# Patient Record
Sex: FEMALE | Race: WHITE | URBAN - METROPOLITAN AREA
[De-identification: names, ages, dates, MRNs, and addresses within clinical notes are randomized per-mention and may not be internally consistent; named-entity substitution may affect disease eponyms.]

---

## 2017-03-27 ENCOUNTER — RECORDS - HEALTHEAST (OUTPATIENT)
Dept: LAB | Facility: CLINIC | Age: 82
End: 2017-03-27

## 2017-03-27 LAB — HBA1C MFR BLD: 7.4 % (ref 4.2–6.1)

## 2017-03-28 ENCOUNTER — TRANSFERRED RECORDS (OUTPATIENT)
Dept: HEALTH INFORMATION MANAGEMENT | Facility: CLINIC | Age: 82
End: 2017-03-28

## 2017-03-28 ENCOUNTER — RECORDS - HEALTHEAST (OUTPATIENT)
Dept: LAB | Facility: CLINIC | Age: 82
End: 2017-03-28

## 2017-03-28 LAB — HBA1C MFR BLD: 7.4 % (ref 4.2–6.1)

## 2017-06-14 ENCOUNTER — HOSPITAL ENCOUNTER (INPATIENT)
Facility: CLINIC | Age: 82
LOS: 5 days | Discharge: HOME | DRG: 853 | End: 2017-06-19
Attending: EMERGENCY MEDICINE | Admitting: INTERNAL MEDICINE
Payer: COMMERCIAL

## 2017-06-14 ENCOUNTER — APPOINTMENT (OUTPATIENT)
Dept: GENERAL RADIOLOGY | Facility: CLINIC | Age: 82
DRG: 853 | End: 2017-06-14
Attending: EMERGENCY MEDICINE
Payer: COMMERCIAL

## 2017-06-14 DIAGNOSIS — J18.9 PNEUMONIA OF LEFT LUNG DUE TO INFECTIOUS ORGANISM, UNSPECIFIED PART OF LUNG: ICD-10-CM

## 2017-06-14 DIAGNOSIS — T83.511A URINARY TRACT INFECTION ASSOCIATED WITH CATHETERIZATION OF URINARY TRACT, UNSPECIFIED INDWELLING URINARY CATHETER TYPE, INITIAL ENCOUNTER (H): ICD-10-CM

## 2017-06-14 DIAGNOSIS — A41.9 SEPSIS, DUE TO UNSPECIFIED ORGANISM: ICD-10-CM

## 2017-06-14 DIAGNOSIS — M19.019 PRIMARY LOCALIZED OSTEOARTHROSIS OF SHOULDER REGION, UNSPECIFIED LATERALITY: Primary | ICD-10-CM

## 2017-06-14 DIAGNOSIS — L03.317 CELLULITIS OF BUTTOCK: ICD-10-CM

## 2017-06-14 DIAGNOSIS — N39.0 URINARY TRACT INFECTION ASSOCIATED WITH CATHETERIZATION OF URINARY TRACT, UNSPECIFIED INDWELLING URINARY CATHETER TYPE, INITIAL ENCOUNTER (H): ICD-10-CM

## 2017-06-14 LAB
ALBUMIN SERPL-MCNC: 2.5 G/DL (ref 3.4–5)
ALBUMIN UR-MCNC: 100 MG/DL
ALP SERPL-CCNC: 96 U/L (ref 40–150)
ALT SERPL W P-5'-P-CCNC: 22 U/L (ref 0–50)
ANION GAP SERPL CALCULATED.3IONS-SCNC: 7 MMOL/L (ref 3–14)
APPEARANCE UR: ABNORMAL
AST SERPL W P-5'-P-CCNC: 20 U/L (ref 0–45)
BACTERIA #/AREA URNS HPF: ABNORMAL /HPF
BASOPHILS # BLD AUTO: 0.1 10E9/L (ref 0–0.2)
BASOPHILS NFR BLD AUTO: 0.3 %
BILIRUB SERPL-MCNC: 0.5 MG/DL (ref 0.2–1.3)
BILIRUB UR QL STRIP: NEGATIVE
BUN SERPL-MCNC: 60 MG/DL (ref 7–30)
CALCIUM SERPL-MCNC: 9 MG/DL (ref 8.5–10.1)
CHLORIDE SERPL-SCNC: 110 MMOL/L (ref 94–109)
CO2 SERPL-SCNC: 27 MMOL/L (ref 20–32)
COLOR UR AUTO: YELLOW
CREAT SERPL-MCNC: 1.28 MG/DL (ref 0.52–1.04)
DIFFERENTIAL METHOD BLD: ABNORMAL
EOSINOPHIL # BLD AUTO: 0.3 10E9/L (ref 0–0.7)
EOSINOPHIL NFR BLD AUTO: 1.7 %
ERYTHROCYTE [DISTWIDTH] IN BLOOD BY AUTOMATED COUNT: 14.9 % (ref 10–15)
GFR SERPL CREATININE-BSD FRML MDRD: 39 ML/MIN/1.7M2
GLUCOSE BLDC GLUCOMTR-MCNC: 162 MG/DL (ref 70–99)
GLUCOSE SERPL-MCNC: 275 MG/DL (ref 70–99)
GLUCOSE UR STRIP-MCNC: 70 MG/DL
HCT VFR BLD AUTO: 39.6 % (ref 35–47)
HGB BLD-MCNC: 12.4 G/DL (ref 11.7–15.7)
HGB UR QL STRIP: ABNORMAL
IMM GRANULOCYTES # BLD: 0.1 10E9/L (ref 0–0.4)
IMM GRANULOCYTES NFR BLD: 0.5 %
INR PPP: 1.1 (ref 0.86–1.14)
KETONES UR STRIP-MCNC: NEGATIVE MG/DL
LACTATE BLD-SCNC: 2.6 MMOL/L (ref 0.7–2.1)
LACTATE BLD-SCNC: 3.5 MMOL/L (ref 0.7–2.1)
LEUKOCYTE ESTERASE UR QL STRIP: ABNORMAL
LIPASE SERPL-CCNC: 130 U/L (ref 73–393)
LYMPHOCYTES # BLD AUTO: 2.8 10E9/L (ref 0.8–5.3)
LYMPHOCYTES NFR BLD AUTO: 15.1 %
MCH RBC QN AUTO: 30.7 PG (ref 26.5–33)
MCHC RBC AUTO-ENTMCNC: 31.3 G/DL (ref 31.5–36.5)
MCV RBC AUTO: 98 FL (ref 78–100)
MONOCYTES # BLD AUTO: 1.2 10E9/L (ref 0–1.3)
MONOCYTES NFR BLD AUTO: 6.2 %
NEUTROPHILS # BLD AUTO: 14.3 10E9/L (ref 1.6–8.3)
NEUTROPHILS NFR BLD AUTO: 76.2 %
NITRATE UR QL: POSITIVE
NRBC # BLD AUTO: 0 10*3/UL
NRBC BLD AUTO-RTO: 0 /100
PH UR STRIP: 6.5 PH (ref 5–7)
PLATELET # BLD AUTO: 386 10E9/L (ref 150–450)
POTASSIUM SERPL-SCNC: 4.1 MMOL/L (ref 3.4–5.3)
PROT SERPL-MCNC: 8.2 G/DL (ref 6.8–8.8)
RBC # BLD AUTO: 4.04 10E12/L (ref 3.8–5.2)
RBC #/AREA URNS AUTO: 0 /HPF (ref 0–2)
SODIUM SERPL-SCNC: 144 MMOL/L (ref 133–144)
SP GR UR STRIP: 1.01 (ref 1–1.03)
URN SPEC COLLECT METH UR: ABNORMAL
UROBILINOGEN UR STRIP-MCNC: NORMAL MG/DL (ref 0–2)
WBC # BLD AUTO: 18.7 10E9/L (ref 4–11)
WBC #/AREA URNS AUTO: >182 /HPF (ref 0–2)
WBC CLUMPS #/AREA URNS HPF: PRESENT /HPF

## 2017-06-14 PROCEDURE — 87088 URINE BACTERIA CULTURE: CPT | Performed by: EMERGENCY MEDICINE

## 2017-06-14 PROCEDURE — 87186 SC STD MICRODIL/AGAR DIL: CPT | Performed by: EMERGENCY MEDICINE

## 2017-06-14 PROCEDURE — 87106 FUNGI IDENTIFICATION YEAST: CPT | Performed by: EMERGENCY MEDICINE

## 2017-06-14 PROCEDURE — 83690 ASSAY OF LIPASE: CPT | Performed by: EMERGENCY MEDICINE

## 2017-06-14 PROCEDURE — 81001 URINALYSIS AUTO W/SCOPE: CPT | Performed by: EMERGENCY MEDICINE

## 2017-06-14 PROCEDURE — 85025 COMPLETE CBC W/AUTO DIFF WBC: CPT | Performed by: EMERGENCY MEDICINE

## 2017-06-14 PROCEDURE — 80053 COMPREHEN METABOLIC PANEL: CPT | Performed by: EMERGENCY MEDICINE

## 2017-06-14 PROCEDURE — 40000275 ZZH STATISTIC RCP TIME EA 10 MIN

## 2017-06-14 PROCEDURE — 83605 ASSAY OF LACTIC ACID: CPT | Performed by: INTERNAL MEDICINE

## 2017-06-14 PROCEDURE — 83605 ASSAY OF LACTIC ACID: CPT | Performed by: EMERGENCY MEDICINE

## 2017-06-14 PROCEDURE — 36415 COLL VENOUS BLD VENIPUNCTURE: CPT | Performed by: EMERGENCY MEDICINE

## 2017-06-14 PROCEDURE — 96365 THER/PROPH/DIAG IV INF INIT: CPT

## 2017-06-14 PROCEDURE — 87040 BLOOD CULTURE FOR BACTERIA: CPT | Performed by: EMERGENCY MEDICINE

## 2017-06-14 PROCEDURE — 12000000 ZZH R&B MED SURG/OB

## 2017-06-14 PROCEDURE — 87077 CULTURE AEROBIC IDENTIFY: CPT | Performed by: EMERGENCY MEDICINE

## 2017-06-14 PROCEDURE — 99223 1ST HOSP IP/OBS HIGH 75: CPT | Mod: AI | Performed by: INTERNAL MEDICINE

## 2017-06-14 PROCEDURE — 96368 THER/DIAG CONCURRENT INF: CPT

## 2017-06-14 PROCEDURE — 96361 HYDRATE IV INFUSION ADD-ON: CPT

## 2017-06-14 PROCEDURE — 25000128 H RX IP 250 OP 636

## 2017-06-14 PROCEDURE — 25000131 ZZH RX MED GY IP 250 OP 636 PS 637: Performed by: INTERNAL MEDICINE

## 2017-06-14 PROCEDURE — 25000132 ZZH RX MED GY IP 250 OP 250 PS 637: Performed by: INTERNAL MEDICINE

## 2017-06-14 PROCEDURE — 25000132 ZZH RX MED GY IP 250 OP 250 PS 637: Performed by: EMERGENCY MEDICINE

## 2017-06-14 PROCEDURE — 25000128 H RX IP 250 OP 636: Performed by: INTERNAL MEDICINE

## 2017-06-14 PROCEDURE — 25000128 H RX IP 250 OP 636: Performed by: EMERGENCY MEDICINE

## 2017-06-14 PROCEDURE — 71020 XR CHEST 2 VW: CPT

## 2017-06-14 PROCEDURE — 87086 URINE CULTURE/COLONY COUNT: CPT | Performed by: EMERGENCY MEDICINE

## 2017-06-14 PROCEDURE — 85610 PROTHROMBIN TIME: CPT | Performed by: EMERGENCY MEDICINE

## 2017-06-14 PROCEDURE — 96367 TX/PROPH/DG ADDL SEQ IV INF: CPT

## 2017-06-14 PROCEDURE — 36415 COLL VENOUS BLD VENIPUNCTURE: CPT

## 2017-06-14 PROCEDURE — 99285 EMERGENCY DEPT VISIT HI MDM: CPT | Mod: 25

## 2017-06-14 PROCEDURE — 36415 COLL VENOUS BLD VENIPUNCTURE: CPT | Performed by: INTERNAL MEDICINE

## 2017-06-14 PROCEDURE — 00000146 ZZHCL STATISTIC GLUCOSE BY METER IP

## 2017-06-14 PROCEDURE — 87070 CULTURE OTHR SPECIMN AEROBIC: CPT | Performed by: EMERGENCY MEDICINE

## 2017-06-14 RX ORDER — ACETAMINOPHEN 650 MG/1
650 SUPPOSITORY RECTAL ONCE
Status: COMPLETED | OUTPATIENT
Start: 2017-06-14 | End: 2017-06-14

## 2017-06-14 RX ORDER — OXYCODONE HCL 5 MG/5 ML
2 SOLUTION, ORAL ORAL EVERY 4 HOURS PRN
Status: DISCONTINUED | OUTPATIENT
Start: 2017-06-14 | End: 2017-06-19 | Stop reason: HOSPADM

## 2017-06-14 RX ORDER — BISACODYL 10 MG
10 SUPPOSITORY, RECTAL RECTAL DAILY PRN
Status: DISCONTINUED | OUTPATIENT
Start: 2017-06-14 | End: 2017-06-19 | Stop reason: HOSPADM

## 2017-06-14 RX ORDER — ACETAMINOPHEN 325 MG/1
650 TABLET ORAL 3 TIMES DAILY
Status: DISCONTINUED | OUTPATIENT
Start: 2017-06-14 | End: 2017-06-19 | Stop reason: HOSPADM

## 2017-06-14 RX ORDER — POTASSIUM CHLORIDE 29.8 MG/ML
20 INJECTION INTRAVENOUS
Status: DISCONTINUED | OUTPATIENT
Start: 2017-06-14 | End: 2017-06-19 | Stop reason: HOSPADM

## 2017-06-14 RX ORDER — NALOXONE HYDROCHLORIDE 0.4 MG/ML
.1-.4 INJECTION, SOLUTION INTRAMUSCULAR; INTRAVENOUS; SUBCUTANEOUS
Status: DISCONTINUED | OUTPATIENT
Start: 2017-06-14 | End: 2017-06-19 | Stop reason: HOSPADM

## 2017-06-14 RX ORDER — SODIUM CHLORIDE 9 MG/ML
INJECTION, SOLUTION INTRAVENOUS CONTINUOUS
Status: DISCONTINUED | OUTPATIENT
Start: 2017-06-14 | End: 2017-06-15

## 2017-06-14 RX ORDER — LACTOBACILLUS RHAMNOSUS GG 10B CELL
1 CAPSULE ORAL DAILY
Status: DISCONTINUED | OUTPATIENT
Start: 2017-06-14 | End: 2017-06-19 | Stop reason: HOSPADM

## 2017-06-14 RX ORDER — OXYCODONE HCL 5 MG/5 ML
2 SOLUTION, ORAL ORAL EVERY 4 HOURS PRN
Status: ON HOLD | COMMUNITY
End: 2017-06-19

## 2017-06-14 RX ORDER — VANCOMYCIN HYDROCHLORIDE 1 G/200ML
1000 INJECTION, SOLUTION INTRAVENOUS ONCE
Status: COMPLETED | OUTPATIENT
Start: 2017-06-14 | End: 2017-06-14

## 2017-06-14 RX ORDER — NICOTINE POLACRILEX 4 MG
15-30 LOZENGE BUCCAL
Status: DISCONTINUED | OUTPATIENT
Start: 2017-06-14 | End: 2017-06-19 | Stop reason: HOSPADM

## 2017-06-14 RX ORDER — INSULIN GLARGINE 100 [IU]/ML
36 INJECTION, SOLUTION SUBCUTANEOUS EVERY MORNING
Status: ON HOLD | COMMUNITY
End: 2017-07-08

## 2017-06-14 RX ORDER — INSULIN GLARGINE 100 [IU]/ML
34 INJECTION, SOLUTION SUBCUTANEOUS AT BEDTIME
Status: ON HOLD | COMMUNITY
End: 2017-07-08

## 2017-06-14 RX ORDER — MULTIVITAMIN,THERAPEUTIC
1 TABLET ORAL DAILY
COMMUNITY

## 2017-06-14 RX ORDER — LEVOFLOXACIN 5 MG/ML
250 INJECTION, SOLUTION INTRAVENOUS EVERY 24 HOURS
Status: DISCONTINUED | OUTPATIENT
Start: 2017-06-15 | End: 2017-06-17

## 2017-06-14 RX ORDER — SODIUM CHLORIDE 9 MG/ML
1000 INJECTION, SOLUTION INTRAVENOUS CONTINUOUS
Status: DISCONTINUED | OUTPATIENT
Start: 2017-06-14 | End: 2017-06-15

## 2017-06-14 RX ORDER — OXYCODONE HCL 5 MG/5 ML
2 SOLUTION, ORAL ORAL 2 TIMES DAILY
Status: ON HOLD | COMMUNITY
End: 2017-06-19

## 2017-06-14 RX ORDER — ONDANSETRON 4 MG/1
4 TABLET, ORALLY DISINTEGRATING ORAL EVERY 6 HOURS PRN
Status: DISCONTINUED | OUTPATIENT
Start: 2017-06-14 | End: 2017-06-19 | Stop reason: HOSPADM

## 2017-06-14 RX ORDER — ACETAMINOPHEN 325 MG/1
650 TABLET ORAL EVERY 4 HOURS PRN
Status: DISCONTINUED | OUTPATIENT
Start: 2017-06-14 | End: 2017-06-14

## 2017-06-14 RX ORDER — DEXTROSE MONOHYDRATE 25 G/50ML
25-50 INJECTION, SOLUTION INTRAVENOUS
Status: DISCONTINUED | OUTPATIENT
Start: 2017-06-14 | End: 2017-06-19 | Stop reason: HOSPADM

## 2017-06-14 RX ORDER — ONDANSETRON 2 MG/ML
4 INJECTION INTRAMUSCULAR; INTRAVENOUS EVERY 6 HOURS PRN
Status: DISCONTINUED | OUTPATIENT
Start: 2017-06-14 | End: 2017-06-19 | Stop reason: HOSPADM

## 2017-06-14 RX ORDER — LIDOCAINE 40 MG/G
CREAM TOPICAL
Status: DISCONTINUED | OUTPATIENT
Start: 2017-06-14 | End: 2017-06-19 | Stop reason: HOSPADM

## 2017-06-14 RX ORDER — POTASSIUM CHLORIDE 1500 MG/1
20-40 TABLET, EXTENDED RELEASE ORAL
Status: DISCONTINUED | OUTPATIENT
Start: 2017-06-14 | End: 2017-06-19 | Stop reason: HOSPADM

## 2017-06-14 RX ORDER — PIPERACILLIN SODIUM, TAZOBACTAM SODIUM 3; .375 G/15ML; G/15ML
3.38 INJECTION, POWDER, LYOPHILIZED, FOR SOLUTION INTRAVENOUS ONCE
Status: COMPLETED | OUTPATIENT
Start: 2017-06-14 | End: 2017-06-14

## 2017-06-14 RX ORDER — LEVOFLOXACIN 5 MG/ML
500 INJECTION, SOLUTION INTRAVENOUS ONCE
Status: COMPLETED | OUTPATIENT
Start: 2017-06-14 | End: 2017-06-14

## 2017-06-14 RX ORDER — ACETAMINOPHEN 650 MG/1
650 SUPPOSITORY RECTAL EVERY 4 HOURS PRN
Status: DISCONTINUED | OUTPATIENT
Start: 2017-06-14 | End: 2017-06-19 | Stop reason: HOSPADM

## 2017-06-14 RX ORDER — POTASSIUM CL/LIDO/0.9 % NACL 10MEQ/0.1L
10 INTRAVENOUS SOLUTION, PIGGYBACK (ML) INTRAVENOUS
Status: DISCONTINUED | OUTPATIENT
Start: 2017-06-14 | End: 2017-06-19 | Stop reason: HOSPADM

## 2017-06-14 RX ORDER — PIPERACILLIN SODIUM, TAZOBACTAM SODIUM 3; .375 G/15ML; G/15ML
3.38 INJECTION, POWDER, LYOPHILIZED, FOR SOLUTION INTRAVENOUS EVERY 6 HOURS
Status: DISCONTINUED | OUTPATIENT
Start: 2017-06-14 | End: 2017-06-17

## 2017-06-14 RX ORDER — OXYCODONE HCL 5 MG/5 ML
2 SOLUTION, ORAL ORAL 2 TIMES DAILY
Status: DISCONTINUED | OUTPATIENT
Start: 2017-06-14 | End: 2017-06-19 | Stop reason: HOSPADM

## 2017-06-14 RX ORDER — POTASSIUM CHLORIDE 7.45 MG/ML
10 INJECTION INTRAVENOUS
Status: DISCONTINUED | OUTPATIENT
Start: 2017-06-14 | End: 2017-06-19 | Stop reason: HOSPADM

## 2017-06-14 RX ORDER — POTASSIUM CHLORIDE 1.5 G/1.58G
20-40 POWDER, FOR SOLUTION ORAL
Status: DISCONTINUED | OUTPATIENT
Start: 2017-06-14 | End: 2017-06-19 | Stop reason: HOSPADM

## 2017-06-14 RX ADMIN — INSULIN ASPART 1 UNITS: 100 INJECTION, SOLUTION INTRAVENOUS; SUBCUTANEOUS at 20:40

## 2017-06-14 RX ADMIN — PIPERACILLIN SODIUM,TAZOBACTAM SODIUM 3.38 G: 3; .375 INJECTION, POWDER, FOR SOLUTION INTRAVENOUS at 20:10

## 2017-06-14 RX ADMIN — SODIUM CHLORIDE 1000 ML: 9 INJECTION, SOLUTION INTRAVENOUS at 12:20

## 2017-06-14 RX ADMIN — SODIUM CHLORIDE: 9 INJECTION, SOLUTION INTRAVENOUS at 18:08

## 2017-06-14 RX ADMIN — PIPERACILLIN SODIUM,TAZOBACTAM SODIUM 3.38 G: 3; .375 INJECTION, POWDER, FOR SOLUTION INTRAVENOUS at 14:38

## 2017-06-14 RX ADMIN — VANCOMYCIN HYDROCHLORIDE 500 MG: 5 INJECTION, POWDER, LYOPHILIZED, FOR SOLUTION INTRAVENOUS at 21:04

## 2017-06-14 RX ADMIN — MICONAZOLE NITRATE: 2 POWDER TOPICAL at 20:53

## 2017-06-14 RX ADMIN — RANITIDINE 75 MG: 75 TABLET, FILM COATED ORAL at 20:45

## 2017-06-14 RX ADMIN — LEVOFLOXACIN 500 MG: 5 INJECTION, SOLUTION INTRAVENOUS at 14:10

## 2017-06-14 RX ADMIN — Medication 1 CAPSULE: at 20:45

## 2017-06-14 RX ADMIN — SODIUM CHLORIDE 1000 ML: 9 INJECTION, SOLUTION INTRAVENOUS at 13:31

## 2017-06-14 RX ADMIN — VANCOMYCIN HYDROCHLORIDE 1000 MG: 1 INJECTION, SOLUTION INTRAVENOUS at 15:19

## 2017-06-14 RX ADMIN — ACETAMINOPHEN 650 MG: 650 SUPPOSITORY RECTAL at 13:30

## 2017-06-14 RX ADMIN — OXYCODONE HYDROCHLORIDE 2 MG: 5 SOLUTION ORAL at 20:45

## 2017-06-14 RX ADMIN — ACETAMINOPHEN 650 MG: 325 TABLET, FILM COATED ORAL at 20:46

## 2017-06-14 NOTE — H&P
DATE OF ADMISSION:  06/14/2017      CHIEF COMPLAINT:  Fever, low oxygen saturations.      HISTORY OF PRESENT ILLNESS:  Ms. Celia Lewis is a 89-year-old female with history of diabetes mellitus type 2, CHF, chronic kidney disease, coronary artery disease, hypertension, recurrent UTIs, Alzheimer dementia, with baseline nonverbal status, who was sent from her long-term care facility for low O2 sats and fever.  Reportedly, the staff at the nursing home noted that the patient's oxygen saturations were low and she also had a fever up to 100.3.  She was also tachypneic and had some labored breathing and so was sent to the emergency room.  No further information is available at this time as the patient is nonverbal at baseline because of her advanced dementia.  There is no reported recent diarrhea or vomiting.  The patient does have a feeding tube in place for nutrition.      In the emergency room, the patient was evaluated by Dr. Cortez.  Basic lab work showed a white count of 18.7 and UA consistent with UTI.  She also has a chest x-ray which showed an infiltrate in the left base.  She does have a chronic indwelling Fonseca which was changed in the emergency room.  Started on empiric antibiotics for both UTI and presumed healthcare-associated pneumonia.  She was also found to have a sacral decubitus with surrounding cellulitis.      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     Advanced directives, counseling/discussion, POLST completed 5/29/15  Full Code 5/29/2015     Alzheimer's disease 5/22/2015     Arthritis     shoulder     Atrial fibrillation (H) 5/22/2015     Calculus of kidney      Candidiasis of skin and nails 5/22/2015     Congestive heart failure, unspecified      Coronary artery disease      Heart disease, unspecified     diastolic dysfunction     Hypertension      Morbid obesity (H) 5/22/2015     Other and unspecified hyperlipidemia 5/22/2015      Personal history of urinary (tract) infection 5/22/2015     Primary localized osteoarthrosis, shoulder region 5/22/2015     Renal failure, unspecified     secondary to diuresis     Thyroid disease      Type II or unspecified type diabetes mellitus with renal manifestations, not stated as uncontrolled(250.40) 5/22/2015     Type II or unspecified type diabetes mellitus without mention of complication, not stated as uncontrolled      Unspecified hypertensive heart and kidney disease with heart failure and with chronic kidney disease stage I through stage IV, or unspecified(404.91) 5/22/2015     Uric acid stone in urine         ALLERGIES:    Allergies   Allergen Reactions     Sulfa Drugs Other (See Comments)     Per nursing home documents, patient has allergy to sulfa        SOCIAL AND PERSONAL HISTORY:   Social History     Social History     Marital status:      Spouse name: Leonel     Number of children: N/A     Years of education: N/A     Occupational History      Retired     Social History Main Topics     Smoking status: Former Smoker     Smokeless tobacco: Never Used      Comment: She was a social smoker many years ago     Alcohol use No     Drug use: No     Sexual activity: Not on file     Other Topics Concern     Not on file     Social History Narrative        HOME MEDICATIONS:  Prescriptions Prior to Admission   Medication Sig Dispense Refill Last Dose     insulin glargine (LANTUS) 100 UNIT/ML injection Inject 36 Units Subcutaneous every morning   6/14/2017 at Unknown time     insulin glargine (LANTUS) 100 UNIT/ML injection Inject 34 Units Subcutaneous At Bedtime   6/13/2017 at Unknown time     insulin NPH (HUMULIN N/NOVOLIN N) 100 UNIT/ML injection Inject 16 Units Subcutaneous every morning   6/14/2017 at Unknown time     insulin NPH (HUMULIN N/NOVOLIN N) 100 UNIT/ML injection Inject 10 Units Subcutaneous every evening Given at 1400 daily   6/13/2017 at Unknown time     Lactobacillus Acidophilus POWD 1  each by Gastric Tube route daily 10 billion units   6/14/2017 at Unknown time     multivitamin, therapeutic (THERA-VIT) TABS tablet 1 tablet by Gastric Tube route daily   6/14/2017 at Unknown time     oxyCODONE (ROXICODONE) 5 MG/5ML solution 2 mg by Gastric Tube route 2 times daily   6/14/2017 at 1000     Loperamide HCl (IMODIUM A-D) 1 MG/7.5ML LIQD 4 mg by Gastric Tube route every other day   6/14/2017 at am     acetaminophen (TYLENOL) 32 mg/mL solution 325 mg by Gastric Tube route daily as needed for fever or mild pain        oxyCODONE (ROXICODONE) 5 MG/5ML solution 2 mg by Gastric Tube route every 4 hours as needed for moderate to severe pain        insulin aspart (NOVOLOG PEN) 100 UNIT/ML soln Inject 1-6 Units Subcutaneous every 4 hours   6/14/2017 at Unknown time     ACETAMINOPHEN  mg by Gastric Tube route 3 times daily    6/14/2017 at x1     nystatin (MYCOSTATIN) 666360 UNIT/GM POWD Apply 1 g topically 3 times daily as needed Apply to stomach folds as needed        RANITIDINE HCL PO 75 mg by Gastric Tube route 2 times daily    6/14/2017 at am     bisacodyl (DULCOLAX) 10 MG suppository Place 10 mg rectally daily as needed for constipation         FAMILY HISTORY:  Family History     Problem (# of Occurrences) Relation (Name,Age of Onset)    CANCER (1) Unspecified    CEREBROVASCULAR DISEASE (2) Mother, Father    DIABETES (1) Brother           REVIEW OF SYSTEMS:  A complete review of system is unobtainable because of patient's baseline is nonverbal state.      PHYSICAL EXAMINATION:   GENERAL:  Ms. Lewis is a 89-year-old female.  She does not appear to be in overt distress, but she is nonverbal at baseline.   VITAL SIGNS:  Temperature is 99.4, blood pressure is 138/74, heart rate is 120, respiration rate is 36.  O2 sats is 92% on 3 liters nasal cannula.   HEENT:  Atraumatic, normocephalic, no pallor or icterus.   NECK:  Supple.   RESPIRATORY:  Coarse breath sounds at the left base with some crackles, normal  effort of breathing.   CARDIOVASCULAR:  Tachycardic, no murmur.   ABDOMEN:  Slightly distended but soft.  Feeding tube in place.   SKIN:  Warm and dry.   NEUROLOGIC:  She does open eyes to verbal commands but does not follow any commands and she is nonverbal.  Difficult to assess her muscle strength.   MUSCULOSKELETAL:  She does appear to have contractures of her extremities.   BACK:  She does have a sacral decubitus which is stable.      LABORATORY DATA:  Sodium is 144, potassium is 4.1, creatinine is 1.28, which is about her baseline.  Lactic acid is elevated at 3.5.  White cell count is 18.7.  UA consistent with UTI with positive nitrite, large leukocyte esterase and 182 WBC.  Chest x-ray with infiltrate on the left lung base.      ASSESSMENT AND PLAN:  Ms. Lewis is a 89-year-old female with advanced dementia, nonverbal at baseline and other medical problems as described above who presents from a nursing home with low oxygen sats, fever and labored breathing.   1.  Severe sepsis due to healthcare associated pneumonia and urinary tract infection.  The patient presents with tachypnea, low oxygen sats and fever.  Chest x-ray does show an infiltrate in the left lung base.  It does appear like she has UTI also.  At this time, she will be covered on empiric antibiotics for healthcare associated pneumonia with vancomycin, Zosyn and Levaquin.  Await results of her blood culture.  She is saturating adequately on 3 liters oxygen at the moment.   2.  Urinary tract infection.  The patient does have a chronic indwelling Fonseca which was changed in the emergency room.  UA consistent with UTI.  Antibiotics as above.   3.  Advanced dementia.  The patient is nonverbal at baseline and apparently does not move around.  Reportedly, there has been no new change in mental state, but this is her baseline.  She does have tube feedings for feeding, nutritional services will be consulted for helping with her tube feeding.   4.  Diabetes  mellitus type 2.  She is on Lantus 35 units in the morning and 40 units at bedtime.  I will continue that at a slightly reduced dose of 30 units in the morning and 30 units at bedtime.  Also, cover with moderate resistance sliding scale.   5.  Sacral decubitus, stable.  Patient does have about 4-5 cm size sacral decubitus with surrounding cellulitis.  Wound care will be consulted.  Antibiotics as above.   6.  Chronic kidney disease, stage 3.  This appears to be at baseline.  Avoid nephrotoxins.        CODE STATUS:  I did not personally speak with the family; however, per Dr. Cortez in the emergency room who spoke with the family, the patient has been and will be a full code.      Anticipated length of stay more than 2 midnights.         VICTOR MANUEL HERRING MD             D: 2017 14:44   T: 2017 15:23   MT: CD      Name:     RAÚL MERCEDES   MRN:      [id number]        Account:      [id number]   :      1926           Admitted:     [id number]      Document: [id number]

## 2017-06-14 NOTE — IP AVS SNAPSHOT
MRN:[id number]                      After Visit Summary   6/14/2017    Celia Lewis    MRN: [id number]           Thank you!     Thank you for choosing [facility] for your care. Our goal is always to provide you with excellent care. Hearing back from our patients is one way we can continue to improve our services. Please take a few minutes to complete the written survey that you may receive in the mail after you visit with us. Thank you!        Patient Information     Date Of Birth          11/30/1926        Designated Caregiver       Most Recent Value    Caregiver    Will someone help with your care after discharge? yes    Name of designated caregiver Daniel argueta Kunkle     Phone number of caregiver [phone]    Caregiver address [address] [place] [address]      About your hospital stay     You were admitted on:  June 14, 2017 You last received care in the:  [facility] [address] [facility]    You were discharged on:  June 19, 2017        Reason for your hospital stay       UTI, pneumonia, decubitus ulcer                  Who to Call     For medical emergencies, please call 911.  For non-urgent questions about your medical care, please call your primary care provider or clinic, None          Attending Provider     Provider Specialty    Chencho Cortez MD Emergency Medicine    Antwan Green MD Internal Medicine       Primary Care Provider    None Specified      After Care Instructions     Activity - Up with nursing assistance       Bed bound status            Advance Diet as Tolerated       Follow this diet upon discharge: Orders Placed This Encounter      Adult Formula Drip Feeding: Continuous Isosource 1.5; Other - Specify in Comment; Goal Rate: 45; mL/hr; Medication - Tube Feeding Flush Frequency: At least 15-30 mL water before and after medication administration and with tube clogging; Amout to ...      NPO for Medical/Clinical Reasons Except for: NPO but  receiving Tube Feeding              Fall precautions           General info for SNF       Length of Stay Estimate: Long Term Care: Estimated # of Days >30  Condition at Discharge: Stable  Level of care:skilled   Rehabilitation Potential: Poor  Admission H&P remains valid and up-to-date: Yes  Recent Chemotherapy: N/A  Use Nursing Home Standing Orders: Yes            Mantoux instructions       Give two-step Mantoux (PPD) Per Facility Policy Yes            Wound care       Per wound care nurse instructions:    Plan for wound care to coccyx/sacrum: BID and prn :  1.  Cleanse wound with MicroKlenz wound cleanser.  Cleanse periwound with mild skin cleanser, ie Nakul perineal lotion.  2.  Moisten a kerlix fluff with Dakin's solution (aka sodium hypochlorite; comes from Pharmacy), wring out excess.  Pack into wound.  3.  Apply antifungal powder to periwound, rub in well.  Then apply Critic-Aid barrier paste over the powder to periwound (and perineal area if needed).  4.  Cover with ABD pad and minimal Medipore tape.  Label with time/date/initials.  5.  Follow rigorous PIP measures.                  Follow-up Appointments     Follow Up       Follow-up with LTC provider in 2-4 days                  Further instructions from your care team         Type of Feeding Tube: G-tube  Enteral Frequency:  Continuous  Enteral Regimen: Isosource 1.5 at 45 mL/hr  Total Enteral Provisions: 1620 kcal (332 kcal per kg), 73 g protein (1.4 g per kg), 16 g fiber, 190 g CHO, 820 mL H2O, 04196 International Units of Vit A, 346 mg of Vit C, and 35 mg of Zinc  Free Water Flush: 135 mL every 4 hours  Certavite via FT daily  Tri-Vi-Sol, 7 mL daily x 10 days (6/16-6/26) (5250 International Units Vit A, 245 mg Vit C, and 2800 International Units Vit D)  Total (TF + Tri-Vi-Sol)= 90441 International Units Vit A and 591 mg Vit C    [facility] back to [facility], phone [phone]    Pending Results     Date and Time Order Name Status Description     "2017 1257 Blood culture Preliminary     2017 1158 Blood culture Preliminary             Statement of Approval     Ordered          17 1011  I have reviewed and agree with all the recommendations and orders detailed in this document.  EFFECTIVE NOW     Approved and electronically signed by:  Kraig Mobley MD             Admission Information     Date & Time Provider Department Dept. Phone    2017 Antwan Green MD [facility] [address] [facility] [phone]      Your Vitals Were     Blood Pressure Pulse Temperature Respirations Height Weight    154/90 (BP Location: Left arm) 95 98.6  F (37  C) (Axillary) 20 1.524 m (5') 69.3 kg (152 lb 12.5 oz)    Pulse Oximetry BMI (Body Mass Index)                96% 29.84 kg/m2          MyChart Information     [facility] lets you send messages to your doctor, view your test results, renew your prescriptions, schedule appointments and more. To sign up, go to [email or web address] . Click on \"Log in\" on the left side of the screen, which will take you to the Welcome page. Then click on \"Sign up Now\" on the right side of the page.     You will be asked to enter the access code listed below, as well as some personal information. Please follow the directions to create your username and password.     Your access code is: [email or web address]  Expires: 2017 12:54 PM     Your access code will  in 90 days. If you need help or a new code, please call your [facility] clinic or [phone].        Care EveryWhere ID     This is your Care EveryWhere ID. This could be used by other organizations to access your [facility] medical records  [id number]           Review of your medicines      START taking        Dose / Directions    levofloxacin 250 MG tablet   Commonly known as:  LEVAQUIN   Used for:  Pneumonia of left lung due to infectious organism, unspecified part of lung        Dose:  250 mg   1 tablet (250 mg) by Per G Tube route daily for 5 days "   Quantity:  5 tablet   Refills:  0       linezolid 600 MG tablet   Commonly known as:  ZYVOX   Used for:  Urinary tract infection associated with catheterization of urinary tract, unspecified indwelling urinary catheter type, initial encounter        Dose:  600 mg   1 tablet (600 mg) by Per G Tube route 2 times daily for 7 days   Refills:  0         CONTINUE these medicines which may have CHANGED, or have new prescriptions. If we are uncertain of the size of tablets/capsules you have at home, strength may be listed as something that might have changed.        Dose / Directions    * oxyCODONE 5 MG/5ML solution   Commonly known as:  ROXICODONE   This may have changed:  how to take this   Used for:  Primary localized osteoarthrosis of shoulder region, unspecified laterality        Dose:  2 mg   2 mLs (2 mg) by Per G Tube route 2 times daily   Quantity:  15 mL   Refills:  0       * oxyCODONE 5 MG/5ML solution   Commonly known as:  ROXICODONE   This may have changed:  how to take this   Used for:  Primary localized osteoarthrosis of shoulder region, unspecified laterality        Dose:  2 mg   2 mLs (2 mg) by Per G Tube route every 4 hours as needed for moderate to severe pain   Refills:  0       * Notice:  This list has 2 medication(s) that are the same as other medications prescribed for you. Read the directions carefully, and ask your doctor or other care provider to review them with you.      CONTINUE these medicines which have NOT CHANGED        Dose / Directions    * ACETAMINOPHEN PO        Dose:  650 mg   650 mg by Gastric Tube route 3 times daily   Refills:  0       * acetaminophen 32 mg/mL solution   Commonly known as:  TYLENOL        Dose:  325 mg   325 mg by Gastric Tube route daily as needed for fever or mild pain   Refills:  0       bisacodyl 10 MG Suppository   Commonly known as:  DULCOLAX        Dose:  10 mg   Place 10 mg rectally daily as needed for constipation   Refills:  0       IMODIUM A-D 1 MG/7.5ML  Liqd   Generic drug:  Loperamide HCl        Dose:  4 mg   4 mg by Gastric Tube route every other day   Refills:  0       insulin aspart 100 UNIT/ML injection   Commonly known as:  NovoLOG PEN   Used for:  Type 2 diabetes mellitus with diabetic nephropathy (H)        Dose:  1-6 Units   Inject 1-6 Units Subcutaneous every 4 hours   Refills:  0       * insulin glargine 100 UNIT/ML injection   Commonly known as:  LANTUS        Dose:  36 Units   Inject 36 Units Subcutaneous every morning   Refills:  0       * insulin glargine 100 UNIT/ML injection   Commonly known as:  LANTUS        Dose:  34 Units   Inject 34 Units Subcutaneous At Bedtime   Refills:  0       * insulin  UNIT/ML injection   Commonly known as:  HumuLIN N/NovoLIN N        Dose:  16 Units   Inject 16 Units Subcutaneous every morning   Refills:  0       * insulin  UNIT/ML injection   Commonly known as:  HumuLIN N/NovoLIN N        Dose:  10 Units   Inject 10 Units Subcutaneous every evening Given at 1400 daily   Refills:  0       Lactobacillus Acidophilus Powd        Dose:  1 each   1 each by Gastric Tube route daily 10 billion units   Refills:  0       multivitamin, therapeutic Tabs tablet        Dose:  1 tablet   1 tablet by Gastric Tube route daily   Refills:  0       nystatin 458607 UNIT/GM Powd   Commonly known as:  MYCOSTATIN        Dose:  1 g   Apply 1 g topically 3 times daily as needed Apply to stomach folds as needed   Refills:  0       RANITIDINE HCL PO        Dose:  75 mg   75 mg by Gastric Tube route 2 times daily   Refills:  0       * Notice:  This list has 6 medication(s) that are the same as other medications prescribed for you. Read the directions carefully, and ask your doctor or other care provider to review them with you.         Where to get your medicines      Some of these will need a paper prescription and others can be bought over the counter. Ask your nurse if you have questions.     You don't need a prescription for  these medications     levofloxacin 250 MG tablet    linezolid 600 MG tablet         Information about where to get these medications is not yet available     ! Ask your nurse or doctor about these medications     oxyCODONE 5 MG/5ML solution    oxyCODONE 5 MG/5ML solution                Protect others around you: Learn how to safely use, store and throw away your medicines at www.disposemymeds.org.             Medication List: This is a list of all your medications and when to take them. Check marks below indicate your daily home schedule. Keep this list as a reference.      Medications           Morning Afternoon Evening Bedtime As Needed    * ACETAMINOPHEN PO   650 mg by Gastric Tube route 3 times daily   Last time this was given:  650 mg on 6/19/2017  8:23 AM                                * acetaminophen 32 mg/mL solution   Commonly known as:  TYLENOL   325 mg by Gastric Tube route daily as needed for fever or mild pain   Last time this was given:  650 mg on 6/19/2017  8:23 AM                                bisacodyl 10 MG Suppository   Commonly known as:  DULCOLAX   Place 10 mg rectally daily as needed for constipation                                IMODIUM A-D 1 MG/7.5ML Liqd   4 mg by Gastric Tube route every other day   Generic drug:  Loperamide HCl                                insulin aspart 100 UNIT/ML injection   Commonly known as:  NovoLOG PEN   Inject 1-6 Units Subcutaneous every 4 hours   Last time this was given:  1 Units on 6/19/2017 12:34 PM                                * insulin glargine 100 UNIT/ML injection   Commonly known as:  LANTUS   Inject 36 Units Subcutaneous every morning   Last time this was given:  36 Units on 6/19/2017  8:25 AM                                * insulin glargine 100 UNIT/ML injection   Commonly known as:  LANTUS   Inject 34 Units Subcutaneous At Bedtime   Last time this was given:  36 Units on 6/19/2017  8:25 AM                                * insulin  UNIT/ML  injection   Commonly known as:  HumuLIN N/NovoLIN N   Inject 16 Units Subcutaneous every morning                                * insulin  UNIT/ML injection   Commonly known as:  HumuLIN N/NovoLIN N   Inject 10 Units Subcutaneous every evening Given at 1400 daily                                Lactobacillus Acidophilus Powd   1 each by Gastric Tube route daily 10 billion units                                levofloxacin 250 MG tablet   Commonly known as:  LEVAQUIN   1 tablet (250 mg) by Per G Tube route daily for 5 days                                linezolid 600 MG tablet   Commonly known as:  ZYVOX   1 tablet (600 mg) by Per G Tube route 2 times daily for 7 days                                multivitamin, therapeutic Tabs tablet   1 tablet by Gastric Tube route daily                                nystatin [id number] UNIT/GM Powd   Commonly known as:  MYCOSTATIN   Apply 1 g topically 3 times daily as needed Apply to stomach folds as needed                                * oxyCODONE 5 MG/5ML solution   Commonly known as:  ROXICODONE   2 mLs (2 mg) by Per G Tube route 2 times daily   Last time this was given:  2 mg on 6/19/2017  8:24 AM                                * oxyCODONE 5 MG/5ML solution   Commonly known as:  ROXICODONE   2 mLs (2 mg) by Per G Tube route every 4 hours as needed for moderate to severe pain   Last time this was given:  2 mg on 6/19/2017  8:24 AM                                RANITIDINE HCL PO   75 mg by Gastric Tube route 2 times daily   Last time this was given:  75 mg on 6/19/2017  8:23 AM                                * Notice:  This list has 8 medication(s) that are the same as other medications prescribed for you. Read the directions carefully, and ask your doctor or other care provider to review them with you.

## 2017-06-14 NOTE — ED NOTES
Pt has nasal trumpet in R nare, RN who assumed care asked the second nurse whom cared for this patient if she knew why it was in place or if it is usually in place. That RN did not know and Dr. Cortez states the patient had it in prior to arrival. Plan is to keep nasal trumpet in place at this time as it does not appear to be causing the patient any trouble

## 2017-06-14 NOTE — PHARMACY-ADMISSION MEDICATION HISTORY
Admission medication history interview status for the 6/14/2017 admission is complete. See Epic admission navigator for allergy information, pharmacy, prior to admission medications and immunization status.     Medication history interview sources:  MAR from [facility] - copied 6/14/17        Prior to Admission medications    Medication Sig Last Dose Taking? Auth Provider   insulin glargine (LANTUS) 100 UNIT/ML injection Inject 36 Units Subcutaneous every morning 6/14/2017 at Unknown time Yes Unknown, Entered By History   insulin glargine (LANTUS) 100 UNIT/ML injection Inject 34 Units Subcutaneous At Bedtime 6/13/2017 at Unknown time Yes Unknown, Entered By History   insulin NPH (HUMULIN N/NOVOLIN N) 100 UNIT/ML injection Inject 16 Units Subcutaneous every morning 6/14/2017 at Unknown time Yes Unknown, Entered By History   insulin NPH (HUMULIN N/NOVOLIN N) 100 UNIT/ML injection Inject 10 Units Subcutaneous every evening Given at 1400 daily 6/13/2017 at Unknown time Yes Unknown, Entered By History   Lactobacillus Acidophilus POWD 1 each by Gastric Tube route daily 10 billion units 6/14/2017 at Unknown time Yes Unknown, Entered By History   multivitamin, therapeutic (THERA-VIT) TABS tablet 1 tablet by Gastric Tube route daily 6/14/2017 at Unknown time Yes Unknown, Entered By History   oxyCODONE (ROXICODONE) 5 MG/5ML solution 2 mg by Gastric Tube route 2 times daily 6/14/2017 at 1000 Yes Unknown, Entered By History   Loperamide HCl (IMODIUM A-D) 1 MG/7.5ML LIQD 4 mg by Gastric Tube route every other day 6/14/2017 at am Yes Unknown, Entered By History   acetaminophen (TYLENOL) 32 mg/mL solution 325 mg by Gastric Tube route daily as needed for fever or mild pain  Yes Unknown, Entered By History   oxyCODONE (ROXICODONE) 5 MG/5ML solution 2 mg by Gastric Tube route every 4 hours as needed for moderate to severe pain  Yes Unknown, Entered By History   insulin aspart (NOVOLOG PEN) 100 UNIT/ML soln Inject 1-6 Units  Subcutaneous every 4 hours 6/14/2017 at Unknown time Yes Hillary Brown MD   ACETAMINOPHEN  mg by Gastric Tube route 3 times daily  6/14/2017 at x1 Yes Unknown, Entered By History   nystatin (MYCOSTATIN) 471209 UNIT/GM POWD Apply 1 g topically 3 times daily as needed Apply to stomach folds as needed  Yes Unknown, Entered By History   RANITIDINE HCL PO 75 mg by Gastric Tube route 2 times daily  6/14/2017 at am Yes Unknown, Entered By History   bisacodyl (DULCOLAX) 10 MG suppository Place 10 mg rectally daily as needed for constipation  Yes Unknown, Entered By History         Medication history completed by: Mimi Marroquin, PharmD

## 2017-06-14 NOTE — IP AVS SNAPSHOT
Joshua Celia S #[id number] (CSN: [id number])  (89 year old F)  (Adm: 17)     [id number]               [place] [address] MEDICAL SPECIALTY UNIT: [phone]            Patient Demographics     Patient Name Sex          Age SSN Address Phone    Celia Lewis Female 1926 (89 year old) [id number] [facility]  [address] [place] [address] [phone] (Home)      Emergency Contact(s)     Name Relation Home Work Mobile    JoshuaLeonel Spouse       Salvatore Lewis Dr. (POA) Son [phone]    Castro & Thi Lewis (POA) Son   [phone]    JoshuaCat Daughter   [phone]      Admission Information     Attending Provider Admitting Provider Admission Type Admission Date/Time    Antwan Green MD Bhattarai, Nimesh, MD Emergency 17  1141    Discharge Date Hospital Service Auth/Cert Status Service Area     Hospitalist [facility]    Unit Room/Bed Admission Status       [facility] MED SPEC UNIT 6629/6629-01 Admission (Confirmed)       Admission     Complaint    Sepsis (H)      Hospital Account     Name Acct ID Class Status Primary Coverage    JoshuaCelia mariano [id number] Inpatient Open UCARE - UCARE SENIORS NON FPA            Guarantor Account (for Hospital Account #[id number])     Name Relation to Pt Service Area Active? Acct Type    Celia Lewis  FCS Yes Personal/Family    Address Phone          [facility]  [address] [place], [place] [address] [phone](H)              Coverage Information (for Hospital Account #[id number])     F/O Payor/Plan Precert #    UCARE/UCARE SENIORS NON FPA     Subscriber Subscriber #    Celia Lewis [id number]    Address Phone    [address]  [place] [id number] [phone]                                                INTERAGENCY TRANSFER FORM - PHYSICIAN ORDERS   2017                       [place] [address]  MEDICAL SPECIALTY UNIT: [phone]            Attending Provider: Antwan Green MD     Allergies:  Sulfa Drugs    Infection:  MRSA-Contact Isolation   Service:  HOSPITALIST    Ht:  1.524 m (5')   Wt:  69.3 kg (152 lb 12.5 oz)   Admission Wt:  68.9 kg (151 lb 14.4 oz)    BMI:  29.84 kg/m 2   BSA:  1.71 m 2            ED Clinical Impression     Diagnosis Description Comment Added By Time Added    Urinary tract infection associated with catheterization of urinary tract, unspecified indwelling urinary catheter type, initial encounter [T83.511A, N39.0] Urinary tract infection associated with catheterization of urinary tract, unspecified indwelling urinary catheter type, initial encounter [T83.511A, N39.0]  Chencho Cortez MD 6/14/2017  2:21 PM    Cellulitis of buttock [L03.317] Cellulitis of buttock [L03.317]  Chencho Cortez MD 6/14/2017  2:22 PM    Pneumonia of left lung due to infectious organism, unspecified part of lung [J18.9] Pneumonia of left lung due to infectious organism, unspecified part of lung [J18.9]  Chencho Cortez MD 6/14/2017  2:22 PM    Sepsis, due to unspecified organism (H) [A41.9] Sepsis, due to unspecified organism (H) [A41.9]  Chencho Cortez MD 6/14/2017  2:23 PM      Hospital Problems as of 6/19/2017              Priority Class Noted POA    Sepsis (H) Medium  6/14/2017 Yes      Non-Hospital Problems as of 6/19/2017              Priority Class Noted    Nephrolithiasis   8/6/2012    Bacteremia   8/11/2012    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    Uric acid stone in urine   Unknown    UTI (urinary tract infection) Medium  6/24/2014    Scalp laceration Medium  6/24/2014    Dementia Medium  6/24/2014    Fall   6/24/2014    Atrial fibrillation (H) Medium  5/22/2015    History of urinary tract infection Medium  5/22/2015    Alzheimer's disease Medium  5/22/2015    Primary localized osteoarthrosis of shoulder region Medium  5/22/2015    Morbid obesity (H) Medium   5/22/2015    DM (diabetes mellitus), type 2 with renal complications (H) Medium  5/22/2015    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) Medium  5/22/2015    Hyperlipidemia Medium  5/22/2015    Candidiasis of skin and nails Medium  5/22/2015    Chronic kidney disease, stage III (moderate) Medium  5/22/2015    Advance care planning Medium  5/29/2015    HCAP (healthcare-associated pneumonia) Medium  6/21/2016      Code Status History     Date Active Date Inactive Code Status Order ID Comments User Context    6/19/2017 10:10 AM  Full Code [id number]  Kraig Mobley MD Outpatient    6/14/2017  5:14 PM 6/19/2017 10:10 AM Full Code [id number]  Antwan Green MD Inpatient    6/21/2016 11:48 AM 6/24/2016  5:44 PM Full Code [id number]  Clarence Wilkerson MD Inpatient    6/24/2014  3:01 PM 6/21/2016 11:48 AM Full Code [id number]  Emily Ca DO Outpatient    6/24/2014  3:34 AM 6/24/2014  3:01 PM Full Code [id number]  Ni Mccurdy MD Inpatient    8/14/2012 12:42 PM 6/24/2014  3:34 AM Full Code [id number]  Francesca Knott MD Outpatient    8/6/2012  3:47 AM 8/14/2012 12:42 PM Full Code [id number]  Peewee De La Cruz MD Inpatient      Current Code Status     Date Active Code Status Order ID Comments User Context       Prior      Summary of Visit     Reason for your hospital stay       UTI, pneumonia, decubitus ulcer                Medication Review      START taking        Dose / Directions Comments    levofloxacin 250 MG tablet   Commonly known as:  LEVAQUIN   Used for:  Pneumonia of left lung due to infectious organism, unspecified part of lung        Dose:  250 mg   1 tablet (250 mg) by Per G Tube route daily for 5 days   Quantity:  5 tablet   Refills:  0        linezolid 600 MG tablet   Commonly known as:  ZYVOX   Used for:  Urinary tract infection associated with catheterization of urinary tract, unspecified indwelling urinary catheter type, initial encounter        Dose:  600 mg   1 tablet (600  mg) by Per G Tube route 2 times daily for 7 days   Refills:  0          CONTINUE these medications which may have CHANGED, or have new prescriptions. If we are uncertain of the size of tablets/capsules you have at home, strength may be listed as something that might have changed.        Dose / Directions Comments    * oxyCODONE 5 MG/5ML solution   Commonly known as:  ROXICODONE   This may have changed:  how to take this   Used for:  Primary localized osteoarthrosis of shoulder region, unspecified laterality        Dose:  2 mg   2 mLs (2 mg) by Per G Tube route 2 times daily   Quantity:  15 mL   Refills:  0        * oxyCODONE 5 MG/5ML solution   Commonly known as:  ROXICODONE   This may have changed:  how to take this   Used for:  Primary localized osteoarthrosis of shoulder region, unspecified laterality        Dose:  2 mg   2 mLs (2 mg) by Per G Tube route every 4 hours as needed for moderate to severe pain   Refills:  0        * Notice:  This list has 2 medication(s) that are the same as other medications prescribed for you. Read the directions carefully, and ask your doctor or other care provider to review them with you.      CONTINUE these medications which have NOT CHANGED        Dose / Directions Comments    * ACETAMINOPHEN PO        Dose:  650 mg   650 mg by Gastric Tube route 3 times daily   Refills:  0        * acetaminophen 32 mg/mL solution   Commonly known as:  TYLENOL        Dose:  325 mg   325 mg by Gastric Tube route daily as needed for fever or mild pain   Refills:  0        bisacodyl 10 MG Suppository   Commonly known as:  DULCOLAX        Dose:  10 mg   Place 10 mg rectally daily as needed for constipation   Refills:  0        IMODIUM A-D 1 MG/7.5ML Liqd   Generic drug:  Loperamide HCl        Dose:  4 mg   4 mg by Gastric Tube route every other day   Refills:  0        insulin aspart 100 UNIT/ML injection   Commonly known as:  NovoLOG PEN   Used for:  Type 2 diabetes mellitus with diabetic  nephropathy (H)        Dose:  1-6 Units   Inject 1-6 Units Subcutaneous every 4 hours   Refills:  0        * insulin glargine 100 UNIT/ML injection   Commonly known as:  LANTUS        Dose:  36 Units   Inject 36 Units Subcutaneous every morning   Refills:  0        * insulin glargine 100 UNIT/ML injection   Commonly known as:  LANTUS        Dose:  34 Units   Inject 34 Units Subcutaneous At Bedtime   Refills:  0        * insulin  UNIT/ML injection   Commonly known as:  HumuLIN N/NovoLIN N        Dose:  16 Units   Inject 16 Units Subcutaneous every morning   Refills:  0        * insulin  UNIT/ML injection   Commonly known as:  HumuLIN N/NovoLIN N        Dose:  10 Units   Inject 10 Units Subcutaneous every evening Given at 1400 daily   Refills:  0        Lactobacillus Acidophilus Powd        Dose:  1 each   1 each by Gastric Tube route daily 10 billion units   Refills:  0        multivitamin, therapeutic Tabs tablet        Dose:  1 tablet   1 tablet by Gastric Tube route daily   Refills:  0        nystatin 611042 UNIT/GM Powd   Commonly known as:  MYCOSTATIN        Dose:  1 g   Apply 1 g topically 3 times daily as needed Apply to stomach folds as needed   Refills:  0        RANITIDINE HCL PO        Dose:  75 mg   75 mg by Gastric Tube route 2 times daily   Refills:  0        * Notice:  This list has 6 medication(s) that are the same as other medications prescribed for you. Read the directions carefully, and ask your doctor or other care provider to review them with you.            After Care     Activity - Up with nursing assistance       Bed bound status       Advance Diet as Tolerated       Follow this diet upon discharge: Orders Placed This Encounter      Adult Formula Drip Feeding: Continuous Isosource 1.5; Other - Specify in Comment; Goal Rate: 45; mL/hr; Medication - Tube Feeding Flush Frequency: At least 15-30 mL water before and after medication administration and with tube clogging; Amout to  ...      NPO for Medical/Clinical Reasons Except for: NPO but receiving Tube Feeding         Fall precautions           General info for SNF       Length of Stay Estimate: Long Term Care: Estimated # of Days >30  Condition at Discharge: Stable  Level of care:skilled   Rehabilitation Potential: Poor  Admission H&P remains valid and up-to-date: Yes  Recent Chemotherapy: N/A  Use Nursing Home Standing Orders: Yes       Mantoux instructions       Give two-step Mantoux (PPD) Per Facility Policy Yes       Wound care       Per wound care nurse instructions:    Plan for wound care to coccyx/sacrum: BID and prn :  1.  Cleanse wound with MicroKlenz wound cleanser.  Cleanse periwound with mild skin cleanser, ie Nakul perineal lotion.  2.  Moisten a kerlix fluff with Dakin's solution (aka sodium hypochlorite; comes from Pharmacy), wring out excess.  Pack into wound.  3.  Apply antifungal powder to periwound, rub in well.  Then apply Critic-Aid barrier paste over the powder to periwound (and perineal area if needed).  4.  Cover with ABD pad and minimal Medipore tape.  Label with time/date/initials.  5.  Follow rigorous PIP measures.               Further instructions from your care team         Type of Feeding Tube: G-tube  Enteral Frequency:  Continuous  Enteral Regimen: Isosource 1.5 at 45 mL/hr  Total Enteral Provisions: 1620 kcal (332 kcal per kg), 73 g protein (1.4 g per kg), 16 g fiber, 190 g CHO, 820 mL H2O, 66650 International Units of Vit A, 346 mg of Vit C, and 35 mg of Zinc  Free Water Flush: 135 mL every 4 hours  Certavite via FT daily  Tri-Vi-Sol, 7 mL daily x 10 days (6/16-6/26) (5250 International Units Vit A, 245 mg Vit C, and 2800 International Units Vit D)  Total (TF + Tri-Vi-Sol)= 34613 International Units Vit A and 591 mg Vit C    Discharge back to [facility], phone [phone]    Follow-Up Appointment Instructions     Follow Up       Follow-up with LTC provider in 2-4 days             Statement of Approval      Ordered          06/19/17 1011  I have reviewed and agree with all the recommendations and orders detailed in this document.  EFFECTIVE NOW     Approved and electronically signed by:  Kraig Mobley MD                                                 INTERAGENCY TRANSFER FORM - NURSING   6/14/2017                       [place] [address] MEDICAL SPECIALTY UNIT: [phone]            Attending Provider: Antwan Green MD     Allergies:  Sulfa Drugs    Infection:  MRSA-Contact Isolation   Service:  HOSPITALIST    Ht:  1.524 m (5')   Wt:  69.3 kg (152 lb 12.5 oz)   Admission Wt:  68.9 kg (151 lb 14.4 oz)    BMI:  29.84 kg/m 2   BSA:  1.71 m 2            Advance Directives        Does patient have a scanned Advance Directive/ACP document in EPIC?           Yes        Immunizations     Name Date      Influenza (IIV3) 10/13/14     Pneumococcal 23 valent 12/01/98       ASSESSMENT     Discharge Profile Flowsheet     EXPECTED DISCHARGE     Passing flatus  yes 06/19/17 0508    Expected Discharge Date  06/19/17 06/15/17 1619   COMMUNICATION ASSESSMENT      DISCHARGE NEEDS ASSESSMENT     Patient's communication style  spoken language (English or Bilingual) (nonverbal at baseline) 06/21/16 1154    Transportation Available  family or friend will provide 06/24/14 1319   SKIN      # of Referrals Placed by CTS  Post Acute Facilities 06/15/17 1122   Inspection  Full 06/19/17 1251    Equipment Used at Home  standard walker;wheelchair/stroller 08/09/12 1131   Skin WDL  ex 06/19/17 1251    GASTROINTESTINAL (ADULT,PEDIATRIC,OB)     Skin Color/Characteristics  redness nonblanchable;pale 06/19/17 0508    GI WDL  ex 06/19/17 1251   Skin Temperature  warm 06/19/17 1251    Abdominal Appearance  distended;obese 06/19/17 1251   Skin Integrity  pressure ulcer(s);scab(s);wound(s) 06/19/17 1251    Last Bowel Movement  06/19/17 06/19/17 1251   SAFETY      GI Signs/Symptoms  fecal incontinence 06/19/17 1251   Safety WDL  WDL 06/19/17 1251  "                Assessment WDL (Within Defined Limits) Definitions           Safety WDL     Effective: 09/28/15    Row Information: <b>WDL Definition:</b> Bed in low position, wheels locked; call light in reach; upper side rails up x 2; ID band on<br> <font color=\"gray\"><i>Item=AS safety wdl>>List=AS safety wdl>>Version=F14</i></font>      Skin WDL     Effective: 09/28/15    Row Information: <b>WDL Definition:</b> Warm; dry; intact; elastic; without discoloration; pressure points without redness<br> <font color=\"gray\"><i>Item=AS skin wdl>>List=AS skin wdl>>Version=F14</i></font>      Vitals     Vital Signs Flowsheet     VITAL SIGNS     Pain Rating: FLACC (Activity) - Face  0 06/19/17 0501    Temp  98.6  F (37  C) 06/19/17 0809   Pain Rating: FLACC (Activity) - Legs  0 06/19/17 0501    Temp src  Axillary 06/19/17 0809   Pain Rating: FLACC (Activity) - Activity  0 06/19/17 0501    Resp  20 06/19/17 0809   Pain Rating: FLACC (Activity) - Cry  0 06/19/17 0501    Pulse  95 06/18/17 1615   Pain Rating: FLACC (Activity) - Consolability  0 06/19/17 0501    Heart Rate  102 06/19/17 0809   Score: FLACC (activity)  0 06/19/17 0501    Pulse/Heart Rate Source  Monitor 06/19/17 0809   ANALGESIA SIDE EFFECTS MONITORING      BP  154/90 06/19/17 0809   Side Effects Monitoring: Respiratory Quality  R 06/19/17 0501    BP Location  Left arm 06/19/17 0809   Side Effects Monitoring: Respiratory Depth  N 06/19/17 0501    OXYGEN THERAPY     Side Effects Monitoring: Sedation Level  S 06/19/17 0501    SpO2  96 % 06/19/17 0809   HEIGHT AND WEIGHT      O2 Device  None (Room air) 06/19/17 0809   Height  1.524 m (5') 06/14/17 1921    Oxygen Delivery  2 LPM 06/17/17 0817   Weight  69.3 kg (152 lb 12.5 oz) 06/19/17 0655    PAIN/COMFORT     EKG MONITORING      Patient Currently in Pain  sleeping: patient not able to self report 06/19/17 1238   Cardiac Regularity  Regular 06/15/17 1038    Preferred Pain Scale  FACES (Chavez-Lira FACES Pain Rating " Scale) 06/18/17 0039   Cardiac Rhythm  ST 06/15/17 1038    0-10 Pain Scale  0 06/19/17 0501   BRANNON COMA SCALE      FACES Pain Rating  0-->no hurt 06/19/17 1238   Best Eye Response  4-->(E4) spontaneous 06/19/17 0501    Nonverbal Indicators Of Pain  other (see comments) 06/19/17 0501   Best Motor Response  6-->(M6) obeys commands 06/19/17 0501    Pain Management Interventions  analgesia administered 06/18/17 1525   Best Verbal Response  5-->(V5) oriented 06/19/17 0501    Pain Intervention(s)  Medication (See eMAR) 06/18/17 1525   [place] Coma Scale Score  15 06/19/17 0501    Response to Interventions  Increase in pain 06/19/17 0501   POSITIONING      PAIN ASSESSMENT/FLACC     Body Position  left, side-lying 06/19/17 1136    Pain Rating: FLACC (rest) - Face  0 06/19/17 1238   Head of Bed (HOB)  HOB at 30 degrees 06/19/17 1136    Pain Rating: FLACC (rest) - Legs  0 06/19/17 1238   Positioning/Transfer Devices  mechanical lift utilized;pillows;in use 06/19/17 1136    Pain Rating: FLACC (rest) - Activity  0 06/19/17 1238   DAILY CARE      Pain Rating: FLACC (rest) - Cry  0 06/19/17 1238   Activity Type  bedrest 06/19/17 0901    Pain Rating: FLACC (rest) - Consolability  0 06/19/17 1238   Activity Level of Assistance  assistance, 2 people 06/19/17 0901    Score: FLACC (rest)  0 06/19/17 1238                 Patient Lines/Drains/Airways Status    Active LINES/DRAINS/AIRWAYS     Name: Placement date: Placement time: Site: Days: Last dressing change:    Gastrostomy/Enterostomy Gastrostomy LUQ       LUQ        Urethral Catheter               Pressure Ulcer 06/21/16 Right Sacrum 06/21/16   1215    363     Pressure Ulcer 06/21/16 Right Other (Comment) 06/21/16   1215    363     Pressure Injury 06/14/17 Coccyx 3x4cm irregular shaped wound to coccyx 06/14/17   1715    4     Pressure Injury 06/14/17 Bilateral Ear Bilateral ear redness/excoriation with scabbing on outer rims  06/14/17   1715    4     Wound Posterior Head  Laceration       Head                Patient Lines/Drains/Airways Status    Active PICC/CVC     None            Intake/Output Detail Report     Date Intake         Output   Net    Shift P.O. I.V. NG/GT IV Piggyback Enteral Total Urine Emesis/NG output Total       Noc 06/17/17 2300 - 06/18/17 0659 -- -- -- -- -- -- 700 -- 700 -700    Day 06/18/17 0700 - 06/18/17 1459 -- 1206 570 -- -- 1776 -- -- -- 1776    Sandrine 06/18/17 1500 - 06/18/17 2259 0 -- 725 -- 688 1413 925 -- 925 488    Noc 06/18/17 2300 - 06/19/17 0659 -- -- -- -- -- -- 625 -- 625 -625    Day 06/19/17 0700 - 06/19/17 1459 -- -- 120 -- -- 120 475 -- 475 -355      Last Void/BM       Most Recent Value    Urine Occurrence     Stool Occurrence 1 at 06/18/2017 2126      Case Management/Discharge Planning     Case Management/Discharge Planning Flowsheet     REFERRAL INFORMATION     Major Change/Loss/Stressor  none 06/16/17 2233    Admission Type  inpatient 06/15/17 1122   EXPECTED DISCHARGE      Arrived From  nursing facility 06/21/16 1154   Expected Discharge Date  06/19/17 06/15/17 1619    Referral Source  case finding 06/15/17 1122   DISCHARGE PLANNING      # of Referrals Placed by CTS  Post Acute Facilities 06/15/17 1122   Transportation Available  family or friend will provide 06/24/14 1319    Post Acute Facilities  [facility] 06/15/17 1122   [facility] 08/10/12 1751    Reason For Consult  discharge planning 06/15/17 1122   Skilled Nursing Facility Phone Number  [phone] 08/10/12 1751    Record Reviewed  clinical discipline documentation;history and physical;medical record 06/15/17 1122   Outpatient/Agency/Support Group Needs  assisted living facility 08/06/12 0445     Assigned to Case  Chantelle Elder 06/15/17 1122   Equipment Used at Home  standard walker;wheelchair/stroller 08/09/12 1131    LIVING ENVIRONMENT     FINAL NOTE      Lives With  facility resident 06/15/17 1122   Final Note  -- (D/C back to [facility])  06/19/17 1226    Living Arrangements  extended care facility 06/15/17 1122   ABUSE RISK SCREEN      Provides Primary Care For  no one, unable/limited ability to care for self 06/15/17 1122   QUESTION TO PATIENT:  Has a member of your family or a partner(now or in the past) intimidated, hurt, manipulated, or controlled you in any way?  patient declined to answer or is unable to answer 06/14/17 1956    ASSESSMENT OF FAMILY/SOCIAL SUPPORT     QUESTION TO PATIENT: Do you feel safe going back to the place where you are living?  patient declined to answer or is unable to answer 06/14/17 1956    Marital Status   06/15/17 1122   OBSERVATION: Is there reason to believe there has been maltreatment of a vulnerable adult (ie. Physical/Sexual/Emotional abuse, self neglect, lack of adequate food, shelter, medical care, or financial exploitation)?  no 06/14/17 1956    Who is your support system?  ;Children 06/15/17 1122   (R) MENTAL HEALTH SUICIDE RISK      COPING/STRESS     Are you depressed or being treated for depression?  No 06/16/17 2233                  [place] [address] MEDICAL SPECIALTY UNIT: [phone]            Medication Administration Report for Celia Lewis as of 06/19/17 1257   Legend:    Given Hold Not Given Due Canceled Entry Other Actions    Time Time (Time) Time  Time-Action       Inactive    Active    Linked        Medications 06/13/17 06/14/17 06/15/17 06/16/17 06/17/17 06/18/17 06/19/17    acetaminophen (TYLENOL) solution 325-650 mg  Dose: 325-650 mg Freq: EVERY 6 HOURS PRN Route: PO  PRN Reasons: mild pain,fever  Start: 06/14/17 1854   Admin Instructions: Maximum acetaminophen dose from all sources= 75 mg/kg/day not to exceed 4 grams/day.       1629 (650 mg)-Given               acetaminophen (TYLENOL) Suppository 650 mg  Dose: 650 mg Freq: EVERY 4 HOURS PRN Route: RE  PRN Reason: mild pain  Start: 06/14/17 1713   Admin Instructions: Alternate ibuprofen (if ordered) with  acetaminophen.  Maximum acetaminophen dose from all sources = 75 mg/kg/day not to exceed 4 grams/day.               acetaminophen (TYLENOL) tablet 650 mg  Dose: 650 mg Freq: 3 TIMES DAILY Route: ORAL OR FEED  Start: 06/14/17 2200   Admin Instructions: Maximum acetaminophen dose from all sources = 75 mg/kg/day not to exceed 4 grams/day.      2046 (650 mg)-Given               0810 (650 mg)-Given              2144 (650 mg)-Given        0902 (650 mg)-Given       1640 (650 mg)-Given       2101 (650 mg)-Given        0938 (650 mg)-Given       1632 (650 mg)-Given       2126 (650 mg)-Given        1000 (650 mg)-Given       1624 (650 mg)-Given       2126 (650 mg)-Given        0823 (650 mg)-Given       [ ] 1600       [ ] 2200           bisacodyl (DULCOLAX) Suppository 10 mg  Dose: 10 mg Freq: DAILY PRN Route: RE  PRN Reason: constipation  Start: 06/14/17 1854              cefTRIAXone (ROCEPHIN) 1 g vial to attach to  mL bag for ADULTS or NS 50 mL bag for PEDS  Dose: 1 g Freq: EVERY 24 HOURS Route: IV  Indications of Use: SKIN AND SOFT TISSUE INFECTION  Last Dose: 1 g (06/18/17 1444)  Start: 06/17/17 1400        1509 (1 g)-New Bag        1444 (1 g)-New Bag        [ ] 1400           dextrose 10 % 1,000 mL infusion  Freq: CONTINUOUS PRN Route: IV  PRN Comment: Hypoglycemia prevention  Start: 06/15/17 0854   Admin Instructions: For Hypoglycemia Prevention for patients on long-acting subcutaneous basal insulin (Glargine, Detemir, NPH) or continuous insulin infusion. Whenever nutrition support is held or interrupted:   1) Infuse IV D10W at nutrition support rate  2) Notify provider for further instructions               glucose 40 % gel 15-30 g  Dose: 15-30 g Freq: EVERY 15 MIN PRN Route: PO  PRN Reason: low blood sugar  Start: 06/14/17 1856   Admin Instructions: Give 15 g for BG 51 to 69 mg/dL IF patient is conscious and able to swallow. Give 30 g for BG less than or equal to 50 mg/dL IF patient is conscious and able to  swallow. Do NOT give glucose gel via enteral tube.  IF patient has enteral tube: give apple juice 120 mL (4 oz or 15 g of CHO) via enteral tube for BG 51 to 69 mg/dL.  Give apple juice 240 mL (8 oz or 30 g of CHO) via enteral tube for BG less than or equal to 50 mg/dL.              Or  dextrose 50 % injection 25-50 mL  Dose: 25-50 mL Freq: EVERY 15 MIN PRN Route: IV  PRN Reason: low blood sugar  Start: 06/14/17 1856   Admin Instructions: Use if have IV access, BG less than 70 mg/dL and meet dose criteria below:  Dose if conscious and alert (or disorientated) and NPO = 25 mL  Dose if unconscious / not alert = 50 mL  Vesicant.              Or  glucagon injection 1 mg  Dose: 1 mg Freq: EVERY 15 MIN PRN Route: SC  PRN Reason: low blood sugar  PRN Comment: May repeat x 1 only  Start: 06/14/17 1856   Admin Instructions: May give SQ or IM. IF BG less than or equal to 50 mg/dL and no IV access.  ONLY use glucagon IF patient has NO IV access AND is UNABLE to swallow.               insulin aspart (NovoLOG) inj (RAPID ACTING)  Dose: 1-6 Units Freq: EVERY 4 HOURS Route: SC  Start: 06/14/17 1900   Admin Instructions: Correction Scale - MEDIUM INSULIN RESISTANCE DOSING     Do Not give Correction Insulin if BG less than 140.  For  - 189 give 1 unit.  For  - 239 give 2 units.  For  - 289 give 3 units.  For  - 339 give 4 units.  For  - 399 give 5 units.  For BG greater than or equal to 400 give 6 units.  Check blood glucose Q4H and administer based on blood glucose.  Notify provider if glucose greater than or equal to 350 mg/dL after administration of correction dose.  If given at mealtime, must be administered 5 min before meal or immediately after.      2040 (1 Units)-Given [C]        (0124)-Not Given [C]       (0420)-Not Given [C]       (0808)-Not Given       (1231)-Not Given       1655 (3 Units)-Given [C]       2024 (1 Units)-Given [C]       2355 (4 Units)-Given [C]        0356 (5 Units)-Given  [C]       0903 (5 Units)-Given       1207 (5 Units)-Given       1640 (5 Units)-Given       2056 (5 Units)-Given        0118 (4 Units)-Given       0440 (4 Units)-Given       0938 (4 Units)-Given       1212 (4 Units)-Given       1632 (3 Units)-Given [C]       2053 (3 Units)-Given        0005 (2 Units)-Given       0501 (2 Units)-Given       1035 (3 Units)-Given [C]       1300 (3 Units)-Given [C]       1618 (2 Units)-Given       2056 (1 Units)-Given        0051 (1 Units)-Given       0453 (2 Units)-Given       0824 (1 Units)-Given       1234 (1 Units)-Given       [ ] 1600       [ ] 2000           insulin glargine (LANTUS) injection 36 Units  Dose: 36 Units Freq: 2 TIMES DAILY Route: SC  Start: 06/17/17 0900        0941 (36 Units)-Given       2052 (36 Units)-Given        1035 (36 Units)-Given       2056 (36 Units)-Given        0825 (36 Units)-Given       [ ] 2100           insulin isophane human (HumuLIN N PEN) injection 10 Units  Dose: 10 Units Freq: EVERY 24 HOURS Route: SC  Start: 06/17/17 1400        1818 (10 Units)-Given        1300 (10 Units)-Given        [ ] 1400           insulin isophane human (HumuLIN N PEN) injection 16 Units  Dose: 16 Units Freq: EVERY MORNING BEFORE BREAKFAST Route: SC  Start: 06/17/17 0815        1213 (16 Units)-Given        1034 (16 Units)-Given        0825 (16 Units)-Given           lactobacillus rhamnosus (GG) (CULTURELL) capsule 1 capsule  Dose: 1 capsule Freq: DAILY Route: PER G TUBE  Start: 06/14/17 1900   Admin Instructions: OK to open capsule  Administer at least 2 hours before or after oral antibiotics. Capsules may be opened.      2045 (1 capsule)-Given        0811 (1 capsule)-Given        0902 (1 capsule)-Given        0938 (1 capsule)-Given        1000 (1 capsule)-Given        0823 (1 capsule)-Given           lidocaine (LMX4) cream  Freq: EVERY 1 HOUR PRN Route: Top  PRN Reason: pain  PRN Comment: with VAD insertion or accessing implanted port.  Start: 06/14/17 1713   Admin  "Instructions: Do NOT give if patient has a history of allergy to any local anesthetic or any \"carlotta\" product.   Apply 30 minutes prior to VAD insertion or port access.  MAX Dose:  2.5 g (  of 5 g tube)               lidocaine 1 % 1 mL  Dose: 1 mL Freq: EVERY 1 HOUR PRN Route: OTHER  PRN Comment: mild pain with VAD insertion or accessing implanted port  Start: 06/14/17 1713   Admin Instructions: Do NOT give if patient has a history of allergy to any local anesthetic or any \"carlotta\" product. MAX dose 1 mL subcutaneous OR intradermal in divided doses.               melatonin tablet 1 mg  Dose: 1 mg Freq: AT BEDTIME PRN Route: PO  PRN Reason: sleep  Start: 06/14/17 1713   Admin Instructions: Do not give unless at least 6 hours of uninterrupted sleep is expected.               miconazole (MICATIN; MICRO GUARD) 2 % powder  Freq: EVERY 1 HOUR PRN Route: Top  PRN Reason: other  PRN Comment: topical candidiasis  Start: 06/14/17 1857   Admin Instructions: Apply to affected area.        2053 ( )-Given           1119 ( )-Given [C]            multivitamins with minerals (CERTAVITE/CEROVITE) liquid 15 mL  Dose: 15 mL Freq: DAILY Route: PO  Start: 06/15/17 0915      1124 (15 mL)-Given        0907 (15 mL)-Given        0939 (15 mL)-Given        1001 (15 mL)-Given        0825 (15 mL)-Given           naloxone (NARCAN) injection 0.1-0.4 mg  Dose: 0.1-0.4 mg Freq: EVERY 2 MIN PRN Route: IV  PRN Reason: opioid reversal  Start: 06/14/17 1713   Admin Instructions: For respiratory rate LESS than or EQUAL to 8.  Partial reversal dose:  0.1 mg titrated q 2 minutes for Analgesia Side Effects Monitoring Sedation Level of 3 (frequently drowsy, arousable, drifts to sleep during conversation).Full reversal dose:  0.4 mg bolus for Analgesia Side Effects Monitoring Sedation Level of 4 (somnolent, minimal or no response to stimulation).               ondansetron (ZOFRAN-ODT) ODT tab 4 mg  Dose: 4 mg Freq: EVERY 6 HOURS PRN Route: PO  PRN Reason: " nausea  Start: 06/14/17 1713   Admin Instructions: This is Step 1 of nausea and vomiting management.  If nausea not resolved in 15 minutes, go to Step 2 prochlorperazine (COMPAZINE). Do not push through foil backing. Peel back foil and gently remove. Place on tongue immediately. Administration with liquid unnecessary              Or  ondansetron (ZOFRAN) injection 4 mg  Dose: 4 mg Freq: EVERY 6 HOURS PRN Route: IV  PRN Reasons: nausea,vomiting  Start: 06/14/17 1713   Admin Instructions: This is Step 1 of nausea and vomiting management.  If nausea not resolved in 15 minutes, go to Step 2 prochlorperazine (COMPAZINE).  Irritant.               oxyCODONE (ROXICODONE) solution 2 mg  Dose: 2 mg Freq: EVERY 4 HOURS PRN Route: PO  PRN Reason: moderate to severe pain  Start: 06/14/17 1856              0910 (2 mg)-Read [C]              oxyCODONE (ROXICODONE) solution 2 mg  Dose: 2 mg Freq: 2 TIMES DAILY Route: PO  Start: 06/14/17 2100     2045 (2 mg)-Given        0813 (2 mg)-Given       2145 (2 mg)-Given        0906 (2 mg)-Given       2059 (2 mg)-Given        0937 (2 mg)-Given       2053 (2 mg)-Given        1000 (2 mg)-Given       2122 (2 mg)-Given        0824 (2 mg)-Given       [ ] 2100           potassium chloride (KLOR-CON) Packet 20-40 mEq  Dose: 20-40 mEq Freq: EVERY 2 HOURS PRN Route: ORAL OR FEED  PRN Reason: potassium supplementation  Start: 06/14/17 1713   Admin Instructions: Use if unable to tolerate tablets.  If Serum K+ 3.0-3.3, dose = 60 mEq po total dose (40 mEq x1 followed in 2 hours by 20 mEq x1). Recheck K+ level 4 hours after dose and the next AM.  If Serum K+ 2.5-2.9, dose = 80 mEq po total dose (40 mEq Q2H x2). Recheck K+ level 4 hours after dose and the next AM.  If Serum K+ less than 2.5, See IV order.  Dissolve packet contents in 4-8 ounces of cold water or juice.               potassium chloride 10 mEq in 100 mL intermittent infusion  Dose: 10 mEq Freq: EVERY 1 HOUR PRN Route: IV  PRN Reason: potassium  supplementation  Start: 06/14/17 1713   Admin Instructions: Infuse via PERIPHERAL LINE or CENTRAL LINE. Use for central line replacement if patient weight less than 65 kg, if patient is on TPN with high potassium content or if unit does not stock 20 mEq bags.   If Serum K+ 3.0-3.3, dose = 10 mEq/hr x4 doses (40 mEq IV total dose). Recheck K+ level 2 hours after dose and the next AM.   If Serum K+ less than 3.0, dose = 10 mEq/hr x6 doses (60 mEq IV total dose). Recheck K+ level 2 hours after dose and the next AM.               potassium chloride 10 mEq in 100 mL intermittent infusion with 10 mg lidocaine  Dose: 10 mEq Freq: EVERY 1 HOUR PRN Route: IV  PRN Reason: potassium supplementation  Start: 06/14/17 1713   Admin Instructions: Infuse via PERIPHERAL LINE. Use potassium with lidocaine for pain with peripheral administration.  If Serum K+ 3.0-3.3, dose = 10 mEq/hr x4 doses (40 mEq IV total dose). Recheck K+ level 2 hours after dose and the next AM.  If Serum K+ less than 3.0, dose = 10 mEq/hr x6 doses (60 mEq IV total dose). Recheck K+ level 2 hours after dose and the next AM.               potassium chloride 20 mEq in 50 mL intermittent infusion  Dose: 20 mEq Freq: EVERY 1 HOUR PRN Route: IV  PRN Reason: potassium supplementation  Start: 06/14/17 1713   Admin Instructions: Infuse via CENTRAL LINE Only. May need EKG if less than 65 kg or on TPN - Max rate is 0.3 mEq/kg/hr for patients not on EKG monitoring.   If Serum K+ 3.0-3.3, dose = 20 mEq/hr x2 doses (40 mEq IV total dose). Recheck K+ level 2 hours after dose and the next AM.  If Serum K+ less than 3.0, dose = 20 mEq/hr x3 doses (60 mEq IV total dose). Recheck K+ level 2 hours after dose and the next AM.               potassium chloride SA (K-DUR/KLOR-CON M) CR tablet 20-40 mEq  Dose: 20-40 mEq Freq: EVERY 2 HOURS PRN Route: PO  PRN Reason: potassium supplementation  Start: 06/14/17 1713   Admin Instructions: Use if able to take PO.   If Serum K+ 3.0-3.3, dose  = 60 mEq po total dose (40 mEq x1 followed in 2 hours by 20 mEq x1). Recheck K+ level 4 hours after dose and the next AM.  If Serum K+ 2.5-2.9, dose = 80 mEq po total dose (40 mEq Q2H x2). Recheck K+ level 4 hours after dose and the next AM.  If Serum K+ less than 2.5, See IV order.  DO NOT CRUSH               ranitidine (ZANTAC) tablet 75 mg  Dose: 75 mg Freq: 2 TIMES DAILY Route: PER G TUBE  Start: 06/14/17 2100     2045 (75 mg)-Given        0811 (75 mg)-Given       2145 (75 mg)-Given        0902 (75 mg)-Given       2039 (75 mg)-Given        0938 (75 mg)-Given       2052 (75 mg)-Given        1000 (75 mg)-Given       2122 (75 mg)-Given        0823 (75 mg)-Given       [ ] 2100           sodium chloride (PF) 0.9% PF flush 3 mL  Dose: 3 mL Freq: EVERY 8 HOURS Route: IK  Start: 06/14/17 1715   Admin Instructions: And Q1H PRN, to lock peripheral IV dormant line.      (1954)-Not Given        0125 (3 mL)-Given              1631 (3 mL)-Given       2357 (3 mL)-Given               0904 (100 mL)-Given               (0120)-Not Given              0955 (3 mL)-Given [C]       (1632)-Not Given        (0047)-Not Given       1010 (3 mL)-Given       1620 (3 mL)-Given        (0053)-Not Given       1056 (3 mL)-Given       [ ] 1715           sodium chloride (PF) 0.9% PF flush 3 mL  Dose: 3 mL Freq: EVERY 1 HOUR PRN Route: IK  PRN Reason: line flush  PRN Comment: for peripheral IV flush post IV meds  Start: 06/14/17 1713              sodium hypochlorite (half-strength DAKINS) external solution  Freq: 2 TIMES DAILY Route: Top  Start: 06/15/17 1145   Admin Instructions: Dakin's-moist gauze to coccyx wound BID and prn       1242 ( )-Given       2151 ( )-Given        0950 ( )-Given       2059 ( )-Given        1202 ( )-Given       2059 ( )-Given        1020 ( )-Given       2121 ( )-Given        1057 ( )-Given       [ ] 2100           vancomycin 1250 mg in 0.9% NaCl 250 mL PREMIX  Dose: 1,250 mg Freq: EVERY 24 HOURS Route: IV  Indications of  Use: HEALTHCARE-ASSOCIATED PNEUMONIA  Start: 06/18/17 2200   Admin Instructions: For an adult with peripheral catheter and dose of 2-2.5 g, infuse over 2 hours.  IF central catheter, doses below 2 g may be given over 1 hour.          2247 (1,250 mg)-New Bag        [ ] 2200           vitamin A-D & C drops (TRI-VI-SOL) drops SOLN 7 mL  Dose: 7 mL Freq: DAILY Route: PER G TUBE  Start: 06/16/17 1445   End: 06/26/17 1559       1640 (7 mL)-Given        1633 (7 mL)-Given        1620 (7 mL)-Given        [ ] 1600          Discontinued Medications  Medications 06/13/17 06/14/17 06/15/17 06/16/17 06/17/17 06/18/17 06/19/17         Rate: 100 mL/hr Freq: CONTINUOUS Route: IV  Start: 06/15/17 1330   End: 06/18/17 0845      1330 ( )-New Bag        0635 ( )-New Bag       1703 ( )-New Bag       2052 ( )-New Bag        0954 ( )-New Bag       1420 ( )-Rate/Dose Verify       1605 ( )-Rate/Dose Verify       2125 ( )-New Bag        0845-Med Discontinued          Dose: 30 Units Freq: 2 TIMES DAILY Route: SC  Start: 06/16/17 1045   End: 06/17/17 0750       1207 (30 Units)-Given       2058 (30 Units)-Given        0750-Med Discontinued           Dose: 250 mg Freq: EVERY 24 HOURS Route: IV  Indications of Use: HEALTHCARE-ASSOCIATED PNEUMONIA  Last Dose: 250 mg (06/16/17 1519)  Start: 06/15/17 1400   End: 06/17/17 0810   Admin Instructions: Dose adjusted per renal dosing policy.  Estimated CrCl = 25 mL/min  Irritant. Administer at a rate of no greater than 100 mL/hr       1333 (250 mg)-New Bag        1519 (250 mg)-New Bag        0810-Med Discontinued           Dose: 3.375 g Freq: EVERY 6 HOURS Route: IV  Indications of Use: HEALTHCARE-ASSOCIATED PNEUMONIA  Last Dose: 3.375 g (06/16/17 2037)  Start: 06/14/17 2000   End: 06/17/17 1321     2010 (3.375 g)-New Bag        0213 (3.375 g)-New Bag       0810 (3.375 g)-New Bag       1327 (3.375 g)-New Bag       2024 (3.375 g)-New Bag        0140 (3.375 g)-New Bag       0905 (3.375 g)-New Bag       1409  (3.375 g)-New Bag       2037 (3.375 g)-New Bag        0141 (3.375 g)-New Bag       0935 (3.375 g)-New Bag       1321-Med Discontinued           Dose: 1,500 mg Freq: EVERY 48 HOURS Route: IV  Indications of Use: HEALTHCARE-ASSOCIATED PNEUMONIA  Last Dose: 1,500 mg (06/17/17 2125)  Start: 06/15/17 2200   End: 06/17/17 2231   Admin Instructions: For an adult with peripheral catheter and dose of 2-2.5 g, infuse over 2 hours.  IF central catheter, doses below 2 g may be given over 1 hour.       2226 (1,500 mg)-New Bag         2125 (1,500 mg)-New Bag       2231-Med Discontinued      Medications 06/13/17 06/14/17 06/15/17 06/16/17 06/17/17 06/18/17 06/19/17               INTERAGENCY TRANSFER FORM - NOTES (H&P, Discharge Summary, Consults, Procedures, Therapies)   6/14/2017                       [place] [address] [facility]: [phone]               History & Physicals      H&P signed by Antwan Green MD at 6/14/2017  6:13 PM      Author:  Antwan Green MD Service:  Hospitalist Author Type:  Physician    Filed:  6/14/2017  6:13 PM Date of Service:  6/14/2017  2:44 PM Creation Time:  6/14/2017  3:24 PM    Status:  Signed :  Antwan Green MD (Physician)         DATE OF ADMISSION:  06/14/2017      CHIEF COMPLAINT:  Fever, low oxygen saturations.      HISTORY OF PRESENT ILLNESS:  Ms. Celia Lewis is a 89-year-old female with history of diabetes mellitus type 2, CHF, chronic kidney disease, coronary artery disease, hypertension, recurrent UTIs, Alzheimer dementia, with baseline nonverbal status, who was sent from her long-term care facility for low O2 sats and fever.  Reportedly, the staff at the nursing home noted that the patient's oxygen saturations were low and she also had a fever up to 100.3.  She was also tachypneic and had some labored breathing and so was sent to the emergency room.  No further information is available at this time as the patient is nonverbal at baseline because  of her advanced dementia.  There is no reported recent diarrhea or vomiting.  The patient does have a feeding tube in place for nutrition.      In the emergency room, the patient was evaluated by Dr. Cortez.  Basic lab work showed a white count of 18.7 and UA consistent with UTI.  She also has a chest x-ray which showed an infiltrate in the left base.  She does have a chronic indwelling Fonseca which was changed in the emergency room.  Started on empiric antibiotics for both UTI and presumed healthcare-associated pneumonia.  She was also found to have a sacral decubitus with surrounding cellulitis.      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     Advanced directives, counseling/discussion, POLST completed 5/29/15  Full Code 5/29/2015     Alzheimer's disease 5/22/2015     Arthritis     shoulder     Atrial fibrillation (H) 5/22/2015     Calculus of kidney      Candidiasis of skin and nails 5/22/2015     Congestive heart failure, unspecified      Coronary artery disease      Heart disease, unspecified     diastolic dysfunction     Hypertension      Morbid obesity (H) 5/22/2015     Other and unspecified hyperlipidemia 5/22/2015     Personal history of urinary (tract) infection 5/22/2015     Primary localized osteoarthrosis, shoulder region 5/22/2015     Renal failure, unspecified     secondary to diuresis     Thyroid disease      Type II or unspecified type diabetes mellitus with renal manifestations, not stated as uncontrolled(250.40) 5/22/2015     Type II or unspecified type diabetes mellitus without mention of complication, not stated as uncontrolled      Unspecified hypertensive heart and kidney disease with heart failure and with chronic kidney disease stage I through stage IV, or unspecified(404.91) 5/22/2015     Uric acid stone in urine[NB1.2]         ALLERGIES:[NB1.1]    Allergies   Allergen Reactions     Sulfa Drugs Other (See Comments)     Per  nursing home documents, patient has allergy to sulfa[NB1.2]        SOCIAL AND PERSONAL HISTORY:[NB1.1]   Social History     Social History     Marital status:      Spouse name: Leonel     Number of children: N/A     Years of education: N/A     Occupational History      Retired     Social History Main Topics     Smoking status: Former Smoker     Smokeless tobacco: Never Used      Comment: She was a social smoker many years ago     Alcohol use No     Drug use: No     Sexual activity: Not on file     Other Topics Concern     Not on file     Social History Narrative[NB1.2]        HOME MEDICATIONS:[NB1.1]  Prescriptions Prior to Admission   Medication Sig Dispense Refill Last Dose     insulin glargine (LANTUS) 100 UNIT/ML injection Inject 36 Units Subcutaneous every morning   6/14/2017 at Unknown time     insulin glargine (LANTUS) 100 UNIT/ML injection Inject 34 Units Subcutaneous At Bedtime   6/13/2017 at Unknown time     insulin NPH (HUMULIN N/NOVOLIN N) 100 UNIT/ML injection Inject 16 Units Subcutaneous every morning   6/14/2017 at Unknown time     insulin NPH (HUMULIN N/NOVOLIN N) 100 UNIT/ML injection Inject 10 Units Subcutaneous every evening Given at 1400 daily   6/13/2017 at Unknown time     Lactobacillus Acidophilus POWD 1 each by Gastric Tube route daily 10 billion units   6/14/2017 at Unknown time     multivitamin, therapeutic (THERA-VIT) TABS tablet 1 tablet by Gastric Tube route daily   6/14/2017 at Unknown time     oxyCODONE (ROXICODONE) 5 MG/5ML solution 2 mg by Gastric Tube route 2 times daily   6/14/2017 at 1000     Loperamide HCl (IMODIUM A-D) 1 MG/7.5ML LIQD 4 mg by Gastric Tube route every other day   6/14/2017 at am     acetaminophen (TYLENOL) 32 mg/mL solution 325 mg by Gastric Tube route daily as needed for fever or mild pain        oxyCODONE (ROXICODONE) 5 MG/5ML solution 2 mg by Gastric Tube route every 4 hours as needed for moderate to severe pain        insulin aspart (NOVOLOG PEN) 100  UNIT/ML soln Inject 1-6 Units Subcutaneous every 4 hours   6/14/2017 at Unknown time     ACETAMINOPHEN  mg by Gastric Tube route 3 times daily    6/14/2017 at x1     nystatin (MYCOSTATIN) 360703 UNIT/GM POWD Apply 1 g topically 3 times daily as needed Apply to stomach folds as needed        RANITIDINE HCL PO 75 mg by Gastric Tube route 2 times daily    6/14/2017 at am     bisacodyl (DULCOLAX) 10 MG suppository Place 10 mg rectally daily as needed for constipation[NB1.2]         FAMILY HISTORY:  Family History     Problem (# of Occurrences) Relation (Name,Age of Onset)    CANCER (1) Unspecified    CEREBROVASCULAR DISEASE (2) Mother, Father    DIABETES (1) Brother           REVIEW OF SYSTEMS:  A complete review of system is unobtainable because of patient's baseline is nonverbal state.      PHYSICAL EXAMINATION:   GENERAL:  Ms. Lewis is a 89-year-old female.  She does not appear to be in overt distress, but she is nonverbal at baseline.   VITAL SIGNS:  Temperature is 99.4, blood pressure is 138/74, heart rate is 120, respiration rate is 36.  O2 sats is 92% on 3 liters nasal cannula.   HEENT:  Atraumatic, normocephalic, no pallor or icterus.   NECK:  Supple.   RESPIRATORY:  Coarse breath sounds at the left base with some crackles, normal effort of breathing.   CARDIOVASCULAR:  Tachycardic, no murmur.   ABDOMEN:  Slightly distended but soft.  Feeding tube in place.   SKIN:  Warm and dry.   NEUROLOGIC:  She does open eyes to verbal commands but does not follow any commands and she is nonverbal.  Difficult to assess her muscle strength.   MUSCULOSKELETAL:  She does appear to have contractures of her extremities.   BACK:  She does have a sacral decubitus which is stable.      LABORATORY DATA:  Sodium is 144, potassium is 4.1, creatinine is 1.28, which is about her baseline.  Lactic acid is elevated at 3.5.  White cell count is 18.7.  UA consistent with UTI with positive nitrite, large leukocyte esterase and 182 WBC.   Chest x-ray with infiltrate on the left lung base.      ASSESSMENT AND PLAN:  Ms. Lewis is a 89-year-old female with advanced dementia, nonverbal at baseline and other medical problems as described above who presents from a nursing home with low oxygen sats, fever and labored breathing.   1.  Severe sepsis due to healthcare associated pneumonia and urinary tract infection.  The patient presents with tachypnea, low oxygen sats and fever.  Chest x-ray does show an infiltrate in the left lung base.  It does appear like she has UTI also.  At this time, she will be covered on empiric antibiotics for healthcare associated pneumonia with vancomycin, Zosyn and Levaquin.  Await results of her blood culture.  She is saturating adequately on 3 liters oxygen at the moment.   2.  Urinary tract infection.  The patient does have a chronic indwelling Fonseca which was changed in the emergency room.  UA consistent with UTI.  Antibiotics as above.   3.  Advanced dementia.  The patient is nonverbal at baseline and apparently does not move around.  Reportedly, there has been no new change in mental state, but this is her baseline.  She does have tube feedings for feeding, nutritional services will be consulted for helping with her tube feeding.   4.  Diabetes mellitus type 2.  She is on Lantus 35 units in the morning and 40 units at bedtime.  I will continue that at a slightly reduced dose of 30 units in the morning and 30 units at bedtime.  Also, cover with moderate resistance sliding scale.   5.  Sacral decubitus, stable.  Patient does have about 4-5 cm size sacral decubitus with surrounding cellulitis.  Wound care will be consulted.  Antibiotics as above.   6.  Chronic kidney disease, stage 3.  This appears to be at baseline.  Avoid nephrotoxins.        CODE STATUS:  I did not personally speak with the family; however, per Dr. Cortez in the emergency room who spoke with the family, the patient has been and will be a full code.       Anticipated length of stay more than 2 midnights.         VICTOR MANUEL HERRING MD             D: 2017 14:44   T: 2017 15:23   MT: ANTIONE      Name:     CELIA LEWIS   MRN:      [id number]        Account:      [id number]   :      1926           Admitted:     [id number]      Document: [id number][NB1.1]         Revision History        User Key Date/Time User Provider Type Action    > NB1.1 2017  6:13 PM Victor Manuel Herring MD Physician Sign     NB1.2 2017  6:12 PM Victor Manuel Herring MD Physician      [N/A] 2017  3:24 PM Victor Manuel Herring MD Physician Edit                     Discharge Summaries      Discharge Summaries by Kraig Mobley MD at 2017 10:31 AM     Author:  Kraig Mobley MD Service:  Hospitalist Author Type:  Physician    Filed:  2017 10:58 AM Date of Service:  2017 10:31 AM Creation Time:  2017 10:12 AM    Status:  Addendum :  Kraig Mobley MD (Physician)         [facility]    Discharge Summary  Hospitalist    Date of Admission:  2017  Date of Discharge:  2017  Discharging Provider:[VP1.1] Kraig VASQUEZ. Leandra[VP1.2], MD  Date of Service: 17[VP1.1]    Discharge Diagnoses[VP1.2]   HCAP vs aspiration pneumonia  Urinary tract infection associated with catheterization of urinary tract, unspecified indwelling urinary catheter type, initial encounter  Stage 3-4 Coccyx pressure ulcer s/p bedside debridement[VP1.1]    History of Present Illness[VP1.2]   Ms. Celia Lewis is a 89-year-old female with history of diabetes mellitus type 2, CHF, chronic kidney disease, coronary artery disease, hypertension, recurrent UTIs, Alzheimer dementia, with baseline nonverbal and bed bound status who presents from a LTC with low oxygen sats, fever and labored breathing.     Please refer to HPI for further details.[VP1.1]    Hospital Course[VP1.2]   The following problems were addressed during his hospitalization.     Severe sepsis due to  HCAP and catheter associated UTI  The patient presents with tachypnea, low oxygen sats and fever.    Chest x-ray shows infiltrate in the left lung base.   Chronic indwelling Fonseca which was changed in the emergency room.    UA consistent with UTI  Sepsis improved with decreasing WBC (18.7->11.8), lactic acid (2.6->1.9), and temp (Tmax 100.4)  Cultures:   - Urine growing >100K MRSA (2 strains)   - Wound from buttock growing E coli, enterococcus avium and faecalis, and candida   - Blood no growth after 5 days  - The patient was initially initiated on broad spectrum antibiotics with Vanco, levofloxacin, and Zosyn.  As she showed improvement in her sepsis and culture data returned, Zosyn and levofloxacin was stopped in favor of ceftriaxone.  Infectious disease was consulted as patient has a sulfa allergy and urine did grow MRSA sensitive to vanco and Linezolid.  ID recommends Linezolid for 7 days.   - Her superficial wound culture shows multiple organisms which is not helpful clinically and her decubitus ulcer was also debrided by general surgery without further evidence of a localized infection.  - From a pneumonia perspective, she received treatment for HCAP.  Repeat CXR prior to discharge was not overly convincing for an ongoing pneumonia, but did see a possible retrocardiac infiltrate.  She is chronically NPO with ongoing G-tube feeds, but may be at risk for aspiration despite this.  - Son, Dr. Salvatore Lewis, allergist/immunologist, is POA.  Discussed his code status, which he confirms is full.  Discussed if Celia has required recurrent hospitalizations, which he confirms she has not.  She was last hospitalized within [place] a year ago.  Unfortunately given her very limited functional status, including being non-verbal, feeding tube dependent, and bed bound, she remains high risk for readmissions for recurrent infections which I discussed with Dr. Lewis.  At this stage he is not interested in palliative care  consultation.    Stage 3-4 Coccyx pressure ulcer  Wound is necrotic measuring 4x7cm, 3 cm deep.  - Wound care and gen surg consult appreciated  - S/p bedside debridement on 6/15 by general surgery without evidence of infection    Advanced dementia  Appears to be at her nonverbal and immobile baseline.    - Continue tube feedings, nutritionist consulted  - Discussed goals of care with son, Dr. Lewis who is POA, who requests full code and current level of cares    Diabetes mellitus type 2   PTA regimen: Lantus 36 units in AM and 34 units in PM, NPH 16 units in AM and 10 units at 1400  - Continue her regimen    CKD3  This appears to be at baseline.  Avoid nephrotoxins.       Hypernatremia  Improved with 1/2NS IVF on admission.  Now maintained on her outpatient G-tube feed regimen.    Kraig Mobley MD  [facility]ist[VP1.1]    Unresulted Labs Ordered in the Past 30 Days of this Admission     Date and Time Order Name Status Description    6/14/2017 1257 Blood culture Preliminary     6/14/2017 1158 Blood culture Preliminary           Code Status[VP1.2]   Full Code[VP1.1]       Primary Care Physician   No primary care provider on file.    Physical Exam   /90 (BP Location: Left arm)  Pulse 95  Temp 98.6  F (37  C) (Axillary)  Resp 20  Ht 1.524 m (5')  Wt 69.3 kg (152 lb 12.5 oz)  SpO2 96%  BMI 29.84 kg/m2[VP1.2]  Constitutional: NAD, eyes open without significant response to questions, non-verbal  HEENT: EOMI   Cardiovascular: S1, S2, regular rhythm  Respiratory: CTAB, no wheezing, resonating upper airway congestion  Gastrointestinal: Soft, NT, G-tube in place  Neurologic: Bed bound, non-verbal  Skin: Sacral decub ulcer[VP1.1]    Discharge Disposition[VP1.2]   Discharged to LTC  Condition at discharge: Stable[VP1.1]    Consultations This Hospital Stay   PHYSICAL THERAPY ADULT IP CONSULT  WOUND OSTOMY CONTINENCE NURSE  IP CONSULT  PHARMACY TO DOSE VANCO  NUTRITION SERVICES ADULT IP CONSULT  PHARMACY IP  CONSULT  SURGERY GENERAL IP CONSULT  INFECTIOUS DISEASES IP CONSULT    Time Spent on this Encounter[VP1.2]   Kraig HERNADEZ, personally saw the patient today and spent greater than 30 minutes discharging this patient.[VP1.1]    Discharge Orders     Mantoux instructions   Give two-step Mantoux (PPD) Per Facility Policy Yes     Reason for your hospital stay   UTI, pneumonia, decubitus ulcer     Activity - Up with nursing assistance   Bed bound status     Wound care   Per wound care nurse instructions:    Plan for wound care to coccyx/sacrum: BID and prn :  1.  Cleanse wound with MicroKlenz wound cleanser.  Cleanse periwound with mild skin cleanser, ie Nakul perineal lotion.  2.  Moisten a kerlix fluff with Dakin's solution (aka sodium hypochlorite; comes from Pharmacy), wring out excess.  Pack into wound.  3.  Apply antifungal powder to periwound, rub in well.  Then apply Critic-Aid barrier paste over the powder to periwound (and perineal area if needed).  4.  Cover with ABD pad and minimal Medipore tape.  Label with time/date/initials.  5.  Follow rigorous PIP measures.     Follow Up   Follow-up with LTC provider in 2-4 days     General info for SNF   Length of Stay Estimate: Long Term Care: Estimated # of Days >30  Condition at Discharge: Stable  Level of care:skilled   Rehabilitation Potential: Poor  Admission H&P remains valid and up-to-date: Yes  Recent Chemotherapy: N/A  Use Nursing Home Standing Orders: Yes     Full Code     Fall precautions     Advance Diet as Tolerated   Follow this diet upon discharge: Orders Placed This Encounter     Adult Formula Drip Feeding: Continuous Isosource 1.5; Other - Specify in Comment; Goal Rate: 45; mL/hr; Medication - Tube Feeding Flush Frequency: At least 15-30 mL water before and after medication administration and with tube clogging; Amout to ...     NPO for Medical/Clinical Reasons Except for: NPO but receiving Tube Feeding         Discharge Medications   Current Discharge  Medication List      START taking these medications    Details   linezolid (ZYVOX) 600 MG tablet 1 tablet (600 mg) by Per G Tube route 2 times daily for 7 days    Associated Diagnoses: Urinary tract infection associated with catheterization of urinary tract, unspecified indwelling urinary catheter type, initial encounter      levofloxacin (LEVAQUIN) 250 MG tablet 1 tablet (250 mg) by Per G Tube route daily for 5 days  Qty: 5 tablet, Refills: 0    Associated Diagnoses: Pneumonia of left lung due to infectious organism, unspecified part of lung         CONTINUE these medications which have CHANGED    Details   !! oxyCODONE (ROXICODONE) 5 MG/5ML solution 2 mLs (2 mg) by Per G Tube route 2 times daily  Qty: 15 mL, Refills: 0    Associated Diagnoses: Primary localized osteoarthrosis of shoulder region, unspecified laterality      !! oxyCODONE (ROXICODONE) 5 MG/5ML solution 2 mLs (2 mg) by Per G Tube route every 4 hours as needed for moderate to severe pain  Refills: 0    Associated Diagnoses: Primary localized osteoarthrosis of shoulder region, unspecified laterality       !! - Potential duplicate medications found. Please discuss with provider.      CONTINUE these medications which have NOT CHANGED    Details   !! insulin glargine (LANTUS) 100 UNIT/ML injection Inject 36 Units Subcutaneous every morning      !! insulin glargine (LANTUS) 100 UNIT/ML injection Inject 34 Units Subcutaneous At Bedtime      !! insulin NPH (HUMULIN N/NOVOLIN N) 100 UNIT/ML injection Inject 16 Units Subcutaneous every morning      !! insulin NPH (HUMULIN N/NOVOLIN N) 100 UNIT/ML injection Inject 10 Units Subcutaneous every evening Given at 1400 daily      Lactobacillus Acidophilus POWD 1 each by Gastric Tube route daily 10 billion units      multivitamin, therapeutic (THERA-VIT) TABS tablet 1 tablet by Gastric Tube route daily      Loperamide HCl (IMODIUM A-D) 1 MG/7.5ML LIQD 4 mg by Gastric Tube route every other day      !! acetaminophen  (TYLENOL) 32 mg/mL solution 325 mg by Gastric Tube route daily as needed for fever or mild pain      insulin aspart (NOVOLOG PEN) 100 UNIT/ML soln Inject 1-6 Units Subcutaneous every 4 hours    Associated Diagnoses: Type 2 diabetes mellitus with diabetic nephropathy (H)      !! ACETAMINOPHEN  mg by Gastric Tube route 3 times daily       nystatin (MYCOSTATIN) 102689 UNIT/GM POWD Apply 1 g topically 3 times daily as needed Apply to stomach folds as needed      RANITIDINE HCL PO 75 mg by Gastric Tube route 2 times daily       bisacodyl (DULCOLAX) 10 MG suppository Place 10 mg rectally daily as needed for constipation       !! - Potential duplicate medications found. Please discuss with provider.        Allergies   Allergies   Allergen Reactions     Sulfa Drugs Other (See Comments)     Per nursing home documents, patient has allergy to sulfa     Data[VP1.2]   Most Recent 3 CBC's:[VP1.1]  Recent Labs   Lab Test  06/19/17   0856  06/18/17   0855  06/17/17   1255   WBC  11.8*  11.4*  11.4*   HGB  10.3*  10.1*  9.5*   MCV  93  94  96   PLT  293  289  270[VP1.2]      Most Recent 3 BMP's:[VP1.1]  Recent Labs   Lab Test  06/19/17   0856  06/18/17   0855  06/17/17   1110   NA  141  141  144   POTASSIUM  3.7  3.6  4.7   CHLORIDE  111*  110*  114*   CO2  22  22  18*   BUN  24  23  30   CR  0.70  0.76  0.90   ANIONGAP  8  9  12   NELLIE  8.3*  7.9*  7.6*   GLC  153*  241*  320*[VP1.2]     Most Recent 2 LFT's:[VP1.1]  Recent Labs   Lab Test  06/14/17   1200  06/24/16   0806   AST  20  24   ALT  22  48   ALKPHOS  96  155*   BILITOTAL  0.5  0.7[VP1.2]     Most Recent INR's and Anticoagulation Dosing History:  Anticoagulation Dose History     Recent Dosing and Labs Latest Ref Rng & Units 11/1/2010 11/2/2010 11/3/2010 8/5/2012 6/23/2014 6/21/2016 6/14/2017    INR 0.86 - 1.14 1.02 1.02 0.97 1.09 1.01 1.37(H) 1.10        Most Recent 3 Troponin's:[VP1.1]  Recent Labs   Lab Test  08/08/12   1751  08/08/12   1323  08/05/12   1837   05/19/10   0646  01/28/10   1505   TROPI  0.013  0.022   --   <0.012   --    TROPONIN   --    --   0.00   --   0.00[VP1.2]     Most Recent Cholesterol Panel:[VP1.1]  Recent Labs   Lab Test 05/26/15   CHOL  159   LDL  77   HDL  40   TRIG  211*[VP1.2]     Most Recent 6 Bacteria Isolates From Any Culture (See EPIC Reports for Culture Details):[VP1.1]  Recent Labs   Lab Test  06/14/17   1319  06/14/17   1230  06/14/17   1200  06/21/16   0653  06/21/16   0652  06/21/16   0636   CULT  No growth after 5 days  Moderate growth Escherichia coli  Moderate growth Enterococcus avium  Moderate growth Enterococcus faecalis  Light growth Candida albicans / dubliniensis Candida albicans and Candida   dubliniensis are not routinely speciated Susceptibility testing not routinely   done  Plus Heavy growth Normal skin maria esther  *  >100,000 colonies/mL Methicillin resistant Staphylococcus aureus (MRSA)  50,000 to 100,000 colonies/mL Strain 2 Methicillin resistant Staphylococcus   aureus (MRSA)  Critical Value/Significant Value called to and read back by Elmer Hurd Rn at   2044 [id number]  *  No growth after 5 days  No growth  >100,000 colonies/mL Enterococcus species*  No growth[VP1.2]     Most Recent TSH, T4 and A1c Labs:[VP1.1]  Recent Labs   Lab Test  06/15/17   0833   11/03/10   0710   TSH   --    --   2.69   A1C  8.4*   < >   --     < > = values in this interval not displayed.[VP1.2]     Results for orders placed or performed during the hospital encounter of 06/14/17   XR Chest 2 Views    Narrative    XR CHEST 2 VW 6/14/2017 1:13 PM    HISTORY: Fever.    COMPARISON: June 22, 2016.      Impression    IMPRESSION: Lung volumes are low, with opacification of the left lung  base suggestive of atelectasis and/or infection. No definite pleural  effusions appreciated. No pneumothorax. Stable cardiomegaly.    PUNEET CARABALLO MD   XR Chest Port 1 View    Narrative    CHEST PORTABLE ONE VIEW June 18, 2017 9:12 AM     HISTORY:  Tachypnea.    COMPARISON: 6/14/2017.    FINDINGS: The heart is enlarged. There is retrocardiac airspace  opacity. No pneumothorax or significant pleural effusion.      Impression    IMPRESSION: Retrocardiac airspace opacity may represent atelectasis or  pneumonia.    LIBBY POST MD[VP1.1]           Revision History        User Key Date/Time User Provider Type Action    > VP1.2 6/19/2017 10:58 AM Kraig Mobley MD Physician Addend     VP1.1 6/19/2017 10:31 AM Kraig Mobley MD Physician Sign                     Consult Notes      Consults signed by Matteo Macias MD at 6/18/2017  2:54 PM      Author:  Matteo Macias MD Service:  Infectious Disease Author Type:  Physician    Filed:  6/18/2017  2:54 PM Date of Service:  6/18/2017  1:11 PM Creation Time:  6/18/2017  1:30 PM    Status:  Signed :  Matteo Macias MD (Physician)         INFECTIOUS DISEASE CONSULTATION      IMPRESSION:   1.  A 89-year-old female, nursing home resident, prior known aspiration, chronic Fonseca catheter and Alzheimer's dementia admitted with low-grade sepsis, etiology not entirely clear, would suspect the MRSA UTI as the cause, no positive blood cultures.  Doubt wound is an issue and not clearcut pneumonia.   2.  Chronic sacral decubitus wound, somewhat necrotic, but no signs of infection and it does not tract deep had an I&D done, culture of the wound growing multiple organisms of no significance.   3.  MRSA positive urine culture with catheter in place.   4.  Possible slight infiltrate, mild initial hypoxia, which has resolved, possible aspiration pneumonia.  If so has improved.   5.  Severe dementia.   6.  Recurrent urinary tract infections and urinary colonization with Fonseca catheter in place.   7.  Feeding tube in place, prior probable aspiration, possible etiology to current illness as well.   8.  Diabetes mellitus.   9.  Chronic renal insufficiency.   10.  Sulfa allergy.      RECOMMENDATIONS:   1.  Ignore the sacral decubitus  culture of no clinical significance.  The wound is not infected, nothing to suggest that deep.  Doubt this is the cause of current fever and has already been debrided.   2.  Urine may just be colonization, but also possible cause of current illness.  I would treat.  Continue vancomycin while here but as early as tomorrow, possibly convert to oral linezolid and treat for about 7 days.   3.  Not clearcut pneumonia.  Has been getting gram-negative coverage currently is on ceftriaxone previously Zosyn and Levaquin.  Does not appear to have major clinical pneumonia.  Assuming is recurrent aspiration could argue for just a short course of antibiotics has already been given.     4.  Obviously long-term prognosis poor, especially if recurrently aspirating.      HISTORY:  Celia Lewis is a 89-year-old female seen in consultation due to apparent sepsis.  The patient is noncommunicative comes from the rehab center has a history of some aspiration pneumonia.  Has feeding and a G-tube, chronic Fonseca catheter and recurrent urinary tract infections.  She has also had a sacral decubitus wound has now been debrided.  As described at admission is showing signs of infection, but nothing to suggest that currently and not seen when debrided by Dr. Jones.  The wound culture is growing multiple organisms as expected, no clinical significance.  The patient is improved at present, not requiring oxygen and had a slight infiltrate present and urine is growing MRSA.  She has been getting vancomycin for that and does not have signs of obvious ongoing sepsis.      PAST MEDICAL HISTORY:  Probable recurrent aspiration, history of sacral decubitus wound, history of chronic Fonseca catheter, advanced dementia, diabetes mellitus, mild chronic renal insufficiency.      ALLERGIES:  Sulfa, unclear history.      REVIEW OF SYSTEMS:  Largely unobtainable, as the patient has severe dementia.      PHYSICAL EXAMINATION:   GENERAL:  The patient is awake, looks  at me and not communicative, does not look toxic or ill.   VITAL SIGNS:  Pulse of 70, respiratory rate 16.  She is afebrile.   HEENT:  No visible lesions.   NECK:  Supple and nontender without lymphadenopathy.   HEART:  Not really tachycardic.   LUNGS:  Few crackles at both bases.  Difficult exam is not cooperative.   EXTREMITIES:  Sacral decubitus wound not impressive from an infection perspective, no significant lower extremity wounds.      LABORATORY:  Creatinine currently normal at 0.76.  Procalcitonin normal at 0.20.  Sacral decubitus wound growing numerous organisms.  Urine culture growing MRSA.  Blood cultures have been negative.  Chest x-ray with possible slight infiltrate.      Thank you very much for consultation.  Will follow the patient with you.         JOSE BERNARDO MD             D: 2017 13:11   T: 2017 13:30   MT: #[id number]      Name:     RAÚL MERCEDES   MRN:      [id number]        Account:       [id number]   :      1926           Consult Date:  2017      Document: [id number]       cc: Kraig Mobley MD   [SD1.1]   Revision History        User Key Date/Time User Provider Type Action    > SD1.1 2017  2:54 PM Jose Bernardo MD Physician Sign            Consults by Jose Bernardo MD at 2017  9:43 AM     Author:  Jose Bernardo MD Service:  Infectious Disease Author Type:  Physician    Filed:  2017  9:43 AM Date of Service:  2017  9:43 AM Creation Time:  2017  9:40 AM    Status:  Signed :  Jose Bernardo MD (Physician)         ID consult dictated IMP 1 89 yo female NH, dementia, TF, caro, chronic sacral wd, ? Sepsis, Bc neg, sacral wd debrided but not deep infection, UC MRSa , ? Infiltrate    REc decubtitus not infected, cx of no sig, no antibiotics, Uc ? Sig, vanco while here, zyvox po short course, had zosyn now ceftriaxone ? Pneumonia but seems minor ? zyvox alone at disposition[SD1.1]     Revision History        User Key Date/Time User  Provider Type Action    > SD1.1 6/18/2017  9:43 AM Matteo Macias MD Physician Sign            Consults signed by Raffy Lopez MD at 6/16/2017 12:21 PM      Author:  Raffy Lopez MD Service:  Surgery Author Type:  Physician    Filed:  6/16/2017 12:21 PM Date of Service:  6/15/2017  2:59 PM Creation Time:  6/16/2017  2:44 AM    Status:  Signed :  Raffy Lopez MD (Physician)         SURGICAL CONSULTATION AND PROCEDURE      DATE OF ENCOUNTER:  06/15/2017      TIME OF ENCOUNTER:  15:00      REASON FOR CONSULTATION:  Sacral decubitus ulcer.      REFERRING PROVIDER:  Dr. Yoder.      BRIEF HISTORY:  Ms. Celia Lewis is a 89-year-old female who was admitted to [facility] due to a presumed urinary tract infection and sepsis, also possible pneumonia.  She was noted on her admission to have a chronic sacral decubitus ulcer.  This was evaluated by the wound care nurse, and found to have some necrotic fibrinous slough within the wound, and surgical consultation was therefore requested to evaluate for debridement.      The patient was examined at the bedside with the assistance of her nurse.  The wound itself was examined.  The sacral decubitus ulcer itself measured 4 cm in length and approximately 7 cm in transverse dimension, and the depth was approximately 3 cm with some deep tunneling at one aspect of the wound.  On the left lateral aspect, there was some necrotic fibrinous slough, whereas on the right side there was healthy-appearing granulation tissue.  There was mild reactive erythema around the open edges of the wound.      I discussed this with the patient's son, Dr. Salvatore Lewis, and obtained a telephone consent to proceed with excisional debridement.      PROCEDURE:  With the assistance of a nurse, with the patient in a right side down decubitus position, scissors and forceps were used to sharply debride the fibrinous slough from the wound.  The eschar present was  relatively superficial.  This was debrided effectively at the bedside to what appeared to be more healthy tissue with some visible mild tissue bleeding.  Overall, I would characterize this as a likely stage III/borderline stage IV sacral decubitus ulcer without real significant sequelae to suggest infection.  The wound was repacked by the nurse.  She tolerated this without any difficulty.      PLAN:  At this point, we will defer ongoing management of the wound to the wound care nurse who has already made recommendations for the ongoing management.         WILNER LOPEZ MD             D: 06/15/2017 14:59   T: 2017 02:44   MT: [id number]      Name:     RAÚL MERCEDES   MRN:      [id number]        Account:       [id number]   :      1926           Consult Date:  06/15/2017      Document: [id number]       cc: Myron Yoder MD   [BP1.1]   Revision History        User Key Date/Time User Provider Type Action    > BP1.1 2017 12:21 PM Wilner Lopez MD Physician Sign            Consults by Wilner Lopez MD at 6/15/2017  2:59 PM     Author:  Wilner Lopez MD Service:  Surgery Author Type:  Physician    Filed:  6/15/2017  2:59 PM Date of Service:  6/15/2017  2:59 PM Creation Time:  6/15/2017  2:59 PM    Status:  Signed :  Wilner Lopez MD (Physician)     Consult Orders:    1. Surgery General IP Consult: Patient to be seen: Routine - within 24 hours; Coccyx pressure ulcer with deep wound and drainage; Consultant may enter orders: Yes [[id number]] ordered by Kraig Mobley MD at 06/15/17 1319                See dictated report[BP1.1]     Revision History        User Key Date/Time User Provider Type Action    > BP1.1 6/15/2017  2:59 PM Wilner Lopez MD Physician Sign            Consults by Bisi Montgomery RD, LD at 6/15/2017  9:28 AM     Author:  Bisi Montgomery RD, LD Service:  Nutrition Author Type:  Registered Dietitian    Filed:  6/15/2017  9:28 AM Date  "of Service:  6/15/2017  9:28 AM Creation Time:  6/15/2017  9:09 AM    Status:  Signed :  Bisi Montgomery RD, LD (Registered Dietitian)     Consult Orders:    1. Nutrition Services Adult IP Consult [[id number]] ordered by Antwan Green MD at 06/14/17 1543                CLINICAL NUTRITION SERVICES  -  ASSESSMENT NOTE      Recommendations Ordered by Registered Dietitian (GAVI):   Isosource 1.5 @ 45 mL/hr, continuous (goal rate)  Daily Certavit  Will check WOCN eval of pt's sacral wound and order additional vit/min once the wound is staged.       Malnutrition:   Patient does not meet two of the criteria necessary for diagnosing malnutrition          REASON FOR ASSESSMENT  Celia Lewis is a 89 year old female seen by Registered Dietitian for Provider Order - Registered Dietitian to Assess and Order TF per Medical Nutrition protocol      NUTRITION HISTORY  - Information obtained from EMR, pt with Alzheimer's and is nonverbal.  Pt lives in a NH. She had a G-tube place 6/16/2016 and has been on Osmolite 1.5 @ 55 mL/hr x 20 hrs/day plus ProStat daily. H2O flushes of 200 mL q 4 hours in addition to flushes given with meds.  Total provisions = 1750 jarrod, 84 gm protein.    Pt was also on a multivt, Senokot and Imodium.      CURRENT NUTRITION ORDERS  Diet Order:     NPO       PHYSICAL FINDINGS  Observed  No nutrition-related physical findings observed  Obtained from Chart/Interdisciplinary Team  Pressure Ulcers - sacral decubitus, large (WOCN consult ordered)    ANTHROPOMETRICS  Height:[CM1.1] 5' 0\"[CM1.2]  Weight:[CM1.1] 151 lbs 14.35 oz[CM1.2] (68.9 kg)[CM1.1]  Body mass index is 29.67 kg/(m^2).[CM1.2]  Weight Status:  Overweight BMI 25-29.9  IBW: 45.4 kg +/- 10^%  % IBW: 152%  Weight History:[CM1.1]   Wt Readings from Last 10 Encounters:   06/14/17 68.9 kg (151 lb 14.4 oz)   06/24/16 72.2 kg (159 lb 3.2 oz)   06/18/15 83.9 kg (185 lb)   05/29/15 81.6 kg (180 lb)   05/28/15 81.6 kg (180 lb)   05/22/15 81.6 kg (179 " lb 12.8 oz)   06/24/14 77.5 kg (170 lb 13.7 oz)   08/26/12 82.1 kg (181 lb)   08/25/12 82.1 kg (181 lb)   08/21/12 82.3 kg (181 lb 7 oz)[CM1.3]       LABS  Labs reviewed    MEDICATIONS  Na IVF @ 100 mL/hr  Med SSI, Lantus BID  Culturell      Dosing Weight 51 kg - adjusted for overweight    ASSESSED NUTRITION NEEDS PER APPROVED PRACTICE GUIDELINES:  Estimated Energy Needs: 2423-9732 kcals (30-35 Kcal/Kg)  Justification: Pressure Injury Protocol  Estimated Protein Needs: 60-75 grams protein (1.2-1.5 g pro/Kg)  Justification: wound healing - monitor renal status  Estimated Fluid Needs: 0734-2486  mL (1 mL/Kcal)  Justification: maintenance    MALNUTRITION:  % Weight Loss:  None noted  % Intake:  No decreased intake noted  Subcutaneous Fat Loss:  None observed  Muscle Loss:  None observed  Fluid Retention:  None noted    Malnutrition Diagnosis: Patient does not meet two of the above criteria necessary for diagnosing malnutrition    NUTRITION DIAGNOSIS:  Increased nutrient needs (protein) related to healing as evidenced by sacral decubitus      NUTRITION INTERVENTIONS  Recommendations / Nutrition Prescription  Isosource 1.5 @ 45 mL/hr continuous to provide 1620 jarrod (32 jarrod/kg), 73 gm pro (1.4 gm/kg), 16 gm fiber, 190 gm CHO, 820 mL free water.  Std H2O flushes of 60 mL q 4 hours while on IVF (increase to 135 mL q 4 hr once IVF d/c'd).  Multivit for wound healing    Will check WOCN eval of pt's sacral wound and order additional vit/min once the wound is staged.      Implementation  Nutrition education: No education needs   EN Schedule, Feeding Tube Flush - ordered TF to start at goal rate of 45 mL/hr  Multivitamin/Mineral - ordered daily Certavit per PI protocol  Collaboration and Referral of Nutrition care -Discussed TF and pt's wounds with RASHMI Roman.      Nutrition Goals  Isosource 1.5 @ goal of 45 mL/hr will meet assessed needs  .      MONITORING AND EVALUATION:  Progress towards goals will be monitored and evaluated  per protocol and Practice Guidelines    Bisi Montgomery RD  Pager [phone] (M-F)            [phone] (W/E & Hol)[CM1.1]                     Revision History        User Key Date/Time User Provider Type Action    > CM1.3 6/15/2017  9:28 AM Bisi Montgomery, GAVI, LD Registered Dietitian Sign     CM1.2 6/15/2017  9:10 AM Bisi Montgomery, GAVI, [place] Registered Dietitian      CM1.1 6/15/2017  9:09 AM Bisi Montgomery, GAVI, [place] Registered Dietitian                      Progress Notes - Physician (Notes for yesterday and today)      Progress Notes by Matteo Macias MD at 6/19/2017  7:41 AM     Author:  Matteo Macias MD Service:  Infectious Disease Author Type:  Physician    Filed:  6/19/2017  7:47 AM Date of Service:  6/19/2017  7:41 AM Creation Time:  6/19/2017  7:41 AM    Status:  Signed :  Matteo Macias MD (Physician)         [facility]  Infectious Disease Progress Note          Assessment and Plan:   IMPRESSION:   1.  A 89-year-old female, nursing home resident, prior known aspiration, chronic Fonseca catheter and Alzheimer's dementia admitted with low-grade sepsis, etiology not entirely clear, would suspect the MRSA UTI as the cause, no positive blood cultures.  Doubt wound is an issue and not clearcut pneumonia.   2.  Chronic sacral decubitus wound, somewhat necrotic, but no signs of infection and it does not tract deep had an I&D done, culture of the wound growing multiple organisms of no significance.   3.  MRSA positive urine culture with catheter in place.   4.  Possible slight infiltrate, mild initial hypoxia, which has resolved, possible aspiration pneumonia.  If so has improved.   5.  Severe dementia.   6.  Recurrent urinary tract infections and urinary colonization with Fonseca catheter in place.   7.  Feeding tube in place, prior probable aspiration, possible etiology to current illness as well.   8.  Diabetes mellitus.   9.  Chronic renal insufficiency.   10.  Sulfa allergy.        RECOMMENDATIONS:   1.  Ignore the sacral decubitus, culture of no clinical significance.  The wound is not infected, nothing to suggest  deep.  Doubt this is the cause of current fever and has already been debrided.   2.  Urine may just be colonization, but also possible cause of current illness.  I would treat.  Continue vancomycin while here but as early as any time convert to oral linezolid 600 bid and treat for about 7 days.   3.  Not clearcut pneumonia.  Has been getting gram-negative coverage currently is on ceftriaxone previously Zosyn and Levaquin.  Does not appear to have major clinical pneumonia.  Assuming is recurrent aspiration could argue for just a short course of antibiotics has already been given, ie 5 days.  Alternatively enteral lev for another 5 days   4.  Obviously long-term prognosis poor, especially if recurrently aspirating.         Interval History:   no complaints not interactive, no fever procal0.2, no new cxs              Medications:       insulin glargine  36 Units Subcutaneous BID     insulin isophane human  16 Units Subcutaneous QAM AC     insulin isophane human  10 Units Subcutaneous Q24H     cefTRIAXone  1 g Intravenous Q24H     vancomycin (VANCOCIN) IV  1,250 mg Intravenous Q24H     vitamin A-D & C drops  7 mL Per G Tube Daily     multivitamins with minerals  15 mL Oral Daily     sodium hypochlorite   Topical BID     sodium chloride (PF)  3 mL Intracatheter Q8H     acetaminophen (TYLENOL) tablet 650 mg  650 mg Oral or Feeding Tube TID     lactobacillus rhamnosus (GG)  1 capsule Per G Tube Daily     oxyCODONE  2 mg Oral BID     ranitidine  75 mg Per G Tube BID     insulin aspart  1-6 Units Subcutaneous Q4H                  Physical Exam:   Blood pressure 167/84, pulse 95, temperature 98.4  F (36.9  C), temperature source Axillary, resp. rate 20, height 1.524 m (5'), weight 69.3 kg (152 lb 12.5 oz), SpO2 97 %.  Wt Readings from Last 2 Encounters:   06/19/17 69.3 kg (152 lb 12.5  oz)   06/24/16 72.2 kg (159 lb 3.2 oz)     Vital Signs with Ranges  Temp:  [97.6  F (36.4  C)-98.8  F (37.1  C)] 98.4  F (36.9  C)  Pulse:  [95] 95  Heart Rate:  [] 104  Resp:  [20-32] 20  BP: (137-167)/(66-90) 167/84  SpO2:  [95 %-100 %] 97 %    Constitutional: Awake, alert, not interactive   Lungs: Clear to auscultation bilaterally, no crackles or wheezing   Cardiovascular: Regular rate and rhythm, normal S1 and S2, and no murmur noted   Abdomen: Normal bowel sounds, soft, non-distended, non-tender   Skin: No rashes, no cyanosis, no edema wd not seen   Other:           Data:   All microbiology laboratory data reviewed.  Recent Labs   Lab Test  06/18/17   0855  06/17/17   1255  06/17/17   1110   WBC  11.4*  11.4*  Canceled, Test credited   Unsatisfactory specimen - clotted  CALLED TO DI ON 66     HGB  10.1*  9.5*  Canceled, Test credited   Unsatisfactory specimen - clotted  CALLED TO DI ON 66     HCT  31.9*  30.7*  Canceled, Test credited   Unsatisfactory specimen - clotted  CALLED TO DI ON 66     MCV  94  96  Canceled, Test credited   Unsatisfactory specimen - clotted  CALLED TO DI ON 66     PLT  289  270  Canceled, Test credited   Unsatisfactory specimen - clotted  CALLED TO DI ON 66       Recent Labs   Lab Test  06/18/17   0855  06/17/17   1110  06/16/17   0850   CR  0.76  0.90  0.96     Recent Labs   Lab Test  05/08/13   1300   SED  61*     Recent Labs   Lab Test  06/14/17   1319  06/14/17   1230  06/14/17   1200  06/21/16   0653  06/21/16   0652  06/21/16   0636  06/24/14   1200  09/10/12   1623  08/31/12   0800   CULT  No growth after 5 days  Moderate growth Escherichia coli  Moderate growth Enterococcus avium  Moderate growth Enterococcus faecalis  Light growth Candida albicans / dubliniensis Candida albicans and Candida   dubliniensis are not routinely speciated Susceptibility testing not routinely   done  Plus Heavy growth Normal skin maria esther  *  >100,000 colonies/mL Methicillin resistant  Staphylococcus aureus (MRSA)  50,000 to 100,000 colonies/mL Strain 2 Methicillin resistant Staphylococcus   aureus (MRSA)  Critical Value/Significant Value called to and read back by Elmer Hurd Rn at   [id number]  *  No growth after 5 days  No growth  >100,000 colonies/mL Enterococcus species*  No growth  10,000 to 50,000 colonies/mL Candida kefyr Susceptibility testing not routinely   done  10,000 to 50,000 colonies/mL Lactobacillus species No further identification  Susceptibility testing not routinely done  *  Heavy growth Klebsiella pneumoniae Heavy growth Enterobacter aerogenes Heavy growth Escherichia coli Plus Normal Urogenital Merline  No growth  No growth[SD1.1]          Revision History        User Key Date/Time User Provider Type Action    > SD1.1 6/19/2017  7:47 AM Matteo Macias MD Physician Sign            Progress Notes by Kraig Mobley MD at 6/18/2017  9:01 AM     Author:  Kraig Mobley MD Service:  Hospitalist Author Type:  Physician    Filed:  6/18/2017  9:15 AM Date of Service:  6/18/2017  9:01 AM Creation Time:  6/18/2017  9:01 AM    Status:  Signed :  Kraig Mobley MD (Physician)         [facility]  Hospitalist Progress Note     06/18/2017    Assessment & Plan   Ms. Celia Lewis is a 89-year-old female with history of diabetes mellitus type 2, CHF, chronic kidney disease, coronary artery disease, hypertension, recurrent UTIs, Alzheimer dementia, with baseline nonverbal status who presents from a LTC  with low oxygen sats, fever and labored breathing.     Severe sepsis due to HCAP and UTI  The patient presents with tachypnea, low oxygen sats and fever.    Chest x-ray shows infiltrate in the left lung base.   Chronic indwelling Fonseca which was changed in the emergency room.    UA consistent with UTI  Sepsis improving with decreasing WBC, lactic acid, and temp  Cultures:   - Urine growing >100K MRSA (2 strains)   - Wound from buttock growing E coli, enterococcus avium  and faecalis, and candida   - Blood NGTD  - Currently on vancomycin and ceftriaxone daily  - Stopped levofloxacin and Zosyn on 6/17  - Given sulfa allergy, will request ID assistance regarding antibiotics if able to transition to orals  - With increased tachypnea, will check CXR and stop IVF, give Lasix 20mg IV x 1  - Restart telemetry    Stage 3-4 Coccyx pressure ulcer  Wound is necrotic measuring 4x7cm, 3 cm deep.  - Wound care and gen surg consult appreciated  - S/p bedside debridement on 6/15 by general surgery    Advanced dementia  Appears to be at her nonverbal and immobile baseline.    - Continue tube feedings, nutritionist consulted  - Discussed goals of care with son, Dr. Lewis who is POA, who requests full code and current level of cares    Diabetes mellitus type 2   PTA regimen: Lantus 36 units in AM and 34 units in PM, NPH 16 units in AM and 10 units at 1400  - Control starting to improve with increased insulin  - Lantus 36 units BID, NPH 16 units in AM, 10 units at 1400  - moderate resistance sliding scale.     CKD3  This appears to be at baseline.  Avoid nephrotoxins.       Hypernatremia  Improved with 1/2NS IVF.    Prophylaxis: Pneumatic Compression Devices  Code Status: Full Code  Disposition:  possible discharge in 2 days back to LTC if able to transition to orals    Last updated son, Dr. Salvatore Lewis (immunologist/allergist), on 6/18    Kraig Mobley MD  [phone] (P)  Text Page (7 am - 6 pm)    Interval History   Patient is nonverbal.  Spoke with son Corbin on the unit floor.  Discussed with nurse that patient is more tachypneic this morning.    -Data reviewed today: I reviewed all new labs and imaging results over the last 24 hours.    Physical Exam   /66 (BP Location: Left arm)  Pulse 99  Temp 98.8  F (37.1  C) (Axillary)  Resp (!) 32  Ht 1.524 m (5')  Wt 70.3 kg (154 lb 15.7 oz)  SpO2 100%  BMI 30.27 kg/m2  Constitutional: NAD, awake  HEENT: EOMI   Cardiovascular: S1, S2,  regular rhythm,   Respiratory: CTAB, diffusely diminished, crackles over bases, tachypneic  Gastrointestinal: Soft, NT  Neurologic: At baseline dementia, nonverbal, does not respond to questions  Skin: Coccyx pressure wound    Medications     IV fluid REPLACEMENT ONLY         insulin glargine  36 Units Subcutaneous BID     insulin isophane human  16 Units Subcutaneous QAM AC     insulin isophane human  10 Units Subcutaneous Q24H     cefTRIAXone  1 g Intravenous Q24H     vancomycin (VANCOCIN) IV  1,250 mg Intravenous Q24H     vitamin A-D & C drops  7 mL Per G Tube Daily     multivitamins with minerals  15 mL Oral Daily     sodium hypochlorite   Topical BID     sodium chloride (PF)  3 mL Intracatheter Q8H     acetaminophen (TYLENOL) tablet 650 mg  650 mg Oral or Feeding Tube TID     lactobacillus rhamnosus (GG)  1 capsule Per G Tube Daily     oxyCODONE  2 mg Oral BID     ranitidine  75 mg Per G Tube BID     insulin aspart  1-6 Units Subcutaneous Q4H       Data     Recent Labs  Lab 06/17/17  1255 06/17/17  1110 06/16/17  0850 06/15/17  0833 06/14/17  2205 06/14/17  1200   WBC 11.4* Canceled, Test credited Unsatisfactory specimen - clottedCALLED TO DI ON 66 10.5 13.9*  --  18.7*   HGB 9.5* Canceled, Test credited Unsatisfactory specimen - clottedCALLED TO DI ON 66 10.0* 11.0*  --  12.4   MCV 96 Canceled, Test credited Unsatisfactory specimen - clottedCALLED TO DI ON 66 98 98  --  98    Canceled, Test credited Unsatisfactory specimen - clottedCALLED TO DI ON 66 286 302  --  386   INR  --   --   --   --   --  1.10   NA  --  144 146* 149*  --  144   POTASSIUM  --  4.7 4.0 4.3  --  4.1   CHLORIDE  --  114* 115* 117*  --  110*   CO2  --  18* 20 23  --  27   BUN  --  30 37* 45*  --  60*   CR  --  0.90 0.96 1.12*  --  1.28*   ANIONGAP  --  12 11 9  --  7   NELLIE  --  7.6* 7.7* 8.3*  --  9.0   GLC  --  320* 366* 109*  --  275*   LACT  --   --   --  1.9 2.6* 3.5*   ALBUMIN  --   --   --   --   --  2.5*   PROTTOTAL  --   --   " --   --   --  8.2   BILITOTAL  --   --   --   --   --  0.5   ALKPHOS  --   --   --   --   --  96   ALT  --   --   --   --   --  22   AST  --   --   --   --   --  20   LIPASE  --   --   --   --   --  130     No results found for this or any previous visit (from the past 24 hour(s)).[VP1.1]      Revision History        User Key Date/Time User Provider Type Action    > VP1.1 6/18/2017  9:15 AM Kraig Mobley MD Physician Sign                  Procedure Notes     No notes of this type exist for this encounter.      Progress Notes - Therapies (Notes from 06/16/17 through 06/19/17)     No notes of this type exist for this encounter.                                          INTERAGENCY TRANSFER FORM - LAB / IMAGING / EKG / EMG RESULTS   6/14/2017                       [place] [address] [facility]: [phone]            Unresulted Labs (24h ago through future)    Start       Ordered    06/19/17 0600  Basic metabolic panel  EVERY MONDAY,   Routine     Comments:  Every Monday while on enteral tube feeding.    06/15/17 0858    06/19/17 0600  Magnesium  EVERY MONDAY,   Routine     Comments:  Every Monday while on enteral tube feeding.    06/15/17 0858    06/19/17 0600  Phosphorus  EVERY MONDAY,   Routine     Comments:  Every Monday while on enteral tube feeding.    06/15/17 0858    06/16/17 0600  Creatinine  AM DRAW,   Routine     Comments:  While receiving Vancomycin therapy    06/14/17 1946    Unscheduled  Potassium  (Potassium Replacement - \"Standard\" - For K levels less than 3.4 mmol/L - UU,UR,UA,RH,SH,PH,WY )  CONDITIONAL (SPECIFY),   Routine     Comments:  Obtain Potassium Level for these conditions:  *IF no potassium result within 24 hours before initiation of order set, draw potassium level with next lab collect.    *2 HOURS AFTER last IV potassium replacement dose and 4 hours after an oral replacement dose.  *Next morning after potassium dose.     Repeat Potassium Replacement if necessary.    06/14/17 " 1714         Lab Results - 3 Days      Glucose by meter [[id number]] (Abnormal)  Resulted: 06/19/17 1231, Result status: Final result    Ordering provider: Antwan Green MD  06/19/17 1225 Resulting lab: POINT OF CARE TEST, GLUCOSE    Specimen Information    Type Source Collected On     06/19/17 1225          Components       Value Reference Range Flag Lab   Glucose 152 70 - 99 mg/dL H 170            Magnesium [[id number]]  Resulted: 06/19/17 0929, Result status: Final result    Ordering provider: Antwan Green MD  06/19/17 0000 Resulting lab: [facility]    Specimen Information    Type Source Collected On   Blood  06/19/17 0856          Components       Value Reference Range Flag Lab   Magnesium 2.1 1.6 - 2.3 mg/dL  FrStHsLb            Phosphorus [[id number]]  Resulted: 06/19/17 0929, Result status: Final result    Ordering provider: Antwan Green MD  06/19/17 0000 Resulting lab: [facility]    Specimen Information    Type Source Collected On   Blood  06/19/17 0856          Components       Value Reference Range Flag Lab   Phosphorus 2.5 2.5 - 4.5 mg/dL  FrStHsLb            Basic metabolic panel [[id number]] (Abnormal)  Resulted: 06/19/17 0929, Result status: Final result    Ordering provider: Antwan Green MD  06/19/17 0000 Resulting lab: [facility]    Specimen Information    Type Source Collected On   Blood  06/19/17 0856          Components       Value Reference Range Flag Lab   Sodium 141 133 - 144 mmol/L  FrStHsLb   Potassium 3.7 3.4 - 5.3 mmol/L  FrStHsLb   Chloride 111 94 - 109 mmol/L H FrStHsLb   Carbon Dioxide 22 20 - 32 mmol/L  FrStHsLb   Anion Gap 8 3 - 14 mmol/L  FrStHsLb   Glucose 153 70 - 99 mg/dL H FrStHsLb   Urea Nitrogen 24 7 - 30 mg/dL  FrStHsLb   Creatinine 0.70 0.52 - 1.04 mg/dL  FrStHsLb   GFR Estimate 79 >60 mL/min/1.7m2  FrStHsLb   Comment:  Non  GFR Calc   GFR Estimate If Black -- >60 mL/min/1.7m2  FrStHsLb    Result:         >90   GFR Calc     Calcium 8.3 8.5 - 10.1 mg/dL L FrStHsLb   Result:              CBC with platelets [[id number]] (Abnormal)  Resulted: 06/19/17 0914, Result status: Final result    Ordering provider: Kraig Mobley MD  06/19/17 0000 Resulting lab: [facility]    Specimen Information    Type Source Collected On   Blood  06/19/17 0856          Components       Value Reference Range Flag Lab   WBC 11.8 4.0 - 11.0 10e9/L H FrStHsLb   RBC Count 3.44 3.8 - 5.2 10e12/L L FrStHsLb   Hemoglobin 10.3 11.7 - 15.7 g/dL L FrStHsLb   Hematocrit 32.1 35.0 - 47.0 % L FrStHsLb   MCV 93 78 - 100 fl  FrStHsLb   MCH 29.9 26.5 - 33.0 pg  FrStHsLb   MCHC 32.1 31.5 - 36.5 g/dL  FrStHsLb   RDW 15.1 10.0 - 15.0 % H FrStHsLb   Platelet Count 293 150 - 450 10e9/L  FrStHsLb            Glucose by meter [[id number]] (Abnormal)  Resulted: 06/19/17 0831, Result status: Final result    Ordering provider: Antwan Green MD  06/19/17 0823 Resulting lab: POINT OF CARE TEST, GLUCOSE    Specimen Information    Type Source Collected On     06/19/17 0823          Components       Value Reference Range Flag Lab   Glucose 149 70 - 99 mg/dL H 170            Glucose by meter [[id number]] (Abnormal)  Resulted: 06/19/17 0455, Result status: Final result    Ordering provider: Antwan Green MD  06/19/17 0442 Resulting lab: POINT OF CARE TEST, GLUCOSE    Specimen Information    Type Source Collected On     06/19/17 0442          Components       Value Reference Range Flag Lab   Glucose 198 70 - 99 mg/dL H 170            Blood culture [[id number]]  Resulted: 06/19/17 0425, Result status: Preliminary result    Ordering provider: Chencho Cortez MD  06/14/17 1158 Resulting lab: [facility]    Specimen Information    Type Source Collected On   Blood Arm, Left 06/14/17 1200          Components       Value Reference Range Flag Lab   Specimen Description Blood Unspecified Site    75   Special Requests Aerobic and anaerobic bottles received   FrStHsLb   Culture Micro No growth after 5 days   75   Micro Report Status Pending   75            Blood culture [[id number]]  Resulted: 06/19/17 0425, Result status: Preliminary result    Ordering provider: Chencho Cortez MD  06/14/17 1257 Resulting lab: [facility]    Specimen Information    Type Source Collected On   Blood  06/14/17 1319          Components       Value Reference Range Flag Lab   Specimen Description Blood Right Arm   FrStHsLb   Special Requests Aerobic and anaerobic bottles received   FrStHsLb   Culture Micro No growth after 5 days   75   Micro Report Status Pending   75            Glucose by meter [[id number]] (Abnormal)  Resulted: 06/19/17 0100, Result status: Final result    Ordering provider: Antwan Green MD  06/19/17 0049 Resulting lab: POINT OF CARE TEST, GLUCOSE    Specimen Information    Type Source Collected On     06/19/17 0049          Components       Value Reference Range Flag Lab   Glucose 156 70 - 99 mg/dL H 170            Vancomycin level [[id number]]  Resulted: 06/18/17 2142, Result status: Final result    Ordering provider: Bella Pinto RPH  06/18/17 1500 Resulting lab: [facility]    Specimen Information    Type Source Collected On   Blood  06/18/17 2053          Components       Value Reference Range Flag Lab   [facility]9 mg[facility]L  [facility]   Comment:         Traditional Dosing therapeutic Range:          Trough 8-20 mg/L          Peak 20-50 mg/L              Glucose by meter [[id number]] (Abnormal)  Resulted: 06/18/17 2021, Result status: Final result    Ordering provider: Antwan Green MD  06/18/17 2016 Resulting lab: POINT OF CARE TEST, GLUCOSE    Specimen Information    Type Source Collected On     06/18/17 2016          Components       Value Reference Range Flag Lab   Glucose 170 70 - 99 mg/dL H 170            Glucose by meter [[id number]]  (Abnormal)  Resulted: 06/18/17 1616, Result status: Final result    Ordering provider: Antwan Green MD  06/18/17 1610 Resulting lab: POINT OF CARE TEST, GLUCOSE    Specimen Information    Type Source Collected On     06/18/17 1610          Components       Value Reference Range Flag Lab   Glucose 208 70 - 99 mg/dL H 170            Glucose by meter [[id number]] (Abnormal)  Resulted: 06/18/17 1221, Result status: Final result    Ordering provider: Antwan Green MD  06/18/17 1217 Resulting lab: POINT OF CARE TEST, GLUCOSE    Specimen Information    Type Source Collected On     06/18/17 1217          Components       Value Reference Range Flag Lab   Glucose 240 70 - 99 mg/dL H 170            Procalcitonin [[id number]]  Resulted: 06/18/17 0958, Result status: Final result    Ordering provider: Kraig Mobley MD  06/18/17 0000 Resulting lab: [facility]    Specimen Information    Type Source Collected On   Blood  06/18/17 0855          Components       Value Reference Range Flag Lab   Procalcitonin 0.20 ng/ml  FrStHsLb   Comment:         0.05-0.24 ng/ml Low risk of systemic bacterial infection. Local bacterial   infection possible.  Recommendation: Assess other clinical features of   infection. Discourage antibiotics unless strong clinical suspicion for   serious   infection.              Basic metabolic panel [[id number]] (Abnormal)  Resulted: 06/18/17 0927, Result status: Final result    Ordering provider: Kraig Mobley MD  06/18/17 0000 Resulting lab: [facility]    Specimen Information    Type Source Collected On   Blood  06/18/17 0855          Components       Value Reference Range Flag Lab   Sodium 141 133 - 144 mmol/L  FrStHsLb   Potassium 3.6 3.4 - 5.3 mmol/L  FrStHsLb   Chloride 110 94 - 109 mmol/L H FrStHsLb   Carbon Dioxide 22 20 - 32 mmol/L  FrStHsLb   Anion Gap 9 3 - 14 mmol/L  FrStHsLb   Glucose 241 70 - 99 mg/dL H FrStHsLb   Urea Nitrogen 23 7 - 30 mg/dL  FrStHsLb    Creatinine 0.76 0.52 - 1.04 mg/dL  FrStHsLb   GFR Estimate 71 >60 mL/min/1.7m2  FrStHsLb   Comment:  Non  GFR Calc   GFR Estimate If Black 86 >60 mL/min/1.7m2  FrStHsLb   Comment:  African American GFR Calc   Calcium 7.9 8.5 - 10.1 mg/dL L FrStHsLb            CBC with platelets [[id number]] (Abnormal)  Resulted: 06/18/17 0908, Result status: Final result    Ordering provider: Kraig Mobley MD  06/18/17 0000 Resulting lab: [facility]    Specimen Information    Type Source Collected On   Blood  06/18/17 0855          Components       Value Reference Range Flag Lab   WBC 11.4 4.0 - 11.0 10e9/L H FrStHsLb   RBC Count 3.39 3.8 - 5.2 10e12/L L FrStHsLb   Hemoglobin 10.1 11.7 - 15.7 g/dL L FrStHsLb   Hematocrit 31.9 35.0 - 47.0 % L FrStHsLb   MCV 94 78 - 100 fl  FrStHsLb   MCH 29.8 26.5 - 33.0 pg  FrStHsLb   MCHC 31.7 31.5 - 36.5 g/dL  FrStHsLb   RDW 14.8 10.0 - 15.0 %  FrStHsLb   Platelet Count 289 150 - 450 10e9/L  FrStHsLb            Glucose by meter [[id number]] (Abnormal)  Resulted: 06/18/17 0500, Result status: Final result    Ordering provider: Antwan Green MD  06/18/17 0457 Resulting lab: POINT OF CARE TEST, GLUCOSE    Specimen Information    Type Source Collected On     06/18/17 0457          Components       Value Reference Range Flag Lab   Glucose 234 70 - 99 mg/dL H 170            Glucose by meter [[id number]] (Abnormal)  Resulted: 06/18/17 0041, Result status: Final result    Ordering provider: Antwan Green MD  06/18/17 0004 Resulting lab: POINT OF CARE TEST, GLUCOSE    Specimen Information    Type Source Collected On     06/18/17 0004          Components       Value Reference Range Flag Lab   Glucose 200 70 - 99 mg/dL H 170            Vancomycin level [[id number]]  Resulted: 06/17/17 2217, Result status: Final result    Ordering provider: Bella Pinto RPH  06/17/17 1500 Resulting lab: [facility]    Specimen Information    Type Source Collected On    Blood  06/17/17 2156          Components       Value Reference Range Flag Lab   Vancomycin Level 11.7 mg/L  FrStHsLb   Comment:         Traditional Dosing therapeutic Range:          Trough 8-20 mg/L          Peak 20-50 mg/L              Glucose by meter [[id number]] (Abnormal)  Resulted: 06/17/17 2021, Result status: Final result    Ordering provider: Antwan Green MD  06/17/17 2006 Resulting lab: POINT OF CARE TEST, GLUCOSE    Specimen Information    Type Source Collected On     06/17/17 2006          Components       Value Reference Range Flag Lab   Glucose 265 70 - 99 mg/dL H 170            Glucose by meter [[id number]] (Abnormal)  Resulted: 06/17/17 1640, Result status: Final result    Ordering provider: Antwan Green MD  06/17/17 1632 Resulting lab: POINT OF CARE TEST, GLUCOSE    Specimen Information    Type Source Collected On     06/17/17 1632          Components       Value Reference Range Flag Lab   Glucose 287 70 - 99 mg/dL H 170            CBC with platelets [[id number]] (Abnormal)  Resulted: 06/17/17 1302, Result status: Final result    Ordering provider: Antwan Green MD  06/17/17 1124 Resulting lab: [facility]    Specimen Information    Type Source Collected On     06/17/17 1255          Components       Value Reference Range Flag Lab   WBC 11.4 4.0 - 11.0 10e9/L H FrStHsLb   RBC Count 3.19 3.8 - 5.2 10e12/L L FrStHsLb   Hemoglobin 9.5 11.7 - 15.7 g/dL L FrStHsLb   Hematocrit 30.7 35.0 - 47.0 % L FrStHsLb   MCV 96 78 - 100 fl  FrStHsLb   MCH 29.8 26.5 - 33.0 pg  FrStHsLb   MCHC 30.9 31.5 - 36.5 g/dL L FrStHsLb   RDW 15.0 10.0 - 15.0 %  FrStHsLb   Platelet Count 270 150 - 450 10e9/L  FrStHsLb            Glucose by meter [[id number]] (Abnormal)  Resulted: 06/17/17 1217, Result status: Final result    Ordering provider: Antwan Green MD  06/17/17 1209 Resulting lab: POINT OF CARE TEST, GLUCOSE    Specimen Information    Type Source Collected On     06/17/17 3017           Components       Value Reference Range Flag Lab   Glucose 297 70 - 99 mg/dL H 170            Wound Culture Aerobic Bacterial [[id number]] (Abnormal)  Resulted: 06/17/17 1140, Result status: Final result    Ordering provider: Chencho Cortez MD  06/14/17 1231 Resulting lab: INFECTIOUS DISEASE DIAGNOSTIC LABORATORY    Specimen Information    Type Source Collected On   Wound Buttock 06/14/17 1230          Components       Value Reference Range Flag Lab   Specimen Description Sacrum Wound   75   Culture Micro --  A 225   Result:         Moderate growth Escherichia coli  Moderate growth Enterococcus avium  Moderate growth Enterococcus faecalis  Light growth Candida albicans / dubliniensis Candida albicans and Candida   dubliniensis are not routinely speciated Susceptibility testing not routinely   done  Plus Heavy growth Normal skin maria esther     Micro Report Status FINAL 06/17/2017   225   Result:     Organism: Moderate growth Escherichia coli   75   Organism: Moderate growth Enterococcus faecalis   225   Organism: Moderate growth Enterococcus avium   225            Basic metabolic panel [[id number]] (Abnormal)  Resulted: 06/17/17 1131, Result status: Final result    Ordering provider: Kraig Mobley MD  06/17/17 0000 Resulting lab: [facility]    Specimen Information    Type Source Collected On   Blood  06/17/17 1110          Components       Value Reference Range Flag Lab   Sodium 144 133 - 144 mmol/L  FrStHsLb   Potassium 4.7 3.4 - 5.3 mmol/L  FrStHsLb   Chloride 114 94 - 109 mmol/L H FrStHsLb   Carbon Dioxide 18 20 - 32 mmol/L L FrStHsLb   Anion Gap 12 3 - 14 mmol/L  FrStHsLb   Glucose 320 70 - 99 mg/dL H FrStHsLb   Urea Nitrogen 30 7 - 30 mg/dL  FrStHsLb   Creatinine 0.90 0.52 - 1.04 mg/dL  FrStHsLb   GFR Estimate 59 >60 mL/min/1.7m2 L FrStHsLb   Comment:  Non  GFR Calc   GFR Estimate If Black 71 >60 mL/min/1.7m2  FrStHsLb   Comment:  African American GFR Calc   Calcium 7.6 8.5 -  10.1 mg/dL L FrStHsLb            CBC with platelets [[id number]]  Resulted: 06/17/17 1123, Result status: Final result    Ordering provider: Kraig Mobley MD  06/17/17 0000 Resulting lab: [facility]    Specimen Information    Type Source Collected On   Blood  06/17/17 1110          Components       Value Reference Range Flag Lab   WBC -- 4.0 - 11.0 10e9/L  FrStHsLb   Result:         Canceled, Test credited   Unsatisfactory specimen - clotted  CALLED TO DI ON 66     RBC Count -- 3.8 - 5.2 10e12/L  FrStHsLb   Result:         Canceled, Test credited   Unsatisfactory specimen - clotted  CALLED TO DI ON 66     Hemoglobin -- 11.7 - 15.7 g/dL  FrStHsLb   Result:         Canceled, Test credited   Unsatisfactory specimen - clotted  CALLED TO DI ON 66     Hematocrit -- 35.0 - 47.0 %  FrStHsLb   Result:         Canceled, Test credited   Unsatisfactory specimen - clotted  CALLED TO DI ON 66     MCV -- 78 - 100 fl  FrStHsLb   Result:         Canceled, Test credited   Unsatisfactory specimen - clotted  CALLED TO DI ON 66     MCH -- 26.5 - 33.0 pg  FrStHsLb   Result:         Canceled, Test credited   Unsatisfactory specimen - clotted  CALLED TO DI ON 66     MCHC -- 31.5 - 36.5 g/dL  FrStHsLb   Result:         Canceled, Test credited   Unsatisfactory specimen - clotted  CALLED TO DI ON 66     RDW -- 10.0 - 15.0 %  FrStHsLb   Result:         Canceled, Test credited   Unsatisfactory specimen - clotted  CALLED TO DI ON 66     Platelet Count -- 150 - 450 10e9/L  FrStHsLb   Result:         Canceled, Test credited   Unsatisfactory specimen - clotted  CALLED TO DI ON 66              Glucose by meter [[id number]] (Abnormal)  Resulted: 06/17/17 0915, Result status: Final result    Ordering provider: Antwan Green MD  06/17/17 0901 Resulting lab: POINT OF CARE TEST, GLUCOSE    Specimen Information    Type Source Collected On     06/17/17 0901          Components       Value Reference Range Flag Lab   Glucose 306 70 - 99  mg/dL H 170            Glucose by meter [[id number]] (Abnormal)  Resulted: 06/17/17 0431, Result status: Final result    Ordering provider: Antwan Green MD  06/17/17 0427 Resulting lab: POINT OF CARE TEST, GLUCOSE    Specimen Information    Type Source Collected On     06/17/17 0427          Components       Value Reference Range Flag Lab   Glucose 307 70 - 99 mg/dL H 170            Glucose by meter [[id number]] (Abnormal)  Resulted: 06/17/17 0121, Result status: Final result    Ordering provider: Antwan Green MD  06/17/17 0047 Resulting lab: POINT OF CARE TEST, GLUCOSE    Specimen Information    Type Source Collected On     06/17/17 0047          Components       Value Reference Range Flag Lab   Glucose 322 70 - 99 mg/dL H 170            Urine Culture Aerobic Bacterial [[id number]] (Abnormal)  Resulted: 06/16/17 2046, Result status: Final result    Ordering provider: Chencho Cortez MD  06/14/17 1158 Resulting lab: [facility]    Specimen Information    Type Source Collected On   Urine Urine catheter 06/14/17 1230          Components       Value Reference Range Flag Lab   Specimen Description Catheterized Urine   FrStHsLb   Culture Micro --  A 75   Result:         >100,000 colonies/mL Methicillin resistant Staphylococcus aureus (MRSA)  50,000 to 100,000 colonies/mL Strain 2 Methicillin resistant Staphylococcus   aureus (MRSA)  Critical Value/Significant Value called to and read back by Elmer Hurd Rn at   [phone]     Micro Report Status FINAL 06/16/2017   75   Result:     Organism: --   75   Result:         50,000 to 100,000 colonies/mL Strain 2 Methicillin resistant Staphylococcus   aureus (MRSA)     Organism: >100,000 colonies/mL Methicillin resistant Staphylococcus aureus (MRSA)   75   Result:              Glucose by meter [[id number]] (Abnormal)  Resulted: 06/16/17 2030, Result status: Final result    Ordering provider: Antwan Green MD  06/16/17 2023  Resulting lab: POINT OF CARE TEST, GLUCOSE    Specimen Information    Type Source Collected On     06/16/17 2023          Components       Value Reference Range Flag Lab   Glucose 365 70 - 99 mg/dL H 170            Glucose by meter [[id number]] (Abnormal)  Resulted: 06/16/17 1626, Result status: Final result    Ordering provider: Antwan Green MD  06/16/17 1621 Resulting lab: POINT OF CARE TEST, GLUCOSE    Specimen Information    Type Source Collected On     06/16/17 1621          Components       Value Reference Range Flag Lab   Glucose 356 70 - 99 mg/dL H 170            Glucose by meter [[id number]] (Abnormal)  Resulted: 06/16/17 1206, Result status: Final result    Ordering provider: Antwan Green MD  06/16/17 1159 Resulting lab: POINT OF CARE TEST, GLUCOSE    Specimen Information    Type Source Collected On     06/16/17 1159          Components       Value Reference Range Flag Lab   Glucose 367 70 - 99 mg/dL H 170            Basic metabolic panel [[id number]] (Abnormal)  Resulted: 06/16/17 0925, Result status: Final result    Ordering provider: Antwan Green MD  06/16/17 0000 Resulting lab: [facility]    Specimen Information    Type Source Collected On   Blood  06/16/17 0850          Components       Value Reference Range Flag Lab   Sodium 146 133 - 144 mmol/L H FrStHsLb   Potassium 4.0 3.4 - 5.3 mmol/L  FrStHsLb   Chloride 115 94 - 109 mmol/L H FrStHsLb   Carbon Dioxide 20 20 - 32 mmol/L  FrStHsLb   Anion Gap 11 3 - 14 mmol/L  FrStHsLb   Glucose 366 70 - 99 mg/dL H FrStHsLb   Urea Nitrogen 37 7 - 30 mg/dL H FrStHsLb   Creatinine 0.96 0.52 - 1.04 mg/dL  FrStHsLb   GFR Estimate 54 >60 mL/min/1.7m2 L FrStHsLb   Comment:  Non  GFR Calc   GFR Estimate If Black 66 >60 mL/min/1.7m2  FrStHsLb   Comment:  African American GFR Calc   Calcium 7.7 8.5 - 10.1 mg/dL L FrStHsLb            Magnesium [[id number]]  Resulted: 06/16/17 0925, Result status: Final result    Ordering  provider: Antwan Green MD  06/16/17 0000 Resulting lab: [facility]    Specimen Information    Type Source Collected On   Blood  06/16/17 0850          Components       Value Reference Range Flag Lab   Magnesium 2.3 1.6 - 2.3 mg/dL  FrStHsLb            Phosphorus [[id number]] (Abnormal)  Resulted: 06/16/17 0925, Result status: Final result    Ordering provider: Antwan Green MD  06/16/17 0000 Resulting lab: [facility]    Specimen Information    Type Source Collected On   Blood  06/16/17 0850          Components       Value Reference Range Flag Lab   Phosphorus 1.8 2.5 - 4.5 mg/dL L FrStHsLb            CBC with platelets [[id number]] (Abnormal)  Resulted: 06/16/17 0914, Result status: Final result    Ordering provider: Kraig Mobley MD  06/16/17 0000 Resulting lab: [facility]    Specimen Information    Type Source Collected On   Blood  06/16/17 0850          Components       Value Reference Range Flag Lab   WBC 10.5 4.0 - 11.0 10e9/L  FrStHsLb   RBC Count 3.32 3.8 - 5.2 10e12/L L FrStHsLb   Hemoglobin 10.0 11.7 - 15.7 g/dL L FrStHsLb   Hematocrit 32.6 35.0 - 47.0 % L FrStHsLb   MCV 98 78 - 100 fl  FrStHsLb   MCH 30.1 26.5 - 33.0 pg  FrStHsLb   MCHC 30.7 31.5 - 36.5 g/dL L FrStHsLb   RDW 15.1 10.0 - 15.0 % H FrStHsLb   Platelet Count 286 150 - 450 10e9/L  FrStHsLb            Glucose by meter [[id number]] (Abnormal)  Resulted: 06/16/17 0856, Result status: Final result    Ordering provider: Antwan Green MD  06/16/17 0854 Resulting lab: POINT OF CARE TEST, GLUCOSE    Specimen Information    Type Source Collected On     06/16/17 0854          Components       Value Reference Range Flag Lab   Glucose 376 70 - 99 mg/dL H 170            Glucose by meter [[id number]] (Abnormal)  Resulted: 06/16/17 0501, Result status: Final result    Ordering provider: Antwan Green MD  06/16/17 0457 Resulting lab: POINT OF CARE TEST, GLUCOSE    Specimen Information    Type  Source Collected On     06/16/17 0457          Components       Value Reference Range Flag Lab   Glucose 267 70 - 99 mg/dL H 170            Glucose by meter [[id number]] (Abnormal)  Resulted: 06/16/17 0406, Result status: Final result    Ordering provider: Antwan Green MD  06/16/17 0355 Resulting lab: POINT OF CARE TEST, GLUCOSE    Specimen Information    Type Source Collected On     06/16/17 0355          Components       Value Reference Range Flag Lab   Glucose 355 70 - 99 mg/dL H 170            Testing Performed By     Lab - Abbreviation Name Director Address Valid Date Range    14 - FrStHsLb [facility] Unknown [address] [place] [address] 05/08/15 1057 - Present    75 - Unknown [facility] Unknown [address] [facility] [address] 01/15/15 1019 - Present    170 - Unknown POINT OF CARE TEST, GLUCOSE Unknown Unknown 10/31/11 1114 - Present    225 - Unknown INF[facility] LABORATORY Unknown [address] [facility] [address] 12/19/14 0954 - Present               Imaging Results - 3 Days      XR Chest Port 1 View [[id number]]  Resulted: 06/18/17 0926, Result status: Final result    Ordering provider: Kraig Mobley MD  06/18/17 0846 Resulted by: Libby Post MD    Performed: 06/18/17 0849 - 06/18/17 0912 Resulting lab: RADIOLOGY RESULTS    Narrative:       CHEST PORTABLE ONE VIEW June 18, 2017 9:12 AM     HISTORY: Tachypnea.    COMPARISON: 6/14/2017.    FINDINGS: The heart is enlarged. There is retrocardiac airspace  opacity. No pneumothorax or significant pleural effusion.      Impression:       IMPRESSION: Retrocardiac airspace opacity may represent atelectasis or  pneumonia.    LIBBY POST MD      Testing Performed By     Lab - Abbreviation Name Director Address Valid Date Range    104 - Rad Rslts RADIOLOGY RESULTS Unknown Unknown 02/16/05 1553 - Present            Encounter-Level Documents:     There are no encounter-level  documents.      Order-Level Documents:     There are no order-level documents.

## 2017-06-14 NOTE — IP AVS SNAPSHOT
[place] [address] [facility]    [address] [place] [address]    Phone:  [phone]                                       After Visit Summary   6/14/2017    Celia Lewis    MRN: [id number]           After Visit Summary Signature Page     I have received my discharge instructions, and my questions have been answered. I have discussed any challenges I see with this plan with the nurse or doctor.    ..........................................................................................................................................  Patient/Patient Representative Signature      ..........................................................................................................................................  Patient Representative Print Name and Relationship to Patient    ..................................................               ................................................  Date                                            Time    ..........................................................................................................................................  Reviewed by Signature/Title    ...................................................              ..............................................  Date                                                            Time

## 2017-06-14 NOTE — ED PROVIDER NOTES
CHIEF COMPLAINT:  Rapid breathing.      HISTORY OF PRESENT ILLNESS:  Celia Lewis is an elderly 89-year-old woman who was sent in from the [facility] because they noticed that her breathing and pulse seemed to be rapid today.  She unfortunately has severe dementia and it does not talk or give any history.  She has a history though of frequent urinary tract infections due to her indwelling catheter and also frequent episodes of pneumonia; according to her nursing home information she has full resuscitation status.  The patient herself can give no other information and we unfortunately do not have much more information of this acute problem from the nursing home.      PAST MEDICAL HISTORY:  Significant for kidney stones, heart disease, kidney failure, coronary artery disease, hypertension, arthritis, atrial fib, obesity, diabetes.      MEDICATIONS:  Include insulin, pain pills and Zantac.      ALLERGIES:  Sulfa.      FAMILY AND SOCIAL HISTORY:  As noted above, she has a feeding tube as well as a Fonseca.      REVIEW OF SYSTEMS:  As noted above, no further information from patient due to her dementia.      PHYSICAL EXAMINATION:     GENERAL:  Revealed an elderly heavyset female supine, her legs are drawn up, she is wearing heel protective boots, she has a nasal airway in place.   VITAL SIGNS:  Her initial temperature is 99.4, blood pressure 142/90, pulse 120, respirations 36, pulse ox 99% on 3 liters.  The patient does feel warm to touch but her rectal temperature does not show fever at this time.   HEAD & NECK EXAM:  Nasal cannula is as noted.  Oropharynx is moist; I cannot get her to open her mouth fully.  Neck reveals no adenopathy, trachea is midline.  Head reveals no sign of trauma.   LUNGS:  Bilateral breath sounds are present.   HEART:  Heart tones regular and rapid, no murmurs appreciated.   ABDOMEN:  Soft, no masses appreciated, feeding tube is present mid abdomen.  1+ femoral pulses are noted.    MUSCULOSKELETAL:  There are no sores under the heel.  There is a large sacral ulcer approximately 4 to 5 cm in diameter with surrounding erythema, there is fibrotic tissue, there is no active drainage.  As noted initially both legs are contracted.   GENITOURINARY:  Fonseca catheter is in place.     NEUROLOGIC:  The patient's eyes are open, she appears to be awake; however she is non-verbal; she does not cooperate with commands or answer any questions.        ED COURSE:  The patient was placed in a stabilization room initially, IV fluids were begun.  Chest x-ray reveals possible atelectasis versus infiltrate at the left base with cardiomegaly.  Fonseca catheter was changed felt and fresh urine was obtained and this reveals many white cells.  Urine was sent for culture.  Blood culture also was obtained x2.  White count 18. 7, hemoglobin 12.4, platelet 386, left shift present.  Chemistries; glucose 275, BUN 60, creatinine 1.28, lactate 3.5, lipase 130.  Although patient is not febrile with an elevated lactate, tachycardia and being warm to touch, sepsis protocol was initiated.  She has 3 possible sources of infection and urinary, pulmonary and also skin, she received triple antibiotics; vancomycin, Zosyn and Levaquin.  She received IV fluids; her pulse had come down to around 100.  She never developed any hypotension.  I did contact her son,  Salvatore Mercedes and we discussed her code status; it remains unchanged but it is understood that if she should have an arrest and required intubation and CPR that results would most likely be very poor.      IMPRESSION:   1.  Sepsis.   2.  Pneumonia.   3.  Urinary tract infection.   4.  Sacral ulcer with cellulitis.      PLAN:  As noted above.         ANDREI BEASLEY MD             D: 2017 16:47   T: 2017 17:44   MT: [id number]      Name:     RAÚL MERCEDES   MRN:      [id number]        Account:      [id number]   :      [id number]           Visit Date:    06/14/2017      Document: [id number]

## 2017-06-14 NOTE — ED NOTES
[facility]  ED Nurse Handoff Report    ED Chief complaint: Respiratory Distress (pt from [facility] - EMS called for labor breathing )      ED Diagnosis:   Final diagnoses:   None       Code Status: Full Code    Allergies:   Allergies   Allergen Reactions     Sulfa Drugs Other (See Comments)     Per nursing home documents, patient has allergy to sulfa       Activity level - Baseline/Home:  Total Care    Activity Level - Current:   Total Care     Needed?: No    Isolation: No  Infection: Not Applicable    Bariatric?: No    Vital Signs:   Vitals:    06/14/17 1155 06/14/17 1252   BP: 142/90    Pulse: 120    Resp: (!) 36    Temp:  99.4  F (37.4  C)   TempSrc:  Rectal   SpO2: 99%        Cardiac Rhythm: ,   Cardiac  Cardiac Rhythm: Sinus tachycardia    Pain level:      Is this patient confused?: Yes    Patient Report: Initial Complaint: resp. Distress;   Focused Assessment: Pt is nonverbal that lives in a total care facility after a stoke. She is contracted in the legs and arms. She arrived tachy with rapid breathing. She was warm to touch. She does have an indwelling catheter that was changed and sample collected. She has a very large decubitus ulcer which possibly needs debridement. A new dressing was placed in the ED.  Pt is full code. Imaging and UA revealed a L lobe PNA and UTI. Lactic acid 3.5. In ED, pt received IV fluids resuscitation and IV fluids. Pt got IV levaquin, zosyn and vanco in ED. Blood cultures x2 sent. Pt current VS-  bpm, blood pressure 116/84 and 96% on 3 L NC.  Pt has nasal trumpet in R nare. This was in place prior to pt arrival so the RNs who assumed care don't know if it was placed by EMS or from the care facility. It is dated as if it was from the care facility. RN spoke with MD and plan is to keep it in place at this time as it does not appear to be harming the patient and they had used it for suctioning in stab room.  Abnormal Results: Lactic 3.5, creat  1.3  Treatments provided: IV fluids, IV antibiotics, new urinary catheter, wound care    Family Comments: no family @ bedside    OBS brochure/video discussed/provided to patient: No    ED Medications:   Medications   0.9% sodium chloride BOLUS (not administered)     Followed by   0.9% sodium chloride infusion (not administered)   acetaminophen (TYLENOL) Suppository 650 mg (not administered)       Drips infusing?:  Yes      ED NURSE PHONE NUMBER: [phone]

## 2017-06-15 LAB
ANION GAP SERPL CALCULATED.3IONS-SCNC: 9 MMOL/L (ref 3–14)
BUN SERPL-MCNC: 45 MG/DL (ref 7–30)
CALCIUM SERPL-MCNC: 8.3 MG/DL (ref 8.5–10.1)
CHLORIDE SERPL-SCNC: 117 MMOL/L (ref 94–109)
CO2 SERPL-SCNC: 23 MMOL/L (ref 20–32)
CREAT SERPL-MCNC: 1.12 MG/DL (ref 0.52–1.04)
ERYTHROCYTE [DISTWIDTH] IN BLOOD BY AUTOMATED COUNT: 15 % (ref 10–15)
GFR SERPL CREATININE-BSD FRML MDRD: 46 ML/MIN/1.7M2
GLUCOSE BLDC GLUCOMTR-MCNC: 108 MG/DL (ref 70–99)
GLUCOSE BLDC GLUCOMTR-MCNC: 111 MG/DL (ref 70–99)
GLUCOSE BLDC GLUCOMTR-MCNC: 187 MG/DL (ref 70–99)
GLUCOSE BLDC GLUCOMTR-MCNC: 251 MG/DL (ref 70–99)
GLUCOSE BLDC GLUCOMTR-MCNC: 95 MG/DL (ref 70–99)
GLUCOSE BLDC GLUCOMTR-MCNC: 99 MG/DL (ref 70–99)
GLUCOSE SERPL-MCNC: 109 MG/DL (ref 70–99)
HBA1C MFR BLD: 8.4 % (ref 4.3–6)
HCT VFR BLD AUTO: 35.8 % (ref 35–47)
HGB BLD-MCNC: 11 G/DL (ref 11.7–15.7)
LACTATE BLD-SCNC: 1.9 MMOL/L (ref 0.7–2.1)
MCH RBC QN AUTO: 30.1 PG (ref 26.5–33)
MCHC RBC AUTO-ENTMCNC: 30.7 G/DL (ref 31.5–36.5)
MCV RBC AUTO: 98 FL (ref 78–100)
PLATELET # BLD AUTO: 302 10E9/L (ref 150–450)
POTASSIUM SERPL-SCNC: 4.3 MMOL/L (ref 3.4–5.3)
RBC # BLD AUTO: 3.65 10E12/L (ref 3.8–5.2)
SODIUM SERPL-SCNC: 149 MMOL/L (ref 133–144)
VANCOMYCIN SERPL-MCNC: 12.7 MG/L
WBC # BLD AUTO: 13.9 10E9/L (ref 4–11)

## 2017-06-15 PROCEDURE — 83036 HEMOGLOBIN GLYCOSYLATED A1C: CPT | Performed by: INTERNAL MEDICINE

## 2017-06-15 PROCEDURE — 99221 1ST HOSP IP/OBS SF/LOW 40: CPT | Performed by: SURGERY

## 2017-06-15 PROCEDURE — 80202 ASSAY OF VANCOMYCIN: CPT | Performed by: INTERNAL MEDICINE

## 2017-06-15 PROCEDURE — 25000131 ZZH RX MED GY IP 250 OP 636 PS 637: Performed by: INTERNAL MEDICINE

## 2017-06-15 PROCEDURE — 80048 BASIC METABOLIC PNL TOTAL CA: CPT | Performed by: INTERNAL MEDICINE

## 2017-06-15 PROCEDURE — 27210429 ZZH NUTRITION PRODUCT INTERMEDIATE LITER

## 2017-06-15 PROCEDURE — 99212 OFFICE O/P EST SF 10 MIN: CPT

## 2017-06-15 PROCEDURE — 85027 COMPLETE CBC AUTOMATED: CPT | Performed by: INTERNAL MEDICINE

## 2017-06-15 PROCEDURE — 12000000 ZZH R&B MED SURG/OB

## 2017-06-15 PROCEDURE — 27210995 ZZH RX 272: Performed by: INTERNAL MEDICINE

## 2017-06-15 PROCEDURE — 83605 ASSAY OF LACTIC ACID: CPT | Performed by: INTERNAL MEDICINE

## 2017-06-15 PROCEDURE — 00000146 ZZHCL STATISTIC GLUCOSE BY METER IP

## 2017-06-15 PROCEDURE — 0JD70ZZ EXTRACTION OF BACK SUBCUTANEOUS TISSUE AND FASCIA, OPEN APPROACH: ICD-10-PCS | Performed by: SURGERY

## 2017-06-15 PROCEDURE — 25000132 ZZH RX MED GY IP 250 OP 250 PS 637: Performed by: INTERNAL MEDICINE

## 2017-06-15 PROCEDURE — 36415 COLL VENOUS BLD VENIPUNCTURE: CPT | Performed by: INTERNAL MEDICINE

## 2017-06-15 PROCEDURE — 99233 SBSQ HOSP IP/OBS HIGH 50: CPT | Performed by: INTERNAL MEDICINE

## 2017-06-15 PROCEDURE — 25000128 H RX IP 250 OP 636: Performed by: INTERNAL MEDICINE

## 2017-06-15 RX ORDER — SODIUM HYPOCHLORITE 2.5 MG/ML
SOLUTION TOPICAL 2 TIMES DAILY
Status: DISCONTINUED | OUTPATIENT
Start: 2017-06-15 | End: 2017-06-19 | Stop reason: HOSPADM

## 2017-06-15 RX ORDER — SODIUM CHLORIDE 450 MG/100ML
INJECTION, SOLUTION INTRAVENOUS CONTINUOUS
Status: DISCONTINUED | OUTPATIENT
Start: 2017-06-15 | End: 2017-06-18

## 2017-06-15 RX ADMIN — LEVOFLOXACIN 250 MG: 5 INJECTION, SOLUTION INTRAVENOUS at 13:33

## 2017-06-15 RX ADMIN — SODIUM CHLORIDE: 9 INJECTION, SOLUTION INTRAVENOUS at 06:27

## 2017-06-15 RX ADMIN — HYOSCYAMINE SULFATE: 16 SOLUTION at 12:42

## 2017-06-15 RX ADMIN — INSULIN ASPART 4 UNITS: 100 INJECTION, SOLUTION INTRAVENOUS; SUBCUTANEOUS at 23:55

## 2017-06-15 RX ADMIN — INSULIN ASPART 1 UNITS: 100 INJECTION, SOLUTION INTRAVENOUS; SUBCUTANEOUS at 20:24

## 2017-06-15 RX ADMIN — ACETAMINOPHEN 650 MG: 325 TABLET, FILM COATED ORAL at 08:10

## 2017-06-15 RX ADMIN — ACETAMINOPHEN 650 MG: 325 TABLET, FILM COATED ORAL at 21:44

## 2017-06-15 RX ADMIN — PIPERACILLIN SODIUM,TAZOBACTAM SODIUM 3.38 G: 3; .375 INJECTION, POWDER, FOR SOLUTION INTRAVENOUS at 20:24

## 2017-06-15 RX ADMIN — VANCOMYCIN HYDROCHLORIDE 1500 MG: 5 INJECTION, POWDER, LYOPHILIZED, FOR SOLUTION INTRAVENOUS at 22:26

## 2017-06-15 RX ADMIN — RANITIDINE 75 MG: 75 TABLET, FILM COATED ORAL at 08:11

## 2017-06-15 RX ADMIN — RANITIDINE 75 MG: 75 TABLET, FILM COATED ORAL at 21:45

## 2017-06-15 RX ADMIN — ACETAMINOPHEN 650 MG: 160 SUSPENSION ORAL at 16:29

## 2017-06-15 RX ADMIN — PIPERACILLIN SODIUM,TAZOBACTAM SODIUM 3.38 G: 3; .375 INJECTION, POWDER, FOR SOLUTION INTRAVENOUS at 08:10

## 2017-06-15 RX ADMIN — SODIUM CHLORIDE: 4.5 INJECTION, SOLUTION INTRAVENOUS at 13:30

## 2017-06-15 RX ADMIN — INSULIN ASPART 3 UNITS: 100 INJECTION, SOLUTION INTRAVENOUS; SUBCUTANEOUS at 16:55

## 2017-06-15 RX ADMIN — HYOSCYAMINE SULFATE: 16 SOLUTION at 21:51

## 2017-06-15 RX ADMIN — PIPERACILLIN SODIUM,TAZOBACTAM SODIUM 3.38 G: 3; .375 INJECTION, POWDER, FOR SOLUTION INTRAVENOUS at 02:13

## 2017-06-15 RX ADMIN — INSULIN GLARGINE 30 UNITS: 100 INJECTION, SOLUTION SUBCUTANEOUS at 08:09

## 2017-06-15 RX ADMIN — PIPERACILLIN SODIUM,TAZOBACTAM SODIUM 3.38 G: 3; .375 INJECTION, POWDER, FOR SOLUTION INTRAVENOUS at 13:27

## 2017-06-15 RX ADMIN — INSULIN GLARGINE 20 UNITS: 100 INJECTION, SOLUTION SUBCUTANEOUS at 21:49

## 2017-06-15 RX ADMIN — OXYCODONE HYDROCHLORIDE 2 MG: 5 SOLUTION ORAL at 08:13

## 2017-06-15 RX ADMIN — MULTIVITAMIN 15 ML: LIQUID ORAL at 11:24

## 2017-06-15 RX ADMIN — OXYCODONE HYDROCHLORIDE 2 MG: 5 SOLUTION ORAL at 21:45

## 2017-06-15 RX ADMIN — Medication 1 CAPSULE: at 08:11

## 2017-06-15 NOTE — PLAN OF CARE
Problem: Goal Outcome Summary  Goal: Goal Outcome Summary  Outcome: Therapy, unable to show any progress toward functional goals  PT: Chart reviewed. Pt. Is dependent with amb., transfers, bed mobility, dressing, toileting (has indwelling catheter), eating (has TF),and  is nonverbal due to advance Alzheimer's Disease. Pt. Is not appropriate for rehab.  and is at baseline from a mobility standpoint. Will DC from PT.

## 2017-06-15 NOTE — PROGRESS NOTES
Care Transition Initial Assessment -   Reason For Consult: discharge planning  Met with: Spoke to Monie in admissions at [facility].    Active Problems:    Sepsis (H)         DATA  Lives With: facility resident  Living Arrangements: extended care facility-[facility]. Bed hold in place.  Who is your support system?: , Children  Identified issues/concerns regarding health management: Return to LTC when ready.      ASSESSMENT  Cognitive Status:  Non-verbal per nursing notes.  Concerns to be addressed: On-going DC planning.       PLAN  Patient anticipates discharging to:  Back to LTC.    SEAN Arroyo  [phone]

## 2017-06-15 NOTE — PHARMACY-VANCOMYCIN DOSING SERVICE
Pharmacy Vancomycin Initial Note  Date of Service 2017  Patient's  [id number]  89 year old, female    Indication: Healthcare-Associated Pneumonia    Current estimated CrCl = Estimated Creatinine Clearance: 25.3 mL/min (based on Cr of 1.28).    Creatinine for last 3 days  2017: 12:00 PM Creatinine 1.28 mg/dL    Recent Vancomycin Level(s) for last 3 days  No results found for requested labs within last 72 hours.      Vancomycin IV Administrations (past 72 hours)                   vancomycin (VANCOCIN) 1000 mg in dextrose 5% 200 mL PREMIX (mg) 1,000 mg New Bag 17 151                Nephrotoxins and other renal medications (Future)    Start     Dose/Rate Route Frequency Ordered Stop    17  piperacillin-tazobactam (ZOSYN) 3.375 g vial to attach to  mL bag     Comments:  Pharmacy can adjust dose based on renal function.    3.375 g  over 1 Hours Intravenous EVERY 6 HOURS 17 1714      17 193  vancomycin (VANCOCIN) 500 mg in NaCl 0.9 % 100 mL intermittent infusion      500 mg Intravenous ONCE 17 19217 192  vancomycin place ramos - receiving intermittent dosing      1 each Does not apply SEE ADMIN INSTRUCTIONS 17            Contrast Orders - past 72 hours     None                Plan:  1.  Vancomycin 1000 mg x 1 given in ED is underdosed for this indication.  Adding Vancomycin  500 mg IV x 1 to bring total initial dose to 1500 mg.  Will dose intermittently based upon measured drug levels for first few days to achieve therapeutic level as quickly as possible.  2.  Goal Trough Level: 15-20 mg/L   3.  Pharmacy will check trough levels as appropriate 6/15 PM  4. Serum creatinine levels will be ordered daily for the first week of therapy and at least twice weekly for subsequent weeks.    5. [place] method utilized to dose vancomycin therapy: clinical experience    Aden SingletonD

## 2017-06-15 NOTE — CONSULTS
"CLINICAL NUTRITION SERVICES  -  ASSESSMENT NOTE      Recommendations Ordered by Registered Dietitian (RD):   Isosource 1.5 @ 45 mL/hr, continuous (goal rate)  Daily Certavit  Will check WOCN eval of pt's sacral wound and order additional vit/min once the wound is staged.       Malnutrition:   Patient does not meet two of the criteria necessary for diagnosing malnutrition          REASON FOR ASSESSMENT  Celia Lewis is a 89 year old female seen by Registered Dietitian for Provider Order - Registered Dietitian to Assess and Order TF per Medical Nutrition protocol      NUTRITION HISTORY  - Information obtained from EMR, pt with Alzheimer's and is nonverbal.  Pt lives in a NH. She had a G-tube place 6/16/2016 and has been on Osmolite 1.5 @ 55 mL/hr x 20 hrs/day plus ProStat daily. H2O flushes of 200 mL q 4 hours in addition to flushes given with meds.  Total provisions = 1750 jarrod, 84 gm protein.    Pt was also on a multivt, Senokot and Imodium.      CURRENT NUTRITION ORDERS  Diet Order:     NPO       PHYSICAL FINDINGS  Observed  No nutrition-related physical findings observed  Obtained from Chart/Interdisciplinary Team  Pressure Ulcers - sacral decubitus, large (WOCN consult ordered)    ANTHROPOMETRICS  Height: 5' 0\"  Weight: 151 lbs 14.35 oz (68.9 kg)  Body mass index is 29.67 kg/(m^2).  Weight Status:  Overweight BMI 25-29.9  IBW: 45.4 kg +/- 10^%  % IBW: 152%  Weight History:   Wt Readings from Last 10 Encounters:   06/14/17 68.9 kg (151 lb 14.4 oz)   06/24/16 72.2 kg (159 lb 3.2 oz)   06/18/15 83.9 kg (185 lb)   05/29/15 81.6 kg (180 lb)   05/28/15 81.6 kg (180 lb)   05/22/15 81.6 kg (179 lb 12.8 oz)   06/24/14 77.5 kg (170 lb 13.7 oz)   08/26/12 82.1 kg (181 lb)   08/25/12 82.1 kg (181 lb)   08/21/12 82.3 kg (181 lb 7 oz)       LABS  Labs reviewed    MEDICATIONS  Na IVF @ 100 mL/hr  Med SSI, Lantus BID  Culturell      Dosing Weight 51 kg - adjusted for overweight    ASSESSED NUTRITION NEEDS PER APPROVED " PRACTICE GUIDELINES:  Estimated Energy Needs: 8968-0350 kcals (30-35 Kcal/Kg)  Justification: Pressure Injury Protocol  Estimated Protein Needs: 60-75 grams protein (1.2-1.5 g pro/Kg)  Justification: wound healing - monitor renal status  Estimated Fluid Needs: 0396-9865  mL (1 mL/Kcal)  Justification: maintenance    MALNUTRITION:  % Weight Loss:  None noted  % Intake:  No decreased intake noted  Subcutaneous Fat Loss:  None observed  Muscle Loss:  None observed  Fluid Retention:  None noted    Malnutrition Diagnosis: Patient does not meet two of the above criteria necessary for diagnosing malnutrition    NUTRITION DIAGNOSIS:  Increased nutrient needs (protein) related to healing as evidenced by sacral decubitus      NUTRITION INTERVENTIONS  Recommendations / Nutrition Prescription  Isosource 1.5 @ 45 mL/hr continuous to provide 1620 jarrod (32 jarrod/kg), 73 gm pro (1.4 gm/kg), 16 gm fiber, 190 gm CHO, 820 mL free water.  Std H2O flushes of 60 mL q 4 hours while on IVF (increase to 135 mL q 4 hr once IVF d/c'd).  Multivit for wound healing    Will check WOCN eval of pt's sacral wound and order additional vit/min once the wound is staged.      Implementation  Nutrition education: No education needs   EN Schedule, Feeding Tube Flush - ordered TF to start at goal rate of 45 mL/hr  Multivitamin/Mineral - ordered daily Certavit per PI protocol  Collaboration and Referral of Nutrition care -Discussed TF and pt's wounds with RASHMI Roman.      Nutrition Goals  Isosource 1.5 @ goal of 45 mL/hr will meet assessed needs  .      MONITORING AND EVALUATION:  Progress towards goals will be monitored and evaluated per protocol and Practice Guidelines    Bisi Montgomery RD  Pager [phone] (M-F)            [phone] (W/E & Hol)

## 2017-06-15 NOTE — PLAN OF CARE
Problem: Goal Outcome Summary  Goal: Goal Outcome Summary  Outcome: No Change  Care Plan Summary Note:  Unresponsive, opens eyes with turn and repositioning. Both legs contracted. AROM performed. Seen by wound care RN. Pt to have air matress and new wound care order (see chart), pt also to have surgery consult d/t sacral wound. Skin intact except for sacral wounds and bilateral ears (stage 2 pressure sore on left ear). IVF infusing, started on TF at goal, will increase free water flush to 135 Q 4 hours once IVF d/c. Contiune with turning schedule Q 2 hours, and scheduled pain meds. T max of 100.4 ax this am. Sepsis fired, lactic acid drawn, --1.9 (normal) which had improved from previous results. Scheduled tylenol given. Rechecked Temp was 99.2 (ax). Continue with IV abx, WBC imporoving. UA  and blood cx pending. Lungs course on bases. Pt was able to be tapered down to 1 LPM Via NC. Bowel sounds present in all quadrants. G tube, caro, and PIVs patent. Continue to monitor.   Activity Level:  Bed rest   Fall Risk: high fall risk, arm band and alarms on  CAM (if applicable): ZOEY  Mobility Tier (A or B): b

## 2017-06-15 NOTE — PLAN OF CARE
Problem: Goal Outcome Summary  Goal: Goal Outcome Summary  Outcome: No Change  Patient is non verbal, total care, turn and reposition every two hours.  Vitals stable.  No s/s pain.  IV fluids and antibiotics per orders.  Dressings to coccyx, CDI.  Blood glucose 99 and 95.  Fonseca patent.  Barrier cream and foam dressings applied to bilateral ear excoriation areas.

## 2017-06-15 NOTE — PROGRESS NOTES
[facility] Nurse Inpatient Adult Pressure Injury (PI) Assessment     Initial Assessment of PI(s) on pt's:   Coccyx/sacrum; bilateral outer ears (helix)    Data:   Patient History:      per MD note(s): Ms. Celia Lewis is a 89-year-old female with history of diabetes mellitus type 2, CHF, chronic kidney disease, coronary artery disease, hypertension, recurrent UTIs, Alzheimer dementia, with baseline nonverbal status, who was sent from her long-term care facility for low O2 sats and fever.  Reportedly, the staff at the nursing home noted that the patient's oxygen saturations were low and she also had a fever up to 100.3.  She was also tachypneic and had some labored breathing and so was sent to the emergency room.  No further information is available at this time as the patient is nonverbal at baseline because of her advanced dementia.  There is no reported recent diarrhea or vomiting.  The patient does have a feeding tube in place for nutrition.       In the emergency room, the patient was evaluated by Dr. Cortez.  Basic lab work showed a white count of 18.7 and UA consistent with UTI.  She also has a chest x-ray which showed an infiltrate in the left base.  She does have a chronic indwelling Fonseca which was changed in the emergency room.  Started on empiric antibiotics for both UTI and presumed healthcare-associated pneumonia.  She was also found to have a sacral decubitus with surrounding cellulitis.      Current mattress:  AtmosAir  Current pressure relieving devices:  Pillows    Moisture Management:  Incontinence Protocol and Urinary Catheter    Catheter secured? Yes      Current Diet / Nutrition:       Active Diet Order      NPO for Medical/Clinical Reasons Except for: NPO but receiving Tube Feeding      Deshawn Assessment and sub scores:   Deshawn Score  Av  Min: 9  Max: 9     Labs:   Recent Labs   Lab Test  06/15/17   0833  17   1200   13   1300   ALBUMIN   --   2.5*   <  >   --    HGB  11.0*  12.4   < >  13.2   INR   --   1.10   < >   --    WBC  13.9*  18.7*   < >  9.3   A1C  8.4*   --    < >   --    CRP   --    --    --   11.0*    < > = values in this interval not displayed.                                                                                                                          Pressure Injury Assessment  (location):   Coccyx/sacrum    Wound History:   Present on admission; according to flowsheets some kind of wound was present about a year ago at previous admission.  Unknown who has been following pt for this if at all and what cares have been done at her nursing home.  Pt remains full code; per nursing, family wants everything possible done for pt, as this is part of their nella.      Wound Base: deep wound, majority of wound base is thick tough tan adherent sloughy necrotic tissue.  Edges with moist pink tissue.    Specific Dimensions (length x width x depth, in cm) :   4 x 7 x 3+cm, unable to tell true depth due to slough    Tunneling:  unknown    Undermining: unknown    Palpation of the wound bed:  Rubbery slough; no direct palpation of bone    Slough appearance:  adherent, firm and tan    Eschar appearance:  none    Periwound Skin: denuded, erythema, maceration and rash    Color: pink, blanchable    Temperature  normal     Drainage:  Moderate yellowish and light tan         Odor: moderate    Pain:  unable to fully assess but no wincing during palpation/cleansing    Pressure Injury Assessment:   Bilateral outer ears (mid-helix)    Wound History:   Present on admission; pt lies directly on ears when on sides.  Wears O2 tubing.     Specific Dimensions (length x width x depth, in cm) :     1.  Left ear:  Approx. 3 x 0.5 cm area of scattered lightly scabby tissue that is sloughing off and some red moist areas, scant serosang drainage.  Posterior ear has slight denudement under O2 tubing.    2.  Right ear: Approx. 1 x 1 cm area of scattered reddened areas and  slightly moist red tissue. Scant serous drainage.     Periwound Skin: scattered mild erythema     Odor: none    Pain:  wincing slightly with palpation           Intervention:     Patient's chart evaluated.      Deshawn Interventions:  Current Deshawn Interventions and Care Plan reviewed and updated, appropriate at this time.    Wounds assessed, coccyx wound cleansed, packed with MicroKlenz-moist gauze, ears cleansed and covered with PolyMem    Orders  Written    Supplies  Reviewed and Ordered: Dakins solution, z-sb pillow, air mattress    Discussed plan of care with Nurse and Physician Dr. Mobley, who will order surgical consult and gave verbal ok for Dakins and air mattress           Assessment:     Pressure Injury (PI) located on coccyx/sacrum: Unstageable, present on admission, deep necrotic wound, base is thick adherent tan slough.  Mild erythema and fungal rash to periwound.  Moderate odor to wound/drainage. Unclear if any infection, but wound likely down to bone or close to it, under the slough - high risk for osteo.  Would recommend surgical consult to evaluate wound, and see if possible to debride some of the necrotic tissue; this would also help make topical dressings more effective.  Meanwhile will start Dakin's solution moist gauze dressings - is basically a dilute bleach solution that is a very strong antimicrobial and will also help debride and help with odor.  Will also order air mattress.   Pt contracted especially lower extremities and is total care - very high risk for further breakdown.  Per nursing, pt's family wants to pursue all cares, in accordance with their nella.      Pressure injuries to bilateral ears:  Scattered small Stage 2s, present on admission, no s/s infection.  Pt lies directly on helix of ear when on side.  O2 tubing not related to these injuries, though may be causing a little denudement to posterior and upper ear creases. Will order z-sb pillow to use under head as will be able to  carve out space for ears to help prevent further injury.             Plan:     Nursing to notify the Provider(s) and re-consult the [facility] Nurse if wound(s) deteriorate(s)or if the wound care plan needs reevaluation.      Plan for wound care to coccyx/sacrum: BID and prn (unless any new orders from Surgery):  1.  Cleanse wound with MicroKlenz wound cleanser.  Cleanse periwound with mild skin cleanser, ie Nakul perineal lotion.  2.  Moisten a kerlix fluff with Dakin's solution (aka sodium hypochlorite; comes from Pharmacy), wring out excess.  Pack into wound.  3.  Apply antifungal powder to periwound, rub in well.  Then apply Critic-Aid barrier paste over the powder to periwound (and perineal area if needed).  4.  Cover with ABD pad and minimal Medipore tape.  Label with time/date/initials.  5.  Follow rigorous PIP measures.       Plan for wound care to bilateral outer ears:  Daily and prn:  1.  Swab wounds and periwounds with no-sting skin prep, let dry.   2.  Cover with small strips of PolyMem, cutting to fit, trimming tape as needed.  Can discontinue as areas heal.    3.  Pad around O2 tubing with Mepilex Lite.  4.  Use z-sb pillow prn under head and 'carve out' space for ear when pt on side, to minimize any pressure to ears.        PIP (pressure injury prevention):  - reposition every 1-2 hours and prn, side to side  - Pulsate air mattress.  NO fitted sheets and NO cloth chux on air mattress.  Only green lift sheet and Covidien pad.    - Z-sb pad prn to keep pressure off of ears  - float heels at all times, consider heel boots   - keep pillow between knees to prevent pressure between contracted legs   - keep HOB as low as tolerated to minimize shear forces  - thorough skin assessments BID and prn    WO Nurse will return: weekly and prn  Face to face time: 30 Minutes

## 2017-06-15 NOTE — PROGRESS NOTES
[facility]  Hospitalist Progress Note     06/15/2017    Assessment & Plan   Ms. Lewis is a 89-year-old female with advanced dementia, nonverbal at baseline and other medical problems as described above who presents from a nursing home with low oxygen sats, fever and labored breathing.     Severe sepsis due to HCAP, UTI, and coccyx cellulitis  The patient presents with tachypnea, low oxygen sats and fever.    Chest x-ray does show an infiltrate in the left lung base.   Chronic indwelling Fonseca which was changed in the emergency room.    UA consistent with UTI  Sepsis starting to improve with decreasing WBC, lactic acid.  Low grade fever present.  Cultures:   - Urine growing >100K staph aureus   - Wound growing GNR   - Blood NGTD  - Currently on vancomycin, Zosyn and Levaquin.     Coccyx pressure ulcer and cellulitis  Wound is deep and necrotic with drainage and surrounding cellulitis.    - Wound care consult recommends general surgery consult    Advanced dementia  Appears to be at her nonverbal and immobile baseline.    - Continue tube feedings, nutritionist consulted    Diabetes mellitus type 2   PTA regimen: Lantus 35 units in the morning and 40 units at bedtime  - Currently on Lantus 20 units BID for now, and monitor further while on tube feeds  - moderate resistance sliding scale.     CKD3  This appears to be at baseline.  Avoid nephrotoxins.       Hypernatremia  Na 149.  Change IVF from NS to 1/2 NS    Prophylaxis: Pneumatic Compression Devices  Code Status: Full Code  Disposition: Expected discharge TBD, > 3 days    Kraig Mobley MD  [phone] (P)  Text Page (7 am - 6 pm)    Interval History   No events overnight.  Patient is nonverbal.  Discussed with nursing and wound nurse.  -Data reviewed today: I reviewed all new labs and imaging results over the last 24 hours.    Physical Exam   /68 (BP Location: Right arm)  Pulse 99  Temp 99.2  F (37.3  C) (Axillary)  Resp 20  Ht 1.524 m  (5')  Wt 68.9 kg (151 lb 14.4 oz)  SpO2 94%  BMI 29.67 kg/m2  Constitutional: NAD, awake  HEENT: EOMI   Cardiovascular: S1, S2, regular rhythm  Respiratory: CTAB, diffusely diminished, no crackles  Gastrointestinal: Soft, NT  Neurologic: At baseline dementia, nonverbal, does not respond to questions    Medications     IV fluid REPLACEMENT ONLY       NaCl Stopped (06/14/17 1514)     NaCl 100 mL/hr at 06/15/17 0837       multivitamins with minerals  15 mL Oral Daily     sodium hypochlorite   Topical BID     sodium chloride (PF)  3 mL Intracatheter Q8H     piperacillin-tazobactam  3.375 g Intravenous Q6H     levofloxacin  250 mg Intravenous Q24H     acetaminophen (TYLENOL) tablet 650 mg  650 mg Oral or Feeding Tube TID     insulin glargine  30 Units Subcutaneous At Bedtime     insulin glargine  30 Units Subcutaneous QAM AC     lactobacillus rhamnosus (GG)  1 capsule Per G Tube Daily     oxyCODONE  2 mg Oral BID     ranitidine  75 mg Per G Tube BID     insulin aspart  1-6 Units Subcutaneous Q4H     vancomycin place ramos - receiving intermittent dosing  1 each Does not apply See Admin Instructions       Data     Recent Labs  Lab 06/15/17  0833 06/14/17  2205 06/14/17  1200   WBC 13.9*  --  18.7*   HGB 11.0*  --  12.4   MCV 98  --  98     --  386   INR  --   --  1.10   *  --  144   POTASSIUM 4.3  --  4.1   CHLORIDE 117*  --  110*   CO2 23  --  27   BUN 45*  --  60*   CR 1.12*  --  1.28*   ANIONGAP 9  --  7   NELLIE 8.3*  --  9.0   *  --  275*   LACT 1.9 2.6* 3.5*   ALBUMIN  --   --  2.5*   PROTTOTAL  --   --  8.2   BILITOTAL  --   --  0.5   ALKPHOS  --   --  96   ALT  --   --  22   AST  --   --  20   LIPASE  --   --  130     Recent Results (from the past 24 hour(s))   XR Chest 2 Views    Narrative    XR CHEST 2 VW 6/14/2017 1:13 PM    HISTORY: Fever.    COMPARISON: June 22, 2016.      Impression    IMPRESSION: Lung volumes are low, with opacification of the left lung  base suggestive of atelectasis  and/or infection. No definite pleural  effusions appreciated. No pneumothorax. Stable cardiomegaly.    PUNEET CARABALLO MD

## 2017-06-16 LAB
ANION GAP SERPL CALCULATED.3IONS-SCNC: 11 MMOL/L (ref 3–14)
BACTERIA SPEC CULT: ABNORMAL
BUN SERPL-MCNC: 37 MG/DL (ref 7–30)
CALCIUM SERPL-MCNC: 7.7 MG/DL (ref 8.5–10.1)
CHLORIDE SERPL-SCNC: 115 MMOL/L (ref 94–109)
CO2 SERPL-SCNC: 20 MMOL/L (ref 20–32)
CREAT SERPL-MCNC: 0.96 MG/DL (ref 0.52–1.04)
ERYTHROCYTE [DISTWIDTH] IN BLOOD BY AUTOMATED COUNT: 15.1 % (ref 10–15)
GFR SERPL CREATININE-BSD FRML MDRD: 54 ML/MIN/1.7M2
GLUCOSE BLDC GLUCOMTR-MCNC: 267 MG/DL (ref 70–99)
GLUCOSE BLDC GLUCOMTR-MCNC: 309 MG/DL (ref 70–99)
GLUCOSE BLDC GLUCOMTR-MCNC: 355 MG/DL (ref 70–99)
GLUCOSE BLDC GLUCOMTR-MCNC: 356 MG/DL (ref 70–99)
GLUCOSE BLDC GLUCOMTR-MCNC: 365 MG/DL (ref 70–99)
GLUCOSE BLDC GLUCOMTR-MCNC: 367 MG/DL (ref 70–99)
GLUCOSE BLDC GLUCOMTR-MCNC: 376 MG/DL (ref 70–99)
GLUCOSE SERPL-MCNC: 366 MG/DL (ref 70–99)
HCT VFR BLD AUTO: 32.6 % (ref 35–47)
HGB BLD-MCNC: 10 G/DL (ref 11.7–15.7)
MAGNESIUM SERPL-MCNC: 2.3 MG/DL (ref 1.6–2.3)
MCH RBC QN AUTO: 30.1 PG (ref 26.5–33)
MCHC RBC AUTO-ENTMCNC: 30.7 G/DL (ref 31.5–36.5)
MCV RBC AUTO: 98 FL (ref 78–100)
MICRO REPORT STATUS: ABNORMAL
MICROORGANISM SPEC CULT: ABNORMAL
MICROORGANISM SPEC CULT: ABNORMAL
PHOSPHATE SERPL-MCNC: 1.8 MG/DL (ref 2.5–4.5)
PLATELET # BLD AUTO: 286 10E9/L (ref 150–450)
POTASSIUM SERPL-SCNC: 4 MMOL/L (ref 3.4–5.3)
RBC # BLD AUTO: 3.32 10E12/L (ref 3.8–5.2)
SODIUM SERPL-SCNC: 146 MMOL/L (ref 133–144)
SPECIMEN SOURCE: ABNORMAL
WBC # BLD AUTO: 10.5 10E9/L (ref 4–11)

## 2017-06-16 PROCEDURE — 27210429 ZZH NUTRITION PRODUCT INTERMEDIATE LITER

## 2017-06-16 PROCEDURE — 25000132 ZZH RX MED GY IP 250 OP 250 PS 637: Performed by: INTERNAL MEDICINE

## 2017-06-16 PROCEDURE — 84100 ASSAY OF PHOSPHORUS: CPT | Performed by: INTERNAL MEDICINE

## 2017-06-16 PROCEDURE — 27210995 ZZH RX 272: Performed by: INTERNAL MEDICINE

## 2017-06-16 PROCEDURE — 83735 ASSAY OF MAGNESIUM: CPT | Performed by: INTERNAL MEDICINE

## 2017-06-16 PROCEDURE — 36415 COLL VENOUS BLD VENIPUNCTURE: CPT | Performed by: INTERNAL MEDICINE

## 2017-06-16 PROCEDURE — 80048 BASIC METABOLIC PNL TOTAL CA: CPT | Performed by: INTERNAL MEDICINE

## 2017-06-16 PROCEDURE — 25000131 ZZH RX MED GY IP 250 OP 636 PS 637: Performed by: INTERNAL MEDICINE

## 2017-06-16 PROCEDURE — 99233 SBSQ HOSP IP/OBS HIGH 50: CPT | Performed by: INTERNAL MEDICINE

## 2017-06-16 PROCEDURE — 25000128 H RX IP 250 OP 636: Performed by: INTERNAL MEDICINE

## 2017-06-16 PROCEDURE — 85027 COMPLETE CBC AUTOMATED: CPT | Performed by: INTERNAL MEDICINE

## 2017-06-16 PROCEDURE — 12000000 ZZH R&B MED SURG/OB

## 2017-06-16 PROCEDURE — 00000146 ZZHCL STATISTIC GLUCOSE BY METER IP

## 2017-06-16 RX ADMIN — PIPERACILLIN SODIUM,TAZOBACTAM SODIUM 3.38 G: 3; .375 INJECTION, POWDER, FOR SOLUTION INTRAVENOUS at 20:37

## 2017-06-16 RX ADMIN — SODIUM CHLORIDE: 4.5 INJECTION, SOLUTION INTRAVENOUS at 06:35

## 2017-06-16 RX ADMIN — LEVOFLOXACIN 250 MG: 5 INJECTION, SOLUTION INTRAVENOUS at 15:19

## 2017-06-16 RX ADMIN — INSULIN ASPART 5 UNITS: 100 INJECTION, SOLUTION INTRAVENOUS; SUBCUTANEOUS at 03:56

## 2017-06-16 RX ADMIN — MULTIVITAMIN 15 ML: LIQUID ORAL at 09:07

## 2017-06-16 RX ADMIN — HYOSCYAMINE SULFATE: 16 SOLUTION at 20:59

## 2017-06-16 RX ADMIN — PIPERACILLIN SODIUM,TAZOBACTAM SODIUM 3.38 G: 3; .375 INJECTION, POWDER, FOR SOLUTION INTRAVENOUS at 14:09

## 2017-06-16 RX ADMIN — ACETAMINOPHEN 650 MG: 325 TABLET, FILM COATED ORAL at 09:02

## 2017-06-16 RX ADMIN — PIPERACILLIN SODIUM,TAZOBACTAM SODIUM 3.38 G: 3; .375 INJECTION, POWDER, FOR SOLUTION INTRAVENOUS at 09:05

## 2017-06-16 RX ADMIN — SODIUM CHLORIDE: 4.5 INJECTION, SOLUTION INTRAVENOUS at 17:03

## 2017-06-16 RX ADMIN — OXYCODONE HYDROCHLORIDE 2 MG: 5 SOLUTION ORAL at 09:10

## 2017-06-16 RX ADMIN — INSULIN ASPART 5 UNITS: 100 INJECTION, SOLUTION INTRAVENOUS; SUBCUTANEOUS at 16:40

## 2017-06-16 RX ADMIN — SODIUM CHLORIDE: 4.5 INJECTION, SOLUTION INTRAVENOUS at 20:52

## 2017-06-16 RX ADMIN — OXYCODONE HYDROCHLORIDE 2 MG: 5 SOLUTION ORAL at 09:06

## 2017-06-16 RX ADMIN — Medication 7 ML: at 16:40

## 2017-06-16 RX ADMIN — INSULIN GLARGINE 30 UNITS: 100 INJECTION, SOLUTION SUBCUTANEOUS at 12:07

## 2017-06-16 RX ADMIN — OXYCODONE HYDROCHLORIDE 2 MG: 5 SOLUTION ORAL at 20:59

## 2017-06-16 RX ADMIN — ACETAMINOPHEN 650 MG: 325 TABLET, FILM COATED ORAL at 21:01

## 2017-06-16 RX ADMIN — RANITIDINE 75 MG: 75 TABLET, FILM COATED ORAL at 20:39

## 2017-06-16 RX ADMIN — Medication 1 CAPSULE: at 09:02

## 2017-06-16 RX ADMIN — INSULIN ASPART 5 UNITS: 100 INJECTION, SOLUTION INTRAVENOUS; SUBCUTANEOUS at 09:03

## 2017-06-16 RX ADMIN — PIPERACILLIN SODIUM,TAZOBACTAM SODIUM 3.38 G: 3; .375 INJECTION, POWDER, FOR SOLUTION INTRAVENOUS at 01:40

## 2017-06-16 RX ADMIN — RANITIDINE 75 MG: 75 TABLET, FILM COATED ORAL at 09:02

## 2017-06-16 RX ADMIN — HYOSCYAMINE SULFATE: 16 SOLUTION at 09:50

## 2017-06-16 RX ADMIN — ACETAMINOPHEN 650 MG: 325 TABLET, FILM COATED ORAL at 16:40

## 2017-06-16 RX ADMIN — INSULIN GLARGINE 30 UNITS: 100 INJECTION, SOLUTION SUBCUTANEOUS at 20:58

## 2017-06-16 RX ADMIN — INSULIN ASPART 5 UNITS: 100 INJECTION, SOLUTION INTRAVENOUS; SUBCUTANEOUS at 20:56

## 2017-06-16 RX ADMIN — INSULIN ASPART 5 UNITS: 100 INJECTION, SOLUTION INTRAVENOUS; SUBCUTANEOUS at 12:07

## 2017-06-16 NOTE — PROGRESS NOTES
[facility]  Hospitalist Progress Note     06/16/2017    Assessment & Plan   Ms. Lewis is a 89-year-old female with advanced dementia, nonverbal at baseline and other medical problems as described above who presents from a nursing home with low oxygen sats, fever and labored breathing.     Severe sepsis due to HCAP and UTI  The patient presents with tachypnea, low oxygen sats and fever.    Chest x-ray does show an infiltrate in the left lung base.   Chronic indwelling Fonseca which was changed in the emergency room.    UA consistent with UTI  Sepsis improving with decreasing WBC, lactic acid, and temp  Cultures:   - Urine growing >100K staph aureus   - Wound from buttock growing GNR   - Blood NGTD  - Currently on vancomycin, Zosyn and Levaquin; based on additional cultures, will start narrowing ABx tomorrow  - Son, Dr. Salvatore Lewis, is requesting PICC line if additional IV antibiotics needed.  Attempted to call to discuss further, but unable to reach.  Will try again later this afternoon.    Addendum: Spoke with Dr. Lewis who was only interested in a PICC if Celia required outpatient IV antibiotics.  Continue PIV.  Son also confirmed code status as Full code    Stage 3-4 Coccyx pressure ulcer  Wound is necrotic measuring 4x7cm, 3 cm deep.  - Wound care and gen surg consult appreciated  - S/p bedside debridement on 6/15 by general surgery    Advanced dementia  Appears to be at her nonverbal and immobile baseline.    - Continue tube feedings, nutritionist consulted    Diabetes mellitus type 2   PTA regimen: Lantus 35 units in the morning and 40 units at bedtime  - Hyperglycemic overnight, increase Lantus to 30units BID  - moderate resistance sliding scale.     CKD3  This appears to be at baseline.  Avoid nephrotoxins.       Hypernatremia  Improving with 1/2 NS    Prophylaxis: Pneumatic Compression Devices  Code Status: Full Code  Disposition: Expected discharge TBD, > 3 days    Kraig Mobley  MD  [phone] (P)  Text Page (7 am - 6 pm)    Interval History   No events overnight.  Patient is nonverbal.  Discussed with nursing and wound nurse.  -Data reviewed today: I reviewed all new labs and imaging results over the last 24 hours.    Physical Exam   /61 (BP Location: Left arm)  Pulse 99  Temp 98.6  F (37  C) (Axillary)  Resp 20  Ht 1.524 m (5')  Wt 64 kg (141 lb 1.5 oz)  SpO2 95%  BMI 27.56 kg/m2  Constitutional: NAD, awake  HEENT: EOMI   Cardiovascular: S1, S2, regular rhythm  Respiratory: CTAB, diffusely diminished, no crackles  Gastrointestinal: Soft, NT  Neurologic: At baseline dementia, nonverbal, does not respond to questions  Skin: Coccyx pressure wound    Medications     IV fluid REPLACEMENT ONLY       NaCl 100 mL/hr at 06/16/17 0635       insulin glargine  30 Units Subcutaneous BID     multivitamins with minerals  15 mL Oral Daily     sodium hypochlorite   Topical BID     vancomycin (VANCOCIN) IV  1,500 mg Intravenous Q48H     sodium chloride (PF)  3 mL Intracatheter Q8H     piperacillin-tazobactam  3.375 g Intravenous Q6H     levofloxacin  250 mg Intravenous Q24H     acetaminophen (TYLENOL) tablet 650 mg  650 mg Oral or Feeding Tube TID     lactobacillus rhamnosus (GG)  1 capsule Per G Tube Daily     oxyCODONE  2 mg Oral BID     ranitidine  75 mg Per G Tube BID     insulin aspart  1-6 Units Subcutaneous Q4H       Data     Recent Labs  Lab 06/16/17  0850 06/15/17  0833 06/14/17  2205 06/14/17  1200   WBC 10.5 13.9*  --  18.7*   HGB 10.0* 11.0*  --  12.4   MCV 98 98  --  98    302  --  386   INR  --   --   --  1.10   * 149*  --  144   POTASSIUM 4.0 4.3  --  4.1   CHLORIDE 115* 117*  --  110*   CO2 20 23  --  27   BUN 37* 45*  --  60*   CR 0.96 1.12*  --  1.28*   ANIONGAP 11 9  --  7   NELLIE 7.7* 8.3*  --  9.0   * 109*  --  275*   LACT  --  1.9 2.6* 3.5*   ALBUMIN  --   --   --  2.5*   PROTTOTAL  --   --   --  8.2   BILITOTAL  --   --   --  0.5   ALKPHOS  --   --    --  96   ALT  --   --   --  22   AST  --   --   --  20   LIPASE  --   --   --  130     No results found for this or any previous visit (from the past 24 hour(s)).

## 2017-06-16 NOTE — PLAN OF CARE
Problem: Goal Outcome Summary  Goal: Goal Outcome Summary  Outcome: No Change  Nonverbal, unresponsive.  HR tachy, low 100's, on 2L/NC sats in the mid 90's. Afebrile.  Appears comfortable.  Bedrest, T/R q2 hrs.  TF running at goal 45/hr, NPO.  , 309, 355, and 267.  Fonseca patent.  IVF infusing per orders. Dressing to coccyx and bilateral ears CDI.  LS course.  IV zosyn, vanco, levaquin.  D/C pending, nursing will continue to monitor.

## 2017-06-16 NOTE — PLAN OF CARE
Problem: Goal Outcome Summary  Goal: Goal Outcome Summary  Outcome: No Change  Pt alert but non-verbal, baseline. NPO. Tolerating tube-feed. , 367, MD started Lantus BID.  Fonseca clear/trung urine. Dressing change (coccyx). Digital stimulation for fecal removal (large/soft). Ear wounds (both) red/dry/crusted, covered w/foam dressing. Turned Q2.  O2 mid-90's on 3L. HR regular.

## 2017-06-16 NOTE — PHARMACY-VANCOMYCIN DOSING SERVICE
Pharmacy Vancomycin Note  Date of Service Barby 15, 2017  Patient's  [id number]   89 year old, female    Indication: Healthcare-Associated Pneumonia  Goal Trough Level: 15-20 mg/L  Day of Therapy: 2  Current Vancomycin regimen:  1500 mg IV once  Current estimated CrCl = Estimated Creatinine Clearance: 28.9 mL/min (based on Cr of 1.12).    Creatinine for last 3 days  2017: 12:00 PM Creatinine 1.28 mg/dL  6/15/2017:  8:33 AM Creatinine 1.12 mg/dL    Recent Vancomycin Levels (past 3 days)  6/15/2017:  8:05 PM Vancomycin Level 12.7 mg/L    Vancomycin IV Administrations (past 72 hours)                   vancomycin (VANCOCIN) 500 mg in NaCl 0.9 % 100 mL intermittent infusion (mg) 500 mg New Bag 17 210    vancomycin (VANCOCIN) 1000 mg in dextrose 5% 200 mL PREMIX (mg) 1,000 mg New Bag 17 1519                Nephrotoxins and other renal medications (Future)    Start     Dose/Rate Route Frequency Ordered Stop    06/15/17 2200  vancomycin (VANCOCIN) 1500 mg in 0.9% NaCl 250 mL PREMIX      1,500 mg Intravenous EVERY 48 HOURS 06/15/17 2103      06/14/17 2000  piperacillin-tazobactam (ZOSYN) 3.375 g vial to attach to  mL bag     Comments:  Pharmacy can adjust dose based on renal function.    3.375 g  over 1 Hours Intravenous EVERY 6 HOURS 17 1714               Contrast Orders - past 72 hours     None          Interpretation of levels and current regimen:  Trough level is  Subtherapeutic    Has serum creatinine changed > 50% in last 72 hours: No    Urine output:  diminished urine output    Renal Function: Stable    Plan:  1.  change to 1500 mg q48h  2.  Pharmacy will check trough levels as appropriate in 1-3 Days.    3. Serum creatinine levels will be ordered daily for the first week of therapy and at least twice weekly for subsequent weeks.      Danica Oseguera        .

## 2017-06-16 NOTE — CONSULTS
SURGICAL CONSULTATION AND PROCEDURE      DATE OF ENCOUNTER:  06/15/2017      TIME OF ENCOUNTER:  15:00      REASON FOR CONSULTATION:  Sacral decubitus ulcer.      REFERRING PROVIDER:  Dr. Yoder.      BRIEF HISTORY:  Ms. Celia Lewis is a 89-year-old female who was admitted to [facility] due to a presumed urinary tract infection and sepsis, also possible pneumonia.  She was noted on her admission to have a chronic sacral decubitus ulcer.  This was evaluated by the wound care nurse, and found to have some necrotic fibrinous slough within the wound, and surgical consultation was therefore requested to evaluate for debridement.      The patient was examined at the bedside with the assistance of her nurse.  The wound itself was examined.  The sacral decubitus ulcer itself measured 4 cm in length and approximately 7 cm in transverse dimension, and the depth was approximately 3 cm with some deep tunneling at one aspect of the wound.  On the left lateral aspect, there was some necrotic fibrinous slough, whereas on the right side there was healthy-appearing granulation tissue.  There was mild reactive erythema around the open edges of the wound.      I discussed this with the patient's son, Dr. Salvatore Lewis, and obtained a telephone consent to proceed with excisional debridement.      PROCEDURE:  With the assistance of a nurse, with the patient in a right side down decubitus position, scissors and forceps were used to sharply debride the fibrinous slough from the wound.  The eschar present was relatively superficial.  This was debrided effectively at the bedside to what appeared to be more healthy tissue with some visible mild tissue bleeding.  Overall, I would characterize this as a likely stage III/borderline stage IV sacral decubitus ulcer without real significant sequelae to suggest infection.  The wound was repacked by the nurse.  She tolerated this without any difficulty.      PLAN:  At this point, we will  defer ongoing management of the wound to the wound care nurse who has already made recommendations for the ongoing management.         WILNER HASSAN MD             D: 06/15/2017 14:59   T: 2017 02:44   MT: MAIN#[id number]      Name:     RAÚL MERCEDES   MRN:      [id number]        Account:       [id number]   :      1926           Consult Date:  06/15/2017      Document: [id number]       cc: Myron Yoder MD

## 2017-06-16 NOTE — PLAN OF CARE
Problem: Goal Outcome Summary  Goal: Goal Outcome Summary  Outcome: No Change  Care Plan Summary Note:  Vss, nonverbal at baseline. Turn and reposition Q 2 hours. Dressing done to coccyx wound per order. 2lpm nc continous, o2 sat >90%. IVF infusing. PIV x2 on left arm, peg tube, and caro  patent and intact. Tachypnea. Lungs coarse. Continue with IV abx and scheduled pain meds. Pt is to have PICC line only if outpatient IV abx if needed as it was dicussed with pt's son by Dr. Mobley.    Critical result of MRSA in urine was given by lab. Call placed to on call MD Curiel . No new order. Pt is to be put in contact isolation. Currently on vanco, zoysn, and lavaquin IV abx.  Activity Level:   Bed rest   Fall Risk: high, arm band and alarm on.   CAM (if applicable): ZOEY  Mobility Tier (A or B): B.      Anticipated DC date: pending.

## 2017-06-16 NOTE — PROGRESS NOTES
Patient resumed TF yesterday (also receives feeds at NH) and continues as follows ~    Nutrition Support Enteral:  Type of Feeding Tube: G-tube  Enteral Frequency:  Continuous  Enteral Regimen: Isosource 1.5 at 45 mL/hr  Total Enteral Provisions: 1620 kcal (332 kcal per kg), 73 g protein (1.4 g per kg), 16 g fiber, 190 g CHO, 820 mL H2O, 01052 International Units of Vit A, 346 mg of Vit C, and 35 mg of Zinc  Free Water Flush: 60 mL every 4 hours, plan to increase to 135 mL every 4 hours once IVF d/c'd     Noted WOCN eval yesterday:  PI on coccyx/sacrum - Unstageable  PI to bilateral ears - Scattered small Stage 2 PIs    Currently receiving Certavite via FT    Will add Tri-Vi-Sol, 7 mL daily x 10 days (5250 International Units Vit A, 245 mg Vit C, and 2800 International Units Vit D)  Total (TF + Tri-Vi-Sol)= 63107 International Units Vit A and 591 mg Vit C    Kasey Trimble RD, LD, CNSC   Clinical Dietitian - [facility]

## 2017-06-17 LAB
ANION GAP SERPL CALCULATED.3IONS-SCNC: 12 MMOL/L (ref 3–14)
BACTERIA SPEC CULT: ABNORMAL
BUN SERPL-MCNC: 30 MG/DL (ref 7–30)
CALCIUM SERPL-MCNC: 7.6 MG/DL (ref 8.5–10.1)
CHLORIDE SERPL-SCNC: 114 MMOL/L (ref 94–109)
CO2 SERPL-SCNC: 18 MMOL/L (ref 20–32)
CREAT SERPL-MCNC: 0.9 MG/DL (ref 0.52–1.04)
ERYTHROCYTE [DISTWIDTH] IN BLOOD BY AUTOMATED COUNT: 15 % (ref 10–15)
ERYTHROCYTE [DISTWIDTH] IN BLOOD BY AUTOMATED COUNT: NORMAL % (ref 10–15)
GFR SERPL CREATININE-BSD FRML MDRD: 59 ML/MIN/1.7M2
GLUCOSE BLDC GLUCOMTR-MCNC: 265 MG/DL (ref 70–99)
GLUCOSE BLDC GLUCOMTR-MCNC: 287 MG/DL (ref 70–99)
GLUCOSE BLDC GLUCOMTR-MCNC: 297 MG/DL (ref 70–99)
GLUCOSE BLDC GLUCOMTR-MCNC: 306 MG/DL (ref 70–99)
GLUCOSE BLDC GLUCOMTR-MCNC: 307 MG/DL (ref 70–99)
GLUCOSE BLDC GLUCOMTR-MCNC: 322 MG/DL (ref 70–99)
GLUCOSE SERPL-MCNC: 320 MG/DL (ref 70–99)
HCT VFR BLD AUTO: 30.7 % (ref 35–47)
HCT VFR BLD AUTO: NORMAL % (ref 35–47)
HGB BLD-MCNC: 9.5 G/DL (ref 11.7–15.7)
HGB BLD-MCNC: NORMAL G/DL (ref 11.7–15.7)
MCH RBC QN AUTO: 29.8 PG (ref 26.5–33)
MCH RBC QN AUTO: NORMAL PG (ref 26.5–33)
MCHC RBC AUTO-ENTMCNC: 30.9 G/DL (ref 31.5–36.5)
MCHC RBC AUTO-ENTMCNC: NORMAL G/DL (ref 31.5–36.5)
MCV RBC AUTO: 96 FL (ref 78–100)
MCV RBC AUTO: NORMAL FL (ref 78–100)
MICRO REPORT STATUS: ABNORMAL
MICROORGANISM SPEC CULT: ABNORMAL
PLATELET # BLD AUTO: 270 10E9/L (ref 150–450)
PLATELET # BLD AUTO: NORMAL 10E9/L (ref 150–450)
POTASSIUM SERPL-SCNC: 4.7 MMOL/L (ref 3.4–5.3)
RBC # BLD AUTO: 3.19 10E12/L (ref 3.8–5.2)
RBC # BLD AUTO: NORMAL 10E12/L (ref 3.8–5.2)
SODIUM SERPL-SCNC: 144 MMOL/L (ref 133–144)
SPECIMEN SOURCE: ABNORMAL
VANCOMYCIN SERPL-MCNC: 11.7 MG/L
WBC # BLD AUTO: 11.4 10E9/L (ref 4–11)
WBC # BLD AUTO: NORMAL 10E9/L (ref 4–11)

## 2017-06-17 PROCEDURE — 25000132 ZZH RX MED GY IP 250 OP 250 PS 637: Performed by: INTERNAL MEDICINE

## 2017-06-17 PROCEDURE — 36416 COLLJ CAPILLARY BLOOD SPEC: CPT | Performed by: INTERNAL MEDICINE

## 2017-06-17 PROCEDURE — 25000131 ZZH RX MED GY IP 250 OP 636 PS 637: Performed by: INTERNAL MEDICINE

## 2017-06-17 PROCEDURE — 80202 ASSAY OF VANCOMYCIN: CPT | Performed by: INTERNAL MEDICINE

## 2017-06-17 PROCEDURE — 80048 BASIC METABOLIC PNL TOTAL CA: CPT | Performed by: INTERNAL MEDICINE

## 2017-06-17 PROCEDURE — 25000128 H RX IP 250 OP 636: Performed by: INTERNAL MEDICINE

## 2017-06-17 PROCEDURE — 36415 COLL VENOUS BLD VENIPUNCTURE: CPT | Performed by: INTERNAL MEDICINE

## 2017-06-17 PROCEDURE — 27210995 ZZH RX 272: Performed by: INTERNAL MEDICINE

## 2017-06-17 PROCEDURE — 85027 COMPLETE CBC AUTOMATED: CPT | Performed by: INTERNAL MEDICINE

## 2017-06-17 PROCEDURE — 12000000 ZZH R&B MED SURG/OB

## 2017-06-17 PROCEDURE — 99233 SBSQ HOSP IP/OBS HIGH 50: CPT | Performed by: INTERNAL MEDICINE

## 2017-06-17 PROCEDURE — 00000146 ZZHCL STATISTIC GLUCOSE BY METER IP

## 2017-06-17 RX ORDER — CEFTRIAXONE 1 G/1
1 INJECTION, POWDER, FOR SOLUTION INTRAMUSCULAR; INTRAVENOUS EVERY 24 HOURS
Status: DISCONTINUED | OUTPATIENT
Start: 2017-06-17 | End: 2017-06-19 | Stop reason: HOSPADM

## 2017-06-17 RX ORDER — CEFAZOLIN SODIUM 1 G/50ML
1250 SOLUTION INTRAVENOUS EVERY 24 HOURS
Status: DISCONTINUED | OUTPATIENT
Start: 2017-06-18 | End: 2017-06-19 | Stop reason: HOSPADM

## 2017-06-17 RX ADMIN — OXYCODONE HYDROCHLORIDE 2 MG: 5 SOLUTION ORAL at 20:53

## 2017-06-17 RX ADMIN — RANITIDINE 75 MG: 75 TABLET, FILM COATED ORAL at 09:38

## 2017-06-17 RX ADMIN — ACETAMINOPHEN 650 MG: 325 TABLET, FILM COATED ORAL at 09:38

## 2017-06-17 RX ADMIN — SODIUM CHLORIDE: 4.5 INJECTION, SOLUTION INTRAVENOUS at 09:54

## 2017-06-17 RX ADMIN — INSULIN ASPART 3 UNITS: 100 INJECTION, SOLUTION INTRAVENOUS; SUBCUTANEOUS at 16:32

## 2017-06-17 RX ADMIN — OXYCODONE HYDROCHLORIDE 2 MG: 5 SOLUTION ORAL at 09:37

## 2017-06-17 RX ADMIN — INSULIN HUMAN 16 UNITS: 100 INJECTION, SUSPENSION SUBCUTANEOUS at 12:13

## 2017-06-17 RX ADMIN — RANITIDINE 75 MG: 75 TABLET, FILM COATED ORAL at 20:52

## 2017-06-17 RX ADMIN — HYOSCYAMINE SULFATE: 16 SOLUTION at 20:59

## 2017-06-17 RX ADMIN — PIPERACILLIN SODIUM,TAZOBACTAM SODIUM 3.38 G: 3; .375 INJECTION, POWDER, FOR SOLUTION INTRAVENOUS at 01:41

## 2017-06-17 RX ADMIN — VANCOMYCIN HYDROCHLORIDE 1500 MG: 5 INJECTION, POWDER, LYOPHILIZED, FOR SOLUTION INTRAVENOUS at 21:25

## 2017-06-17 RX ADMIN — INSULIN ASPART 3 UNITS: 100 INJECTION, SOLUTION INTRAVENOUS; SUBCUTANEOUS at 20:53

## 2017-06-17 RX ADMIN — INSULIN ASPART 4 UNITS: 100 INJECTION, SOLUTION INTRAVENOUS; SUBCUTANEOUS at 04:40

## 2017-06-17 RX ADMIN — ACETAMINOPHEN 650 MG: 325 TABLET, FILM COATED ORAL at 16:32

## 2017-06-17 RX ADMIN — HYOSCYAMINE SULFATE: 16 SOLUTION at 12:02

## 2017-06-17 RX ADMIN — INSULIN GLARGINE 36 UNITS: 100 INJECTION, SOLUTION SUBCUTANEOUS at 09:41

## 2017-06-17 RX ADMIN — INSULIN HUMAN 10 UNITS: 100 INJECTION, SUSPENSION SUBCUTANEOUS at 18:18

## 2017-06-17 RX ADMIN — INSULIN ASPART 4 UNITS: 100 INJECTION, SOLUTION INTRAVENOUS; SUBCUTANEOUS at 12:12

## 2017-06-17 RX ADMIN — Medication 1 CAPSULE: at 09:38

## 2017-06-17 RX ADMIN — PIPERACILLIN SODIUM,TAZOBACTAM SODIUM 3.38 G: 3; .375 INJECTION, POWDER, FOR SOLUTION INTRAVENOUS at 09:35

## 2017-06-17 RX ADMIN — INSULIN ASPART 4 UNITS: 100 INJECTION, SOLUTION INTRAVENOUS; SUBCUTANEOUS at 01:18

## 2017-06-17 RX ADMIN — INSULIN ASPART 4 UNITS: 100 INJECTION, SOLUTION INTRAVENOUS; SUBCUTANEOUS at 09:38

## 2017-06-17 RX ADMIN — CEFTRIAXONE 1 G: 1 INJECTION, POWDER, FOR SOLUTION INTRAMUSCULAR; INTRAVENOUS at 15:09

## 2017-06-17 RX ADMIN — INSULIN GLARGINE 36 UNITS: 100 INJECTION, SOLUTION SUBCUTANEOUS at 20:52

## 2017-06-17 RX ADMIN — ACETAMINOPHEN 650 MG: 325 TABLET, FILM COATED ORAL at 21:26

## 2017-06-17 RX ADMIN — SODIUM CHLORIDE: 4.5 INJECTION, SOLUTION INTRAVENOUS at 21:25

## 2017-06-17 RX ADMIN — Medication 7 ML: at 16:33

## 2017-06-17 RX ADMIN — MULTIVITAMIN 15 ML: LIQUID ORAL at 09:39

## 2017-06-17 NOTE — PROVIDER NOTIFICATION
Call placed to on call MD Curiel regarding MRSA in urine. No new order. Pt is to be put in contact isolation. Currently on vanco, zoysn, and lavaquin IV abx.

## 2017-06-17 NOTE — PROGRESS NOTES
[facility]  Hospitalist Progress Note     06/17/2017    Assessment & Plan   Ms. Lewis is a 89-year-old female with advanced dementia, nonverbal at baseline and other medical problems as described above who presents from a nursing home with low oxygen sats, fever and labored breathing.     Severe sepsis due to HCAP and UTI  The patient presents with tachypnea, low oxygen sats and fever.    Chest x-ray shows infiltrate in the left lung base.   Chronic indwelling Fonseca which was changed in the emergency room.    UA consistent with UTI  Sepsis improving with decreasing WBC, lactic acid, and temp  Cultures:   - Urine growing >100K MRSA (2 strains)   - Wound from buttock growing E coli, enterococcus avium and faecalis, and candida   - Blood NGTD  - Continue vancomycin, start ceftriaxone daily  - Stop levofloxacin and Zosyn  - Given sulfa allergy, will request ID assistance tomorrow regarding antibiotics if able to transition to orals    Stage 3-4 Coccyx pressure ulcer  Wound is necrotic measuring 4x7cm, 3 cm deep.  - Wound care and gen surg consult appreciated  - S/p bedside debridement on 6/15 by general surgery    Advanced dementia  Appears to be at her nonverbal and immobile baseline.    - Continue tube feedings, nutritionist consulted  - Discussed goals of care with son, Dr. Lewis who is POA, who requests full code and current level of cares    Diabetes mellitus type 2   PTA regimen: Lantus 36 units in AM and 34 units in PM, NPH 16 units in AM and 10 units at 1400  - Still hyperglycemic, will also resume PTA NPH  - Lantus 36 units BID, NPH 16 units in AM, 10 units at 1400  - moderate resistance sliding scale.     CKD3  This appears to be at baseline.  Avoid nephrotoxins.       Hypernatremia  Improving with 1/2 NS    Prophylaxis: Pneumatic Compression Devices  Code Status: Full Code  Disposition: Sepsis improving, possible discharge in 2 days back to LTC if able to transition to orals    Last updated  son, Dr. Salvatore Lewis (immunologist/allergist), on 6/16    Kraig Mobley MD  [phone] (P)  Text Page (7 am - 6 pm)    Interval History   No events overnight.  Patient is nonverbal.   -Data reviewed today: I reviewed all new labs and imaging results over the last 24 hours.    Physical Exam   /70 (BP Location: Left arm)  Pulse 99  Temp 98.5  F (36.9  C) (Axillary)  Resp 18  Ht 1.524 m (5')  Wt 68.4 kg (150 lb 12.7 oz)  SpO2 96%  BMI 29.45 kg/m2  Constitutional: NAD, awake  HEENT: EOMI   Cardiovascular: S1, S2, regular rhythm  Respiratory: CTAB, diffusely diminished, no crackles  Gastrointestinal: Soft, NT  Neurologic: At baseline dementia, nonverbal, does not respond to questions  Skin: Coccyx pressure wound    Medications     IV fluid REPLACEMENT ONLY       NaCl 100 mL/hr at 06/17/17 0954       insulin glargine  36 Units Subcutaneous BID     insulin isophane human  16 Units Subcutaneous QAM AC     insulin isophane human  10 Units Subcutaneous Q24H     vitamin A-D & C drops  7 mL Per G Tube Daily     multivitamins with minerals  15 mL Oral Daily     sodium hypochlorite   Topical BID     vancomycin (VANCOCIN) IV  1,500 mg Intravenous Q48H     sodium chloride (PF)  3 mL Intracatheter Q8H     piperacillin-tazobactam  3.375 g Intravenous Q6H     acetaminophen (TYLENOL) tablet 650 mg  650 mg Oral or Feeding Tube TID     lactobacillus rhamnosus (GG)  1 capsule Per G Tube Daily     oxyCODONE  2 mg Oral BID     ranitidine  75 mg Per G Tube BID     insulin aspart  1-6 Units Subcutaneous Q4H       Data     Recent Labs  Lab 06/17/17  1255 06/17/17  1110 06/16/17  0850 06/15/17  0833 06/14/17  2205 06/14/17  1200   WBC 11.4* Canceled, Test credited Unsatisfactory specimen - clottedCALLED TO DI ON 66 10.5 13.9*  --  18.7*   HGB 9.5* Canceled, Test credited Unsatisfactory specimen - clottedCALLED TO DI ON 66 10.0* 11.0*  --  12.4   MCV 96 Canceled, Test credited Unsatisfactory specimen - clottedCALLED TO DI ON 66  98 98  --  98    Canceled, Test credited Unsatisfactory specimen - clottedCALLED TO DI ON 66 286 302  --  386   INR  --   --   --   --   --  1.10   NA  --  144 146* 149*  --  144   POTASSIUM  --  4.7 4.0 4.3  --  4.1   CHLORIDE  --  114* 115* 117*  --  110*   CO2  --  18* 20 23  --  27   BUN  --  30 37* 45*  --  60*   CR  --  0.90 0.96 1.12*  --  1.28*   ANIONGAP  --  12 11 9  --  7   NELLIE  --  7.6* 7.7* 8.3*  --  9.0   GLC  --  320* 366* 109*  --  275*   LACT  --   --   --  1.9 2.6* 3.5*   ALBUMIN  --   --   --   --   --  2.5*   PROTTOTAL  --   --   --   --   --  8.2   BILITOTAL  --   --   --   --   --  0.5   ALKPHOS  --   --   --   --   --  96   ALT  --   --   --   --   --  22   AST  --   --   --   --   --  20   LIPASE  --   --   --   --   --  130     No results found for this or any previous visit (from the past 24 hour(s)).

## 2017-06-17 NOTE — PLAN OF CARE
Problem: Goal Outcome Summary  Goal: Goal Outcome Summary  Outcome: No Change  Pt alert at times. Non-verbal baseline. Total cares. NPO, TF at 45/hr. 1/2 NS at 100/hr. , 297. Insulin orders increased. Drg chg BID coccyx wound. Bilat ear woc, covered with foam drsg. Reposition Q2. BMx2 this shift, loose. Fonseca in place. RA sats 94-95. Levoquin and Zosyn stopped today. Started on Rocephin

## 2017-06-17 NOTE — PLAN OF CARE
Problem: Goal Outcome Summary  Goal: Goal Outcome Summary  Outcome: No Change  Patient nonverbal and baseline dementia, 2 assists with mechanical lift, total care, VSS, wean to room air, saturation of 99%, incontinence of bowel, BM x 2, caro patent, turned and repositioned every 2 hours, LS diminished, stage 3-4 coccyx wound, dressing changed, bilateral ear wound crusted, , 307, NPO, tube feeding at 45 ml/hr, 60 ml free water flush, 1/2 NS at 100 ml/hr.

## 2017-06-18 ENCOUNTER — APPOINTMENT (OUTPATIENT)
Dept: GENERAL RADIOLOGY | Facility: CLINIC | Age: 82
DRG: 853 | End: 2017-06-18
Attending: INTERNAL MEDICINE
Payer: COMMERCIAL

## 2017-06-18 LAB
ANION GAP SERPL CALCULATED.3IONS-SCNC: 9 MMOL/L (ref 3–14)
BUN SERPL-MCNC: 23 MG/DL (ref 7–30)
CALCIUM SERPL-MCNC: 7.9 MG/DL (ref 8.5–10.1)
CHLORIDE SERPL-SCNC: 110 MMOL/L (ref 94–109)
CO2 SERPL-SCNC: 22 MMOL/L (ref 20–32)
CREAT SERPL-MCNC: 0.76 MG/DL (ref 0.52–1.04)
ERYTHROCYTE [DISTWIDTH] IN BLOOD BY AUTOMATED COUNT: 14.8 % (ref 10–15)
GFR SERPL CREATININE-BSD FRML MDRD: 71 ML/MIN/1.7M2
GLUCOSE BLDC GLUCOMTR-MCNC: 170 MG/DL (ref 70–99)
GLUCOSE BLDC GLUCOMTR-MCNC: 200 MG/DL (ref 70–99)
GLUCOSE BLDC GLUCOMTR-MCNC: 208 MG/DL (ref 70–99)
GLUCOSE BLDC GLUCOMTR-MCNC: 234 MG/DL (ref 70–99)
GLUCOSE BLDC GLUCOMTR-MCNC: 240 MG/DL (ref 70–99)
GLUCOSE SERPL-MCNC: 241 MG/DL (ref 70–99)
HCT VFR BLD AUTO: 31.9 % (ref 35–47)
HGB BLD-MCNC: 10.1 G/DL (ref 11.7–15.7)
MCH RBC QN AUTO: 29.8 PG (ref 26.5–33)
MCHC RBC AUTO-ENTMCNC: 31.7 G/DL (ref 31.5–36.5)
MCV RBC AUTO: 94 FL (ref 78–100)
PLATELET # BLD AUTO: 289 10E9/L (ref 150–450)
POTASSIUM SERPL-SCNC: 3.6 MMOL/L (ref 3.4–5.3)
PROCALCITONIN SERPL-MCNC: 0.2 NG/ML
RBC # BLD AUTO: 3.39 10E12/L (ref 3.8–5.2)
SODIUM SERPL-SCNC: 141 MMOL/L (ref 133–144)
VANCOMYCIN SERPL-MCNC: 15.9 MG/L
WBC # BLD AUTO: 11.4 10E9/L (ref 4–11)

## 2017-06-18 PROCEDURE — 00000146 ZZHCL STATISTIC GLUCOSE BY METER IP

## 2017-06-18 PROCEDURE — 36415 COLL VENOUS BLD VENIPUNCTURE: CPT | Performed by: INTERNAL MEDICINE

## 2017-06-18 PROCEDURE — 80048 BASIC METABOLIC PNL TOTAL CA: CPT | Performed by: INTERNAL MEDICINE

## 2017-06-18 PROCEDURE — 71010 XR CHEST PORT 1 VW: CPT

## 2017-06-18 PROCEDURE — 85027 COMPLETE CBC AUTOMATED: CPT | Performed by: INTERNAL MEDICINE

## 2017-06-18 PROCEDURE — 25000128 H RX IP 250 OP 636: Performed by: INTERNAL MEDICINE

## 2017-06-18 PROCEDURE — 12000000 ZZH R&B MED SURG/OB

## 2017-06-18 PROCEDURE — 40000141 ZZH STATISTIC PERIPHERAL IV START W/O US GUIDANCE

## 2017-06-18 PROCEDURE — 25000132 ZZH RX MED GY IP 250 OP 250 PS 637: Performed by: INTERNAL MEDICINE

## 2017-06-18 PROCEDURE — 80202 ASSAY OF VANCOMYCIN: CPT | Performed by: INTERNAL MEDICINE

## 2017-06-18 PROCEDURE — 25000131 ZZH RX MED GY IP 250 OP 636 PS 637: Performed by: INTERNAL MEDICINE

## 2017-06-18 PROCEDURE — 84145 PROCALCITONIN (PCT): CPT | Performed by: INTERNAL MEDICINE

## 2017-06-18 PROCEDURE — 99233 SBSQ HOSP IP/OBS HIGH 50: CPT | Performed by: INTERNAL MEDICINE

## 2017-06-18 RX ADMIN — MULTIVITAMIN 15 ML: LIQUID ORAL at 10:01

## 2017-06-18 RX ADMIN — Medication 7 ML: at 16:20

## 2017-06-18 RX ADMIN — RANITIDINE 75 MG: 75 TABLET, FILM COATED ORAL at 21:22

## 2017-06-18 RX ADMIN — INSULIN ASPART 2 UNITS: 100 INJECTION, SOLUTION INTRAVENOUS; SUBCUTANEOUS at 05:01

## 2017-06-18 RX ADMIN — INSULIN GLARGINE 36 UNITS: 100 INJECTION, SOLUTION SUBCUTANEOUS at 20:56

## 2017-06-18 RX ADMIN — INSULIN ASPART 3 UNITS: 100 INJECTION, SOLUTION INTRAVENOUS; SUBCUTANEOUS at 10:35

## 2017-06-18 RX ADMIN — INSULIN ASPART 3 UNITS: 100 INJECTION, SOLUTION INTRAVENOUS; SUBCUTANEOUS at 13:00

## 2017-06-18 RX ADMIN — ACETAMINOPHEN 650 MG: 325 TABLET, FILM COATED ORAL at 21:26

## 2017-06-18 RX ADMIN — RANITIDINE 75 MG: 75 TABLET, FILM COATED ORAL at 10:00

## 2017-06-18 RX ADMIN — MICONAZOLE NITRATE: 2 POWDER TOPICAL at 11:19

## 2017-06-18 RX ADMIN — OXYCODONE HYDROCHLORIDE 2 MG: 5 SOLUTION ORAL at 21:22

## 2017-06-18 RX ADMIN — INSULIN ASPART 2 UNITS: 100 INJECTION, SOLUTION INTRAVENOUS; SUBCUTANEOUS at 16:18

## 2017-06-18 RX ADMIN — INSULIN ASPART 1 UNITS: 100 INJECTION, SOLUTION INTRAVENOUS; SUBCUTANEOUS at 20:56

## 2017-06-18 RX ADMIN — HYOSCYAMINE SULFATE: 16 SOLUTION at 10:20

## 2017-06-18 RX ADMIN — INSULIN GLARGINE 36 UNITS: 100 INJECTION, SOLUTION SUBCUTANEOUS at 10:35

## 2017-06-18 RX ADMIN — ACETAMINOPHEN 650 MG: 325 TABLET, FILM COATED ORAL at 10:00

## 2017-06-18 RX ADMIN — INSULIN HUMAN 10 UNITS: 100 INJECTION, SUSPENSION SUBCUTANEOUS at 13:00

## 2017-06-18 RX ADMIN — CEFTRIAXONE 1 G: 1 INJECTION, POWDER, FOR SOLUTION INTRAMUSCULAR; INTRAVENOUS at 14:44

## 2017-06-18 RX ADMIN — VANCOMYCIN HYDROCHLORIDE 1250 MG: 5 INJECTION, POWDER, LYOPHILIZED, FOR SOLUTION INTRAVENOUS at 22:47

## 2017-06-18 RX ADMIN — Medication 1 CAPSULE: at 10:00

## 2017-06-18 RX ADMIN — ACETAMINOPHEN 650 MG: 325 TABLET, FILM COATED ORAL at 16:24

## 2017-06-18 RX ADMIN — HYOSCYAMINE SULFATE: 16 SOLUTION at 21:21

## 2017-06-18 RX ADMIN — OXYCODONE HYDROCHLORIDE 2 MG: 5 SOLUTION ORAL at 10:00

## 2017-06-18 RX ADMIN — INSULIN ASPART 2 UNITS: 100 INJECTION, SOLUTION INTRAVENOUS; SUBCUTANEOUS at 00:05

## 2017-06-18 RX ADMIN — INSULIN HUMAN 16 UNITS: 100 INJECTION, SUSPENSION SUBCUTANEOUS at 10:34

## 2017-06-18 NOTE — PROGRESS NOTES
CLINICAL NUTRITION SERVICES - REASSESSMENT NOTE      EVALUATION OF PROGRESS TOWARD GOALS   Diet:  NPO    Nutrition Support Enteral:  Type of Feeding Tube: G-tube  Enteral Frequency:  Continuous  Enteral Regimen: Isosource 1.5 at 45 mL/hr  Total Enteral Provisions: 1620 kcal (332 kcal per kg), 73 g protein (1.4 g per kg), 16 g fiber, 190 g CHO, 820 mL H2O, 17025 International Units of Vit A, 346 mg of Vit C, and 35 mg of Zinc  Free Water Flush: 135 mL every 4 hours     Per PI protocol, pt receiving daily Certavite and Tri-Vi-Sol, 7 mL daily x 10 days (5250 International Units Vit A, 245 mg Vit C, and 2800 International Units Vit D)  Total (TF + Tri-Vi-Sol)= 19212 International Units Vit A and 591 mg Vit C    Pt is tolerating TF without issues, having 2 BMs/day.       Dosing Weight 51 kg - adjusted for overweight   ASSESSED NUTRITION NEEDS PER APPROVED PRACTICE GUIDELINES:  Estimated Energy Needs: 0390-6073 kcals (30-35 Kcal/Kg)  Justification: Pressure Injury Protocol  Estimated Protein Needs: 60-75 grams protein (1.2-1.5 g pro/Kg)  Justification: wound healing - monitor renal status      NEW FINDINGS:   BS 200s, on Lantus BID, med SSI and NPH BID    Vitals:    06/14/17 1907 06/16/17 0621 06/17/17 0500 06/18/17 0500   Weight: 68.9 kg (151 lb 14.4 oz) 64 kg (141 lb 1.5 oz) 68.4 kg (150 lb 12.7 oz) 70.3 kg (154 lb 15.7 oz)       Previous Goals:   Isosource 1.5 @ goal of 45 mL/hr will meet assessed needs  Evaluation: Met    Previous Nutrition Diagnosis:   Increased nutrient needs (protein) related to healing as evidenced by sacral decubitus  Evaluation: No change      CURRENT NUTRITION DIAGNOSIS  Same: Increased nutrient needs (protein) related to healing as evidenced by sacral decubitus    INTERVENTIONS  Recommendations / Nutrition Prescription  Continue current TF    Implementation  No further interventions    Goals  Isosource 1.5 @ goal of 45 mL/hr will continue to meet assessed needs      MONITORING AND  EVALUATION:  Progress towards goals will be monitored and evaluated per protocol and Practice Guidelines    Bisi Montgomery, GAVI  Pager [phone] (M-F)            [phone] (W/E & Hol)

## 2017-06-18 NOTE — CONSULTS
INFECTIOUS DISEASE CONSULTATION      IMPRESSION:   1.  A 89-year-old female, nursing home resident, prior known aspiration, chronic Fonseca catheter and Alzheimer's dementia admitted with low-grade sepsis, etiology not entirely clear, would suspect the MRSA UTI as the cause, no positive blood cultures.  Doubt wound is an issue and not clearcut pneumonia.   2.  Chronic sacral decubitus wound, somewhat necrotic, but no signs of infection and it does not tract deep had an I&D done, culture of the wound growing multiple organisms of no significance.   3.  MRSA positive urine culture with catheter in place.   4.  Possible slight infiltrate, mild initial hypoxia, which has resolved, possible aspiration pneumonia.  If so has improved.   5.  Severe dementia.   6.  Recurrent urinary tract infections and urinary colonization with Fonseca catheter in place.   7.  Feeding tube in place, prior probable aspiration, possible etiology to current illness as well.   8.  Diabetes mellitus.   9.  Chronic renal insufficiency.   10.  Sulfa allergy.      RECOMMENDATIONS:   1.  Ignore the sacral decubitus culture of no clinical significance.  The wound is not infected, nothing to suggest that deep.  Doubt this is the cause of current fever and has already been debrided.   2.  Urine may just be colonization, but also possible cause of current illness.  I would treat.  Continue vancomycin while here but as early as tomorrow, possibly convert to oral linezolid and treat for about 7 days.   3.  Not clearcut pneumonia.  Has been getting gram-negative coverage currently is on ceftriaxone previously Zosyn and Levaquin.  Does not appear to have major clinical pneumonia.  Assuming is recurrent aspiration could argue for just a short course of antibiotics has already been given.     4.  Obviously long-term prognosis poor, especially if recurrently aspirating.      HISTORY:  Celia Lewis is a 89-year-old female seen in consultation due to apparent  sepsis.  The patient is noncommunicative comes from the rehab center has a history of some aspiration pneumonia.  Has feeding and a G-tube, chronic Fonseca catheter and recurrent urinary tract infections.  She has also had a sacral decubitus wound has now been debrided.  As described at admission is showing signs of infection, but nothing to suggest that currently and not seen when debrided by Dr. Jones.  The wound culture is growing multiple organisms as expected, no clinical significance.  The patient is improved at present, not requiring oxygen and had a slight infiltrate present and urine is growing MRSA.  She has been getting vancomycin for that and does not have signs of obvious ongoing sepsis.      PAST MEDICAL HISTORY:  Probable recurrent aspiration, history of sacral decubitus wound, history of chronic Fonseca catheter, advanced dementia, diabetes mellitus, mild chronic renal insufficiency.      ALLERGIES:  Sulfa, unclear history.      REVIEW OF SYSTEMS:  Largely unobtainable, as the patient has severe dementia.      PHYSICAL EXAMINATION:   GENERAL:  The patient is awake, looks at me and not communicative, does not look toxic or ill.   VITAL SIGNS:  Pulse of 70, respiratory rate 16.  She is afebrile.   HEENT:  No visible lesions.   NECK:  Supple and nontender without lymphadenopathy.   HEART:  Not really tachycardic.   LUNGS:  Few crackles at both bases.  Difficult exam is not cooperative.   EXTREMITIES:  Sacral decubitus wound not impressive from an infection perspective, no significant lower extremity wounds.      LABORATORY:  Creatinine currently normal at 0.76.  Procalcitonin normal at 0.20.  Sacral decubitus wound growing numerous organisms.  Urine culture growing MRSA.  Blood cultures have been negative.  Chest x-ray with possible slight infiltrate.      Thank you very much for consultation.  Will follow the patient with you.         JOSE BERNARDO MD             D: 06/18/2017 13:11   T: 06/18/2017  13:30   MT: [id number]      Name:     RAÚL MERCEDES   MRN:      [id number]        Account:       [id number]   :      1926           Consult Date:  2017      Document: [id number]       cc: Kraig Mobley MD

## 2017-06-18 NOTE — CONSULTS
ID consult dictated IMP 1 89 yo female NH, dementia, TF, caro, chronic sacral wd, ? Sepsis, Bc neg, sacral wd debrided but not deep infection, UC MRSa , ? Infiltrate    REc decubtitus not infected, cx of no sig, no antibiotics, Uc ? Sig, vanco while here, zyvox po short course, had zosyn now ceftriaxone ? Pneumonia but seems minor ? zyvox alone at disposition

## 2017-06-18 NOTE — PLAN OF CARE
Problem: Goal Outcome Summary  Goal: Goal Outcome Summary  Pt nonverbal, VSS, desats during repositioning, o2 quickly returns. AO2, lift. Turned and reposiitoned q 2h. Fonseca patent, good output. 2 small, loose BMs. Wound care done on coccyx and dressing c/d/i. Plan for ID consult tomorrow.

## 2017-06-18 NOTE — PLAN OF CARE
Problem: Goal Outcome Summary  Goal: Goal Outcome Summary  Outcome: No Change  Pt appears comfortable in bed. Repositioned q 2 hours. Small stool incontinence.

## 2017-06-18 NOTE — PLAN OF CARE
Son Dr. Salvatore Lewis would appreciate calls regarding change in care or for any questions (cell-preferred) [phone]  Or (Z) [phone]

## 2017-06-18 NOTE — PLAN OF CARE
Problem: Goal Outcome Summary  Goal: Goal Outcome Summary  Outcome: No Change  Patient nonverbal and baseline dementia, 2 assists with mechanical lift, total care, VSS on room air, saturation in mid 90's, incontinence of bowel, small BM x 2, caro patent, turned and repositioned every 2 hours, LS diminished with some expiratory wheezes, stage 3-4 coccyx wound, dressing changed, bilateral ear wound with foam dressing, , 233, NPO, tube feeding at 45 ml/hr,  1/2 NS at 100 ml/hr, infectious disease consult.

## 2017-06-18 NOTE — PLAN OF CARE
Problem: Goal Outcome Summary  Goal: Goal Outcome Summary  Outcome: No Change  Patient is nonverbal, dementia, unable to assess orientation, VSS on room air. Bedbound, turn and reposition x2, specialty mattress. Fonseca intact, incontinent of stool. NPO, TF. Scheduled meds given for pain, no nonverbal indicators of pain. Large decubitus ulcer and bilateral ear scabs redressed. Continue antibiotics. Possible return to assisted in 2-3 days. Will continue to monitor.

## 2017-06-18 NOTE — PHARMACY-VANCOMYCIN DOSING SERVICE
Pharmacy Vancomycin Note  Date of Service 2017  Patient's  1926   89 year old, female    Indication: Healthcare-Associated Pneumonia  Goal Trough Level: 15-20 mg/L  Day of Therapy: 4  Current Vancomycin regimen:  1500 mg IV q48h    Current estimated CrCl = Estimated Creatinine Clearance: 35.9 mL/min (based on Cr of 0.9).    Creatinine for last 3 days  6/15/2017:  8:33 AM Creatinine 1.12 mg/dL  2017:  8:50 AM Creatinine 0.96 mg/dL  2017: 11:10 AM Creatinine 0.90 mg/dL    Recent Vancomycin Levels (past 3 days)  6/15/2017:  8:05 PM Vancomycin Level 12.7 mg/L  2017:  9:56 PM Vancomycin Level 11.7 mg/L    Vancomycin IV Administrations (past 72 hours)                   vancomycin (VANCOCIN) 1500 mg in 0.9% NaCl 250 mL PREMIX (mg) 1,500 mg New Bag 17 2125     1,500 mg New Bag 06/15/17 2226                Nephrotoxins and other renal medications (Future)    Start     Dose/Rate Route Frequency Ordered Stop    17 2200  vancomycin 1250 mg in 0.9% NaCl 250 mL PREMIX      1,250 mg Intravenous EVERY 24 HOURS 17 2231               Contrast Orders - past 72 hours     None          Interpretation of levels and current regimen:  Trough level is  Subtherapeutic    Has serum creatinine changed > 50% in last 72 hours: No      Renal Function: Stable    Plan:  1.  Increase Dose to 1250mg IV Q24hr  2.  Pharmacy will check trough levels as appropriate in 1-3 Days.    3. Serum creatinine levels will be ordered daily for the first week of therapy and at least twice weekly for subsequent weeks.      Cece Loza        .

## 2017-06-18 NOTE — PROGRESS NOTES
[facility]  Hospitalist Progress Note     06/18/2017    Assessment & Plan   Ms. Celia Lewis is a 89-year-old female with history of diabetes mellitus type 2, CHF, chronic kidney disease, coronary artery disease, hypertension, recurrent UTIs, Alzheimer dementia, with baseline nonverbal status who presents from a LTC  with low oxygen sats, fever and labored breathing.     Severe sepsis due to HCAP and UTI  The patient presents with tachypnea, low oxygen sats and fever.    Chest x-ray shows infiltrate in the left lung base.   Chronic indwelling Fonseca which was changed in the emergency room.    UA consistent with UTI  Sepsis improving with decreasing WBC, lactic acid, and temp  Cultures:   - Urine growing >100K MRSA (2 strains)   - Wound from buttock growing E coli, enterococcus avium and faecalis, and candida   - Blood NGTD  - Currently on vancomycin and ceftriaxone daily  - Stopped levofloxacin and Zosyn on 6/17  - Given sulfa allergy, will request ID assistance regarding antibiotics if able to transition to orals  - With increased tachypnea, will check CXR and stop IVF, give Lasix 20mg IV x 1  - Restart telemetry    Stage 3-4 Coccyx pressure ulcer  Wound is necrotic measuring 4x7cm, 3 cm deep.  - Wound care and gen surg consult appreciated  - S/p bedside debridement on 6/15 by general surgery    Advanced dementia  Appears to be at her nonverbal and immobile baseline.    - Continue tube feedings, nutritionist consulted  - Discussed goals of care with son, Dr. Lewis who is POA, who requests full code and current level of cares    Diabetes mellitus type 2   PTA regimen: Lantus 36 units in AM and 34 units in PM, NPH 16 units in AM and 10 units at 1400  - Control starting to improve with increased insulin  - Lantus 36 units BID, NPH 16 units in AM, 10 units at 1400  - moderate resistance sliding scale.     CKD3  This appears to be at baseline.  Avoid nephrotoxins.       Hypernatremia  Improved with  1/2NS IVF.    Prophylaxis: Pneumatic Compression Devices  Code Status: Full Code  Disposition:  possible discharge in 2 days back to LTC if able to transition to orals    Last updated son, Bibi Sotoxler (immunologist/allergist), on 6/18    Kraig Mobley MD  [phone] (P)  Text Page (7 am - 6 pm)    Interval History   Patient is nonverbal.  Spoke with son Corbin on the unit floor.  Discussed with nurse that patient is more tachypneic this morning.    -Data reviewed today: I reviewed all new labs and imaging results over the last 24 hours.    Physical Exam   /66 (BP Location: Left arm)  Pulse 99  Temp 98.8  F (37.1  C) (Axillary)  Resp (!) 32  Ht 1.524 m (5')  Wt 70.3 kg (154 lb 15.7 oz)  SpO2 100%  BMI 30.27 kg/m2  Constitutional: NAD, awake  HEENT: EOMI   Cardiovascular: S1, S2, regular rhythm,   Respiratory: CTAB, diffusely diminished, crackles over bases, tachypneic  Gastrointestinal: Soft, NT  Neurologic: At baseline dementia, nonverbal, does not respond to questions  Skin: Coccyx pressure wound    Medications     IV fluid REPLACEMENT ONLY         insulin glargine  36 Units Subcutaneous BID     insulin isophane human  16 Units Subcutaneous QAM AC     insulin isophane human  10 Units Subcutaneous Q24H     cefTRIAXone  1 g Intravenous Q24H     vancomycin (VANCOCIN) IV  1,250 mg Intravenous Q24H     vitamin A-D & C drops  7 mL Per G Tube Daily     multivitamins with minerals  15 mL Oral Daily     sodium hypochlorite   Topical BID     sodium chloride (PF)  3 mL Intracatheter Q8H     acetaminophen (TYLENOL) tablet 650 mg  650 mg Oral or Feeding Tube TID     lactobacillus rhamnosus (GG)  1 capsule Per G Tube Daily     oxyCODONE  2 mg Oral BID     ranitidine  75 mg Per G Tube BID     insulin aspart  1-6 Units Subcutaneous Q4H       Data     Recent Labs  Lab 06/17/17  1255 06/17/17  1110 06/16/17  0850 06/15/17  0833 06/14/17  2205 06/14/17  1200   WBC 11.4* Canceled, Test credited Unsatisfactory  specimen - clottedCALLED TO DI ON 66 10.5 13.9*  --  18.7*   HGB 9.5* Canceled, Test credited Unsatisfactory specimen - clottedCALLED TO DI ON 66 10.0* 11.0*  --  12.4   MCV 96 Canceled, Test credited Unsatisfactory specimen - clottedCALLED TO DI ON 66 98 98  --  98    Canceled, Test credited Unsatisfactory specimen - clottedCALLED TO DI ON 66 286 302  --  386   INR  --   --   --   --   --  1.10   NA  --  144 146* 149*  --  144   POTASSIUM  --  4.7 4.0 4.3  --  4.1   CHLORIDE  --  114* 115* 117*  --  110*   CO2  --  18* 20 23  --  27   BUN  --  30 37* 45*  --  60*   CR  --  0.90 0.96 1.12*  --  1.28*   ANIONGAP  --  12 11 9  --  7   NELLIE  --  7.6* 7.7* 8.3*  --  9.0   GLC  --  320* 366* 109*  --  275*   LACT  --   --   --  1.9 2.6* 3.5*   ALBUMIN  --   --   --   --   --  2.5*   PROTTOTAL  --   --   --   --   --  8.2   BILITOTAL  --   --   --   --   --  0.5   ALKPHOS  --   --   --   --   --  96   ALT  --   --   --   --   --  22   AST  --   --   --   --   --  20   LIPASE  --   --   --   --   --  130     No results found for this or any previous visit (from the past 24 hour(s)).

## 2017-06-19 VITALS
DIASTOLIC BLOOD PRESSURE: 90 MMHG | OXYGEN SATURATION: 96 % | TEMPERATURE: 98.6 F | HEART RATE: 95 BPM | BODY MASS INDEX: 29.99 KG/M2 | SYSTOLIC BLOOD PRESSURE: 154 MMHG | RESPIRATION RATE: 20 BRPM | WEIGHT: 152.78 LBS | HEIGHT: 60 IN

## 2017-06-19 LAB
ANION GAP SERPL CALCULATED.3IONS-SCNC: 8 MMOL/L (ref 3–14)
BUN SERPL-MCNC: 24 MG/DL (ref 7–30)
CALCIUM SERPL-MCNC: 8.3 MG/DL (ref 8.5–10.1)
CHLORIDE SERPL-SCNC: 111 MMOL/L (ref 94–109)
CO2 SERPL-SCNC: 22 MMOL/L (ref 20–32)
CREAT SERPL-MCNC: 0.7 MG/DL (ref 0.52–1.04)
ERYTHROCYTE [DISTWIDTH] IN BLOOD BY AUTOMATED COUNT: 15.1 % (ref 10–15)
GFR SERPL CREATININE-BSD FRML MDRD: 79 ML/MIN/1.7M2
GLUCOSE BLDC GLUCOMTR-MCNC: 149 MG/DL (ref 70–99)
GLUCOSE BLDC GLUCOMTR-MCNC: 152 MG/DL (ref 70–99)
GLUCOSE BLDC GLUCOMTR-MCNC: 156 MG/DL (ref 70–99)
GLUCOSE BLDC GLUCOMTR-MCNC: 198 MG/DL (ref 70–99)
GLUCOSE SERPL-MCNC: 153 MG/DL (ref 70–99)
HCT VFR BLD AUTO: 32.1 % (ref 35–47)
HGB BLD-MCNC: 10.3 G/DL (ref 11.7–15.7)
MAGNESIUM SERPL-MCNC: 2.1 MG/DL (ref 1.6–2.3)
MCH RBC QN AUTO: 29.9 PG (ref 26.5–33)
MCHC RBC AUTO-ENTMCNC: 32.1 G/DL (ref 31.5–36.5)
MCV RBC AUTO: 93 FL (ref 78–100)
PHOSPHATE SERPL-MCNC: 2.5 MG/DL (ref 2.5–4.5)
PLATELET # BLD AUTO: 293 10E9/L (ref 150–450)
POTASSIUM SERPL-SCNC: 3.7 MMOL/L (ref 3.4–5.3)
RBC # BLD AUTO: 3.44 10E12/L (ref 3.8–5.2)
SODIUM SERPL-SCNC: 141 MMOL/L (ref 133–144)
WBC # BLD AUTO: 11.8 10E9/L (ref 4–11)

## 2017-06-19 PROCEDURE — 25000131 ZZH RX MED GY IP 250 OP 636 PS 637: Performed by: INTERNAL MEDICINE

## 2017-06-19 PROCEDURE — 83735 ASSAY OF MAGNESIUM: CPT | Performed by: INTERNAL MEDICINE

## 2017-06-19 PROCEDURE — 84100 ASSAY OF PHOSPHORUS: CPT | Performed by: INTERNAL MEDICINE

## 2017-06-19 PROCEDURE — 85027 COMPLETE CBC AUTOMATED: CPT | Performed by: INTERNAL MEDICINE

## 2017-06-19 PROCEDURE — 99239 HOSP IP/OBS DSCHRG MGMT >30: CPT | Performed by: INTERNAL MEDICINE

## 2017-06-19 PROCEDURE — 80048 BASIC METABOLIC PNL TOTAL CA: CPT | Performed by: INTERNAL MEDICINE

## 2017-06-19 PROCEDURE — 36415 COLL VENOUS BLD VENIPUNCTURE: CPT | Performed by: INTERNAL MEDICINE

## 2017-06-19 PROCEDURE — 00000146 ZZHCL STATISTIC GLUCOSE BY METER IP

## 2017-06-19 PROCEDURE — 25000132 ZZH RX MED GY IP 250 OP 250 PS 637: Performed by: INTERNAL MEDICINE

## 2017-06-19 RX ORDER — LEVOFLOXACIN 250 MG/1
250 TABLET, FILM COATED ORAL DAILY
Qty: 3 TABLET | Refills: 0 | DISCHARGE
Start: 2017-06-19 | End: 2017-06-19

## 2017-06-19 RX ORDER — OXYCODONE HCL 5 MG/5 ML
2 SOLUTION, ORAL ORAL EVERY 4 HOURS PRN
Refills: 0 | Status: ON HOLD | DISCHARGE
Start: 2017-06-19 | End: 2017-07-08

## 2017-06-19 RX ORDER — LINEZOLID 600 MG/1
600 TABLET, FILM COATED ORAL 2 TIMES DAILY
DISCHARGE
Start: 2017-06-19 | End: 2017-06-26

## 2017-06-19 RX ORDER — OXYCODONE HCL 5 MG/5 ML
2 SOLUTION, ORAL ORAL 2 TIMES DAILY
Qty: 15 ML | Refills: 0 | Status: ON HOLD | DISCHARGE
Start: 2017-06-19 | End: 2017-07-08

## 2017-06-19 RX ORDER — LEVOFLOXACIN 250 MG/1
250 TABLET, FILM COATED ORAL DAILY
Qty: 5 TABLET | Refills: 0 | DISCHARGE
Start: 2017-06-19 | End: 2017-06-24

## 2017-06-19 RX ADMIN — INSULIN HUMAN 16 UNITS: 100 INJECTION, SUSPENSION SUBCUTANEOUS at 08:25

## 2017-06-19 RX ADMIN — INSULIN ASPART 1 UNITS: 100 INJECTION, SOLUTION INTRAVENOUS; SUBCUTANEOUS at 08:24

## 2017-06-19 RX ADMIN — Medication 1 CAPSULE: at 08:23

## 2017-06-19 RX ADMIN — INSULIN ASPART 2 UNITS: 100 INJECTION, SOLUTION INTRAVENOUS; SUBCUTANEOUS at 04:53

## 2017-06-19 RX ADMIN — ACETAMINOPHEN 650 MG: 325 TABLET, FILM COATED ORAL at 08:23

## 2017-06-19 RX ADMIN — MULTIVITAMIN 15 ML: LIQUID ORAL at 08:25

## 2017-06-19 RX ADMIN — OXYCODONE HYDROCHLORIDE 2 MG: 5 SOLUTION ORAL at 08:24

## 2017-06-19 RX ADMIN — INSULIN ASPART 1 UNITS: 100 INJECTION, SOLUTION INTRAVENOUS; SUBCUTANEOUS at 12:34

## 2017-06-19 RX ADMIN — RANITIDINE 75 MG: 75 TABLET, FILM COATED ORAL at 08:23

## 2017-06-19 RX ADMIN — INSULIN ASPART 1 UNITS: 100 INJECTION, SOLUTION INTRAVENOUS; SUBCUTANEOUS at 00:51

## 2017-06-19 RX ADMIN — INSULIN GLARGINE 36 UNITS: 100 INJECTION, SOLUTION SUBCUTANEOUS at 08:25

## 2017-06-19 RX ADMIN — HYOSCYAMINE SULFATE: 16 SOLUTION at 10:57

## 2017-06-19 NOTE — PLAN OF CARE
Problem: Goal Outcome Summary  Goal: Goal Outcome Summary  Outcome: No Change  [id number] Patient baseline nonverbal, dementia. Unable to assess orientation. BP slightly elevated other VSS on RA. Fonseca intact, adequate output. Patient is NPO, TF 45ml. Coccyx decubitus ulcer wound care done. Bilateral ear scabs ZOEY. Patient had no nonverbal indicators of pain on shift. Patient is bedbound , turned and reposition q2h. Possible return to [place] 2-3 days . Will continue to monitor

## 2017-06-19 NOTE — DISCHARGE INSTRUCTIONS
Type of Feeding Tube: G-tube  Enteral Frequency:  Continuous  Enteral Regimen: Isosource 1.5 at 45 mL/hr  Total Enteral Provisions: 1620 kcal (332 kcal per kg), 73 g protein (1.4 g per kg), 16 g fiber, 190 g CHO, 820 mL H2O, 22937 International Units of Vit A, 346 mg of Vit C, and 35 mg of Zinc  Free Water Flush: 135 mL every 4 hours  Certavite via FT daily  Tri-Vi-Sol, 7 mL daily x 10 days (6/16-6/26) (5250 International Units Vit A, 245 mg Vit C, and 2800 International Units Vit D)  Total (TF + Tri-Vi-Sol)= 62612 International Units Vit A and 591 mg Vit C    Discharge back to [facility], phone [phone]

## 2017-06-19 NOTE — PROGRESS NOTES
[facility]  Infectious Disease Progress Note          Assessment and Plan:   IMPRESSION:   1.  A 89-year-old female, nursing home resident, prior known aspiration, chronic Fonseca catheter and Alzheimer's dementia admitted with low-grade sepsis, etiology not entirely clear, would suspect the MRSA UTI as the cause, no positive blood cultures.  Doubt wound is an issue and not clearcut pneumonia.   2.  Chronic sacral decubitus wound, somewhat necrotic, but no signs of infection and it does not tract deep had an I&D done, culture of the wound growing multiple organisms of no significance.   3.  MRSA positive urine culture with catheter in place.   4.  Possible slight infiltrate, mild initial hypoxia, which has resolved, possible aspiration pneumonia.  If so has improved.   5.  Severe dementia.   6.  Recurrent urinary tract infections and urinary colonization with Fonseca catheter in place.   7.  Feeding tube in place, prior probable aspiration, possible etiology to current illness as well.   8.  Diabetes mellitus.   9.  Chronic renal insufficiency.   10.  Sulfa allergy.       RECOMMENDATIONS:   1.  Ignore the sacral decubitus, culture of no clinical significance.  The wound is not infected, nothing to suggest  deep.  Doubt this is the cause of current fever and has already been debrided.   2.  Urine may just be colonization, but also possible cause of current illness.  I would treat.  Continue vancomycin while here but as early as any time convert to oral linezolid 600 bid and treat for about 7 days.   3.  Not clearcut pneumonia.  Has been getting gram-negative coverage currently is on ceftriaxone previously Zosyn and Levaquin.  Does not appear to have major clinical pneumonia.  Assuming is recurrent aspiration could argue for just a short course of antibiotics has already been given, ie 5 days.  Alternatively enteral lev for another 5 days   4.  Obviously long-term prognosis poor, especially if  recurrently aspirating.         Interval History:   no complaints not interactive, no fever procal0.2, no new cxs              Medications:       insulin glargine  36 Units Subcutaneous BID     insulin isophane human  16 Units Subcutaneous QAM AC     insulin isophane human  10 Units Subcutaneous Q24H     cefTRIAXone  1 g Intravenous Q24H     vancomycin (VANCOCIN) IV  1,250 mg Intravenous Q24H     vitamin A-D & C drops  7 mL Per G Tube Daily     multivitamins with minerals  15 mL Oral Daily     sodium hypochlorite   Topical BID     sodium chloride (PF)  3 mL Intracatheter Q8H     acetaminophen (TYLENOL) tablet 650 mg  650 mg Oral or Feeding Tube TID     lactobacillus rhamnosus (GG)  1 capsule Per G Tube Daily     oxyCODONE  2 mg Oral BID     ranitidine  75 mg Per G Tube BID     insulin aspart  1-6 Units Subcutaneous Q4H                  Physical Exam:   Blood pressure 167/84, pulse 95, temperature 98.4  F (36.9  C), temperature source Axillary, resp. rate 20, height 1.524 m (5'), weight 69.3 kg (152 lb 12.5 oz), SpO2 97 %.  Wt Readings from Last 2 Encounters:   06/19/17 69.3 kg (152 lb 12.5 oz)   06/24/16 72.2 kg (159 lb 3.2 oz)     Vital Signs with Ranges  Temp:  [97.6  F (36.4  C)-98.8  F (37.1  C)] 98.4  F (36.9  C)  Pulse:  [95] 95  Heart Rate:  [] 104  Resp:  [20-32] 20  BP: (137-167)/(66-90) 167/84  SpO2:  [95 %-100 %] 97 %    Constitutional: Awake, alert, not interactive   Lungs: Clear to auscultation bilaterally, no crackles or wheezing   Cardiovascular: Regular rate and rhythm, normal S1 and S2, and no murmur noted   Abdomen: Normal bowel sounds, soft, non-distended, non-tender   Skin: No rashes, no cyanosis, no edema wd not seen   Other:           Data:   All microbiology laboratory data reviewed.  Recent Labs   Lab Test  06/18/17   0855  06/17/17   1255  06/17/17   1110   WBC  11.4*  11.4*  Canceled, Test credited   Unsatisfactory specimen - clotted  CALLED TO MARISOL ON 66     HGB  10.1*  9.5*  Canceled,  Test credited   Unsatisfactory specimen - clotted  CALLED TO DI ON 66     HCT  31.9*  30.7*  Canceled, Test credited   Unsatisfactory specimen - clotted  CALLED TO DI ON 66     MCV  94  96  Canceled, Test credited   Unsatisfactory specimen - clotted  CALLED TO DI ON 66     PLT  289  270  Canceled, Test credited   Unsatisfactory specimen - clotted  CALLED TO DI ON 66       Recent Labs   Lab Test  06/18/17   0855  06/17/17   1110  06/16/17   0850   CR  0.76  0.90  0.96     Recent Labs   Lab Test  05/08/13   1300   SED  61*     Recent Labs   Lab Test  06/14/17   1319  06/14/17   1230  06/14/17   1200  06/21/16   0653  06/21/16   0652  06/21/16   0636  06/24/14   1200  09/10/12   1623  08/31/12   0800   CULT  No growth after 5 days  Moderate growth Escherichia coli  Moderate growth Enterococcus avium  Moderate growth Enterococcus faecalis  Light growth Candida albicans / dubliniensis Candida albicans and Candida   dubliniensis are not routinely speciated Susceptibility testing not routinely   done  Plus Heavy growth Normal skin maria esther  *  >100,000 colonies/mL Methicillin resistant Staphylococcus aureus (MRSA)  50,000 to 100,000 colonies/mL Strain 2 Methicillin resistant Staphylococcus   aureus (MRSA)  Critical Value/Significant Value called to and read back by Elmer Hurd Rn at   2044 [id number]  *  No growth after 5 days  No growth  >100,000 colonies/mL Enterococcus species*  No growth  10,000 to 50,000 colonies/mL Candida kefyr Susceptibility testing not routinely   done  10,000 to 50,000 colonies/mL Lactobacillus species No further identification  Susceptibility testing not routinely done  *  Heavy growth Klebsiella pneumoniae Heavy growth Enterobacter aerogenes Heavy growth Escherichia coli Plus Normal Urogenital Maria Esther  No growth  No growth

## 2017-06-19 NOTE — PROGRESS NOTES
MRALON WALKER MD has completed discharge orders for return to skilled nursing facility. Patient has a bed hold in place at [facility]. She is from their long term care unit.  I) [facility] stretcher ride at 1330 and completed the PCS form. Patient requires stretcher due to a stage 3-4 coccyx pressure ulcer.  Faxed orders and the scripts to [facility]. Left a message for Monie in Admissions with the D/C time.  Updated son, Salvatore.  A) Salvatore agrees to this plan.  P) D/C back to [facility] at 1330 today.

## 2017-06-19 NOTE — PLAN OF CARE
Problem: Goal Outcome Summary  Goal: Goal Outcome Summary  Outcome: No Change  Patient nonverbal, total care, 2 assist with mechanical lift, BP elevated 167/87, , other VSS on RA, , 198, NPO, tube feed at 45 ml/hr, coccyx wound dressing CDI, turned and repositioned every 2 hours, caro patent, incontinence of bowel, no BM this shift, tele sinus tach, will continue to monitor.

## 2017-06-19 NOTE — PHARMACY-VANCOMYCIN DOSING SERVICE
Pharmacy Vancomycin Note  Date of Service 2017  Patient's  [id number]   89 year old, female    Indication: Healthcare-Associated Pneumonia and Urinary Tract Infection  Goal Trough Level: 15-20 mg/L  Day of Therapy: 5  Current Vancomycin regimen:  1250 mg IV q24h    Current estimated CrCl = Estimated Creatinine Clearance: 43 mL/min (based on Cr of 0.76).    Creatinine for last 3 days  2017:  8:50 AM Creatinine 0.96 mg/dL  2017: 11:10 AM Creatinine 0.90 mg/dL  2017:  8:55 AM Creatinine 0.76 mg/dL    Recent Vancomycin Levels (past 3 days)  2017:  9:56 PM Vancomycin Level 11.7 mg/L  2017:  8:53 PM Vancomycin Level 15.9 mg/L    Vancomycin IV Administrations (past 72 hours)                   vancomycin (VANCOCIN) 1500 mg in 0.9% NaCl 250 mL PREMIX (mg) 1,500 mg New Bag 17 2125                Nephrotoxins and other renal medications (Future)    Start     Dose/Rate Route Frequency Ordered Stop    17 2200  vancomycin 1250 mg in 0.9% NaCl 250 mL PREMIX      1,250 mg Intravenous EVERY 24 HOURS 17 2231               Contrast Orders - past 72 hours     None          Interpretation of levels and current regimen:  Trough level is  Therapeutic    Has serum creatinine changed > 50% in last 72 hours: No    Renal Function: Stable    Plan:  1.  Continue Current Dose  2.  Pharmacy will check trough levels as appropriate in 1-3 Days.    3. Serum creatinine levels will be ordered daily for the first week of therapy and at least twice weekly for subsequent weeks.      Cece Loza        .

## 2017-06-19 NOTE — DISCHARGE SUMMARY
[facility]    Discharge Summary  Hospitalist    Date of Admission:  6/14/2017  Date of Discharge:  6/19/2017  Discharging Provider: Kraig Mobley MD  Date of Service: 06/19/17    Discharge Diagnoses   HCAP vs aspiration pneumonia  Urinary tract infection associated with catheterization of urinary tract, unspecified indwelling urinary catheter type, initial encounter  Stage 3-4 Coccyx pressure ulcer s/p bedside debridement    History of Present Illness   Ms. Celia Lewis is a 89-year-old female with history of diabetes mellitus type 2, CHF, chronic kidney disease, coronary artery disease, hypertension, recurrent UTIs, Alzheimer dementia, with baseline nonverbal and bed bound status who presents from a LTC with low oxygen sats, fever and labored breathing.     Please refer to HPI for further details.    Hospital Course   The following problems were addressed during his hospitalization.     Severe sepsis due to HCAP and catheter associated UTI  The patient presents with tachypnea, low oxygen sats and fever.    Chest x-ray shows infiltrate in the left lung base.   Chronic indwelling Fonseca which was changed in the emergency room.    UA consistent with UTI  Sepsis improved with decreasing WBC (18.7->11.8), lactic acid (2.6->1.9), and temp (Tmax 100.4)  Cultures:   - Urine growing >100K MRSA (2 strains)   - Wound from buttock growing E coli, enterococcus avium and faecalis, and candida   - Blood no growth after 5 days  - The patient was initially initiated on broad spectrum antibiotics with Vanco, levofloxacin, and Zosyn.  As she showed improvement in her sepsis and culture data returned, Zosyn and levofloxacin was stopped in favor of ceftriaxone.  Infectious disease was consulted as patient has a sulfa allergy and urine did grow MRSA sensitive to vanco and Linezolid.  ID recommends Linezolid for 7 days.   - Her superficial wound culture shows multiple organisms which is not helpful clinically and her  decubitus ulcer was also debrided by general surgery without further evidence of a localized infection.  - From a pneumonia perspective, she received treatment for HCAP.  Repeat CXR prior to discharge was not overly convincing for an ongoing pneumonia, but did see a possible retrocardiac infiltrate.  She is chronically NPO with ongoing G-tube feeds, but may be at risk for aspiration despite this.  - Son, Dr. Salvatore Lewis, allergist/immunologist, is POA.  Discussed his code status, which he confirms is full.  Discussed if Celia has required recurrent hospitalizations, which he confirms she has not.  She was last hospitalized within [facility] a year ago.  Unfortunately given her very limited functional status, including being non-verbal, feeding tube dependent, and bed bound, she remains high risk for readmissions for recurrent infections which I discussed with Dr. Lewis.  At this stage he is not interested in palliative care consultation.    Stage 3-4 Coccyx pressure ulcer  Wound is necrotic measuring 4x7cm, 3 cm deep.  - Wound care and gen surg consult appreciated  - S/p bedside debridement on 6/15 by general surgery without evidence of infection    Advanced dementia  Appears to be at her nonverbal and immobile baseline.    - Continue tube feedings, nutritionist consulted  - Discussed goals of care with son, Dr. Lewis who is POA, who requests full code and current level of cares    Diabetes mellitus type 2   PTA regimen: Lantus 36 units in AM and 34 units in PM, NPH 16 units in AM and 10 units at 1400  - Continue her regimen    CKD3  This appears to be at baseline.  Avoid nephrotoxins.       Hypernatremia  Improved with 1/2NS IVF on admission.  Now maintained on her outpatient G-tube feed regimen.    Kraig Mobley MD  [facility] Hospitalist    Unresulted Labs Ordered in the Past 30 Days of this Admission     Date and Time Order Name Status Description    6/14/2017 1257 Blood culture Preliminary     6/14/2017 0429  Blood culture Preliminary           Code Status   Full Code       Primary Care Physician   No primary care provider on file.    Physical Exam   /90 (BP Location: Left arm)  Pulse 95  Temp 98.6  F (37  C) (Axillary)  Resp 20  Ht 1.524 m (5')  Wt 69.3 kg (152 lb 12.5 oz)  SpO2 96%  BMI 29.84 kg/m2  Constitutional: NAD, eyes open without significant response to questions, non-verbal  HEENT: EOMI   Cardiovascular: S1, S2, regular rhythm  Respiratory: CTAB, no wheezing, resonating upper airway congestion  Gastrointestinal: Soft, NT, G-tube in place  Neurologic: Bed bound, non-verbal  Skin: Sacral decub ulcer    Discharge Disposition   Discharged to LTC  Condition at discharge: Stable    Consultations This Hospital Stay   PHYSICAL THERAPY ADULT IP CONSULT  WOUND OSTOMY CONTINENCE NURSE  IP CONSULT  PHARMACY TO DOSE VANCO  NUTRITION SERVICES ADULT IP CONSULT  PHARMACY IP CONSULT  SURGERY GENERAL IP CONSULT  INFECTIOUS DISEASES IP CONSULT    Time Spent on this Encounter   Kraig HERNADEZ, personally saw the patient today and spent greater than 30 minutes discharging this patient.    Discharge Orders     Mantoux instructions   Give two-step Mantoux (PPD) Per Facility Policy Yes     Reason for your hospital stay   UTI, pneumonia, decubitus ulcer     Activity - Up with nursing assistance   Bed bound status     Wound care   Per wound care nurse instructions:    Plan for wound care to coccyx/sacrum: BID and prn :  1.  Cleanse wound with MicroKlenz wound cleanser.  Cleanse periwound with mild skin cleanser, ie Nakul perineal lotion.  2.  Moisten a kerlix fluff with Dakin's solution (aka sodium hypochlorite; comes from Pharmacy), wring out excess.  Pack into wound.  3.  Apply antifungal powder to periwound, rub in well.  Then apply Critic-Aid barrier paste over the powder to periwound (and perineal area if needed).  4.  Cover with ABD pad and minimal Medipore tape.  Label with time/date/initials.  5.  Follow rigorous  PIP measures.     Follow Up   Follow-up with LTC provider in 2-4 days     General info for SNF   Length of Stay Estimate: Long Term Care: Estimated # of Days >30  Condition at Discharge: Stable  Level of care:skilled   Rehabilitation Potential: Poor  Admission H&P remains valid and up-to-date: Yes  Recent Chemotherapy: N/A  Use Nursing Home Standing Orders: Yes     Full Code     Fall precautions     Advance Diet as Tolerated   Follow this diet upon discharge: Orders Placed This Encounter     Adult Formula Drip Feeding: Continuous Isosource 1.5; Other - Specify in Comment; Goal Rate: 45; mL/hr; Medication - Tube Feeding Flush Frequency: At least 15-30 mL water before and after medication administration and with tube clogging; Amout to ...     NPO for Medical/Clinical Reasons Except for: NPO but receiving Tube Feeding         Discharge Medications   Current Discharge Medication List      START taking these medications    Details   linezolid (ZYVOX) 600 MG tablet 1 tablet (600 mg) by Per G Tube route 2 times daily for 7 days    Associated Diagnoses: Urinary tract infection associated with catheterization of urinary tract, unspecified indwelling urinary catheter type, initial encounter      levofloxacin (LEVAQUIN) 250 MG tablet 1 tablet (250 mg) by Per G Tube route daily for 5 days  Qty: 5 tablet, Refills: 0    Associated Diagnoses: Pneumonia of left lung due to infectious organism, unspecified part of lung         CONTINUE these medications which have CHANGED    Details   !! oxyCODONE (ROXICODONE) 5 MG/5ML solution 2 mLs (2 mg) by Per G Tube route 2 times daily  Qty: 15 mL, Refills: 0    Associated Diagnoses: Primary localized osteoarthrosis of shoulder region, unspecified laterality      !! oxyCODONE (ROXICODONE) 5 MG/5ML solution 2 mLs (2 mg) by Per G Tube route every 4 hours as needed for moderate to severe pain  Refills: 0    Associated Diagnoses: Primary localized osteoarthrosis of shoulder region, unspecified  laterality       !! - Potential duplicate medications found. Please discuss with provider.      CONTINUE these medications which have NOT CHANGED    Details   !! insulin glargine (LANTUS) 100 UNIT/ML injection Inject 36 Units Subcutaneous every morning      !! insulin glargine (LANTUS) 100 UNIT/ML injection Inject 34 Units Subcutaneous At Bedtime      !! insulin NPH (HUMULIN N/NOVOLIN N) 100 UNIT/ML injection Inject 16 Units Subcutaneous every morning      !! insulin NPH (HUMULIN N/NOVOLIN N) 100 UNIT/ML injection Inject 10 Units Subcutaneous every evening Given at 1400 daily      Lactobacillus Acidophilus POWD 1 each by Gastric Tube route daily 10 billion units      multivitamin, therapeutic (THERA-VIT) TABS tablet 1 tablet by Gastric Tube route daily      Loperamide HCl (IMODIUM A-D) 1 MG/7.5ML LIQD 4 mg by Gastric Tube route every other day      !! acetaminophen (TYLENOL) 32 mg/mL solution 325 mg by Gastric Tube route daily as needed for fever or mild pain      insulin aspart (NOVOLOG PEN) 100 UNIT/ML soln Inject 1-6 Units Subcutaneous every 4 hours    Associated Diagnoses: Type 2 diabetes mellitus with diabetic nephropathy (H)      !! ACETAMINOPHEN  mg by Gastric Tube route 3 times daily       nystatin (MYCOSTATIN) 190846 UNIT/GM POWD Apply 1 g topically 3 times daily as needed Apply to stomach folds as needed      RANITIDINE HCL PO 75 mg by Gastric Tube route 2 times daily       bisacodyl (DULCOLAX) 10 MG suppository Place 10 mg rectally daily as needed for constipation       !! - Potential duplicate medications found. Please discuss with provider.        Allergies   Allergies   Allergen Reactions     Sulfa Drugs Other (See Comments)     Per nursing home documents, patient has allergy to sulfa     Data   Most Recent 3 CBC's:  Recent Labs   Lab Test  06/19/17   0856  06/18/17   0855  06/17/17   1255   WBC  11.8*  11.4*  11.4*   HGB  10.3*  10.1*  9.5*   MCV  93  94  96   PLT  293  289  270      Most Recent  3 BMP's:  Recent Labs   Lab Test  06/19/17   0856  06/18/17   0855  06/17/17   1110   NA  141  141  144   POTASSIUM  3.7  3.6  4.7   CHLORIDE  111*  110*  114*   CO2  22  22  18*   BUN  24  23  30   CR  0.70  0.76  0.90   ANIONGAP  8  9  12   NELLIE  8.3*  7.9*  7.6*   GLC  153*  241*  320*     Most Recent 2 LFT's:  Recent Labs   Lab Test  06/14/17   1200  06/24/16   0806   AST  20  24   ALT  22  48   ALKPHOS  96  155*   BILITOTAL  0.5  0.7     Most Recent INR's and Anticoagulation Dosing History:  Anticoagulation Dose History     Recent Dosing and Labs Latest Ref Rng & Units 11/1/2010 11/2/2010 11/3/2010 8/5/2012 6/23/2014 6/21/2016 6/14/2017    INR 0.86 - 1.14 1.02 1.02 0.97 1.09 1.01 1.37(H) 1.10        Most Recent 3 Troponin's:  Recent Labs   Lab Test  08/08/12   1751  08/08/12   1323  08/05/12   1837  05/19/10   0646  01/28/10   1505   TROPI  0.013  0.022   --   <0.012   --    TROPONIN   --    --   0.00   --   0.00     Most Recent Cholesterol Panel:  Recent Labs   Lab Test 05/26/15   CHOL  159   LDL  77   HDL  40   TRIG  211*     Most Recent 6 Bacteria Isolates From Any Culture (See EPIC Reports for Culture Details):  Recent Labs   Lab Test  06/14/17   1319  06/14/17   1230  06/14/17   1200  06/21/16   0653  06/21/16   0652  06/21/16   0636   CULT  No growth after 5 days  Moderate growth Escherichia coli  Moderate growth Enterococcus avium  Moderate growth Enterococcus faecalis  Light growth Candida albicans / dubliniensis Candida albicans and Candida   dubliniensis are not routinely speciated Susceptibility testing not routinely   done  Plus Heavy growth Normal skin maria esther  *  >100,000 colonies/mL Methicillin resistant Staphylococcus aureus (MRSA)  50,000 to 100,000 colonies/mL Strain 2 Methicillin resistant Staphylococcus   aureus (MRSA)  Critical Value/Significant Value called to and read back by Elmer Hurd Rn at   [id number]  *  No growth after 5 days  No growth  >100,000 colonies/mL Enterococcus  species*  No growth     Most Recent TSH, T4 and A1c Labs:  Recent Labs   Lab Test  06/15/17   0833   11/03/10   0710   TSH   --    --   2.69   A1C  8.4*   < >   --     < > = values in this interval not displayed.     Results for orders placed or performed during the hospital encounter of 06/14/17   XR Chest 2 Views    Narrative    XR CHEST 2 VW 6/14/2017 1:13 PM    HISTORY: Fever.    COMPARISON: June 22, 2016.      Impression    IMPRESSION: Lung volumes are low, with opacification of the left lung  base suggestive of atelectasis and/or infection. No definite pleural  effusions appreciated. No pneumothorax. Stable cardiomegaly.    PUNEET CARABALLO MD   XR Chest Port 1 View    Narrative    CHEST PORTABLE ONE VIEW June 18, 2017 9:12 AM     HISTORY: Tachypnea.    COMPARISON: 6/14/2017.    FINDINGS: The heart is enlarged. There is retrocardiac airspace  opacity. No pneumothorax or significant pleural effusion.      Impression    IMPRESSION: Retrocardiac airspace opacity may represent atelectasis or  pneumonia.    LIBBY POST MD

## 2017-06-19 NOTE — PLAN OF CARE
Problem: Goal Outcome Summary  Goal: Goal Outcome Summary  Outcome: Adequate for Discharge Date Met:  06/19/17  Patient sleeping and lethargic throughout shift.  Opens eyes occasionally with cares and turning and repositioning. Total care, assist with 2 and lift repositioned q2hrs. Vitals were stable with no sign of fever or infection. Incontinent with continuous indwelling catheter. Receives continuous tube feeding via G-tube. Coccyx pressure wound and bilateral ear wounds assessed, cleaned and redressed. Had a small BM. Blood sugar reading was 152 and 1 unit of insulin was given. Is planning to be discharged today at 1:30 to her nursing home

## 2017-06-20 LAB
BACTERIA SPEC CULT: NO GROWTH
BACTERIA SPEC CULT: NO GROWTH
Lab: NORMAL
Lab: NORMAL
MICRO REPORT STATUS: NORMAL
MICRO REPORT STATUS: NORMAL
SPECIMEN SOURCE: NORMAL
SPECIMEN SOURCE: NORMAL

## 2017-06-30 ENCOUNTER — HOSPITAL ENCOUNTER (INPATIENT)
Facility: CLINIC | Age: 82
LOS: 8 days | Discharge: NURSING, REHAB OR LONG-TERM CARE | DRG: 871 | End: 2017-07-08
Attending: EMERGENCY MEDICINE | Admitting: INTERNAL MEDICINE
Payer: COMMERCIAL

## 2017-06-30 ENCOUNTER — APPOINTMENT (OUTPATIENT)
Dept: GENERAL RADIOLOGY | Facility: CLINIC | Age: 82
DRG: 871 | End: 2017-06-30
Attending: EMERGENCY MEDICINE
Payer: COMMERCIAL

## 2017-06-30 DIAGNOSIS — L89.309 DECUBITUS ULCER OF BUTTOCK, UNSPECIFIED LATERALITY, UNSPECIFIED ULCER STAGE: ICD-10-CM

## 2017-06-30 DIAGNOSIS — E11.21 TYPE 2 DIABETES MELLITUS WITH DIABETIC NEPHROPATHY, UNSPECIFIED LONG TERM INSULIN USE STATUS: Primary | ICD-10-CM

## 2017-06-30 DIAGNOSIS — I10 BENIGN ESSENTIAL HYPERTENSION: ICD-10-CM

## 2017-06-30 DIAGNOSIS — M19.019 PRIMARY LOCALIZED OSTEOARTHROSIS OF SHOULDER REGION, UNSPECIFIED LATERALITY: ICD-10-CM

## 2017-06-30 DIAGNOSIS — A41.9 SEPSIS (H): ICD-10-CM

## 2017-06-30 DIAGNOSIS — J18.9 HCAP (HEALTHCARE-ASSOCIATED PNEUMONIA): ICD-10-CM

## 2017-06-30 DIAGNOSIS — K21.9 GASTROESOPHAGEAL REFLUX DISEASE WITHOUT ESOPHAGITIS: ICD-10-CM

## 2017-06-30 PROBLEM — N17.9 ARF (ACUTE RENAL FAILURE) (H): Status: ACTIVE | Noted: 2017-06-30

## 2017-06-30 LAB
ALBUMIN SERPL-MCNC: 2.9 G/DL (ref 3.4–5)
ALBUMIN UR-MCNC: 30 MG/DL
ALP SERPL-CCNC: 81 U/L (ref 40–150)
ALT SERPL W P-5'-P-CCNC: 26 U/L (ref 0–50)
ANION GAP SERPL CALCULATED.3IONS-SCNC: 11 MMOL/L (ref 3–14)
APPEARANCE UR: ABNORMAL
AST SERPL W P-5'-P-CCNC: 24 U/L (ref 0–45)
BACTERIA #/AREA URNS HPF: ABNORMAL /HPF
BASOPHILS # BLD AUTO: 0.1 10E9/L (ref 0–0.2)
BASOPHILS NFR BLD AUTO: 0.3 %
BILIRUB SERPL-MCNC: 0.7 MG/DL (ref 0.2–1.3)
BILIRUB UR QL STRIP: NEGATIVE
BUN SERPL-MCNC: 104 MG/DL (ref 7–30)
C DIFF TOX B STL QL: NORMAL
CALCIUM SERPL-MCNC: 8.8 MG/DL (ref 8.5–10.1)
CHLORIDE SERPL-SCNC: 111 MMOL/L (ref 94–109)
CO2 SERPL-SCNC: 26 MMOL/L (ref 20–32)
COLOR UR AUTO: YELLOW
CREAT SERPL-MCNC: 2.84 MG/DL (ref 0.52–1.04)
DIFFERENTIAL METHOD BLD: ABNORMAL
EOSINOPHIL # BLD AUTO: 0.2 10E9/L (ref 0–0.7)
EOSINOPHIL NFR BLD AUTO: 1.1 %
ERYTHROCYTE [DISTWIDTH] IN BLOOD BY AUTOMATED COUNT: 17.1 % (ref 10–15)
GFR SERPL CREATININE-BSD FRML MDRD: 16 ML/MIN/1.7M2
GLUCOSE BLDC GLUCOMTR-MCNC: 159 MG/DL (ref 70–99)
GLUCOSE SERPL-MCNC: 240 MG/DL (ref 70–99)
GLUCOSE UR STRIP-MCNC: NEGATIVE MG/DL
HCT VFR BLD AUTO: 38.4 % (ref 35–47)
HGB BLD-MCNC: 11.7 G/DL (ref 11.7–15.7)
HGB UR QL STRIP: ABNORMAL
IMM GRANULOCYTES # BLD: 0.1 10E9/L (ref 0–0.4)
IMM GRANULOCYTES NFR BLD: 0.5 %
KETONES UR STRIP-MCNC: NEGATIVE MG/DL
LACTATE BLD-SCNC: 3.8 MMOL/L (ref 0.7–2.1)
LEUKOCYTE ESTERASE UR QL STRIP: ABNORMAL
LYMPHOCYTES # BLD AUTO: 2.6 10E9/L (ref 0.8–5.3)
LYMPHOCYTES NFR BLD AUTO: 16.6 %
MCH RBC QN AUTO: 30.9 PG (ref 26.5–33)
MCHC RBC AUTO-ENTMCNC: 30.5 G/DL (ref 31.5–36.5)
MCV RBC AUTO: 101 FL (ref 78–100)
MONOCYTES # BLD AUTO: 1.3 10E9/L (ref 0–1.3)
MONOCYTES NFR BLD AUTO: 8.2 %
NEUTROPHILS # BLD AUTO: 11.4 10E9/L (ref 1.6–8.3)
NEUTROPHILS NFR BLD AUTO: 73.3 %
NITRATE UR QL: NEGATIVE
NRBC # BLD AUTO: 0 10*3/UL
NRBC BLD AUTO-RTO: 0 /100
PH UR STRIP: 5.5 PH (ref 5–7)
PLATELET # BLD AUTO: 251 10E9/L (ref 150–450)
PLATELET # BLD EST: ABNORMAL 10*3/UL
POTASSIUM SERPL-SCNC: 4.6 MMOL/L (ref 3.4–5.3)
PROT SERPL-MCNC: 8.1 G/DL (ref 6.8–8.8)
RBC # BLD AUTO: 3.79 10E12/L (ref 3.8–5.2)
RBC #/AREA URNS AUTO: ABNORMAL /HPF (ref 0–2)
SODIUM SERPL-SCNC: 148 MMOL/L (ref 133–144)
SP GR UR STRIP: 1.01 (ref 1–1.03)
SPECIMEN SOURCE: NORMAL
TROPONIN I SERPL-MCNC: 0.04 UG/L (ref 0–0.04)
URN SPEC COLLECT METH UR: ABNORMAL
UROBILINOGEN UR STRIP-ACNC: 0.2 EU/DL (ref 0.2–1)
WBC # BLD AUTO: 15.6 10E9/L (ref 4–11)
WBC #/AREA URNS AUTO: ABNORMAL /HPF (ref 0–2)
YEAST #/AREA URNS HPF: ABNORMAL /HPF

## 2017-06-30 PROCEDURE — 96366 THER/PROPH/DIAG IV INF ADDON: CPT

## 2017-06-30 PROCEDURE — 81001 URINALYSIS AUTO W/SCOPE: CPT | Performed by: EMERGENCY MEDICINE

## 2017-06-30 PROCEDURE — 25000132 ZZH RX MED GY IP 250 OP 250 PS 637: Performed by: INTERNAL MEDICINE

## 2017-06-30 PROCEDURE — 85025 COMPLETE CBC W/AUTO DIFF WBC: CPT | Performed by: EMERGENCY MEDICINE

## 2017-06-30 PROCEDURE — 96375 TX/PRO/DX INJ NEW DRUG ADDON: CPT

## 2017-06-30 PROCEDURE — 25000128 H RX IP 250 OP 636: Performed by: EMERGENCY MEDICINE

## 2017-06-30 PROCEDURE — 00000146 ZZHCL STATISTIC GLUCOSE BY METER IP

## 2017-06-30 PROCEDURE — 40000884 ZZH STATISTIC STEP DOWN HRS NIGHT

## 2017-06-30 PROCEDURE — 83605 ASSAY OF LACTIC ACID: CPT | Performed by: EMERGENCY MEDICINE

## 2017-06-30 PROCEDURE — 21000000 ZZH R&B IMCU HEART CARE

## 2017-06-30 PROCEDURE — 99291 CRITICAL CARE FIRST HOUR: CPT | Mod: 25

## 2017-06-30 PROCEDURE — 71010 XR CHEST PORT 1 VW: CPT

## 2017-06-30 PROCEDURE — 25000128 H RX IP 250 OP 636: Performed by: INTERNAL MEDICINE

## 2017-06-30 PROCEDURE — 84484 ASSAY OF TROPONIN QUANT: CPT | Performed by: EMERGENCY MEDICINE

## 2017-06-30 PROCEDURE — 87040 BLOOD CULTURE FOR BACTERIA: CPT | Performed by: EMERGENCY MEDICINE

## 2017-06-30 PROCEDURE — 40000885 ZZH STATISTIC STEP DOWN HRS EVENING

## 2017-06-30 PROCEDURE — 96365 THER/PROPH/DIAG IV INF INIT: CPT

## 2017-06-30 PROCEDURE — 87493 C DIFF AMPLIFIED PROBE: CPT | Performed by: EMERGENCY MEDICINE

## 2017-06-30 PROCEDURE — 87086 URINE CULTURE/COLONY COUNT: CPT | Performed by: EMERGENCY MEDICINE

## 2017-06-30 PROCEDURE — 80053 COMPREHEN METABOLIC PANEL: CPT | Performed by: EMERGENCY MEDICINE

## 2017-06-30 PROCEDURE — 96368 THER/DIAG CONCURRENT INF: CPT

## 2017-06-30 PROCEDURE — 99223 1ST HOSP IP/OBS HIGH 75: CPT | Mod: AI | Performed by: INTERNAL MEDICINE

## 2017-06-30 RX ORDER — OXYCODONE HCL 5 MG/5 ML
2 SOLUTION, ORAL ORAL EVERY 4 HOURS PRN
Status: DISCONTINUED | OUTPATIENT
Start: 2017-06-30 | End: 2017-07-08 | Stop reason: HOSPADM

## 2017-06-30 RX ORDER — LEVOFLOXACIN 5 MG/ML
500 INJECTION, SOLUTION INTRAVENOUS
Status: DISCONTINUED | OUTPATIENT
Start: 2017-07-02 | End: 2017-07-07

## 2017-06-30 RX ORDER — LACTOBACILLUS RHAMNOSUS GG 10B CELL
1 CAPSULE ORAL DAILY
Status: DISCONTINUED | OUTPATIENT
Start: 2017-07-01 | End: 2017-07-08 | Stop reason: HOSPADM

## 2017-06-30 RX ORDER — SODIUM CHLORIDE 9 MG/ML
INJECTION, SOLUTION INTRAVENOUS CONTINUOUS
Status: DISCONTINUED | OUTPATIENT
Start: 2017-06-30 | End: 2017-07-02

## 2017-06-30 RX ORDER — PIPERACILLIN SODIUM, TAZOBACTAM SODIUM 4; .5 G/20ML; G/20ML
4.5 INJECTION, POWDER, LYOPHILIZED, FOR SOLUTION INTRAVENOUS ONCE
Status: COMPLETED | OUTPATIENT
Start: 2017-06-30 | End: 2017-06-30

## 2017-06-30 RX ORDER — NALOXONE HYDROCHLORIDE 0.4 MG/ML
.1-.4 INJECTION, SOLUTION INTRAMUSCULAR; INTRAVENOUS; SUBCUTANEOUS
Status: DISCONTINUED | OUTPATIENT
Start: 2017-06-30 | End: 2017-07-08 | Stop reason: HOSPADM

## 2017-06-30 RX ORDER — ACETAMINOPHEN 650 MG/1
650 SUPPOSITORY RECTAL ONCE
Status: DISCONTINUED | OUTPATIENT
Start: 2017-06-30 | End: 2017-07-08 | Stop reason: HOSPADM

## 2017-06-30 RX ORDER — NITROGLYCERIN 0.4 MG/1
0.4 TABLET SUBLINGUAL EVERY 5 MIN PRN
Status: DISCONTINUED | OUTPATIENT
Start: 2017-06-30 | End: 2017-07-08 | Stop reason: HOSPADM

## 2017-06-30 RX ORDER — LEVOFLOXACIN 5 MG/ML
750 INJECTION, SOLUTION INTRAVENOUS ONCE
Status: COMPLETED | OUTPATIENT
Start: 2017-06-30 | End: 2017-06-30

## 2017-06-30 RX ORDER — OXYCODONE HCL 5 MG/5 ML
2 SOLUTION, ORAL ORAL 2 TIMES DAILY
Status: DISCONTINUED | OUTPATIENT
Start: 2017-06-30 | End: 2017-07-08 | Stop reason: HOSPADM

## 2017-06-30 RX ORDER — BISACODYL 10 MG
10 SUPPOSITORY, RECTAL RECTAL DAILY PRN
Status: DISCONTINUED | OUTPATIENT
Start: 2017-06-30 | End: 2017-07-08 | Stop reason: HOSPADM

## 2017-06-30 RX ORDER — PIPERACILLIN SODIUM, TAZOBACTAM SODIUM 2; .25 G/10ML; G/10ML
2.25 INJECTION, POWDER, LYOPHILIZED, FOR SOLUTION INTRAVENOUS EVERY 6 HOURS
Status: DISCONTINUED | OUTPATIENT
Start: 2017-06-30 | End: 2017-07-02

## 2017-06-30 RX ORDER — LIDOCAINE 40 MG/G
CREAM TOPICAL
Status: DISCONTINUED | OUTPATIENT
Start: 2017-06-30 | End: 2017-07-08 | Stop reason: HOSPADM

## 2017-06-30 RX ORDER — VANCOMYCIN HYDROCHLORIDE 1 G/200ML
1000 INJECTION, SOLUTION INTRAVENOUS ONCE
Status: COMPLETED | OUTPATIENT
Start: 2017-06-30 | End: 2017-06-30

## 2017-06-30 RX ORDER — SODIUM CHLORIDE, SODIUM LACTATE, POTASSIUM CHLORIDE, CALCIUM CHLORIDE 600; 310; 30; 20 MG/100ML; MG/100ML; MG/100ML; MG/100ML
INJECTION, SOLUTION INTRAVENOUS CONTINUOUS
Status: DISCONTINUED | OUTPATIENT
Start: 2017-06-30 | End: 2017-07-01

## 2017-06-30 RX ORDER — PIPERACILLIN SODIUM, TAZOBACTAM SODIUM 2; .25 G/10ML; G/10ML
2.25 INJECTION, POWDER, LYOPHILIZED, FOR SOLUTION INTRAVENOUS EVERY 6 HOURS
Status: DISCONTINUED | OUTPATIENT
Start: 2017-06-30 | End: 2017-06-30

## 2017-06-30 RX ADMIN — LEVOFLOXACIN 750 MG: 5 INJECTION, SOLUTION INTRAVENOUS at 17:43

## 2017-06-30 RX ADMIN — SODIUM CHLORIDE: 9 INJECTION, SOLUTION INTRAVENOUS at 22:44

## 2017-06-30 RX ADMIN — SODIUM CHLORIDE, POTASSIUM CHLORIDE, SODIUM LACTATE AND CALCIUM CHLORIDE 2211 ML: 600; 310; 30; 20 INJECTION, SOLUTION INTRAVENOUS at 16:39

## 2017-06-30 RX ADMIN — VANCOMYCIN HYDROCHLORIDE 1000 MG: 1 INJECTION, SOLUTION INTRAVENOUS at 17:02

## 2017-06-30 RX ADMIN — OXYCODONE HYDROCHLORIDE 2 MG: 5 SOLUTION ORAL at 23:00

## 2017-06-30 RX ADMIN — PIPERACILLIN SODIUM,TAZOBACTAM SODIUM 2.25 G: 2; .25 INJECTION, POWDER, FOR SOLUTION INTRAVENOUS at 23:20

## 2017-06-30 RX ADMIN — ACETAMINOPHEN 650 MG: 160 SUSPENSION ORAL at 23:01

## 2017-06-30 RX ADMIN — PIPERACILLIN AND TAZOBACTAM 4.5 G: 4; .5 INJECTION, POWDER, FOR SOLUTION INTRAVENOUS at 17:01

## 2017-06-30 NOTE — IP AVS SNAPSHOT
[place] [address] ONCOLOGY: [phone]                                              INTERAGENCY TRANSFER FORM - LAB / IMAGING / EKG / EMG RESULTS   2017                    Hospital Admission Date: 2017  RAÚL MERCEDES   : 1926  Sex: Female        Attending Provider: Paul Jolly MD     Allergies:  Sulfa Drugs    Infection:  MRSA-Contact Isolation   Service:  HOSPITALIST    Ht:  1.524 m (5')   Wt:  71.4 kg (157 lb 6.4 oz)   Admission Wt:  73.7 kg (162 lb 8 oz)    BMI:  30.74 kg/m 2   BSA:  1.74 m 2            Patient PCP Information     None on File         Lab Results - 3 Days      Glucose by meter [[id number]] (Abnormal)  Resulted: 17 1330, Result status: Final result    Ordering provider: Paul Jolly MD  17 1232 Resulting lab: POINT OF CARE TEST, GLUCOSE    Specimen Information    Type Source Collected On     17 1232          Components       Value Reference Range Flag Lab   Glucose 236 70 - 99 mg/dL H 170            Glucose by meter [[id number]] (Abnormal)  Resulted: 17 0921, Result status: Final result    Ordering provider: Paul Jolly MD  17 0831 Resulting lab: POINT OF CARE TEST, GLUCOSE    Specimen Information    Type Source Collected On     17 0831          Components       Value Reference Range Flag Lab   Glucose 279 70 - 99 mg/dL H 170   Comment:  /RN Notified            Basic metabolic panel [[id number]] (Abnormal)  Resulted: 17 0754, Result status: Final result    Ordering provider: Piotr Saba MD  17 0001 Resulting lab: [facility]    Specimen Information    Type Source Collected On   Blood  17 0727          Components       Value Reference Range Flag Lab   Sodium 145 133 - 144 mmol/L H FrStHsLb   Potassium 4.4 3.4 - 5.3 mmol/L  FrStHsLb   Chloride 114 94 - 109 mmol/L H FrStHsLb   Carbon Dioxide 20 20 - 32 mmol/L  FrStHsLb   Anion Gap 11 3 - 14 mmol/L  FrStHsLb   Glucose 271 70 - 99  mg/dL H FrStHsLb   Urea Nitrogen 32 7 - 30 mg/dL H FrStHsLb   Creatinine 0.96 0.52 - 1.04 mg/dL  FrStHsLb   GFR Estimate 54 >60 mL/min/1.7m2 L FrStHsLb   Comment:  Non  GFR Calc   GFR Estimate If Black 66 >60 mL/min/1.7m2  FrStHsLb   Comment:  African American GFR Calc   Calcium 8.8 8.5 - 10.1 mg/dL  FrStHsLb            CBC with platelets [[id number]] (Abnormal)  Resulted: 07/08/17 0736, Result status: Final result    Ordering provider: Piotr Saba MD  07/08/17 0001 Resulting lab: [facility]    Specimen Information    Type Source Collected On   Blood  07/08/17 0727          Components       Value Reference Range Flag Lab   WBC 10.0 4.0 - 11.0 10e9/L  FrStHsLb   RBC Count 3.48 3.8 - 5.2 10e12/L L FrStHsLb   Hemoglobin 10.6 11.7 - 15.7 g/dL L FrStHsLb   Hematocrit 33.5 35.0 - 47.0 % L FrStHsLb   MCV 96 78 - 100 fl  FrStHsLb   MCH 30.5 26.5 - 33.0 pg  FrStHsLb   MCHC 31.6 31.5 - 36.5 g/dL  FrStHsLb   RDW 16.9 10.0 - 15.0 % H FrStHsLb   Platelet Count 333 150 - 450 10e9/L  FrStHsLb            Glucose by meter [[id number]] (Abnormal)  Resulted: 07/08/17 0455, Result status: Final result    Ordering provider: Paul Jolly MD  07/08/17 0440 Resulting lab: POINT OF CARE TEST, GLUCOSE    Specimen Information    Type Source Collected On     07/08/17 0440          Components       Value Reference Range Flag Lab   Glucose 270 70 - 99 mg/dL H 170            Glucose by meter [[id number]] (Abnormal)  Resulted: 07/08/17 0026, Result status: Final result    Ordering provider: Paul Jolly MD  07/08/17 0009 Resulting lab: POINT OF CARE TEST, GLUCOSE    Specimen Information    Type Source Collected On     07/08/17 0009          Components       Value Reference Range Flag Lab   Glucose 238 70 - 99 mg/dL H 170            Glucose by meter [[id number]] (Abnormal)  Resulted: 07/07/17 2101, Result status: Final result    Ordering provider: Paul Jolly MD  07/07/17 2047 Resulting lab: POINT  OF CARE TEST, GLUCOSE    Specimen Information    Type Source Collected On     07/07/17 2047          Components       Value Reference Range Flag Lab   Glucose 292 70 - 99 mg/dL H 170            Lactic acid level STAT [[id number]] (Abnormal)  Resulted: 07/07/17 1815, Result status: Final result    Ordering provider: Lele Sanderson MD  07/07/17 1713 Resulting lab: [facility]    Specimen Information    Type Source Collected On   Blood  07/07/17 1802          Components       Value Reference Range Flag Lab   Lactic Acid 3.0 0.7 - 2.1 mmol/L H FrStHsLb            Glucose by meter [[id number]] (Abnormal)  Resulted: 07/07/17 1621, Result status: Final result    Ordering provider: Paul Jolly MD  07/07/17 1604 Resulting lab: POINT OF CARE TEST, GLUCOSE    Specimen Information    Type Source Collected On     07/07/17 1604          Components       Value Reference Range Flag Lab   Glucose 306 70 - 99 mg/dL H 170            Vancomycin level [[id number]]  Resulted: 07/07/17 1242, Result status: Final result    Ordering provider: Piotr Saba MD  07/07/17 1051 Resulting lab: [facility]    Specimen Information    Type Source Collected On   Blood  07/07/17 1155          Components       Value Reference Range Flag Lab   Vancomycin Level 17.6 mg/L  FrStHsLb   Comment:         Traditional Dosing therapeutic Range:          Trough 8-20 mg/L          Peak 20-50 mg/L              Glucose by meter [[id number]] (Abnormal)  Resulted: 07/07/17 1201, Result status: Final result    Ordering provider: Paul Jolly MD  07/07/17 1145 Resulting lab: POINT OF CARE TEST, GLUCOSE    Specimen Information    Type Source Collected On     07/07/17 1145          Components       Value Reference Range Flag Lab   Glucose 320 70 - 99 mg/dL H 170            Glucose by meter [[id number]] (Abnormal)  Resulted: 07/07/17 0856, Result status: Final result    Ordering provider: Paul Jolly MD  07/07/17 0848  Resulting lab: POINT OF CARE TEST, GLUCOSE    Specimen Information    Type Source Collected On     07/07/17 0848          Components       Value Reference Range Flag Lab   Glucose 317 70 - 99 mg/dL H 170            Basic metabolic panel [[id number]] (Abnormal)  Resulted: 07/07/17 0701, Result status: Final result    Ordering provider: Piotr Saba MD  07/07/17 0001 Resulting lab: [facility]    Specimen Information    Type Source Collected On   Blood  07/07/17 0637          Components       Value Reference Range Flag Lab   Sodium 145 133 - 144 mmol/L H FrStHsLb   Potassium 4.1 3.4 - 5.3 mmol/L  FrStHsLb   Chloride 113 94 - 109 mmol/L H FrStHsLb   Carbon Dioxide 21 20 - 32 mmol/L  FrStHsLb   Anion Gap 11 3 - 14 mmol/L  FrStHsLb   Glucose 319 70 - 99 mg/dL H FrStHsLb   Urea Nitrogen 33 7 - 30 mg/dL H FrStHsLb   Creatinine 1.17 0.52 - 1.04 mg/dL H FrStHsLb   GFR Estimate 43 >60 mL/min/1.7m2 L FrStHsLb   Comment:  Non  GFR Calc   GFR Estimate If Black 53 >60 mL/min/1.7m2 L FrStHsLb   Comment:  African American GFR Calc   Calcium 8.6 8.5 - 10.1 mg/dL  FrStHsLb            CBC with platelets [[id number]] (Abnormal)  Resulted: 07/07/17 0647, Result status: Final result    Ordering provider: Piotr Saba MD  07/07/17 0001 Resulting lab: [facility]    Specimen Information    Type Source Collected On   Blood  07/07/17 0637          Components       Value Reference Range Flag Lab   WBC 8.7 4.0 - 11.0 10e9/L  FrStHsLb   RBC Count 3.27 3.8 - 5.2 10e12/L L FrStHsLb   Hemoglobin 10.0 11.7 - 15.7 g/dL L FrStHsLb   Hematocrit 31.1 35.0 - 47.0 % L FrStHsLb   MCV 95 78 - 100 fl  FrStHsLb   MCH 30.6 26.5 - 33.0 pg  FrStHsLb   MCHC 32.2 31.5 - 36.5 g/dL  FrStHsLb   RDW 16.5 10.0 - 15.0 % H FrStHsLb   Platelet Count 291 150 - 450 10e9/L  FrStHsLb            Glucose by meter [[id number]] (Abnormal)  Resulted: 07/07/17 0426, Result status: Final result    Ordering provider: Rik  Paul ORTIZ MD  07/07/17 0417 Resulting lab: POINT OF CARE TEST, GLUCOSE    Specimen Information    Type Source Collected On     07/07/17 0417          Components       Value Reference Range Flag Lab   Glucose 319 70 - 99 mg/dL H 170            Lactic acid level STAT [[id number]] (Abnormal)  Resulted: 07/07/17 0101, Result status: Final result    Ordering provider: Piotr Saba MD  07/06/17 2303 Resulting lab: [facility]    Specimen Information    Type Source Collected On   Blood  07/07/17 0055          Components       Value Reference Range Flag Lab   Lactic Acid 2.8 0.7 - 2.1 mmol/L H FrStHsLb            Glucose by meter [[id number]] (Abnormal)  Resulted: 07/07/17 0050, Result status: Final result    Ordering provider: Paul Jolly MD  07/07/17 0017 Resulting lab: POINT OF CARE TEST, GLUCOSE    Specimen Information    Type Source Collected On     07/07/17 0017          Components       Value Reference Range Flag Lab   Glucose 310 70 - 99 mg/dL H 170            Glucose by meter [[id number]] (Abnormal)  Resulted: 07/06/17 2051, Result status: Final result    Ordering provider: Paul Jolly MD  07/06/17 2037 Resulting lab: POINT OF CARE TEST, GLUCOSE    Specimen Information    Type Source Collected On     07/06/17 2037          Components       Value Reference Range Flag Lab   Glucose 368 70 - 99 mg/dL H 170   Comment:  /RN Notified            Glucose by meter [[id number]] (Abnormal)  Resulted: 07/06/17 1611, Result status: Final result    Ordering provider: Paul Jolly MD  07/06/17 1603 Resulting lab: POINT OF CARE TEST, GLUCOSE    Specimen Information    Type Source Collected On     07/06/17 1603          Components       Value Reference Range Flag Lab   Glucose 344 70 - 99 mg/dL H 170            Glucose by meter [[id number]] (Abnormal)  Resulted: 07/06/17 1231, Result status: Final result    Ordering provider: Paul Jolly MD  07/06/17 1225 Resulting lab: POINT OF CARE TEST,  GLUCOSE    Specimen Information    Type Source Collected On     07/06/17 1225          Components       Value Reference Range Flag Lab   Glucose 358 70 - 99 mg/dL H 170            Glucose by meter [[id number]] (Abnormal)  Resulted: 07/06/17 0826, Result status: Final result    Ordering provider: Paul Jolly MD  07/06/17 0815 Resulting lab: POINT OF CARE TEST, GLUCOSE    Specimen Information    Type Source Collected On     07/06/17 0815          Components       Value Reference Range Flag Lab   Glucose 348 70 - 99 mg/dL H 170            Basic metabolic panel [[id number]] (Abnormal)  Resulted: 07/06/17 0716, Result status: Final result    Ordering provider: Kinjal Gorman MD  07/06/17 0000 Resulting lab: [facility]    Specimen Information    Type Source Collected On   Blood  07/06/17 0650          Components       Value Reference Range Flag Lab   Sodium 142 133 - 144 mmol/L  FrStHsLb   Potassium 4.2 3.4 - 5.3 mmol/L  FrStHsLb   Chloride 109 94 - 109 mmol/L  FrStHsLb   Carbon Dioxide 18 20 - 32 mmol/L L FrStHsLb   Anion Gap 15 3 - 14 mmol/L H FrStHsLb   Glucose 367 70 - 99 mg/dL H FrStHsLb   Urea Nitrogen 34 7 - 30 mg/dL H FrStHsLb   Creatinine 1.25 0.52 - 1.04 mg/dL H FrStHsLb   GFR Estimate 40 >60 mL/min/1.7m2 L FrStHsLb   Comment:  Non  GFR Calc   GFR Estimate If Black 49 >60 mL/min/1.7m2 L FrStHsLb   Comment:  African American GFR Calc   Calcium 8.5 8.5 - 10.1 mg/dL  FrStHsLb            Lactic acid whole blood [[id number]] (Abnormal)  Resulted: 07/06/17 0706, Result status: Final result    Ordering provider: Kinjal Gorman MD  07/06/17 0000 Resulting lab: [facility]    Specimen Information    Type Source Collected On   Blood  07/06/17 0650          Components       Value Reference Range Flag Lab   Lactic Acid 3.3 0.7 - 2.1 mmol/L H FrStHsLb            Glucose by meter [[id number]] (Abnormal)  Resulted: 07/06/17 0436, Result status: Final  result    Ordering provider: Paul Jloly MD  07/06/17 0409 Resulting lab: POINT OF CARE TEST, GLUCOSE    Specimen Information    Type Source Collected On     07/06/17 0409          Components       Value Reference Range Flag Lab   Glucose 323 70 - 99 mg/dL H 170            Blood culture [[id number]]  Resulted: 07/06/17 0114, Result status: Final result    Ordering provider: Mariaelena Ca MD  06/30/17 1632 Resulting lab: [facility]CRO RAPID TESTING [facility]    Specimen Information    Type Source Collected On   Blood Arm, Left 06/30/17 1640          Components       Value Reference Range Flag Lab   Specimen Description Blood Left Arm   FrStHsLb   Special Requests Aerobic and anaerobic bottles received   FrStHsLb   Culture Micro No growth   226   Micro Report Status FINAL 07/06/2017   226            Blood culture [[id number]]  Resulted: 07/06/17 0114, Result status: Final result    Ordering provider: Mariaelean Ca MD  06/30/17 1632 Resulting lab: [facility]AP[facility]    Specimen Information    Type Source Collected On   Blood Arm, Right 06/30/17 1654          Components       Value Reference Range Flag Lab   Specimen Description Right Arm   FrStHsLb   Special Requests Aerobic and anaerobic bottles received   FrStHsLb   Culture Micro No growth   226   Micro Report Status FINAL 07/06/2017   226            Potassium [[id number]]  Resulted: 07/06/17 0113, Result status: Final result    Ordering provider: Paul Jolly MD  07/05/17 2212 Resulting lab: [facility]    Specimen Information    Type Source Collected On   Blood  07/06/17 0100          Components       Value Reference Range Flag Lab   [facility]2 3.4 [facility] 5.3 [facility]            Glucose by meter [[id number]] (Abnormal)  Resulted: 07/06/17 0035, Result status: Final result    Ordering provider: Paul Jolly MD  07/06/17 0003 Resulting lab: POINT OF CARE TEST, GLUCOSE    Specimen Information    Type Source Collected On     07/06/17  0003          Components       Value Reference Range Flag Lab   Glucose 283 70 - 99 mg/dL H 170            Glucose by meter [[id number]] (Abnormal)  Resulted: 07/05/17 2116, Result status: Final result    Ordering provider: Paul Jolly MD  07/05/17 2108 Resulting lab: POINT OF CARE TEST, GLUCOSE    Specimen Information    Type Source Collected On     07/05/17 2108          Components       Value Reference Range Flag Lab   Glucose 315 70 - 99 mg/dL H 170            Glucose by meter [[id number]] (Abnormal)  Resulted: 07/05/17 1746, Result status: Final result    Ordering provider: Paul Jolly MD  07/05/17 1739 Resulting lab: POINT OF CARE TEST, GLUCOSE    Specimen Information    Type Source Collected On     07/05/17 1739          Components       Value Reference Range Flag Lab   Glucose 334 70 - 99 mg/dL H 170            Glucose by meter [[id number]] (Abnormal)  Resulted: 07/05/17 1156, Result status: Final result    Ordering provider: Paul Jolly MD  07/05/17 1129 Resulting lab: POINT OF CARE TEST, GLUCOSE    Specimen Information    Type Source Collected On     07/05/17 1129          Components       Value Reference Range Flag Lab   Glucose 394 70 - 99 mg/dL H 170   Comment:  /RN Notified            Glucose by meter [[id number]] (Abnormal)  Resulted: 07/05/17 0821, Result status: Final result    Ordering provider: Paul Jolly MD  07/05/17 0815 Resulting lab: POINT OF CARE TEST, GLUCOSE    Specimen Information    Type Source Collected On     07/05/17 0815          Components       Value Reference Range Flag Lab   Glucose 335 70 - 99 mg/dL H 170   Comment:  /RN Notified            Vancomycin level [[id number]]  Resulted: 07/05/17 0755, Result status: Final result    Ordering provider: Paul Jolly MD  07/05/17 0000 Resulting lab: [facility]    Specimen Information    Type Source Collected On   Blood  07/05/17 0705          Components       Value Reference Range Flag Lab    Vancomycin Level 17.9 mg/L  FrStHsLb   Comment:         Traditional Dosing therapeutic Range:          Trough 8-20 mg/L          Peak 20-50 mg/L              Basic metabolic panel [[id number]] (Abnormal)  Resulted: 07/05/17 0754, Result status: Final result    Ordering provider: Shu Berg MD  07/05/17 0000 Resulting lab: [facility]    Specimen Information    Type Source Collected On   Blood  07/05/17 0705          Components       Value Reference Range Flag Lab   Sodium 140 133 - 144 mmol/L  FrStHsLb   Potassium 3.3 3.4 - 5.3 mmol/L L FrStHsLb   Chloride 106 94 - 109 mmol/L  FrStHsLb   Carbon Dioxide 22 20 - 32 mmol/L  FrStHsLb   Anion Gap 12 3 - 14 mmol/L  FrStHsLb   Glucose 305 70 - 99 mg/dL H FrStHsLb   Urea Nitrogen 31 7 - 30 mg/dL H FrStHsLb   Creatinine 1.05 0.52 - 1.04 mg/dL H FrStHsLb   GFR Estimate 49 >60 mL/min/1.7m2 L FrStHsLb   Comment:  Non  GFR Calc   GFR Estimate If Black 60 >60 mL/min/1.7m2 L FrStHsLb   Comment:  African American GFR Calc   Calcium 7.9 8.5 - 10.1 mg/dL L FrStHsLb            CBC with platelets [[id number]] (Abnormal)  Resulted: 07/05/17 0748, Result status: Final result    Ordering provider: Shu Berg MD  07/05/17 0000 Resulting lab: [facility]    Specimen Information    Type Source Collected On   Blood  07/05/17 0705          Components       Value Reference Range Flag Lab   WBC 8.7 4.0 - 11.0 10e9/L  FrStHsLb   RBC Count 3.53 3.8 - 5.2 10e12/L L FrStHsLb   Hemoglobin 10.7 11.7 - 15.7 g/dL L FrStHsLb   Hematocrit 33.0 35.0 - 47.0 % L FrStHsLb   MCV 94 78 - 100 fl  FrStHsLb   MCH 30.3 26.5 - 33.0 pg  FrStHsLb   MCHC 32.4 31.5 - 36.5 g/dL  FrStHsLb   RDW 15.7 10.0 - 15.0 % H FrStHsLb   Platelet Count 240 150 - 450 10e9/L  FrStHsLb            Glucose by meter [[id number]] (Abnormal)  Resulted: 07/05/17 0426, Result status: Final result    Ordering provider: Paul Jolly MD  07/05/17 0406  "Resulting lab: POINT OF CARE TEST, GLUCOSE    Specimen Information    Type Source Collected On     07/05/17 0406          Components       Value Reference Range Flag Lab   Glucose 337 70 - 99 mg/dL H 170            Testing Performed By     Lab - Abbreviation Name Director Address Valid Date Range    14 - FrStHsLb [facility] Unknown [address] [place] [address] 05/08/15 1057 - Present    170 - Unknown POINT OF CARE TEST, GLUCOSE Unknown Unknown 10/31/11 1114 - Present    226 - Unknown [facility]CRO RAPID TESTING LAB Unknown [address] [facility] [address] 12/19/14 0955 - Present            Unresulted Labs (24h ago through future)    Start       Ordered    07/10/17 0600  Basic metabolic panel  EVERY MONDAY,   Routine     Comments:  Every Monday while on enteral tube feeding.    07/06/17 0744    07/10/17 0600  Magnesium  EVERY MONDAY,   Routine     Comments:  Every Monday while on enteral tube feeding.    07/06/17 0744    07/10/17 0600  Phosphorus  EVERY MONDAY,   Routine     Comments:  Every Monday while on enteral tube feeding.    07/06/17 0744    Unscheduled  Blood gas blood with oxy  CONDITIONAL X 3,   Routine     Comments:  Release order if patient in respiratory distress and Notify Provider.    07/06/17 0744    Unscheduled  Glucose  CONDITIONAL X 3,   Routine     Comments:  Release order if patient experiencing hyperglycemia or hypoglycemia symptoms and Notify Provider.    07/06/17 0744    Unscheduled  Hemoglobin  CONDITIONAL X 3,   Routine     Comments:  Last Lab Result: Hemoglobin (g/dL)       Date                     Value                 07/05/2017               10.7 (L)         ----------    07/06/17 0744    Unscheduled  Magnesium  (Magnesium Replacement -  Adult - \"Standard\" - Replacement for all levels less than 1.6 mg/dL )  CONDITIONAL (SPECIFY),   Routine     Comments:  Obtain Magnesium Level for these conditions:  *IF no magnesium result within 24 hrs before initiation of order " "set, draw magnesium level with next lab collect.    *2 HOURS AFTER last magnesium replacement dose when magnesium replacement given for level less than 1.6   *Next morning after magnesium dose.     Repeat Magnesium Replacement if necessary.    07/06/17 0744    Unscheduled  Potassium  (Potassium Replacement - \"Standard\" - For K levels less than 3.4 mmol/L - UU,UR,UA,RH,SH,PH,WY )  CONDITIONAL (SPECIFY),   Routine     Comments:  Obtain Potassium Level for these conditions:  *IF no potassium result within 24 hours before initiation of order set, draw potassium level with next lab collect.    *2 HOURS AFTER last IV potassium replacement dose and 4 hours after an oral replacement dose.  *Next morning after potassium dose.     Repeat Potassium Replacement if necessary.    07/06/17 0744         Imaging Results - 3 Days      XR Chest Port 1 View [[id number]]  Resulted: 07/06/17 1711, Result status: Final result    Ordering provider: Piotr Saba MD  07/06/17 1421 Resulted by: Will Caraballo MD    Performed: 07/06/17 1455 - 07/06/17 1505 Resulting lab: RADIOLOGY RESULTS    Narrative:       XR CHEST PORT 1 VW 7/6/2017 3:05 PM    HISTORY: Increased respiratory rate.    COMPARISON: July 4, 2017.      Impression:       IMPRESSION: No significant change from prior examination. Low lung  volumes. Bibasilar opacities likely reflect atelectasis although  infection cannot be excluded. No pneumothorax. Stable cardiomegaly.    WILL CARABALLO MD      Testing Performed By     Lab - Abbreviation Name Director Address Valid Date Range    104 - Rad Rslts RADIOLOGY RESULTS Unknown Unknown 02/16/05 1553 - Present            Encounter-Level Documents:     There are no encounter-level documents.      Order-Level Documents:     There are no order-level documents.      "

## 2017-06-30 NOTE — ED PROVIDER NOTES
History     Chief Complaint:  Altered Mental Status    HPI   Celia Lewis is a 89 year old female with history of diabetes mellitus type 2, CHF, chronic kidney disease, coronary artery disease, hypertension, recurrent UTI's, Alzheimer dementia, nonverbal baseline status post pneumonia who presents to the Emergency Department for evaluation of altered mental status. Patient stopped antibiotics on 6/26/17. Per EMS, the patient was found to be unresponsive by nursing home staff. Staff was unaware of her last time known well. Patient was found with SpO2 of 86% on room air, glucose of 237, systolic BP of 154, fever 102 F, elevated respirations and tachycardic with HR in the 130's-140's. Saturations improved to 91% after 4 liters O2 on nasal canula. Of note the patient was hospitalized here on 6/14/17 due to fever and low oxygen saturations. Talking with RN at nursing home later, they state that she is usually unresponsive (other than son says she opens eyes to voice). The RN noted that there was foam and what appeared to be tube feeding material around her mouth.      Discharge summary (6/19/17):     Severe sepsis due to HCAP and catheter associated UTI  The patient presents with tachypnea, low oxygen SATs and fever.    Chest x-ray shows infiltrate in the left lung base.   Chronic indwelling Fonseca which was changed in the emergency room.    UA consistent with UTI  Sepsis improved with decreasing WBC (18.7->11.8), lactic acid (2.6->1.9), and temp (Tmax 100.4)  Cultures:                        - Urine growing >100K MRSA (2 strains)                        - Wound from buttock growing E coli, enterococcus avium and faecalis, and candida                        - Blood no growth after 5 days  - The patient was initially initiated on broad spectrum antibiotics with Vanco, levofloxacin, and Zosyn.  As she showed improvement in her sepsis and culture data returned, Zosyn and levofloxacin was stopped in favor of ceftriaxone.   Infectious disease was consulted as patient has a sulfa allergy and urine did grow MRSA sensitive to vanco and Linezolid.  ID recommends Linezolid for 7 days.   - Her superficial wound culture shows multiple organisms which is not helpful clinically and her decubitus ulcer was also debrided by general surgery without further evidence of a localized infection.  - From a pneumonia perspective, she received treatment for HCAP.  Repeat CXR prior to discharge was not overly convincing for an ongoing pneumonia, but did see a possible retrocardiac infiltrate.  She is chronically NPO with ongoing G-tube feeds, but may be at risk for aspiration despite this.  - Son, Dr. Salvatore Lewis, allergist/immunologist, is POA.  Discussed his code status, which he confirms is full.  Discussed if Celia has required recurrent hospitalizations, which he confirms she has not.  She was last hospitalized within [facility] a year ago.  Unfortunately given her very limited functional status, including being non-verbal, feeding tube dependent, and bed bound, she remains high risk for readmissions for recurrent infections which I discussed with Dr. Lewis.  At this stage he is not interested in palliative care consultation.     Stage 3-4 Coccyx pressure ulcer:  Wound is necrotic measuring 4x7cm, 3 cm deep.  - Wound care and gen surg consult appreciated  - S/p bedside debridement on 6/15 by general surgery without evidence of infection     Advanced dementia:  Appears to be at her nonverbal and immobile baseline.    - Continue tube feedings, nutritionist consulted  - Discussed goals of care with son, Dr. Lewis who is POA, who requests full code and current level of cares     Diabetes mellitus type 2 :  PTA regimen: Lantus 36 units in AM and 34 units in PM, NPH 16 units in AM and 10 units at 1400  - Continue her regimen     CKD3:  This appears to be at baseline.  Avoid nephrotoxins.        Hypernatremia:  Improved with 1/2NS IVF on admission.   Now maintained on her outpatient G-tube feed regimen.     Kraig Mobley MD  [facility]    Allergies:  Sulfa drugs     Medications:    oxyCODONE (ROXICODONE) 5 MG/5ML solution  insulin glargine (LANTUS) 100 UNIT/ML injection  insulin NPH (HUMULIN N/NOVOLIN N) 100 UNIT/ML injection  Lactobacillus Acidophilus POWD  multivitamin, therapeutic (THERA-VIT) TABS tablet  Loperamide HCl (IMODIUM A-D) 1 MG/7.5ML LIQD  acetaminophen (TYLENOL) 32 mg/mL solution  insulin aspart (NOVOLOG PEN) 100 UNIT/ML soln  ACETAMINOPHEN PO  nystatin (MYCOSTATIN) 798716 UNIT/GM POWD  RANITIDINE HCL PO  bisacodyl (DULCOLAX) 10 MG suppository    Past Medical History:    Actinomyces infection   Advanced directives, counseling/discussion, POLST completed 5/29/15  Full Code   Alzheimer's disease   Arthritis   Atrial fibrillation (H)   Calculus of kidney  CKD   Candidiasis of skin and nails   Congestive heart failure, unspecified   Coronary artery disease   Heart disease, unspecified   Hyperlipidemia  Hypertension   Morbid obesity (H)   Other and unspecified hyperlipidemia   Personal history of urinary (tract) infection   Primary localized osteoarthrosis, shoulder region   Renal failure, unspecified   Thyroid disease   Type II diabetes mellitus   Sepsis  Nephrolithiasis  Bacteremia  Actinomyces infection  Dementia  UTI    Past Surgical History:    Cystoscopy, insert stent urethra  Cystoscopy ureteroscopy  Lithotripsy ureters    Family History:    Cerebrovascular disease: mother, father  Diabetes: brother    Social History:  The patient was accompanied to the ED by EMS.  Smoking Status: former smoker  Smokeless Tobacco: no  Alcohol Use: no  Marital Status:   [2]     Review of Systems   Unable to perform ROS: Mental status change     Physical Exam   First Vitals:  Patient Vitals for the past 24 hrs:   BP Temp Temp src Heart Rate Resp SpO2 Weight   06/30/17 1726 135/73 - - 113 29 93 % -   06/30/17 1715 122/85 99.3  F (37.4  C) - 112 11 96  % -   06/30/17 1700 143/71 100.6  F (38.1  C) Bladder 115 26 98 % -   06/30/17 1655 - 98.6  F (37  C) - 118 (!) 31 - -   06/30/17 1645 149/83 - - 122 (!) 34 95 % -   06/30/17 1635 133/75 - - 125 (!) 33 97 % -   06/30/17 1620 136/82 - - 125 (!) 31 95 % -   06/30/17 1618 - - - 125 22 94 % 73.7 kg (162 lb 8 oz)        Physical Exam  Constitutional:  Patient unresponsive. No response to calling name. Feeding tube in place.      Lying with eyes closed  HENT:   Head:    Normocephalic and atraumatic.   Right Ear:   Tympanic membrane and external ear normal.   Left Ear:   Tympanic membrane and external ear normal.   Mouth/Throat:   Oropharynx is clear and moist and mucous membranes are normal.   Eyes:    Conjunctivae normal and EOM are normal.      Pupils are equal   Neck:    Normal range of motion. Neck supple.   Cardiovascular:  Tachycardic, S1 normal, S2 normal and normal heart sounds.      No gallop. No murmur heard.  Pulmonary/Chest:  Crackles in the lung bases     No wheezes.   Abdominal:   Soft. Normal appearance. No hepatosplenomegaly.      No obvious abdominal tenderness. Small umbilical hernia. No rigidity, no rebound, no guarding.      No CVA tenderness.   Musculoskeletal:  Normal range of motion. Pin point rash across upper abdomen and chest.  Skin:    Coccyx ulcer (see below)  Neurological:   Patient unresponsive. Pupils equal. Does not respond to IV poke or sternal rub. Does squeeze eyes together when trying to open lids.         Emergency Department Course   ECG:  Indication: Altered mental status  Time: 1625  Vent. Rate 124 bpm. MO interval 126. QRS duration 76. QT/QTc 330/474. P-R-T axis 49 78 36.   Sinus tachycardia. Otherwise normal ECG. Read time: 1630.    Imaging:  Radiographic findings were communicated with the patient who voiced understanding of the findings.    Chest XR:  No convincing evidence of active cardiopulmonary disease. As per radiology.     Laboratory:  CBC: WBC: 15.6(H), HGB: 11.7, PLT:  251  CMP: Glucose 240(H), (H), Chloride 111(H), (H), Creatinine 2.84(H), GFR 16(L), Albumin 2.9(L), o/w WNL.   Lactic acid: 3.8(H)  Troponin: 0.035  UA reflex to microscopic: small urine blood, protein albumin 30, large leukocyte esterase, o/w WNL.  Urine microscopic: WBC 25-50, RBC 2-5, few bacteria, many yeast.    Urine Culture aerobic bacterial: In process  Clostridium difficile toxin B: In process      Blood culture: In process  Blood culture: In process    Interventions:  1639 Lactated ringers 2,211 mL IV  1701 Zosyn 4.5 g IV  1702 Vancomycin 1 G IV    Emergency Department Course:  Nursing notes and vitals reviewed. I performed an exam of the patient as documented above.     1614 Met patient at bedside.    1619   and SpO2 95%.    1625 Nurse at nursing home states he lies in bed and doesn't respond,foaming at mouth, distended abdomen and temperature of 102.     1630 I spoke with the patients power of  regarding this patient.    1637 I reassessed the patient.     EKG obtained in the ED, see results above.     The patient provided a stool sample here in the emergency department. This was sent for laboratory testing, findings above.    1740 I reassessed the patient and spoke with Dr. Lewis.     1805 I spoke with Dr. Juarez regarding this patient.    Findings and plan explained to the son who consents to admission. Discussed the patient with the hospialist, who will admit the patient to a bed for further monitoring, evaluation, and treatment.    Impression & Plan      CMS Diagnoses:    The patient has signs of Severe Sepsis as evidenced by:    1. 2 SIRS criteria, AND  2. Suspected infection, AND   3. Organ dysfunction: Lactic Acid >2    Time severe sepsis diagnosis confirmed = When lactate was resulted.       3 Hour Severe Sepsis Bundle Completion:  1. Initial Lactic Acid Result:   Recent Labs   Lab Test  06/30/17   1620  06/15/17   0833  06/14/17   2205   LACT  3.8*  1.9  2.6*     2.  Blood Cultures before Antibiotics: Yes  3. Broad Spectrum Antibiotics Administered: Yes     Anti-infectives (Future)    Start     Dose/Rate Route Frequency Ordered Stop    07/02/17 1800  levofloxacin (LEVAQUIN) infusion 500 mg     Comments:  Pharmacy can adjust dose based on renal function.    500 mg  100 mL/hr over 60 Minutes Intravenous EVERY 48 HOURS 06/30/17 1929      06/30/17 2300  piperacillin-tazobactam (ZOSYN) 2.25 g vial to attach to  ml bag     Comments:  Pharmacy can adjust dose based on renal function.    2.25 g  over 30 Minutes Intravenous EVERY 6 HOURS 06/30/17 1950      06/30/17 1959  vancomycin [facility] - receiving intermittent dosing      1 each Does not apply SEE ADMIN INSTRUCTIONS 06/30/17 2000          4. 30/kg ml of IV fluids.    the patient was transferred out of the ED prior to the 6 hour hesham.     Medical Decision Making:  Patient who comes in with a fever and change in level of responsiveness. Patient was recently here from 6/14-6/19 and diagnosed with pneumonia, UTI and pressure ulcer. She was discharged home on antibiotics which were stopped on the 26 th. The nurse that sent her in today does not know her but said that she found her with foam around her mouth, possibly looking like feeding tube secretions and felt she was unresponsive, however she apparently is always unresponsive other than she might open her eyes to voice. She was noted to have a fever of 102. On examination here the only response I get is she squeezed her eyelids tightly when I try to open them, otherwise she does not withdraw to pain and IV placement or catheter placement. On examination she has the feeding tube in place and the caro catheter was not drained but when replaced drained about 1200 cc. She has the large coccyx ulcer which does not appear to be acutely infected. Her laboratory work is remarkable for elevated white count, lactic acid, renal failure which looks due to dehydration, positive urine,  although obviously from a catheter. Hyperglycemia at 240, hypernatremia at 148, chest xray was unremarkable.     I did talk to the nurse at the nursing home and then talked to Dr. Lewis who explained to me the patient is an [employer]  and that he has talked to the rabbi and unless there is organ failure, they will consider full measures for her. I did discuss whether her dementia would qualify as having some organ failure which he said would not be included. We discuss her renal insufficiency today although this appears as more pre renal and could be due to the catheter not draining as well as dehydration.     Assessment: number one sepsis, possibly due to UTI, she also has a coccyx ulcer and was  hypoxic so need to consider pneumonia, although not seen on xray.She may have had aspiration by history Number two, dementia with patient being largely unresponsive. Number 3, hyperglycemia. Number four, renal failure.     Diagnosis:    ICD-10-CM    1. Sepsis (H) A41.9      Disposition:  Admit    I, Juliet Guzman, am serving as a scribe on 6/30/2017 at 5:26 PM to personally document services performed by Mariaelena Ca MD based on my observations and the provider's statements to me.     Juliet Guzman  6/30/2017    EMERGENCY DEPARTMENT       Mariaelena Ca MD  06/30/17 [id number]

## 2017-06-30 NOTE — IP AVS SNAPSHOT
` `     [place] [address] ONCOLOGY: [phone]                 INTERAGENCY TRANSFER FORM - NOTES (H&P, Discharge Summary, Consults, Procedures, Therapies)   2017                    Hospital Admission Date: 2017  RAÚL MERCEDES   : 1926  Sex: Female        Patient PCP Information     None on File         History & Physicals      H&P by Paul Jolly MD at 2017  7:01 PM     Author:  Paul Jolly MD Service:  Hospitalist Author Type:  Physician    Filed:  2017  8:19 PM Date of Service:  2017  7:01 PM Creation Time:  2017  7:01 PM    Status:  Signed :  Paul Jolly MD (Physician)         [facility]    History and Physical  Hospitalist       Date of Admission:  2017  Date of Service (when I saw the patient): 17    Assessment & Plan   Raúl Mercedes is a 89 year old female who presents with acute renal failure      1. Acute renal failure on CKD  This is likely due to an obstructive uropathy. Notably she had 1200 cc out what her Fonseca was changed in the emergency department.  Her urinalysis does appear to be somewhat suspicious for infection thus we will continue antibiotics. We will hydrate her aggressively with intravenous fluids and follow her creatinine. I discussed with her son the possibility of having more frequent Fonseca catheter changes in her care facility as this may prevent obstructive uropathy episodes from occurring as frequently.    2. fevers uncertain source: Her urine does appear to be possibly infected[PG1.1];[PG1.2] her chest x-ray appears unremarkable. She has a history of a coccygeal ulcer which was recently evaluated by infectious disease and felt to not be a likely source of infection. Nevertheless we will again consult the infectious disease service and ask them to render an expert opinion on the source for fevers. She has been initiated on Zosyn and vancomycin and levofloxacin and we will continue those  agents with plans to streamline as more is known.     3. Advanced dementia with nonverbal state she has tube feeds on board which will be temporarily held due to the possibility of aspiration. We will ask nutrition to evaluate her in the a.m.      4. Type 2 diabetes she normally requires Lantus in the a.m. and p.m. as well as NPH in the a.m. and p.m. and a sliding scale. For now we will hold her Lantus due to her acute renal failure and continue her NPH and place her on an insulin sliding scale.    5 sacral decubitus ulcer will ask our wound nurse to evaluate this and infectious disease has also been consulted due to the fevers.      DVT Prophylaxis: Pneumatic Compression Devices  Code Status: Full Code    Disposition: Inpatient    Paul Jolly MD    Primary Care Physician     Chief Complaint   Fevers    History is obtained from the patient's son    History of Present Illness   Celia Lewis is a 89 year old female who presents with fevers. She was brought to the emergency department due to fevers that occurred in the [facility] where she lives. She was recently hospitalized at [facility] between 6/14/2017 and 6/19/2017. During that time she was felt to have a healthcare associated pneumonia possibly due to aspiration and urinary tract infection associated with catheterization with urinary tract as well as a stage 3-4 coccygeal pressure ulcer.[PG1.1]    The patient returns today with her son due to fevers that occurred in the [facility] home. She was seen by Dr. Ca and a chest x-ray was performed as well as routine labs. She was noted to have a significant worsening of her kidney function. She was also noted to have 1200 cc of urine in her bladder which were expelled what her Fonseca was changed. I discussed the case with Dr. Marie and due to the acute renal failure as well as the fevers we both agree that it would be appropriate to admit her to our intermediate care unit.      The  patient is nonverbal and has been for some time. Her son notes that she had some frothy things on her mouth when seen in the [facility] home today. The question of possible aspiration of tube feeds exists. She does have advanced Alzheimer's disease. For Ho[employer] reasons the family prefers not to have palliative care involved in her care. There is no history of any bleeding or clotting disorders but she has a history of having had atrial fibrillation as well as congestive heart failure in the past.[PG1.2]    Past Medical History[PG1.1]    I have reviewed this patient's medical history and updated it with pertinent information if needed.   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     Advanced directives, counseling/discussion, POLST completed 5/29/15  Full Code 5/29/2015     Alzheimer's disease 5/22/2015     Arthritis     shoulder     Atrial fibrillation (H) 5/22/2015     Calculus of kidney      Candidiasis of skin and nails 5/22/2015     Congestive heart failure, unspecified      Coronary artery disease      Heart disease, unspecified     diastolic dysfunction     Hyp ht/kd NOS I-IV w hf 5/22/2015     Hypertension      Morbid obesity (H) 5/22/2015     Other and unspecified hyperlipidemia 5/22/2015     Personal history of urinary (tract) infection 5/22/2015     Primary localized osteoarthrosis, shoulder region 5/22/2015     Renal failure, unspecified     secondary to diuresis     Thyroid disease      Type II or unspecified type diabetes mellitus with renal manifestations, not stated as uncontrolled(250.40) 5/22/2015     Type II or unspecified type diabetes mellitus without mention of complication, not stated as uncontrolled      Uric acid stone in urine[PG1.2]        Past Surgical History[PG1.1]   I have reviewed this patient's surgical history and updated it with pertinent information if needed.  Past Surgical History:   Procedure Laterality Date     CYSTOSCOPY, INSERT STENT URETHRA,  COMBINED  2012    Procedure: COMBINED CYSTOSCOPY, INSERT STENT URETHRA;  cystoscopy, left  ureteral stent placement, left pyelogram;  Surgeon: Eulalio Bernardo MD;  Location: [facility] OR     CYSTOSCOPY,+URETEROSCOPY  05    with placement of (L) ureteral JJ stent     HC X-RAY ABDOMEN AP VIEW (KUB)  05     LASER HOLMIUM LITHOTRIPSY URETER(S), INSERT STENT, COMBINED  2012    Procedure: COMBINED CYSTOSCOPY, URETEROSCOPY, LASER HOLMIUM LITHOTRIPSY URETER(S), INSERT STENT;  CYSTOSCOPY, LEFT URETEROSCOPY, LASER HOLMIUM LITHOTRIPSY ,left ureteral stent exchange;  Surgeon: Eulalio Bernardo MD;  Location: [facility] OR[PG1.2]       Prior to Admission Medications   Prior to Admission Medications   Prescriptions Last Dose Informant Patient Reported? Taking?   ACETAMINOPHEN PO  Nursing Home Yes No   Si mg by Gastric Tube route 3 times daily    Lactobacillus Acidophilus POWD  Nursing Home Yes No   Si each by Gastric Tube route daily 10 billion units   Loperamide HCl (IMODIUM A-D) 1 MG/7.5ML LIQD  Nursing Home Yes No   Si mg by Gastric Tube route every other day   RANITIDINE HCL PO  Nursing Home Yes No   Si mg by Gastric Tube route 2 times daily    acetaminophen (TYLENOL) 32 mg/mL solution  Nursing Home Yes No   Si mg by Gastric Tube route daily as needed for fever or mild pain   bisacodyl (DULCOLAX) 10 MG suppository  Nursing Home Yes No   Sig: Place 10 mg rectally daily as needed for constipation   insulin NPH (HUMULIN N/NOVOLIN N) 100 UNIT/ML injection  Nursing Home Yes No   Sig: Inject 16 Units Subcutaneous every morning   insulin NPH (HUMULIN N/NOVOLIN N) 100 UNIT/ML injection  Nursing Home Yes No   Sig: Inject 10 Units Subcutaneous every evening Given at 1400 daily   insulin aspart (NOVOLOG PEN) 100 UNIT/ML soln  Nursing Home No No   Sig: Inject 1-6 Units Subcutaneous every 4 hours   insulin glargine (LANTUS) 100 UNIT/ML injection  Nursing Home Yes No   Sig: Inject 36 Units  Subcutaneous every morning   insulin glargine (LANTUS) 100 UNIT/ML injection  Nursing Home Yes No   Sig: Inject 34 Units Subcutaneous At Bedtime   multivitamin, therapeutic (THERA-VIT) TABS tablet  Nursing Home Yes No   Si tablet by Gastric Tube route daily   nystatin (MYCOSTATIN) 008433 UNIT/GM POWD  Nursing Home Yes No   Sig: Apply 1 g topically 3 times daily as needed Apply to stomach folds as needed   oxyCODONE (ROXICODONE) 5 MG/5ML solution   No No   Si mLs (2 mg) by Per G Tube route 2 times daily   oxyCODONE (ROXICODONE) 5 MG/5ML solution   No No   Si mLs (2 mg) by Per G Tube route every 4 hours as needed for moderate to severe pain      Facility-Administered Medications: None     Allergies   Allergies   Allergen Reactions     Sulfa Drugs Other (See Comments)     Per nursing home documents, patient has allergy to sulfa       Social History[PG1.1]   I have reviewed this patient's social history and updated it with pertinent information if needed. Celia Lewis  reports that she has quit smoking. She has never used smokeless tobacco. She reports that she does not drink alcohol or use illicit drugs.[PG1.2]    Family History[PG1.1]   I have reviewed this patient's family history and updated it with pertinent information if needed.   Family History   Problem Relation Age of Onset     CEREBROVASCULAR DISEASE Mother      CEREBROVASCULAR DISEASE Father      CANCER       Lung     DIABETES Brother[PG1.2]        Review of Systems[PG1.1]   Review of systems not obtained due to patient factors - confusion[PG1.2]    Physical Exam   Temp: 99.3  F (37.4  C) Temp src: Bladder BP: 135/73   Heart Rate: 113 Resp: 29 SpO2: 93 % O2 Device: Oxymizer cannula Oxygen Delivery: 4 LPM  Vital Signs with Ranges  Temp:  [98.6  F (37  C)-100.6  F (38.1  C)] 99.3  F (37.4  C)  Heart Rate:  [112-125] 113  Resp:  [11-34] 29  BP: (122-149)/(71-85) 135/73  SpO2:  [93 %-98 %] 93 %  162 lbs 8 oz    Constitutional:[PG1.1]  Nonverbal[PG1.2]   Eyes:[PG1.1] Attempt to close eyes to light pupils are myotic but symmetric minimally reactive[PG1.2]   HEENT:[PG1.1] No facial asymmetry[PG1.2]  Respiratory:[PG1.1] Decreased breath sounds at bases no crackles or wheezes[PG1.2]  Cardiovascular:[PG1.1] Regular rate and rhythm S1 and S2 heard no murmurs[PG1.2]   GI:[PG1.1] Abdomen soft obese nontender nondistended there is a feeding tube in place[PG1.2]   Lymph/Hematologic:[PG1.1] Trace edema in extremities[PG1.2]   Genitourinary:[PG1.1] A Fonseca catheter is in place[PG1.2]   Skin:[PG1.1] No jaundice[PG1.2]  Neurologic:[PG1.1] She is nonfocal nonverbal and responds only to pain and light[PG1.2]       Data   Data reviewed today:  I personally reviewed[PG1.1] the EKG tracing showing Sinus tachycardia[PG1.2].[PG1.1]    I also reviewed the chest x-ray and I agree with radiologist that there are no acute findings.[PG1.2]    Recent Labs  Lab 06/30/17  1620   WBC 15.6*   HGB 11.7   *      *   POTASSIUM 4.6   CHLORIDE 111*   CO2 26   *   CR 2.84*   ANIONGAP 11   NELLIE 8.8   *   ALBUMIN 2.9*   PROTTOTAL 8.1   BILITOTAL 0.7   ALKPHOS 81   ALT 26   AST 24   TROPI 0.035       Recent Results (from the past 24 hour(s))   XR Chest Port 1 View    Narrative    CHEST SINGLE VIEW PORTABLE   6/30/2017 4:47 PM     HISTORY: Fever.    COMPARISON: 6/18/2017.    FINDINGS: Note that the evaluation is mildly limited by the rotation  of the patient to the left. A few linear opacities in the left lung  base most likely represent atelectasis or scarring. The lungs are  otherwise clear. Mild cardiomegaly. Atherosclerotic calcification in  the thoracic aorta.      Impression    IMPRESSION: No convincing evidence of active cardiopulmonary disease.[PG1.1]        Revision History        User Key Date/Time User Provider Type Action    > PG1.2 6/30/2017  8:19 PM Paul Jolly MD Physician Sign     PG1.1 6/30/2017  7:01 PM Paul Jolly MD Physician              H&P signed by Antwan Green MD at 6/14/2017  6:13 PM      Author:  Antwan Green MD Service:  Hospitalist Author Type:  Physician    Filed:  6/14/2017  6:13 PM Date of Service:  6/14/2017  2:44 PM Creation Time:  6/14/2017  3:24 PM    Status:  Signed :  Antwan Green MD (Physician)         DATE OF ADMISSION:  06/14/2017      CHIEF COMPLAINT:  Fever, low oxygen saturations.      HISTORY OF PRESENT ILLNESS:  Ms. Celia Lewis is a 89-year-old female with history of diabetes mellitus type 2, CHF, chronic kidney disease, coronary artery disease, hypertension, recurrent UTIs, Alzheimer dementia, with baseline nonverbal status, who was sent from her long-term care facility for low O2 sats and fever.  Reportedly, the staff at the nursing home noted that the patient's oxygen saturations were low and she also had a fever up to 100.3.  She was also tachypneic and had some labored breathing and so was sent to the emergency room.  No further information is available at this time as the patient is nonverbal at baseline because of her advanced dementia.  There is no reported recent diarrhea or vomiting.  The patient does have a feeding tube in place for nutrition.      In the emergency room, the patient was evaluated by Dr. Cortez.  Basic lab work showed a white count of 18.7 and UA consistent with UTI.  She also has a chest x-ray which showed an infiltrate in the left base.  She does have a chronic indwelling Fonseca which was changed in the emergency room.  Started on empiric antibiotics for both UTI and presumed healthcare-associated pneumonia.  She was also found to have a sacral decubitus with surrounding cellulitis.      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     Advanced directives, counseling/discussion, POLST completed 5/29/15  Full Code 5/29/2015     Alzheimer's disease 5/22/2015     Arthritis     shoulder     Atrial  fibrillation (H) 5/22/2015     Calculus of kidney      Candidiasis of skin and nails 5/22/2015     Congestive heart failure, unspecified      Coronary artery disease      Heart disease, unspecified     diastolic dysfunction     Hypertension      Morbid obesity (H) 5/22/2015     Other and unspecified hyperlipidemia 5/22/2015     Personal history of urinary (tract) infection 5/22/2015     Primary localized osteoarthrosis, shoulder region 5/22/2015     Renal failure, unspecified     secondary to diuresis     Thyroid disease      Type II or unspecified type diabetes mellitus with renal manifestations, not stated as uncontrolled(250.40) 5/22/2015     Type II or unspecified type diabetes mellitus without mention of complication, not stated as uncontrolled      Unspecified hypertensive heart and kidney disease with heart failure and with chronic kidney disease stage I through stage IV, or unspecified(404.91) 5/22/2015     Uric acid stone in urine[NB1.2]         ALLERGIES:[NB1.1]    Allergies   Allergen Reactions     Sulfa Drugs Other (See Comments)     Per nursing home documents, patient has allergy to sulfa[NB1.2]        SOCIAL AND PERSONAL HISTORY:[NB1.1]   Social History     Social History     Marital status:      Spouse name: Leonel     Number of children: N/A     Years of education: N/A     Occupational History      Retired     Social History Main Topics     Smoking status: Former Smoker     Smokeless tobacco: Never Used      Comment: She was a social smoker many years ago     Alcohol use No     Drug use: No     Sexual activity: Not on file     Other Topics Concern     Not on file     Social History Narrative[NB1.2]        HOME MEDICATIONS:[NB1.1]  Prescriptions Prior to Admission   Medication Sig Dispense Refill Last Dose     insulin glargine (LANTUS) 100 UNIT/ML injection Inject 36 Units Subcutaneous every morning   6/14/2017 at Unknown time     insulin glargine (LANTUS) 100 UNIT/ML injection Inject 34 Units  Subcutaneous At Bedtime   6/13/2017 at Unknown time     insulin NPH (HUMULIN N/NOVOLIN N) 100 UNIT/ML injection Inject 16 Units Subcutaneous every morning   6/14/2017 at Unknown time     insulin NPH (HUMULIN N/NOVOLIN N) 100 UNIT/ML injection Inject 10 Units Subcutaneous every evening Given at 1400 daily   6/13/2017 at Unknown time     Lactobacillus Acidophilus POWD 1 each by Gastric Tube route daily 10 billion units   6/14/2017 at Unknown time     multivitamin, therapeutic (THERA-VIT) TABS tablet 1 tablet by Gastric Tube route daily   6/14/2017 at Unknown time     oxyCODONE (ROXICODONE) 5 MG/5ML solution 2 mg by Gastric Tube route 2 times daily   6/14/2017 at 1000     Loperamide HCl (IMODIUM A-D) 1 MG/7.5ML LIQD 4 mg by Gastric Tube route every other day   6/14/2017 at am     acetaminophen (TYLENOL) 32 mg/mL solution 325 mg by Gastric Tube route daily as needed for fever or mild pain        oxyCODONE (ROXICODONE) 5 MG/5ML solution 2 mg by Gastric Tube route every 4 hours as needed for moderate to severe pain        insulin aspart (NOVOLOG PEN) 100 UNIT/ML soln Inject 1-6 Units Subcutaneous every 4 hours   6/14/2017 at Unknown time     ACETAMINOPHEN  mg by Gastric Tube route 3 times daily    6/14/2017 at x1     nystatin (MYCOSTATIN) [id number] UNIT/GM POWD Apply 1 g topically 3 times daily as needed Apply to stomach folds as needed        RANITIDINE HCL PO 75 mg by Gastric Tube route 2 times daily    6/14/2017 at am     bisacodyl (DULCOLAX) 10 MG suppository Place 10 mg rectally daily as needed for constipation[NB1.2]         FAMILY HISTORY:  Family History     Problem (# of Occurrences) Relation (Name,Age of Onset)    CANCER (1) Unspecified    CEREBROVASCULAR DISEASE (2) Mother, Father    DIABETES (1) Brother           REVIEW OF SYSTEMS:  A complete review of system is unobtainable because of patient's baseline is nonverbal state.      PHYSICAL EXAMINATION:   GENERAL:  Ms. Lewis is a 89-year-old female.  She  does not appear to be in overt distress, but she is nonverbal at baseline.   VITAL SIGNS:  Temperature is 99.4, blood pressure is 138/74, heart rate is 120, respiration rate is 36.  O2 sats is 92% on 3 liters nasal cannula.   HEENT:  Atraumatic, normocephalic, no pallor or icterus.   NECK:  Supple.   RESPIRATORY:  Coarse breath sounds at the left base with some crackles, normal effort of breathing.   CARDIOVASCULAR:  Tachycardic, no murmur.   ABDOMEN:  Slightly distended but soft.  Feeding tube in place.   SKIN:  Warm and dry.   NEUROLOGIC:  She does open eyes to verbal commands but does not follow any commands and she is nonverbal.  Difficult to assess her muscle strength.   MUSCULOSKELETAL:  She does appear to have contractures of her extremities.   BACK:  She does have a sacral decubitus which is stable.      LABORATORY DATA:  Sodium is 144, potassium is 4.1, creatinine is 1.28, which is about her baseline.  Lactic acid is elevated at 3.5.  White cell count is 18.7.  UA consistent with UTI with positive nitrite, large leukocyte esterase and 182 WBC.  Chest x-ray with infiltrate on the left lung base.      ASSESSMENT AND PLAN:  Ms. Lewis is a 89-year-old female with advanced dementia, nonverbal at baseline and other medical problems as described above who presents from a nursing home with low oxygen sats, fever and labored breathing.   1.  Severe sepsis due to healthcare associated pneumonia and urinary tract infection.  The patient presents with tachypnea, low oxygen sats and fever.  Chest x-ray does show an infiltrate in the left lung base.  It does appear like she has UTI also.  At this time, she will be covered on empiric antibiotics for healthcare associated pneumonia with vancomycin, Zosyn and Levaquin.  Await results of her blood culture.  She is saturating adequately on 3 liters oxygen at the moment.   2.  Urinary tract infection.  The patient does have a chronic indwelling Fonseca which was changed in the  emergency room.  UA consistent with UTI.  Antibiotics as above.   3.  Advanced dementia.  The patient is nonverbal at baseline and apparently does not move around.  Reportedly, there has been no new change in mental state, but this is her baseline.  She does have tube feedings for feeding, nutritional services will be consulted for helping with her tube feeding.   4.  Diabetes mellitus type 2.  She is on Lantus 35 units in the morning and 40 units at bedtime.  I will continue that at a slightly reduced dose of 30 units in the morning and 30 units at bedtime.  Also, cover with moderate resistance sliding scale.   5.  Sacral decubitus, stable.  Patient does have about 4-5 cm size sacral decubitus with surrounding cellulitis.  Wound care will be consulted.  Antibiotics as above.   6.  Chronic kidney disease, stage 3.  This appears to be at baseline.  Avoid nephrotoxins.        CODE STATUS:  I did not personally speak with the family; however, per Dr. Cortez in the emergency room who spoke with the family, the patient has been and will be a full code.      Anticipated length of stay more than 2 midnights.         ANTWAN HERRING MD             D: 2017 14:44   T: 2017 15:23   MT: CD      Name:     RAÚL MERCEDES   MRN:      [id number]        Account:      [id number]   :      1926           Admitted:     [id number]      Document: [id number][NB1.1]         Revision History        User Key Date/Time User Provider Type Action    > NB1.1 2017  6:13 PM Antwan Herring MD Physician Sign     NB1.2 2017  6:12 PM Antwan Herring MD Physician      [N/A] 2017  3:24 PM Antwan Herring MD Physician Edit                     Discharge Summaries      Discharge Summaries by Piotr Saba MD at 2017 10:27 AM     Author:  Piotr Saba MD Service:  Hospitalist Author Type:  Physician    Filed:  2017 10:50 AM Date of Service:  2017 10:27 AM Creation Time:  2017  10:27 AM    Status:  Addendum :  Piotr Saba MD (Physician)         [facility]  Discharge Summary        Celia Lewis MRN# [id number]   YOB: 1926 Age: 89 year old     Date of Admission:  6/30/2017  Date of Discharge:  7/8/2017  Admitting Physician:  Paul Jolly MD  Discharge Physician: Piotr Saba MD  Discharging Service: Hospitalist     Primary Provider:  PCP at [facility]  Primary Care Physician Phone Number: None     Discharge Diagnoses:     1. Fevers possible sepsis secondary to HCAP vs aspiration   2. Severe dementia patient bed bound with tube feedings, patient non-verbal and non-interactive  3. Chronic indwelling caro catheter with hx of MRSA infection, UCx negative, urinary   4. Hypernatremia, nutritional  5. Chronic stage III sacral decubitus ulcer  6. DM II  7. CKD/ARF  8. Elevated BP, start on metoprolol      Problem Oriented Hospital Course   Celia Lewis was admitted on 6/30/2017 by Paul Jolly MD and I would refer you to their history and physical.  The following problems were addressed during her hospitalization:     89 y/o NH resident is admitted with ARF and sepsis, not clear sourse of infection, possible sec MRSA UTI ( but neg UC and BC). She developed hypernatremia and the free water through PEG was increased. She was stable, afebrile, stable VS and she was transferred out of CCU on July 3. On early morning July 4 she developed tachycardia and tachypnea and the CXR shows new bilat infiltrates. Most likely aspiration. Tube feeding will be continued as per family cristinoes       Her complex PMHx: advanced dementia, aspirations pneumonia, indwelling urinary cath, UTIs, DM, CKD, feeding tube, chronic sacral wound,           Sepsis possible sec MRSA UTI w neg BC  -- fever, tachypnea, hypoxemia, increased lactic acid - on admission. She was getting better until July 4 when she became tachycardic and tachypneic and new CXR infiltrates.  Most likely she aspirated.    -- ID consult notes reviewed, cont Vanco and zosyn changed to orals with Linezolid + Levaquin for 1 week at discharge.  -- CXR 6/30 - negative for acute changes. CXR 7/4: bilat infiltrates, likely aspiration vs HCAP  -- BC - neg to the date  -- UA with WBC, yeast. UC is neg at this admission but Pos for MRSA on June 14  -- C Diff - neg      Nutrition and long term prognosis  --  tube feeding resumed as per son wishes.    -- her Na improved, with D5W and currently on free water flushes   -- SL IVF      Chr indwelling CFoley cath, freq UTIs  -- ok to change Caro q 2 weeks  -- caro was blocked with sediment, flushed and with good urine output.  -- family asking for urology consult, but currently no urologic problems   -- patient had 1200 ml residual on admission which were expelled when Caro was changed in ER ( see H&P)       Hypernatremia  -- NA  142<--140<--145<--151<--155<--151 < -- 148  -- continue free water flushes       ARF on CKD  -- Cr  0.96 <--1.25<--1.04<--1.91<--2.33 <-- 2.84   -- suspect from her infection/sepsis         Elevated BP  -- she was hypertensive and tachycardic  -- will treat with metoprolol          Advanced dementia with nonverbal state, nonabulatory with fixed chronic muscle contraction          DM 2. Poorly controlled.  -- she was on 70/30 bid and Lantus bid at the NH     -- being discharged with Lantus 30 units bid   -- she will need accuchecks q 4 with ssi          Chr sacral decubitus ulcer   -- considered chronic, noninfected   -- continue local care      Chr Anemia  -- Hgb in the same range as before.       DVT Prophylaxis:   -- she has Pneumatic Compression Devices      Goal of care and CODE status  -- Dr. Berg had a long discussion with pt's son,  Joshua, about the patient medical condition. He feels pressured and judged by the medical staff but he is trying to respect his mother strong Hindu believes. And all the major changes in her  "plan of care need to have \" a clearance\" from their rabbi.   -- He understands her medical situation and the prognosis and he appreciates the care his mother is receiving. The patient is Religion Yarsani and she was very strong in her Christianity beliefs. Her son wants to respect her wishes. According to their Orthodox he can't change her to DNR. If a time comes and she will need to be intubated the situation will be reassessed. For now she will have full treatment.   -- Medical team discussed about nutrition and fluids. Son wants picc line and long term IV fluids. He understands this increased he risk of infection.            Code Status:      Full Code        Brief Hospital Stay Summary Sent Home With Patient in AVS:       Patient admitted with fever, ARF and some urinary retention.        Important Results:      See below        Pending Results:        Unresulted Labs Ordered in the Past 30 Days of this Admission     No orders found from 5/1/2017 to 7/1/2017.            Discharge Instructions and Follow-Up:            Discharge Disposition:      Discharged to LTC        Discharge Medications:        # addendem :  Adding metoprolol[PD1.1] 2[PD1.2]5 mg liquid via feeding tube BID.    Current Discharge Medication List      START taking these medications    Details   vitamin A-D & C drops (TRI-VI-SOL) 750-400-35 UNIT-MG/ML solution NEW FORMULATION 7 mLs by Per G Tube route daily  Qty: 50 mL, Refills: 0    Associated Diagnoses: Decubitus ulcer of buttock, unspecified laterality, unspecified ulcer stage      linezolid (ZYVOX) 100 MG/5ML suspension 30 mLs (600 mg) by Per Feeding Tube route every 12 hours for 6 days  Qty: 360 mL, Refills: 0    Associated Diagnoses: HCAP (healthcare-associated pneumonia)      levofloxacin (LEVAQUIN) 25 MG/ML solution 10 mLs (250 mg) by Per Feeding Tube route daily for 6 days  Qty: 60 mL, Refills: 0    Associated Diagnoses: HCAP (healthcare-associated pneumonia)         CONTINUE these " medications which have CHANGED    Details   insulin glargine (LANTUS) 100 UNIT/ML injection Inject 30 Units Subcutaneous 2 times daily    Associated Diagnoses: Type 2 diabetes mellitus with diabetic nephropathy, unspecified long term insulin use status (H)      ranitidine (ZANTAC) 150 MG/10ML syrup 10 mLs (150 mg) by Per Feeding Tube route daily  Qty: 600 mL    Associated Diagnoses: Gastroesophageal reflux disease without esophagitis      oxyCODONE (ROXICODONE) 5 MG/5ML solution 2 mLs (2 mg) by Per G Tube route every 4 hours as needed for moderate to severe pain  Qty: 473 mL, Refills: 0    Associated Diagnoses: Primary localized osteoarthrosis of shoulder region, unspecified laterality         CONTINUE these medications which have NOT CHANGED    Details   !! INSULIN ASPART SC Inject 1-6 Units Subcutaneous every 6 hours 0800, 1400, 2000, 0200  -250 1 unit  -300 2 units  -350 3 units  -400 4 units  -450 5 units  BG >450 6 units and update MD      Dakins 0.125 % SOLN Externally apply topically 2 times daily BID and PRN.   1. Cleanse wound with microklenze, cleanse periwound area with naomi perineal  2. Moisten kerlix fluff with dakins solution 0.125%, wring out excess, pack wound with kerlix  3. Apply antifungal powder to periwound area, rub in. Apply criticaid over powder.  4. Cover with ABD using minimal medipore tape to secure.  5. Label dressing with date, time, initials. Follow Rigorous PIP measures.      Lactobacillus Acidophilus POWD 1 capsule by Gastric Tube route daily 10 billion units       multivitamin, therapeutic (THERA-VIT) TABS tablet 1 tablet by Gastric Tube route daily      Loperamide HCl (IMODIUM A-D) 1 MG/7.5ML LIQD 4 mg by Gastric Tube route every other day      !! acetaminophen (TYLENOL) 32 mg/mL solution 325 mg by Gastric Tube route daily as needed for fever or mild pain      !! ACETAMINOPHEN  mg by Gastric Tube route 3 times daily       bisacodyl (DULCOLAX) 10  MG suppository Place 10 mg rectally daily as needed for constipation      !! insulin aspart (NOVOLOG PEN) 100 UNIT/ML soln Inject 1-6 Units Subcutaneous every 4 hours    Associated Diagnoses: Type 2 diabetes mellitus with diabetic nephropathy (H)       !! - Potential duplicate medications found. Please discuss with provider.      STOP taking these medications       insulin NPH-insulin regular (HUMULIN MIX 70/30/NOVOLIN MIX 70/30) injection Comments:   Reason for Stopping:         insulin NPH-insulin regular (HUMULIN MIX 70/30/NOVOLIN MIX 70/30) injection Comments:   Reason for Stopping:                 Allergies:         Allergies   Allergen Reactions     Sulfa Drugs Other (See Comments)     Per nursing home documents, patient has allergy to sulfa           Consultations This Hospital Stay:      Consultation during this admission received from infectious disease        Condition and Physical on Discharge:      Discharge condition: Stable   Vitals: Blood pressure 151/67, pulse 107, temperature 96.7  F (35.9  C), temperature source Axillary, resp. rate 28, height 1.524 m (5'), weight 71.4 kg (157 lb 6.4 oz), SpO2 95 %.     Constitutional: Non verbal - resting comfortable   Lungs: Decreased at the bases, clear upper lungs   Cardiovascular: RRR   Abdomen: Distended, hypoactive bs   Skin: Warm, dry, sacral decubitus ulcer   Other: contracted         Discharge Time:      Greater than 30 minutes.        Image Results From This Hospital Stay (For Non-EPIC Providers):        Results for orders placed or performed during the hospital encounter of 06/30/17   XR Chest Port 1 View    Narrative    CHEST SINGLE VIEW PORTABLE   6/30/2017 4:47 PM     HISTORY: Fever.    COMPARISON: 6/18/2017.    FINDINGS: Note that the evaluation is mildly limited by the rotation  of the patient to the left. A few linear opacities in the left lung  base most likely represent atelectasis or scarring. The lungs are  otherwise clear. Mild cardiomegaly.  Atherosclerotic calcification in  the thoracic aorta.      Impression    IMPRESSION: No convincing evidence of active cardiopulmonary disease.    RENU UMANA MD   XR Chest 2 Views    Narrative    CHEST TWO VIEWS July 4, 2017 4:30 AM     HISTORY: Tachypnea (evaluate for aspiration event).    COMPARISON: 6/30/2017.      Impression    IMPRESSION: Hypoinflated lungs. Patchy bilateral mid to low chest  airspace opacities appear increased and could represent developing  airspace disease and/or atelectasis. Cardiomegaly is stable. There is  vascular congestion and potential pulmonary edema.    PARMINDER HOWARD MD   XR Chest Port 1 View    Narrative    XR CHEST PORT 1 VW 7/6/2017 3:05 PM    HISTORY: Increased respiratory rate.    COMPARISON: July 4, 2017.      Impression    IMPRESSION: No significant change from prior examination. Low lung  volumes. Bibasilar opacities likely reflect atelectasis although  infection cannot be excluded. No pneumothorax. Stable cardiomegaly.    PUNEET CARABALLO MD           Most Recent Lab Results In EPIC (For Non-EPIC Providers):    Most Recent 3 CBC's:  Recent Labs   Lab Test  07/08/17   0727  07/07/17   0637  07/05/17   0705   WBC  10.0  8.7  8.7   HGB  10.6*  10.0*  10.7*   MCV  96  95  94   PLT  333  291  240      Most Recent 3 BMP's:  Recent Labs   Lab Test  07/08/17   0727  07/07/17   0637  07/06/17   0650   NA  145*  145*  142   POTASSIUM  4.4  4.1  4.2   CHLORIDE  114*  113*  109   CO2  20  21  18*   BUN  32*  33*  34*   CR  0.96  1.17*  1.25*   ANIONGAP  11  11  15*   NELLIE  8.8  8.6  8.5   GLC  271*  319*  367*     Most Recent 3 Troponin's:  Recent Labs   Lab Test  06/30/17   1620  08/08/12   1751  08/08/12   1323  08/05/12   1837   01/28/10   1505   TROPI  0.035  0.013  0.022   --    < >   --    TROPONIN   --    --    --   0.00   --   0.00    < > = values in this interval not displayed.     Most Recent 3 INR's:  Recent Labs   Lab Test  06/14/17   1200  06/21/16   0643  06/23/14   2140    INR  1.10  1.37*  1.01     Most Recent 2 LFT's:  Recent Labs   Lab Test  06/30/17   1620  06/14/17   1200   AST  24  20   ALT  26  22   ALKPHOS  81  96   BILITOTAL  0.7  0.5     Most Recent Cholesterol Panel:  Recent Labs   Lab Test 05/26/15   CHOL  159   LDL  77   HDL  40   TRIG  211*     Most Recent 6 Bacteria Isolates From Any Culture (See EPIC Reports for Culture Details):  Recent Labs   Lab Test  06/30/17   1654  06/30/17   1640  06/14/17   1319  06/14/17   1230  06/14/17   1200  06/21/16   0653   CULT  No growth  <1000 colonies/mL urogenital maria esther Susceptibility testing not routinely done  No growth  No growth  Moderate growth Escherichia coli  Moderate growth Enterococcus avium  Moderate growth Enterococcus faecalis  Light growth Candida albicans / dubliniensis Candida albicans and Candida   dubliniensis are not routinely speciated Susceptibility testing not routinely   done  Plus Heavy growth Normal skin maria esther  *  >100,000 colonies/mL Methicillin resistant Staphylococcus aureus (MRSA)  50,000 to 100,000 colonies/mL Strain 2 Methicillin resistant Staphylococcus   aureus (MRSA)  Critical Value/Significant Value called to and read back by Elmer Hurd Rn at   2044 [id number]  *  No growth  No growth     Most Recent TSH, T4 and HgbA1c:   Recent Labs   Lab Test  06/15/17   0833   11/03/10   0710   TSH   --    --   2.69   A1C  8.4*   < >   --     < > = values in this interval not displayed.[PD1.1]                   Revision History        User Key Date/Time User Provider Type Action    > PD1.2 7/8/2017 10:50 AM Piotr Saba MD Physician Addend     PD1.1 7/8/2017 10:47 AM Piotr Saba MD Physician Sign            Discharge Summaries by Kraig Mobley MD at 6/19/2017 10:31 AM     Author:  Kraig Mobley MD Service:  Hospitalist Author Type:  Physician    Filed:  6/19/2017 10:58 AM Date of Service:  6/19/2017 10:31 AM Creation Time:  6/19/2017 10:12 AM    Status:  Addendum :  Kraig Mobley MD  (Physician)         [facility]    Discharge Summary  Hospitalist    Date of Admission:  6/14/2017  Date of Discharge:  6/19/2017  Discharging Provider:[VP1.1] Kraig Mobley[VP1.2], MD  Date of Service: 06/19/17[VP1.1]    Discharge Diagnoses[VP1.2]   HCAP vs aspiration pneumonia  Urinary tract infection associated with catheterization of urinary tract, unspecified indwelling urinary catheter type, initial encounter  Stage 3-4 Coccyx pressure ulcer s/p bedside debridement[VP1.1]    History of Present Illness[VP1.2]   Ms. Celia Lewis is a 89-year-old female with history of diabetes mellitus type 2, CHF, chronic kidney disease, coronary artery disease, hypertension, recurrent UTIs, Alzheimer dementia, with baseline nonverbal and bed bound status who presents from a LTC with low oxygen sats, fever and labored breathing.     Please refer to HPI for further details.[VP1.1]    Hospital Course[VP1.2]   The following problems were addressed during his hospitalization.     Severe sepsis due to HCAP and catheter associated UTI  The patient presents with tachypnea, low oxygen sats and fever.    Chest x-ray shows infiltrate in the left lung base.   Chronic indwelling Fonseca which was changed in the emergency room.    UA consistent with UTI  Sepsis improved with decreasing WBC (18.7->11.8), lactic acid (2.6->1.9), and temp (Tmax 100.4)  Cultures:   - Urine growing >100K MRSA (2 strains)   - Wound from buttock growing E coli, enterococcus avium and faecalis, and candida   - Blood no growth after 5 days  - The patient was initially initiated on broad spectrum antibiotics with Vanco, levofloxacin, and Zosyn.  As she showed improvement in her sepsis and culture data returned, Zosyn and levofloxacin was stopped in favor of ceftriaxone.  Infectious disease was consulted as patient has a sulfa allergy and urine did grow MRSA sensitive to vanco and Linezolid.  ID recommends Linezolid for 7 days.   - Her superficial wound  culture shows multiple organisms which is not helpful clinically and her decubitus ulcer was also debrided by general surgery without further evidence of a localized infection.  - From a pneumonia perspective, she received treatment for HCAP.  Repeat CXR prior to discharge was not overly convincing for an ongoing pneumonia, but did see a possible retrocardiac infiltrate.  She is chronically NPO with ongoing G-tube feeds, but may be at risk for aspiration despite this.  - Son, Dr. Salvatore Lewis, allergist/immunologist, is POA.  Discussed his code status, which he confirms is full.  Discussed if Celia has required recurrent hospitalizations, which he confirms she has not.  She was last hospitalized within [place] a year ago.  Unfortunately given her very limited functional status, including being non-verbal, feeding tube dependent, and bed bound, she remains high risk for readmissions for recurrent infections which I discussed with Dr. Lewis.  At this stage he is not interested in palliative care consultation.    Stage 3-4 Coccyx pressure ulcer  Wound is necrotic measuring 4x7cm, 3 cm deep.  - Wound care and gen surg consult appreciated  - S/p bedside debridement on 6/15 by general surgery without evidence of infection    Advanced dementia  Appears to be at her nonverbal and immobile baseline.    - Continue tube feedings, nutritionist consulted  - Discussed goals of care with son, Dr. Lewis who is POA, who requests full code and current level of cares    Diabetes mellitus type 2   PTA regimen: Lantus 36 units in AM and 34 units in PM, NPH 16 units in AM and 10 units at 1400  - Continue her regimen    CKD3  This appears to be at baseline.  Avoid nephrotoxins.       Hypernatremia  Improved with 1/2NS IVF on admission.  Now maintained on her outpatient G-tube feed regimen.    Kraig Mobley MD  [facility][VP1.1]    Unresulted Labs Ordered in the Past 30 Days of this Admission     Date and Time Order Name  Status Description    6/14/2017 1257 Blood culture Preliminary     6/14/2017 1158 Blood culture Preliminary           Code Status[VP1.2]   Full Code[VP1.1]       Primary Care Physician   No primary care provider on file.    Physical Exam   /90 (BP Location: Left arm)  Pulse 95  Temp 98.6  F (37  C) (Axillary)  Resp 20  Ht 1.524 m (5')  Wt 69.3 kg (152 lb 12.5 oz)  SpO2 96%  BMI 29.84 kg/m2[VP1.2]  Constitutional: NAD, eyes open without significant response to questions, non-verbal  HEENT: EOMI   Cardiovascular: S1, S2, regular rhythm  Respiratory: CTAB, no wheezing, resonating upper airway congestion  Gastrointestinal: Soft, NT, G-tube in place  Neurologic: Bed bound, non-verbal  Skin: Sacral decub ulcer[VP1.1]    Discharge Disposition[VP1.2]   Discharged to LTC  Condition at discharge: Stable[VP1.1]    Consultations This Hospital Stay   PHYSICAL THERAPY ADULT IP CONSULT  WOUND OSTOMY CONTINENCE NURSE  IP CONSULT  PHARMACY TO DOSE VANCO  NUTRITION SERVICES ADULT IP CONSULT  PHARMACY IP CONSULT  SURGERY GENERAL IP CONSULT  INFECTIOUS DISEASES IP CONSULT    Time Spent on this Encounter[VP1.2]   Kraig HERNADEZ, personally saw the patient today and spent greater than 30 minutes discharging this patient.[VP1.1]    Discharge Orders     Mantoux instructions   Give two-step Mantoux (PPD) Per Facility Policy Yes     Reason for your hospital stay   UTI, pneumonia, decubitus ulcer     Activity - Up with nursing assistance   Bed bound status     Wound care   Per wound care nurse instructions:    Plan for wound care to coccyx/sacrum: BID and prn :  1.  Cleanse wound with MicroKlenz wound cleanser.  Cleanse periwound with mild skin cleanser, ie Nakul perineal lotion.  2.  Moisten a kerlix fluff with Dakin's solution (aka sodium hypochlorite; comes from Pharmacy), wring out excess.  Pack into wound.  3.  Apply antifungal powder to periwound, rub in well.  Then apply Critic-Aid barrier paste over the powder to  periwound (and perineal area if needed).  4.  Cover with ABD pad and minimal Medipore tape.  Label with time/date/initials.  5.  Follow rigorous PIP measures.     Follow Up   Follow-up with LTC provider in 2-4 days     General info for SNF   Length of Stay Estimate: Long Term Care: Estimated # of Days >30  Condition at Discharge: Stable  Level of care:skilled   Rehabilitation Potential: Poor  Admission H&P remains valid and up-to-date: Yes  Recent Chemotherapy: N/A  Use Nursing Home Standing Orders: Yes     Full Code     Fall precautions     Advance Diet as Tolerated   Follow this diet upon discharge: Orders Placed This Encounter     Adult Formula Drip Feeding: Continuous Isosource 1.5; Other - Specify in Comment; Goal Rate: 45; mL/hr; Medication - Tube Feeding Flush Frequency: At least 15-30 mL water before and after medication administration and with tube clogging; Amout to ...     NPO for Medical/Clinical Reasons Except for: NPO but receiving Tube Feeding         Discharge Medications   Current Discharge Medication List      START taking these medications    Details   linezolid (ZYVOX) 600 MG tablet 1 tablet (600 mg) by Per G Tube route 2 times daily for 7 days    Associated Diagnoses: Urinary tract infection associated with catheterization of urinary tract, unspecified indwelling urinary catheter type, initial encounter      levofloxacin (LEVAQUIN) 250 MG tablet 1 tablet (250 mg) by Per G Tube route daily for 5 days  Qty: 5 tablet, Refills: 0    Associated Diagnoses: Pneumonia of left lung due to infectious organism, unspecified part of lung         CONTINUE these medications which have CHANGED    Details   !! oxyCODONE (ROXICODONE) 5 MG/5ML solution 2 mLs (2 mg) by Per G Tube route 2 times daily  Qty: 15 mL, Refills: 0    Associated Diagnoses: Primary localized osteoarthrosis of shoulder region, unspecified laterality      !! oxyCODONE (ROXICODONE) 5 MG/5ML solution 2 mLs (2 mg) by Per G Tube route every 4  hours as needed for moderate to severe pain  Refills: 0    Associated Diagnoses: Primary localized osteoarthrosis of shoulder region, unspecified laterality       !! - Potential duplicate medications found. Please discuss with provider.      CONTINUE these medications which have NOT CHANGED    Details   !! insulin glargine (LANTUS) 100 UNIT/ML injection Inject 36 Units Subcutaneous every morning      !! insulin glargine (LANTUS) 100 UNIT/ML injection Inject 34 Units Subcutaneous At Bedtime      !! insulin NPH (HUMULIN N/NOVOLIN N) 100 UNIT/ML injection Inject 16 Units Subcutaneous every morning      !! insulin NPH (HUMULIN N/NOVOLIN N) 100 UNIT/ML injection Inject 10 Units Subcutaneous every evening Given at 1400 daily      Lactobacillus Acidophilus POWD 1 each by Gastric Tube route daily 10 billion units      multivitamin, therapeutic (THERA-VIT) TABS tablet 1 tablet by Gastric Tube route daily      Loperamide HCl (IMODIUM A-D) 1 MG/7.5ML LIQD 4 mg by Gastric Tube route every other day      !! acetaminophen (TYLENOL) 32 mg/mL solution 325 mg by Gastric Tube route daily as needed for fever or mild pain      insulin aspart (NOVOLOG PEN) 100 UNIT/ML soln Inject 1-6 Units Subcutaneous every 4 hours    Associated Diagnoses: Type 2 diabetes mellitus with diabetic nephropathy (H)      !! ACETAMINOPHEN  mg by Gastric Tube route 3 times daily       nystatin (MYCOSTATIN) 974445 UNIT/GM POWD Apply 1 g topically 3 times daily as needed Apply to stomach folds as needed      RANITIDINE HCL PO 75 mg by Gastric Tube route 2 times daily       bisacodyl (DULCOLAX) 10 MG suppository Place 10 mg rectally daily as needed for constipation       !! - Potential duplicate medications found. Please discuss with provider.        Allergies   Allergies   Allergen Reactions     Sulfa Drugs Other (See Comments)     Per nursing home documents, patient has allergy to sulfa     Data[VP1.2]   Most Recent 3 CBC's:[VP1.1]  Recent Labs   Lab Test   06/19/17   0856  06/18/17   0855  06/17/17   1255   WBC  11.8*  11.4*  11.4*   HGB  10.3*  10.1*  9.5*   MCV  93  94  96   PLT  293  289  270[VP1.2]      Most Recent 3 BMP's:[VP1.1]  Recent Labs   Lab Test  06/19/17   0856  06/18/17   0855  06/17/17   1110   NA  141  141  144   POTASSIUM  3.7  3.6  4.7   CHLORIDE  111*  110*  114*   CO2  22  22  18*   BUN  24  23  30   CR  0.70  0.76  0.90   ANIONGAP  8  9  12   NELLIE  8.3*  7.9*  7.6*   GLC  153*  241*  320*[VP1.2]     Most Recent 2 LFT's:[VP1.1]  Recent Labs   Lab Test  06/14/17   1200  06/24/16   0806   AST  20  24   ALT  22  48   ALKPHOS  96  155*   BILITOTAL  0.5  0.7[VP1.2]     Most Recent INR's and Anticoagulation Dosing History:  Anticoagulation Dose History     Recent Dosing and Labs Latest Ref Rng & Units 11/1/2010 11/2/2010 11/3/2010 8/5/2012 6/23/2014 6/21/2016 6/14/2017    INR 0.86 - 1.14 1.02 1.02 0.97 1.09 1.01 1.37(H) 1.10        Most Recent 3 Troponin's:[VP1.1]  Recent Labs   Lab Test  08/08/12   1751  08/08/12   1323  08/05/12   1837  05/19/10   0646  01/28/10   1505   TROPI  0.013  0.022   --   <0.012   --    TROPONIN   --    --   0.00   --   0.00[VP1.2]     Most Recent Cholesterol Panel:[VP1.1]  Recent Labs   Lab Test 05/26/15   CHOL  159   LDL  77   HDL  40   TRIG  211*[VP1.2]     Most Recent 6 Bacteria Isolates From Any Culture (See EPIC Reports for Culture Details):[VP1.1]  Recent Labs   Lab Test  06/14/17   1319  06/14/17   1230  06/14/17   1200  06/21/16   0653  06/21/16   0652  06/21/16   0636   CULT  No growth after 5 days  Moderate growth Escherichia coli  Moderate growth Enterococcus avium  Moderate growth Enterococcus faecalis  Light growth Candida albicans / dubliniensis Candida albicans and Candida   dubliniensis are not routinely speciated Susceptibility testing not routinely   done  Plus Heavy growth Normal skin maria esther  *  >100,000 colonies/mL Methicillin resistant Staphylococcus aureus (MRSA)  50,000 to 100,000 colonies/mL Strain 2  Methicillin resistant Staphylococcus   aureus (MRSA)  Critical Value/Significant Value called to and read back by Elmer Hurd Rn at   2044 [id number]  *  No growth after 5 days  No growth  >100,000 colonies/mL Enterococcus species*  No growth[VP1.2]     Most Recent TSH, T4 and A1c Labs:[VP1.1]  Recent Labs   Lab Test  06/15/17   0833   11/03/10   0710   TSH   --    --   2.69   A1C  8.4*   < >   --     < > = values in this interval not displayed.[VP1.2]     Results for orders placed or performed during the hospital encounter of 06/14/17   XR Chest 2 Views    Narrative    XR CHEST 2 VW 6/14/2017 1:13 PM    HISTORY: Fever.    COMPARISON: June 22, 2016.      Impression    IMPRESSION: Lung volumes are low, with opacification of the left lung  base suggestive of atelectasis and/or infection. No definite pleural  effusions appreciated. No pneumothorax. Stable cardiomegaly.    PUNEET CARABALLO MD   XR Chest Port 1 View    Narrative    CHEST PORTABLE ONE VIEW June 18, 2017 9:12 AM     HISTORY: Tachypnea.    COMPARISON: 6/14/2017.    FINDINGS: The heart is enlarged. There is retrocardiac airspace  opacity. No pneumothorax or significant pleural effusion.      Impression    IMPRESSION: Retrocardiac airspace opacity may represent atelectasis or  pneumonia.    LIBBY POST MD[VP1.1]           Revision History        User Key Date/Time User Provider Type Action    > VP1.2 6/19/2017 10:58 AM Kraig Mobley MD Physician Addend     VP1.1 6/19/2017 10:31 AM Kraig Mobley MD Physician Sign                     Consult Notes      Consults by Ni Cuba at 7/1/2017 10:58 AM     Author:  Ni Cuba Service:  (none) Author Type:  (none)    Filed:  7/1/2017 12:15 PM Date of Service:  7/1/2017 10:58 AM Creation Time:  7/1/2017 10:57 AM    Status:  Attested :  Ni Cuba    Cosigner:  Madeline Kiran, RD, LD at 7/3/2017  7:26 AM         Consult Orders:    1. Nutrition Services Adult IP Consult [[id number]]  ordered by Paul Jolly MD at 06/30/17 7807           Attestation signed by Madeline Kiran RD, LD at 7/3/2017  7:26 AM        Clinical co-sign  Madeline Kiran RD, LD  Clinical Dietitian - [facility]  Pager - [phone]                                 CLINICAL NUTRITION SERVICES  -  ASSESSMENT NOTE      Recommendations Ordered by Registered Dietitian (RD):   1. Isosource 1.5 via G-tube at goal rate of 45mL/hr. Providing 1620 kcal (32 kcal/kg), 73 g PRO (1.4 g/kg), 190 g CHO, 16.2g fiber, 35% fat and 820 mL free water.[AP1.1] Initiate at 15mL/hr and advance by 10mL Q4hrs  2. Free w[AP1.2]ater flushes: 60mL Q4hr  3. Multivitamin/Mineral- Tri-Vi-Sol, 7 mL daily x 10 days (5250 International Units Vit A, 245 mg Vit C, and 2800 International Units Vit D)   Future/Additional Recommendations:[AP1.1]   Continue to monitor PI and need for further supplementation[AP1.2]    Malnutrition:[AP1.1] Unable to assess[AP1.2]         REASON FOR ASSESSMENT  Celia Lewis is a 89 year old female seen by Registered Dietitian for[AP1.1] Provider Order - Registered Dietitian to Assess and Order TF per Medical Nutrition protocol[AP1.2]      NUTRITION HISTORY  - Unable to obtain nutrition history from pt due to nonverbal   6/16/16: G-tube placed  Pt lives at [facility][AP1.1]  Per previous notes pt was receiving TF, however, no notes in paper chart and no pt family present verify regimen. Per RD note on 6/15:[AP1.2] Osmolite 1.5 @ 55 mL/hr x 20 hrs/day plus ProStat daily. H2O flushes of 200 mL q 4 hours in addition to flushes given with meds.[AP1.1] Total provisions = 1750 jarrod, 84 gm protein.[AP1.2]      CURRENT NUTRITION ORDERS  Diet Order:     NPO     Current Intake/Tolerance:  BGM range: 106-240 - NPH and sliding scale ordered (Lantus held due to renal failure[AP1.1])  BM x2 reported by ED[AP1.2]    PHYSICAL FINDINGS  Observed[AP1.1]  None observed[AP1.2]   Obtained from  "Chart/Interdisciplinary Team  Pressure Ulcers, per MD ellison PI (stage 3-4) on admission     ANTHROPOMETRICS  Height:[AP1.1] 5' 0\"[AP1.3]  Weight:[AP1.1] 150 lbs 12.71 oz[AP1.3] (68.4kg)[AP1.1]  Body mass index is 29.45 kg/(m^2).[AP1.3]  Weight Status:  Overweight BMI 25-29.9  IBW: 45.5 kg  % IBW: 151%  Weight History: Wt loss of 3.8kg (5.5%) over the last year[AP1.1]  Wt Readings from Last 10 Encounters:   07/01/17 68.4 kg (150 lb 12.7 oz)   06/19/17 69.3 kg (152 lb 12.5 oz)   06/24/16 72.2 kg (159 lb 3.2 oz)   06/18/15 83.9 kg (185 lb)   05/29/15 81.6 kg (180 lb)   05/28/15 81.6 kg (180 lb)   05/22/15 81.6 kg (179 lb 12.8 oz)   06/24/14 77.5 kg (170 lb 13.7 oz)   08/26/12 82.1 kg (181 lb)   08/25/12 82.1 kg (181 lb)[AP1.4]       LABS  Na 151 (H), BUN 86 (H), Cr 2.33 (H)    MEDICATIONS  Certavite     Dosing Weight 51.2 kg (adjusted wt, based on 68.4kg from 7/1)    ASSESSED NUTRITION NEEDS PER APPROVED PRACTICE GUIDELINES:  Estimated Energy Needs: 3157-6064 kcals (30-35 Kcal/Kg)  Justification: Pressure injury   Estimated Protein Needs: 61-77 grams protein (1.2-1.5 g pro/Kg)  Justification: wound healing, monitor renal labs  Estimated Fluid Needs: 1288-1536mL (25-30 mL/kg)  Justification: maintenance    MALNUTRITION:  % Weight Loss:  Weight loss does not meet criteria for malnutrition- Wt loss of 3.8kg (5.5%) over the last year  % Intake:[AP1.1]  Unable to assess due to limited information and pt nonverbal[AP1.2]   Subcutaneous Fat Loss:[AP1.1]  None observed[AP1.2]  Muscle Loss:[AP1.1]  None observed[AP1.2]  Fluid Retention:[AP1.1]  None noted[AP1.2]     Malnutrition Diagnosis:[AP1.1] Unable to determine due to unable to assess intake- suspect EN was running at previous regimen[AP1.2]       NUTRITION DIAGNOSIS:[AP1.1]  Increased nutrient needs energy-protein[AP1.2] related to[AP1.1] PI and overall medical status[AP1.2] as evidenced by[AP1.1] per MD[AP1.2] coccyx PI (stage 3-4) on " admission[AP1.1].[AP1.2]      NUTRITION INTERVENTIONS  Recommendations / Nutrition Prescription  Enteral nutrition goal rate 45mL/hr  Tri-Vi-Sol   Continue Certavite     Implementation  1. Nutrition education:[AP1.1] Not appropriate at this time due to patient condition[AP1.2]  2. EN Composition- Isosource 1.5 via G-tube at goal rate of 45mL/hr. Providing 1620 kcal (32 kcal/kg), 73 g PRO (1.4 g/kg), 190 g CHO, 16.2g fiber, 35% fat and 820 mL free water.[AP1.1] (39294 International Units of Vit A, 346 mg of Vit C, and 35 mg of Zinc)[AP1.2]  3. Free water flushes: 60mL Q4hr  4. Multivitamin/Mineral- Tri-Vi-Sol, 7 mL daily x 10 days (5250 International Units Vit A, 245 mg Vit C, and 2800 International Units Vit D)[AP1.1]    Certavite + Tri-Vi-Sol (7mL) + TF(45mL/hr)=  Vit A: 16,827 International Units, Vit C: 651mg, Vit D: 2816 International Units[AP1.2]    Nutrition Goals  1. Initiate enteral nutrition within 24-48hrs  2. Once enteral nutrition initiated, TF to meet 90%-110% of assessed needs    MONITORING AND EVALUATION:  Progress towards goals will be monitored and evaluated per protocol and Practice Guidelines    Ni Cuba RD[AP1.1]       Revision History        User Key Date/Time User Provider Type Action    > [N/A] 7/1/2017 12:15 PM Ni Cuba (none) Sign     AP1.2 7/1/2017 12:06 PM Ni Cuba (none) Sign     AP1.4 7/1/2017 11:13 AM Ni Cuba (none)      AP1.3 7/1/2017 10:58 AM Ni Cuba (none)      AP1.1 7/1/2017 10:57 AM Ni Cuba (none)             Consults by Lele Sanderson MD at 7/1/2017  7:24 PM     Author:  Lele Sanderson MD Service:  Infectious Disease Author Type:  Physician    Filed:  7/1/2017  9:07 PM Date of Service:  7/1/2017  7:24 PM Creation Time:  7/1/2017  7:16 PM    Status:  Signed :  Lele Sanderson MD (Physician)     Consult Orders:    1. Infectious Diseases IP Consult: Patient to be seen: Routine - within 24 hours;  "Fevers, uncertain etiology.; Consultant may enter orders: Yes [[id number]] ordered by Paul Jolly MD at 17 1901                Note INFECTIOUS DISEASES CONSULTATION NOTE  Covering for Grecia Gandara and Gilberto     Date 2017     Name /  Celia Lewis   1926     MRN [id number]       Thank you for asking us to see Celia Lewis for infectious diseases evaluation    REASON FOR CONSULT[DA1.1] Fevers, uncertain etiology[DA1.2]  REQUESTING PROVIDER[DA1.1] Dr Jolly[DA1.2]    CHIEF COMPLAINT[DA1.1]    fevers[DA1.2]  HPI  Nonagenarian w hx dementia, chronic Fonseca catheter, hx MRSA[DA1.1]. Nonverbal / non ambulatory / chronic sacral wound[DA1.2]  Recently in hospital  to  (earlier this month) w \"low grade sepsis\"  Discharged after peripheral leukocytosis and elevated blood lactate both improved  Thought to have possible (recurrent) aspiration and / or catheter associated UTI  Chronic sacral wound thought NOT to be actively infected at that time  Discharged w linezolid & levofloxacin to [facility]       Seen by Dr Macias. From his note  Assessment and Plan:   IMPRESSION:   1.  A 89-year-old female, nursing home resident, prior known aspiration, chronic Fonseca catheter and Alzheimer's dementia admitted with low-grade sepsis, etiology not entirely clear, would suspect the MRSA UTI as the cause, no positive blood cultures.  Doubt wound is an issue and not clearcut pneumonia.   2.  Chronic sacral decubitus wound, somewhat necrotic, but no signs of infection and it does not tract deep had an I&D done, culture of the wound growing multiple organisms of no significance.   3.  MRSA positive urine culture with catheter in place.   4.  Possible slight infiltrate, mild initial hypoxia, which has resolved, possible aspiration pneumonia.  If so has improved.   5.  Severe dementia.   6.  Recurrent urinary tract infections and urinary colonization with Fonseca catheter in place.   7.  Feeding tube in place, prior " "probable aspiration, possible etiology to current illness as well.   8.  Diabetes mellitus.   9.  Chronic renal insufficiency.   10.  Sulfa allergy.       RECOMMENDATIONS:   1.  Ignore the sacral decubitus, culture of no clinical significance.  The wound is not infected, nothing to suggest  deep.  Doubt this is the cause of current fever and has already been debrided.   2.  Urine may just be colonization, but also possible cause of current illness.  I would treat.  Continue vancomycin while here but as early as any time convert to oral linezolid 600 bid and treat for about 7 days.   3.  Not clearcut pneumonia.  Has been getting gram-negative coverage currently is on ceftriaxone previously Zosyn and Levaquin.  Does not appear to have major clinical pneumonia.  Assuming is recurrent aspiration could argue for just a short course of antibiotics has already been given, ie 5 days.  Alternatively enteral lev for another 5 days   4.  Obviously long-term prognosis poor, especially if recurrently aspirating.     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     Advanced directives, counseling/discussion, POLST completed 5/29/15  Full Code 5/29/2015     Alzheimer's disease 5/22/2015     Arthritis     shoulder     Atrial fibrillation (H) 5/22/2015     Calculus of kidney      Candidiasis of skin and nails 5/22/2015     Congestive heart failure, unspecified      Coronary artery disease      Heart disease, unspecified     diastolic dysfunction     Hyp ht/kd NOS I-IV w hf 5/22/2015     Hypertension      Morbid obesity (H) 5/22/2015     Other and unspecified hyperlipidemia 5/22/2015     Personal history of " urinary (tract) infection 5/22/2015     Primary localized osteoarthrosis, shoulder region 5/22/2015     Renal failure, unspecified     secondary to diuresis     Thyroid disease      Type II or unspecified type diabetes mellitus with renal manifestations, not stated as uncontrolled(250.40) 5/22/2015     Type II or unspecified type diabetes mellitus without mention of complication, not stated as uncontrolled      Uric acid stone in urine      Past Surgical History:   Procedure Laterality Date     CYSTOSCOPY, INSERT STENT URETHRA, COMBINED  8/6/2012    Procedure: COMBINED CYSTOSCOPY, INSERT STENT URETHRA;  cystoscopy, left  ureteral stent placement, left pyelogram;  Surgeon: Eulalio Bernardo MD;  Location: [facility] OR     CYSTOSCOPY,+URETEROSCOPY  8/19/05    with placement of (L) ureteral JJ stent     HC X-RAY ABDOMEN AP VIEW (KUB)  8/19/05     LASER HOLMIUM LITHOTRIPSY URETER(S), INSERT STENT, COMBINED  8/21/2012    Procedure: COMBINED CYSTOSCOPY, URETEROSCOPY, LASER HOLMIUM LITHOTRIPSY URETER(S), INSERT STENT;  CYSTOSCOPY, LEFT URETEROSCOPY, LASER HOLMIUM LITHOTRIPSY ,left ureteral stent exchange;  Surgeon: Eulalio Bernardo MD;  Location: [facility] OR      Problem (# of Occurrences) Relation (Name,Age of Onset)    CANCER (1) Unspecified    CEREBROVASCULAR DISEASE (2) Mother, Father    DIABETES (1) Brother        Social History     Social History     Marital status:      Spouse name: Leonel     Number of children: N/A     Years of education: N/A     Occupational History      Retired     Social History Main Topics     Smoking status: Former Smoker     Smokeless tobacco: Never Used      Comment: She was a social smoker many years ago     Alcohol use No     Drug use: No     Sexual activity: Not on file     Other Topics Concern     Not on file     Social History Narrative     Allergies   Allergen Reactions     Sulfa Drugs Other (See Comments)     Per nursing home documents, patient has allergy to sulfa      Immunization History   Administered Date(s) Administered     Influenza (IIV3) 10/13/2014     Pneumococcal 23 valent 12/01/1998     Prescription Medications as of 7/1/2017             insulin NPH-insulin regular (HUMULIN MIX 70/30/NOVOLIN MIX 70/30) injection Inject 16 Units Subcutaneous every morning    insulin NPH-insulin regular (HUMULIN MIX 70/30/NOVOLIN MIX 70/30) injection Inject 10 Units Subcutaneous every 24 hours @@ 1400    INSULIN ASPART SC Inject 1-6 Units Subcutaneous every 6 hours 0800, 1400, 2000, 0200  -250 1 unit  -300 2 units  -350 3 units  -400 4 units  -450 5 units  BG >450 6 units and update MD    Dakins 0.125 % SOLN Externally apply topically 2 times daily BID and PRN.   1. Cleanse wound with microklenze, cleanse periwound area with naomi perineal  2. Moisten kerlix fluff with dakins solution 0.125%, wring out excess, pack wound with kerlix  3. Apply antifungal powder to periwound area, rub in. Apply criticaid over powder.  4. Cover with ABD using minimal medipore tape to secure.  5. Label dressing with date, time, initials. Follow Rigorous PIP measures.    oxyCODONE (ROXICODONE) 5 MG/5ML solution 2 mLs (2 mg) by Per G Tube route 2 times daily    oxyCODONE (ROXICODONE) 5 MG/5ML solution 2 mLs (2 mg) by Per G Tube route every 4 hours as needed for moderate to severe pain    insulin glargine (LANTUS) 100 UNIT/ML injection Inject 36 Units Subcutaneous every morning    insulin glargine (LANTUS) 100 UNIT/ML injection Inject 34 Units Subcutaneous At Bedtime    Lactobacillus Acidophilus POWD 1 capsule by Gastric Tube route daily 10 billion units     multivitamin, therapeutic (THERA-VIT) TABS tablet 1 tablet by Gastric Tube route daily    Loperamide HCl (IMODIUM A-D) 1 MG/7.5ML LIQD 4 mg by Gastric Tube route every other day    acetaminophen (TYLENOL) 32 mg/mL solution 325 mg by Gastric Tube route daily as needed for fever or mild pain    insulin aspart (NOVOLOG PEN) 100  "UNIT/ML soln Inject 1-6 Units Subcutaneous every 4 hours    ACETAMINOPHEN  mg by Gastric Tube route 3 times daily     bisacodyl (DULCOLAX) 10 MG suppository Place 10 mg rectally daily as needed for constipation      Facility Administered Medications as of 7/1/2017             miconazole (MICATIN; MICRO GUARD) 2 % powder Apply topically every hour as needed for other (topical candidiasis)    dextrose 10 % 1,000 mL infusion Inject into the vein continuous prn (Hypoglycemia prevention)    vitamin A-D & C drops (TRI-VI-SOL) drops SOLN 7 mL 7 mLs by Per G Tube route daily    glucose 40 % gel 15-30 g Take 15-30 g by mouth every 15 minutes as needed for low blood sugar    Linked Group 1:  \"Or\" Linked Group Details     dextrose 50 % injection 25-50 mL Inject 25-50 mLs into the vein every 15 minutes as needed for low blood sugar    Linked Group 1:  \"Or\" Linked Group Details     glucagon injection 1 mg Inject 1 mg Subcutaneous every 15 minutes as needed for low blood sugar (May repeat x 1 only)    Linked Group 1:  \"Or\" Linked Group Details     insulin aspart (NovoLOG) inj (RAPID ACTING) Inject 1-4 Units Subcutaneous every 4 hours    vancomycin (VANCOCIN) 1500 mg in 0.9% NaCl 250 mL PREMIX Inject 250 mLs (1,500 mg) into the vein once    acetaminophen (TYLENOL) Suppository 650 mg Place 1 suppository (650 mg) rectally once    acetaminophen (TYLENOL) solution 325 mg Take 10.15 mLs (325 mg) by mouth every 8 hours as needed for mild pain or fever    acetaminophen (TYLENOL) solution 650 mg 20.3 mLs (650 mg) by Per Feeding Tube route 3 times daily    bisacodyl (DULCOLAX) Suppository 10 mg Place 1 suppository (10 mg) rectally daily as needed for constipation    lactobacillus rhamnosus (GG) (CULTURELL) capsule 1 capsule 1 capsule by Per Feeding Tube route daily    multivitamins with minerals (CERTAVITE/CEROVITE) liquid 15 mL 15 mLs by Per Feeding Tube route daily    oxyCODONE (ROXICODONE) solution 2 mg 2 mLs (2 mg) by Per G Tube " route 2 times daily    oxyCODONE (ROXICODONE) solution 2 mg 2 mLs (2 mg) by Per G Tube route every 4 hours as needed for moderate to severe pain    ranitidine (Zantac) syrup 150 mg 10 mLs (150 mg) by Per Feeding Tube route daily    naloxone (NARCAN) injection 0.1-0.4 mg Inject 0.25-1 mLs (0.1-0.4 mg) into the vein every 2 minutes as needed for opioid reversal    0.9% sodium chloride infusion Inject into the vein continuous    lidocaine 1 % 1 mL 1 mL by Other route every hour as needed (mild pain with VAD insertion or accessing implanted port)    lidocaine (LMX4) cream Apply topically every hour as needed for pain (with VAD insertion or accessing implanted port.)    sodium chloride (PF) 0.9% PF flush 3 mL 3 mLs by Intracatheter route every hour as needed for line flush (for peripheral IV flush post IV meds)    sodium chloride (PF) 0.9% PF flush 3 mL 3 mLs by Intracatheter route every 8 hours    nitroGLYcerin (NITROSTAT) sublingual tablet 0.4 mg Place 1 tablet (0.4 mg) under the tongue every 5 minutes as needed for chest pain    levofloxacin (LEVAQUIN) infusion 500 mg Starting on 7/2/2017. Inject 100 mLs (500 mg) into the vein every 48 hours    piperacillin-tazobactam (ZOSYN) 2.25 g vial to attach to  ml bag Inject 2.25 g into the vein every 6 hours    vancomycin place ramos - receiving intermittent dosing 1 each See Admin Instructions    lactated ringers infusion (Discontinued) Inject into the vein continuous    miconazole (MICATIN; MICRO GUARD) 2 % powder 1 g (Discontinued) Apply 1 g topically 3 times daily as needed for dry skin    piperacillin-tazobactam (ZOSYN) 2.25 g vial to attach to  ml bag (Discontinued) Inject 2.25 g into the vein every 6 hours    Lidocaine 1% injection 0.5-5 mL 0.5-5 mLs by Other route once as needed (mild pain For local anesthetic during PICC insertion.)    lidocaine 4 % (LMX4) cream Apply topically once as needed for moderate pain (for local anesthetic during PICC insertion)  "   sodium chloride (PF) 0.9% PF flush 5-50 mL Inject 5-50 mLs into the vein once as needed for line flush (to flush each lumen with line placement)        EXAM  /60  Temp 97.6  F (36.4  C) (Axillary)  Resp 27  Ht 1.524 m (5')  Wt 68.4 kg (150 lb 12.7 oz)  SpO2 96%  BMI 29.45 kg/m2[DA1.1]    general   In bed     neuro   Eyes open  No response for me  Mouth remains closed  No visible signs of pain or anguish     skin   pale     buttock   ~ 4-5 cm circular wound w ~ 2-3 cm depth  No visible bone / no odor / no gross purulence  Quite near anus -- some stool present at this time     lungs   Shallow respirations  No cough observed     CV   No murmer   abd   soft[DA1.2]     DATA[DA1.1]  Imaging    6.30 CXR No clear acute lung disease[DA1.2]    Cultures[DA1.1]    6.30 Stool (--) C diff PCR  6.30 Blood (--) culture  6.30 Urine (--) culture[DA1.2]    LABS  Recent Labs   Lab Test  06/30/17   1620  06/19/17   0856   06/14/17   1200   WBC  15.6*  11.8*   < >  18.7*   AST  24   --    --   20   ALT  26   --    --   22   BILITOTAL  0.7   --    --   0.5   ALKPHOS  81   --    --   96    < > = values in this interval not displayed.[DA1.1]      Lactate 6.30 3.8   7.1 2.8    Troponin 6.30 0.035[DA1.2]          ASSESSMENT   SUGGESTIONS    (A41.9) Sepsis (H)[DA1.1]    Many potential reasons for fever  Infection (UTI, aspiration resp tract, C diff, iv related blood)  Non-infection (aspiratin w/o infection, DVT, MI, ...)    Pt is of advanced age, nonverbal, non ambulatory  I think chance of sustained period without fever / cause to be in hospital is exceedingly small  I think continued aggressive intervention has significant chance of increasing discomfort without significant chance or sustained \"cure\" of anything   Pt remains \"full code\" which I think only stands to increase patient's dis-ease / pain and suffering if she does have an arrest  I am aware that family wants no involvement by palliative care team at this " time    I do not think chronic sacral wound will ever heal. I do not think it is now contributing to her fever  She is at risk for chronic asymptomatic bacteriuria as well as clinical UTI  She is at high risk of C diff  She likely recurrently aspirates despite having percutaneous feeding tube  There are many potential non infection causes of fever in her as well  Her non communicative state makes investigation a bit more challenging    At this time continue current maximal anti infection intervention  Follow cultures (all neg so far)  ? DVT study of legs  Anti aspiration measures  Wound care sacrum  Maximize pt comfort as best we can[DA1.2]         Lele Sanderson MD  Covering for Grecia Gandara & Gilberto & Liam  [facility] Infectious Diseases  [phone][DA1.1]      Revision History        User Key Date/Time User Provider Type Action    > DA1.2 7/1/2017  9:07 PM Lele Sanderson MD Physician Sign     DA1.1 7/1/2017  7:16 PM Lele Sanderson MD Physician             Consults signed by Matteo Macias MD at 6/18/2017  2:54 PM      Author:  Matteo Macias MD Service:  Infectious Disease Author Type:  Physician    Filed:  6/18/2017  2:54 PM Date of Service:  6/18/2017  1:11 PM Creation Time:  6/18/2017  1:30 PM    Status:  Signed :  Matteo Macias MD (Physician)         INFECTIOUS DISEASE CONSULTATION      IMPRESSION:   1.  A 89-year-old female, nursing home resident, prior known aspiration, chronic Fonseca catheter and Alzheimer's dementia admitted with low-grade sepsis, etiology not entirely clear, would suspect the MRSA UTI as the cause, no positive blood cultures.  Doubt wound is an issue and not clearcut pneumonia.   2.  Chronic sacral decubitus wound, somewhat necrotic, but no signs of infection and it does not tract deep had an I&D done, culture of the wound growing multiple organisms of no significance.   3.  MRSA positive urine culture with catheter in place.   4.  Possible  slight infiltrate, mild initial hypoxia, which has resolved, possible aspiration pneumonia.  If so has improved.   5.  Severe dementia.   6.  Recurrent urinary tract infections and urinary colonization with Fonseca catheter in place.   7.  Feeding tube in place, prior probable aspiration, possible etiology to current illness as well.   8.  Diabetes mellitus.   9.  Chronic renal insufficiency.   10.  Sulfa allergy.      RECOMMENDATIONS:   1.  Ignore the sacral decubitus culture of no clinical significance.  The wound is not infected, nothing to suggest that deep.  Doubt this is the cause of current fever and has already been debrided.   2.  Urine may just be colonization, but also possible cause of current illness.  I would treat.  Continue vancomycin while here but as early as tomorrow, possibly convert to oral linezolid and treat for about 7 days.   3.  Not clearcut pneumonia.  Has been getting gram-negative coverage currently is on ceftriaxone previously Zosyn and Levaquin.  Does not appear to have major clinical pneumonia.  Assuming is recurrent aspiration could argue for just a short course of antibiotics has already been given.     4.  Obviously long-term prognosis poor, especially if recurrently aspirating.      HISTORY:  Celia Lewis is a 89-year-old female seen in consultation due to apparent sepsis.  The patient is noncommunicative comes from the rehab center has a history of some aspiration pneumonia.  Has feeding and a G-tube, chronic Fonseca catheter and recurrent urinary tract infections.  She has also had a sacral decubitus wound has now been debrided.  As described at admission is showing signs of infection, but nothing to suggest that currently and not seen when debrided by Dr. Jones.  The wound culture is growing multiple organisms as expected, no clinical significance.  The patient is improved at present, not requiring oxygen and had a slight infiltrate present and urine is growing MRSA.  She has  been getting vancomycin for that and does not have signs of obvious ongoing sepsis.      PAST MEDICAL HISTORY:  Probable recurrent aspiration, history of sacral decubitus wound, history of chronic Fonseca catheter, advanced dementia, diabetes mellitus, mild chronic renal insufficiency.      ALLERGIES:  Sulfa, unclear history.      REVIEW OF SYSTEMS:  Largely unobtainable, as the patient has severe dementia.      PHYSICAL EXAMINATION:   GENERAL:  The patient is awake, looks at me and not communicative, does not look toxic or ill.   VITAL SIGNS:  Pulse of 70, respiratory rate 16.  She is afebrile.   HEENT:  No visible lesions.   NECK:  Supple and nontender without lymphadenopathy.   HEART:  Not really tachycardic.   LUNGS:  Few crackles at both bases.  Difficult exam is not cooperative.   EXTREMITIES:  Sacral decubitus wound not impressive from an infection perspective, no significant lower extremity wounds.      LABORATORY:  Creatinine currently normal at 0.76.  Procalcitonin normal at 0.20.  Sacral decubitus wound growing numerous organisms.  Urine culture growing MRSA.  Blood cultures have been negative.  Chest x-ray with possible slight infiltrate.      Thank you very much for consultation.  Will follow the patient with you.         JOSE BERNARDO MD             D: 2017 13:11   T: 2017 13:30   MT: [id number]      Name:     RAÚL MERCEDES   MRN:      [id number]        Account:       [id number]   :      1926           Consult Date:  2017      Document: [id number]       cc: Kraig Mobley MD   [SD1.1]   Revision History        User Key Date/Time User Provider Type Action    > SD1.1 2017  2:54 PM Jose Bernardo MD Physician Sign            Consults by Jose Bernardo MD at 2017  9:43 AM     Author:  Jose Bernardo MD Service:  Infectious Disease Author Type:  Physician    Filed:  2017  9:43 AM Date of Service:  2017  9:43 AM Creation Time:  2017  9:40 AM    Status:   Signed :  Matteo Macias MD (Physician)         ID consult dictated IMP 1 89 yo female NH, dementia, TF, caro, chronic sacral wd, ? Sepsis, Bc neg, sacral wd debrided but not deep infection, UC MRSa , ? Infiltrate    REc decubtitus not infected, cx of no sig, no antibiotics, Uc ? Sig, vanco while here, zyvox po short course, had zosyn now ceftriaxone ? Pneumonia but seems minor ? zyvox alone at disposition[SD1.1]     Revision History        User Key Date/Time User Provider Type Action    > SD1.1 6/18/2017  9:43 AM Matteo Macias MD Physician Sign            Consults signed by Raffy Lopez MD at 6/16/2017 12:21 PM      Author:  Raffy Lopez MD Service:  Surgery Author Type:  Physician    Filed:  6/16/2017 12:21 PM Date of Service:  6/15/2017  2:59 PM Creation Time:  6/16/2017  2:44 AM    Status:  Signed :  Raffy Lopez MD (Physician)         SURGICAL CONSULTATION AND PROCEDURE      DATE OF ENCOUNTER:  06/15/2017      TIME OF ENCOUNTER:  15:00      REASON FOR CONSULTATION:  Sacral decubitus ulcer.      REFERRING PROVIDER:  Dr. Yoder.      BRIEF HISTORY:  Ms. Celia Lewis is a 89-year-old female who was admitted to [facility] due to a presumed urinary tract infection and sepsis, also possible pneumonia.  She was noted on her admission to have a chronic sacral decubitus ulcer.  This was evaluated by the wound care nurse, and found to have some necrotic fibrinous slough within the wound, and surgical consultation was therefore requested to evaluate for debridement.      The patient was examined at the bedside with the assistance of her nurse.  The wound itself was examined.  The sacral decubitus ulcer itself measured 4 cm in length and approximately 7 cm in transverse dimension, and the depth was approximately 3 cm with some deep tunneling at one aspect of the wound.  On the left lateral aspect, there was some necrotic fibrinous slough, whereas on the right side  there was healthy-appearing granulation tissue.  There was mild reactive erythema around the open edges of the wound.      I discussed this with the patient's son, Dr. Salvatore Lewis, and obtained a telephone consent to proceed with excisional debridement.      PROCEDURE:  With the assistance of a nurse, with the patient in a right side down decubitus position, scissors and forceps were used to sharply debride the fibrinous slough from the wound.  The eschar present was relatively superficial.  This was debrided effectively at the bedside to what appeared to be more healthy tissue with some visible mild tissue bleeding.  Overall, I would characterize this as a likely stage III/borderline stage IV sacral decubitus ulcer without real significant sequelae to suggest infection.  The wound was repacked by the nurse.  She tolerated this without any difficulty.      PLAN:  At this point, we will defer ongoing management of the wound to the wound care nurse who has already made recommendations for the ongoing management.         WILNER LOPEZ MD             D: 06/15/2017 14:59   T: 2017 02:44   MT: [id number]      Name:     RAÚL LEWIS   MRN:      [id number]        Account:       [id number]   :      1926           Consult Date:  06/15/2017      Document: [id number]       cc: Myron Yoder MD   [BP1.1]   Revision History        User Key Date/Time User Provider Type Action    > BP1.1 2017 12:21 PM Wilner Lopez MD Physician Sign            Consults by Wilner Lopez MD at 6/15/2017  2:59 PM     Author:  Wilner Lopez MD Service:  Surgery Author Type:  Physician    Filed:  6/15/2017  2:59 PM Date of Service:  6/15/2017  2:59 PM Creation Time:  6/15/2017  2:59 PM    Status:  Signed :  Wilner Lopez MD (Physician)     Consult Orders:    1. Surgery General IP Consult: Patient to be seen: Routine - within 24 hours; Coccyx pressure ulcer with deep wound and  drainage; Consultant may enter orders: Yes [[id number]] ordered by Kraig Mobley MD at 06/15/17 1319                See dictated report[BP1.1]     Revision History        User Key Date/Time User Provider Type Action    > BP1.1 6/15/2017  2:59 PM Raffy Lopez MD Physician Sign            Consults by Bisi Montgomery RD, LD at 6/15/2017  9:28 AM     Author:  Bisi Montgomery RD, LD Service:  Nutrition Author Type:  Registered Dietitian    Filed:  6/15/2017  9:28 AM Date of Service:  6/15/2017  9:28 AM Creation Time:  6/15/2017  9:09 AM    Status:  Signed :  Bisi Montgomery RD, LD (Registered Dietitian)     Consult Orders:    1. Nutrition Services Adult IP Consult [[id number]] ordered by Antwan Green MD at 06/14/17 1543                CLINICAL NUTRITION SERVICES  -  ASSESSMENT NOTE      Recommendations Ordered by Registered Dietitian (GAVI):   Isosource 1.5 @ 45 mL/hr, continuous (goal rate)  Daily Certavit  Will check WOCN eval of pt's sacral wound and order additional vit/min once the wound is staged.       Malnutrition:   Patient does not meet two of the criteria necessary for diagnosing malnutrition          REASON FOR ASSESSMENT  Celia Lewis is a 89 year old female seen by Registered Dietitian for Provider Order - Registered Dietitian to Assess and Order TF per Medical Nutrition protocol      NUTRITION HISTORY  - Information obtained from EMR, pt with Alzheimer's and is nonverbal.  Pt lives in a NH. She had a G-tube place 6/16/2016 and has been on Osmolite 1.5 @ 55 mL/hr x 20 hrs/day plus ProStat daily. H2O flushes of 200 mL q 4 hours in addition to flushes given with meds.  Total provisions = 1750 jarrod, 84 gm protein.    Pt was also on a multivt, Senokot and Imodium.      CURRENT NUTRITION ORDERS  Diet Order:     NPO       PHYSICAL FINDINGS  Observed  No nutrition-related physical findings observed  Obtained from Chart/Interdisciplinary Team  Pressure Ulcers - sacral decubitus, large (WOCN  "consult ordered)    ANTHROPOMETRICS  Height:[CM1.1] 5' 0\"[CM1.2]  Weight:[CM1.1] 151 lbs 14.35 oz[CM1.2] (68.9 kg)[CM1.1]  Body mass index is 29.67 kg/(m^2).[CM1.2]  Weight Status:  Overweight BMI 25-29.9  IBW: 45.4 kg +/- 10^%  % IBW: 152%  Weight History:[CM1.1]   Wt Readings from Last 10 Encounters:   06/14/17 68.9 kg (151 lb 14.4 oz)   06/24/16 72.2 kg (159 lb 3.2 oz)   06/18/15 83.9 kg (185 lb)   05/29/15 81.6 kg (180 lb)   05/28/15 81.6 kg (180 lb)   05/22/15 81.6 kg (179 lb 12.8 oz)   06/24/14 77.5 kg (170 lb 13.7 oz)   08/26/12 82.1 kg (181 lb)   08/25/12 82.1 kg (181 lb)   08/21/12 82.3 kg (181 lb 7 oz)[CM1.3]       LABS  Labs reviewed    MEDICATIONS  Na IVF @ 100 mL/hr  Med SSI, Lantus BID  Culturell      Dosing Weight 51 kg - adjusted for overweight    ASSESSED NUTRITION NEEDS PER APPROVED PRACTICE GUIDELINES:  Estimated Energy Needs: 8385-5293 kcals (30-35 Kcal/Kg)  Justification: Pressure Injury Protocol  Estimated Protein Needs: 60-75 grams protein (1.2-1.5 g pro/Kg)  Justification: wound healing - monitor renal status  Estimated Fluid Needs: 8706-1304  mL (1 mL/Kcal)  Justification: maintenance    MALNUTRITION:  % Weight Loss:  None noted  % Intake:  No decreased intake noted  Subcutaneous Fat Loss:  None observed  Muscle Loss:  None observed  Fluid Retention:  None noted    Malnutrition Diagnosis: Patient does not meet two of the above criteria necessary for diagnosing malnutrition    NUTRITION DIAGNOSIS:  Increased nutrient needs (protein) related to healing as evidenced by sacral decubitus      NUTRITION INTERVENTIONS  Recommendations / Nutrition Prescription  Isosource 1.5 @ 45 mL/hr continuous to provide 1620 jarrod (32 jarrod/kg), 73 gm pro (1.4 gm/kg), 16 gm fiber, 190 gm CHO, 820 mL free water.  Std H2O flushes of 60 mL q 4 hours while on IVF (increase to 135 mL q 4 hr once IVF d/c'd).  Multivit for wound healing    Will check WOCN eval of pt's sacral wound and order additional vit/min once the " "wound is staged.      Implementation  Nutrition education: No education needs   EN Schedule, Feeding Tube Flush - ordered TF to start at goal rate of 45 mL/hr  Multivitamin/Mineral - ordered daily Certavit per PI protocol  Collaboration and Referral of Nutrition care -Discussed TF and pt's wounds with RASHMI Roman.      Nutrition Goals  Isosource 1.5 @ goal of 45 mL/hr will meet assessed needs  .      MONITORING AND EVALUATION:  Progress towards goals will be monitored and evaluated per protocol and Practice Guidelines    Bisi Montgomery RD  Pager [phone] (M-F)            [phone] (W/E & Hol)[CM1.1]                     Revision History        User Key Date/Time User Provider Type Action    > CM1.3 6/15/2017  9:28 AM Bisi Montgomery, GAVI, LD Registered Dietitian Sign     CM1.2 6/15/2017  9:10 AM Bisi Montgomery, GAVI, [place] Registered Dietitian      CM1.1 6/15/2017  9:09 AM Bisi Montgomery, GAVI, LD Registered Dietitian                      Progress Notes - Physician (Notes from 07/05/17 through 07/08/17)      Progress Notes by Tamar Montes De Oca RN at 7/8/2017 12:33 PM     Author:  Tamar Montes De Oca RN Service:  (none) Author Type:      Filed:  7/8/2017 12:40 PM Date of Service:  7/8/2017 12:33 PM Creation Time:  7/8/2017 12:33 PM    Status:  Addendum :  Tamar Montes De Oca RN ()         Care Coordination:    Routinely following.  Appreciate d/c orders.  Understand MD has reached out to pt's family re: discharge plans.  SW has set 2pm ride time.    Noted in d/c summary, \"\"son wants picc line & long term IV fluids.\" Pt does not have a PICC.  Paged hospitalist to clarify.[LH1.1]     Noted nutrition note from 7/5: \"Pt originally at goal rate of 55mL/h but then held last night d/t suspected aspiration. Family wishes to continue with nutrition support; MD reinitiated TF at lower rate (45mL/h) and added D5. Provisions still adequate to meet needs.\"     Discussed w/MD:[LH1.2] no plan for PICC, pt has a " functioning G-tube for nutrition.[LH1.3]      Tamar Montes De Oca RN, BSN  Critical access hospital Care Coordinator   Mobile Phone: [phone][LH1.1]       Revision History        User Key Date/Time User Provider Type Action    > LH1.3 7/8/2017 12:40 PM Tamar Montes De Oca, RN Case Manager Addend     LH1.2 7/8/2017 12:37 PM Tamar Montes De Oca RN Case Manager Addend     LH1.1 7/8/2017 12:34 PM Tamar Montes De Oca RN Case Manager Sign            Progress Notes by Suzi Gonzalez LSW at 7/8/2017 11:17 AM     Author:  Suzi Gonzalez LSW Service:  Social Work Author Type:      Filed:  7/8/2017 11:19 AM Date of Service:  7/8/2017 11:17 AM Creation Time:  7/8/2017 11:17 AM    Status:  Signed :  Suzi Gonzalez LSW ()         Sw:    I/P:  Per MD pt is ready for d/c.  Per MD pt needs stretcher transport.  Per chart pt is nonverbal (cannot express needs), contracted, requires a mechanical lift, cannot sit.  Tee arranged stretcher transport with [employer] from  for today at 1400.  Tee confirmed with [facility] that pt can arrive today with a transport time of 1400.  Per MD son was left vm regarding d/c plan.  Sw faxed d/c orders.[KE1.1]     Revision History        User Key Date/Time User Provider Type Action    > KE1.1 7/8/2017 11:19 AM Suzi Gonzalez LSW  Sign            Progress Notes by Matteo Macias MD at 7/8/2017  8:06 AM     Author:  Matteo Macias MD Service:  Infectious Disease Author Type:  Physician    Filed:  7/8/2017  8:10 AM Date of Service:  7/8/2017  8:06 AM Creation Time:  7/8/2017  8:06 AM    Status:  Signed :  Matteo Macias MD (Physician)         [facility]  Infectious Disease Progress Note          Assessment and Plan:   IMPRESSION:   1.  A 89-year-old female, nursing home resident, prior known aspiration, chronic Fonseca catheter and Alzheimer's dementia readmitted with low-grade sepsis, etiology still not entirely clear, prior treated MRSA UTI ,  now UC neg, no positive blood cultures.  Still doubt wound is  the issue , not clearcut pneumonia but obvious aspiration risk.   2.  Chronic sacral decubitus wound, somewhat necrotic, but no signs of infection and it does not tract deep had prior I&D done, culture of the wound growing multiple organisms of no significance by itself.   3.  MRSA positive urine culture prior admit, treated, UA abnormal still with with catheter in place, but current UC no sig +.   4.  Minimal infiltrate, mild  hypoxia, mostly resolved, possible aspiration pneumonia.  If so has improved.   5.  Severe dementia.   6.  Recurrent urinary tract infections and urinary colonization with Fonseca catheter in place.   7.  Feeding tube in place, prior probable aspiration, possible etiology to current illness as well.   8.  Diabetes mellitus.   9.  Chronic renal insufficiency, acute worsening now stable.   10.  Sulfa allergy.       RECOMMENDATIONS:   1.  No indication to treat sacral decubitus with antibiotics,  wound culture of no clinical significance unless deep osteo or cellulitis and neither evident. .  Doubt this is the cause of current fever and has already been debrided recently, if it is cause of fever due to deep infection, no long term antibiotic strategy of likely benefit, would require major surgical intervention also.   2.  Urine does not appear to be current issue (recent MRSA) with current cx neg  3.  Not clearcut initial pneumonia.  Day 8+  gram-negative  and MRSA coverage .  Does not appear to have major clinical pneumonia.  If it is aspiration antibiotics likely only of short term benefit been given,apparently another episode aspiration, although not hypoxic now,   4.  Obviously long-term prognosis poor, especially if recurrently aspirating, Note ethics consult . Plan is still  full aggressive care, almost certain to have recurrent episodes and no preventive, longer or other antibiotic plan likely to change that  5 Even with  aggressive care plan no indication for extended IV,if here IV OK but I would favor to enteral empiric levaquin and zyvox for 1 week any time, disposition OK  6 Continues to trigger sepsis protocol, lactic acid persistent elevation likely for other reasons, not clinically septic        Interval History:   no complaints not interactive, no fever , no new cxs +, Uc mixed low ct maria esther, initial CXR no clear infiltrate but now bilat infiltrates, despite this no hypoxia, creat now 1.17              Medications:[SD1.1]       linezolid  600 mg Per Feeding Tube Q12H VIRA     levofloxacin  250 mg Per Feeding Tube Daily     insulin glargine  24 Units Subcutaneous BID     insulin aspart  1-6 Units Subcutaneous Q4H     metoprolol  2.5 mg Intravenous Q6H     amLODIPine  2.5 mg Oral Daily     vitamin A-D & C drops  7 mL Per G Tube Daily     acetaminophen  650 mg Rectal Once     acetaminophen  650 mg Per Feeding Tube TID     lactobacillus rhamnosus (GG)  1 capsule Per Feeding Tube Daily     multivitamins with minerals  15 mL Per Feeding Tube Daily     oxyCODONE  2 mg Per G Tube BID     ranitidine  150 mg Per Feeding Tube Daily     sodium chloride (PF)  3 mL Intracatheter Q8H[SD1.2]                  Physical Exam:[SD1.1]   Blood pressure 159/68, pulse 107, temperature 97.9  F (36.6  C), temperature source Axillary, resp. rate 30, height 1.524 m (5'), weight 71.4 kg (157 lb 6.4 oz), SpO2 95 %.  Wt Readings from Last 2 Encounters:   07/06/17 71.4 kg (157 lb 6.4 oz)   06/19/17 69.3 kg (152 lb 12.5 oz)[SD1.2]     Vital Signs with Ranges[SD1.1]  Temp:  [96.7  F (35.9  C)-98.1  F (36.7  C)] 97.9  F (36.6  C)  Pulse:  [107] 107  Heart Rate:  [] 95  Resp:  [22-35] 30  BP: (137-187)/(60-86) 159/68  SpO2:  [94 %-97 %] 95 %[SD1.2]    Constitutional: Awake, alert, not interactive as usual   Lungs: Congestion to auscultation bilaterally, no crackles or wheezing no O2   Cardiovascular: Regular rate and rhythm, normal S1 and S2, and no murmur  noted   Abdomen: Normal bowel sounds, soft, non-distended, non-tender   Skin: No rashes, no cyanosis, no edema wd not seen today   Other:           Data:   All microbiology laboratory data reviewed.[SD1.1]  Recent Labs   Lab Test  07/08/17   0727  07/07/17   0637  07/05/17   0705   WBC  10.0  8.7  8.7   HGB  10.6*  10.0*  10.7*   HCT  33.5*  31.1*  33.0*   MCV  96  95  94   PLT  333  291  240     Recent Labs   Lab Test  07/08/17   0727  07/07/17   0637  07/06/17   0650   CR  0.96  1.17*  1.25*     Recent Labs   Lab Test  05/08/13   1300   SED  61*     Recent Labs   Lab Test  06/30/17   1654  06/30/17   1640  06/14/17   1319  06/14/17   1230  06/14/17   1200  06/21/16   0653  06/21/16   0652  06/21/16   0636  06/24/14   1200   CULT  No growth  <1000 colonies/mL urogenital maria esther Susceptibility testing not routinely done  No growth  No growth  Moderate growth Escherichia coli  Moderate growth Enterococcus avium  Moderate growth Enterococcus faecalis  Light growth Candida albicans / dubliniensis Candida albicans and Candida   dubliniensis are not routinely speciated Susceptibility testing not routinely   done  Plus Heavy growth Normal skin maria esther  *  >100,000 colonies/mL Methicillin resistant Staphylococcus aureus (MRSA)  50,000 to 100,000 colonies/mL Strain 2 Methicillin resistant Staphylococcus   aureus (MRSA)  Critical Value/Significant Value called to and read back by Elmer Hurd Rn at   2044 [id number]  *  No growth  No growth  >100,000 colonies/mL Enterococcus species*  No growth  10,000 to 50,000 colonies/mL Candida kefyr Susceptibility testing not routinely   done  10,000 to 50,000 colonies/mL Lactobacillus species No further identification  Susceptibility testing not routinely done  *[SD1.2]          Revision History        User Key Date/Time User Provider Type Action    > SD1.2 7/8/2017  8:10 AM Matteo Macias MD Physician Sign     SD1.1 7/8/2017  8:06 AM Matteo Macias MD Physician             Progress  Notes by Piotr Saba MD at 7/7/2017  5:02 PM     Author:  Piotr Saba MD Service:  Hospitalist Author Type:  Physician    Filed:  7/7/2017  5:04 PM Date of Service:  7/7/2017  5:02 PM Creation Time:  7/7/2017  5:02 PM    Status:  Signed :  Piotr Saba MD (Physician)         [facility]  Hospitalist Progress Note  Piotr Saba MD  07/07/2017    Assessment & Plan   89 y/o NH resident is admitted with ARF and sepsis, not clear sourse of infection, possible sec MRSA UTI ( but neg UC and BC). She developed hypernatremia and the free water through PEG was increased. She was stable, afebrile, stable VS and she was transferred out of CCU on July 3. On early morning July 4 she developed tachycardia and tachypnea and the CXR shows new bilat infiltrates. Most likely aspiration. Tube feeding will be continued as per family goldishes       Her complex PMHx: advanced dementia, aspirations pneumonia, indwelling urinary cath, UTIs, DM, CKD, feeding tube, chronic sacral wound,           Sepsis possible sec MRSA UTI w neg BC  -- fever, tachypnea, hypoxemia, increased lactic acid ( no fever in the last 24) - on admission. She was getting better until July 4 when she became tachycardic and tachypneic and new CXR infiltrates. Most likely she aspirated.    -- ID consult notes reviewed, cont Vanco and zosyn today and to orals today with Linezolid + Levaquin  -- CXR 6/30 - negative for acute changes. CXR 7/4: bilat infiltrates, will repeat CXR with tachypnea and mild tachycardia on 7/6  -- BC - neg to the date  -- UA with WBC, yeast. UC is neg at this admission but Pos for MRSA on June 14  -- C Diff - neg      Nutrition and long term prognosis  --  tube feeding resumed as per son wishes. We will continue the D5 lower rate as well.  -- her Na improved, stop D5W and give free water flushes   -- SL IVF     Chr indwelling CFoley cath, freq UTIs  -- ok to change Caro q 2 weeks  -- caro was  "blocked with sediment, flushed and had good urine output.  -- family asking for urology consult, but currently no urologic problems noted  -- patient had 1200 ml residual on admission which were expelled when Fonseca was changed in ER ( see H&P)       Hypernatremia  -- NA  142<--140<--145<--151<--155<--151 < -- 148  -- SL IVF  -- continue free water flushes           ARF on CKD  -- Cr  1.25<--1.04<--1.91<--2.33 <-- 2.84   -- monitor   -- SL iv fluids      Elevated BP  -- started on Amlodipine 2.5 mg daily  -- metoprolol prn HTN, tachycardia         Advanced dementia with nonverbal state, nonabulatory with fixed chronic muscle contraction          DM 2. Poorly controlled.  -- she was on 70/30 bid and Lantus bid at the NH - she will not need both  -- SSI to medium   -- increase Lantus 20 to 24 units bid           Chr sacral decubitus ulcer   -- considered chronic, noninfected   -- continue local care      Chr Anemia  -- Hgb in the same range as before.          DVT Prophylaxis: Pneumatic Compression Devices     Goal of care and COde status  -- Dr. Berg had a long discussion with pt's son,  Joshua, about the patient medical condition. He feels pressured and judged by the medical staff but he is trying to respect his mother strong Confucianism believes. And all the major changes in her plan of care need to have \" a clearance\" from their rabbi.   -- He understands her medical situation and the prognosis and he appreciates the care his mother is receiving. The patient is Orthodoxy Confucianism and she was very strong in her Confucianism beliefs. Her son wants to respect her wishes. According to their [employer] he can't change her to DNR. If a time comes and she will need to be intubated the situation will be reassessed. For now she will have full treatment.   -- Medical team discussed about nutrition and fluids. Son wants picc line and long term IV fluids. He understands this increased he risk of infection.     Code " Status: Full Code      Disposition:   - > 2 days    Interval History    - breathing comfortably  - she is awake, non-verbal      -Data reviewed today: I reviewed all new labs and imaging over the last 24 hours. I personally reviewed no images or EKG's today.    Physical Exam   Heart Rate: 110, Blood pressure 161/60, pulse 79, temperature 98.1  F (36.7  C), temperature source Oral, resp. rate (!) 34, height 1.524 m (5'), weight 71.4 kg (157 lb 6.4 oz), SpO2 95 %.  Vitals:    07/02/17 0500 07/03/17 0600 07/06/17 0535   Weight: 68.4 kg (150 lb 12.7 oz) 68 kg (149 lb 14.6 oz) 71.4 kg (157 lb 6.4 oz)     Vital Signs with Ranges  Temp:  [96  F (35.6  C)-98.3  F (36.8  C)] 98.1  F (36.7  C)  Heart Rate:  [] 110  Resp:  [26-40] 34  BP: (108-161)/(55-82) 161/60  SpO2:  [94 %-97 %] 95 %  I/O's Last 24 hours  I/O last 3 completed shifts:  In: 3080 [I.V.:1648; NG/GT:460]  Out: 2400 [Urine:2400]    Constitutional: Non-verbal, awake  Respiratory: Clear to auscultation bilaterally, no crackles or wheezing  Cardiovascular: borderline tachycardia, normal S1 and S2, and no murmur noted  GI: Normal bowel sounds, soft,  distended, non-tender  Skin/Integumen: No rashes, no cyanosis,    Other:      Medications   All medications were reviewed.    IV fluid REPLACEMENT ONLY         linezolid  600 mg Per Feeding Tube Q12H VIRA     levofloxacin  250 mg Per Feeding Tube Daily     insulin glargine  24 Units Subcutaneous BID     insulin aspart  1-6 Units Subcutaneous Q4H     metoprolol  2.5 mg Intravenous Q6H     amLODIPine  2.5 mg Oral Daily     vitamin A-D & C drops  7 mL Per G Tube Daily     acetaminophen  650 mg Rectal Once     acetaminophen  650 mg Per Feeding Tube TID     lactobacillus rhamnosus (GG)  1 capsule Per Feeding Tube Daily     multivitamins with minerals  15 mL Per Feeding Tube Daily     oxyCODONE  2 mg Per G Tube BID     ranitidine  150 mg Per Feeding Tube Daily     sodium chloride (PF)  3 mL Intracatheter Q8H        Data      Recent Labs  Lab 07/07/17  0637 07/06/17  0650 07/06/17  0100 07/05/17  0705  07/03/17  1005   WBC 8.7  --   --  8.7  --  8.6   HGB 10.0*  --   --  10.7*  --  9.8*   MCV 95  --   --  94  --  99     --   --  240  --  189   * 142  --  140  < >  --    POTASSIUM 4.1 4.2 4.2 3.3*  < >  --    CHLORIDE 113* 109  --  106  < >  --    CO2 21 18*  --  22  < >  --    BUN 33* 34*  --  31*  < >  --    CR 1.17* 1.25*  --  1.05*  < >  --    ANIONGAP 11 15*  --  12  < >  --    NELLIE 8.6 8.5  --  7.9*  < >  --    * 367*  --  305*  < >  --    < > = values in this interval not displayed.    No results found for this or any previous visit (from the past 24 hour(s)).    Piotr Saba MD  Pager [phone][PD1.1]        Revision History        User Key Date/Time User Provider Type Action    > PD1.1 7/7/2017  5:04 PM Piotr Saba MD Physician Sign            Progress Notes by Matteo Macias MD at 7/7/2017  9:15 AM     Author:  Matteo Macias MD Service:  Infectious Disease Author Type:  Physician    Filed:  7/7/2017  9:23 AM Date of Service:  7/7/2017  9:15 AM Creation Time:  7/7/2017  9:15 AM    Status:  Signed :  Matteo Macias MD (Physician)         [facility]  Infectious Disease Progress Note          Assessment and Plan:   IMPRESSION:   1.  A 89-year-old female, nursing home resident, prior known aspiration, chronic Fonseca catheter and Alzheimer's dementia readmitted with low-grade sepsis, etiology still not entirely clear, prior treated MRSA UTI , now UC neg, no positive blood cultures.  Still doubt wound is  the issue , not clearcut pneumonia but obvious aspiration risk.   2.  Chronic sacral decubitus wound, somewhat necrotic, but no signs of infection and it does not tract deep had prior I&D done, culture of the wound growing multiple organisms of no significance by itself.   3.  MRSA positive urine culture prior admit, treated, UA abnormal still with with catheter in  place, but current UC no sig +.   4.  Minimal infiltrate, mild  hypoxia, mostly resolved, possible aspiration pneumonia.  If so has improved.   5.  Severe dementia.   6.  Recurrent urinary tract infections and urinary colonization with Fonseca catheter in place.   7.  Feeding tube in place, prior probable aspiration, possible etiology to current illness as well.   8.  Diabetes mellitus.   9.  Chronic renal insufficiency, acute worsening now stable.   10.  Sulfa allergy.       RECOMMENDATIONS:   1.  No indication to treat sacral decubitus with antibiotics,  wound culture of no clinical significance unless deep osteo or cellulitis and neither evident. .  Doubt this is the cause of current fever and has already been debrided recently, if it is cause of fever due to deep infection, no long term antibiotic strategy of likely benefit, would require major surgical intervention also.   2.  Urine does not appear to be current issue (recent MRSA) with current cx neg  3.  Not clearcut initial pneumonia.  Day 7+  gram-negative  and MRSA coverage .  Does not appear to have major clinical pneumonia.  If it is aspiration antibiotics likely only of short term benefit been given,apparently another episode aspiration, although not hypoxic now,   4.  Obviously long-term prognosis poor, especially if recurrently aspirating, Note ethics consult . Plan is full aggressive care  5 Even with aggressive care plan no indication for extended IV, I would favor 1 week enteral empiric levaquin and zyvox, dispositionOK        Interval History:   no complaints not interactive, no fever , no new cxs +, Uc mixed low ct maria esther, initial CXR no clear infiltrate but now bilat infiltrates, despite this no hypoxia, creat now 1.17              Medications:       vancomycin (VANCOCIN) IV  1,250 mg Intravenous Q48H     insulin glargine  20 Units Subcutaneous BID     insulin aspart  1-6 Units Subcutaneous Q4H     piperacillin-tazobactam  2.25 g Intravenous Q6H      metoprolol  2.5 mg Intravenous Q6H     amLODIPine  2.5 mg Oral Daily     vitamin A-D & C drops  7 mL Per G Tube Daily     acetaminophen  650 mg Rectal Once     acetaminophen  650 mg Per Feeding Tube TID     lactobacillus rhamnosus (GG)  1 capsule Per Feeding Tube Daily     multivitamins with minerals  15 mL Per Feeding Tube Daily     oxyCODONE  2 mg Per G Tube BID     ranitidine  150 mg Per Feeding Tube Daily     sodium chloride (PF)  3 mL Intracatheter Q8H     levofloxacin  500 mg Intravenous Q48H     vancomycin place ramos - receiving intermittent dosing  1 each Does not apply See Admin Instructions                  Physical Exam:   Blood pressure 161/62, pulse 79, temperature 97  F (36.1  C), temperature source Axillary, resp. rate 26, height 1.524 m (5'), weight 71.4 kg (157 lb 6.4 oz), SpO2 96 %.  Wt Readings from Last 2 Encounters:   07/06/17 71.4 kg (157 lb 6.4 oz)   06/19/17 69.3 kg (152 lb 12.5 oz)     Vital Signs with Ranges  Temp:  [96  F (35.6  C)-98.3  F (36.8  C)] 97  F (36.1  C)  Heart Rate:  [] 90  Resp:  [18-40] 26  BP: (108-161)/(55-75) 161/62  SpO2:  [93 %-97 %] 96 %    Constitutional: Awake, alert, not interactive as usual   Lungs: Congestion to auscultation bilaterally, no crackles or wheezing no O2   Cardiovascular: Regular rate and rhythm, normal S1 and S2, and no murmur noted   Abdomen: Normal bowel sounds, soft, non-distended, non-tender   Skin: No rashes, no cyanosis, no edema wd not seen today   Other:           Data:   All microbiology laboratory data reviewed.  Recent Labs   Lab Test  07/07/17   0637  07/05/17   0705  07/03/17   1005   WBC  8.7  8.7  8.6   HGB  10.0*  10.7*  9.8*   HCT  31.1*  33.0*  31.9*   MCV  95  94  99   PLT  291  240  189     Recent Labs   Lab Test  07/07/17   0637  07/06/17   0650  07/05/17   0705   CR  1.17*  1.25*  1.05*     Recent Labs   Lab Test  05/08/13   1300   SED  61*     Recent Labs   Lab Test  06/30/17   1654  06/30/17   1640  06/14/17   1319   06/14/17   1230  06/14/17   1200  06/21/16   0653  06/21/16   0652  06/21/16   0636  06/24/14   1200   CULT  No growth  <1000 colonies/mL urogenital maria esther Susceptibility testing not routinely done  No growth  No growth  Moderate growth Escherichia coli  Moderate growth Enterococcus avium  Moderate growth Enterococcus faecalis  Light growth Candida albicans / dubliniensis Candida albicans and Candida   dubliniensis are not routinely speciated Susceptibility testing not routinely   done  Plus Heavy growth Normal skin maria esther  *  >100,000 colonies/mL Methicillin resistant Staphylococcus aureus (MRSA)  50,000 to 100,000 colonies/mL Strain 2 Methicillin resistant Staphylococcus   aureus (MRSA)  Critical Value/Significant Value called to and read back by Elmer Hurd Rn at   2044 [id number]  *  No growth  No growth  >100,000 colonies/mL Enterococcus species*  No growth  10,000 to 50,000 colonies/mL Candida kefyr Susceptibility testing not routinely   done  10,000 to 50,000 colonies/mL Lactobacillus species No further identification  Susceptibility testing not routinely done  *[SD1.1]          Revision History        User Key Date/Time User Provider Type Action    > SD1.1 7/7/2017  9:23 AM Matteo Macias MD Physician Sign            Progress Notes by Piotr Saba MD at 7/6/2017  1:59 PM     Author:  Piotr Saba MD Service:  Hospitalist Author Type:  Physician    Filed:  7/6/2017  2:25 PM Date of Service:  7/6/2017  1:59 PM Creation Time:  7/6/2017  1:59 PM    Status:  Signed :  Piotr Saba MD (Physician)         [facility]  Hospitalist Progress Note  Piotr Saba MD  07/06/2017    Assessment & Plan   89 y/o NH resident is admitted with ARF and sepsis, not clear sourse of infection, possible sec MRSA UTI ( but neg UC and BC). She developed hypernatremia and the free water through PEG was increased. She was stable, afebrile, stable VS and she was transferred out of CCU on  July 3. On early morning July 4 she developed tachycardia and tachypnea and the CXR shows new bilat infiltrates. Most likely aspiration. Tube feeding will be continued as per family brian       Her complex PMHx: advanced dementia, aspirations pneumonia, indwelling urinary cath, UTIs, DM, CKD, feeding tube, chronic sacral wound,           Sepsis possible sec MRSA UTI w neg BC  -- fever, tachypnea, hypoxemia, increased lactic acid ( no fever in the last 24) - on admission. She was getting better until July 4 when she became tachycardic and tachypneic and new CXR infiltrates. Most likely she aspirated.    -- ID consult notes reviewed, cont Vanco and zosyn today and to orals tomorrow with Linezolid + Levaquin  -- CXR 6/30 - negative for acute changes. CXR 7/4: bilat infiltrates, will repeat CXR with tachypnea and mild tachycardia on 7/6  -- BC - neg to the date  -- UA with WBC, yeast. UC is neg at this admission but Pos for MRSA on June 14  -- C Diff - neg      Nutrition and long term prognosis  --  tube feeding resumed as per son wishes. We will continue the D5 lower rate as well.  -- her Na improved, stop D5W and give free water flushes      Chr indwelling CFoley cath, freq UTIs  -- ok to change Caro q 2 weeks  -- caro was blocked with sediment, flushed and had good urine output.  -- family asking for urology consult, but currently no urologic problems noted  -- patient had 1200 ml residual on admission which were expelled when Caro was changed in ER ( see H&P)       Hypernatremia  -- NA  142<--140<--145<--151<--155<--151 < -- 148  -- SL IVF  -- start free water flushes           ARF on CKD  -- Cr  1.25<--1.04<--1.91<--2.33 <-- 2.84   -- monitor   -- SL iv fluids      Elevated BP  -- started on Amlodipine 2.5 mg daily  -- metoprolol prn HTN, tachycardia         Advanced dementia with nonverbal state, nonabulatory with fixed chronic muscle contraction          DM 2. Poorly controlled.  -- she was on 70/30 bid and  "Lantus bid at the NH - she will not need both  -- high BS in 300 range, discontinued D5W infusion  -- increase SSI to medium   -- contninue Lantus 20 units bid for now          Chr sacral decubitus ulcer   -- considered chronic, noninfected   -- continue local care      Chr Anemia  -- Hgb in the same range as before.          DVT Prophylaxis: Pneumatic Compression Devices     Goal of care and COde status  -- Dr. Berg had a long discussion with pt's son, Dr Lewis, about the patient medical condition. He feels pressured and judged by the medical staff but he is trying to respect his mother strong Episcopal believes. And all the major changes in her plan of care need to have \" a clearance\" from their rabbi.   -- He understands her medical situation and the prognosis and he appreciates the care his mother is receiving. The patient is Rastafari Christian and she was very strong in her Episcopal beliefs. Her son wants to respect her wishes. According to their Protestant he can't change her to DNR. If a time comes and she will need to be intubated the situation will be reassessed. For now she will have full treatment.   -- Medical team discussed about nutrition and fluids. Son wants picc line and long term IV fluids. He understands this increased he risk of infection.     Code Status: Full Code      Disposition:   - > 2 days    Interval History    - chart reviewed  - patient non-verbal  - more tachypnea today    -Data reviewed today: I reviewed all new labs and imaging over the last 24 hours. I personally reviewed no images or EKG's today.    Physical Exam   Heart Rate: 108, Blood pressure 145/75, pulse 79, temperature 96.8  F (36  C), temperature source Axillary, resp. rate 18, height 1.524 m (5'), weight 71.4 kg (157 lb 6.4 oz), SpO2 93 %.  Vitals:    07/02/17 0500 07/03/17 0600 07/06/17 0535   Weight: 68.4 kg (150 lb 12.7 oz) 68 kg (149 lb 14.6 oz) 71.4 kg (157 lb 6.4 oz)     Vital Signs with Ranges  Temp:  [96.5 "  F (35.8  C)-97.4  F (36.3  C)] 96.8  F (36  C)  Heart Rate:  [] 108  Resp:  [16-18] 18  BP: (134-163)/(67-83) 145/75  SpO2:  [93 %-98 %] 93 %  I/O's Last 24 hours  I/O last 3 completed shifts:  In: 1637 [I.V.:1577; NG/GT:60]  Out: 50 [Urine:50]    Constitutional: Non-verbal, mildly increased RR  Respiratory: Clear to auscultation bilaterally, no crackles or wheezing  Cardiovascular: borderline tachycardia, normal S1 and S2, and no murmur noted  GI: Normal bowel sounds, soft,  distended, non-tender  Skin/Integumen: No rashes, no cyanosis, no edema  Other:      Medications   All medications were reviewed.    D5W 50 mL/hr at 07/05/17 1247     IV fluid REPLACEMENT ONLY         vancomycin (VANCOCIN) IV  1,250 mg Intravenous Q48H     insulin glargine  20 Units Subcutaneous BID     insulin aspart  1-6 Units Subcutaneous Q4H     piperacillin-tazobactam  2.25 g Intravenous Q6H     metoprolol  2.5 mg Intravenous Q6H     amLODIPine  2.5 mg Oral Daily     vitamin A-D & C drops  7 mL Per G Tube Daily     acetaminophen  650 mg Rectal Once     acetaminophen  650 mg Per Feeding Tube TID     lactobacillus rhamnosus (GG)  1 capsule Per Feeding Tube Daily     multivitamins with minerals  15 mL Per Feeding Tube Daily     oxyCODONE  2 mg Per G Tube BID     ranitidine  150 mg Per Feeding Tube Daily     sodium chloride (PF)  3 mL Intracatheter Q8H     levofloxacin  500 mg Intravenous Q48H     vancomycin place ramos - receiving intermittent dosing  1 each Does not apply See Admin Instructions        Data     Recent Labs  Lab 07/06/17  0650 07/06/17  0100 07/05/17  0705 07/04/17  0705 07/03/17  1005 07/03/17  0556  06/30/17  1620   WBC  --   --  8.7  --  8.6 Canceled, Test credited Unsatisfactory specimen - clottedLAB TO REORDER AND DRAW.  < > 15.6*   HGB  --   --  10.7*  --  9.8* Canceled, Test credited Unsatisfactory specimen - clottedLAB TO REORDER AND DRAW.  < > 11.7   MCV  --   --  94  --  99 Canceled, Test credited  Unsatisfactory specimen - clottedLAB TO REORDER AND DRAW.  < > 101*   PLT  --   --  240  --  189 Canceled, Test credited Unsatisfactory specimen - clottedLAB TO REORDER AND DRAW.  < > 251     --  140 145*  --  150*  < > 148*   POTASSIUM 4.2 4.2 3.3* 3.6  --  3.7  < > 4.6   CHLORIDE 109  --  106 114*  --  118*  < > 111*   CO2 18*  --  22 20  --  21  < > 26   BUN 34*  --  31* 36*  --  49*  < > 104*   CR 1.25*  --  1.05* 1.04  --  1.36*  < > 2.84*   ANIONGAP 15*  --  12 11  --  11  < > 11   NELLIE 8.5  --  7.9* 8.1*  --  7.9*  < > 8.8   *  --  305* 327*  --  336*  < > 240*   ALBUMIN  --   --   --   --   --   --   --  2.9*   PROTTOTAL  --   --   --   --   --   --   --  8.1   BILITOTAL  --   --   --   --   --   --   --  0.7   ALKPHOS  --   --   --   --   --   --   --  81   ALT  --   --   --   --   --   --   --  26   AST  --   --   --   --   --   --   --  24   TROPI  --   --   --   --   --   --   --  0.035   < > = values in this interval not displayed.    No results found for this or any previous visit (from the past 24 hour(s)).    Piotr Saba MD  Pager [phone][PD1.1]        Revision History        User Key Date/Time User Provider Type Action    > PD1.1 7/6/2017  2:25 PM Piotr Saba MD Physician Sign            Progress Notes by Malnii Underwood at 7/6/2017 10:12 AM     Author:  Malini Underwood Service:  Spiritual Health Author Type:      Filed:  7/6/2017 10:13 AM Date of Service:  7/6/2017 10:12 AM Creation Time:  7/6/2017 10:12 AM    Status:  Signed :  Malini Underwood ()         SPIRITUAL HEALTH SERVICES Progress Note  [facility]    I have attempted to connect with family, but have been unsuccessful to date.  Understand from chart notes that pt is Oriental orthodox Orthodox, and that her own Rabbi is involved.  SH team is available for additional support, per family/staff request.                                                                                                                                                  Malini Underwood M.A.  Staff   Pager [phone]  Phone [phone][PL1.1]         Revision History        User Key Date/Time User Provider Type Action    > PL1.1 7/6/2017 10:13 AM Malini Underwood Sign            Progress Notes by Matteo Macias MD at 7/6/2017  8:17 AM     Author:  Matteo Macias MD Service:  Infectious Disease Author Type:  Physician    Filed:  7/6/2017  8:20 AM Date of Service:  7/6/2017  8:17 AM Creation Time:  7/6/2017  8:17 AM    Status:  Signed :  Matteo Macias MD (Physician)         [facility]  Infectious Disease Progress Note          Assessment and Plan:   IMPRESSION:   1.  A 89-year-old female, nursing home resident, prior known aspiration, chronic Fonseca catheter and Alzheimer's dementia readmitted with low-grade sepsis, etiology still not entirely clear, prior treated MRSA UTI , now UC neg, no positive blood cultures.  Still doubt wound is  the issue , not clearcut pneumonia but obvious aspiration risk.   2.  Chronic sacral decubitus wound, somewhat necrotic, but no signs of infection and it does not tract deep had prior I&D done, culture of the wound growing multiple organisms of no significance by itself.   3.  MRSA positive urine culture prior admit, treated, UA abnormal still with with catheter in place, but current UC no sig +.   4.  Minimal infiltrate, mild  hypoxia, mostly resolved, possible aspiration pneumonia.  If so has improved.   5.  Severe dementia.   6.  Recurrent urinary tract infections and urinary colonization with Fonseca catheter in place.   7.  Feeding tube in place, prior probable aspiration, possible etiology to current illness as well.   8.  Diabetes mellitus.   9.  Chronic renal insufficiency, acute worsening now stable.   10.  Sulfa allergy.       RECOMMENDATIONS:   1.  No indication to treat sacral decubitus with antibiotics,  wound culture of no clinical significance unless deep  osteo or cellulitis and neither evident. .  Doubt this is the cause of current fever and has already been debrided recently, if it is cause of fever due to deep infection, no long term antibiotic strategy of likely benefit, would require major surgical intervention also.   2.  Urine does not appear to be current issue (recent MRSA) with current cx neg  3.  Not clearcut initial pneumonia.  Day 7  gram-negative  and MRSA coverage .  Does not appear to have major clinical pneumonia.  If it is aspiration antibiotics likely only of short term benefit been given,apparently another episode aspiration, although not hypoxic now,   4.  Obviously long-term prognosis poor, especially if recurrently aspirating, Note ethics consult . Plan is full aggressive care  5 Even with aggressive care plan no indication for extended IV, continue 1 day more then enteral conversion        Interval History:   no complaints not interactive, no fever , no new cxs +, Uc mixed low ct maria esther, initial CXR no clear infiltrate but now bilat infiltrates, despite this no hypoxia, creat now 1.25              Medications:       vancomycin (VANCOCIN) IV  1,250 mg Intravenous Q48H     insulin glargine  20 Units Subcutaneous BID     insulin aspart  1-6 Units Subcutaneous Q4H     piperacillin-tazobactam  2.25 g Intravenous Q6H     metoprolol  2.5 mg Intravenous Q6H     amLODIPine  2.5 mg Oral Daily     vitamin A-D & C drops  7 mL Per G Tube Daily     acetaminophen  650 mg Rectal Once     acetaminophen  650 mg Per Feeding Tube TID     lactobacillus rhamnosus (GG)  1 capsule Per Feeding Tube Daily     multivitamins with minerals  15 mL Per Feeding Tube Daily     oxyCODONE  2 mg Per G Tube BID     ranitidine  150 mg Per Feeding Tube Daily     sodium chloride (PF)  3 mL Intracatheter Q8H     levofloxacin  500 mg Intravenous Q48H     vancomycin place ramos - receiving intermittent dosing  1 each Does not apply See Admin Instructions                  Physical Exam:    Blood pressure 158/76, pulse 79, temperature 96.5  F (35.8  C), temperature source Axillary, resp. rate 18, height 1.524 m (5'), weight 71.4 kg (157 lb 6.4 oz), SpO2 97 %.  Wt Readings from Last 2 Encounters:   07/06/17 71.4 kg (157 lb 6.4 oz)   06/19/17 69.3 kg (152 lb 12.5 oz)     Vital Signs with Ranges  Temp:  [96  F (35.6  C)-97.3  F (36.3  C)] 96.5  F (35.8  C)  Heart Rate:  [] 92  Resp:  [16-18] 18  BP: (134-158)/(67-81) 158/76  SpO2:  [96 %-98 %] 97 %    Constitutional: Awake, alert, not interactive as usual   Lungs: Congestion to auscultation bilaterally, no crackles or wheezing no O2   Cardiovascular: Regular rate and rhythm, normal S1 and S2, and no murmur noted   Abdomen: Normal bowel sounds, soft, non-distended, non-tender   Skin: No rashes, no cyanosis, no edema wd not seen today   Other:           Data:   All microbiology laboratory data reviewed.  Recent Labs   Lab Test  07/05/17   0705  07/03/17   1005  07/03/17   0556   WBC  8.7  8.6  Canceled, Test credited   Unsatisfactory specimen - clotted  LAB TO REORDER AND DRAW.     HGB  10.7*  9.8*  Canceled, Test credited   Unsatisfactory specimen - clotted  LAB TO REORDER AND DRAW.     HCT  33.0*  31.9*  Canceled, Test credited   Unsatisfactory specimen - clotted  LAB TO REORDER AND DRAW.     MCV  94  99  Canceled, Test credited   Unsatisfactory specimen - clotted  LAB TO REORDER AND DRAW.     PLT  240  189  Canceled, Test credited   Unsatisfactory specimen - clotted  LAB TO REORDER AND DRAW.       Recent Labs   Lab Test  07/06/17   0650  07/05/17   0705  07/04/17   0705   CR  1.25*  1.05*  1.04     Recent Labs   Lab Test  05/08/13   1300   SED  61*     Recent Labs   Lab Test  06/30/17   1654  06/30/17   1640  06/14/17   1319  06/14/17   1230  06/14/17   1200  06/21/16   0653  06/21/16   0652  06/21/16   0636  06/24/14   1200   CULT  No growth  <1000 colonies/mL urogenital maria esther Susceptibility testing not routinely done  No growth  No growth   Moderate growth Escherichia coli  Moderate growth Enterococcus avium  Moderate growth Enterococcus faecalis  Light growth Candida albicans / dubliniensis Candida albicans and Candida   dubliniensis are not routinely speciated Susceptibility testing not routinely   done  Plus Heavy growth Normal skin maria esther  *  >100,000 colonies/mL Methicillin resistant Staphylococcus aureus (MRSA)  50,000 to 100,000 colonies/mL Strain 2 Methicillin resistant Staphylococcus   aureus (MRSA)  Critical Value/Significant Value called to and read back by Elmer Hurd Rn at   2044 [id number]  *  No growth  No growth  >100,000 colonies/mL Enterococcus species*  No growth  10,000 to 50,000 colonies/mL Candida kefyr Susceptibility testing not routinely   done  10,000 to 50,000 colonies/mL Lactobacillus species No further identification  Susceptibility testing not routinely done  *[SD1.1]          Revision History        User Key Date/Time User Provider Type Action    > SD1.1 7/6/2017  8:20 AM Matteo Macias MD Physician Sign            Progress Notes by Miguelina Malik at 7/5/2017  3:51 PM     Author:  Miguelina Malik Service:  Nutrition Author Type:  Registered Dietitian    Filed:  7/5/2017  4:30 PM Date of Service:  7/5/2017  3:51 PM Creation Time:  7/5/2017  3:51 PM    Status:  Signed :  Miguelina Malik ([facility]abel[facility] Dietitian)         CLINICAL NUTRITION SERVICES - REASSESSMENT NOTE      RECOMMENDATIONS FOR MD/PROVIDER TO ORDER:   Consider high SSI for improved BG control    Recommendations Ordered by Registered Dietitian (RD):   Continue current enteral regimen   Future/Additional Recommendations:   D/cD5 as able pending aspiration risk and ability to increase TF rate.        EVALUATION OF PROGRESS TOWARD GOALS   Diet:  NPO    Nutrition Support Enteral:  Type of Feeding Tube: G-tube  Enteral Frequency:  Continuous  Enteral Regimen: 45mL/h  Total Enteral Provisions: 1620 kcals (32 kcal/kg), 73g PRO (1.4 g/kg), 190g CHO, 16g fiber and 821  free water.  Also receiving D5 IV @ 50mL/h, providing additional 204 kcals for total energy provision of 1824 kcals (36 kcal/kg)  Free Water Flush: 30mL q4h    Intake:    Pt originally at goal rate of 55mL/h but then held last night d/t suspected aspiration. Family wishes to continue with nutrition support; MD reinitiated TF at lower rate (45mL/h) and added D5. Provisions still adequate to meet needs.     Tolerance:   BM 1-3x/day; fecal incontinence  I/O 1671/2250  -394 (medium SSI + Lantus BID)  +1 edema in L+R foot and ankle  Na improved (140) K low (3.3)    Dosing Weight::  68.4kg    ASSESSED NUTRITION NEEDS:  ASSESSED NUTRITION NEEDS :  Energy Needs: 2208-4340 kcals (30-35) - Pressure injury   Protein Needs: 61-77 grams protein (1.2-1.5 - wound healing, monitor renal labs    NEW FINDINGS:   Ethics conferance today: aggressive interventions to continue pending alternative POC from pt's [facility]. Goal is to return pt to [facility] whether critically ill or not.    WOC 7/3: Unstageable PI on coccyx/sacrum. Stage II on ears. Will continue PI nutritional provisions per protocol.     Previous Goals:   1. Initiate enteral nutrition within 24-48hrs  Evaluation: Met  2. Once enteral nutrition initiated, TF to meet 90%-110% of assessed needs  Evaluation: Met     Previous Nutrition Diagnosis:   Increased nutrient needs energy-protein related to PI and overall medical status as evidenced by per MD coccyx PI (stage 3-4) on admission.  Evaluation: No change (ongoing)      CURRENT NUTRITION DIAGNOSIS  Predicted suboptimal nutrient intake related to aspiration risk as evidenced by TF previously held and now re-initiated however at reduced rate    INTERVENTIONS  Recommendations / Nutrition Prescription  Continue FT rate as MD discretion. May need additional protein provisions if decreased any further.     Recommend consider high SSI for better BG control      Implementation  Medical Food Supplement    Goals  TF + D5 will  meet % of needs.       MONITORING AND EVALUATION:  Progress towards goals will be monitored and evaluated per protocol and Practice Guidelines    Miguelina Malik RD, [place]  Clinical Dietitian[VH1.1]         Revision History        User Key Date/Time User Provider Type Action    > VH1.1 7/5/2017  4:30 PM Miguelina Malik Registered Dietitian Sign            Progress Notes by Kinjal Gorman MD at 7/5/2017 12:24 PM     Author:  Kinjal Gorman MD Service:  Hospitalist Author Type:  Physician    Filed:  7/5/2017 12:34 PM Date of Service:  7/5/2017 12:24 PM Creation Time:  7/5/2017 12:24 PM    Status:  Signed :  Kinjal Gorman MD (Physician)           [facility]  Hospitalist Progress Note  Shu Rausch MD  Pager: [phone]     Admission day    6/30/2017  Date of Service (when I saw the patient): 4 July 2017        Interval History:     Pt nonverbal this morning. No overnight events.         Assessment and Plan:     89 y/o NH resident is admitted with ARF and sepsis, not clear sourse of infection, possible sec MRSA UTI ( but neg UC and BC). She developed hypernatremia and the free water through PEG was increased. She was stable, afebrile, stable VS and she was transferred out of CCU on July 3. On early morning July 4 she developed tachycardia and tachypnea and the CXR shows new bilat infiltrates. Most likely aspiration. Tube feeding will be continued as per family cristinoes       Her complex PMHx: advanced dementia, aspirations pneumonia, indwelling urinary cath, UTIs, DM, CKD, feeding tube, chronic sacral wound,           Sepsis possible sec MRSA UTI w neg BC  -- fever, tachypnea, hypoxemia, increased lactic acid ( no fever in the last 24) - on admission. She was getting better until July 4 when she became tachycardic and tachypneic and new CXR infiltrates. Most likely she aspirated.    -- ID consult appreciated. Recommended cont Vanco while inpatient and  "discharged on Linzolid + Levaquin  -- cont vanco, Zosyn  -- CXR 6/30 - negative for acute changes. CXR 7/4: bilat infiltrates  -- BC - neg to the date  -- UA with WBC, yeast. UC is neg at this admission but Pos for MRSA on June 14  -- C Diff - neg     Nutrition and long term prognosis  --  tube feeding resumed as per son wishes. We will continue the D5 lower rate as well.  -- her Na improved when she was receiving 50 ml/h free water water along with tube feeding 45 ml /h. We stopped and reintroduce a at a slower rate.     Chr indwelling CFoley cath, freq UTIs  -- ok to change Fonseca q 2 weeks  -- family asking for urology consult, but currently no urologic problems noted  -- patient had 1200 ml residual on admission which were expelled when Fonseca was changed in ER ( see H&P)       Hypernatremia  -- <--145<--151<--155<--151 < -- 148  -- decrease D5 75>50 cc/hr  -- monitor           ARF on CKD  -- Cr 1.04<--1.91<--2.33 <-- 2.84   -- monitor   -- cont iv fluids     Elevated BP  -- started on Amlodipine 2.5 mg daily  -- metoprolol prn HTN, tachyc        Advanced dementia with nonverbal state, nonabulatory with fixed chronic muscle contraction          DM 2. Poorly controlled.  -- she was on 70/30 bid and Lantus bid at the NH - she will not need both  -- high BS.  -- increase SSI to medium   -- increase Lantus 20 units bid           Chr sacral decubitus ulcer   -- considered chronic, noninfected   -- continue local care      Chr Anemia  -- Hgb in the same range as before.         DVT Prophylaxis: Pneumatic Compression Devices    Goal of care and COde status  -- Dr. Berg had a long discussion with pt's son,  Joshua, about the patient medical condition. He feels pressured and judged by the medical staff but he is trying to respect his mother strong Worship believes. And all the major changes in her plan of care need to have \" a clearance\" from their rabbi.   -- He understands her medical situation and " the prognosis and he appreciates the care his mother is receiving. The patient is Christianity Denominational and she was very strong in her [employer] beliefs. Her son wants to respect her wishes. According to their Congregation he can't change her to DNR. If a time comes and she will need to be intubated the situation will be reassessed. For now she will have full treatment.   -- Medical team discussed about nutrition and fluids. Son wants picc line and long term IV fluids. He understands this increased he risk of infection.    Code Status: Full Code      Disposition: Inpatient      ROS: not obtainable             Physical Exam:   Blood pressure 168/82, pulse 79, temperature 96.1  F (35.6  C), temperature source Axillary, resp. rate 16, height 1.524 m (5'), weight 68 kg (149 lb 14.6 oz), SpO2 96 %.     NAD, appears comfortable   Skin: no rashes   Chest: clear to auscultation bilaterally, tachypneic  Heart: S1 S2, RRR, no mgr appreciated  Abdomen: soft, not tender,   Extremities: no edema.   Neurologic: A, nonverbal,     Vital Sign Ranges  Temperature Temp  Av.8  F (36  C)  Min: 95.7  F (35.4  C)  Max: 98.1  F (36.7  C)   Blood pressure Systolic (24hrs), Av , Min:118 , Max:173        Diastolic (24hrs), Av, Min:10, Max:86      Pulse No Data Recorded   Respirations Resp  Av.2  Min: 19  Max: 40   Pulse oximetry SpO2  Av.6 %  Min: 94 %  Max: 98 %              Medications:         vancomycin (VANCOCIN) IV  1,250 mg Intravenous Q48H     insulin glargine  15 Units Subcutaneous BID     insulin aspart  1-6 Units Subcutaneous Q4H     piperacillin-tazobactam  2.25 g Intravenous Q6H     metoprolol  2.5 mg Intravenous Q6H     amLODIPine  2.5 mg Oral Daily     vitamin A-D & C drops  7 mL Per G Tube Daily     acetaminophen  650 mg Rectal Once     acetaminophen  650 mg Per Feeding Tube TID     lactobacillus rhamnosus (GG)  1 capsule Per Feeding Tube Daily     multivitamins with minerals  15 mL Per Feeding Tube Daily      oxyCODONE  2 mg Per G Tube BID     ranitidine  150 mg Per Feeding Tube Daily     sodium chloride (PF)  3 mL Intracatheter Q8H     levofloxacin  500 mg Intravenous Q48H     vancomycin place ramos - receiving intermittent dosing  1 each Does not apply See Admin Instructions        Prescriptions Prior to Admission   Medication Sig Dispense Refill Last Dose     insulin NPH-insulin regular (HUMULIN MIX 70/30/NOVOLIN MIX 70/30) injection Inject 16 Units Subcutaneous every morning   6/30/2017 at am     insulin NPH-insulin regular (HUMULIN MIX 70/30/NOVOLIN MIX 70/30) injection Inject 10 Units Subcutaneous every 24 hours @@ 1400        INSULIN ASPART SC Inject 1-6 Units Subcutaneous every 6 hours 0800, 1400, 2000, 0200  -250 1 unit  -300 2 units  -350 3 units  -400 4 units  -450 5 units  BG >450 6 units and update MD        Dakins 0.125 % SOLN Externally apply topically 2 times daily BID and PRN.   1. Cleanse wound with microklenze, cleanse periwound area with naomi perineal  2. Moisten kerlix fluff with dakins solution 0.125%, wring out excess, pack wound with kerlix  3. Apply antifungal powder to periwound area, rub in. Apply criticaid over powder.  4. Cover with ABD using minimal medipore tape to secure.  5. Label dressing with date, time, initials. Follow Rigorous PIP measures.        oxyCODONE (ROXICODONE) 5 MG/5ML solution 2 mLs (2 mg) by Per G Tube route 2 times daily 15 mL 0 6/30/2017 at 10 am     oxyCODONE (ROXICODONE) 5 MG/5ML solution 2 mLs (2 mg) by Per G Tube route every 4 hours as needed for moderate to severe pain  0 6/29/2017 at Unknown time     insulin glargine (LANTUS) 100 UNIT/ML injection Inject 36 Units Subcutaneous every morning   6/30/2017 at am     insulin glargine (LANTUS) 100 UNIT/ML injection Inject 34 Units Subcutaneous At Bedtime   6/29/2017 at pm     Lactobacillus Acidophilus POWD 1 capsule by Gastric Tube route daily 10 billion units    6/30/2017 at am      multivitamin, therapeutic (THERA-VIT) TABS tablet 1 tablet by Gastric Tube route daily   6/30/2017 at am     Loperamide HCl (IMODIUM A-D) 1 MG/7.5ML LIQD 4 mg by Gastric Tube route every other day   6/30/2017 at 0800     acetaminophen (TYLENOL) 32 mg/mL solution 325 mg by Gastric Tube route daily as needed for fever or mild pain   prn     ACETAMINOPHEN  mg by Gastric Tube route 3 times daily    6/30/2017 at 1400     bisacodyl (DULCOLAX) 10 MG suppository Place 10 mg rectally daily as needed for constipation   prn     insulin aspart (NOVOLOG PEN) 100 UNIT/ML soln Inject 1-6 Units Subcutaneous every 4 hours   6/14/2017 at Unknown time       Current Facility-Administered Medications   Medication     potassium chloride SA (K-DUR/KLOR-CON M) CR tablet 20-40 mEq     potassium chloride (KLOR-CON) Packet 20-40 mEq     potassium chloride 10 mEq in 100 mL intermittent infusion     potassium chloride 10 mEq in 100 mL intermittent infusion with 10 mg lidocaine     potassium chloride 20 mEq in 50 mL intermittent infusion     magnesium sulfate 4 g in 100 mL sterile water (premade)     vancomycin 1250 mg in 0.9% NaCl 250 mL PREMIX     metoprolol (LOPRESSOR) injection 2.5 mg     dextrose 5% infusion     insulin glargine (LANTUS) injection 15 Units     insulin aspart (NovoLOG) inj (RAPID ACTING)     piperacillin-tazobactam (ZOSYN) 2.25 g vial to attach to  ml bag     metoprolol (LOPRESSOR) injection 2.5 mg     albuterol neb solution 2.5 mg     amLODIPine (NORVASC) tablet 2.5 mg     miconazole (MICATIN; MICRO GUARD) 2 % powder     dextrose 10 % 1,000 mL infusion     vitamin A-D & C drops (TRI-VI-SOL) drops SOLN 7 mL     glucose 40 % gel 15-30 g    Or     dextrose 50 % injection 25-50 mL    Or     glucagon injection 1 mg     acetaminophen (TYLENOL) Suppository 650 mg     acetaminophen (TYLENOL) solution 325 mg     acetaminophen (TYLENOL) solution 650 mg     bisacodyl (DULCOLAX) Suppository 10 mg     lactobacillus rhamnosus  (GG) (CULTURELL) capsule 1 capsule     multivitamins with minerals (CERTAVITE/CEROVITE) liquid 15 mL     oxyCODONE (ROXICODONE) solution 2 mg     oxyCODONE (ROXICODONE) solution 2 mg     ranitidine (Zantac) syrup 150 mg     naloxone (NARCAN) injection 0.1-0.4 mg     lidocaine 1 % 1 mL     lidocaine (LMX4) cream     sodium chloride (PF) 0.9% PF flush 3 mL     sodium chloride (PF) 0.9% PF flush 3 mL     nitroGLYcerin (NITROSTAT) sublingual tablet 0.4 mg     levofloxacin (LEVAQUIN) infusion 500 mg     vancomycin place ramos - receiving intermittent dosing     Facility-Administered Medications Ordered in Other Encounters   Medication     Lidocaine 1% injection 0.5-5 mL     lidocaine 4 % (LMX4) cream     sodium chloride (PF) 0.9% PF flush 5-50 mL        Intake/Output Summary (Last 24 hours) at 07/04/17 0754  Last data filed at 07/04/17 0717   Gross per 24 hour   Intake             2966 ml   Output             1900 ml   Net             1066 ml             Vitals:    06/30/17 1618 07/01/17 0500 07/02/17 0500 07/03/17 0600   Weight: 73.7 kg (162 lb 8 oz) 68.4 kg (150 lb 12.7 oz) 68.4 kg (150 lb 12.7 oz) 68 kg (149 lb 14.6 oz)               Data:   Recent labs, imaging, and other studies were reviewed.      Recent Labs  Lab 07/05/17  0705 07/03/17  1005 07/03/17  0556   WBC 8.7 8.6 Canceled, Test credited Unsatisfactory specimen - clottedLAB TO REORDER AND DRAW.   HGB 10.7* 9.8* Canceled, Test credited Unsatisfactory specimen - clottedLAB TO REORDER AND DRAW.   HCT 33.0* 31.9* Canceled, Test credited Unsatisfactory specimen - clottedLAB TO REORDER AND DRAW.   MCV 94 99 Canceled, Test credited Unsatisfactory specimen - clottedLAB TO REORDER AND DRAW.    189 Canceled, Test credited Unsatisfactory specimen - clottedLAB TO REORDER AND DRAW.       Recent Labs  Lab 07/05/17  0705 07/04/17  0705 07/03/17  0556  07/01/17  1650  06/30/17  1620    145* 150*  < >  --   < > 148*   POTASSIUM 3.3* 3.6 3.7  < >  --   < > 4.6    CHLORIDE 106 114* 118*  < >  --   < > 111*   CO2 22 20 21  < >  --   < > 26   ANIONGAP 12 11 11  < >  --   < > 11   * 327* 336*  < >  --   < > 240*   BUN 31* 36* 49*  < >  --   < > 104*   CR 1.05* 1.04 1.36*  < >  --   < > 2.84*   GFRESTIMATED 49* 50* 36*  < >  --   < > 16*   GFRESTBLACK 60* 60* 44*  < >  --   < > 19*   NELLIE 7.9* 8.1* 7.9*  < >  --   < > 8.8   MAG  --   --  2.1  --  2.6*  --   --    PHOS  --   --  2.5  --  3.6  --   --    PROTTOTAL  --   --   --   --   --   --  8.1   ALBUMIN  --   --   --   --   --   --  2.9*   BILITOTAL  --   --   --   --   --   --  0.7   ALKPHOS  --   --   --   --   --   --  81   AST  --   --   --   --   --   --  24   ALT  --   --   --   --   --   --  26   < > = values in this interval not displayed.    Recent Labs  Lab 07/05/17  1129 07/05/17  0815 07/05/17  0705 07/05/17  0406 07/05/17  0001 07/04/17  2106  07/04/17  0705  07/03/17  0556  07/02/17  0532  07/01/17  0615   GLC  --   --  305*  --   --   --   --  327*  --  336*  --  181*  --  115*   * 335*  --  337* 287* 244*  < >  --   < >  --   < >  --   < >  --    < > = values in this interval not displayed.    Recent Labs  Lab 07/04/17  0245 07/01/17  0215 06/30/17  1620   LACT 2.9* 2.8* 3.8*[OE1.1]                    Revision History        User Key Date/Time User Provider Type Action    > OE1.1 7/5/2017 12:34 PM Kinjal Gorman MD Physician Sign            Progress Notes by Christen Carroll, RN at 7/5/2017 11:48 AM     Author:  Christen Carroll RN Service:  (none) Author Type:  Registered Nurse    Filed:  7/5/2017 11:48 AM Date of Service:  7/5/2017 11:48 AM Creation Time:  7/5/2017 11:48 AM    Status:  Signed :  Christen Carroll RN (Registered Nurse)         Notified hospitalist of 394 BG with sliding scale given.[JH1.1]      Revision History        User Key Date/Time User Provider Type Action    > JH1.1 7/5/2017 11:48 AM Christen Carroll, RN Registered Nurse Sign            Progress Notes by  "Alice Fitzgerald RN at 7/5/2017 11:10 AM     Author:  Alice Fitzgerald RN Service:  Care Coordinator Author Type:      Filed:  7/5/2017 11:29 AM Date of Service:  7/5/2017 11:10 AM Creation Time:  7/5/2017 11:10 AM    Status:  Addendum :  Alice Fitzgerald RN ()         Left  for the patients son Bibi Lewis phone: [phone] per notes yesterday with Shu Gerardo patients son discussed that he would be involving the Rabbi for any changes in her plan of care for \"clearance\". Asked for Dr. Lewis to call back if he is aware of when the Rabbi might be coming in so that we can have the assigned MD and possibly ID discuss further and answer questions.  Will await a call back from Dr. Lewis.[BB1.1]     Addendum: Received a call back from Dr. Lewis today discussing the above situation.  Per Dr. Lewis he is not getting the Rabbi involved at this point and the Rabbi would support a full code.  He says his Rabbi will be involved in the \"future\" for any changes to be discussed. Tried to explain that PICC/IVF is a change however Dr. Lewis says that the Hospitalist from yesterday informed him already that long term IVF is not covered.  Told him that we would further clarify for him.  Will update SW and Ethics SW.[BB1.2]     Revision History        User Key Date/Time User Provider Type Action    > BB1.2 7/5/2017 11:29 AM Alice Fitzgerald RN Case Manager Addend     BB1.1 7/5/2017 11:19 AM Alice Fitzgerald RN Case Manager Sign            Progress Notes by Gauri Stack LSW at 7/5/2017 11:15 AM     Author:  Gauri Stack LSW Service:  (none) Author Type:      Filed:  7/5/2017 11:18 AM Date of Service:  7/5/2017 11:15 AM Creation Time:  7/5/2017 11:15 AM    Status:  Addendum :  Gauri Stack LSW ()         MARLON  I: MARLON attended ethics consult to discuss patient's care plan. Patient may d/c with a picc line for IV fluids and a question arose as to " whether [facility] can accept patient with IV fluids. Sonam, RN manager at [facility][SM1.1],[SM1.2] states they can accept patient back with picc line for IV fluids. SW will need to update admissions when patient is ready for d/c.     P: SW will continue to follow and assist as needed.    SEAN Villarreal   *[id number][SM1.1]       Revision History        User Key Date/Time User Provider Type Action    > [N/A] 7/5/2017 11:18 AM Gauri Stack LSW  Addend     SM1.2 7/5/2017 11:18 AM Gauri Stack LSW  Sign     SM1.1 7/5/2017 11:15 AM Gauri Stack LSW              Progress Notes by Matteo Macias MD at 7/5/2017  7:52 AM     Author:  Matteo Macias MD Service:  Infectious Disease Author Type:  Physician    Filed:  7/5/2017  7:56 AM Date of Service:  7/5/2017  7:52 AM Creation Time:  7/5/2017  7:52 AM    Status:  Signed :  Matteo Macias MD (Physician)         [facility]  Infectious Disease Progress Note          Assessment and Plan:   IMPRESSION:   1.  A 89-year-old female, nursing home resident, prior known aspiration, chronic Fonseca catheter and Alzheimer's dementia readmitted with low-grade sepsis, etiology still not entirely clear, prior treated MRSA UTI , now UC neg, no positive blood cultures.  Still doubt wound is  the issue , not clearcut pneumonia but obvious aspiration risk.   2.  Chronic sacral decubitus wound, somewhat necrotic, but no signs of infection and it does not tract deep had prior I&D done, culture of the wound growing multiple organisms of no significance by itself.   3.  MRSA positive urine culture prior admit, treated, UA abnormal still with with catheter in place, but current UC no sig +.   4.  Minimal infiltrate, mild  hypoxia, mostly resolved, possible aspiration pneumonia.  If so has improved.   5.  Severe dementia.   6.  Recurrent urinary tract infections and urinary colonization with  Fonseca catheter in place.   7.  Feeding tube in place, prior probable aspiration, possible etiology to current illness as well.   8.  Diabetes mellitus.   9.  Chronic renal insufficiency, acute worsening now improved.   10.  Sulfa allergy.       RECOMMENDATIONS:   1.  No indication to treat sacral decubitus with antibiotics, such a wound culture of no clinical significance unless deep osteo or cellulitis and neither evident. .  Doubt this is the cause of current fever and has already been debrided recently, if it is cause of fever due to deep infection, no long term antibiotic strategy of likely benefit, would require major surgical intervention also.   2.  Urine does not appear to be current issue   3.  Not clearcut pneumonia.  Has been getting gram-negative coverage currently is on  levaquin and MRSA coverage vanco.  Does not appear to have major clinical pneumonia.  If it is aspiration antibiotics likely only of short term benefit been given,apparently another episode aspiration, although not hypoxic now, add back zosyn. Day 6 broad empiric coverage, usual approach 1 week or so  4.  Obviously long-term prognosis poor, especially if recurrently aspirating, ? Further family LOC discussion. Will try to contact Dr Lewis today        Interval History:   no complaints not interactive, no fever , no new cxs +, Uc mixed low ct maria esther, initial CXR no clear infiltrate but now bilat infiltrates, despite this no hypoxia, creat now 1.04 improved              Medications:[SD1.1]       insulin glargine  15 Units Subcutaneous BID     insulin aspart  1-6 Units Subcutaneous Q4H     piperacillin-tazobactam  2.25 g Intravenous Q6H     metoprolol  2.5 mg Intravenous Q6H     amLODIPine  2.5 mg Oral Daily     vitamin A-D & C drops  7 mL Per G Tube Daily     acetaminophen  650 mg Rectal Once     acetaminophen  650 mg Per Feeding Tube TID     lactobacillus rhamnosus (GG)  1 capsule Per Feeding Tube Daily     multivitamins with minerals   15 mL Per Feeding Tube Daily     oxyCODONE  2 mg Per G Tube BID     ranitidine  150 mg Per Feeding Tube Daily     sodium chloride (PF)  3 mL Intracatheter Q8H     levofloxacin  500 mg Intravenous Q48H     vancomycin place ramos - receiving intermittent dosing  1 each Does not apply See Admin Instructions[SD1.2]                  Physical Exam:[SD1.1]   Blood pressure 168/82, pulse 79, temperature 96.1  F (35.6  C), temperature source Axillary, resp. rate 16, height 1.524 m (5'), weight 68 kg (149 lb 14.6 oz), SpO2 96 %.  Wt Readings from Last 2 Encounters:   07/03/17 68 kg (149 lb 14.6 oz)   06/19/17 69.3 kg (152 lb 12.5 oz)[SD1.2]     Vital Signs with Ranges[SD1.1]  Temp:  [95.5  F (35.3  C)-97.6  F (36.4  C)] 96.1  F (35.6  C)  Pulse:  [79-89] 79  Heart Rate:  [80-92] 92  Resp:  [16-32] 16  BP: (127-168)/(66-82) 168/82  SpO2:  [94 %-96 %] 96 %[SD1.2]    Constitutional: Awake, alert, not interactive as usual   Lungs: Congestion to auscultation bilaterally, no crackles or wheezing no O2   Cardiovascular: Regular rate and rhythm, normal S1 and S2, and no murmur noted   Abdomen: Normal bowel sounds, soft, non-distended, non-tender   Skin: No rashes, no cyanosis, no edema wd not seen today   Other:           Data:   All microbiology laboratory data reviewed.[SD1.1]  Recent Labs   Lab Test  07/05/17   0705  07/03/17   1005  07/03/17   0556   WBC  8.7  8.6  Canceled, Test credited   Unsatisfactory specimen - clotted  LAB TO REORDER AND DRAW.     HGB  10.7*  9.8*  Canceled, Test credited   Unsatisfactory specimen - clotted  LAB TO REORDER AND DRAW.     HCT  33.0*  31.9*  Canceled, Test credited   Unsatisfactory specimen - clotted  LAB TO REORDER AND DRAW.     MCV  94  99  Canceled, Test credited   Unsatisfactory specimen - clotted  LAB TO REORDER AND DRAW.     PLT  240  189  Canceled, Test credited   Unsatisfactory specimen - clotted  LAB TO REORDER AND DRAW.       Recent Labs   Lab Test  07/04/17   0705  07/03/17   0556   07/02/17   0532   CR  1.04  1.36*  1.91*     Recent Labs   Lab Test  05/08/13   1300   SED  61*     Recent Labs   Lab Test  06/30/17   1654  06/30/17   1640  06/14/17   1319  06/14/17   1230  06/14/17   1200  06/21/16   0653  06/21/16   0652  06/21/16   0636  06/24/14   1200   CULT  No growth after 5 days  <1000 colonies/mL urogenital maria esther Susceptibility testing not routinely done  No growth after 5 days  No growth  Moderate growth Escherichia coli  Moderate growth Enterococcus avium  Moderate growth Enterococcus faecalis  Light growth Candida albicans / dubliniensis Candida albicans and Candida   dubliniensis are not routinely speciated Susceptibility testing not routinely   done  Plus Heavy growth Normal skin maria esther  *  >100,000 colonies/mL Methicillin resistant Staphylococcus aureus (MRSA)  50,000 to 100,000 colonies/mL Strain 2 Methicillin resistant Staphylococcus   aureus (MRSA)  Critical Value/Significant Value called to and read back by Elmer Hurd Rn at   [id number]  *  No growth  No growth  >100,000 colonies/mL Enterococcus species*  No growth  10,000 to 50,000 colonies/mL Candida kefyr Susceptibility testing not routinely   done  10,000 to 50,000 colonies/mL Lactobacillus species No further identification  Susceptibility testing not routinely done  *[SD1.2]          Revision History        User Key Date/Time User Provider Type Action    > SD1.2 7/5/2017  7:56 AM Matteo Macias MD Physician Sign     SD1.1 7/5/2017  7:52 AM Matteo Macias MD Physician                   Procedure Notes     No notes of this type exist for this encounter.      Progress Notes - Therapies (Notes from 07/05/17 through 07/08/17)     No notes of this type exist for this encounter.

## 2017-06-30 NOTE — IP AVS SNAPSHOT
` [place] [address] ONCOLOGY: [phone]                                              INTERAGENCY TRANSFER FORM - NURSING   2017                    Hospital Admission Date: 2017  RAÚL MERCEDES   : 1926  Sex: Female        Attending Provider: Paul Jolly MD     Allergies:  Sulfa Drugs    Infection:  MRSA-Contact Isolation   Service:  HOSPITALIST    Ht:  1.524 m (5')   Wt:  71.4 kg (157 lb 6.4 oz)   Admission Wt:  73.7 kg (162 lb 8 oz)    BMI:  30.74 kg/m 2   BSA:  1.74 m 2            Patient PCP Information     None on File      Current Code Status     Date Active Code Status Order ID Comments User Context       Prior      Code Status History     Date Active Date Inactive Code Status Order ID Comments User Context    2017 10:22 AM  Full Code [id number]  Piotr Saba MD Outpatient    2017  7:29 PM 2017 10:22 AM Full Code [id number]  Paul Jolly MD Inpatient    2017 10:10 AM 2017  7:29 PM Full Code [id number]  Kraig Mobley MD Outpatient    2017  5:14 PM 2017 10:10 AM Full Code [id number]  Antwan Green MD Inpatient    2016 11:48 AM 2016  5:44 PM Full Code [id number]  Clarence Wilkerson MD Inpatient    2014  3:01 PM 2016 11:48 AM Full Code [id number]  mEily Ca DO Outpatient    2014  3:34 AM 2014  3:01 PM Full Code [id number]  Ni Mccurdy MD Inpatient    2012 12:42 PM 2014  3:34 AM Full Code [id number]  Francesca Knott MD Outpatient    2012  3:47 AM 2012 12:42 PM Full Code [id number]  Peewee De La Cruz MD Inpatient      Advance Directives        Does patient have a scanned Advance Directive/ACP document in EPIC?           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    Uric acid stone in urine   Unknown    UTI (urinary tract infection) Medium  6/24/2014    Scalp laceration Medium  6/24/2014    Dementia Medium  6/24/2014    Fall   6/24/2014    Atrial fibrillation (H) Medium  5/22/2015    History of urinary tract infection Medium  5/22/2015    Alzheimer's disease Medium  5/22/2015    Primary localized osteoarthrosis of shoulder region Medium  5/22/2015    Morbid obesity (H) Medium  5/22/2015    DM (diabetes mellitus), type 2 with renal complications (H) Medium  5/22/2015    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) Medium  5/22/2015    Hyperlipidemia Medium  5/22/2015    Candidiasis of skin and nails Medium  5/22/2015    Chronic kidney disease, stage III (moderate) Medium  5/22/2015    Advance care planning Medium  5/29/2015    HCAP (healthcare-associated pneumonia) Medium  6/21/2016    Sepsis (H) Medium  6/14/2017      Immunizations     Name Date      Influenza (IIV3) 10/13/14     Pneumococcal 23 valent 12/01/98          END      ASSESSMENT     Discharge Profile Flowsheet     EXPECTED DISCHARGE     COMMUNICATION ASSESSMENT      Expected Discharge Date  07/08/17 07/08/17 1120   Patient's communication style  spoken language (English or Bilingual) 06/30/17 1618    DISCHARGE NEEDS ASSESSMENT     FINAL RESOURCES      Medical Team notified of plan?  yes 07/08/17 1120   Referrals Placed  SNF;Transportation 07/08/17 1120    Transportation Available  family or friend will provide 06/24/14 1319   SKIN      # of Referrals Placed by CTS  Post Acute Facilities;Transportation 07/08/17 1120   Inspection  Full 07/08/17 0050    Equipment Used at Home  standard walker;wheelchair/stroller 08/09/12 1131   Skin WDL  ex 07/08/17 0050    GASTROINTESTINAL (ADULT,PEDIATRIC,OB)     Skin Color/Characteristics  pale 07/08/17 0050    GI WDL  ex 07/08/17 0050   Skin Temperature  warm 07/06/17 2119    Abdominal Appearance  rounded  "07/08/17 0050   Skin Moisture  diaphoretic 07/08/17 0050    Last Bowel Movement  07/07/17 07/07/17 1648   Skin Integrity  pressure ulcer(s);scab(s) 07/08/17 0050    GI Signs/Symptoms  fecal incontinence 07/08/17 0050   SAFETY      Passing flatus  yes 07/07/17 1050   Safety WDL  WDL 07/08/17 0050                 Assessment WDL (Within Defined Limits) Definitions           Safety WDL     Effective: 09/28/15    Row Information: <b>WDL Definition:</b> Bed in low position, wheels locked; call light in reach; upper side rails up x 2; ID band on<br> <font color=\"gray\"><i>Item=AS safety wdl>>List=AS safety wdl>>Version=F14</i></font>      Skin WDL     Effective: 09/28/15    Row Information: <b>WDL Definition:</b> Warm; dry; intact; elastic; without discoloration; pressure points without redness<br> <font color=\"gray\"><i>Item=AS skin wdl>>List=AS skin wdl>>Version=F14</i></font>      Vitals     Vital Signs Flowsheet     VITAL SIGNS     Pain Rating: FLACC (Activity) - Legs  0 07/07/17 1041    Temp  96.4  F (35.8  C) 07/08/17 1238   Pain Rating: FLACC (Activity) - Activity  1 (unable to move) 07/07/17 1041    Temp src  Axillary 07/08/17 1238   Pain Rating: FLACC (Activity) - Cry  1 07/07/17 1041    Resp  28 07/08/17 1238   Pain Rating: FLACC (Activity) - Consolability  1 07/07/17 1041    Pulse  107 07/08/17 0015   Score: FLACC (activity)  4 07/07/17 1041    Heart Rate  94 07/08/17 1238   ANALGESIA SIDE EFFECTS MONITORING      Pulse/Heart Rate Source  Monitor 07/08/17 1238   Side Effects Monitoring: Respiratory Quality  R 07/07/17 2115    BP  141/59 07/08/17 1238   Side Effects Monitoring: Respiratory Depth  N 07/07/17 2115    BP Location  Left arm 07/08/17 1238   Side Effects Monitoring: Sedation Level  2 07/07/17 2115    OXYGEN THERAPY     HEIGHT AND WEIGHT      SpO2  96 % 07/08/17 1238   Height  1.524 m (5') 06/30/17 1946    O2 Device  None (Room air) 07/08/17 1238   Height Method  Estimated 06/30/17 1946    Oxygen Delivery  " 1 LPM 07/03/17 1444   Weight  71.4 kg (157 lb 6.4 oz) 07/06/17 0535    PAIN/COMFORT     BSA (Calculated - sq m)  1.77 06/30/17 1946    Patient Currently in Pain  -- 07/07/17 2115   BMI (Calculated)  31.8 06/30/17 1946    Preferred Pain Scale  FACES (Chavez-Lira FACES Pain Rating Scale) 07/07/17 2115   POSITIONING      0-10 Pain Scale  2 07/07/17 2113   Body Position  right, side-lying 07/08/17 1314    FACES Pain Rating  2-->hurts little bit 07/07/17 1640   Head of Bed (HOB)  HOB at 45 degrees 07/08/17 1314    rFLACC Pain Rating: Face  1-->occasional grimace or frown, withdrawn, disinterested, appears sad or worried 07/06/17 1746   Positioning/Transfer Devices  pillows 07/08/17 1314    rFLACC Pain Rating - Legs  1-->uneasy, restless, tense, occasional tremors 07/06/17 1746   DAILY CARE      rFLACC Pain Rating: Activity  0-->lying quietly, normal position, moves easily 07/06/17 1746   Activity Type  other (see comments) 07/07/17 1640    rFLACC Pain Rating - Cry  0-->no cry (awake or asleep) 07/06/17 1746   Activity Level of Assistance  assistance, 2 people 07/08/17 0050    rFLACC Pain Rating - Consolability  0-->content, relaxed 07/06/17 1746   Activity Assistive Device  mechanical lift 07/08/17 0050    rFLACC Score: Activity  2 07/06/17 1746   RESPIRATORY MONITORING      Nonverbal Indicators Of Pain  grimace 07/06/17 1746   Respiratory Monitoring (EtCO2)  21 mmHg 06/30/17 1727    Pain Management Interventions  pillow support provided 07/07/17 1640   EKG MONITORING      Pain Intervention(s)  Repositioned 07/07/17 1640   Cardiac Regularity  Regular 06/15/17 1038    Response to Interventions  Decrease in pain 07/06/17 1746   Cardiac Rhythm  ST 06/15/17 1038    PAIN ASSESSMENT/FLACC     [place] COMA SCALE      Pain Rating: FLACC (rest) - Face  0 07/07/17 1041   Best Eye Response  4-->(E4) spontaneous 07/02/17 1520    Pain Rating: FLACC (rest) - Legs  0 07/07/17 1041   Best Motor Response  1-->(M1) none 07/02/17 1521     Pain Rating: FLACC (rest) - Activity  0 07/07/17 1041   Best Verbal Response  1-->(V1) none 07/02/17 1520    Pain Rating: FLACC (rest) - Cry  0 07/07/17 1041   [place] Coma Scale Score  6 07/02/17 1520    Pain Rating: FLACC (rest) - Consolability  0 07/07/17 1041   Assessment Qualifiers  other (see comments) (baseline congition per SNF) 07/01/17 1840    Score: FLACC (rest)  0 07/07/17 1041   ECG      Pain Rating: FLACC (Activity) - Face  1 07/07/17 1041   ECG Rhythm  Sinus tachycardia 07/07/17 1041            Patient Lines/Drains/Airways Status    Active LINES/DRAINS/AIRWAYS     Name: Placement date: Placement time: Site: Days: Last dressing change:    Gastrostomy/Enterostomy Gastrostomy LUQ       LUQ        Urethral Catheter Temperature probe 10 fr 06/30/17   1645   Temperature probe   7     Peripheral IV 07/04/17 Right Upper forearm 07/04/17   1045   Upper forearm   4     Pressure Ulcer 06/21/16 Right Sacrum 06/21/16   1215    382     Pressure Ulcer 06/21/16 Right Other (Comment) 06/21/16   1215    382     Pressure Injury 06/14/17 Coccyx 3x4cm irregular shaped wound to coccyx 06/14/17   1715    23     Wound Posterior Head Laceration       Head        Wound 06/30/17 Bilateral;Outer Ear Suspected pressure ulcer 06/30/17   1900   Ear   7     Rash 07/01/17 0014 Bilateral upper other (see comments) other (see comments) 07/01/17   0014    7             Patient Lines/Drains/Airways Status    Active PICC/CVC     None            Intake/Output Detail Report     Date Intake         Output   Net    Shift P.O. I.V. NG/GT IV Piggyback Enteral Total Urine Emesis/NG output Total       Noc 07/06/17 2300 - 07/07/17 0659 -- -- -- -- -- -- 1350 -- 1350 -1350    Day 07/07/17 0700 - 07/07/17 1459 -- 1648 340 -- 972 2960 1050 -- 1050 1910    Sandrine 07/07/17 1500 - 07/07/17 2259 -- 375 -- -- -- 375 425 -- 425 -50    Noc 07/07/17 2300 - 07/08/17 0659 -- -- -- -- -- -- 75 -- 75 -75    Day 07/08/17 0700 - 07/08/17 1459 -- -- -- -- -- -- -- --  -- 0      Last Void/BM       Most Recent Value    Urine Occurrence     Stool Occurrence 1 at 07/07/2017 2248      Case Management/Discharge Planning     Case Management/Discharge Planning Flowsheet     REFERRAL INFORMATION     Expected Discharge Date  07/08/17 07/08/17 1120    Admission Type  inpatient 06/15/17 1122   DISCHARGE PLANNING      Arrived From  nursing facility 06/21/16 1154   Medical Team notified of plan?  yes 07/08/17 1120    Referral Source  case finding 06/15/17 1122   Transportation Available  family or friend will provide 06/24/14 1319    # of Referrals Placed by CTS  Post Acute Facilities;Transportation 07/08/17 1120   Skilled Nursing Facility  [facility] 08/10/12 1751    Reason For Consult  discharge planning 07/08/17 1120   Skilled Nursing Facility Phone Number  [phone] 08/10/12 1751    Record Reviewed  medical record 07/08/17 1120   Outpatient/Agency/Support Group Needs  assisted living facility 08/06/12 0445     Assigned to Case  Federica Gonzalez 07/08/17 1120   Equipment Used at Home  standard walker;wheelchair/stroller 08/09/12 1131    LIVING ENVIRONMENT     FINAL NOTE      Lives With  facility resident 07/02/17 1436   Final Note  d/c back to SNF today at 1400 via stretcher 07/08/17 1120    Living Arrangements  extended care facility 06/15/17 1122   FINAL RESOURCES      Provides Primary Care For  no one, unable/limited ability to care for self 06/15/17 1122   Referrals Placed  SNF;Transportation 07/08/17 1120    ASSESSMENT OF FAMILY/SOCIAL SUPPORT     ABUSE RISK SCREEN      Marital Status   06/15/17 1122   QUESTION TO PATIENT:  Has a member of your family or a partner(now or in the past) intimidated, hurt, manipulated, or controlled you in any way?  patient declined to answer or is unable to answer 06/30/17 1621    Who is your support system?  ;Children 06/15/17 1122   QUESTION TO PATIENT: Do you feel safe going back to the place where you are living?   patient declined to answer or is unable to answer 06/30/17 1621    COPING/STRESS     (R) MENTAL HEALTH SUICIDE RISK      Major Change/Loss/Stressor  unable to assess 07/02/17 1447   Are you depressed or being treated for depression?  No 07/02/17 1439    EXPECTED DISCHARGE

## 2017-06-30 NOTE — ED NOTES
Rounding done. Antibiotic hung. Family at bedside. Family have no needs or questions at this time.

## 2017-06-30 NOTE — IP AVS SNAPSHOT
[address], [address]    [facility] [address]    Phone:  [phone]                                       After Visit Summary   6/30/2017    Celia Lewis    MRN: [id number]           After Visit Summary Signature Page     I have received my discharge instructions, and my questions have been answered. I have discussed any challenges I see with this plan with the nurse or doctor.    ..........................................................................................................................................  Patient/Patient Representative Signature      ..........................................................................................................................................  Patient Representative Print Name and Relationship to Patient    ..................................................               ................................................  Date                                            Time    ..........................................................................................................................................  Reviewed by Signature/Title    ...................................................              ..............................................  Date                                                            Time

## 2017-06-30 NOTE — ED NOTES
Bed: ST02  Expected date:   Expected time:   Means of arrival:   Comments:  416  89 F unresponsive  [id number]

## 2017-06-30 NOTE — IP AVS SNAPSHOT
` [place] [address] ONCOLOGY: [phone]            Medication Administration Report for Celia Lewis as of 07/08/17 1334   Legend:    Given Hold Not Given Due Canceled Entry Other Actions    Time Time (Time) Time  Time-Action       Inactive    Active    Linked        Medications 07/02/17 07/03/17 07/04/17 07/05/17 07/06/17 07/07/17 07/08/17    acetaminophen (TYLENOL) solution 325 mg  Dose: 325 mg Freq: EVERY 8 HOURS PRN Route: PO  PRN Reasons: mild pain,fever  Start: 06/30/17 1929   Admin Instructions: Maximum acetaminophen dose from all sources= 75 mg/kg/day not to exceed 4 grams/day.               acetaminophen (TYLENOL) solution 650 mg  Dose: 650 mg Freq: 3 TIMES DAILY Route: PER FEEDING   Start: 06/30/17 2200   Admin Instructions: Maximum acetaminophen dose from all sources= 75 mg/kg/day not to exceed 4 grams/day.     0949 (650 mg)-Given       1705 (650 mg)-Given       2121 (650 mg)-Given        0838 (650 mg)-Given       1649 (650 mg)-Given       2202 (650 mg)-Given        1033 (650 mg)-Given       1640 (650 mg)-Given       2239 (650 mg)-Given        0912 (650 mg)-Given       1638 (650 mg)-Given       2118 (650 mg)-Given        0849 (650 mg)-Given       1606 (650 mg)-Given       2225 (650 mg)-Given        0817 (650 mg)-Given       1626 (650 mg)-Given       2249 (650 mg)-Given        0929 (650 mg)-Given       [ ] 1600       [ ] 2200           acetaminophen (TYLENOL) Suppository 650 mg  Dose: 650 mg Freq: ONCE Route: RE  Start: 06/30/17 1635   Admin Instructions: Maximum acetaminophen dose from all sources = 75 mg/kg/day not to exceed 4 grams/day.               albuterol neb solution 2.5 mg  Dose: 2.5 mg Freq: EVERY 6 HOURS PRN Route: NEBULIZATION  PRN Reasons: wheezing,shortness of breath / dyspnea  Start: 07/03/17 0456     0530 (2.5 mg)-Given        0321 (2.5 mg)-Given         1947 (2.5 mg)-Given             amLODIPine (NORVASC) tablet 2.5 mg  Dose: 2.5 mg Freq: DAILY Route: PO  Start: 07/03/17  1145   Admin Instructions: Hold for SBP < 110      1211 (2.5 mg)-Given        1040 (2.5 mg)-Given        0914 (2.5 mg)-Given        0851 (2.5 mg)-Given        0818 (2.5 mg)-Given        0930 (2.5 mg)-Given           bisacodyl (DULCOLAX) Suppository 10 mg  Dose: 10 mg Freq: DAILY PRN Route: RE  PRN Reason: constipation  Start: 06/30/17 1929              dextrose 10 % 1,000 mL infusion  Freq: CONTINUOUS PRN Route: IV  PRN Comment: Hypoglycemia prevention  Start: 07/01/17 1254   Admin Instructions: For Hypoglycemia Prevention for patients on long-acting subcutaneous basal insulin (Glargine, Detemir, NPH) or continuous insulin infusion. Whenever nutrition support is held or interrupted:   1) Infuse IV D10W at nutrition support rate  2) Notify provider for further instructions               glucose 40 % gel 15-30 g  Dose: 15-30 g Freq: EVERY 15 MIN PRN Route: PO  PRN Reason: low blood sugar  Start: 07/01/17 5094   Admin Instructions: Give 15 g for BG 51 to 69 mg/dL IF patient is conscious and able to swallow. Give 30 g for BG less than or equal to 50 mg/dL IF patient is conscious and able to swallow. Do NOT give glucose gel via enteral tube.  IF patient has enteral tube: give apple juice 120 mL (4 oz or 15 g of CHO) via enteral tube for BG 51 to 69 mg/dL.  Give apple juice 240 mL (8 oz or 30 g of CHO) via enteral tube for BG less than or equal to 50 mg/dL.    ~Oral gel is preferable for conscious and able to swallow patient.   ~IF gel unavailable or patient refuses may provide apple juice 120 mL (4 oz or 15 g of CHO). Document juice on I and O flowsheet.              Or  dextrose 50 % injection 25-50 mL  Dose: 25-50 mL Freq: EVERY 15 MIN PRN Route: IV  PRN Reason: low blood sugar  Start: 07/01/17 5464   Admin Instructions: Use if have IV access, BG less than 70 mg/dL and meet dose criteria below:  Dose if conscious and alert (or disorientated) and NPO = 25 mL  Dose if unconscious / not alert = 50 mL  Vesicant.               Or  glucagon injection 1 mg  Dose: 1 mg Freq: EVERY 15 MIN PRN Route: SC  PRN Reason: low blood sugar  PRN Comment: May repeat x 1 only  Start: 07/01/17 1354   Admin Instructions: May give SQ or IM. ONLY use glucagon IF patient has NO IV access AND is UNABLE to swallow AND blood glucose is LESS than or EQUAL to 50 mg/dL.               insulin aspart (NovoLOG) inj (RAPID ACTING)  Dose: 1-6 Units Freq: EVERY 4 HOURS Route: SC  Start: 07/04/17 0815   Admin Instructions: Correction Scale - MEDIUM INSULIN RESISTANCE DOSING     Do Not give Correction Insulin if BG less than 140.  For  - 189 give 1 unit.  For  - 239 give 2 units.  For  - 289 give 3 units.  For  - 339 give 4 units.  For  - 399 give 5 units.  For BG greater than or equal to 400 give 6 units.  Check blood glucose Q4H and administer based on blood glucose.  Notify provider if glucose greater than or equal to 350 mg/dL after administration of correction dose.  If given at mealtime, must be administered 5 min before meal or immediately after.       1000 (4 Units)-Given       1328 (4 Units)-Given       1759 (2 Units)-Given       2238 (3 Units)-Given        0005 (3 Units)-Given [C]       0406 (4 Units)-Given [C]       0910 (4 Units)-Given       1146 (5 Units)-Given       1748 (4 Units)-Given       2117 (4 Units)-Given        0010 (3 Units)-Given [C]       0408 (4 Units)-Given [C]       0826 (5 Units)-Given       1227 (5 Units)-Given       1623 (5 Units)-Given       2048 (5 Units)-Given [C]        0018 (4 Units)-Given       0418 (4 Units)-Given       0853 (4 Units)-Given       1147 (4 Units)-Given       1621 (4 Units)-Given       2100 (4 Units)-Given [C]        0011 (2 Units)-Given       0441 (3 Units)-Given       0934 (3 Units)-Given       1248 (3 Units)-Given       [ ] 1600       [ ] 2000           insulin glargine (LANTUS) injection 24 Units  Dose: 24 Units Freq: 2 TIMES DAILY Route: SC  Start: 07/07/17 2100         2105 (24  "Units)-Given        0949 (24 Units)-Given       [ ] 2100           lactobacillus rhamnosus (GG) (CULTURELL) capsule 1 capsule  Dose: 1 capsule Freq: DAILY Route: PER FEEDING   Start: 07/01/17 0900   Admin Instructions: May open capsule and use powder dissolved in water  Administer at least 2 hours before or after oral antibiotics. Capsules may be opened.     0949 (1 capsule)-Given        0838 (1 capsule)-Given        1040 (1 capsule)-Given        0914 (1 capsule)-Given        0851 (1 capsule)-Given        0819 (1 capsule)-Given        0930 (1 capsule)-Given           lidocaine (LMX4) cream  Freq: EVERY 1 HOUR PRN Route: Top  PRN Reason: pain  PRN Comment: with VAD insertion or accessing implanted port.  Start: 06/30/17 1929   Admin Instructions: Do NOT give if patient has a history of allergy to any local anesthetic or any \"carlotta\" product.   Apply 30 minutes prior to VAD insertion or port access.  MAX Dose:  2.5 g (  of 5 g tube)               lidocaine 1 % 1 mL  Dose: 1 mL Freq: EVERY 1 HOUR PRN Route: OTHER  PRN Comment: mild pain with VAD insertion or accessing implanted port  Start: 06/30/17 1929   Admin Instructions: Do NOT give if patient has a history of allergy to any local anesthetic or any \"carlotta\" product. MAX dose 1 mL subcutaneous OR intradermal in divided doses.               linezolid (ZYVOX) suspension 600 mg  Dose: 600 mg Freq: EVERY 12 HOURS SCHEDULED Route: PER FEEDING   Indications of Use: HEALTHCARE-ASSOCIATED PNEUMONIA  Start: 07/08/17 0830          0930 (600 mg)-Given       [ ] 2000           magnesium sulfate 4 g in 100 mL sterile water (premade)  Dose: 4 g Freq: EVERY 4 HOURS PRN Route: IV  PRN Reason: magnesium supplementation  Start: 07/05/17 0801   Admin Instructions: For serum Mg++ less than 1.6 mg/dL  Give 4 g and recheck magnesium level 2 hours after dose, and next AM.               metoprolol (LOPRESSOR) injection 2.5 mg  Dose: 2.5 mg Freq: EVERY 6 HOURS Route: IV  Start: 07/04/17 0900 "      1008 (2.5 mg)-Given       1639 ( )-Given       2239 ( )-Given        0226 (2.5 mg)-Given       0915 (2.5 mg)-Given       1637 (2.5 mg)-Given [C]       2118 (2.5 mg)-Given [C]        0408 (2.5 mg)-Given       0850 (2.5 mg)-Given       1607 (2.5 mg)-Given       2057 (2.5 mg)-Given [C]        0242 (2.5 mg)-Given       0838 (2.5 mg)-Given       1629 (2.5 mg)-Given [C]       2104 (2.5 mg)-Given        0315 (2.5 mg)-Given       0951 (2.5 mg)-Given       [ ] 1500       [ ] 2100           metoprolol (LOPRESSOR) injection 2.5 mg  Dose: 2.5 mg Freq: EVERY 4 HOURS PRN Route: IV  PRN Reason: other  PRN Comment: HR > 120, hold for SBP < 90  Start: 07/04/17 0207      0215 (2.5 mg)-Given           0019 (2.5 mg)-Given           miconazole (MICATIN; MICRO GUARD) 2 % powder  Freq: EVERY 1 HOUR PRN Route: Top  PRN Reason: other  PRN Comment: topical candidiasis  Start: 07/01/17 0951   Admin Instructions: Apply to affected area.       2133 ( )-Given        0855 ( )-Given                multivitamins with minerals (CERTAVITE/CEROVITE) liquid 15 mL  Dose: 15 mL Freq: DAILY Route: PER FEEDING   Start: 07/01/17 0900    0949 (15 mL)-Given        0838 (15 mL)-Given        1034 (15 mL)-Given        0915 (15 mL)-Given        0851 (15 mL)-Given        0826 (15 mL)-Given        0929 (15 mL)-Given           naloxone (NARCAN) injection 0.1-0.4 mg  Dose: 0.1-0.4 mg Freq: EVERY 2 MIN PRN Route: IV  PRN Reason: opioid reversal  Start: 06/30/17 1929   Admin Instructions: For respiratory rate LESS than or EQUAL to 8.  Partial reversal dose:  0.1 mg titrated q 2 minutes for Analgesia Side Effects Monitoring Sedation Level of 3 (frequently drowsy, arousable, drifts to sleep during conversation).Full reversal dose:  0.4 mg bolus for Analgesia Side Effects Monitoring Sedation Level of 4 (somnolent, minimal or no response to stimulation).               nitroGLYcerin (NITROSTAT) sublingual tablet 0.4 mg  Dose: 0.4 mg Freq: EVERY 5 MIN PRN Route: SL  PRN  Reason: chest pain  Start: 06/30/17 1929   Admin Instructions: Maximum 3 doses in 15 minutes.  Notify provider if no relief after 3 doses.    Do NOT give nitroglycerin SLIF the patient has taken avanafil (STENDRA), sildenafil (VIAGRA) or (REVATIO) within the last 8 hours, vardenafil (LEVITRA) or (STAXYN) within the last 18 hours, tadalafil (CIALIS) or (ADCIRCA) within the last 36 hours. Inform provider if patient has taken one of these medications.  If patient is still having acute angina requiring treatment, an alternative treatment option may be used such as: IV beta-blocker [2.5 mg - 5 mg metoprolol (LOPRESSOR)] if ordered by a provider.               oxyCODONE (ROXICODONE) solution 2 mg  Dose: 2 mg Freq: EVERY 4 HOURS PRN Route: PER G TUBE  PRN Reason: moderate to severe pain  Start: 06/30/17 1929              oxyCODONE (ROXICODONE) solution 2 mg  Dose: 2 mg Freq: 2 TIMES DAILY Route: PER G TUBE  Start: 06/30/17 2100    (0948)-Not Given       2121 (2 mg)-Given        0838 (2 mg)-Given       2209 (2 mg)-Given        1027 (2 mg)-Given       2230 (2 mg)-Given       (2237)-Not Given [C]        (0912)-Not Given [C]       (2103)-Not Given [C]        0850 (2 mg)-Given       2049 (2 mg)-Given        0817 (2 mg)-Given       2108 (2 mg)-Given        0930 (2 mg)-Given       [ ] 2100           potassium chloride (KLOR-CON) Packet 20-40 mEq  Dose: 20-40 mEq Freq: EVERY 2 HOURS PRN Route: ORAL OR FEED  PRN Reason: potassium supplementation  Start: 07/05/17 0801   Admin Instructions: Use if unable to tolerate tablets.  If Serum K+ 3.0-3.3, dose = 60 mEq po total dose (40 mEq x1 followed in 2 hours by 20 mEq x1). Recheck K+ level 4 hours after dose and the next AM.  If Serum K+ 2.5-2.9, dose = 80 mEq po total dose (40 mEq Q2H x2). Recheck K+ level 4 hours after dose and the next AM.  If Serum K+ less than 2.5, See IV order.  Dissolve packet contents in 4-8 ounces of cold water or juice.               potassium chloride 10 mEq  in 100 mL intermittent infusion  Dose: 10 mEq Freq: EVERY 1 HOUR PRN Route: IV  PRN Reason: potassium supplementation  Start: 07/05/17 0801   Admin Instructions: Infuse via PERIPHERAL LINE or CENTRAL LINE. Use for central line replacement if patient weight less than 65 kg, if patient is on TPN with high potassium content or if unit does not stock 20 mEq bags.   If Serum K+ 3.0-3.3, dose = 10 mEq/hr x4 doses (40 mEq IV total dose). Recheck K+ level 2 hours after dose and the next AM.   If Serum K+ less than 3.0, dose = 10 mEq/hr x6 doses (60 mEq IV total dose). Recheck K+ level 2 hours after dose and the next AM.               potassium chloride 10 mEq in 100 mL intermittent infusion with 10 mg lidocaine  Dose: 10 mEq Freq: EVERY 1 HOUR PRN Route: IV  PRN Reason: potassium supplementation  Last Dose: 10 mEq (07/05/17 2135)  Start: 07/05/17 0801   Admin Instructions: Infuse via PERIPHERAL LINE. Use potassium with lidocaine for pain with peripheral administration.  If Serum K+ 3.0-3.3, dose = 10 mEq/hr x4 doses (40 mEq IV total dose). Recheck K+ level 2 hours after dose and the next AM.  If Serum K+ less than 3.0, dose = 10 mEq/hr x6 doses (60 mEq IV total dose). Recheck K+ level 2 hours after dose and the next AM.        1057 (10 mEq)-New Bag       1502 (10 mEq)-New Bag       1911 (10 mEq)-New Bag       2135 (10 mEq)-New Bag              potassium chloride 20 mEq in 50 mL intermittent infusion  Dose: 20 mEq Freq: EVERY 1 HOUR PRN Route: IV  PRN Reason: potassium supplementation  Start: 07/05/17 0801   Admin Instructions: Infuse via CENTRAL LINE Only. May need EKG if less than 65 kg or on TPN - Max rate is 0.3 mEq/kg/hr for patients not on EKG monitoring.   If Serum K+ 3.0-3.3, dose = 20 mEq/hr x2 doses (40 mEq IV total dose). Recheck K+ level 2 hours after dose and the next AM.  If Serum K+ less than 3.0, dose = 20 mEq/hr x3 doses (60 mEq IV total dose). Recheck K+ level 2 hours after dose and the next AM.                potassium chloride SA (K-DUR/KLOR-CON M) CR tablet 20-40 mEq  Dose: 20-40 mEq Freq: EVERY 2 HOURS PRN Route: PO  PRN Reason: potassium supplementation  Start: 07/05/17 0801   Admin Instructions: Use if able to take PO.   If Serum K+ 3.0-3.3, dose = 60 mEq po total dose (40 mEq x1 followed in 2 hours by 20 mEq x1). Recheck K+ level 4 hours after dose and the next AM.  If Serum K+ 2.5-2.9, dose = 80 mEq po total dose (40 mEq Q2H x2). Recheck K+ level 4 hours after dose and the next AM.  If Serum K+ less than 2.5, See IV order.  DO NOT CRUSH               ranitidine (Zantac) syrup 150 mg  Dose: 150 mg Freq: DAILY Route: PER FEEDING   Start: 07/01/17 0900   Admin Instructions: Dosed per renal fn     0949 (150 mg)-Given        0837 (150 mg)-Given        1034 (150 mg)-Given        0913 (150 mg)-Given        0849 (150 mg)-Given        0818 (150 mg)-Given        0930 (150 mg)-Given           sodium chloride (PF) 0.9% PF flush 3 mL  Dose: 3 mL Freq: EVERY 8 HOURS Route: IK  Start: 06/30/17 1930   Admin Instructions: And Q1H PRN, to lock peripheral IV dormant line.     0136 (3 mL)-Given              1312 (3 mL)-Given       1706 (3 mL)-Given       2121 (3 mL)-Given        (0132)-Not Given              1103 (3 mL)-Given       (1910)-Not Given        (0347)-Not Given       1043 (3 mL)-Given       (2011)-Not Given        (0417)-Not Given       (1058)-Not Given       (1910)-Not Given        (0300)-Not Given       (1307)-Not Given       (2219)-Not Given        (0242)-Not Given       (1151)-Not Given       2059 (3 mL)-Given        0318 (3 mL)-Given       1252 (3 mL)-Given       [ ] 1900           sodium chloride (PF) 0.9% PF flush 3 mL  Dose: 3 mL Freq: EVERY 1 HOUR PRN Route: IK  PRN Reason: line flush  PRN Comment: for peripheral IV flush post IV meds  Start: 06/30/17 1929          0954 (3 mL)-Given           vitamin A-D & C drops (TRI-VI-SOL) drops SOLN 7 mL  Dose: 7 mL Freq: DAILY Route: PER G TUBE  Start: 07/01/17 1310     0949 (7 mL)-Given        0838 (7 mL)-Given        1033 (7 mL)-Given        0914 (7 mL)-Given        0851 (7 mL)-Given        0826 (7 mL)-Given        0930 (7 mL)-Given          Future Medications  Medications 07/02/17 07/03/17 07/04/17 07/05/17 07/06/17 07/07/17 07/08/17       levofloxacin (LEVAQUIN) solution 250 mg  Dose: 250 mg Freq: DAILY Route: PER FEEDING   Indications of Use: HEALTHCARE-ASSOCIATED PNEUMONIA  Start: 07/08/17 1800   Admin Instructions: Administer at least 2 hrs before or 4 hrs after aluminum, calcium, iron, zinc or magnesium containing products. Take 1 hr before or 2 hr after food.           [ ] 1800          Completed Medications  Medications 07/02/17 07/03/17 07/04/17 07/05/17 07/06/17 07/07/17 07/08/17         Dose: 250 mL Freq: ONCE Route: IV  Last Dose: 250 mL (07/07/17 0132)  Start: 07/07/17 0130   End: 07/07/17 0232         0132 (250 mL)-New Bag           Discontinued Medications  Medications 07/02/17 07/03/17 07/04/17 07/05/17 07/06/17 07/07/17 07/08/17         Rate: 100 mL/hr Freq: CONTINUOUS Route: IV  Start: 07/07/17 0130   End: 07/07/17 1700         0238 ( )-Rate/Dose Change       1157 ( )-New Bag       1700-Med Discontinued          Rate: 50 mL/hr Freq: CONTINUOUS Route: IV  Last Dose: Stopped (07/06/17 1436)  Start: 07/04/17 0330   End: 07/06/17 1419      0331 ( )-New Bag       1005 ( )-Rate/Dose Change       1025-Stopped [C]       1050 ( )-Restarted       1500 ( )-New Bag       1648 ( )-Rate/Dose Verify        0733 ( )-New Bag       1247 ( )-Rate/Dose Change        1419-Med Discontinued  1436-Stopped               Dose: 20 Units Freq: 2 TIMES DAILY Route: SC  Start: 07/05/17 2100   End: 07/07/17 1659       2118 (20 Units)-Given        0900 (20 Units)-Given       2047 (20 Units)-Given        0838 (20 Units)-Given       1659-Med Discontinued          Dose: 500 mg Freq: EVERY 48 HOURS Route: IV  Indications of Use: SEPSIS  Last Dose: 500 mg (07/06/17 1828)  Start: 07/02/17 1800    End: 07/07/17 1659   Admin Instructions: Irritant. Administer at a rate of no greater than 100 mL/hr     1713 (500 mg)-New Bag         1800 (500 mg)-New Bag         1828 (500 mg)-New Bag        1659-Med Discontinued          Dose: 250 mg Freq: DAILY Route: PO  Indications of Use: HEALTHCARE-ASSOCIATED PNEUMONIA  Start: 07/07/17 1700   End: 07/07/17 1659   Admin Instructions: Administer at least 2 hrs before or 4 hrs after aluminum, calcium, iron, zinc or magnesium containing products. Take 1 hr before or 2 hr after food.          1659-Med Discontinued          Dose: 600 mg Freq: EVERY 12 HOURS SCHEDULED Route: PO  Indications of Use: HEALTHCARE-ASSOCIATED PNEUMONIA  Start: 07/07/17 2000   End: 07/07/17 1659         1659-Med Discontinued          Dose: 2.25 g Freq: EVERY 6 HOURS Route: IV  Indications of Use: COMMUNITY ACQUIRED PNEUMONIA  Last Dose: 2.25 g (07/06/17 1237)  Start: 07/04/17 0845   End: 07/07/17 1657   Admin Instructions: Dose adjusted per renal dosing policy.  Estimated CrCl = 20-40 mL/min. <br>       1104 (2.25 g)-New Bag       1645 (2.25 g)-New Bag       2240 (2.25 g)-New Bag        0424 (2.25 g)-New Bag       1054 (2.25 g)-New Bag       1638 (2.25 g)-New Bag       2248 (2.25 g)-New Bag        0426 (2.25 g)-New Bag       1237 (2.25 g)-New Bag       1619 (2.25 g)-New Bag       2219 (2.25 g)-New Bag        0504 (2.25 g)-New Bag       1146 (2.25 g)-New Bag       1657-Med Discontinued          Dose: 1 packet Freq: 3 TIMES DAILY Route: PER FEEDING   Start: 07/05/17 1630   End: 07/05/17 1625   Admin Instructions: Infuse via syringe down feeding tube. Flush feeding tube with 15-30 mL of water after administration. Do not mix with other medications.  No mixing or dilution is required. SUPPLIED BY NUTRITION DEPARTMENT.        1625-Med Discontinued            Dose: 1,250 mg Freq: EVERY 48 HOURS Route: IV  Indications of Use: SEPSIS  Start: 07/05/17 1300   End: 07/07/17 1657   Admin Instructions: For an adult  with peripheral catheter and dose of 2-2.5 g, infuse over 2 hours.  IF central catheter, doses below 2 g may be given over 1 hour.        1247 (1,250 mg)-New Bag         1232 (1,250 mg)-New Bag       1657-Med Discontinued          Dose: 1 each Freq: SEE ADMIN INSTRUCTIONS Route: XX  Indications of Use: SEPSIS  Start: 06/30/17 1959   End: 07/07/17 1304   Admin Instructions: This order is meant to notify providers that this patient is receiving intermittent doses of vancomycin. Do NOT chart on this order.          1304-Med Discontinued     Medications 07/02/17 07/03/17 07/04/17 07/05/17 07/06/17 07/07/17 07/08/17

## 2017-06-30 NOTE — ED NOTES
[facility]  ED Nurse Handoff Report    ED Chief complaint: Altered Mental Status      ED Diagnosis:   Final diagnoses:   Sepsis (H)       Code Status: Hospitalist to address.     Allergies:   Allergies   Allergen Reactions     Sulfa Drugs Other (See Comments)     Per nursing home documents, patient has allergy to sulfa       Activity level - Baseline/Home:  Total Care    Activity Level - Current:   Total Care     Needed?: No    Isolation: Yes  Infection: Not Applicable  C-Diff Pending  MRSA    Bariatric?: No    Vital Signs:   Vitals:    06/30/17 1645 06/30/17 1655 06/30/17 1700 06/30/17 1715   BP: 149/83  143/71 122/85   Resp: (!) 34 (!) 31 26 11   Temp:  98.6  F (37  C) 100.6  F (38.1  C) 99.3  F (37.4  C)   TempSrc:   Bladder    SpO2: 95%  98% 96%   Weight:           Cardiac Rhythm: ,    Sinus Tachycardia     Pain level:  ZOEY     Is this patient confused?: Yes. Unresponsive to verbal stimuli (baseline)     Patient Report: Initial Complaint: Pt presents form nursing home with decreased LOC. Pt is less responsive than unusual. 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 Pt recently treated for pneumonia.   Focused Assessment: Cardiac: Tachycardic 110 s. Respiratory: Shallow respirations. Occasional weak  Congested. Oxygen dependent in ED @ 4 L NC with saturations in the 90 s. Neuro: GCS score, (9). Pt is nonverbal and withdraws to pain. Skin: Pressure ulcer on coccyx and ears (reported to writer) Needs thorough assessment and staging. : Fonseca was changed and about 1700 ml cloudy yellow urine was obtained. GI: Loose stools X 2 in ED reported.   Tests Performed: CBC. CMP. Lactic acid. Troponin. CXR. UA. Blood cultures.   Abnormal Results: Na 148. Creatinine 2.84. Albumin 2.9. Lactic acid 3.8. Glucose 240. WBC 15.6.  Urine (yeast, bacteria etc-see full results).    Treatments provided: Vancomycin 100 mg . Zosyn 4.5 g. Levaquin 750  mg.  LR 2211 ml.     Family Comments: Son Salvatore Lewis Home Phone [phone]. Pager# [phone]. Please notify or call with any questions or concerns.     OBS brochure/video discussed/provided to patient: N/A    ED Medications:   Medications   lactated ringers infusion (not administered)   levofloxacin (LEVAQUIN) infusion 750 mg (750 mg Intravenous New Bag 6/30/17 1743)   acetaminophen (TYLENOL) Suppository 650 mg (650 mg Rectal Not Given 6/30/17 1727)   lactated ringers BOLUS 2,211 mL (2,211 mLs Intravenous New Bag 6/30/17 1639)   vancomycin (VANCOCIN) 1000 mg in dextrose 5% 200 mL PREMIX (1,000 mg Intravenous New Bag 6/30/17 1702)   piperacillin-tazobactam (ZOSYN) 4.5 g vial to attach to  mL bag (4.5 g Intravenous New Bag 6/30/17 1701)       Drips infusing?:  Yes      ED NURSE PHONE NUMBER: [phone]

## 2017-06-30 NOTE — IP AVS SNAPSHOT
[place] [address] ONCOLOGY: [phone]                                              INTERAGENCY TRANSFER FORM - PHYSICIAN ORDERS   2017                    Hospital Admission Date: 2017  RAÚL MERCEDES   : 1926  Sex: Female        Attending Provider: Paul Jolly MD     Allergies:  Sulfa Drugs    Infection:  MRSA-Contact Isolation   Service:  HOSPITALIST    Ht:  1.524 m (5')   Wt:  71.4 kg (157 lb 6.4 oz)   Admission Wt:  73.7 kg (162 lb 8 oz)    BMI:  30.74 kg/m 2   BSA:  1.74 m 2            Patient PCP Information     None on File      ED Clinical Impression     Diagnosis Description Comment Added By Time Added    Sepsis (H) [A41.9] Sepsis (H) [A41.9]  Kavitha Miller 2017  6:14 PM      Hospital Problems as of 2017              Priority Class Noted POA    ARF (acute renal failure) (H) Medium  2017 Yes      Non-Hospital Problems as of 2017              Priority Class Noted    Nephrolithiasis   2012    Bacteremia   2012    Actinomyces infection   2012    Uric acid stone in urine   Unknown    UTI (urinary tract infection) Medium  2014    Scalp laceration Medium  2014    Dementia Medium  2014    Fall   2014    Atrial fibrillation (H) Medium  2015    History of urinary tract infection Medium  2015    Alzheimer's disease Medium  2015    Primary localized osteoarthrosis of shoulder region Medium  2015    Morbid obesity (H) Medium  2015    DM (diabetes mellitus), type 2 with renal complications (H) Medium  2015    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) Medium  2015    Hyperlipidemia Medium  2015    Candidiasis of skin and nails Medium  2015    Chronic kidney disease, stage III (moderate) Medium  2015    Advance care planning Medium  2015    HCAP (healthcare-associated pneumonia) Medium  2016    Sepsis (H) Medium  2017      Code Status  History     Date Active Date Inactive Code Status Order ID Comments User Context    7/8/2017 10:22 AM  Full Code [id number]  Piotr Saba MD Outpatient    6/30/2017  7:29 PM 7/8/2017 10:22 AM Full Code [id number]  Paul Jolly MD Inpatient    6/19/2017 10:10 AM 6/30/2017  7:29 PM Full Code [id number]  Kraig Mobley MD Outpatient    6/14/2017  5:14 PM 6/19/2017 10:10 AM Full Code [id number]  Antwan Green MD Inpatient    6/21/2016 11:48 AM 6/24/2016  5:44 PM Full Code [id number]  Clarence Wilkerson MD Inpatient    6/24/2014  3:01 PM 6/21/2016 11:48 AM Full Code [id number]  Emily Ca DO Outpatient    6/24/2014  3:34 AM 6/24/2014  3:01 PM Full Code [id number]  Ni Mccurdy MD Inpatient    8/14/2012 12:42 PM 6/24/2014  3:34 AM Full Code [id number]  Francesca Knott MD Outpatient    8/6/2012  3:47 AM 8/14/2012 12:42 PM Full Code [id number]  Peewee De La Cruz MD Inpatient         Medication Review      START taking        Dose / Directions Comments    insulin glargine 100 UNIT/ML injection   Commonly known as:  LANTUS   Used for:  Type 2 diabetes mellitus with diabetic nephropathy, unspecified long term insulin use status (H)   Replaces:  insulin glargine 100 UNIT/ML injection        Dose:  30 Units   Inject 30 Units Subcutaneous 2 times daily   Refills:  0        levofloxacin 25 MG/ML solution   Commonly known as:  LEVAQUIN   Indication:  Healthcare-Associated Pneumonia   Used for:  HCAP (healthcare-associated pneumonia)        Dose:  250 mg   10 mLs (250 mg) by Per Feeding Tube route daily for 6 days   Quantity:  60 mL   Refills:  0        linezolid 100 MG/5ML suspension   Commonly known as:  ZYVOX   Indication:  Healthcare-Associated Pneumonia   Used for:  HCAP (healthcare-associated pneumonia)        Dose:  600 mg   30 mLs (600 mg) by Per Feeding Tube route every 12 hours for 6 days   Quantity:  360 mL   Refills:  0        metoprolol 10 mg/mL Susp   Commonly known as:  LOPRESSOR   Used for:  Benign essential  hypertension        Dose:  25 mg   Take 2.5 mLs (25 mg) by mouth 2 times daily   Quantity:  100 mL   Refills:  1        vitamin A-D & C drops 750-400-35 UNIT-MG/ML solution NEW FORMULATION   Used for:  Decubitus ulcer of buttock, unspecified laterality, unspecified ulcer stage        Dose:  7 mL   7 mLs by Per G Tube route daily   Quantity:  50 mL   Refills:  0          CONTINUE these medications which may have CHANGED, or have new prescriptions. If we are uncertain of the size of tablets/capsules you have at home, strength may be listed as something that might have changed.        Dose / Directions Comments    oxyCODONE 5 MG/5ML solution   Commonly known as:  ROXICODONE   This may have changed:  Another medication with the same name was removed. Continue taking this medication, and follow the directions you see here.   Used for:  Primary localized osteoarthrosis of shoulder region, unspecified laterality        Dose:  2 mg   2 mLs (2 mg) by Per G Tube route every 4 hours as needed for moderate to severe pain   Quantity:  473 mL   Refills:  0        ranitidine 150 MG/10ML syrup   Commonly known as:  Zantac   This may have changed:    - medication strength  - how much to take  - how to take this  - when to take this   Used for:  Gastroesophageal reflux disease without esophagitis        Dose:  150 mg   10 mLs (150 mg) by Per Feeding Tube route daily   Quantity:  600 mL   Refills:  0          CONTINUE these medications which have NOT CHANGED        Dose / Directions Comments    * ACETAMINOPHEN PO        Dose:  650 mg   650 mg by Gastric Tube route 3 times daily   Refills:  0        * acetaminophen 32 mg/mL solution   Commonly known as:  TYLENOL        Dose:  325 mg   325 mg by Gastric Tube route daily as needed for fever or mild pain   Refills:  0        bisacodyl 10 MG Suppository   Commonly known as:  DULCOLAX        Dose:  10 mg   Place 10 mg rectally daily as needed for constipation   Refills:  0        Dakins  0.125 % Soln        Externally apply topically 2 times daily BID and PRN.  1. Cleanse wound with microklenze, cleanse periwound area with naomi perineal 2. Moisten kerlix fluff with dakins solution 0.125%, wring out excess, pack wound with kerlix 3. Apply antifungal powder to periwound area, rub in. Apply criticaid over powder. 4. Cover with ABD using minimal medipore tape to secure. 5. Label dressing with date, time, initials. Follow Rigorous PIP measures.   Refills:  0        IMODIUM A-D 1 MG/7.5ML Liqd   Generic drug:  Loperamide HCl        Dose:  4 mg   4 mg by Gastric Tube route every other day   Refills:  0        * INSULIN ASPART SC        Dose:  1-6 Units   Inject 1-6 Units Subcutaneous every 6 hours 0800, 1400, 2000, 0200 -250 1 unit -300 2 units -350 3 units -400 4 units -450 5 units BG >450 6 units and update MD   Refills:  0        * insulin aspart 100 UNIT/ML injection   Commonly known as:  NovoLOG PEN   Used for:  Type 2 diabetes mellitus with diabetic nephropathy (H)        Dose:  1-6 Units   Inject 1-6 Units Subcutaneous every 4 hours   Refills:  0        Lactobacillus Acidophilus Powd        Dose:  1 capsule   1 capsule by Gastric Tube route daily 10 billion units   Refills:  0        multivitamin, therapeutic Tabs tablet        Dose:  1 tablet   1 tablet by Gastric Tube route daily   Refills:  0        * Notice:  This list has 4 medication(s) that are the same as other medications prescribed for you. Read the directions carefully, and ask your doctor or other care provider to review them with you.      STOP taking     insulin glargine 100 UNIT/ML injection   Commonly known as:  LANTUS   Replaced by:  insulin glargine 100 UNIT/ML injection           insulin NPH-insulin regular injection   Commonly known as:  HumuLIN MIX 70/30/NovoLIN MIX 70/30                   Summary of Visit     Reason for your hospital stay       You were admitted with fever, renal failure, possible  sepsis             After Care     Activity - Up with assistive device           Advance Diet as Tolerated       Follow this diet upon discharge: Orders Placed This Encounter      Adult Formula Drip Feeding: Continuous Isosource 1.5; Gastrostomy; Goal Rate: 45; mL/hr; Medication - Tube Feeding Flush Frequency: At least 15-30 mL water before and after medication administration and with tube clogging; Isosource 1.5 via G-tube...      NPO for Medical/Clinical Reasons Except for: No Exceptions       General info for SNF       Length of Stay Estimate: Long Term Care  Condition at Discharge: Stable  Level of care:board and care  Rehabilitation Potential: Poor  Admission H&P remains valid and up-to-date: Yes  Recent Chemotherapy: N/A  Use Nursing Home Standing Orders: Yes       Glucose monitor nursing POCT       Before meals and at bedtime       Mantoux instructions       Give two-step Mantoux (PPD) Per Facility Policy Yes             Procedures     Oxygen - Nasal cannula       2-4 Lpm by nasal cannula to keep O2 sats 92% or greater.             Follow-Up Appointment Instructions     Future Labs/Procedures    Follow Up and recommended labs and tests     Comments:    Follow up with shelter physician.  The following labs/tests are recommended: titration of metoprolol and lantus      Follow-Up Appointment Instructions     Follow Up and recommended labs and tests       Follow up with shelter physician.  The following labs/tests are recommended: titration of metoprolol and lantus             Statement of Approval     Ordered          07/08/17 1022  I have reviewed and agree with all the recommendations and orders detailed in this document.  EFFECTIVE NOW     Approved and electronically signed by:  Piotr Saba MD

## 2017-06-30 NOTE — IP AVS SNAPSHOT
` ` Patient Information     Patient Name Sex     Celia Lewis ([id number]) Female 1926       Room Bed    8831 8831-02      Patient Demographics     Address Phone    [facility]  [address] [place] [address] [phone] (Home)      Patient Ethnicity & Race     Ethnic Group Patient Race    American White      Emergency Contact(s)     Name Relation Home Work Mobile    Salvatore Lewis Dr. Son [phone]    JoshuaLeonel Spouse       Castro [employer] Thi Lancasterer Son   [phone]    JoshuaCat smith Daughter   [phone]      Documents on File        Status Date Received Description       Documents for the Patient    Privacy Notice - [place] Received 12     Face Sheet Received () 01/28/10     External Medication Information Consent       Patient ID       Consent for Services - Hospital/Clinic Received () 10/31/10     Face Sheet Received () 10/31/10     Business/Insurance/Care Coordination/Health Form - Patient.1  12     Business/Insurance/Care Coordination/Health Form - Patient.1  12     Insurance Card Received 12 [employer]    Advance Directives and Living Will Received 12 POLST 11-    Consent for Services - Hospital/Clinic Received () 12     Consent for Services - Hospital/Clinic  () 12 CONSENT FOR SERVICES - CLINIC AND HOD    Consent for EHR Access  13 Copied from existing Consent for services - C/HOD collected on 2012    [facility] Specified Other       Consent for Services - Hospital/Clinic  () 14 CONSENT FOR SERVICE    Consent for Services - [facility]       Consent for Services/Privacy Notice - Hospital/Clinic-Esign Received () 16     Consent for Services/Privacy Notice - Hospital/Clinic       Advance Directives and Living Will Received 16 POLST 07/21/15    Insurance Card Received (Deleted) 12     Advance Directives and Living  Will Received (Deleted) 09/04/12 to be deleted       Documents for the Encounter    CMS IM for Patient Signature Received 07/02/17 1MM    Photograph       EMS/Ambulance Record  06/30/17 [facility]      Admission Information     Attending Provider Admitting Provider Admission Type Admission Date/Time    Paul Jolly MD Gjevre, Peter W, MD Emergency 06/30/17  1617    Discharge Date Hospital Service Auth/Cert Status Service Area     Hospitalist Incomplete [facility]    Unit Room/Bed Admission Status       [facility] ONCOLOGY 8831/8831-02 Admission (Confirmed)       Admission     Complaint    ARF (acute renal failure) (H)      Hospital Account     Name Acct ID Class Status Primary Coverage    Celia Lewis [id number] Inpatient Open UCARE - UCARE SENIORS NON FPA            Guarantor Account (for Hospital Account #[id number])     Name Relation to Pt Service Area Active? Acct Type    Celia Lewis  FCS Yes Personal/Family    Address Phone          [facility] [address] [place] [address] [phone](H)              Coverage Information (for Hospital Account #[id number])     F/O Payor/Plan Precert #    UCARE/UCARE SENIORS NON FPA     Subscriber Subscriber #    Celia Lewis [id number]    Address Phone    [address]  [place] [address] [phone]

## 2017-07-01 LAB
ANION GAP SERPL CALCULATED.3IONS-SCNC: 9 MMOL/L (ref 3–14)
BACTERIA SPEC CULT: NORMAL
BUN SERPL-MCNC: 86 MG/DL (ref 7–30)
CALCIUM SERPL-MCNC: 7.6 MG/DL (ref 8.5–10.1)
CHLORIDE SERPL-SCNC: 117 MMOL/L (ref 94–109)
CO2 SERPL-SCNC: 25 MMOL/L (ref 20–32)
CREAT SERPL-MCNC: 2.33 MG/DL (ref 0.52–1.04)
GFR SERPL CREATININE-BSD FRML MDRD: 20 ML/MIN/1.7M2
GLUCOSE BLDC GLUCOMTR-MCNC: 106 MG/DL (ref 70–99)
GLUCOSE BLDC GLUCOMTR-MCNC: 117 MG/DL (ref 70–99)
GLUCOSE BLDC GLUCOMTR-MCNC: 121 MG/DL (ref 70–99)
GLUCOSE BLDC GLUCOMTR-MCNC: 147 MG/DL (ref 70–99)
GLUCOSE SERPL-MCNC: 115 MG/DL (ref 70–99)
GLUCOSE SERPL-MCNC: 141 MG/DL (ref 70–99)
LACTATE BLD-SCNC: 2.8 MMOL/L (ref 0.7–2.1)
Lab: NORMAL
MAGNESIUM SERPL-MCNC: 2.6 MG/DL (ref 1.6–2.3)
MICRO REPORT STATUS: NORMAL
PHOSPHATE SERPL-MCNC: 3.6 MG/DL (ref 2.5–4.5)
POTASSIUM SERPL-SCNC: 4 MMOL/L (ref 3.4–5.3)
SODIUM SERPL-SCNC: 151 MMOL/L (ref 133–144)
SPECIMEN SOURCE: NORMAL
VANCOMYCIN SERPL-MCNC: 12.7 MG/L

## 2017-07-01 PROCEDURE — 99233 SBSQ HOSP IP/OBS HIGH 50: CPT | Performed by: INTERNAL MEDICINE

## 2017-07-01 PROCEDURE — 25000128 H RX IP 250 OP 636: Performed by: INTERNAL MEDICINE

## 2017-07-01 PROCEDURE — 83605 ASSAY OF LACTIC ACID: CPT | Performed by: INTERNAL MEDICINE

## 2017-07-01 PROCEDURE — 40000884 ZZH STATISTIC STEP DOWN HRS NIGHT

## 2017-07-01 PROCEDURE — 36415 COLL VENOUS BLD VENIPUNCTURE: CPT | Performed by: INTERNAL MEDICINE

## 2017-07-01 PROCEDURE — 80202 ASSAY OF VANCOMYCIN: CPT | Performed by: INTERNAL MEDICINE

## 2017-07-01 PROCEDURE — 25000132 ZZH RX MED GY IP 250 OP 250 PS 637: Performed by: INTERNAL MEDICINE

## 2017-07-01 PROCEDURE — 84100 ASSAY OF PHOSPHORUS: CPT | Performed by: INTERNAL MEDICINE

## 2017-07-01 PROCEDURE — 27210429 ZZH NUTRITION PRODUCT INTERMEDIATE LITER

## 2017-07-01 PROCEDURE — 00000146 ZZHCL STATISTIC GLUCOSE BY METER IP

## 2017-07-01 PROCEDURE — 21000000 ZZH R&B IMCU HEART CARE

## 2017-07-01 PROCEDURE — 40000885 ZZH STATISTIC STEP DOWN HRS EVENING

## 2017-07-01 PROCEDURE — 82947 ASSAY GLUCOSE BLOOD QUANT: CPT | Performed by: INTERNAL MEDICINE

## 2017-07-01 PROCEDURE — 80048 BASIC METABOLIC PNL TOTAL CA: CPT | Performed by: INTERNAL MEDICINE

## 2017-07-01 PROCEDURE — 25000131 ZZH RX MED GY IP 250 OP 636 PS 637: Performed by: INTERNAL MEDICINE

## 2017-07-01 PROCEDURE — 83735 ASSAY OF MAGNESIUM: CPT | Performed by: INTERNAL MEDICINE

## 2017-07-01 RX ORDER — NICOTINE POLACRILEX 4 MG
15-30 LOZENGE BUCCAL
Status: DISCONTINUED | OUTPATIENT
Start: 2017-07-01 | End: 2017-07-08 | Stop reason: HOSPADM

## 2017-07-01 RX ORDER — SODIUM HYPOCHLORITE 2.5 MG/ML
SOLUTION TOPICAL DAILY
Status: ON HOLD | COMMUNITY
End: 2017-07-01

## 2017-07-01 RX ORDER — DEXTROSE MONOHYDRATE 25 G/50ML
25-50 INJECTION, SOLUTION INTRAVENOUS
Status: DISCONTINUED | OUTPATIENT
Start: 2017-07-01 | End: 2017-07-08 | Stop reason: HOSPADM

## 2017-07-01 RX ADMIN — OXYCODONE HYDROCHLORIDE 2 MG: 5 SOLUTION ORAL at 21:29

## 2017-07-01 RX ADMIN — ACETAMINOPHEN 650 MG: 160 SUSPENSION ORAL at 17:23

## 2017-07-01 RX ADMIN — ACETAMINOPHEN 650 MG: 160 SUSPENSION ORAL at 21:29

## 2017-07-01 RX ADMIN — PIPERACILLIN SODIUM,TAZOBACTAM SODIUM 2.25 G: 2; .25 INJECTION, POWDER, FOR SOLUTION INTRAVENOUS at 23:41

## 2017-07-01 RX ADMIN — MULTIVITAMIN 15 ML: LIQUID ORAL at 09:01

## 2017-07-01 RX ADMIN — VANCOMYCIN HYDROCHLORIDE 1500 MG: 5 INJECTION, POWDER, LYOPHILIZED, FOR SOLUTION INTRAVENOUS at 21:15

## 2017-07-01 RX ADMIN — MICONAZOLE NITRATE: 2 POWDER TOPICAL at 15:03

## 2017-07-01 RX ADMIN — PIPERACILLIN SODIUM,TAZOBACTAM SODIUM 2.25 G: 2; .25 INJECTION, POWDER, FOR SOLUTION INTRAVENOUS at 11:54

## 2017-07-01 RX ADMIN — ACETAMINOPHEN 650 MG: 160 SUSPENSION ORAL at 09:02

## 2017-07-01 RX ADMIN — Medication 7 ML: at 15:04

## 2017-07-01 RX ADMIN — PIPERACILLIN SODIUM,TAZOBACTAM SODIUM 2.25 G: 2; .25 INJECTION, POWDER, FOR SOLUTION INTRAVENOUS at 17:10

## 2017-07-01 RX ADMIN — SODIUM CHLORIDE: 9 INJECTION, SOLUTION INTRAVENOUS at 09:03

## 2017-07-01 RX ADMIN — Medication 1 CAPSULE: at 09:03

## 2017-07-01 RX ADMIN — SODIUM CHLORIDE: 9 INJECTION, SOLUTION INTRAVENOUS at 21:16

## 2017-07-01 RX ADMIN — PIPERACILLIN SODIUM,TAZOBACTAM SODIUM 2.25 G: 2; .25 INJECTION, POWDER, FOR SOLUTION INTRAVENOUS at 05:34

## 2017-07-01 RX ADMIN — INSULIN ASPART 1 UNITS: 100 INJECTION, SOLUTION INTRAVENOUS; SUBCUTANEOUS at 21:50

## 2017-07-01 RX ADMIN — RANITIDINE HYDROCHLORIDE 150 MG: 150 SOLUTION ORAL at 09:01

## 2017-07-01 NOTE — PHARMACY-VANCOMYCIN DOSING SERVICE
Pharmacy Vancomycin Initial Note  Date of Service 2017  Patient's  1926  89 year old, female    Indication: Sepsis, with Hx of Urosepsis  which was MRSA.      Current estimated CrCl = Estimated Creatinine Clearance: 11.8 mL/min (based on Cr of 2.84).    Creatinine for last 3 days  2017:  4:20 PM Creatinine 2.84 mg/dL    Recent Vancomycin Level(s) for last 3 days  No results found for requested labs within last 72 hours.      Vancomycin IV Administrations (past 72 hours)                   vancomycin (VANCOCIN) 1000 mg in dextrose 5% 200 mL PREMIX (mg) 1,000 mg New Bag 17 1702                Nephrotoxins and other renal medications (Future)    Start     Dose/Rate Route Frequency Ordered Stop    17 2300  piperacillin-tazobactam (ZOSYN) 2.25 g vial to attach to  ml bag     Comments:  Pharmacy can adjust dose based on renal function.    2.25 g  over 30 Minutes Intravenous EVERY 6 HOURS 17 1950      17  vancomycin place ramos - receiving intermittent dosing      1 each Does not apply SEE ADMIN INSTRUCTIONS 17            Contrast Orders - past 72 hours     None                Plan:  1.  Start vancomycin  , 1000mg was given in Emergency Department.  Will use intermittent dosing based on trough levels as crcl = 11.8.    2.  Goal Trough Level: 15-20 mg/L   3.  Pharmacy will check trough levels as appropriate in 1-3 Days.    4. Serum creatinine levels will be ordered daily for the first week of therapy and at least twice weekly for subsequent weeks.  There are multiple nephrotoxic agents ordered.  5. [place] method utilized to dose vancomycin therapy: Method 1    Piotr Arguello

## 2017-07-01 NOTE — PROGRESS NOTES
[facility]    Sepsis Evaluation Progress Note    Date of Service: 07/01/2017    I was called to see Celia Lewis due to abnormal vital signs triggering the Sepsis SIRS screening alert. She is known to have an infection.     Physical Exam    Vital Signs:  Temp: 99.3  F (37.4  C) Temp src: Bladder BP: 109/58   Heart Rate: 100 Resp: 23 SpO2: 94 % O2 Device: Nasal cannula Oxygen Delivery: 2 LPM    Lab:  Lactic Acid   Date Value Ref Range Status   07/01/2017 2.8 (H) 0.7 - 2.1 mmol/L Final       The patient is at baseline mental status from admission    The rest of their physical exam is significant for see recent H&P.    Assessment and Plan    The SIRS and exam findings are likely due to   sepsis.     ID: The patient is currently on the following antibiotics:  Anti-infectives (Future)    Start     Dose/Rate Route Frequency Ordered Stop    07/02/17 1800  levofloxacin (LEVAQUIN) infusion 500 mg     Comments:  Pharmacy can adjust dose based on renal function.    500 mg  100 mL/hr over 60 Minutes Intravenous EVERY 48 HOURS 06/30/17 1929      06/30/17 2300  piperacillin-tazobactam (ZOSYN) 2.25 g vial to attach to  ml bag     Comments:  Pharmacy can adjust dose based on renal function.    2.25 g  over 30 Minutes Intravenous EVERY 6 HOURS 06/30/17 1950      06/30/17 1959  vancomycin place ramos - receiving intermittent dosing      1 each Does not apply SEE ADMIN INSTRUCTIONS 06/30/17 2000          Current antibiotic coverage is appropriate for source of infection.    Fluid: Fluid bolus not indicated due to improving lactic acid, currently on IV fluids.    Lab: Repeat lactic acid is not indicated unless clinical change in status, as lactic acid is improving with treatment    Disposition: The patient will remain on the current unit. We will continue to monitor this patient closely.    Lul Molina MD

## 2017-07-01 NOTE — CONSULTS
"CLINICAL NUTRITION SERVICES  -  ASSESSMENT NOTE      Recommendations Ordered by Registered Dietitian (RD):   1. Isosource 1.5 via G-tube at goal rate of 45mL/hr. Providing 1620 kcal (32 kcal/kg), 73 g PRO (1.4 g/kg), 190 g CHO, 16.2g fiber, 35% fat and 820 mL free water. Initiate at 15mL/hr and advance by 10mL Q4hrs  2. Free water flushes: 60mL Q4hr  3. Multivitamin/Mineral- Tri-Vi-Sol, 7 mL daily x 10 days (5250 International Units Vit A, 245 mg Vit C, and 2800 International Units Vit D)   Future/Additional Recommendations:   Continue to monitor PI and need for further supplementation    Malnutrition: Unable to assess         REASON FOR ASSESSMENT  Celia Lewis is a 89 year old female seen by Registered Dietitian for Provider Order - Registered Dietitian to Assess and Order TF per Medical Nutrition protocol      NUTRITION HISTORY  - Unable to obtain nutrition history from pt due to nonverbal   6/16/16: G-tube placed  Pt lives at [facility]  Per previous notes pt was receiving TF, however, no notes in paper chart and no pt family present verify regimen. Per RD note on 6/15: Osmolite 1.5 @ 55 mL/hr x 20 hrs/day plus ProStat daily. H2O flushes of 200 mL q 4 hours in addition to flushes given with meds. Total provisions = 1750 jarrod, 84 gm protein.      CURRENT NUTRITION ORDERS  Diet Order:     NPO     Current Intake/Tolerance:  BGM range: 106-240 - NPH and sliding scale ordered (Lantus held due to renal failure)  BM x2 reported by ED    PHYSICAL FINDINGS  Observed  None observed   Obtained from Chart/Interdisciplinary Team  Pressure Ulcers, per MD ellison PI (stage 3-4) on admission     ANTHROPOMETRICS  Height: 5' 0\"  Weight: 150 lbs 12.71 oz (68.4kg)  Body mass index is 29.45 kg/(m^2).  Weight Status:  Overweight BMI 25-29.9  IBW: 45.5 kg  % IBW: 151%  Weight History: Wt loss of 3.8kg (5.5%) over the last year  Wt Readings from Last 10 Encounters:   07/01/17 68.4 kg (150 lb 12.7 oz)   06/19/17 69.3 kg " (152 lb 12.5 oz)   06/24/16 72.2 kg (159 lb 3.2 oz)   06/18/15 83.9 kg (185 lb)   05/29/15 81.6 kg (180 lb)   05/28/15 81.6 kg (180 lb)   05/22/15 81.6 kg (179 lb 12.8 oz)   06/24/14 77.5 kg (170 lb 13.7 oz)   08/26/12 82.1 kg (181 lb)   08/25/12 82.1 kg (181 lb)       LABS  Na 151 (H), BUN 86 (H), Cr 2.33 (H)    MEDICATIONS  Certavite     Dosing Weight 51.2 kg (adjusted wt, based on 68.4kg from 7/1)    ASSESSED NUTRITION NEEDS PER APPROVED PRACTICE GUIDELINES:  Estimated Energy Needs: 1031-3576 kcals (30-35 Kcal/Kg)  Justification: Pressure injury   Estimated Protein Needs: 61-77 grams protein (1.2-1.5 g pro/Kg)  Justification: wound healing, monitor renal labs  Estimated Fluid Needs: 1288-1536mL (25-30 mL/kg)  Justification: maintenance    MALNUTRITION:  % Weight Loss:  Weight loss does not meet criteria for malnutrition- Wt loss of 3.8kg (5.5%) over the last year  % Intake:  Unable to assess due to limited information and pt nonverbal   Subcutaneous Fat Loss:  None observed  Muscle Loss:  None observed  Fluid Retention:  None noted     Malnutrition Diagnosis: Unable to determine due to unable to assess intake- suspect EN was running at previous regimen       NUTRITION DIAGNOSIS:  Increased nutrient needs energy-protein related to PI and overall medical status as evidenced by per MD ellison PI (stage 3-4) on admission.      NUTRITION INTERVENTIONS  Recommendations / Nutrition Prescription  Enteral nutrition goal rate 45mL/hr  Tri-Vi-Sol   Continue Certavite     Implementation  1. Nutrition education: Not appropriate at this time due to patient condition  2. EN Composition- Isosource 1.5 via G-tube at goal rate of 45mL/hr. Providing 1620 kcal (32 kcal/kg), 73 g PRO (1.4 g/kg), 190 g CHO, 16.2g fiber, 35% fat and 820 mL free water. (93301 International Units of Vit A, 346 mg of Vit C, and 35 mg of Zinc)  3. Free water flushes: 60mL Q4hr  4. Multivitamin/Mineral- Tri-Vi-Sol, 7 mL daily x 10 days (6152 International  Units Vit A, 245 mg Vit C, and 2800 International Units Vit D)    Certavite + Tri-Vi-Sol (7mL) + TF(45mL/hr)=  Vit A: 16,827 International Units, Vit C: 651mg, Vit D: 2816 International Units    Nutrition Goals  1. Initiate enteral nutrition within 24-48hrs  2. Once enteral nutrition initiated, TF to meet 90%-110% of assessed needs    MONITORING AND EVALUATION:  Progress towards goals will be monitored and evaluated per protocol and Practice Guidelines    Ni Cuba RD

## 2017-07-01 NOTE — PHARMACY-VANCOMYCIN DOSING SERVICE
Pharmacy Vancomycin Note  Date of Service 2017  Patient's  1926   89 year old, female    Indication: Sepsis  Goal Trough Level: 15-20 mg/L  Day of Therapy: 2  Current Vancomycin regimen:  Had 1 time dose 1000mg in ED .  Intermittent dosing due to changing renal fn.    Current estimated CrCl = Estimated Creatinine Clearance: 13.9 mL/min (based on Cr of 2.33).    Creatinine for last 3 days  2017:  4:20 PM Creatinine 2.84 mg/dL  2017:  6:15 AM Creatinine 2.33 mg/dL    Recent Vancomycin Levels (past 3 days)  2017:  4:50 PM Vancomycin Level 12.7 mg/L    Vancomycin IV Administrations (past 72 hours)                   vancomycin (VANCOCIN) 1000 mg in dextrose 5% 200 mL PREMIX (mg) 1,000 mg New Bag 17 1702                Nephrotoxins and other renal medications (Future)    Start     Dose/Rate Route Frequency Ordered Stop    17 1900  vancomycin (VANCOCIN) 1500 mg in 0.9% NaCl 250 mL PREMIX      1,500 mg Intravenous ONCE 17 1801      17 2300  piperacillin-tazobactam (ZOSYN) 2.25 g vial to attach to  ml bag     Comments:  Pharmacy can adjust dose based on renal function.    2.25 g  over 30 Minutes Intravenous EVERY 6 HOURS 17 1950      17 195  vancomycin place ramos - receiving intermittent dosing      1 each Does not apply SEE ADMIN INSTRUCTIONS 17               Contrast Orders - past 72 hours     None          Interpretation of levels and current regimen:  Trough level is  Therapeutic  At trough will redose    Has serum creatinine changed > 50% in last 72 hours: No    Urine output:  diminished urine output    Renal Function: Improving    Plan:  1.  Redose with 1500mg x 1 continue intermitent dosing  2.  Pharmacy will check trough levels as appropriate in 1-3 Days.    3. Serum creatinine levels will be ordered daily for the first week of therapy and at least twice weekly for subsequent weeks.      Piotr Arguello        .

## 2017-07-01 NOTE — PLAN OF CARE
Problem: Goal Outcome Summary  Goal: Goal Outcome Summary  Outcome: No Change  VSS, pt opens eyes spontaneously but does not respond to voice or stimulus. This is baseline for patient. Turn/reposition q2 hours, dressing changes to coccyx BID and PRN per [facility] home orderes--await WOC consult Monday. TF restarted-watch closely for signs of aspiration, pt does not allow oral care but doesn't handle secretions well, oral suction as able.

## 2017-07-01 NOTE — H&P
[facility]    History and Physical  Hospitalist       Date of Admission:  6/30/2017  Date of Service (when I saw the patient): 06/30/17    Assessment & Plan   Celia Lewis is a 89 year old female who presents with acute renal failure      1. Acute renal failure on CKD  This is likely due to an obstructive uropathy. Notably she had 1200 cc out what her Fonseca was changed in the emergency department.  Her urinalysis does appear to be somewhat suspicious for infection thus we will continue antibiotics. We will hydrate her aggressively with intravenous fluids and follow her creatinine. I discussed with her son the possibility of having more frequent Fonseca catheter changes in her care facility as this may prevent obstructive uropathy episodes from occurring as frequently.    2. fevers uncertain source: Her urine does appear to be possibly infected; her chest x-ray appears unremarkable. She has a history of a coccygeal ulcer which was recently evaluated by infectious disease and felt to not be a likely source of infection. Nevertheless we will again consult the infectious disease service and ask them to render an expert opinion on the source for fevers. She has been initiated on Zosyn and vancomycin and levofloxacin and we will continue those agents with plans to streamline as more is known.     3. Advanced dementia with nonverbal state she has tube feeds on board which will be temporarily held due to the possibility of aspiration. We will ask nutrition to evaluate her in the a.m.      4. Type 2 diabetes she normally requires Lantus in the a.m. and p.m. as well as NPH in the a.m. and p.m. and a sliding scale. For now we will hold her Lantus due to her acute renal failure and continue her NPH and place her on an insulin sliding scale.    5 sacral decubitus ulcer will ask our wound nurse to evaluate this and infectious disease has also been consulted due to the fevers.      DVT Prophylaxis: Pneumatic  Compression Devices  Code Status: Full Code    Disposition: Inpatient    Paul Jolly MD    Primary Care Physician     Chief Complaint   Fevers    History is obtained from the patient's son    History of Present Illness   Celia Lewis is a 89 year old female who presents with fevers. She was brought to the emergency department due to fevers that occurred in the [facility] where she lives. She was recently hospitalized at [facility] between 6/14/2017 and 6/19/2017. During that time she was felt to have a healthcare associated pneumonia possibly due to aspiration and urinary tract infection associated with catheterization with urinary tract as well as a stage 3-4 coccygeal pressure ulcer.    The patient returns today with her son due to fevers that occurred in the [facility]. She was seen by Dr. Ca and a chest x-ray was performed as well as routine labs. She was noted to have a significant worsening of her kidney function. She was also noted to have 1200 cc of urine in her bladder which were expelled what her Fonseca was changed. I discussed the case with Dr. Marie and due to the acute renal failure as well as the fevers we both agree that it would be appropriate to admit her to our intermediate care unit.      The patient is nonverbal and has been for some time. Her son notes that she had some frothy things on her mouth when seen in the [facility] today. The question of possible aspiration of tube feeds exists. She does have advanced Alzheimer's disease. For Yazdanism reasons the family prefers not to have palliative care involved in her care. There is no history of any bleeding or clotting disorders but she has a history of having had atrial fibrillation as well as congestive heart failure in the past.    Past Medical History    I have reviewed this patient's medical history and updated it with pertinent information if needed.   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     Advanced directives, counseling/discussion, POLST completed 5/29/15  Full Code 5/29/2015     Alzheimer's disease 5/22/2015     Arthritis     shoulder     Atrial fibrillation (H) 5/22/2015     Calculus of kidney      Candidiasis of skin and nails 5/22/2015     Congestive heart failure, unspecified      Coronary artery disease      Heart disease, unspecified     diastolic dysfunction     Hyp ht/kd NOS I-IV w hf 5/22/2015     Hypertension      Morbid obesity (H) 5/22/2015     Other and unspecified hyperlipidemia 5/22/2015     Personal history of urinary (tract) infection 5/22/2015     Primary localized osteoarthrosis, shoulder region 5/22/2015     Renal failure, unspecified     secondary to diuresis     Thyroid disease      Type II or unspecified type diabetes mellitus with renal manifestations, not stated as uncontrolled(250.40) 5/22/2015     Type II or unspecified type diabetes mellitus without mention of complication, not stated as uncontrolled      Uric acid stone in urine        Past Surgical History   I have reviewed this patient's surgical history and updated it with pertinent information if needed.  Past Surgical History:   Procedure Laterality Date     CYSTOSCOPY, INSERT STENT URETHRA, COMBINED  8/6/2012    Procedure: COMBINED CYSTOSCOPY, INSERT STENT URETHRA;  cystoscopy, left  ureteral stent placement, left pyelogram;  Surgeon: Eulalio Bernardo MD;  Location: [facility] OR     CYSTOSCOPY,+URETEROSCOPY  8/19/05    with placement of (L) ureteral JJ stent     HC X-RAY ABDOMEN AP VIEW (KUB)  8/19/05     LASER HOLMIUM LITHOTRIPSY URETER(S), INSERT STENT, COMBINED  8/21/2012    Procedure: COMBINED CYSTOSCOPY, URETEROSCOPY, LASER HOLMIUM LITHOTRIPSY URETER(S), INSERT STENT;  CYSTOSCOPY, LEFT URETEROSCOPY, LASER HOLMIUM LITHOTRIPSY ,left ureteral stent exchange;  Surgeon: Eulalio Bernardo MD;  Location: [facility] OR       Prior to Admission Medications   Prior to Admission  Medications   Prescriptions Last Dose Informant Patient Reported? Taking?   ACETAMINOPHEN PO  Nursing Home Yes No   Si mg by Gastric Tube route 3 times daily    Lactobacillus Acidophilus POWD  Nursing Home Yes No   Si each by Gastric Tube route daily 10 billion units   Loperamide HCl (IMODIUM A-D) 1 MG/7.5ML LIQD  Nursing Home Yes No   Si mg by Gastric Tube route every other day   RANITIDINE HCL PO  Nursing Home Yes No   Si mg by Gastric Tube route 2 times daily    acetaminophen (TYLENOL) 32 mg/mL solution  Nursing Home Yes No   Si mg by Gastric Tube route daily as needed for fever or mild pain   bisacodyl (DULCOLAX) 10 MG suppository  Nursing Home Yes No   Sig: Place 10 mg rectally daily as needed for constipation   insulin NPH (HUMULIN N/NOVOLIN N) 100 UNIT/ML injection  Nursing Home Yes No   Sig: Inject 16 Units Subcutaneous every morning   insulin NPH (HUMULIN N/NOVOLIN N) 100 UNIT/ML injection  Nursing Home Yes No   Sig: Inject 10 Units Subcutaneous every evening Given at 1400 daily   insulin aspart (NOVOLOG PEN) 100 UNIT/ML soln  Nursing Home No No   Sig: Inject 1-6 Units Subcutaneous every 4 hours   insulin glargine (LANTUS) 100 UNIT/ML injection  Nursing Home Yes No   Sig: Inject 36 Units Subcutaneous every morning   insulin glargine (LANTUS) 100 UNIT/ML injection  Nursing Home Yes No   Sig: Inject 34 Units Subcutaneous At Bedtime   multivitamin, therapeutic (THERA-VIT) TABS tablet  Nursing Home Yes No   Si tablet by Gastric Tube route daily   nystatin (MYCOSTATIN) 238805 UNIT/GM POW  Nursing Home Yes No   Sig: Apply 1 g topically 3 times daily as needed Apply to stomach folds as needed   oxyCODONE (ROXICODONE) 5 MG/5ML solution   No No   Si mLs (2 mg) by Per G Tube route 2 times daily   oxyCODONE (ROXICODONE) 5 MG/5ML solution   No No   Si mLs (2 mg) by Per G Tube route every 4 hours as needed for moderate to severe pain      Facility-Administered Medications: None      Allergies   Allergies   Allergen Reactions     Sulfa Drugs Other (See Comments)     Per nursing home documents, patient has allergy to sulfa       Social History   I have reviewed this patient's social history and updated it with pertinent information if needed. Celia Lewis  reports that she has quit smoking. She has never used smokeless tobacco. She reports that she does not drink alcohol or use illicit drugs.    Family History   I have reviewed this patient's family history and updated it with pertinent information if needed.   Family History   Problem Relation Age of Onset     CEREBROVASCULAR DISEASE Mother      CEREBROVASCULAR DISEASE Father      CANCER       Lung     DIABETES Brother        Review of Systems   Review of systems not obtained due to patient factors - confusion    Physical Exam   Temp: 99.3  F (37.4  C) Temp src: Bladder BP: 135/73   Heart Rate: 113 Resp: 29 SpO2: 93 % O2 Device: Oxymizer cannula Oxygen Delivery: 4 LPM  Vital Signs with Ranges  Temp:  [98.6  F (37  C)-100.6  F (38.1  C)] 99.3  F (37.4  C)  Heart Rate:  [112-125] 113  Resp:  [11-34] 29  BP: (122-149)/(71-85) 135/73  SpO2:  [93 %-98 %] 93 %  162 lbs 8 oz    Constitutional: Nonverbal   Eyes: Attempt to close eyes to light pupils are myotic but symmetric minimally reactive   HEENT: No facial asymmetry  Respiratory: Decreased breath sounds at bases no crackles or wheezes  Cardiovascular: Regular rate and rhythm S1 and S2 heard no murmurs   GI: Abdomen soft obese nontender nondistended there is a feeding tube in place   Lymph/Hematologic: Trace edema in extremities   Genitourinary: A Fonseca catheter is in place   Skin: No jaundice  Neurologic: She is nonfocal nonverbal and responds only to pain and light       Data   Data reviewed today:  I personally reviewed the EKG tracing showing Sinus tachycardia.    I also reviewed the chest x-ray and I agree with radiologist that there are no acute findings.    Recent Labs  Lab  06/30/17  1620   WBC 15.6*   HGB 11.7   *      *   POTASSIUM 4.6   CHLORIDE 111*   CO2 26   *   CR 2.84*   ANIONGAP 11   NELLIE 8.8   *   ALBUMIN 2.9*   PROTTOTAL 8.1   BILITOTAL 0.7   ALKPHOS 81   ALT 26   AST 24   TROPI 0.035       Recent Results (from the past 24 hour(s))   XR Chest Port 1 View    Narrative    CHEST SINGLE VIEW PORTABLE   6/30/2017 4:47 PM     HISTORY: Fever.    COMPARISON: 6/18/2017.    FINDINGS: Note that the evaluation is mildly limited by the rotation  of the patient to the left. A few linear opacities in the left lung  base most likely represent atelectasis or scarring. The lungs are  otherwise clear. Mild cardiomegaly. Atherosclerotic calcification in  the thoracic aorta.      Impression    IMPRESSION: No convincing evidence of active cardiopulmonary disease.

## 2017-07-01 NOTE — PLAN OF CARE
Problem: Goal Outcome Summary  Goal: Goal Outcome Summary  Outcome: No Change  Pt unresponsive. Occasionally opens eyes. Does not appear to be experiencing pain. Monitor shows SR. LS wheezy with stridor upon arrival to unit. Required suctioning for frothy secretions. Respiratory status has improved and pt breathing easily using O2 at 4 LPM. Generalized edema noted. Mepilex in place on coccyx. G tube functioning well. Good UOP with Fonseca. Continue with supportive cares.

## 2017-07-01 NOTE — PLAN OF CARE
Problem: Goal Outcome Summary  Goal: Goal Outcome Summary  Outcome: No Change  Pt unresponsive, opens eyes spontaneously w/o any form of understanding, extremities contracted. Vitals stable on 2 liters NC, unable to wean to RA. Total lift w/ A-2. T&R Q2H. incontinent of bowel. Dressing on coccyx intact. PU on bilateral ears, blanchable around scabs, using z-flow to prevent pressure. Has secretions but able to suction r/t pt clenching teeth. crackles through lung fields. Strict NPO. Blood sugar stable. IV fluids running, continues on IV abx, LA trending down. Planning ID, nutrition, and wound consult today. G-tube intact, free water flush, patent. Contact precautions maintained. Tele 's- continue to monitor.

## 2017-07-01 NOTE — PROGRESS NOTES
[facility]  Hospitalist Progress Note  Shu Rausch MD  Pager: [phone]     Admission day    6/30/2017  Date of Service (when I saw the patient): 1 July 2017        Interval History:     Patient has been stable, no fever, VS stable   We will discontinue VS q2h and go routine q6h. Complex care, she ramirez jeison in CCU for now.   Case discussed with RN         Assessment and Plan:   89 y/o NH resident is admitted with ARF and sepsis.     Her complex PMHx: advanced dementia, aspirations pneumonia, indwelling urinary cath, UTIs, DM, CKD, feeding tube, chronic sacral wound,       Sepsis    -- fever, tachypnea, hypoxemia, increased lactic acid ( no fever in the last 24)  -- most likely sec coccyx ulcer  -- cont Levaquin, ZOsyn and vanco  -- ID was involved on prior admitted ( consult June 18)   -- CXR 6/30 - negative for acute changes  -- BC - p  -- UA with WBC, yeast. UC is p  -- C Diff - neg    Hypernatremia  --  < -- 148  -- add free water     CKD  -- Cr 2.33 <-- 2.84   -- monitor   -- cont iv fluids    Advanced dementia with nonverbal state she has tube feeds on board which will be temporarily held due to the possibility of aspiration. We will ask nutrition to evaluate her in the a.m.     DM 2  -- she was on 70/30 bid and Lantus bid at the NH - none given since admission  -- BS under 120 since admission  -- add low SSI  -- we will consider resuming Lantus tomorrow based on the BS  -- she definitely will not require 70/30 + lantus        sacral decubitus ulcer will ask our wound nurse to evaluate this and infectious disease has also been consulted due to the fevers.        DVT Prophylaxis: Pneumatic Compression Devices  Code Status: Full Code     Disposition: Inpatient    ROS: not obtainable            Physical Exam:   Blood pressure 119/64, temperature 97.9  F (36.6  C), temperature source Axillary, resp. rate 18, height 1.524 m (5'), weight 68.4 kg (150 lb 12.7 oz), SpO2 97 %.     NAD,  appears comfortable   Skin: no rashes   Chest: clear to auscultation bilaterally, good respiratory effort  Heart: S1 S2, RRR, no mgr appreciated  Abdomen: soft, not tender,   Extremities: no edema.   Neurologic: lethargic, extremities in chronic flexion contraction   Sacral area: large and deep ulcer, most likely to the bone. Mild discharge. Likely source of sepsis     Vital Sign Ranges  Temperature Temp  Av.8  F (37.1  C)  Min: 97.6  F (36.4  C)  Max: 100.6  F (38.1  C)   Blood pressure Systolic (24hrs), Av , Min:95 , Max:149        Diastolic (24hrs), Av, Min:46, Max:85      Pulse No Data Recorded   Respirations Resp  Av.2  Min: 11  Max: 34   Pulse oximetry SpO2  Av.1 %  Min: 89 %  Max: 100 %              Medications:         acetaminophen  650 mg Rectal Once     acetaminophen  650 mg Per Feeding Tube TID     lactobacillus rhamnosus (GG)  1 capsule Per Feeding Tube Daily     multivitamins with minerals  15 mL Per Feeding Tube Daily     oxyCODONE  2 mg Per G Tube BID     ranitidine  150 mg Per Feeding Tube Daily     sodium chloride (PF)  3 mL Intracatheter Q8H     [START ON 2017] levofloxacin  500 mg Intravenous Q48H     piperacillin-tazobactam  2.25 g Intravenous Q6H     vancomycin place ramos - receiving intermittent dosing  1 each Does not apply See Admin Instructions        Prescriptions Prior to Admission   Medication Sig Dispense Refill Last Dose     oxyCODONE (ROXICODONE) 5 MG/5ML solution 2 mLs (2 mg) by Per G Tube route 2 times daily 15 mL 0 2017 at 10 am     oxyCODONE (ROXICODONE) 5 MG/5ML solution 2 mLs (2 mg) by Per G Tube route every 4 hours as needed for moderate to severe pain  0 2017 at Unknown time     Lactobacillus Acidophilus POWD 1 capsule by Gastric Tube route daily 10 billion units    2017 at am     multivitamin, therapeutic (THERA-VIT) TABS tablet 1 tablet by Gastric Tube route daily   2017 at am     Loperamide HCl (IMODIUM A-D) 1 MG/7.5ML LIQD  4 mg by Gastric Tube route every other day   6/30/2017 at 0800     acetaminophen (TYLENOL) 32 mg/mL solution 325 mg by Gastric Tube route daily as needed for fever or mild pain   prn     ACETAMINOPHEN  mg by Gastric Tube route 3 times daily    6/30/2017 at 1400     bisacodyl (DULCOLAX) 10 MG suppository Place 10 mg rectally daily as needed for constipation   prn     insulin glargine (LANTUS) 100 UNIT/ML injection Inject 36 Units Subcutaneous every morning   6/14/2017 at Unknown time     insulin glargine (LANTUS) 100 UNIT/ML injection Inject 34 Units Subcutaneous At Bedtime   6/13/2017 at Unknown time     insulin NPH (HUMULIN N/NOVOLIN N) 100 UNIT/ML injection Inject 16 Units Subcutaneous every morning   6/14/2017 at Unknown time     insulin NPH (HUMULIN N/NOVOLIN N) 100 UNIT/ML injection Inject 10 Units Subcutaneous every evening Given at 1400 daily   6/13/2017 at Unknown time     insulin aspart (NOVOLOG PEN) 100 UNIT/ML soln Inject 1-6 Units Subcutaneous every 4 hours   6/14/2017 at Unknown time     RANITIDINE HCL PO 75 mg by Gastric Tube route 2 times daily    6/14/2017 at am       Current Facility-Administered Medications   Medication     lactated ringers infusion     acetaminophen (TYLENOL) Suppository 650 mg     acetaminophen (TYLENOL) solution 325 mg     acetaminophen (TYLENOL) solution 650 mg     bisacodyl (DULCOLAX) Suppository 10 mg     lactobacillus rhamnosus (GG) (CULTURELL) capsule 1 capsule     multivitamins with minerals (CERTAVITE/CEROVITE) liquid 15 mL     miconazole (MICATIN; MICRO GUARD) 2 % powder 1 g     oxyCODONE (ROXICODONE) solution 2 mg     oxyCODONE (ROXICODONE) solution 2 mg     ranitidine (Zantac) syrup 150 mg     naloxone (NARCAN) injection 0.1-0.4 mg     0.9% sodium chloride infusion     lidocaine 1 % 1 mL     lidocaine (LMX4) cream     sodium chloride (PF) 0.9% PF flush 3 mL     sodium chloride (PF) 0.9% PF flush 3 mL     nitroGLYcerin (NITROSTAT) sublingual tablet 0.4 mg      [START ON 7/2/2017] levofloxacin (LEVAQUIN) infusion 500 mg     piperacillin-tazobactam (ZOSYN) 2.25 g vial to attach to  ml bag     vancomycin place ramos - receiving intermittent dosing     Facility-Administered Medications Ordered in Other Encounters   Medication     Lidocaine 1% injection 0.5-5 mL     lidocaine 4 % (LMX4) cream     sodium chloride (PF) 0.9% PF flush 5-50 mL        Intake/Output Summary (Last 24 hours) at 07/01/17 0848  Last data filed at 07/01/17 0550   Gross per 24 hour   Intake           248.33 ml   Output             1775 ml   Net         -1526.67 ml             Vitals:    06/30/17 1618 07/01/17 0500   Weight: 73.7 kg (162 lb 8 oz) 68.4 kg (150 lb 12.7 oz)               Data:   Recent labs, imaging, and other studies were reviewed.      Recent Labs  Lab 06/30/17  1620   WBC 15.6*   HGB 11.7   HCT 38.4   *          Recent Labs  Lab 07/01/17 0615 07/01/17 0215 06/30/17  1620   *  --  148*   POTASSIUM 4.0  --  4.6   CHLORIDE 117*  --  111*   CO2 25  --  26   ANIONGAP 9  --  11   * 141* 240*   BUN 86*  --  104*   CR 2.33*  --  2.84*   GFRESTIMATED 20*  --  16*   GFRESTBLACK 24*  --  19*   NELLIE 7.6*  --  8.8   PROTTOTAL  --   --  8.1   ALBUMIN  --   --  2.9*   BILITOTAL  --   --  0.7   ALKPHOS  --   --  81   AST  --   --  24   ALT  --   --  26       Recent Labs  Lab 07/01/17  0810 07/01/17 0615 07/01/17 0215 06/30/17 2010 06/30/17  1620   GLC  --  115* 141*  --  240*   *  --   --  159*  --      No results for input(s): TSH in the last 168 hours.    Recent Labs  Lab 06/30/17  1620   TROPI 0.035

## 2017-07-02 LAB
ANION GAP SERPL CALCULATED.3IONS-SCNC: 9 MMOL/L (ref 3–14)
BUN SERPL-MCNC: 65 MG/DL (ref 7–30)
CALCIUM SERPL-MCNC: 7.6 MG/DL (ref 8.5–10.1)
CHLORIDE SERPL-SCNC: 121 MMOL/L (ref 94–109)
CO2 SERPL-SCNC: 25 MMOL/L (ref 20–32)
CREAT SERPL-MCNC: 1.91 MG/DL (ref 0.52–1.04)
ERYTHROCYTE [DISTWIDTH] IN BLOOD BY AUTOMATED COUNT: 16.2 % (ref 10–15)
GFR SERPL CREATININE-BSD FRML MDRD: 25 ML/MIN/1.7M2
GLUCOSE BLDC GLUCOMTR-MCNC: 147 MG/DL (ref 70–99)
GLUCOSE BLDC GLUCOMTR-MCNC: 197 MG/DL (ref 70–99)
GLUCOSE BLDC GLUCOMTR-MCNC: 286 MG/DL (ref 70–99)
GLUCOSE BLDC GLUCOMTR-MCNC: 337 MG/DL (ref 70–99)
GLUCOSE BLDC GLUCOMTR-MCNC: 347 MG/DL (ref 70–99)
GLUCOSE BLDC GLUCOMTR-MCNC: 349 MG/DL (ref 70–99)
GLUCOSE SERPL-MCNC: 181 MG/DL (ref 70–99)
HCT VFR BLD AUTO: 31.5 % (ref 35–47)
HGB BLD-MCNC: 9.4 G/DL (ref 11.7–15.7)
MCH RBC QN AUTO: 29.9 PG (ref 26.5–33)
MCHC RBC AUTO-ENTMCNC: 29.8 G/DL (ref 31.5–36.5)
MCV RBC AUTO: 100 FL (ref 78–100)
PLATELET # BLD AUTO: 181 10E9/L (ref 150–450)
POTASSIUM SERPL-SCNC: 3.8 MMOL/L (ref 3.4–5.3)
RBC # BLD AUTO: 3.14 10E12/L (ref 3.8–5.2)
SODIUM SERPL-SCNC: 151 MMOL/L (ref 133–144)
SODIUM SERPL-SCNC: 155 MMOL/L (ref 133–144)
VANCOMYCIN SERPL-MCNC: 21.7 MG/L
WBC # BLD AUTO: 8.1 10E9/L (ref 4–11)

## 2017-07-02 PROCEDURE — 80202 ASSAY OF VANCOMYCIN: CPT | Performed by: INTERNAL MEDICINE

## 2017-07-02 PROCEDURE — 25000128 H RX IP 250 OP 636: Performed by: INTERNAL MEDICINE

## 2017-07-02 PROCEDURE — 99233 SBSQ HOSP IP/OBS HIGH 50: CPT | Performed by: INTERNAL MEDICINE

## 2017-07-02 PROCEDURE — 80048 BASIC METABOLIC PNL TOTAL CA: CPT | Performed by: INTERNAL MEDICINE

## 2017-07-02 PROCEDURE — 21000000 ZZH R&B IMCU HEART CARE

## 2017-07-02 PROCEDURE — 85027 COMPLETE CBC AUTOMATED: CPT | Performed by: INTERNAL MEDICINE

## 2017-07-02 PROCEDURE — 25000131 ZZH RX MED GY IP 250 OP 636 PS 637: Performed by: INTERNAL MEDICINE

## 2017-07-02 PROCEDURE — 27210429 ZZH NUTRITION PRODUCT INTERMEDIATE LITER

## 2017-07-02 PROCEDURE — 00000146 ZZHCL STATISTIC GLUCOSE BY METER IP

## 2017-07-02 PROCEDURE — 36415 COLL VENOUS BLD VENIPUNCTURE: CPT | Performed by: INTERNAL MEDICINE

## 2017-07-02 PROCEDURE — 25000132 ZZH RX MED GY IP 250 OP 250 PS 637: Performed by: INTERNAL MEDICINE

## 2017-07-02 PROCEDURE — 84295 ASSAY OF SERUM SODIUM: CPT | Performed by: INTERNAL MEDICINE

## 2017-07-02 RX ADMIN — INSULIN GLARGINE 5 UNITS: 100 INJECTION, SOLUTION SUBCUTANEOUS at 13:09

## 2017-07-02 RX ADMIN — Medication 7 ML: at 09:49

## 2017-07-02 RX ADMIN — ACETAMINOPHEN 650 MG: 160 SUSPENSION ORAL at 17:05

## 2017-07-02 RX ADMIN — MICONAZOLE NITRATE: 2 POWDER TOPICAL at 21:33

## 2017-07-02 RX ADMIN — INSULIN ASPART 1 UNITS: 100 INJECTION, SOLUTION INTRAVENOUS; SUBCUTANEOUS at 01:36

## 2017-07-02 RX ADMIN — Medication 1 CAPSULE: at 09:49

## 2017-07-02 RX ADMIN — INSULIN ASPART 1 UNITS: 100 INJECTION, SOLUTION INTRAVENOUS; SUBCUTANEOUS at 05:51

## 2017-07-02 RX ADMIN — ACETAMINOPHEN 650 MG: 160 SUSPENSION ORAL at 09:49

## 2017-07-02 RX ADMIN — INSULIN ASPART 3 UNITS: 100 INJECTION, SOLUTION INTRAVENOUS; SUBCUTANEOUS at 21:22

## 2017-07-02 RX ADMIN — ACETAMINOPHEN 650 MG: 160 SUSPENSION ORAL at 21:21

## 2017-07-02 RX ADMIN — RANITIDINE HYDROCHLORIDE 150 MG: 150 SOLUTION ORAL at 09:49

## 2017-07-02 RX ADMIN — INSULIN ASPART 2 UNITS: 100 INJECTION, SOLUTION INTRAVENOUS; SUBCUTANEOUS at 17:06

## 2017-07-02 RX ADMIN — INSULIN GLARGINE 5 UNITS: 100 INJECTION, SOLUTION SUBCUTANEOUS at 21:22

## 2017-07-02 RX ADMIN — LEVOFLOXACIN 500 MG: 5 INJECTION, SOLUTION INTRAVENOUS at 17:13

## 2017-07-02 RX ADMIN — PIPERACILLIN SODIUM,TAZOBACTAM SODIUM 2.25 G: 2; .25 INJECTION, POWDER, FOR SOLUTION INTRAVENOUS at 05:43

## 2017-07-02 RX ADMIN — INSULIN ASPART 2 UNITS: 100 INJECTION, SOLUTION INTRAVENOUS; SUBCUTANEOUS at 13:10

## 2017-07-02 RX ADMIN — OXYCODONE HYDROCHLORIDE 2 MG: 5 SOLUTION ORAL at 21:21

## 2017-07-02 RX ADMIN — MULTIVITAMIN 15 ML: LIQUID ORAL at 09:49

## 2017-07-02 ASSESSMENT — ACTIVITIES OF DAILY LIVING (ADL)
FALL_HISTORY_WITHIN_LAST_SIX_MONTHS: NO
TOILETING: 4-->COMPLETELY DEPENDENT
TRANSFERRING: 4-->COMPLETELY DEPENDENT
RETIRED_COMMUNICATION: 3-->UNABLE TO SPEAK (NOT RELATED TO LANGUAGE BARRIER)
AMBULATION: 4-->COMPLETELY DEPENDENT
SWALLOWING: 2-->DIFFICULTY SWALLOWING LIQUIDS/FOODS
RETIRED_EATING: 4-->COMPLETELY DEPENDENT
COGNITION: 2 - DIFFICULTY WITH ORGANIZING THOUGHTS
DRESS: 4-->COMPLETELY DEPENDENT
BATHING: 4-->COMPLETELY DEPENDENT

## 2017-07-02 NOTE — PLAN OF CARE
Problem: Goal Outcome Summary  Goal: Goal Outcome Summary  Outcome: No Change  Pt remains non-verbal and non-responsive to commands. Pt did open eyes spontaneously throughout the shift. VSS. Tele shows SR. Plan is to continue with antibiotics and tube feeding. Bed alarm on. Will continue to monitor pt.

## 2017-07-02 NOTE — PROGRESS NOTES
[facility]  Hospitalist Progress Note  Shu Rausch MD  Pager: [phone]     Admission day    6/30/2017  Date of Service (when I saw the patient): 2 July 2017        Interval History:     stable   Na higher. - increase free water  Add Lantus           Assessment and Plan:     89 y/o NH resident is admitted with ARF and sepsis.     Her complex PMHx: advanced dementia, aspirations pneumonia, indwelling urinary cath, UTIs, DM, CKD, feeding tube, chronic sacral wound,         Sepsis possible sec MRSA UTI w neg BC  -- fever, tachypnea, hypoxemia, increased lactic acid ( no fever in the last 24)  -- ID consult appreciated. Recommended cont Vanco while inpatient and discharged on Linzolid + Levaquin  -- cont vanco  -- CXR 6/30 - negative for acute changes  -- BC - neg to the date  -- UA with WBC, yeast. UC is neg at this admission but Pos for MRSA on June 14  -- C Diff - neg     Hypernatremia  -- <--151 < -- 148  -- increase free water, dc NS  -- total fluids: 1740 to be given today: 45 ml TF/h x 12h = 540 ml. Free water 5o ml q 1h = 1200  -- monitor NA       CKD  -- Cr 1.91<--2.33 <-- 2.84   -- monitor   -- cont iv fluids     Advanced dementia with nonverbal state, nonabulatory with fixed chronic muscle contraction        DM 2  -- she was on 70/30 bid and Lantus bid at the NH - she will not need both  -- BS under 120 while npo  -- add low SSI  -- resume Lantus 5 units bid          Chr sacral decubitus ulcer   -- considered chronic, noninfected   -- continue local care    Chr Anemia  -- Hgb in the same range as before.             DVT Prophylaxis: Pneumatic Compression Devices  Code Status: Full Code      Disposition: Inpatient     ROS: not obtainable          Physical Exam:   Blood pressure 119/56, temperature 98.3  F (36.8  C), temperature source Axillary, resp. rate 17, height 1.524 m (5'), weight 68.4 kg (150 lb 12.7 oz), SpO2 97 %.     NAD, appears comfortable   Skin: no rashes   Chest:  clear to auscultation bilaterally, good respiratory effort  Heart: S1 S2, RRR, no mgr appreciated  Abdomen: soft, not tender,   Extremities: no  edema.   Neurologic: sleeping, opens eyes sometimes     Vital Sign Ranges  Temperature Temp  Av.7  F (36.5  C)  Min: 97.1  F (36.2  C)  Max: 98.3  F (36.8  C)   Blood pressure Systolic (24hrs), Av , Min:100 , Max:133        Diastolic (24hrs), Av, Min:47, Max:70      Pulse No Data Recorded   Respirations Resp  Av.3  Min: 16  Max: 29   Pulse oximetry SpO2  Av.2 %  Min: 84 %  Max: 99 %              Medications:         vitamin A-D & C drops  7 mL Per G Tube Daily     insulin aspart  1-4 Units Subcutaneous Q4H     acetaminophen  650 mg Rectal Once     acetaminophen  650 mg Per Feeding Tube TID     lactobacillus rhamnosus (GG)  1 capsule Per Feeding Tube Daily     multivitamins with minerals  15 mL Per Feeding Tube Daily     oxyCODONE  2 mg Per G Tube BID     ranitidine  150 mg Per Feeding Tube Daily     sodium chloride (PF)  3 mL Intracatheter Q8H     levofloxacin  500 mg Intravenous Q48H     piperacillin-tazobactam  2.25 g Intravenous Q6H     vancomycin place ramos - receiving intermittent dosing  1 each Does not apply See Admin Instructions        Prescriptions Prior to Admission   Medication Sig Dispense Refill Last Dose     insulin NPH-insulin regular (HUMULIN MIX 70/30/NOVOLIN MIX 70/30) injection Inject 16 Units Subcutaneous every morning   2017 at am     insulin NPH-insulin regular (HUMULIN MIX 70/30/NOVOLIN MIX 70/30) injection Inject 10 Units Subcutaneous every 24 hours @@ 1400        INSULIN ASPART SC Inject 1-6 Units Subcutaneous every 6 hours 0800, 1400, 2000, 0200  -250 1 unit  -300 2 units  -350 3 units  -400 4 units  -450 5 units  BG >450 6 units and update MD Dakins 0.125 % SOLN Externally apply topically 2 times daily BID and PRN.   1. Cleanse wound with microklenze, cleanse periwound area with  naomi perineal  2. Moisten kerlix fluff with dakins solution 0.125%, wring out excess, pack wound with kerlix  3. Apply antifungal powder to periwound area, rub in. Apply criticaid over powder.  4. Cover with ABD using minimal medipore tape to secure.  5. Label dressing with date, time, initials. Follow Rigorous PIP measures.        oxyCODONE (ROXICODONE) 5 MG/5ML solution 2 mLs (2 mg) by Per G Tube route 2 times daily 15 mL 0 6/30/2017 at 10 am     oxyCODONE (ROXICODONE) 5 MG/5ML solution 2 mLs (2 mg) by Per G Tube route every 4 hours as needed for moderate to severe pain  0 6/29/2017 at Unknown time     insulin glargine (LANTUS) 100 UNIT/ML injection Inject 36 Units Subcutaneous every morning   6/30/2017 at am     insulin glargine (LANTUS) 100 UNIT/ML injection Inject 34 Units Subcutaneous At Bedtime   6/29/2017 at pm     Lactobacillus Acidophilus POWD 1 capsule by Gastric Tube route daily 10 billion units    6/30/2017 at am     multivitamin, therapeutic (THERA-VIT) TABS tablet 1 tablet by Gastric Tube route daily   6/30/2017 at am     Loperamide HCl (IMODIUM A-D) 1 MG/7.5ML LIQD 4 mg by Gastric Tube route every other day   6/30/2017 at 0800     acetaminophen (TYLENOL) 32 mg/mL solution 325 mg by Gastric Tube route daily as needed for fever or mild pain   prn     ACETAMINOPHEN  mg by Gastric Tube route 3 times daily    6/30/2017 at 1400     bisacodyl (DULCOLAX) 10 MG suppository Place 10 mg rectally daily as needed for constipation   prn     insulin aspart (NOVOLOG PEN) 100 UNIT/ML soln Inject 1-6 Units Subcutaneous every 4 hours   6/14/2017 at Unknown time       Current Facility-Administered Medications   Medication     miconazole (MICATIN; MICRO GUARD) 2 % powder     dextrose 10 % 1,000 mL infusion     vitamin A-D & C drops (TRI-VI-SOL) drops SOLN 7 mL     glucose 40 % gel 15-30 g    Or     dextrose 50 % injection 25-50 mL    Or     glucagon injection 1 mg     insulin aspart (NovoLOG) inj (RAPID ACTING)      acetaminophen (TYLENOL) Suppository 650 mg     acetaminophen (TYLENOL) solution 325 mg     acetaminophen (TYLENOL) solution 650 mg     bisacodyl (DULCOLAX) Suppository 10 mg     lactobacillus rhamnosus (GG) (CULTURELL) capsule 1 capsule     multivitamins with minerals (CERTAVITE/CEROVITE) liquid 15 mL     oxyCODONE (ROXICODONE) solution 2 mg     oxyCODONE (ROXICODONE) solution 2 mg     ranitidine (Zantac) syrup 150 mg     naloxone (NARCAN) injection 0.1-0.4 mg     0.9% sodium chloride infusion     lidocaine 1 % 1 mL     lidocaine (LMX4) cream     sodium chloride (PF) 0.9% PF flush 3 mL     sodium chloride (PF) 0.9% PF flush 3 mL     nitroGLYcerin (NITROSTAT) sublingual tablet 0.4 mg     levofloxacin (LEVAQUIN) infusion 500 mg     piperacillin-tazobactam (ZOSYN) 2.25 g vial to attach to  ml bag     vancomycin place ramos - receiving intermittent dosing     Facility-Administered Medications Ordered in Other Encounters   Medication     Lidocaine 1% injection 0.5-5 mL     lidocaine 4 % (LMX4) cream     sodium chloride (PF) 0.9% PF flush 5-50 mL        Intake/Output Summary (Last 24 hours) at 07/02/17 0831  Last data filed at 07/02/17 0600   Gross per 24 hour   Intake          3911.58 ml   Output             2125 ml   Net          1786.58 ml             Vitals:    06/30/17 1618 07/01/17 0500 07/02/17 0500   Weight: 73.7 kg (162 lb 8 oz) 68.4 kg (150 lb 12.7 oz) 68.4 kg (150 lb 12.7 oz)               Data:   Recent labs, imaging, and other studies were reviewed.      Recent Labs  Lab 07/02/17  0532 06/30/17  1620   WBC 8.1 15.6*   HGB 9.4* 11.7   HCT 31.5* 38.4    101*    251       Recent Labs  Lab 07/02/17  0532 07/01/17  1650 07/01/17  0615 07/01/17  0215 06/30/17  1620   *  --  151*  --  148*   POTASSIUM 3.8  --  4.0  --  4.6   CHLORIDE 121*  --  117*  --  111*   CO2 25  --  25  --  26   ANIONGAP 9  --  9  --  11   *  --  115* 141* 240*   BUN 65*  --  86*  --  104*   CR 1.91*  --   2.33*  --  2.84*   GFRESTIMATED 25*  --  20*  --  16*   GFRESTBLACK 30*  --  24*  --  19*   NELLIE 7.6*  --  7.6*  --  8.8   MAG  --  2.6*  --   --   --    PHOS  --  3.6  --   --   --    PROTTOTAL  --   --   --   --  8.1   ALBUMIN  --   --   --   --  2.9*   BILITOTAL  --   --   --   --  0.7   ALKPHOS  --   --   --   --  81   AST  --   --   --   --  24   ALT  --   --   --   --  26       Recent Labs  Lab 07/02/17  0541 07/02/17  0532 07/02/17  0129 07/01/17  2144 07/01/17  1753 07/01/17  1217  07/01/17  0615 07/01/17  0215  06/30/17  1620   GLC  --  181*  --   --   --   --   --  115* 141*  --  240*   *  --  147* 147* 121* 117*  < >  --   --   < >  --    < > = values in this interval not displayed.

## 2017-07-02 NOTE — PROGRESS NOTES
"Note Infectious Disease Consult Service Progress Note   Pt Name Celia Lewis   Date 07/02/2017   MRN [id number]       Notes / labs / imaging test results and other data were reviewed    CHIEF COMPLAINT: REASON FOR VISIT    sepsis      HPI  Nonagenarian w hx dementia, chronic Fonseca catheter, hx MRSA.   Chronic nonverbal / non ambulatory state  Chronic sacral wound    June 14 to 19 in hospital w \"low grade sepsis\"  Discharged w linezolid & levofloxacin to [facility]    Readmitted w \"sepsis\" - in CCU    7.2 Temp & WBC both normalized    Admission cultures of blood & urine all negative  Stool C diff test negative      ROS  Pt is non verbal, not otherwise able to communicate in fashion to give ROS    Remainder of 14-point ROS was negative except as listed above    PFSH:  Personal / Family / Social Histories were reviewed and updated  nothing new    PT IS FULL CODE     CURRENT MED REVIEWD    Prescription Medications as of 7/2/2017             insulin NPH-insulin regular (HUMULIN MIX 70/30/NOVOLIN MIX 70/30) injection Inject 16 Units Subcutaneous every morning    insulin NPH-insulin regular (HUMULIN MIX 70/30/NOVOLIN MIX 70/30) injection Inject 10 Units Subcutaneous every 24 hours @@ 1400    INSULIN ASPART SC Inject 1-6 Units Subcutaneous every 6 hours 0800, 1400, 2000, 0200  -250 1 unit  -300 2 units  -350 3 units  -400 4 units  -450 5 units  BG >450 6 units and update MD    Dakins 0.125 % SOLN Externally apply topically 2 times daily BID and PRN.   1. Cleanse wound with microklenze, cleanse periwound area with naomi perineal  2. Moisten kerlix fluff with dakins solution 0.125%, wring out excess, pack wound with kerlix  3. Apply antifungal powder to periwound area, rub in. Apply criticaid over powder.  4. Cover with ABD using minimal medipore tape to secure.  5. Label dressing with date, time, initials. Follow Rigorous PIP measures.    oxyCODONE (ROXICODONE) 5 MG/5ML solution 2 mLs (2 mg) by Per " "G Tube route 2 times daily    oxyCODONE (ROXICODONE) 5 MG/5ML solution 2 mLs (2 mg) by Per G Tube route every 4 hours as needed for moderate to severe pain    insulin glargine (LANTUS) 100 UNIT/ML injection Inject 36 Units Subcutaneous every morning    insulin glargine (LANTUS) 100 UNIT/ML injection Inject 34 Units Subcutaneous At Bedtime    Lactobacillus Acidophilus POWD 1 capsule by Gastric Tube route daily 10 billion units     multivitamin, therapeutic (THERA-VIT) TABS tablet 1 tablet by Gastric Tube route daily    Loperamide HCl (IMODIUM A-D) 1 MG/7.5ML LIQD 4 mg by Gastric Tube route every other day    acetaminophen (TYLENOL) 32 mg/mL solution 325 mg by Gastric Tube route daily as needed for fever or mild pain    insulin aspart (NOVOLOG PEN) 100 UNIT/ML soln Inject 1-6 Units Subcutaneous every 4 hours    ACETAMINOPHEN  mg by Gastric Tube route 3 times daily     bisacodyl (DULCOLAX) 10 MG suppository Place 10 mg rectally daily as needed for constipation      Facility Administered Medications as of 7/2/2017             insulin glargine (LANTUS) injection 5 Units Inject 5 Units Subcutaneous 2 times daily    miconazole (MICATIN; MICRO GUARD) 2 % powder Apply topically every hour as needed for other (topical candidiasis)    dextrose 10 % 1,000 mL infusion Inject into the vein continuous prn (Hypoglycemia prevention)    vitamin A-D & C drops (TRI-VI-SOL) drops SOLN 7 mL 7 mLs by Per G Tube route daily    glucose 40 % gel 15-30 g Take 15-30 g by mouth every 15 minutes as needed for low blood sugar    Linked Group 1:  \"Or\" Linked Group Details     dextrose 50 % injection 25-50 mL Inject 25-50 mLs into the vein every 15 minutes as needed for low blood sugar    Linked Group 1:  \"Or\" Linked Group Details     glucagon injection 1 mg Inject 1 mg Subcutaneous every 15 minutes as needed for low blood sugar (May repeat x 1 only)    Linked Group 1:  \"Or\" Linked Group Details     insulin aspart (NovoLOG) inj (RAPID ACTING) " Inject 1-4 Units Subcutaneous every 4 hours    vancomycin (VANCOCIN) 1500 mg in 0.9% NaCl 250 mL PREMIX Inject 250 mLs (1,500 mg) into the vein once    acetaminophen (TYLENOL) Suppository 650 mg Place 1 suppository (650 mg) rectally once    acetaminophen (TYLENOL) solution 325 mg Take 10.15 mLs (325 mg) by mouth every 8 hours as needed for mild pain or fever    acetaminophen (TYLENOL) solution 650 mg 20.3 mLs (650 mg) by Per Feeding Tube route 3 times daily    bisacodyl (DULCOLAX) Suppository 10 mg Place 1 suppository (10 mg) rectally daily as needed for constipation    lactobacillus rhamnosus (GG) (CULTURELL) capsule 1 capsule 1 capsule by Per Feeding Tube route daily    multivitamins with minerals (CERTAVITE/CEROVITE) liquid 15 mL 15 mLs by Per Feeding Tube route daily    oxyCODONE (ROXICODONE) solution 2 mg 2 mLs (2 mg) by Per G Tube route 2 times daily    oxyCODONE (ROXICODONE) solution 2 mg 2 mLs (2 mg) by Per G Tube route every 4 hours as needed for moderate to severe pain    ranitidine (Zantac) syrup 150 mg 10 mLs (150 mg) by Per Feeding Tube route daily    naloxone (NARCAN) injection 0.1-0.4 mg Inject 0.25-1 mLs (0.1-0.4 mg) into the vein every 2 minutes as needed for opioid reversal    lidocaine 1 % 1 mL 1 mL by Other route every hour as needed (mild pain with VAD insertion or accessing implanted port)    lidocaine (LMX4) cream Apply topically every hour as needed for pain (with VAD insertion or accessing implanted port.)    sodium chloride (PF) 0.9% PF flush 3 mL 3 mLs by Intracatheter route every hour as needed for line flush (for peripheral IV flush post IV meds)    sodium chloride (PF) 0.9% PF flush 3 mL 3 mLs by Intracatheter route every 8 hours    nitroGLYcerin (NITROSTAT) sublingual tablet 0.4 mg Place 1 tablet (0.4 mg) under the tongue every 5 minutes as needed for chest pain    levofloxacin (LEVAQUIN) infusion 500 mg Inject 100 mLs (500 mg) into the vein every 48 hours    vancomycin place ramos -  "receiving intermittent dosing 1 each See Admin Instructions    0.9% sodium chloride infusion (Discontinued) Inject into the vein continuous    piperacillin-tazobactam (ZOSYN) 2.25 g vial to attach to  ml bag (Discontinued) Inject 2.25 g into the vein every 6 hours    Lidocaine 1% injection 0.5-5 mL 0.5-5 mLs by Other route once as needed (mild pain For local anesthetic during PICC insertion.)    lidocaine 4 % (LMX4) cream Apply topically once as needed for moderate pain (for local anesthetic during PICC insertion)    sodium chloride (PF) 0.9% PF flush 5-50 mL Inject 5-50 mLs into the vein once as needed for line flush (to flush each lumen with line placement)          Vital Signs: /69  Temp 97.3  F (36.3  C) (Axillary)  Resp 21  Ht 1.524 m (5')  Wt 68.4 kg (150 lb 12.7 oz)  SpO2 97%  BMI 29.45 kg/m2        Data  Cultures    Blood / urine  Negative  Stool C diff Negative     LABS  Lab Results   Component Value Date/Time    WBC 8.1 07/02/2017 05:32 AM    WBC 15.6 (H) 06/30/2017 04:20 PM    WBC 11.8 (H) 06/19/2017 08:56 AM    WBC 11.4 (H) 06/18/2017 08:55 AM    AST 24 06/30/2017 04:20 PM    AST 20 06/14/2017 12:00 PM    AST 24 06/24/2016 08:06 AM    AST 40 06/22/2016 08:04 AM    ALT 26 06/30/2017 04:20 PM    ALT 22 06/14/2017 12:00 PM    ALT 48 06/24/2016 08:06 AM    ALT 82 (H) 06/22/2016 08:04 AM    ALKPHOS 81 06/30/2017 04:20 PM    ALKPHOS 96 06/14/2017 12:00 PM    ALKPHOS 155 (H) 06/24/2016 08:06 AM    ALKPHOS 173 (H) 06/22/2016 08:04 AM           ASSESSMENT & SUGGESTIONS    (A41.9) Sepsis (H)  Seems improved  At high risk for aspiration events, UTI, \"dermo-sepsis\" (sacral wound) and C diff  I predict repeated hospital stays, no ID intervention likely to lead her to communicative / ambulatory state  In my opinion predict repeated interventions carry potential to significantly increase her discomfort / C diff / other undesired consequences and are not likely to consistently increase her comfort or " improve her state much beyond current state    Full code & full intervention strategy at this time  Grecia Macias / Juliocesar / Liam assume ID care again tomorrow        Lele Sanderson MD  Covering for Grecia Gandara & Gilberto & Liam  [facility] Infectious Diseases  [phone]

## 2017-07-02 NOTE — CONSULTS
"Note INFECTIOUS DISEASES CONSULTATION NOTE  Covering for Grecia Gandara and Gilberto     Date 2017     Name /  Celia Lewis   1926     MRN [id number]       Thank you for asking us to see Celia Lewis for infectious diseases evaluation    REASON FOR CONSULT Fevers, uncertain etiology  REQUESTING PROVIDER Dr Jolly    CHIEF COMPLAINT    fevers  HPI  Nonagenarian w hx dementia, chronic Fonseca catheter, hx MRSA. Nonverbal / non ambulatory / chronic sacral wound  Recently in hospital  to  (earlier this month) w \"low grade sepsis\"  Discharged after peripheral leukocytosis and elevated blood lactate both improved  Thought to have possible (recurrent) aspiration and / or catheter associated UTI  Chronic sacral wound thought NOT to be actively infected at that time  Discharged w linezolid & levofloxacin to [facility]       Seen by Dr Macias. From his note  Assessment and Plan:   IMPRESSION:   1.  A 89-year-old female, nursing home resident, prior known aspiration, chronic Fonseca catheter and Alzheimer's dementia admitted with low-grade sepsis, etiology not entirely clear, would suspect the MRSA UTI as the cause, no positive blood cultures.  Doubt wound is an issue and not clearcut pneumonia.   2.  Chronic sacral decubitus wound, somewhat necrotic, but no signs of infection and it does not tract deep had an I&D done, culture of the wound growing multiple organisms of no significance.   3.  MRSA positive urine culture with catheter in place.   4.  Possible slight infiltrate, mild initial hypoxia, which has resolved, possible aspiration pneumonia.  If so has improved.   5.  Severe dementia.   6.  Recurrent urinary tract infections and urinary colonization with Fonseca catheter in place.   7.  Feeding tube in place, prior probable aspiration, possible etiology to current illness as well.   8.  Diabetes mellitus.   9.  Chronic renal insufficiency.   10.  Sulfa allergy.       RECOMMENDATIONS:   1.  Ignore the " "sacral decubitus, culture of no clinical significance.  The wound is not infected, nothing to suggest  deep.  Doubt this is the cause of current fever and has already been debrided.   2.  Urine may just be colonization, but also possible cause of current illness.  I would treat.  Continue vancomycin while here but as early as any time convert to oral linezolid 600 bid and treat for about 7 days.   3.  Not clearcut pneumonia.  Has been getting gram-negative coverage currently is on ceftriaxone previously Zosyn and Levaquin.  Does not appear to have major clinical pneumonia.  Assuming is recurrent aspiration could argue for just a short course of antibiotics has already been given, ie 5 days.  Alternatively enteral lev for another 5 days   4.  Obviously long-term prognosis poor, especially if recurrently aspirating.     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     Advanced directives, counseling/discussion, POLST completed 5/29/15  Full Code 5/29/2015     Alzheimer's disease 5/22/2015     Arthritis     shoulder     Atrial fibrillation (H) 5/22/2015     Calculus of kidney      Candidiasis of skin and nails 5/22/2015     Congestive heart failure, unspecified      Coronary artery disease      Heart disease, unspecified     diastolic dysfunction     Hyp ht/kd NOS I-IV w hf 5/22/2015     Hypertension      Morbid obesity (H) 5/22/2015     Other and unspecified hyperlipidemia 5/22/2015     Personal history of urinary (tract) infection 5/22/2015     Primary localized osteoarthrosis, shoulder region 5/22/2015     Renal failure, unspecified     secondary to diuresis     Thyroid disease      Type II or unspecified " type diabetes mellitus with renal manifestations, not stated as uncontrolled(250.40) 5/22/2015     Type II or unspecified type diabetes mellitus without mention of complication, not stated as uncontrolled      Uric acid stone in urine      Past Surgical History:   Procedure Laterality Date     CYSTOSCOPY, INSERT STENT URETHRA, COMBINED  8/6/2012    Procedure: COMBINED CYSTOSCOPY, INSERT STENT URETHRA;  cystoscopy, left  ureteral stent placement, left pyelogram;  Surgeon: Eulalio Bernardo MD;  Location: [facility] OR     CYSTOSCOPY,+URETEROSCOPY  8/19/05    with placement of (L) ureteral JJ stent     HC X-RAY ABDOMEN AP VIEW (KUB)  8/19/05     LASER HOLMIUM LITHOTRIPSY URETER(S), INSERT STENT, COMBINED  8/21/2012    Procedure: COMBINED CYSTOSCOPY, URETEROSCOPY, LASER HOLMIUM LITHOTRIPSY URETER(S), INSERT STENT;  CYSTOSCOPY, LEFT URETEROSCOPY, LASER HOLMIUM LITHOTRIPSY ,left ureteral stent exchange;  Surgeon: Eulalio Bernardo MD;  Location: [facility] OR      Problem (# of Occurrences) Relation (Name,Age of Onset)    CANCER (1) Unspecified    CEREBROVASCULAR DISEASE (2) Mother, Father    DIABETES (1) Brother        Social History     Social History     Marital status:      Spouse name: Leonel     Number of children: N/A     Years of education: N/A     Occupational History      Retired     Social History Main Topics     Smoking status: Former Smoker     Smokeless tobacco: Never Used      Comment: She was a social smoker many years ago     Alcohol use No     Drug use: No     Sexual activity: Not on file     Other Topics Concern     Not on file     Social History Narrative     Allergies   Allergen Reactions     Sulfa Drugs Other (See Comments)     Per nursing home documents, patient has allergy to sulfa     Immunization History   Administered Date(s) Administered     Influenza (IIV3) 10/13/2014     Pneumococcal 23 valent 12/01/1998     Prescription Medications as of 7/1/2017             insulin NPH-insulin  regular (HUMULIN MIX 70/30/NOVOLIN MIX 70/30) injection Inject 16 Units Subcutaneous every morning    insulin NPH-insulin regular (HUMULIN MIX 70/30/NOVOLIN MIX 70/30) injection Inject 10 Units Subcutaneous every 24 hours @@ 1400    INSULIN ASPART SC Inject 1-6 Units Subcutaneous every 6 hours 0800, 1400, 2000, 0200  -250 1 unit  -300 2 units  -350 3 units  -400 4 units  -450 5 units  BG >450 6 units and update MD    Dakins 0.125 % SOLN Externally apply topically 2 times daily BID and PRN.   1. Cleanse wound with microklenze, cleanse periwound area with naomi perineal  2. Moisten kerlix fluff with dakins solution 0.125%, wring out excess, pack wound with kerlix  3. Apply antifungal powder to periwound area, rub in. Apply criticaid over powder.  4. Cover with ABD using minimal medipore tape to secure.  5. Label dressing with date, time, initials. Follow Rigorous PIP measures.    oxyCODONE (ROXICODONE) 5 MG/5ML solution 2 mLs (2 mg) by Per G Tube route 2 times daily    oxyCODONE (ROXICODONE) 5 MG/5ML solution 2 mLs (2 mg) by Per G Tube route every 4 hours as needed for moderate to severe pain    insulin glargine (LANTUS) 100 UNIT/ML injection Inject 36 Units Subcutaneous every morning    insulin glargine (LANTUS) 100 UNIT/ML injection Inject 34 Units Subcutaneous At Bedtime    Lactobacillus Acidophilus POWD 1 capsule by Gastric Tube route daily 10 billion units     multivitamin, therapeutic (THERA-VIT) TABS tablet 1 tablet by Gastric Tube route daily    Loperamide HCl (IMODIUM A-D) 1 MG/7.5ML LIQD 4 mg by Gastric Tube route every other day    acetaminophen (TYLENOL) 32 mg/mL solution 325 mg by Gastric Tube route daily as needed for fever or mild pain    insulin aspart (NOVOLOG PEN) 100 UNIT/ML soln Inject 1-6 Units Subcutaneous every 4 hours    ACETAMINOPHEN  mg by Gastric Tube route 3 times daily     bisacodyl (DULCOLAX) 10 MG suppository Place 10 mg rectally daily as needed for  "constipation      Facility Administered Medications as of 7/1/2017             miconazole (MICATIN; MICRO GUARD) 2 % powder Apply topically every hour as needed for other (topical candidiasis)    dextrose 10 % 1,000 mL infusion Inject into the vein continuous prn (Hypoglycemia prevention)    vitamin A-D & C drops (TRI-VI-SOL) drops SOLN 7 mL 7 mLs by Per G Tube route daily    glucose 40 % gel 15-30 g Take 15-30 g by mouth every 15 minutes as needed for low blood sugar    Linked Group 1:  \"Or\" Linked Group Details     dextrose 50 % injection 25-50 mL Inject 25-50 mLs into the vein every 15 minutes as needed for low blood sugar    Linked Group 1:  \"Or\" Linked Group Details     glucagon injection 1 mg Inject 1 mg Subcutaneous every 15 minutes as needed for low blood sugar (May repeat x 1 only)    Linked Group 1:  \"Or\" Linked Group Details     insulin aspart (NovoLOG) inj (RAPID ACTING) Inject 1-4 Units Subcutaneous every 4 hours    vancomycin (VANCOCIN) 1500 mg in 0.9% NaCl 250 mL PREMIX Inject 250 mLs (1,500 mg) into the vein once    acetaminophen (TYLENOL) Suppository 650 mg Place 1 suppository (650 mg) rectally once    acetaminophen (TYLENOL) solution 325 mg Take 10.15 mLs (325 mg) by mouth every 8 hours as needed for mild pain or fever    acetaminophen (TYLENOL) solution 650 mg 20.3 mLs (650 mg) by Per Feeding Tube route 3 times daily    bisacodyl (DULCOLAX) Suppository 10 mg Place 1 suppository (10 mg) rectally daily as needed for constipation    lactobacillus rhamnosus (GG) (CULTURELL) capsule 1 capsule 1 capsule by Per Feeding Tube route daily    multivitamins with minerals (CERTAVITE/CEROVITE) liquid 15 mL 15 mLs by Per Feeding Tube route daily    oxyCODONE (ROXICODONE) solution 2 mg 2 mLs (2 mg) by Per G Tube route 2 times daily    oxyCODONE (ROXICODONE) solution 2 mg 2 mLs (2 mg) by Per G Tube route every 4 hours as needed for moderate to severe pain    ranitidine (Zantac) syrup 150 mg 10 mLs (150 mg) by Per " Feeding Tube route daily    naloxone (NARCAN) injection 0.1-0.4 mg Inject 0.25-1 mLs (0.1-0.4 mg) into the vein every 2 minutes as needed for opioid reversal    0.9% sodium chloride infusion Inject into the vein continuous    lidocaine 1 % 1 mL 1 mL by Other route every hour as needed (mild pain with VAD insertion or accessing implanted port)    lidocaine (LMX4) cream Apply topically every hour as needed for pain (with VAD insertion or accessing implanted port.)    sodium chloride (PF) 0.9% PF flush 3 mL 3 mLs by Intracatheter route every hour as needed for line flush (for peripheral IV flush post IV meds)    sodium chloride (PF) 0.9% PF flush 3 mL 3 mLs by Intracatheter route every 8 hours    nitroGLYcerin (NITROSTAT) sublingual tablet 0.4 mg Place 1 tablet (0.4 mg) under the tongue every 5 minutes as needed for chest pain    levofloxacin (LEVAQUIN) infusion 500 mg Starting on 7/2/2017. Inject 100 mLs (500 mg) into the vein every 48 hours    piperacillin-tazobactam (ZOSYN) 2.25 g vial to attach to  ml bag Inject 2.25 g into the vein every 6 hours    vancomycin place ramos - receiving intermittent dosing 1 each See Admin Instructions    lactated ringers infusion (Discontinued) Inject into the vein continuous    miconazole (MICATIN; MICRO GUARD) 2 % powder 1 g (Discontinued) Apply 1 g topically 3 times daily as needed for dry skin    piperacillin-tazobactam (ZOSYN) 2.25 g vial to attach to  ml bag (Discontinued) Inject 2.25 g into the vein every 6 hours    Lidocaine 1% injection 0.5-5 mL 0.5-5 mLs by Other route once as needed (mild pain For local anesthetic during PICC insertion.)    lidocaine 4 % (LMX4) cream Apply topically once as needed for moderate pain (for local anesthetic during PICC insertion)    sodium chloride (PF) 0.9% PF flush 5-50 mL Inject 5-50 mLs into the vein once as needed for line flush (to flush each lumen with line placement)        EXAM  /60  Temp 97.6  F (36.4  C)  "(Axillary)  Resp 27  Ht 1.524 m (5')  Wt 68.4 kg (150 lb 12.7 oz)  SpO2 96%  BMI 29.45 kg/m2    general   In bed     neuro   Eyes open  No response for me  Mouth remains closed  No visible signs of pain or anguish     skin   pale     buttock   ~ 4-5 cm circular wound w ~ 2-3 cm depth  No visible bone / no odor / no gross purulence  Quite near anus -- some stool present at this time     lungs   Shallow respirations  No cough observed     CV   No murmer   abd   soft     DATA  Imaging    6.30 CXR No clear acute lung disease    Cultures    6.30 Stool (--) C diff PCR  6.30 Blood (--) culture  6.30 Urine (--) culture    LABS  Recent Labs   Lab Test  06/30/17   1620  06/19/17   0856   06/14/17   1200   WBC  15.6*  11.8*   < >  18.7*   AST  24   --    --   20   ALT  26   --    --   22   BILITOTAL  0.7   --    --   0.5   ALKPHOS  81   --    --   96    < > = values in this interval not displayed.      Lactate 6.30 3.8   7.1 2.8    Troponin 6.30 0.035          ASSESSMENT   SUGGESTIONS    (A41.9) Sepsis (H)    Many potential reasons for fever  Infection (UTI, aspiration resp tract, C diff, iv related blood)  Non-infection (aspiratin w/o infection, DVT, MI, ...)    Pt is of advanced age, nonverbal, non ambulatory  I think chance of sustained period without fever / cause to be in hospital is exceedingly small  I think continued aggressive intervention has significant chance of increasing discomfort without significant chance or sustained \"cure\" of anything   Pt remains \"full code\" which I think only stands to increase patient's dis-ease / pain and suffering if she does have an arrest  I am aware that family wants no involvement by palliative care team at this time    I do not think chronic sacral wound will ever heal. I do not think it is now contributing to her fever  She is at risk for chronic asymptomatic bacteriuria as well as clinical UTI  She is at high risk of C diff  She likely recurrently aspirates despite having " percutaneous feeding tube  There are many potential non infection causes of fever in her as well  Her non communicative state makes investigation a bit more challenging    At this time continue current maximal anti infection intervention  Follow cultures (all neg so far)  ? DVT study of legs  Anti aspiration measures  Wound care sacrum  Maximize pt comfort as best we can         Lele Sanderson MD  Covering for Grecia Gandara & Gilberto & Liam  [facility] Infectious Diseases  [phone]

## 2017-07-03 LAB
ANION GAP SERPL CALCULATED.3IONS-SCNC: 11 MMOL/L (ref 3–14)
BUN SERPL-MCNC: 49 MG/DL (ref 7–30)
CALCIUM SERPL-MCNC: 7.9 MG/DL (ref 8.5–10.1)
CHLORIDE SERPL-SCNC: 118 MMOL/L (ref 94–109)
CO2 SERPL-SCNC: 21 MMOL/L (ref 20–32)
CREAT SERPL-MCNC: 1.36 MG/DL (ref 0.52–1.04)
ERYTHROCYTE [DISTWIDTH] IN BLOOD BY AUTOMATED COUNT: 15.8 % (ref 10–15)
ERYTHROCYTE [DISTWIDTH] IN BLOOD BY AUTOMATED COUNT: NORMAL % (ref 10–15)
GFR SERPL CREATININE-BSD FRML MDRD: 36 ML/MIN/1.7M2
GLUCOSE BLDC GLUCOMTR-MCNC: 337 MG/DL (ref 70–99)
GLUCOSE BLDC GLUCOMTR-MCNC: 344 MG/DL (ref 70–99)
GLUCOSE BLDC GLUCOMTR-MCNC: 345 MG/DL (ref 70–99)
GLUCOSE BLDC GLUCOMTR-MCNC: 354 MG/DL (ref 70–99)
GLUCOSE BLDC GLUCOMTR-MCNC: 380 MG/DL (ref 70–99)
GLUCOSE SERPL-MCNC: 336 MG/DL (ref 70–99)
HCT VFR BLD AUTO: 31.9 % (ref 35–47)
HCT VFR BLD AUTO: NORMAL % (ref 35–47)
HGB BLD-MCNC: 9.8 G/DL (ref 11.7–15.7)
HGB BLD-MCNC: NORMAL G/DL (ref 11.7–15.7)
MAGNESIUM SERPL-MCNC: 2.1 MG/DL (ref 1.6–2.3)
MCH RBC QN AUTO: 30.3 PG (ref 26.5–33)
MCH RBC QN AUTO: NORMAL PG (ref 26.5–33)
MCHC RBC AUTO-ENTMCNC: 30.7 G/DL (ref 31.5–36.5)
MCHC RBC AUTO-ENTMCNC: NORMAL G/DL (ref 31.5–36.5)
MCV RBC AUTO: 99 FL (ref 78–100)
MCV RBC AUTO: NORMAL FL (ref 78–100)
PHOSPHATE SERPL-MCNC: 2.5 MG/DL (ref 2.5–4.5)
PLATELET # BLD AUTO: 189 10E9/L (ref 150–450)
PLATELET # BLD AUTO: NORMAL 10E9/L (ref 150–450)
POTASSIUM SERPL-SCNC: 3.7 MMOL/L (ref 3.4–5.3)
RBC # BLD AUTO: 3.23 10E12/L (ref 3.8–5.2)
RBC # BLD AUTO: NORMAL 10E12/L (ref 3.8–5.2)
SODIUM SERPL-SCNC: 150 MMOL/L (ref 133–144)
VANCOMYCIN SERPL-MCNC: 17.8 MG/L
WBC # BLD AUTO: 8.6 10E9/L (ref 4–11)
WBC # BLD AUTO: NORMAL 10E9/L (ref 4–11)

## 2017-07-03 PROCEDURE — 85027 COMPLETE CBC AUTOMATED: CPT | Performed by: INTERNAL MEDICINE

## 2017-07-03 PROCEDURE — 25000132 ZZH RX MED GY IP 250 OP 250 PS 637: Performed by: INTERNAL MEDICINE

## 2017-07-03 PROCEDURE — 00000146 ZZHCL STATISTIC GLUCOSE BY METER IP

## 2017-07-03 PROCEDURE — 25000125 ZZHC RX 250: Performed by: INTERNAL MEDICINE

## 2017-07-03 PROCEDURE — 94640 AIRWAY INHALATION TREATMENT: CPT

## 2017-07-03 PROCEDURE — 99233 SBSQ HOSP IP/OBS HIGH 50: CPT | Performed by: INTERNAL MEDICINE

## 2017-07-03 PROCEDURE — 83735 ASSAY OF MAGNESIUM: CPT | Performed by: INTERNAL MEDICINE

## 2017-07-03 PROCEDURE — 12000000 ZZH R&B MED SURG/OB

## 2017-07-03 PROCEDURE — 40000275 ZZH STATISTIC RCP TIME EA 10 MIN

## 2017-07-03 PROCEDURE — 80048 BASIC METABOLIC PNL TOTAL CA: CPT | Performed by: INTERNAL MEDICINE

## 2017-07-03 PROCEDURE — 80202 ASSAY OF VANCOMYCIN: CPT | Performed by: INTERNAL MEDICINE

## 2017-07-03 PROCEDURE — 27210429 ZZH NUTRITION PRODUCT INTERMEDIATE LITER

## 2017-07-03 PROCEDURE — 25000128 H RX IP 250 OP 636: Performed by: INTERNAL MEDICINE

## 2017-07-03 PROCEDURE — 36415 COLL VENOUS BLD VENIPUNCTURE: CPT | Performed by: INTERNAL MEDICINE

## 2017-07-03 PROCEDURE — 99213 OFFICE O/P EST LOW 20 MIN: CPT

## 2017-07-03 PROCEDURE — 84100 ASSAY OF PHOSPHORUS: CPT | Performed by: INTERNAL MEDICINE

## 2017-07-03 PROCEDURE — 25000131 ZZH RX MED GY IP 250 OP 636 PS 637: Performed by: INTERNAL MEDICINE

## 2017-07-03 RX ORDER — AMLODIPINE BESYLATE 2.5 MG/1
2.5 TABLET ORAL DAILY
Status: DISCONTINUED | OUTPATIENT
Start: 2017-07-03 | End: 2017-07-08 | Stop reason: HOSPADM

## 2017-07-03 RX ORDER — ALBUTEROL SULFATE 0.83 MG/ML
2.5 SOLUTION RESPIRATORY (INHALATION) EVERY 6 HOURS PRN
Status: DISCONTINUED | OUTPATIENT
Start: 2017-07-03 | End: 2017-07-08 | Stop reason: HOSPADM

## 2017-07-03 RX ADMIN — INSULIN ASPART 2 UNITS: 100 INJECTION, SOLUTION INTRAVENOUS; SUBCUTANEOUS at 19:53

## 2017-07-03 RX ADMIN — INSULIN ASPART 3 UNITS: 100 INJECTION, SOLUTION INTRAVENOUS; SUBCUTANEOUS at 14:20

## 2017-07-03 RX ADMIN — ACETAMINOPHEN 650 MG: 160 SUSPENSION ORAL at 08:38

## 2017-07-03 RX ADMIN — INSULIN ASPART 3 UNITS: 100 INJECTION, SOLUTION INTRAVENOUS; SUBCUTANEOUS at 01:30

## 2017-07-03 RX ADMIN — OXYCODONE HYDROCHLORIDE 2 MG: 5 SOLUTION ORAL at 08:38

## 2017-07-03 RX ADMIN — Medication 1 CAPSULE: at 08:38

## 2017-07-03 RX ADMIN — Medication 7 ML: at 08:38

## 2017-07-03 RX ADMIN — RANITIDINE HYDROCHLORIDE 150 MG: 150 SOLUTION ORAL at 08:37

## 2017-07-03 RX ADMIN — ACETAMINOPHEN 650 MG: 160 SUSPENSION ORAL at 22:02

## 2017-07-03 RX ADMIN — INSULIN ASPART 3 UNITS: 100 INJECTION, SOLUTION INTRAVENOUS; SUBCUTANEOUS at 22:03

## 2017-07-03 RX ADMIN — ALBUTEROL SULFATE 2.5 MG: 2.5 SOLUTION RESPIRATORY (INHALATION) at 05:30

## 2017-07-03 RX ADMIN — MICONAZOLE NITRATE: 2 POWDER TOPICAL at 08:55

## 2017-07-03 RX ADMIN — INSULIN ASPART 2 UNITS: 100 INJECTION, SOLUTION INTRAVENOUS; SUBCUTANEOUS at 06:35

## 2017-07-03 RX ADMIN — INSULIN GLARGINE 5 UNITS: 100 INJECTION, SOLUTION SUBCUTANEOUS at 11:01

## 2017-07-03 RX ADMIN — INSULIN ASPART 3 UNITS: 100 INJECTION, SOLUTION INTRAVENOUS; SUBCUTANEOUS at 11:02

## 2017-07-03 RX ADMIN — AMLODIPINE BESYLATE 2.5 MG: 2.5 TABLET ORAL at 12:11

## 2017-07-03 RX ADMIN — VANCOMYCIN HYDROCHLORIDE 1500 MG: 5 INJECTION, POWDER, LYOPHILIZED, FOR SOLUTION INTRAVENOUS at 08:41

## 2017-07-03 RX ADMIN — MULTIVITAMIN 15 ML: LIQUID ORAL at 08:38

## 2017-07-03 RX ADMIN — ACETAMINOPHEN 650 MG: 160 SUSPENSION ORAL at 16:49

## 2017-07-03 RX ADMIN — OXYCODONE HYDROCHLORIDE 2 MG: 5 SOLUTION ORAL at 22:09

## 2017-07-03 RX ADMIN — INSULIN GLARGINE 10 UNITS: 100 INJECTION, SOLUTION SUBCUTANEOUS at 22:02

## 2017-07-03 NOTE — PLAN OF CARE
Problem: Goal Outcome Summary  Goal: Goal Outcome Summary  Outcome: No Change  Pt unresponsive, opens eyes spontaneously w/o any form of understanding, extremities contracted. Vitals stable on 1 liters NC, Total lift w/ A-2. T&R Q2H. incontinent of bowel. Dressing on coccyx intact. PU on bilateral ears, blanchable around scabs, using z-flow to prevent pressure and on 1st step mattress. allowing oral care this shift. diminished lungs that devolved crackles and wheezes through lung fields. Strict NPO. Blood sugar elevated. Planning wound consult today, G-tube intact, tube feed running. Contact precautions maintained. Tele SR, continue to monitor.

## 2017-07-03 NOTE — PROGRESS NOTES
[facility]  Hospitalist Progress Note  Shu Rausch MD  Pager: [phone]     Admission day    6/30/2017  Date of Service (when I saw the patient): 3 July 2017        Interval History:       Stable.  BS getting high w TF  We will transfer her out of CCU to regular medical floor    Discussed with son Salvatore today.   -- He would like an urology consults because it is hard for her to be taken to appointments. I explained him that there is no emergency for inpatient consult. I will see who is available over this holiday days.   -- he would like the caro changed q 2 weeks  -- he would like her to have more than 10 d course of Abx because in the past she had the pattern of readmission after the Abx were stopped.          Assessment and Plan:       89 y/o NH resident is admitted with ARF and sepsis.      Her complex PMHx: advanced dementia, aspirations pneumonia, indwelling urinary cath, UTIs, DM, CKD, feeding tube, chronic sacral wound,           Sepsis possible sec MRSA UTI w neg BC  -- fever, tachypnea, hypoxemia, increased lactic acid ( no fever in the last 24)  -- ID consult appreciated. Recommended cont Vanco while inpatient and discharged on Linzolid + Levaquin  -- cont vanco  -- CXR 6/30 - negative for acute changes  -- BC - neg to the date  -- UA with WBC, yeast. UC is neg at this admission but Pos for MRSA on June 14  -- C Diff - neg    Chr indwelling CFoley cath, freq UTIs  -- family requests Urology consult and change Caro q 2 weeks. We will ask NH to consider change Caro q 2 w.       Hypernatremia  -- <--155<--151 < -- 148  -- increase free water, dc NS  -- continue  45 ml TF/h x 12h = 540 ml. Free water 5o ml q 1h = 1200  -- monitor NA         CKD  -- Cr 1.91<--2.33 <-- 2.84   -- monitor   -- cont iv fluids    Elevated BP  -- start Amlodipine 2.5 mg daily      Advanced dementia with nonverbal state, nonabulatory with fixed chronic muscle contraction         DM 2  -- she was on  70/30 bid and Lantus bid at the NH - she will not need both  -- BS under 120 while npo  -- con't SSI  -- resume Lantus 10 units bid           Chr sacral decubitus ulcer   -- considered chronic, noninfected   -- continue local care     Chr Anemia  -- Hgb in the same range as before.              DVT Prophylaxis: Pneumatic Compression Devices  Code Status: Full Code      Disposition: Inpatient      ROS: not obtainable          Physical Exam:   Blood pressure 177/87, temperature 97.1  F (36.2  C), temperature source Axillary, resp. rate 30, height 1.524 m (5'), weight 68 kg (149 lb 14.6 oz), SpO2 96 %.     NAD, appears comfortable   Skin: no rashes   Chest: clear to auscultation bilaterally, good respiratory effort  Heart: S1 S2, RRR, no mgr appreciated  Abdomen: soft, not tender,   Extremities: no  edema.   Neurologic: lethargic     Vital Sign Ranges  Temperature Temp  Av.2  F (36.2  C)  Min: 97.1  F (36.2  C)  Max: 97.3  F (36.3  C)   Blood pressure Systolic (24hrs), Av , Min:118 , Max:177        Diastolic (24hrs), Av, Min:56, Max:87      Pulse No Data Recorded   Respirations Resp  Av  Min: 19  Max: 30   Pulse oximetry SpO2  Av.7 %  Min: 91 %  Max: 100 %              Medications:         vancomycin (VANCOCIN) IV  1,500 mg Intravenous Once     insulin glargine  5 Units Subcutaneous BID     vitamin A-D & C drops  7 mL Per G Tube Daily     insulin aspart  1-4 Units Subcutaneous Q4H     acetaminophen  650 mg Rectal Once     acetaminophen  650 mg Per Feeding Tube TID     lactobacillus rhamnosus (GG)  1 capsule Per Feeding Tube Daily     multivitamins with minerals  15 mL Per Feeding Tube Daily     oxyCODONE  2 mg Per G Tube BID     ranitidine  150 mg Per Feeding Tube Daily     sodium chloride (PF)  3 mL Intracatheter Q8H     levofloxacin  500 mg Intravenous Q48H     vancomycin place ramos - receiving intermittent dosing  1 each Does not apply See Admin Instructions        Prescriptions Prior to  Admission   Medication Sig Dispense Refill Last Dose     insulin NPH-insulin regular (HUMULIN MIX 70/30/NOVOLIN MIX 70/30) injection Inject 16 Units Subcutaneous every morning   6/30/2017 at am     insulin NPH-insulin regular (HUMULIN MIX 70/30/NOVOLIN MIX 70/30) injection Inject 10 Units Subcutaneous every 24 hours @@ 1400        INSULIN ASPART SC Inject 1-6 Units Subcutaneous every 6 hours 0800, 1400, 2000, 0200  -250 1 unit  -300 2 units  -350 3 units  -400 4 units  -450 5 units  BG >450 6 units and update MD        Dakins 0.125 % SOLN Externally apply topically 2 times daily BID and PRN.   1. Cleanse wound with microklenze, cleanse periwound area with naomi perineal  2. Moisten kerlix fluff with dakins solution 0.125%, wring out excess, pack wound with kerlix  3. Apply antifungal powder to periwound area, rub in. Apply criticaid over powder.  4. Cover with ABD using minimal medipore tape to secure.  5. Label dressing with date, time, initials. Follow Rigorous PIP measures.        oxyCODONE (ROXICODONE) 5 MG/5ML solution 2 mLs (2 mg) by Per G Tube route 2 times daily 15 mL 0 6/30/2017 at 10 am     oxyCODONE (ROXICODONE) 5 MG/5ML solution 2 mLs (2 mg) by Per G Tube route every 4 hours as needed for moderate to severe pain  0 6/29/2017 at Unknown time     insulin glargine (LANTUS) 100 UNIT/ML injection Inject 36 Units Subcutaneous every morning   6/30/2017 at am     insulin glargine (LANTUS) 100 UNIT/ML injection Inject 34 Units Subcutaneous At Bedtime   6/29/2017 at pm     Lactobacillus Acidophilus POWD 1 capsule by Gastric Tube route daily 10 billion units    6/30/2017 at am     multivitamin, therapeutic (THERA-VIT) TABS tablet 1 tablet by Gastric Tube route daily   6/30/2017 at am     Loperamide HCl (IMODIUM A-D) 1 MG/7.5ML LIQD 4 mg by Gastric Tube route every other day   6/30/2017 at 0800     acetaminophen (TYLENOL) 32 mg/mL solution 325 mg by Gastric Tube route daily as needed for  fever or mild pain   prn     ACETAMINOPHEN  mg by Gastric Tube route 3 times daily    6/30/2017 at 1400     bisacodyl (DULCOLAX) 10 MG suppository Place 10 mg rectally daily as needed for constipation   prn     insulin aspart (NOVOLOG PEN) 100 UNIT/ML soln Inject 1-6 Units Subcutaneous every 4 hours   6/14/2017 at Unknown time       Current Facility-Administered Medications   Medication     albuterol neb solution 2.5 mg     vancomycin (VANCOCIN) 1500 mg in 0.9% NaCl 250 mL PREMIX     insulin glargine (LANTUS) injection 5 Units     miconazole (MICATIN; MICRO GUARD) 2 % powder     dextrose 10 % 1,000 mL infusion     vitamin A-D & C drops (TRI-VI-SOL) drops SOLN 7 mL     glucose 40 % gel 15-30 g    Or     dextrose 50 % injection 25-50 mL    Or     glucagon injection 1 mg     insulin aspart (NovoLOG) inj (RAPID ACTING)     acetaminophen (TYLENOL) Suppository 650 mg     acetaminophen (TYLENOL) solution 325 mg     acetaminophen (TYLENOL) solution 650 mg     bisacodyl (DULCOLAX) Suppository 10 mg     lactobacillus rhamnosus (GG) (CULTURELL) capsule 1 capsule     multivitamins with minerals (CERTAVITE/CEROVITE) liquid 15 mL     oxyCODONE (ROXICODONE) solution 2 mg     oxyCODONE (ROXICODONE) solution 2 mg     ranitidine (Zantac) syrup 150 mg     naloxone (NARCAN) injection 0.1-0.4 mg     lidocaine 1 % 1 mL     lidocaine (LMX4) cream     sodium chloride (PF) 0.9% PF flush 3 mL     sodium chloride (PF) 0.9% PF flush 3 mL     nitroGLYcerin (NITROSTAT) sublingual tablet 0.4 mg     levofloxacin (LEVAQUIN) infusion 500 mg     vancomycin place ramos - receiving intermittent dosing     Facility-Administered Medications Ordered in Other Encounters   Medication     Lidocaine 1% injection 0.5-5 mL     lidocaine 4 % (LMX4) cream     sodium chloride (PF) 0.9% PF flush 5-50 mL        Intake/Output Summary (Last 24 hours) at 07/03/17 0836  Last data filed at 07/03/17 0437   Gross per 24 hour   Intake          2026.75 ml   Output              1750 ml   Net           276.75 ml       Vitals:    06/30/17 1618 07/01/17 0500 07/02/17 0500 07/03/17 0600   Weight: 73.7 kg (162 lb 8 oz) 68.4 kg (150 lb 12.7 oz) 68.4 kg (150 lb 12.7 oz) 68 kg (149 lb 14.6 oz)               Data:   Recent labs, imaging, and other studies were reviewed.      Recent Labs  Lab 07/02/17  0532 06/30/17  1620   WBC 8.1 15.6*   HGB 9.4* 11.7   HCT 31.5* 38.4    101*    251       Recent Labs  Lab 07/03/17  0556 07/02/17  2050 07/02/17  0532 07/01/17  1650 07/01/17  0615  06/30/17  1620   * 151* 155*  --  151*  --  148*   POTASSIUM 3.7  --  3.8  --  4.0  --  4.6   CHLORIDE 118*  --  121*  --  117*  --  111*   CO2 21  --  25  --  25  --  26   ANIONGAP 11  --  9  --  9  --  11   *  --  181*  --  115*  < > 240*   BUN 49*  --  65*  --  86*  --  104*   CR 1.36*  --  1.91*  --  2.33*  --  2.84*   GFRESTIMATED 36*  --  25*  --  20*  --  16*   GFRESTBLACK 44*  --  30*  --  24*  --  19*   NELLIE 7.9*  --  7.6*  --  7.6*  --  8.8   MAG 2.1  --   --  2.6*  --   --   --    PHOS 2.5  --   --  3.6  --   --   --    PROTTOTAL  --   --   --   --   --   --  8.1   ALBUMIN  --   --   --   --   --   --  2.9*   BILITOTAL  --   --   --   --   --   --  0.7   ALKPHOS  --   --   --   --   --   --  81   AST  --   --   --   --   --   --  24   ALT  --   --   --   --   --   --  26   < > = values in this interval not displayed.  No results for input(s): INR in the last 168 hours.        Recent Labs  Lab 07/03/17  1026 07/03/17  0556 07/03/17  0128 07/02/17  2110 07/02/17  1706 07/02/17  1308  07/02/17  0532  07/01/17  0615 07/01/17  0215  06/30/17  1620   GLC  --  336*  --   --   --   --   --  181*  --  115* 141*  --  240*   *  --  344* 347* 337* 349*  < >  --   < >  --   --   < >  --    < > = values in this interval not displayed.

## 2017-07-03 NOTE — PHARMACY-VANCOMYCIN DOSING SERVICE
Vancomycin Therapy Consult  Day 4 of vancomycin therapy, currently receiving vancomycin intermittently based on levels.    Vancomycin level today is 17.8 mg/L with a goal of  15-20 mg/L for redosing.  Last vancomycin dose given was 1500 mg on 7/3.  Vancomycin level appropriate for re-dose today.  Next level will be checked when appropriate.

## 2017-07-03 NOTE — PROVIDER NOTIFICATION
Pt developed inspiratory and expiratory wheezes after repositioning. Paged on-call hospitalist, Dr. costello to update and possible neb order.     MD called back, entering neb order

## 2017-07-03 NOTE — PROGRESS NOTES
[facility] Nurse Inpatient Adult Pressure Injury (PI) Assessment     Initial Assessment of PI(s) on pt's:   Coccyx/sacrum; bilateral outer ears (helix)    Data:   Patient History:      per MD note(s): 89 y/o NH resident is admitted with ARF and sepsis.       Her complex PMHx: advanced dementia, aspirations pneumonia, indwelling urinary cath, UTIs, DM, CKD, feeding tube, chronic sacral wound, Sepsis possible sec MRSA UTI w neg BC     Current mattress:  Low air loss mattress  Current pressure relieving devices:  Pillows    Moisture Management:  Incontinence Protocol and Urinary Catheter    Catheter secured? Yes      Current Diet / Nutrition:       Active Diet Order      NPO for Medical/Clinical Reasons Except for: NPO but receiving Tube Feeding      Deshawn Assessment and sub scores:   Deshawn Score  Av.6  Min: 7  Max: 11       Labs:   Recent Labs   Lab Test  06/15/17   0833  17   1200   13   1300   ALBUMIN   --   2.5*   < >   --    HGB  11.0*  12.4   < >  13.2   INR   --   1.10   < >   --    WBC  13.9*  18.7*   < >  9.3   A1C  8.4*   --    < >   --    CRP   --    --    --   11.0*    < > = values in this interval not displayed.                                                                                                                          Pressure Injury Assessment  (location):   Coccyx/sacrum    Wound History:   Pt just readmitted with wound on the coccyx.  During previous admission wound was debrided, 06-15-17.  Pt with frequent soft stools causing the dressing to become soiled and needing to be changed frequently.     Wound now measures about 5cm x 5cm x up to 2cm.  Wound is about 50% moist, soft slough and 50% moist, bumpy, shiny red tissue.  The base of the wound feels secure but is just very slightly softer like there may be tunneling but am not able to probe into it.  There is a very shallow pocket of undermining at about 4oclock, up to 0.8cm.     Pressure Injury  Assessment:   Bilateral outer ears (mid-helix)    Wound History:   Present on admission; pt lies directly on ears when on sides.  Wears O2 tubing.     Specific Dimensions (length x width x depth, in cm) :     1.  Left ear:  Approx. 2 x 0.5 cm area of scattered lightly scabby tissue that is sloughing off and some red moist areas, scant serosang drainage.  Posterior ear has slight denudement under O2 tubing.    2.  Right ear: Approx. 1 x 1 cm area of scattered reddened areas and slightly moist pale red tissue. Scant serous drainage.     Periwound Skin: scattered mild erythema     Odor: none    Pain:  absent           Intervention:     Patient's chart evaluated.      Deshawn Interventions:  Current Deshawn Interventions and Care Plan reviewed and updated, appropriate at this time.    Wounds assessed, packed coccyx wound with MiroKlenz moist gauze and covered with foam.    Orders  Reviewed and updated    Supplies  Reviewed     Discussed plan of care with Nurse            Assessment:     Pressure Injury (PI) located on coccyx/sacrum: what had been an unstageable PI had been debrided about 2 weeks ago is now a moist, but pale red looking wound with some slough that should benefit from use of Iodosorb Gel. This gel should help clean up any bioburden present and help jump start the wound, as well as dry out the wound.  Will do daily dressing changes using a hydrocolloid covering on the wound to help protect from stooling.      Pressure injuries to bilateral ears:  Scattered small Stage 2s, no with scabs, present on admission, no s/s infection.  Pt lies directly on helix of ear when on side.  O2 tubing not related to these injuries, though may be causing a little denudement to posterior and upper ear creases. Will use z-sb pillow under head, will be able to carve out space for ears to help prevent further injury.             Plan:     Nursing to notify the Provider(s) and re-consult the [facility] Nurse if wound(s) deteriorate(s)or  "if the wound care plan needs reevaluation.    Plan of care for wound on coccyx: daily   1. Clean with MicroKlenz Spray, pat dry  2. \"clean\" the periwound tissue with one or 2 skin prep pads.  Throw and way.  Using a fresh, new skin prep pad now lay down a layer of skin prep to periwound tissue.  Allow to dry completely before allowing skin to skin contact or the skin will stick to itself.    3.  Using 1 or 2 2x2 gauze  Very slighlty moisten with MicroKlenz Spray, wring out. Apply a smear of Iodosorb Gel (#405534) to the gauze and press to the wound bed (dont over fill the wound, want to stay below level of surrounding skin)  4. Cover with hydrocolloid Comfeel Dressing (#[id number])- may need 2 to fully cover the wound.  Start by covering the bottom half of the wound first, near the anus, to assure correct placement of the dressing.   *TIME AND DATE DRESSING APPLICATION*      Plan for wound care to bilateral outer ears:  BID and prn:  1.  Swab wounds and periwounds with no-sting skin prep, let dry.   2.  Pad around O2 tubing with Mepilex Lite.  3.  Use z-sb pillow prn under head and 'carve out' space for ear when pt on side, to minimize any pressure to ears.      PIP ACTIVITY MEASURES:  CHAIR: Pt should sit on a chair cushion ([id number]) when up to the chair and not sit for more than one hour at a time before fully offloading backside (either stand for a couple of minutes and/or return to bed, positioning on a side) to relieve pressure and re-perfuse tissue.  Additionally, encourage pt to shift side to side every 15 minutes, too.    NOTE: the Z-Flow Pad is NOT a cushion, pt should NOT sit on the Z-Flow pads    BED:  Reposition side to side every 1-2 hours when awake.   ? Consider Z-Sb Pillows to help keep pt on side (#[id number]-medium or #[id number]- large). *Z-Flows are for positioning, DO NOT SIT ON!  ? Keep heels elevated  ? As able keep HOB below 30 degrees  ? Evaluate for specialty mattress  NOTE: If pt is refusing to turn " "or reposition they must be educated on the potential injury from not offloading pressure.  Then this \"educated refusal\" needs to be documented as an \"educated refusal to turn/ reposition\" and document if alert, etc.  Additionally please notify MD and charge nurse of refusal(s).        [facility] Nurse will return: weekly and prn  Face to face time: 30+ Minutes    "

## 2017-07-03 NOTE — PROGRESS NOTES
VSS, turned q 2, wound care completed, TF infusing, caro patent, pulsate mattress function on. Stable for transfer to medical bed, report given to RN on st 88. Transfer in the bed with pulsate mattress.

## 2017-07-03 NOTE — PLAN OF CARE
Problem: Goal Outcome Summary  Goal: Goal Outcome Summary  Outcome: No Change  89 yo [facility] with possible [facility].  Non-verbal d/t severe dementia, PCD's, Pulsate mattress, non-ambulatory with fixed muscle contractures, lifts for transfers.

## 2017-07-03 NOTE — ETHICS CONSULT
Ethics Consult Received:  D:  Consult acknowledged.  There are no providers available today.  There is an ethics consult scheduled for 7/5 at 10:00.  More documentation to follow.    SABA Burris, [facility]  [phone]

## 2017-07-03 NOTE — PROGRESS NOTES
[facility]  Infectious Disease Progress Note          Assessment and Plan:   IMPRESSION:   1.  A 89-year-old female, nursing home resident, prior known aspiration, chronic Fonseca catheter and Alzheimer's dementia readmitted with low-grade sepsis, etiology still not entirely clear, prior treated MRSA UTI , now UC neg, no positive blood cultures.  Still doubt wound is  the issue , not clearcut pneumonia but obvious aspiration risk.   2.  Chronic sacral decubitus wound, somewhat necrotic, but no signs of infection and it does not tract deep had prior I&D done, culture of the wound growing multiple organisms of no significance by itself.   3.  MRSA positive urine culture prior admit, treated, UA abnormal still with with catheter in place, but current UC no sig +.   4.  Minimal infiltrate, mild  hypoxia, mostly resolved, possible aspiration pneumonia.  If so has improved.   5.  Severe dementia.   6.  Recurrent urinary tract infections and urinary colonization with Fonseca catheter in place.   7.  Feeding tube in place, prior probable aspiration, possible etiology to current illness as well.   8.  Diabetes mellitus.   9.  Chronic renal insufficiency, acute worsening now improved.   10.  Sulfa allergy.       RECOMMENDATIONS:   1.  No indication to treat sacral decubitus, such culture of no clinical significance unless deep osteo or cellulitis and neither evident. .  Doubt this is the cause of current fever and has already been debrided, if it is cause of fever no long term antibiotic strategy of likely benefit.   2.  Urine does not appear to be current issue   3.  Not clearcut pneumonia.  Has been getting gram-negative coverage currently is on  Zosyn and MRSA coverage vanco.  Does not appear to have major clinical pneumonia.  If it is aspiration antibiotics likely only of short term benefit been given, given   4.  Obviously long-term prognosis poor, especially if recurrently aspirating, ? Further  family LOC discussion.         Interval History:   no complaints not interactive, no fever procal0.2, no new cxs +, Uc mixed low ct maria esther, CXR no clear infiltrate, creat now 1.36 improved              Medications:       insulin glargine  5 Units Subcutaneous BID     vitamin A-D & C drops  7 mL Per G Tube Daily     insulin aspart  1-4 Units Subcutaneous Q4H     acetaminophen  650 mg Rectal Once     acetaminophen  650 mg Per Feeding Tube TID     lactobacillus rhamnosus (GG)  1 capsule Per Feeding Tube Daily     multivitamins with minerals  15 mL Per Feeding Tube Daily     oxyCODONE  2 mg Per G Tube BID     ranitidine  150 mg Per Feeding Tube Daily     sodium chloride (PF)  3 mL Intracatheter Q8H     levofloxacin  500 mg Intravenous Q48H     vancomycin place ramos - receiving intermittent dosing  1 each Does not apply See Admin Instructions                  Physical Exam:   Blood pressure 172/80, temperature 97.1  F (36.2  C), temperature source Axillary, resp. rate 28, height 1.524 m (5'), weight 68 kg (149 lb 14.6 oz), SpO2 96 %.  Wt Readings from Last 2 Encounters:   07/03/17 68 kg (149 lb 14.6 oz)   06/19/17 69.3 kg (152 lb 12.5 oz)     Vital Signs with Ranges  Temp:  [97.1  F (36.2  C)-97.3  F (36.3  C)] 97.1  F (36.2  C)  Heart Rate:  [76-95] 93  Resp:  [19-30] 28  BP: (118-177)/(56-87) 172/80  SpO2:  [91 %-100 %] 96 %    Constitutional: Awake, alert, not interactive   Lungs: Congestion to auscultation bilaterally, no crackles or wheezing   Cardiovascular: Regular rate and rhythm, normal S1 and S2, and no murmur noted   Abdomen: Normal bowel sounds, soft, non-distended, non-tender   Skin: No rashes, no cyanosis, no edema wd not seen today   Other:           Data:   All microbiology laboratory data reviewed.  Recent Labs   Lab Test  07/02/17   0532  06/30/17   1620  06/19/17   0856   WBC  8.1  15.6*  11.8*   HGB  9.4*  11.7  10.3*   HCT  31.5*  38.4  32.1*   MCV  100  101*  93   PLT  181  251  293     Recent Labs    Lab Test  07/03/17   0556  07/02/17   0532  07/01/17   0615   CR  1.36*  1.91*  2.33*     Recent Labs   Lab Test  05/08/13   1300   SED  61*     Recent Labs   Lab Test  06/30/17   1654  06/30/17   1640  06/14/17   1319  06/14/17   1230  06/14/17   1200  06/21/16   0653  06/21/16   0652  06/21/16   0636  06/24/14   1200   CULT  No growth after 3 days  <1000 colonies/mL urogenital maria esther Susceptibility testing not routinely done  No growth after 3 days  No growth  Moderate growth Escherichia coli  Moderate growth Enterococcus avium  Moderate growth Enterococcus faecalis  Light growth Candida albicans / dubliniensis Candida albicans and Candida   dubliniensis are not routinely speciated Susceptibility testing not routinely   done  Plus Heavy growth Normal skin maria esther  *  >100,000 colonies/mL Methicillin resistant Staphylococcus aureus (MRSA)  50,000 to 100,000 colonies/mL Strain 2 Methicillin resistant Staphylococcus   aureus (MRSA)  Critical Value/Significant Value called to and read back by Elmer Hurd Rn at   2044 [id number]  *  No growth  No growth  >100,000 colonies/mL Enterococcus species*  No growth  10,000 to 50,000 colonies/mL Candida kefyr Susceptibility testing not routinely   done  10,000 to 50,000 colonies/mL Lactobacillus species No further identification  Susceptibility testing not routinely done  *

## 2017-07-04 ENCOUNTER — APPOINTMENT (OUTPATIENT)
Dept: GENERAL RADIOLOGY | Facility: CLINIC | Age: 82
DRG: 871 | End: 2017-07-04
Attending: INTERNAL MEDICINE
Payer: COMMERCIAL

## 2017-07-04 LAB
ANION GAP SERPL CALCULATED.3IONS-SCNC: 11 MMOL/L (ref 3–14)
BUN SERPL-MCNC: 36 MG/DL (ref 7–30)
CALCIUM SERPL-MCNC: 8.1 MG/DL (ref 8.5–10.1)
CHLORIDE SERPL-SCNC: 114 MMOL/L (ref 94–109)
CO2 SERPL-SCNC: 20 MMOL/L (ref 20–32)
CREAT SERPL-MCNC: 1.04 MG/DL (ref 0.52–1.04)
GFR SERPL CREATININE-BSD FRML MDRD: 50 ML/MIN/1.7M2
GLUCOSE BLDC GLUCOMTR-MCNC: 192 MG/DL (ref 70–99)
GLUCOSE BLDC GLUCOMTR-MCNC: 244 MG/DL (ref 70–99)
GLUCOSE BLDC GLUCOMTR-MCNC: 309 MG/DL (ref 70–99)
GLUCOSE BLDC GLUCOMTR-MCNC: 318 MG/DL (ref 70–99)
GLUCOSE BLDC GLUCOMTR-MCNC: 325 MG/DL (ref 70–99)
GLUCOSE BLDC GLUCOMTR-MCNC: 380 MG/DL (ref 70–99)
GLUCOSE SERPL-MCNC: 327 MG/DL (ref 70–99)
LACTATE BLD-SCNC: 2.9 MMOL/L (ref 0.7–2.1)
POTASSIUM SERPL-SCNC: 3.6 MMOL/L (ref 3.4–5.3)
SODIUM SERPL-SCNC: 145 MMOL/L (ref 133–144)
VANCOMYCIN SERPL-MCNC: 22.9 MG/L

## 2017-07-04 PROCEDURE — 71020 XR CHEST 2 VW: CPT

## 2017-07-04 PROCEDURE — 99233 SBSQ HOSP IP/OBS HIGH 50: CPT | Performed by: INTERNAL MEDICINE

## 2017-07-04 PROCEDURE — 25000128 H RX IP 250 OP 636: Performed by: INTERNAL MEDICINE

## 2017-07-04 PROCEDURE — 27210429 ZZH NUTRITION PRODUCT INTERMEDIATE LITER

## 2017-07-04 PROCEDURE — 83605 ASSAY OF LACTIC ACID: CPT | Performed by: INTERNAL MEDICINE

## 2017-07-04 PROCEDURE — 25000125 ZZHC RX 250: Performed by: INTERNAL MEDICINE

## 2017-07-04 PROCEDURE — 80202 ASSAY OF VANCOMYCIN: CPT | Performed by: INTERNAL MEDICINE

## 2017-07-04 PROCEDURE — 94640 AIRWAY INHALATION TREATMENT: CPT

## 2017-07-04 PROCEDURE — 40000275 ZZH STATISTIC RCP TIME EA 10 MIN

## 2017-07-04 PROCEDURE — 00000146 ZZHCL STATISTIC GLUCOSE BY METER IP

## 2017-07-04 PROCEDURE — 12000000 ZZH R&B MED SURG/OB

## 2017-07-04 PROCEDURE — 80048 BASIC METABOLIC PNL TOTAL CA: CPT | Performed by: INTERNAL MEDICINE

## 2017-07-04 PROCEDURE — 25000131 ZZH RX MED GY IP 250 OP 636 PS 637: Performed by: INTERNAL MEDICINE

## 2017-07-04 PROCEDURE — 25000132 ZZH RX MED GY IP 250 OP 250 PS 637: Performed by: INTERNAL MEDICINE

## 2017-07-04 PROCEDURE — 36415 COLL VENOUS BLD VENIPUNCTURE: CPT | Performed by: INTERNAL MEDICINE

## 2017-07-04 RX ORDER — METOPROLOL TARTRATE 1 MG/ML
2.5 INJECTION, SOLUTION INTRAVENOUS EVERY 4 HOURS PRN
Status: DISCONTINUED | OUTPATIENT
Start: 2017-07-04 | End: 2017-07-08 | Stop reason: HOSPADM

## 2017-07-04 RX ORDER — DEXTROSE MONOHYDRATE 50 MG/ML
INJECTION, SOLUTION INTRAVENOUS CONTINUOUS
Status: DISCONTINUED | OUTPATIENT
Start: 2017-07-04 | End: 2017-07-06

## 2017-07-04 RX ORDER — FUROSEMIDE 10 MG/ML
40 INJECTION INTRAMUSCULAR; INTRAVENOUS ONCE
Status: COMPLETED | OUTPATIENT
Start: 2017-07-04 | End: 2017-07-04

## 2017-07-04 RX ORDER — METOPROLOL TARTRATE 1 MG/ML
2.5 INJECTION, SOLUTION INTRAVENOUS ONCE
Status: COMPLETED | OUTPATIENT
Start: 2017-07-04 | End: 2017-07-04

## 2017-07-04 RX ORDER — PIPERACILLIN SODIUM, TAZOBACTAM SODIUM 2; .25 G/10ML; G/10ML
2.25 INJECTION, POWDER, LYOPHILIZED, FOR SOLUTION INTRAVENOUS EVERY 6 HOURS
Status: DISCONTINUED | OUTPATIENT
Start: 2017-07-04 | End: 2017-07-07

## 2017-07-04 RX ORDER — METOPROLOL TARTRATE 1 MG/ML
2.5 INJECTION, SOLUTION INTRAVENOUS EVERY 6 HOURS
Status: DISCONTINUED | OUTPATIENT
Start: 2017-07-04 | End: 2017-07-08 | Stop reason: HOSPADM

## 2017-07-04 RX ADMIN — METOPROLOL TARTRATE 2.5 MG: 5 INJECTION INTRAVENOUS at 10:08

## 2017-07-04 RX ADMIN — Medication 1 CAPSULE: at 10:40

## 2017-07-04 RX ADMIN — ALBUTEROL SULFATE 2.5 MG: 2.5 SOLUTION RESPIRATORY (INHALATION) at 03:21

## 2017-07-04 RX ADMIN — METOPROLOL TARTRATE 2.5 MG: 5 INJECTION INTRAVENOUS at 02:15

## 2017-07-04 RX ADMIN — PIPERACILLIN SODIUM,TAZOBACTAM SODIUM 2.25 G: 2; .25 INJECTION, POWDER, FOR SOLUTION INTRAVENOUS at 16:45

## 2017-07-04 RX ADMIN — PIPERACILLIN SODIUM,TAZOBACTAM SODIUM 2.25 G: 2; .25 INJECTION, POWDER, FOR SOLUTION INTRAVENOUS at 11:04

## 2017-07-04 RX ADMIN — METOPROLOL TARTRATE: 5 INJECTION INTRAVENOUS at 22:39

## 2017-07-04 RX ADMIN — OXYCODONE HYDROCHLORIDE 2 MG: 5 SOLUTION ORAL at 22:30

## 2017-07-04 RX ADMIN — METOPROLOL TARTRATE: 5 INJECTION INTRAVENOUS at 16:39

## 2017-07-04 RX ADMIN — INSULIN ASPART 3 UNITS: 100 INJECTION, SOLUTION INTRAVENOUS; SUBCUTANEOUS at 06:20

## 2017-07-04 RX ADMIN — INSULIN GLARGINE 15 UNITS: 100 INJECTION, SOLUTION SUBCUTANEOUS at 22:38

## 2017-07-04 RX ADMIN — ACETAMINOPHEN 650 MG: 160 SUSPENSION ORAL at 10:33

## 2017-07-04 RX ADMIN — ACETAMINOPHEN 650 MG: 160 SUSPENSION ORAL at 22:39

## 2017-07-04 RX ADMIN — LEVOFLOXACIN 500 MG: 5 INJECTION, SOLUTION INTRAVENOUS at 18:00

## 2017-07-04 RX ADMIN — METOPROLOL TARTRATE 2.5 MG: 5 INJECTION INTRAVENOUS at 03:41

## 2017-07-04 RX ADMIN — Medication 7 ML: at 10:33

## 2017-07-04 RX ADMIN — INSULIN ASPART 2 UNITS: 100 INJECTION, SOLUTION INTRAVENOUS; SUBCUTANEOUS at 02:42

## 2017-07-04 RX ADMIN — DEXTROSE MONOHYDRATE: 50 INJECTION, SOLUTION INTRAVENOUS at 15:00

## 2017-07-04 RX ADMIN — INSULIN GLARGINE 15 UNITS: 100 INJECTION, SOLUTION SUBCUTANEOUS at 10:02

## 2017-07-04 RX ADMIN — ACETAMINOPHEN 650 MG: 160 SUSPENSION ORAL at 16:40

## 2017-07-04 RX ADMIN — FUROSEMIDE 40 MG: 10 INJECTION, SOLUTION INTRAVENOUS at 10:59

## 2017-07-04 RX ADMIN — AMLODIPINE BESYLATE 2.5 MG: 2.5 TABLET ORAL at 10:40

## 2017-07-04 RX ADMIN — RANITIDINE HYDROCHLORIDE 150 MG: 150 SOLUTION ORAL at 10:34

## 2017-07-04 RX ADMIN — DEXTROSE MONOHYDRATE: 50 INJECTION, SOLUTION INTRAVENOUS at 03:31

## 2017-07-04 RX ADMIN — MULTIVITAMIN 15 ML: LIQUID ORAL at 10:34

## 2017-07-04 RX ADMIN — OXYCODONE HYDROCHLORIDE 2 MG: 5 SOLUTION ORAL at 10:27

## 2017-07-04 RX ADMIN — PIPERACILLIN SODIUM,TAZOBACTAM SODIUM 2.25 G: 2; .25 INJECTION, POWDER, FOR SOLUTION INTRAVENOUS at 22:40

## 2017-07-04 NOTE — PROGRESS NOTES
[facility]    Sepsis Evaluation Progress Note    Date of Service: 07/04/2017    I was called to see Celia Lewis due to abnormal vital signs triggering the Sepsis SIRS screening alert. She is known to have an infection.     Physical Exam    Vital Signs:  Temp: 96.8  F (36  C) Temp src: Oral BP: 118/76   Heart Rate: 114 Resp: (!) 38 SpO2: 94 % O2 Device: None (Room air) Oxygen Delivery: 1 LPM    Lab:  Lactic Acid   Date Value Ref Range Status   07/04/2017 2.9 (H) 0.7 - 2.1 mmol/L Final     Lactic acid appears stable from 7/1/17, 2.8 at that time.    The patient is at baseline mental status.    The rest of their physical exam is significant for nonverbal female lying comfortably in bed with tachypnea noted, though atypical breathing pattern consistent with obesity hypoventilation (abdominal breathing pattern). Tachycardia to the 130s range. Breath sounds largely clear bilaterally with no significant wheezing. Minimal effort on inspiration. Chronic sacral wound was not evaluated at this time.    Assessment and Plan    The SIRS and exam findings are likely due to   sepsis. Primary suspicion is actually for recurrent aspiration event resulting in worsening of atrial fibrillation with rapid ventricular rate. Patient has known MRSA urinary tract infection, though currently being treated appropriately for this.    ID: The patient is currently on the following antibiotics:  Anti-infectives (Future)    Start     Dose/Rate Route Frequency Ordered Stop    07/02/17 1800  levofloxacin (LEVAQUIN) infusion 500 mg     Comments:  Pharmacy can adjust dose based on renal function.    500 mg  100 mL/hr over 60 Minutes Intravenous EVERY 48 HOURS 06/30/17 1929 06/30/17 1959  vancomycin place ramos - receiving intermittent dosing      1 each Does not apply SEE ADMIN INSTRUCTIONS 06/30/17 2000          Current antibiotic coverage is appropriate for source of infection.    Fluid: Fluid bolus not indicated due to  Hypertension, free water deficit. Patient has been initiated on D5 at 125 per hour. Tube feedings currently held, as have patient's 50 mL q.1 hour free water boluses.     As per prior note, tube feeds have been held given concern for aspiration. Awaiting 2 view chest x-ray at this time. Patient is not currently hypoxic. Rate control with IV metoprolol.    A repeat lactic acid has not currently been ordered.    Disposition: The patient will remain on the current unit. We will continue to monitor this patient closely.    Renaldo Sandy MD

## 2017-07-04 NOTE — PLAN OF CARE
Problem: Goal Outcome Summary  Goal: Goal Outcome Summary  Outcome: No Change  Disoriented x4. Opens eyes spontaneously. Nonverbal. Ethics consults on 7/5 at 1000. NPO/TF infusing 45 ml/hr.  Hyperglycemic, sliding scale insulin given. Up with lift for transfers, total cares.  RPO q2h. IV TKO. BM 7/3.Fixed muscle contraction. Will continue to monitor.

## 2017-07-04 NOTE — PROVIDER NOTIFICATION
Pt with a-fib RVR into 140s range, known hx of A-fib. Now with tachypnea as well. Receiving tube feedings (PEG) w/o recent rate change.    Metoprolol PRN IV for HR    2 view CXR now    Hold tube feedings (concern for recurrent aspiration events) pending CXR and stability from cardiopulmonary standpoint.    Note concern for recurrent aspiration and LOC discussion recommendation in ID note.    Renaldo Sandy MD  2:11 AM

## 2017-07-04 NOTE — PHARMACY-VANCOMYCIN DOSING SERVICE
Vancomycin Therapy Consult  Day 5 of vancomycin therapy, currently receiving vancomycin intermittently based on levels.    Vancomycin level today is 22.9 mg/L with a goal of  15-20 mg/L for redosing.  Last vancomycin dose given was 1500 mg on 7/3.  Vancomycin level NOT appropriate for re-dose today.  Next level will be checked tomorrow AM.

## 2017-07-04 NOTE — PROGRESS NOTES
[facility]  Hospitalist Progress Note  Shu Rausch MD  Pager: [phone]     Admission day    6/30/2017  Date of Service (when I saw the patient): 4 July 2017        Interval History:     Early this morning the patient became tachypneic and tachycardic.   CXR shows patchy bilat infiltrates. Most likely aspiration.   Because of hypernatremia she has been getting  free water through PEG. Tube feeding were dc and patient was started on D5. Resumed after talking to son.     Today, I had a long discussion with her son,  Joshua,  about future care. See below discussion. We will resume the tube feeding          Assessment and Plan:     89 y/o NH resident is admitted with ARF and sepsis, not clear sourse of infection, possible sec MRSA UTI ( but neg UC and BC). She developed hypernatremia and the free water through PEG was increased. She was stable, afebrile, stable VS and she was transferred out of CCU on July 3. On early morning July 4 she developed tachycardia and tachypnea and the CXR shows new bilat infiltrates. Most likely aspiration. Tube feeding will be continued as per family brian       Her complex PMHx: advanced dementia, aspirations pneumonia, indwelling urinary cath, UTIs, DM, CKD, feeding tube, chronic sacral wound,           Sepsis possible sec MRSA UTI w neg BC  -- fever, tachypnea, hypoxemia, increased lactic acid ( no fever in the last 24) - on admission. She was getting better until July 4 when she became tachycardic and tachypneic and new CXR infiltrates. Most likely she aspirated.    -- ID consult appreciated. Recommended cont Vanco while inpatient and discharged on Linzolid + Levaquin  -- cont vanco, Zosyn  -- CXR 6/30 - negative for acute changes. CXR 7/4: bilat infiltrates  -- BC - neg to the date  -- UA with WBC, yeast. UC is neg at this admission but Pos for MRSA on June 14  -- C Diff - neg     Nutrition and long term prognosis  -- we will resume tube feeding as per son  "wishes. I will start at a lower rate. We will continue the D5 lower rate as well.  -- her Na improved when she was receiving 50 ml/h free water water along with tube feeding 45 ml /h. We stopped and reintroduce a at a slower rate.     Chr indwelling CFoley cath, freq UTIs  -- family requests Urology consult and change Fonseca q 2 weeks.   -- we will ask Urology consult for July 5.  -- patient had 1200 ml residuum on admission which were expelled when Fonseca was changed in ER ( see H&P)       Hypernatremia  -- <--151<--155<--151 < -- 148  -- monitor           ARF on CKD  -- Cr 1.04<--1.91<--2.33 <-- 2.84   -- monitor   -- cont iv fluids     Elevated BP  -- started on Amlodipine 2.5 mg daily  - metoprolol prn HTN, tachyc        Advanced dementia with nonverbal state, nonabulatory with fixed chronic muscle contraction          DM 2  -- she was on 70/30 bid and Lantus bid at the NH - she will not need both  -- high BS.  -- increase SSI to medium   -- increase Lantus 15 units bid           Chr sacral decubitus ulcer   -- considered chronic, noninfected   -- continue local care      Chr Anemia  -- Hgb in the same range as before.               DVT Prophylaxis: Pneumatic Compression Devices    Goal of care and COde status  -- I had a long discussion with her son, Dr Lewis, about the patient medical condition. He feels pressured and judged by the medical staff but he is trying to respect his mother strong Holiness believes. And all the major changes in her plan of care need to have \" a clearance\" from their rabbi.   -- He understands her medical situation and the prognosis and he appreciates the care his mother is receiving. The patient is Denominational Episcopalian and she was very strong in her Holiness beliefs. Her son wants to respect her wishes. According to their Oriental orthodox he can't change her to DNR. If a time comes and she will need to be intubated the situation will be reassessed. For now she will have full treatment. "   -- we discussed about nutrition and fluids. I noticed that her Na and cr have been high and I think she hasn't been receiving enough fluids. She is at risk for aspiration and increasing the fluids through the tube feeding with increase the risk. He understand this and he wishes his mother to have nutrition and fluids. He also asked for long term iv fluids through picc line. As far as I know the insurance would not cover it and chronic picc line increases her risk for infections.     Code Status: Full Code      Disposition: Inpatient      ROS: not obtainable             Physical Exam:   Blood pressure 124/70, temperature 96.8  F (36  C), temperature source Oral, resp. rate 28, height 1.524 m (5'), weight 68 kg (149 lb 14.6 oz), SpO2 98 %.     NAD, appears comfortable   Skin: no rashes   Chest: clear to auscultation bilaterally, tachypneic  Heart: S1 S2, RRR, no mgr appreciated  Abdomen: soft, not tender,   Extremities: n0 edema.   Neurologic: A, nonverbal,     Vital Sign Ranges  Temperature Temp  Av.8  F (36  C)  Min: 95.7  F (35.4  C)  Max: 98.1  F (36.7  C)   Blood pressure Systolic (24hrs), Av , Min:118 , Max:173        Diastolic (24hrs), Av, Min:10, Max:86      Pulse No Data Recorded   Respirations Resp  Av.2  Min: 19  Max: 40   Pulse oximetry SpO2  Av.6 %  Min: 94 %  Max: 98 %              Medications:         insulin glargine  10 Units Subcutaneous BID     amLODIPine  2.5 mg Oral Daily     vitamin A-D & C drops  7 mL Per G Tube Daily     insulin aspart  1-4 Units Subcutaneous Q4H     acetaminophen  650 mg Rectal Once     acetaminophen  650 mg Per Feeding Tube TID     lactobacillus rhamnosus (GG)  1 capsule Per Feeding Tube Daily     multivitamins with minerals  15 mL Per Feeding Tube Daily     oxyCODONE  2 mg Per G Tube BID     ranitidine  150 mg Per Feeding Tube Daily     sodium chloride (PF)  3 mL Intracatheter Q8H     levofloxacin  500 mg Intravenous Q48H     vancomycin place ramos  - receiving intermittent dosing  1 each Does not apply See Admin Instructions        Prescriptions Prior to Admission   Medication Sig Dispense Refill Last Dose     insulin NPH-insulin regular (HUMULIN MIX 70/30/NOVOLIN MIX 70/30) injection Inject 16 Units Subcutaneous every morning   6/30/2017 at am     insulin NPH-insulin regular (HUMULIN MIX 70/30/NOVOLIN MIX 70/30) injection Inject 10 Units Subcutaneous every 24 hours @@ 1400        INSULIN ASPART SC Inject 1-6 Units Subcutaneous every 6 hours 0800, 1400, 2000, 0200  -250 1 unit  -300 2 units  -350 3 units  -400 4 units  -450 5 units  BG >450 6 units and update MD        Dakins 0.125 % SOLN Externally apply topically 2 times daily BID and PRN.   1. Cleanse wound with microklenze, cleanse periwound area with naomi perineal  2. Moisten kerlix fluff with dakins solution 0.125%, wring out excess, pack wound with kerlix  3. Apply antifungal powder to periwound area, rub in. Apply criticaid over powder.  4. Cover with ABD using minimal medipore tape to secure.  5. Label dressing with date, time, initials. Follow Rigorous PIP measures.        oxyCODONE (ROXICODONE) 5 MG/5ML solution 2 mLs (2 mg) by Per G Tube route 2 times daily 15 mL 0 6/30/2017 at 10 am     oxyCODONE (ROXICODONE) 5 MG/5ML solution 2 mLs (2 mg) by Per G Tube route every 4 hours as needed for moderate to severe pain  0 6/29/2017 at Unknown time     insulin glargine (LANTUS) 100 UNIT/ML injection Inject 36 Units Subcutaneous every morning   6/30/2017 at am     insulin glargine (LANTUS) 100 UNIT/ML injection Inject 34 Units Subcutaneous At Bedtime   6/29/2017 at pm     Lactobacillus Acidophilus POWD 1 capsule by Gastric Tube route daily 10 billion units    6/30/2017 at am     multivitamin, therapeutic (THERA-VIT) TABS tablet 1 tablet by Gastric Tube route daily   6/30/2017 at am     Loperamide HCl (IMODIUM A-D) 1 MG/7.5ML LIQD 4 mg by Gastric Tube route every other day    6/30/2017 at 0800     acetaminophen (TYLENOL) 32 mg/mL solution 325 mg by Gastric Tube route daily as needed for fever or mild pain   prn     ACETAMINOPHEN  mg by Gastric Tube route 3 times daily    6/30/2017 at 1400     bisacodyl (DULCOLAX) 10 MG suppository Place 10 mg rectally daily as needed for constipation   prn     insulin aspart (NOVOLOG PEN) 100 UNIT/ML soln Inject 1-6 Units Subcutaneous every 4 hours   6/14/2017 at Unknown time       Current Facility-Administered Medications   Medication     metoprolol (LOPRESSOR) injection 2.5 mg     dextrose 5% infusion     albuterol neb solution 2.5 mg     insulin glargine (LANTUS) injection 10 Units     amLODIPine (NORVASC) tablet 2.5 mg     miconazole (MICATIN; MICRO GUARD) 2 % powder     dextrose 10 % 1,000 mL infusion     vitamin A-D & C drops (TRI-VI-SOL) drops SOLN 7 mL     glucose 40 % gel 15-30 g    Or     dextrose 50 % injection 25-50 mL    Or     glucagon injection 1 mg     insulin aspart (NovoLOG) inj (RAPID ACTING)     acetaminophen (TYLENOL) Suppository 650 mg     acetaminophen (TYLENOL) solution 325 mg     acetaminophen (TYLENOL) solution 650 mg     bisacodyl (DULCOLAX) Suppository 10 mg     lactobacillus rhamnosus (GG) (CULTURELL) capsule 1 capsule     multivitamins with minerals (CERTAVITE/CEROVITE) liquid 15 mL     oxyCODONE (ROXICODONE) solution 2 mg     oxyCODONE (ROXICODONE) solution 2 mg     ranitidine (Zantac) syrup 150 mg     naloxone (NARCAN) injection 0.1-0.4 mg     lidocaine 1 % 1 mL     lidocaine (LMX4) cream     sodium chloride (PF) 0.9% PF flush 3 mL     sodium chloride (PF) 0.9% PF flush 3 mL     nitroGLYcerin (NITROSTAT) sublingual tablet 0.4 mg     levofloxacin (LEVAQUIN) infusion 500 mg     vancomycin place ramos - receiving intermittent dosing     Facility-Administered Medications Ordered in Other Encounters   Medication     Lidocaine 1% injection 0.5-5 mL     lidocaine 4 % (LMX4) cream     sodium chloride (PF) 0.9% PF flush 5-50  mL        Intake/Output Summary (Last 24 hours) at 07/04/17 0754  Last data filed at 07/04/17 0717   Gross per 24 hour   Intake             2966 ml   Output             1900 ml   Net             1066 ml             Vitals:    06/30/17 1618 07/01/17 0500 07/02/17 0500 07/03/17 0600   Weight: 73.7 kg (162 lb 8 oz) 68.4 kg (150 lb 12.7 oz) 68.4 kg (150 lb 12.7 oz) 68 kg (149 lb 14.6 oz)               Data:   Recent labs, imaging, and other studies were reviewed.      Recent Labs  Lab 07/03/17  1005 07/03/17  0556 07/02/17  0532   WBC 8.6 Canceled, Test credited Unsatisfactory specimen - clottedLAB TO REORDER AND DRAW. 8.1   HGB 9.8* Canceled, Test credited Unsatisfactory specimen - clottedLAB TO REORDER AND DRAW. 9.4*   HCT 31.9* Canceled, Test credited Unsatisfactory specimen - clottedLAB TO REORDER AND DRAW. 31.5*   MCV 99 Canceled, Test credited Unsatisfactory specimen - clottedLAB TO REORDER AND DRAW. 100    Canceled, Test credited Unsatisfactory specimen - clottedLAB TO REORDER AND DRAW. 181       Recent Labs  Lab 07/04/17  0705 07/03/17  0556 07/02/17  2050 07/02/17  0532 07/01/17  1650  06/30/17  1620   * 150* 151* 155*  --   < > 148*   POTASSIUM 3.6 3.7  --  3.8  --   < > 4.6   CHLORIDE 114* 118*  --  121*  --   < > 111*   CO2 20 21  --  25  --   < > 26   ANIONGAP 11 11  --  9  --   < > 11   * 336*  --  181*  --   < > 240*   BUN 36* 49*  --  65*  --   < > 104*   CR 1.04 1.36*  --  1.91*  --   < > 2.84*   GFRESTIMATED 50* 36*  --  25*  --   < > 16*   GFRESTBLACK 60* 44*  --  30*  --   < > 19*   NELLIE 8.1* 7.9*  --  7.6*  --   < > 8.8   MAG  --  2.1  --   --  2.6*  --   --    PHOS  --  2.5  --   --  3.6  --   --    PROTTOTAL  --   --   --   --   --   --  8.1   ALBUMIN  --   --   --   --   --   --  2.9*   BILITOTAL  --   --   --   --   --   --  0.7   ALKPHOS  --   --   --   --   --   --  81   AST  --   --   --   --   --   --  24   ALT  --   --   --   --   --   --  26   < > = values in this  interval not displayed.    Recent Labs  Lab 07/04/17  0705 07/04/17  0559 07/04/17  0229 07/03/17  2157 07/03/17  1842 07/03/17  1419  07/03/17  0556  07/02/17  0532  07/01/17  0615 07/01/17  0215   *  --   --   --   --   --   --  336*  --  181*  --  115* 141*   BGM  --  380* 325* 345* 337* 354*  < >  --   < >  --   < >  --   --    < > = values in this interval not displayed.    Recent Labs  Lab 07/04/17  0245 07/01/17  0215 06/30/17  1620   LACT 2.9* 2.8* 3.8*

## 2017-07-04 NOTE — PROGRESS NOTES
MD Notification    Notified Person:  MD    Notified Persons Name: Dr. Sandy    Notification Date/Time: 7/4/2017 0210    Notification Interaction:  Talked with Physician    Purpose of Notification: -160, tachypnic, expiratory wheezes    Orders Received: Hold TF until morning, Give neb treatment, orders for PRN metoprolol, 2 view chest x-ray    Comments:

## 2017-07-04 NOTE — PROGRESS NOTES
[facility]  Infectious Disease Progress Note          Assessment and Plan:   IMPRESSION:   1.  A 89-year-old female, nursing home resident, prior known aspiration, chronic Fonseca catheter and Alzheimer's dementia readmitted with low-grade sepsis, etiology still not entirely clear, prior treated MRSA UTI , now UC neg, no positive blood cultures.  Still doubt wound is  the issue , not clearcut pneumonia but obvious aspiration risk.   2.  Chronic sacral decubitus wound, somewhat necrotic, but no signs of infection and it does not tract deep had prior I&D done, culture of the wound growing multiple organisms of no significance by itself.   3.  MRSA positive urine culture prior admit, treated, UA abnormal still with with catheter in place, but current UC no sig +.   4.  Minimal infiltrate, mild  hypoxia, mostly resolved, possible aspiration pneumonia.  If so has improved.   5.  Severe dementia.   6.  Recurrent urinary tract infections and urinary colonization with Fonseca catheter in place.   7.  Feeding tube in place, prior probable aspiration, possible etiology to current illness as well.   8.  Diabetes mellitus.   9.  Chronic renal insufficiency, acute worsening now improved.   10.  Sulfa allergy.       RECOMMENDATIONS:   1.  No indication to treat sacral decubitus, such a wound culture of no clinical significance unless deep osteo or cellulitis and neither evident. .  Doubt this is the cause of current fever and has already been debrided, if it is cause of fever no long term antibiotic strategy of likely benefit, would require major surgical intervention also.   2.  Urine does not appear to be current issue   3.  Not clearcut pneumonia.  Has been getting gram-negative coverage currently is on  levaquin and MRSA coverage vanco.  Does not appear to have major clinical pneumonia.  If it is aspiration antibiotics likely only of short term benefit been given,apparently another episode aspiration,  although not hypoxic now, add back zosyn  4.  Obviously long-term prognosis poor, especially if recurrently aspirating, ? Further family LOC discussion.         Interval History:   no complaints not interactive, no fever procal 0.2, no new cxs +, Uc mixed low ct maria esther, initial CXR no clear infiltrate but now bilat infiltrate, creat now 1.04 improved              Medications:       insulin glargine  15 Units Subcutaneous BID     insulin aspart  1-6 Units Subcutaneous Q4H     amLODIPine  2.5 mg Oral Daily     vitamin A-D & C drops  7 mL Per G Tube Daily     acetaminophen  650 mg Rectal Once     acetaminophen  650 mg Per Feeding Tube TID     lactobacillus rhamnosus (GG)  1 capsule Per Feeding Tube Daily     multivitamins with minerals  15 mL Per Feeding Tube Daily     oxyCODONE  2 mg Per G Tube BID     ranitidine  150 mg Per Feeding Tube Daily     sodium chloride (PF)  3 mL Intracatheter Q8H     levofloxacin  500 mg Intravenous Q48H     vancomycin place ramos - receiving intermittent dosing  1 each Does not apply See Admin Instructions                  Physical Exam:   Blood pressure 124/70, temperature 96.8  F (36  C), temperature source Oral, resp. rate 28, height 1.524 m (5'), weight 68 kg (149 lb 14.6 oz), SpO2 98 %.  Wt Readings from Last 2 Encounters:   07/03/17 68 kg (149 lb 14.6 oz)   06/19/17 69.3 kg (152 lb 12.5 oz)     Vital Signs with Ranges  Temp:  [95.7  F (35.4  C)-98.1  F (36.7  C)] 96.8  F (36  C)  Heart Rate:  [] 127  Resp:  [19-40] 28  BP: (118-173)/(10-86) 124/70  SpO2:  [94 %-98 %] 98 %    Constitutional: Awake, alert, not interactive as usual   Lungs: Congestion to auscultation bilaterally, no crackles or wheezing no O2   Cardiovascular: Regular rate and rhythm, normal S1 and S2, and no murmur noted   Abdomen: Normal bowel sounds, soft, non-distended, non-tender   Skin: No rashes, no cyanosis, no edema wd not seen today   Other:           Data:   All microbiology laboratory data  reviewed.  Recent Labs   Lab Test  07/03/17   1005  07/03/17   0556  07/02/17   0532   WBC  8.6  Canceled, Test credited   Unsatisfactory specimen - clotted  LAB TO REORDER AND DRAW.    8.1   HGB  9.8*  Canceled, Test credited   Unsatisfactory specimen - clotted  LAB TO REORDER AND DRAW.    9.4*   HCT  31.9*  Canceled, Test credited   Unsatisfactory specimen - clotted  LAB TO REORDER AND DRAW.    31.5*   MCV  99  Canceled, Test credited   Unsatisfactory specimen - clotted  LAB TO REORDER AND DRAW.    100   PLT  189  Canceled, Test credited   Unsatisfactory specimen - clotted  LAB TO REORDER AND DRAW.    181     Recent Labs   Lab Test  07/04/17   0705  07/03/17   0556  07/02/17   0532   CR  1.04  1.36*  1.91*     Recent Labs   Lab Test  05/08/13   1300   SED  61*     Recent Labs   Lab Test  06/30/17   1654  06/30/17   1640  06/14/17   1319  06/14/17   1230  06/14/17   1200  06/21/16   0653  06/21/16   0652  06/21/16   0636  06/24/14   1200   CULT  No growth after 4 days  <1000 colonies/mL urogenital maria esther Susceptibility testing not routinely done  No growth after 4 days  No growth  Moderate growth Escherichia coli  Moderate growth Enterococcus avium  Moderate growth Enterococcus faecalis  Light growth Candida albicans / dubliniensis Candida albicans and Candida   dubliniensis are not routinely speciated Susceptibility testing not routinely   done  Plus Heavy growth Normal skin maria esther  *  >100,000 colonies/mL Methicillin resistant Staphylococcus aureus (MRSA)  50,000 to 100,000 colonies/mL Strain 2 Methicillin resistant Staphylococcus   aureus (MRSA)  Critical Value/Significant Value called to and read back by Elmer Hurd Rn at   2044 [id number]  *  No growth  No growth  >100,000 colonies/mL Enterococcus species*  No growth  10,000 to 50,000 colonies/mL Candida kefyr Susceptibility testing not routinely   done  10,000 to 50,000 colonies/mL Lactobacillus species No further identification  Susceptibility testing not  routinely done  *

## 2017-07-04 NOTE — PLAN OF CARE
Problem: Goal Outcome Summary  Goal: Goal Outcome Summary  Outcome: No Change  Disoriented x4. Opens eyes spontaneously. Nonverbal. Ethics consults on 7/5 at 1000. NPO, TF held until morning per MD orders d/t episode of sustained tachycardia 120-160's and respirations in 40's, new expiratory wheezes. Lactic drawn, 2.9, MD notified. PRN metoprolol given, neb tx done.  , 380, sliding scale insulin given. Up with lift for transfers, total cares.  RPO q2h. IV TKO. Fixed muscle contraction. Will continue to monitor.

## 2017-07-04 NOTE — PLAN OF CARE
Problem: Goal Outcome Summary  Goal: Goal Outcome Summary  Outcome: Improving  Lethargic.  Non-verbal.  Fixed muscle contraction.  VSS.  Afebrile.  LS-Diminished.  Assist of 2 & lift with repositioning.  Incontinent of loose stools x1.  Fonseca catheter intact.  Dressing to sacral wound changed.  NPO.  TF restarted.  Running at 15 ml/hr.  Q 4hr 30ml water flushes.   On D5% @ 75 ml/hr.  Tele-NSR.  BG-318 & 309.  Total care.  Visited by tate.

## 2017-07-05 LAB
ANION GAP SERPL CALCULATED.3IONS-SCNC: 12 MMOL/L (ref 3–14)
BUN SERPL-MCNC: 31 MG/DL (ref 7–30)
CALCIUM SERPL-MCNC: 7.9 MG/DL (ref 8.5–10.1)
CHLORIDE SERPL-SCNC: 106 MMOL/L (ref 94–109)
CO2 SERPL-SCNC: 22 MMOL/L (ref 20–32)
CREAT SERPL-MCNC: 1.05 MG/DL (ref 0.52–1.04)
ERYTHROCYTE [DISTWIDTH] IN BLOOD BY AUTOMATED COUNT: 15.7 % (ref 10–15)
GFR SERPL CREATININE-BSD FRML MDRD: 49 ML/MIN/1.7M2
GLUCOSE BLDC GLUCOMTR-MCNC: 287 MG/DL (ref 70–99)
GLUCOSE BLDC GLUCOMTR-MCNC: 315 MG/DL (ref 70–99)
GLUCOSE BLDC GLUCOMTR-MCNC: 334 MG/DL (ref 70–99)
GLUCOSE BLDC GLUCOMTR-MCNC: 335 MG/DL (ref 70–99)
GLUCOSE BLDC GLUCOMTR-MCNC: 337 MG/DL (ref 70–99)
GLUCOSE BLDC GLUCOMTR-MCNC: 394 MG/DL (ref 70–99)
GLUCOSE SERPL-MCNC: 305 MG/DL (ref 70–99)
HCT VFR BLD AUTO: 33 % (ref 35–47)
HGB BLD-MCNC: 10.7 G/DL (ref 11.7–15.7)
MCH RBC QN AUTO: 30.3 PG (ref 26.5–33)
MCHC RBC AUTO-ENTMCNC: 32.4 G/DL (ref 31.5–36.5)
MCV RBC AUTO: 94 FL (ref 78–100)
PLATELET # BLD AUTO: 240 10E9/L (ref 150–450)
POTASSIUM SERPL-SCNC: 3.3 MMOL/L (ref 3.4–5.3)
RBC # BLD AUTO: 3.53 10E12/L (ref 3.8–5.2)
SODIUM SERPL-SCNC: 140 MMOL/L (ref 133–144)
VANCOMYCIN SERPL-MCNC: 17.9 MG/L
WBC # BLD AUTO: 8.7 10E9/L (ref 4–11)

## 2017-07-05 PROCEDURE — 25000128 H RX IP 250 OP 636: Performed by: INTERNAL MEDICINE

## 2017-07-05 PROCEDURE — 25000125 ZZHC RX 250: Performed by: INTERNAL MEDICINE

## 2017-07-05 PROCEDURE — 25000131 ZZH RX MED GY IP 250 OP 636 PS 637: Performed by: INTERNAL MEDICINE

## 2017-07-05 PROCEDURE — 36415 COLL VENOUS BLD VENIPUNCTURE: CPT | Performed by: INTERNAL MEDICINE

## 2017-07-05 PROCEDURE — 25000128 H RX IP 250 OP 636

## 2017-07-05 PROCEDURE — 12000000 ZZH R&B MED SURG/OB

## 2017-07-05 PROCEDURE — 25000132 ZZH RX MED GY IP 250 OP 250 PS 637: Performed by: INTERNAL MEDICINE

## 2017-07-05 PROCEDURE — 99232 SBSQ HOSP IP/OBS MODERATE 35: CPT | Performed by: INTERNAL MEDICINE

## 2017-07-05 PROCEDURE — 80202 ASSAY OF VANCOMYCIN: CPT | Performed by: INTERNAL MEDICINE

## 2017-07-05 PROCEDURE — 85027 COMPLETE CBC AUTOMATED: CPT | Performed by: INTERNAL MEDICINE

## 2017-07-05 PROCEDURE — 00000146 ZZHCL STATISTIC GLUCOSE BY METER IP

## 2017-07-05 PROCEDURE — 80048 BASIC METABOLIC PNL TOTAL CA: CPT | Performed by: INTERNAL MEDICINE

## 2017-07-05 RX ORDER — POTASSIUM CHLORIDE 7.45 MG/ML
10 INJECTION INTRAVENOUS
Status: DISCONTINUED | OUTPATIENT
Start: 2017-07-05 | End: 2017-07-08 | Stop reason: HOSPADM

## 2017-07-05 RX ORDER — POTASSIUM CHLORIDE 1500 MG/1
20-40 TABLET, EXTENDED RELEASE ORAL
Status: DISCONTINUED | OUTPATIENT
Start: 2017-07-05 | End: 2017-07-08 | Stop reason: HOSPADM

## 2017-07-05 RX ORDER — CEFAZOLIN SODIUM 1 G/50ML
1250 SOLUTION INTRAVENOUS
Status: DISCONTINUED | OUTPATIENT
Start: 2017-07-05 | End: 2017-07-07

## 2017-07-05 RX ORDER — POTASSIUM CHLORIDE 1.5 G/1.58G
20-40 POWDER, FOR SOLUTION ORAL
Status: DISCONTINUED | OUTPATIENT
Start: 2017-07-05 | End: 2017-07-08 | Stop reason: HOSPADM

## 2017-07-05 RX ORDER — MAGNESIUM SULFATE HEPTAHYDRATE 40 MG/ML
4 INJECTION, SOLUTION INTRAVENOUS EVERY 4 HOURS PRN
Status: DISCONTINUED | OUTPATIENT
Start: 2017-07-05 | End: 2017-07-08 | Stop reason: HOSPADM

## 2017-07-05 RX ORDER — POTASSIUM CL/LIDO/0.9 % NACL 10MEQ/0.1L
10 INTRAVENOUS SOLUTION, PIGGYBACK (ML) INTRAVENOUS
Status: DISCONTINUED | OUTPATIENT
Start: 2017-07-05 | End: 2017-07-08 | Stop reason: HOSPADM

## 2017-07-05 RX ORDER — AMINO ACIDS/PROTEIN HYDROLYS 11G-40/45
1 LIQUID IN PACKET (ML) ORAL 3 TIMES DAILY
Status: DISCONTINUED | OUTPATIENT
Start: 2017-07-05 | End: 2017-07-05

## 2017-07-05 RX ORDER — POTASSIUM CHLORIDE 29.8 MG/ML
20 INJECTION INTRAVENOUS
Status: DISCONTINUED | OUTPATIENT
Start: 2017-07-05 | End: 2017-07-08 | Stop reason: HOSPADM

## 2017-07-05 RX ADMIN — METOPROLOL TARTRATE 2.5 MG: 5 INJECTION INTRAVENOUS at 21:18

## 2017-07-05 RX ADMIN — RANITIDINE HYDROCHLORIDE 150 MG: 150 SOLUTION ORAL at 09:13

## 2017-07-05 RX ADMIN — POTASSIUM CHLORIDE 10 MEQ: 14.9 INJECTION, SOLUTION, CONCENTRATE PARENTERAL at 15:02

## 2017-07-05 RX ADMIN — ACETAMINOPHEN 650 MG: 160 SUSPENSION ORAL at 16:38

## 2017-07-05 RX ADMIN — VANCOMYCIN HYDROCHLORIDE 1250 MG: 5 INJECTION, POWDER, LYOPHILIZED, FOR SOLUTION INTRAVENOUS at 12:47

## 2017-07-05 RX ADMIN — METOPROLOL TARTRATE 2.5 MG: 5 INJECTION INTRAVENOUS at 09:15

## 2017-07-05 RX ADMIN — METOPROLOL TARTRATE 2.5 MG: 5 INJECTION INTRAVENOUS at 02:26

## 2017-07-05 RX ADMIN — Medication 1 CAPSULE: at 09:14

## 2017-07-05 RX ADMIN — POTASSIUM CHLORIDE 10 MEQ: 14.9 INJECTION, SOLUTION, CONCENTRATE PARENTERAL at 19:11

## 2017-07-05 RX ADMIN — POTASSIUM CHLORIDE 10 MEQ: 14.9 INJECTION, SOLUTION, CONCENTRATE PARENTERAL at 21:35

## 2017-07-05 RX ADMIN — ACETAMINOPHEN 650 MG: 160 SUSPENSION ORAL at 09:12

## 2017-07-05 RX ADMIN — PIPERACILLIN SODIUM,TAZOBACTAM SODIUM 2.25 G: 2; .25 INJECTION, POWDER, FOR SOLUTION INTRAVENOUS at 04:24

## 2017-07-05 RX ADMIN — INSULIN GLARGINE 15 UNITS: 100 INJECTION, SOLUTION SUBCUTANEOUS at 09:15

## 2017-07-05 RX ADMIN — PIPERACILLIN SODIUM,TAZOBACTAM SODIUM 2.25 G: 2; .25 INJECTION, POWDER, FOR SOLUTION INTRAVENOUS at 16:38

## 2017-07-05 RX ADMIN — DEXTROSE MONOHYDRATE: 50 INJECTION, SOLUTION INTRAVENOUS at 07:33

## 2017-07-05 RX ADMIN — AMLODIPINE BESYLATE 2.5 MG: 2.5 TABLET ORAL at 09:14

## 2017-07-05 RX ADMIN — Medication 7 ML: at 09:14

## 2017-07-05 RX ADMIN — ACETAMINOPHEN 650 MG: 160 SUSPENSION ORAL at 21:18

## 2017-07-05 RX ADMIN — METOPROLOL TARTRATE 2.5 MG: 5 INJECTION INTRAVENOUS at 16:37

## 2017-07-05 RX ADMIN — MULTIVITAMIN 15 ML: LIQUID ORAL at 09:15

## 2017-07-05 RX ADMIN — POTASSIUM CHLORIDE 10 MEQ: 14.9 INJECTION, SOLUTION, CONCENTRATE PARENTERAL at 10:57

## 2017-07-05 RX ADMIN — PIPERACILLIN SODIUM,TAZOBACTAM SODIUM 2.25 G: 2; .25 INJECTION, POWDER, FOR SOLUTION INTRAVENOUS at 10:54

## 2017-07-05 RX ADMIN — INSULIN GLARGINE 20 UNITS: 100 INJECTION, SOLUTION SUBCUTANEOUS at 21:18

## 2017-07-05 RX ADMIN — PIPERACILLIN SODIUM,TAZOBACTAM SODIUM 2.25 G: 2; .25 INJECTION, POWDER, FOR SOLUTION INTRAVENOUS at 22:48

## 2017-07-05 NOTE — ETHICS CONSULT
"ETHICS CONSULTATION PROGRESS NOTE  [facility]    Pt: Celia Lewis   MRN:  [id number]    Reason for Ethics consult:  \"end of life care\"    Ethics Committee members participating in consultation:  Brianne Elliott MD, Mark Anthony Gomez, Che Wells, Dana Nissen, Eli Trent, Yenny Dunn, Sangeeta Cesar, Bernadette Mcdowell, Mouna Monaco, Lavinia Guido, Alice Fitzgerald, Gauri Stack, and Shavonne Shaw.    Participants reviewed and discussed documentation in the EMR including the patient's POLST (dated 7/8/15).    We think the POLST makes it clear that this patient would want aggressive interventions \"everything possible\".    The patient has a strong Gnosticism Sabianism nella and her son feels it is important to respect her wishes and beliefs.  We understand the patient's son will need to \"get clearance\" from the patient's rabbi regarding any major changes in the patient's plan of care.        We understand that it is the goal of the patient's son to continue with current level of care.  We would encourage the involvement of the patient's rabbi for support and guidance.      We infer that there may be some non-alliance between the patient's son and medical providers which may result in moral distress for the healthcare team.  However, in light of the patient's stated wishes in her POLST, we conclude that the continuation of current cares could be interpreted simply as helping this patient to return back to [facility] whether she remains critically ill or not.    SABA Burris, [facility]  Ethics Committee Participant  [phone]  "

## 2017-07-05 NOTE — PLAN OF CARE
Problem: Goal Outcome Summary  Goal: Goal Outcome Summary  Outcome: No Change  Disoriented x4.  Nonverbal. Ethics consults on 7/5 at 1000. NPO. TF running at goal rate of 45. Up with lift for transfers, total cares.  RPO q2h. Large watery BM x 1, wound dressing soiled and changed per plan of care at 0600. Fixed muscle contraction. Will continue to monitor.

## 2017-07-05 NOTE — PROGRESS NOTES
"Left VM for the patients son  Joshua phone: [phone] per notes yesterday with Dr. Berg, Shu patients son discussed that he would be involving the Rabbi for any changes in her plan of care for \"clearance\". Asked for Dr. Lewis to call back if he is aware of when the Rabbi might be coming in so that we can have the assigned MD and possibly ID discuss further and answer questions.  Will await a call back from Dr. Lewis.     Addendum: Received a call back from Dr. Lewis today discussing the above situation.  Per Dr. Lewis he is not getting the Rabbi involved at this point and the Rabbi would support a full code.  He says his Rabbi will be involved in the \"future\" for any changes to be discussed. Tried to explain that PICC/IVF is a change however Dr. Lewis says that the Hospitalist from yesterday informed him already that long term IVF is not covered.  Told him that we would further clarify for him.  Will update SW and Ethics SW.  "

## 2017-07-05 NOTE — PROGRESS NOTES
MARLON  I: SW attended ethics consult to discuss patient's care plan. Patient may d/c with a picc line for IV fluids and a question arose as to whether [facility] can accept patient with IV fluids. Sonam, RN manager at [facility], states they can accept patient back with picc line for IV fluids. SW will need to update admissions when patient is ready for d/c.     P: SW will continue to follow and assist as needed.    Gauri Stack, SEAN   *[phone]

## 2017-07-05 NOTE — PROGRESS NOTES
CLINICAL NUTRITION SERVICES - REASSESSMENT NOTE      RECOMMENDATIONS FOR MD/PROVIDER TO ORDER:   Consider high SSI for improved BG control    Recommendations Ordered by Registered Dietitian (RD):   Continue current enteral regimen   Future/Additional Recommendations:   D/cD5 as able pending aspiration risk and ability to increase TF rate.        EVALUATION OF PROGRESS TOWARD GOALS   Diet:  NPO    Nutrition Support Enteral:  Type of Feeding Tube: G-tube  Enteral Frequency:  Continuous  Enteral Regimen: 45mL/h  Total Enteral Provisions: 1620 kcals (32 kcal/kg), 73g PRO (1.4 g/kg), 190g CHO, 16g fiber and 821 free water.  Also receiving D5 IV @ 50mL/h, providing additional 204 kcals for total energy provision of 1824 kcals (36 kcal/kg)  Free Water Flush: 30mL q4h    Intake:    Pt originally at goal rate of 55mL/h but then held last night d/t suspected aspiration. Family wishes to continue with nutrition support; MD reinitiated TF at lower rate (45mL/h) and added D5. Provisions still adequate to meet needs.     Tolerance:   BM 1-3x/day; fecal incontinence  I/O 1671/2250  -394 (medium SSI + Lantus BID)  +1 edema in L+R foot and ankle  Na improved (140) K low (3.3)    Dosing Weight::  68.4kg    ASSESSED NUTRITION NEEDS:  ASSESSED NUTRITION NEEDS :  Energy Needs: 4466-8534 kcals (30-35) - Pressure injury   Protein Needs: 61-77 grams protein (1.2-1.5 - wound healing, monitor renal labs    NEW FINDINGS:   Ethics conferance today: aggressive interventions to continue pending alternative POC from pt's [facility]. Goal is to return pt to [facility] whether critically ill or not.    WOC 7/3: Unstageable PI on coccyx/sacrum. Stage II on ears. Will continue PI nutritional provisions per protocol.     Previous Goals:   1. Initiate enteral nutrition within 24-48hrs  Evaluation: Met  2. Once enteral nutrition initiated, TF to meet 90%-110% of assessed needs  Evaluation: Met     Previous Nutrition Diagnosis:   Increased nutrient  needs energy-protein related to PI and overall medical status as evidenced by per MD terra PI (stage 3-4) on admission.  Evaluation: No change (ongoing)      CURRENT NUTRITION DIAGNOSIS  Predicted suboptimal nutrient intake related to aspiration risk as evidenced by TF previously held and now re-initiated however at reduced rate    INTERVENTIONS  Recommendations / Nutrition Prescription  Continue FT rate as MD discretion. May need additional protein provisions if decreased any further.     Recommend consider high SSI for better BG control      Implementation  Medical Food Supplement    Goals  TF + D5 will meet % of needs.       MONITORING AND EVALUATION:  Progress towards goals will be monitored and evaluated per protocol and Practice Guidelines    Miguelina Malik RD, LD  Clinical Dietitian

## 2017-07-05 NOTE — PLAN OF CARE
Problem: Goal Outcome Summary  Goal: Goal Outcome Summary  Outcome: No Change  Pt nonverbal, lethargic. VSS. Up 2 assist w/ lift, repo q2hr. z-flow used to position head. No signs or symptoms of pain. NPO. Continuous tube feeding at 45 mL/hr, free water 60 mL for shift. Fonseca patent. BM x1. Coccyx dressing changed at 0600, c/d/i.

## 2017-07-05 NOTE — PHARMACY-VANCOMYCIN DOSING SERVICE
Pharmacy Vancomycin Note  Date of Service 2017  Patient's  1926   89 year old, female    Indication: Sepsis  Goal Trough Level: 15-20 mg/L  Day of Therapy: 6  Current Vancomycin regimen:   Intermittent based on level    Current estimated CrCl = Estimated Creatinine Clearance: 30.6 mL/min (based on Cr of 1.05).    Creatinine for last 3 days  7/3/2017:  5:56 AM Creatinine 1.36 mg/dL  2017:  7:05 AM Creatinine 1.04 mg/dL  2017:  7:05 AM Creatinine 1.05 mg/dL    Recent Vancomycin Levels (past 3 days)  2017:  8:50 PM Vancomycin Level 21.7 mg/L  7/3/2017:  5:56 AM Vancomycin Level 17.8 mg/L  2017:  7:05 AM Vancomycin Level 22.9 mg/L  2017:  7:05 AM Vancomycin Level 17.9 mg/L    Vancomycin IV Administrations (past 72 hours)                   vancomycin (VANCOCIN) 1500 mg in 0.9% NaCl 250 mL PREMIX (mg) 1,500 mg New Bag 17 0841                Nephrotoxins and other renal medications (Future)    Start     Dose/Rate Route Frequency Ordered Stop    17 0845  piperacillin-tazobactam (ZOSYN) 2.25 g vial to attach to  ml bag     Comments:  Pharmacy can adjust dose based on renal function.    2.25 g  over 30 Minutes Intravenous EVERY 6 HOURS 17 0836      17 195  vancomycin place ramos - receiving intermittent dosing      1 each Does not apply SEE ADMIN INSTRUCTIONS 17               Contrast Orders - past 72 hours     None          Interpretation of levels and current regimen:  Trough level is  Therapeutic    Has serum creatinine changed > 50% in last 72 hours: No    Urine output:  good urine output    Renal Function: Stable    Plan:  1.  restart scheduled dosing at 1250mg q48h  2.  Pharmacy will check trough levels as appropriate in 1-3 Days.    3. Serum creatinine levels will be ordered daily for the first week of therapy and at least twice weekly for subsequent weeks.      Patria Faria        .

## 2017-07-05 NOTE — PROGRESS NOTES
[facility]  Hospitalist Progress Note  Shu Rausch MD  Pager: [phone]     Admission day    6/30/2017  Date of Service (when I saw the patient): 4 July 2017        Interval History:     Pt nonverbal this morning. No overnight events.         Assessment and Plan:     89 y/o NH resident is admitted with ARF and sepsis, not clear sourse of infection, possible sec MRSA UTI ( but neg UC and BC). She developed hypernatremia and the free water through PEG was increased. She was stable, afebrile, stable VS and she was transferred out of CCU on July 3. On early morning July 4 she developed tachycardia and tachypnea and the CXR shows new bilat infiltrates. Most likely aspiration. Tube feeding will be continued as per family brian       Her complex PMHx: advanced dementia, aspirations pneumonia, indwelling urinary cath, UTIs, DM, CKD, feeding tube, chronic sacral wound,           Sepsis possible sec MRSA UTI w neg BC  -- fever, tachypnea, hypoxemia, increased lactic acid ( no fever in the last 24) - on admission. She was getting better until July 4 when she became tachycardic and tachypneic and new CXR infiltrates. Most likely she aspirated.    -- ID consult appreciated. Recommended cont Vanco while inpatient and discharged on Linzolid + Levaquin  -- cont vanco, Zosyn  -- CXR 6/30 - negative for acute changes. CXR 7/4: bilat infiltrates  -- BC - neg to the date  -- UA with WBC, yeast. UC is neg at this admission but Pos for MRSA on June 14  -- C Diff - neg     Nutrition and long term prognosis  --  tube feeding resumed as per son wishes. We will continue the D5 lower rate as well.  -- her Na improved when she was receiving 50 ml/h free water water along with tube feeding 45 ml /h. We stopped and reintroduce a at a slower rate.     Chr indwelling CFoley cath, freq UTIs  -- ok to change Fonseca q 2 weeks  -- family asking for urology consult, but currently no urologic problems noted  -- patient had 1200  "ml residual on admission which were expelled when Fonseca was changed in ER ( see H&P)       Hypernatremia  -- <--145<--151<--155<--151 < -- 148  -- decrease D5 75>50 cc/hr  -- monitor           ARF on CKD  -- Cr 1.04<--1.91<--2.33 <-- 2.84   -- monitor   -- cont iv fluids     Elevated BP  -- started on Amlodipine 2.5 mg daily  -- metoprolol prn HTN, tachyc        Advanced dementia with nonverbal state, nonabulatory with fixed chronic muscle contraction          DM 2. Poorly controlled.  -- she was on 70/30 bid and Lantus bid at the NH - she will not need both  -- high BS.  -- increase SSI to medium   -- increase Lantus 20 units bid           Chr sacral decubitus ulcer   -- considered chronic, noninfected   -- continue local care      Chr Anemia  -- Hgb in the same range as before.         DVT Prophylaxis: Pneumatic Compression Devices    Goal of care and COde status  -- Dr. Berg had a long discussion with pt's son, Dr Lewis, about the patient medical condition. He feels pressured and judged by the medical staff but he is trying to respect his mother strong Congregation believes. And all the major changes in her plan of care need to have \" a clearance\" from their rabbi.   -- He understands her medical situation and the prognosis and he appreciates the care his mother is receiving. The patient is Muslim Evangelical and she was very strong in her Congregation beliefs. Her son wants to respect her wishes. According to their Zoroastrianism he can't change her to DNR. If a time comes and she will need to be intubated the situation will be reassessed. For now she will have full treatment.   -- Medical team discussed about nutrition and fluids. Son wants picc line and long term IV fluids. He understands this increased he risk of infection.    Code Status: Full Code      Disposition: Inpatient      ROS: not obtainable             Physical Exam:   Blood pressure 168/82, pulse 79, temperature 96.1  F (35.6  C), temperature " source Axillary, resp. rate 16, height 1.524 m (5'), weight 68 kg (149 lb 14.6 oz), SpO2 96 %.     NAD, appears comfortable   Skin: no rashes   Chest: clear to auscultation bilaterally, tachypneic  Heart: S1 S2, RRR, no mgr appreciated  Abdomen: soft, not tender,   Extremities: no edema.   Neurologic: A, nonverbal,     Vital Sign Ranges  Temperature Temp  Av.8  F (36  C)  Min: 95.7  F (35.4  C)  Max: 98.1  F (36.7  C)   Blood pressure Systolic (24hrs), Av , Min:118 , Max:173        Diastolic (24hrs), Av, Min:10, Max:86      Pulse No Data Recorded   Respirations Resp  Av.2  Min: 19  Max: 40   Pulse oximetry SpO2  Av.6 %  Min: 94 %  Max: 98 %              Medications:         vancomycin (VANCOCIN) IV  1,250 mg Intravenous Q48H     insulin glargine  15 Units Subcutaneous BID     insulin aspart  1-6 Units Subcutaneous Q4H     piperacillin-tazobactam  2.25 g Intravenous Q6H     metoprolol  2.5 mg Intravenous Q6H     amLODIPine  2.5 mg Oral Daily     vitamin A-D & C drops  7 mL Per G Tube Daily     acetaminophen  650 mg Rectal Once     acetaminophen  650 mg Per Feeding Tube TID     lactobacillus rhamnosus (GG)  1 capsule Per Feeding Tube Daily     multivitamins with minerals  15 mL Per Feeding Tube Daily     oxyCODONE  2 mg Per G Tube BID     ranitidine  150 mg Per Feeding Tube Daily     sodium chloride (PF)  3 mL Intracatheter Q8H     levofloxacin  500 mg Intravenous Q48H     vancomycin place ramos - receiving intermittent dosing  1 each Does not apply See Admin Instructions        Prescriptions Prior to Admission   Medication Sig Dispense Refill Last Dose     insulin NPH-insulin regular (HUMULIN MIX 70/30/NOVOLIN MIX 70/30) injection Inject 16 Units Subcutaneous every morning   2017 at am     insulin NPH-insulin regular (HUMULIN MIX 70/30/NOVOLIN MIX 70/30) injection Inject 10 Units Subcutaneous every 24 hours @@ 1400        INSULIN ASPART SC Inject 1-6 Units Subcutaneous every 6 hours  0800, 1400, 2000, 0200  -250 1 unit  -300 2 units  -350 3 units  -400 4 units  -450 5 units  BG >450 6 units and update MD        Dakins 0.125 % SOLN Externally apply topically 2 times daily BID and PRN.   1. Cleanse wound with microklenze, cleanse periwound area with naomi perineal  2. Moisten kerlix fluff with dakins solution 0.125%, wring out excess, pack wound with kerlix  3. Apply antifungal powder to periwound area, rub in. Apply criticaid over powder.  4. Cover with ABD using minimal medipore tape to secure.  5. Label dressing with date, time, initials. Follow Rigorous PIP measures.        oxyCODONE (ROXICODONE) 5 MG/5ML solution 2 mLs (2 mg) by Per G Tube route 2 times daily 15 mL 0 6/30/2017 at 10 am     oxyCODONE (ROXICODONE) 5 MG/5ML solution 2 mLs (2 mg) by Per G Tube route every 4 hours as needed for moderate to severe pain  0 6/29/2017 at Unknown time     insulin glargine (LANTUS) 100 UNIT/ML injection Inject 36 Units Subcutaneous every morning   6/30/2017 at am     insulin glargine (LANTUS) 100 UNIT/ML injection Inject 34 Units Subcutaneous At Bedtime   6/29/2017 at pm     Lactobacillus Acidophilus POWD 1 capsule by Gastric Tube route daily 10 billion units    6/30/2017 at am     multivitamin, therapeutic (THERA-VIT) TABS tablet 1 tablet by Gastric Tube route daily   6/30/2017 at am     Loperamide HCl (IMODIUM A-D) 1 MG/7.5ML LIQD 4 mg by Gastric Tube route every other day   6/30/2017 at 0800     acetaminophen (TYLENOL) 32 mg/mL solution 325 mg by Gastric Tube route daily as needed for fever or mild pain   prn     ACETAMINOPHEN  mg by Gastric Tube route 3 times daily    6/30/2017 at 1400     bisacodyl (DULCOLAX) 10 MG suppository Place 10 mg rectally daily as needed for constipation   prn     insulin aspart (NOVOLOG PEN) 100 UNIT/ML soln Inject 1-6 Units Subcutaneous every 4 hours   6/14/2017 at Unknown time       Current Facility-Administered Medications    Medication     potassium chloride SA (K-DUR/KLOR-CON M) CR tablet 20-40 mEq     potassium chloride (KLOR-CON) Packet 20-40 mEq     potassium chloride 10 mEq in 100 mL intermittent infusion     potassium chloride 10 mEq in 100 mL intermittent infusion with 10 mg lidocaine     potassium chloride 20 mEq in 50 mL intermittent infusion     magnesium sulfate 4 g in 100 mL sterile water (premade)     vancomycin 1250 mg in 0.9% NaCl 250 mL PREMIX     metoprolol (LOPRESSOR) injection 2.5 mg     dextrose 5% infusion     insulin glargine (LANTUS) injection 15 Units     insulin aspart (NovoLOG) inj (RAPID ACTING)     piperacillin-tazobactam (ZOSYN) 2.25 g vial to attach to  ml bag     metoprolol (LOPRESSOR) injection 2.5 mg     albuterol neb solution 2.5 mg     amLODIPine (NORVASC) tablet 2.5 mg     miconazole (MICATIN; MICRO GUARD) 2 % powder     dextrose 10 % 1,000 mL infusion     vitamin A-D & C drops (TRI-VI-SOL) drops SOLN 7 mL     glucose 40 % gel 15-30 g    Or     dextrose 50 % injection 25-50 mL    Or     glucagon injection 1 mg     acetaminophen (TYLENOL) Suppository 650 mg     acetaminophen (TYLENOL) solution 325 mg     acetaminophen (TYLENOL) solution 650 mg     bisacodyl (DULCOLAX) Suppository 10 mg     lactobacillus rhamnosus (GG) (CULTURELL) capsule 1 capsule     multivitamins with minerals (CERTAVITE/CEROVITE) liquid 15 mL     oxyCODONE (ROXICODONE) solution 2 mg     oxyCODONE (ROXICODONE) solution 2 mg     ranitidine (Zantac) syrup 150 mg     naloxone (NARCAN) injection 0.1-0.4 mg     lidocaine 1 % 1 mL     lidocaine (LMX4) cream     sodium chloride (PF) 0.9% PF flush 3 mL     sodium chloride (PF) 0.9% PF flush 3 mL     nitroGLYcerin (NITROSTAT) sublingual tablet 0.4 mg     levofloxacin (LEVAQUIN) infusion 500 mg     vancomycin place ramos - receiving intermittent dosing     Facility-Administered Medications Ordered in Other Encounters   Medication     Lidocaine 1% injection 0.5-5 mL     lidocaine 4 %  (LMX4) cream     sodium chloride (PF) 0.9% PF flush 5-50 mL        Intake/Output Summary (Last 24 hours) at 07/04/17 0754  Last data filed at 07/04/17 0717   Gross per 24 hour   Intake             2966 ml   Output             1900 ml   Net             1066 ml             Vitals:    06/30/17 1618 07/01/17 0500 07/02/17 0500 07/03/17 0600   Weight: 73.7 kg (162 lb 8 oz) 68.4 kg (150 lb 12.7 oz) 68.4 kg (150 lb 12.7 oz) 68 kg (149 lb 14.6 oz)               Data:   Recent labs, imaging, and other studies were reviewed.      Recent Labs  Lab 07/05/17  0705 07/03/17  1005 07/03/17  0556   WBC 8.7 8.6 Canceled, Test credited Unsatisfactory specimen - clottedLAB TO REORDER AND DRAW.   HGB 10.7* 9.8* Canceled, Test credited Unsatisfactory specimen - clottedLAB TO REORDER AND DRAW.   HCT 33.0* 31.9* Canceled, Test credited Unsatisfactory specimen - clottedLAB TO REORDER AND DRAW.   MCV 94 99 Canceled, Test credited Unsatisfactory specimen - clottedLAB TO REORDER AND DRAW.    189 Canceled, Test credited Unsatisfactory specimen - clottedLAB TO REORDER AND DRAW.       Recent Labs  Lab 07/05/17  0705 07/04/17  0705 07/03/17  0556  07/01/17  1650  06/30/17  1620    145* 150*  < >  --   < > 148*   POTASSIUM 3.3* 3.6 3.7  < >  --   < > 4.6   CHLORIDE 106 114* 118*  < >  --   < > 111*   CO2 22 20 21  < >  --   < > 26   ANIONGAP 12 11 11  < >  --   < > 11   * 327* 336*  < >  --   < > 240*   BUN 31* 36* 49*  < >  --   < > 104*   CR 1.05* 1.04 1.36*  < >  --   < > 2.84*   GFRESTIMATED 49* 50* 36*  < >  --   < > 16*   GFRESTBLACK 60* 60* 44*  < >  --   < > 19*   NELLIE 7.9* 8.1* 7.9*  < >  --   < > 8.8   MAG  --   --  2.1  --  2.6*  --   --    PHOS  --   --  2.5  --  3.6  --   --    PROTTOTAL  --   --   --   --   --   --  8.1   ALBUMIN  --   --   --   --   --   --  2.9*   BILITOTAL  --   --   --   --   --   --  0.7   ALKPHOS  --   --   --   --   --   --  81   AST  --   --   --   --   --   --  24   ALT  --   --   --   --    --   --  26   < > = values in this interval not displayed.    Recent Labs  Lab 07/05/17  1129 07/05/17  0815 07/05/17  0705 07/05/17  0406 07/05/17  0001 07/04/17  2106  07/04/17  0705  07/03/17  0556  07/02/17  0532  07/01/17  0615   GLC  --   --  305*  --   --   --   --  327*  --  336*  --  181*  --  115*   * 335*  --  337* 287* 244*  < >  --   < >  --   < >  --   < >  --    < > = values in this interval not displayed.    Recent Labs  Lab 07/04/17  0245 07/01/17  0215 06/30/17  1620   LACT 2.9* 2.8* 3.8*

## 2017-07-05 NOTE — PLAN OF CARE
Problem: Goal Outcome Summary  Goal: Goal Outcome Summary  Outcome: No change  Pt is nonverbal. VSS & afebrile. Repositioned with A2 using lift. Incontinent of loose stools x1 in evening shift. TF infusing to goal at 45ml/hr, D5% at75 ml/hr, IV Abxs continue. Fonseca cath intact/patent with moderate amount output, and will continue to monitor.

## 2017-07-05 NOTE — PROGRESS NOTES
[facility]  Infectious Disease Progress Note          Assessment and Plan:   IMPRESSION:   1.  A 89-year-old female, nursing home resident, prior known aspiration, chronic Fonseca catheter and Alzheimer's dementia readmitted with low-grade sepsis, etiology still not entirely clear, prior treated MRSA UTI , now UC neg, no positive blood cultures.  Still doubt wound is  the issue , not clearcut pneumonia but obvious aspiration risk.   2.  Chronic sacral decubitus wound, somewhat necrotic, but no signs of infection and it does not tract deep had prior I&D done, culture of the wound growing multiple organisms of no significance by itself.   3.  MRSA positive urine culture prior admit, treated, UA abnormal still with with catheter in place, but current UC no sig +.   4.  Minimal infiltrate, mild  hypoxia, mostly resolved, possible aspiration pneumonia.  If so has improved.   5.  Severe dementia.   6.  Recurrent urinary tract infections and urinary colonization with Fonseca catheter in place.   7.  Feeding tube in place, prior probable aspiration, possible etiology to current illness as well.   8.  Diabetes mellitus.   9.  Chronic renal insufficiency, acute worsening now improved.   10.  Sulfa allergy.       RECOMMENDATIONS:   1.  No indication to treat sacral decubitus with antibiotics, such a wound culture of no clinical significance unless deep osteo or cellulitis and neither evident. .  Doubt this is the cause of current fever and has already been debrided recently, if it is cause of fever due to deep infection, no long term antibiotic strategy of likely benefit, would require major surgical intervention also.   2.  Urine does not appear to be current issue   3.  Not clearcut pneumonia.  Has been getting gram-negative coverage currently is on  levaquin and MRSA coverage vanco.  Does not appear to have major clinical pneumonia.  If it is aspiration antibiotics likely only of short term benefit been  given,apparently another episode aspiration, although not hypoxic now, add back zosyn. Day 6 broad empiric coverage, usual approach 1 week or so  4.  Obviously long-term prognosis poor, especially if recurrently aspirating, ? Further family LOC discussion. Will try to contact Dr Lewis today        Interval History:   no complaints not interactive, no fever , no new cxs +, Uc mixed low ct maria esther, initial CXR no clear infiltrate but now bilat infiltrates, despite this no hypoxia, creat now 1.04 improved              Medications:       insulin glargine  15 Units Subcutaneous BID     insulin aspart  1-6 Units Subcutaneous Q4H     piperacillin-tazobactam  2.25 g Intravenous Q6H     metoprolol  2.5 mg Intravenous Q6H     amLODIPine  2.5 mg Oral Daily     vitamin A-D & C drops  7 mL Per G Tube Daily     acetaminophen  650 mg Rectal Once     acetaminophen  650 mg Per Feeding Tube TID     lactobacillus rhamnosus (GG)  1 capsule Per Feeding Tube Daily     multivitamins with minerals  15 mL Per Feeding Tube Daily     oxyCODONE  2 mg Per G Tube BID     ranitidine  150 mg Per Feeding Tube Daily     sodium chloride (PF)  3 mL Intracatheter Q8H     levofloxacin  500 mg Intravenous Q48H     vancomycin place ramos - receiving intermittent dosing  1 each Does not apply See Admin Instructions                  Physical Exam:   Blood pressure 168/82, pulse 79, temperature 96.1  F (35.6  C), temperature source Axillary, resp. rate 16, height 1.524 m (5'), weight 68 kg (149 lb 14.6 oz), SpO2 96 %.  Wt Readings from Last 2 Encounters:   07/03/17 68 kg (149 lb 14.6 oz)   06/19/17 69.3 kg (152 lb 12.5 oz)     Vital Signs with Ranges  Temp:  [95.5  F (35.3  C)-97.6  F (36.4  C)] 96.1  F (35.6  C)  Pulse:  [79-89] 79  Heart Rate:  [80-92] 92  Resp:  [16-32] 16  BP: (127-168)/(66-82) 168/82  SpO2:  [94 %-96 %] 96 %    Constitutional: Awake, alert, not interactive as usual   Lungs: Congestion to auscultation bilaterally, no crackles or wheezing  no O2   Cardiovascular: Regular rate and rhythm, normal S1 and S2, and no murmur noted   Abdomen: Normal bowel sounds, soft, non-distended, non-tender   Skin: No rashes, no cyanosis, no edema wd not seen today   Other:           Data:   All microbiology laboratory data reviewed.  Recent Labs   Lab Test  07/05/17   0705  07/03/17   1005  07/03/17   0556   WBC  8.7  8.6  Canceled, Test credited   Unsatisfactory specimen - clotted  LAB TO REORDER AND DRAW.     HGB  10.7*  9.8*  Canceled, Test credited   Unsatisfactory specimen - clotted  LAB TO REORDER AND DRAW.     HCT  33.0*  31.9*  Canceled, Test credited   Unsatisfactory specimen - clotted  LAB TO REORDER AND DRAW.     MCV  94  99  Canceled, Test credited   Unsatisfactory specimen - clotted  LAB TO REORDER AND DRAW.     PLT  240  189  Canceled, Test credited   Unsatisfactory specimen - clotted  LAB TO REORDER AND DRAW.       Recent Labs   Lab Test  07/04/17   0705  07/03/17   0556  07/02/17   0532   CR  1.04  1.36*  1.91*     Recent Labs   Lab Test  05/08/13   1300   SED  61*     Recent Labs   Lab Test  06/30/17   1654  06/30/17   1640  06/14/17   1319  06/14/17   1230  06/14/17   1200  06/21/16   0653  06/21/16   0652  06/21/16   0636  06/24/14   1200   CULT  No growth after 5 days  <1000 colonies/mL urogenital maria esther Susceptibility testing not routinely done  No growth after 5 days  No growth  Moderate growth Escherichia coli  Moderate growth Enterococcus avium  Moderate growth Enterococcus faecalis  Light growth Candida albicans / dubliniensis Candida albicans and Candida   dubliniensis are not routinely speciated Susceptibility testing not routinely   done  Plus Heavy growth Normal skin maria esther  *  >100,000 colonies/mL Methicillin resistant Staphylococcus aureus (MRSA)  50,000 to 100,000 colonies/mL Strain 2 Methicillin resistant Staphylococcus   aureus (MRSA)  Critical Value/Significant Value called to and read back by Elmer Hurd Rn at   2044 [id number]  *  No  growth  No growth  >100,000 colonies/mL Enterococcus species*  No growth  10,000 to 50,000 colonies/mL Candida kefyr Susceptibility testing not routinely   done  10,000 to 50,000 colonies/mL Lactobacillus species No further identification  Susceptibility testing not routinely done  *

## 2017-07-06 ENCOUNTER — APPOINTMENT (OUTPATIENT)
Dept: GENERAL RADIOLOGY | Facility: CLINIC | Age: 82
DRG: 871 | End: 2017-07-06
Attending: INTERNAL MEDICINE
Payer: COMMERCIAL

## 2017-07-06 LAB
ANION GAP SERPL CALCULATED.3IONS-SCNC: 15 MMOL/L (ref 3–14)
BACTERIA SPEC CULT: NO GROWTH
BACTERIA SPEC CULT: NO GROWTH
BUN SERPL-MCNC: 34 MG/DL (ref 7–30)
CALCIUM SERPL-MCNC: 8.5 MG/DL (ref 8.5–10.1)
CHLORIDE SERPL-SCNC: 109 MMOL/L (ref 94–109)
CO2 SERPL-SCNC: 18 MMOL/L (ref 20–32)
CREAT SERPL-MCNC: 1.25 MG/DL (ref 0.52–1.04)
GFR SERPL CREATININE-BSD FRML MDRD: 40 ML/MIN/1.7M2
GLUCOSE BLDC GLUCOMTR-MCNC: 283 MG/DL (ref 70–99)
GLUCOSE BLDC GLUCOMTR-MCNC: 323 MG/DL (ref 70–99)
GLUCOSE BLDC GLUCOMTR-MCNC: 344 MG/DL (ref 70–99)
GLUCOSE BLDC GLUCOMTR-MCNC: 348 MG/DL (ref 70–99)
GLUCOSE BLDC GLUCOMTR-MCNC: 358 MG/DL (ref 70–99)
GLUCOSE BLDC GLUCOMTR-MCNC: 368 MG/DL (ref 70–99)
GLUCOSE SERPL-MCNC: 367 MG/DL (ref 70–99)
LACTATE BLD-SCNC: 3.3 MMOL/L (ref 0.7–2.1)
Lab: NORMAL
Lab: NORMAL
MICRO REPORT STATUS: NORMAL
MICRO REPORT STATUS: NORMAL
POTASSIUM SERPL-SCNC: 4.2 MMOL/L (ref 3.4–5.3)
POTASSIUM SERPL-SCNC: 4.2 MMOL/L (ref 3.4–5.3)
SODIUM SERPL-SCNC: 142 MMOL/L (ref 133–144)
SPECIMEN SOURCE: NORMAL
SPECIMEN SOURCE: NORMAL

## 2017-07-06 PROCEDURE — 25000132 ZZH RX MED GY IP 250 OP 250 PS 637: Performed by: INTERNAL MEDICINE

## 2017-07-06 PROCEDURE — 99233 SBSQ HOSP IP/OBS HIGH 50: CPT | Performed by: INTERNAL MEDICINE

## 2017-07-06 PROCEDURE — 25000131 ZZH RX MED GY IP 250 OP 636 PS 637: Performed by: INTERNAL MEDICINE

## 2017-07-06 PROCEDURE — 40000275 ZZH STATISTIC RCP TIME EA 10 MIN

## 2017-07-06 PROCEDURE — 25000128 H RX IP 250 OP 636: Performed by: INTERNAL MEDICINE

## 2017-07-06 PROCEDURE — 12000000 ZZH R&B MED SURG/OB

## 2017-07-06 PROCEDURE — 84132 ASSAY OF SERUM POTASSIUM: CPT | Performed by: INTERNAL MEDICINE

## 2017-07-06 PROCEDURE — 94640 AIRWAY INHALATION TREATMENT: CPT | Mod: 76

## 2017-07-06 PROCEDURE — 25000125 ZZHC RX 250: Performed by: INTERNAL MEDICINE

## 2017-07-06 PROCEDURE — 36415 COLL VENOUS BLD VENIPUNCTURE: CPT | Performed by: INTERNAL MEDICINE

## 2017-07-06 PROCEDURE — 80048 BASIC METABOLIC PNL TOTAL CA: CPT | Performed by: INTERNAL MEDICINE

## 2017-07-06 PROCEDURE — 83605 ASSAY OF LACTIC ACID: CPT | Performed by: INTERNAL MEDICINE

## 2017-07-06 PROCEDURE — 71010 XR CHEST PORT 1 VW: CPT

## 2017-07-06 PROCEDURE — 00000146 ZZHCL STATISTIC GLUCOSE BY METER IP

## 2017-07-06 PROCEDURE — 27210429 ZZH NUTRITION PRODUCT INTERMEDIATE LITER

## 2017-07-06 PROCEDURE — 94640 AIRWAY INHALATION TREATMENT: CPT

## 2017-07-06 RX ADMIN — AMLODIPINE BESYLATE 2.5 MG: 2.5 TABLET ORAL at 08:51

## 2017-07-06 RX ADMIN — PIPERACILLIN SODIUM,TAZOBACTAM SODIUM 2.25 G: 2; .25 INJECTION, POWDER, FOR SOLUTION INTRAVENOUS at 16:19

## 2017-07-06 RX ADMIN — METOPROLOL TARTRATE 2.5 MG: 5 INJECTION INTRAVENOUS at 20:57

## 2017-07-06 RX ADMIN — OXYCODONE HYDROCHLORIDE 2 MG: 5 SOLUTION ORAL at 20:49

## 2017-07-06 RX ADMIN — MULTIVITAMIN 15 ML: LIQUID ORAL at 08:51

## 2017-07-06 RX ADMIN — OXYCODONE HYDROCHLORIDE 2 MG: 5 SOLUTION ORAL at 08:50

## 2017-07-06 RX ADMIN — ACETAMINOPHEN 650 MG: 160 SUSPENSION ORAL at 22:25

## 2017-07-06 RX ADMIN — INSULIN GLARGINE 20 UNITS: 100 INJECTION, SOLUTION SUBCUTANEOUS at 09:00

## 2017-07-06 RX ADMIN — PIPERACILLIN SODIUM,TAZOBACTAM SODIUM 2.25 G: 2; .25 INJECTION, POWDER, FOR SOLUTION INTRAVENOUS at 12:37

## 2017-07-06 RX ADMIN — PIPERACILLIN SODIUM,TAZOBACTAM SODIUM 2.25 G: 2; .25 INJECTION, POWDER, FOR SOLUTION INTRAVENOUS at 04:26

## 2017-07-06 RX ADMIN — ACETAMINOPHEN 650 MG: 160 SUSPENSION ORAL at 16:06

## 2017-07-06 RX ADMIN — METOPROLOL TARTRATE 2.5 MG: 5 INJECTION INTRAVENOUS at 16:07

## 2017-07-06 RX ADMIN — Medication 1 CAPSULE: at 08:51

## 2017-07-06 RX ADMIN — PIPERACILLIN SODIUM,TAZOBACTAM SODIUM 2.25 G: 2; .25 INJECTION, POWDER, FOR SOLUTION INTRAVENOUS at 22:19

## 2017-07-06 RX ADMIN — Medication 7 ML: at 08:51

## 2017-07-06 RX ADMIN — METOPROLOL TARTRATE 2.5 MG: 5 INJECTION INTRAVENOUS at 04:08

## 2017-07-06 RX ADMIN — RANITIDINE HYDROCHLORIDE 150 MG: 150 SOLUTION ORAL at 08:49

## 2017-07-06 RX ADMIN — ALBUTEROL SULFATE 2.5 MG: 2.5 SOLUTION RESPIRATORY (INHALATION) at 19:47

## 2017-07-06 RX ADMIN — LEVOFLOXACIN 500 MG: 5 INJECTION, SOLUTION INTRAVENOUS at 18:28

## 2017-07-06 RX ADMIN — INSULIN GLARGINE 20 UNITS: 100 INJECTION, SOLUTION SUBCUTANEOUS at 20:47

## 2017-07-06 RX ADMIN — ACETAMINOPHEN 650 MG: 160 SUSPENSION ORAL at 08:49

## 2017-07-06 RX ADMIN — METOPROLOL TARTRATE 2.5 MG: 5 INJECTION INTRAVENOUS at 08:50

## 2017-07-06 NOTE — PLAN OF CARE
Problem: Goal Outcome Summary  Goal: Goal Outcome Summary  Outcome: No Change  Pt is nonverbal, sleeping most of the day, disoriented x4. Npo, tube feed running at goal rate of 45ml/hr. Residual 70ml. Repositioned q2h, coccyx dressing is CDI. Tele: NSR.  Incontinent of stool, chronic caro catheter is in place. Legs remain contracted. Up with mechanical lift.

## 2017-07-06 NOTE — PROGRESS NOTES
[facility]  Hospitalist Progress Note  Piotr Saba MD  07/06/2017    Assessment & Plan   89 y/o NH resident is admitted with ARF and sepsis, not clear sourse of infection, possible sec MRSA UTI ( but neg UC and BC). She developed hypernatremia and the free water through PEG was increased. She was stable, afebrile, stable VS and she was transferred out of CCU on July 3. On early morning July 4 she developed tachycardia and tachypnea and the CXR shows new bilat infiltrates. Most likely aspiration. Tube feeding will be continued as per family brian       Her complex PMHx: advanced dementia, aspirations pneumonia, indwelling urinary cath, UTIs, DM, CKD, feeding tube, chronic sacral wound,           Sepsis possible sec MRSA UTI w neg BC  -- fever, tachypnea, hypoxemia, increased lactic acid ( no fever in the last 24) - on admission. She was getting better until July 4 when she became tachycardic and tachypneic and new CXR infiltrates. Most likely she aspirated.    -- ID consult notes reviewed, cont Vanco and zosyn today and to orals tomorrow with Linezolid + Levaquin  -- CXR 6/30 - negative for acute changes. CXR 7/4: bilat infiltrates, will repeat CXR with tachypnea and mild tachycardia on 7/6  -- BC - neg to the date  -- UA with WBC, yeast. UC is neg at this admission but Pos for MRSA on June 14  -- C Diff - neg      Nutrition and long term prognosis  --  tube feeding resumed as per son wishes. We will continue the D5 lower rate as well.  -- her Na improved, stop D5W and give free water flushes      Chr indwelling CFoley cath, freq UTIs  -- ok to change Caro q 2 weeks  -- caro was blocked with sediment, flushed and had good urine output.  -- family asking for urology consult, but currently no urologic problems noted  -- patient had 1200 ml residual on admission which were expelled when Caro was changed in ER ( see H&P)       Hypernatremia  -- NA  142<--140<--145<--151<--155<--151 < --  "148  -- SL IVF  -- start free water flushes           ARF on CKD  -- Cr  1.25<--1.04<--1.91<--2.33 <-- 2.84   -- monitor   -- SL iv fluids      Elevated BP  -- started on Amlodipine 2.5 mg daily  -- metoprolol prn HTN, tachycardia         Advanced dementia with nonverbal state, nonabulatory with fixed chronic muscle contraction          DM 2. Poorly controlled.  -- she was on 70/30 bid and Lantus bid at the NH - she will not need both  -- high BS in 300 range, discontinued D5W infusion  -- increase SSI to medium   -- contninue Lantus 20 units bid for now          Chr sacral decubitus ulcer   -- considered chronic, noninfected   -- continue local care      Chr Anemia  -- Hgb in the same range as before.          DVT Prophylaxis: Pneumatic Compression Devices     Goal of care and COde status  -- Dr. Berg had a long discussion with pt's son,  Joshua, about the patient medical condition. He feels pressured and judged by the medical staff but he is trying to respect his mother strong Pentecostalism believes. And all the major changes in her plan of care need to have \" a clearance\" from their rabbi.   -- He understands her medical situation and the prognosis and he appreciates the care his mother is receiving. The patient is [employer] and she was very strong in her Pentecostalism beliefs. Her son wants to respect her wishes. According to their [employer] he can't change her to DNR. If a time comes and she will need to be intubated the situation will be reassessed. For now she will have full treatment.   -- Medical team discussed about nutrition and fluids. Son wants picc line and long term IV fluids. He understands this increased he risk of infection.     Code Status: Full Code      Disposition:   - > 2 days    Interval History    - chart reviewed  - patient non-verbal  - more tachypnea today    -Data reviewed today: I reviewed all new labs and imaging over the last 24 hours. I personally reviewed no images or EKG's " today.    Physical Exam   Heart Rate: 108, Blood pressure 145/75, pulse 79, temperature 96.8  F (36  C), temperature source Axillary, resp. rate 18, height 1.524 m (5'), weight 71.4 kg (157 lb 6.4 oz), SpO2 93 %.  Vitals:    07/02/17 0500 07/03/17 0600 07/06/17 0535   Weight: 68.4 kg (150 lb 12.7 oz) 68 kg (149 lb 14.6 oz) 71.4 kg (157 lb 6.4 oz)     Vital Signs with Ranges  Temp:  [96.5  F (35.8  C)-97.4  F (36.3  C)] 96.8  F (36  C)  Heart Rate:  [] 108  Resp:  [16-18] 18  BP: (134-163)/(67-83) 145/75  SpO2:  [93 %-98 %] 93 %  I/O's Last 24 hours  I/O last 3 completed shifts:  In: 1637 [I.V.:1577; NG/GT:60]  Out: 50 [Urine:50]    Constitutional: Non-verbal, mildly increased RR  Respiratory: Clear to auscultation bilaterally, no crackles or wheezing  Cardiovascular: borderline tachycardia, normal S1 and S2, and no murmur noted  GI: Normal bowel sounds, soft,  distended, non-tender  Skin/Integumen: No rashes, no cyanosis, no edema  Other:      Medications   All medications were reviewed.    D5W 50 mL/hr at 07/05/17 1247     IV fluid REPLACEMENT ONLY         vancomycin (VANCOCIN) IV  1,250 mg Intravenous Q48H     insulin glargine  20 Units Subcutaneous BID     insulin aspart  1-6 Units Subcutaneous Q4H     piperacillin-tazobactam  2.25 g Intravenous Q6H     metoprolol  2.5 mg Intravenous Q6H     amLODIPine  2.5 mg Oral Daily     vitamin A-D & C drops  7 mL Per G Tube Daily     acetaminophen  650 mg Rectal Once     acetaminophen  650 mg Per Feeding Tube TID     lactobacillus rhamnosus (GG)  1 capsule Per Feeding Tube Daily     multivitamins with minerals  15 mL Per Feeding Tube Daily     oxyCODONE  2 mg Per G Tube BID     ranitidine  150 mg Per Feeding Tube Daily     sodium chloride (PF)  3 mL Intracatheter Q8H     levofloxacin  500 mg Intravenous Q48H     vancomycin place ramos - receiving intermittent dosing  1 each Does not apply See Admin Instructions        Data     Recent Labs  Lab 07/06/17  3154  07/06/17  0100 07/05/17  0705 07/04/17  0705 07/03/17  1005 07/03/17  0556  06/30/17  1620   WBC  --   --  8.7  --  8.6 Canceled, Test credited Unsatisfactory specimen - clottedLAB TO REORDER AND DRAW.  < > 15.6*   HGB  --   --  10.7*  --  9.8* Canceled, Test credited Unsatisfactory specimen - clottedLAB TO REORDER AND DRAW.  < > 11.7   MCV  --   --  94  --  99 Canceled, Test credited Unsatisfactory specimen - clottedLAB TO REORDER AND DRAW.  < > 101*   PLT  --   --  240  --  189 Canceled, Test credited Unsatisfactory specimen - clottedLAB TO REORDER AND DRAW.  < > 251     --  140 145*  --  150*  < > 148*   POTASSIUM 4.2 4.2 3.3* 3.6  --  3.7  < > 4.6   CHLORIDE 109  --  106 114*  --  118*  < > 111*   CO2 18*  --  22 20  --  21  < > 26   BUN 34*  --  31* 36*  --  49*  < > 104*   CR 1.25*  --  1.05* 1.04  --  1.36*  < > 2.84*   ANIONGAP 15*  --  12 11  --  11  < > 11   NELLIE 8.5  --  7.9* 8.1*  --  7.9*  < > 8.8   *  --  305* 327*  --  336*  < > 240*   ALBUMIN  --   --   --   --   --   --   --  2.9*   PROTTOTAL  --   --   --   --   --   --   --  8.1   BILITOTAL  --   --   --   --   --   --   --  0.7   ALKPHOS  --   --   --   --   --   --   --  81   ALT  --   --   --   --   --   --   --  26   AST  --   --   --   --   --   --   --  24   TROPI  --   --   --   --   --   --   --  0.035   < > = values in this interval not displayed.    No results found for this or any previous visit (from the past 24 hour(s)).    Piotr Saba MD  Pager [phone]

## 2017-07-06 NOTE — PLAN OF CARE
Problem: Goal Outcome Summary  Goal: Goal Outcome Summary  Outcome: No Change  Pt is nonverbal, slept most of shift, disoriented x4. VSS.Tele: NSR. NPO, tube feed running at goal rate of 45ml/hr, residual of 45, BG q4h, 283 and 323. Repositioned q2h, coccyx dressing replaced x2, last at 0415, CDI.  Incontinent of stool x1, chronic caro catheter is in place. Legs remain contracted, slight arm movement. Up with mechanical lift. Pt presented to rest comfortably through the night. Plan: pending

## 2017-07-06 NOTE — PLAN OF CARE
Problem: Goal Outcome Summary  Goal: Goal Outcome Summary  Outcome: Declining  Pt is nonverbal, slept most of shift, disoriented x4. VSS on RA exp tachycardic. Tele: sinus tach. NPO, continuous TF @45ml/hr, residual of 120-300 mL. BG q4h, running in 300s. Repositioned q2h, coccyx dressing replaced x1, now CDI.  Incontinent of stool x1, chronic caro catheter with adequate output. Legs remain contracted, slight arm movement. Pt seems uncomfortable with movement but otherwise resting comfortably.

## 2017-07-06 NOTE — PROGRESS NOTES
[facility]  Infectious Disease Progress Note          Assessment and Plan:   IMPRESSION:   1.  A 89-year-old female, nursing home resident, prior known aspiration, chronic Fonseca catheter and Alzheimer's dementia readmitted with low-grade sepsis, etiology still not entirely clear, prior treated MRSA UTI , now UC neg, no positive blood cultures.  Still doubt wound is  the issue , not clearcut pneumonia but obvious aspiration risk.   2.  Chronic sacral decubitus wound, somewhat necrotic, but no signs of infection and it does not tract deep had prior I&D done, culture of the wound growing multiple organisms of no significance by itself.   3.  MRSA positive urine culture prior admit, treated, UA abnormal still with with catheter in place, but current UC no sig +.   4.  Minimal infiltrate, mild  hypoxia, mostly resolved, possible aspiration pneumonia.  If so has improved.   5.  Severe dementia.   6.  Recurrent urinary tract infections and urinary colonization with Fonseca catheter in place.   7.  Feeding tube in place, prior probable aspiration, possible etiology to current illness as well.   8.  Diabetes mellitus.   9.  Chronic renal insufficiency, acute worsening now stable.   10.  Sulfa allergy.       RECOMMENDATIONS:   1.  No indication to treat sacral decubitus with antibiotics,  wound culture of no clinical significance unless deep osteo or cellulitis and neither evident. .  Doubt this is the cause of current fever and has already been debrided recently, if it is cause of fever due to deep infection, no long term antibiotic strategy of likely benefit, would require major surgical intervention also.   2.  Urine does not appear to be current issue (recent MRSA) with current cx neg  3.  Not clearcut initial pneumonia.  Day 7  gram-negative  and MRSA coverage .  Does not appear to have major clinical pneumonia.  If it is aspiration antibiotics likely only of short term benefit been given,apparently  another episode aspiration, although not hypoxic now,   4.  Obviously long-term prognosis poor, especially if recurrently aspirating, Note ethics consult . Plan is full aggressive care  5 Even with aggressive care plan no indication for extended IV, continue 1 day more then enteral conversion        Interval History:   no complaints not interactive, no fever , no new cxs +, Uc mixed low ct maria esther, initial CXR no clear infiltrate but now bilat infiltrates, despite this no hypoxia, creat now 1.25              Medications:       vancomycin (VANCOCIN) IV  1,250 mg Intravenous Q48H     insulin glargine  20 Units Subcutaneous BID     insulin aspart  1-6 Units Subcutaneous Q4H     piperacillin-tazobactam  2.25 g Intravenous Q6H     metoprolol  2.5 mg Intravenous Q6H     amLODIPine  2.5 mg Oral Daily     vitamin A-D & C drops  7 mL Per G Tube Daily     acetaminophen  650 mg Rectal Once     acetaminophen  650 mg Per Feeding Tube TID     lactobacillus rhamnosus (GG)  1 capsule Per Feeding Tube Daily     multivitamins with minerals  15 mL Per Feeding Tube Daily     oxyCODONE  2 mg Per G Tube BID     ranitidine  150 mg Per Feeding Tube Daily     sodium chloride (PF)  3 mL Intracatheter Q8H     levofloxacin  500 mg Intravenous Q48H     vancomycin place ramos - receiving intermittent dosing  1 each Does not apply See Admin Instructions                  Physical Exam:   Blood pressure 158/76, pulse 79, temperature 96.5  F (35.8  C), temperature source Axillary, resp. rate 18, height 1.524 m (5'), weight 71.4 kg (157 lb 6.4 oz), SpO2 97 %.  Wt Readings from Last 2 Encounters:   07/06/17 71.4 kg (157 lb 6.4 oz)   06/19/17 69.3 kg (152 lb 12.5 oz)     Vital Signs with Ranges  Temp:  [96  F (35.6  C)-97.3  F (36.3  C)] 96.5  F (35.8  C)  Heart Rate:  [] 92  Resp:  [16-18] 18  BP: (134-158)/(67-81) 158/76  SpO2:  [96 %-98 %] 97 %    Constitutional: Awake, alert, not interactive as usual   Lungs: Congestion to auscultation  bilaterally, no crackles or wheezing no O2   Cardiovascular: Regular rate and rhythm, normal S1 and S2, and no murmur noted   Abdomen: Normal bowel sounds, soft, non-distended, non-tender   Skin: No rashes, no cyanosis, no edema wd not seen today   Other:           Data:   All microbiology laboratory data reviewed.  Recent Labs   Lab Test  07/05/17   0705  07/03/17   1005  07/03/17   0556   WBC  8.7  8.6  Canceled, Test credited   Unsatisfactory specimen - clotted  LAB TO REORDER AND DRAW.     HGB  10.7*  9.8*  Canceled, Test credited   Unsatisfactory specimen - clotted  LAB TO REORDER AND DRAW.     HCT  33.0*  31.9*  Canceled, Test credited   Unsatisfactory specimen - clotted  LAB TO REORDER AND DRAW.     MCV  94  99  Canceled, Test credited   Unsatisfactory specimen - clotted  LAB TO REORDER AND DRAW.     PLT  240  189  Canceled, Test credited   Unsatisfactory specimen - clotted  LAB TO REORDER AND DRAW.       Recent Labs   Lab Test  07/06/17   0650  07/05/17   0705  07/04/17   0705   CR  1.25*  1.05*  1.04     Recent Labs   Lab Test  05/08/13   1300   SED  61*     Recent Labs   Lab Test  06/30/17   1654  06/30/17   1640  06/14/17   1319  06/14/17   1230  06/14/17   1200  06/21/16   0653  06/21/16   0652  06/21/16   0636  06/24/14   1200   CULT  No growth  <1000 colonies/mL urogenital maria esther Susceptibility testing not routinely done  No growth  No growth  Moderate growth Escherichia coli  Moderate growth Enterococcus avium  Moderate growth Enterococcus faecalis  Light growth Candida albicans / dubliniensis Candida albicans and Candida   dubliniensis are not routinely speciated Susceptibility testing not routinely   done  Plus Heavy growth Normal skin maria esther  *  >100,000 colonies/mL Methicillin resistant Staphylococcus aureus (MRSA)  50,000 to 100,000 colonies/mL Strain 2 Methicillin resistant Staphylococcus   aureus (MRSA)  Critical Value/Significant Value called to and read back by Elmer Hurd Rn at   2044  [id number]  *  No growth  No growth  >100,000 colonies/mL Enterococcus species*  No growth  10,000 to 50,000 colonies/mL Candida kefyr Susceptibility testing not routinely   done  10,000 to 50,000 colonies/mL Lactobacillus species No further identification  Susceptibility testing not routinely done  *

## 2017-07-06 NOTE — PROGRESS NOTES
SP[facility] Progress Note  [facility]    I have attempted to connect with family, but have been unsuccessful to date.  Understand from chart notes that pt is [employer], and that her own Rabbi is involved.   team is available for additional support, per family/staff request.                                                                                                                                                 Malini Underwood M.A.  Staff   Pager [phone]  Phone [phone]

## 2017-07-07 LAB
ANION GAP SERPL CALCULATED.3IONS-SCNC: 11 MMOL/L (ref 3–14)
BUN SERPL-MCNC: 33 MG/DL (ref 7–30)
CALCIUM SERPL-MCNC: 8.6 MG/DL (ref 8.5–10.1)
CHLORIDE SERPL-SCNC: 113 MMOL/L (ref 94–109)
CO2 SERPL-SCNC: 21 MMOL/L (ref 20–32)
CREAT SERPL-MCNC: 1.17 MG/DL (ref 0.52–1.04)
ERYTHROCYTE [DISTWIDTH] IN BLOOD BY AUTOMATED COUNT: 16.5 % (ref 10–15)
GFR SERPL CREATININE-BSD FRML MDRD: 43 ML/MIN/1.7M2
GLUCOSE BLDC GLUCOMTR-MCNC: 292 MG/DL (ref 70–99)
GLUCOSE BLDC GLUCOMTR-MCNC: 306 MG/DL (ref 70–99)
GLUCOSE BLDC GLUCOMTR-MCNC: 310 MG/DL (ref 70–99)
GLUCOSE BLDC GLUCOMTR-MCNC: 317 MG/DL (ref 70–99)
GLUCOSE BLDC GLUCOMTR-MCNC: 319 MG/DL (ref 70–99)
GLUCOSE BLDC GLUCOMTR-MCNC: 320 MG/DL (ref 70–99)
GLUCOSE SERPL-MCNC: 319 MG/DL (ref 70–99)
HCT VFR BLD AUTO: 31.1 % (ref 35–47)
HGB BLD-MCNC: 10 G/DL (ref 11.7–15.7)
LACTATE BLD-SCNC: 2.8 MMOL/L (ref 0.7–2.1)
LACTATE BLD-SCNC: 3 MMOL/L (ref 0.7–2.1)
MCH RBC QN AUTO: 30.6 PG (ref 26.5–33)
MCHC RBC AUTO-ENTMCNC: 32.2 G/DL (ref 31.5–36.5)
MCV RBC AUTO: 95 FL (ref 78–100)
PLATELET # BLD AUTO: 291 10E9/L (ref 150–450)
POTASSIUM SERPL-SCNC: 4.1 MMOL/L (ref 3.4–5.3)
RBC # BLD AUTO: 3.27 10E12/L (ref 3.8–5.2)
SODIUM SERPL-SCNC: 145 MMOL/L (ref 133–144)
VANCOMYCIN SERPL-MCNC: 17.6 MG/L
WBC # BLD AUTO: 8.7 10E9/L (ref 4–11)

## 2017-07-07 PROCEDURE — 83605 ASSAY OF LACTIC ACID: CPT | Performed by: INTERNAL MEDICINE

## 2017-07-07 PROCEDURE — 85027 COMPLETE CBC AUTOMATED: CPT | Performed by: INTERNAL MEDICINE

## 2017-07-07 PROCEDURE — 12000000 ZZH R&B MED SURG/OB

## 2017-07-07 PROCEDURE — 25000132 ZZH RX MED GY IP 250 OP 250 PS 637: Performed by: INTERNAL MEDICINE

## 2017-07-07 PROCEDURE — 25000128 H RX IP 250 OP 636

## 2017-07-07 PROCEDURE — 25000125 ZZHC RX 250: Performed by: INTERNAL MEDICINE

## 2017-07-07 PROCEDURE — 00000146 ZZHCL STATISTIC GLUCOSE BY METER IP

## 2017-07-07 PROCEDURE — 36415 COLL VENOUS BLD VENIPUNCTURE: CPT | Performed by: INTERNAL MEDICINE

## 2017-07-07 PROCEDURE — 80202 ASSAY OF VANCOMYCIN: CPT | Performed by: INTERNAL MEDICINE

## 2017-07-07 PROCEDURE — 25000128 H RX IP 250 OP 636: Performed by: INTERNAL MEDICINE

## 2017-07-07 PROCEDURE — 99232 SBSQ HOSP IP/OBS MODERATE 35: CPT | Performed by: INTERNAL MEDICINE

## 2017-07-07 PROCEDURE — 25000131 ZZH RX MED GY IP 250 OP 636 PS 637: Performed by: INTERNAL MEDICINE

## 2017-07-07 PROCEDURE — 80048 BASIC METABOLIC PNL TOTAL CA: CPT | Performed by: INTERNAL MEDICINE

## 2017-07-07 RX ORDER — LEVOFLOXACIN 25 MG/ML
250 SOLUTION ORAL DAILY
Status: DISCONTINUED | OUTPATIENT
Start: 2017-07-07 | End: 2017-07-07

## 2017-07-07 RX ORDER — LINEZOLID 100 MG/5ML
600 GRANULE, FOR SUSPENSION ORAL EVERY 12 HOURS SCHEDULED
Status: DISCONTINUED | OUTPATIENT
Start: 2017-07-08 | End: 2017-07-08 | Stop reason: HOSPADM

## 2017-07-07 RX ORDER — LINEZOLID 100 MG/5ML
600 GRANULE, FOR SUSPENSION ORAL EVERY 12 HOURS SCHEDULED
Status: DISCONTINUED | OUTPATIENT
Start: 2017-07-07 | End: 2017-07-07

## 2017-07-07 RX ORDER — SODIUM CHLORIDE 9 MG/ML
INJECTION, SOLUTION INTRAVENOUS CONTINUOUS
Status: DISCONTINUED | OUTPATIENT
Start: 2017-07-07 | End: 2017-07-07

## 2017-07-07 RX ORDER — LEVOFLOXACIN 25 MG/ML
250 SOLUTION ORAL DAILY
Status: DISCONTINUED | OUTPATIENT
Start: 2017-07-08 | End: 2017-07-08 | Stop reason: HOSPADM

## 2017-07-07 RX ADMIN — AMLODIPINE BESYLATE 2.5 MG: 2.5 TABLET ORAL at 08:18

## 2017-07-07 RX ADMIN — MULTIVITAMIN 15 ML: LIQUID ORAL at 08:26

## 2017-07-07 RX ADMIN — OXYCODONE HYDROCHLORIDE 2 MG: 5 SOLUTION ORAL at 08:17

## 2017-07-07 RX ADMIN — SODIUM CHLORIDE: 9 INJECTION, SOLUTION INTRAVENOUS at 11:57

## 2017-07-07 RX ADMIN — ACETAMINOPHEN 650 MG: 160 SUSPENSION ORAL at 22:49

## 2017-07-07 RX ADMIN — ACETAMINOPHEN 650 MG: 160 SUSPENSION ORAL at 16:26

## 2017-07-07 RX ADMIN — Medication 1 CAPSULE: at 08:19

## 2017-07-07 RX ADMIN — VANCOMYCIN HYDROCHLORIDE 1250 MG: 5 INJECTION, POWDER, LYOPHILIZED, FOR SOLUTION INTRAVENOUS at 12:32

## 2017-07-07 RX ADMIN — PIPERACILLIN SODIUM,TAZOBACTAM SODIUM 2.25 G: 2; .25 INJECTION, POWDER, FOR SOLUTION INTRAVENOUS at 05:04

## 2017-07-07 RX ADMIN — PIPERACILLIN SODIUM,TAZOBACTAM SODIUM 2.25 G: 2; .25 INJECTION, POWDER, FOR SOLUTION INTRAVENOUS at 11:46

## 2017-07-07 RX ADMIN — METOPROLOL TARTRATE 2.5 MG: 5 INJECTION INTRAVENOUS at 02:42

## 2017-07-07 RX ADMIN — METOPROLOL TARTRATE 2.5 MG: 5 INJECTION INTRAVENOUS at 16:29

## 2017-07-07 RX ADMIN — ACETAMINOPHEN 650 MG: 160 SUSPENSION ORAL at 08:17

## 2017-07-07 RX ADMIN — INSULIN GLARGINE 24 UNITS: 100 INJECTION, SOLUTION SUBCUTANEOUS at 21:05

## 2017-07-07 RX ADMIN — Medication 7 ML: at 08:26

## 2017-07-07 RX ADMIN — METOPROLOL TARTRATE 2.5 MG: 5 INJECTION INTRAVENOUS at 21:04

## 2017-07-07 RX ADMIN — SODIUM CHLORIDE 250 ML: 9 INJECTION, SOLUTION INTRAVENOUS at 01:32

## 2017-07-07 RX ADMIN — RANITIDINE HYDROCHLORIDE 150 MG: 150 SOLUTION ORAL at 08:18

## 2017-07-07 RX ADMIN — OXYCODONE HYDROCHLORIDE 2 MG: 5 SOLUTION ORAL at 21:08

## 2017-07-07 RX ADMIN — METOPROLOL TARTRATE 2.5 MG: 5 INJECTION INTRAVENOUS at 08:38

## 2017-07-07 RX ADMIN — INSULIN GLARGINE 20 UNITS: 100 INJECTION, SOLUTION SUBCUTANEOUS at 08:38

## 2017-07-07 NOTE — PHARMACY-VANCOMYCIN DOSING SERVICE
Pharmacy Vancomycin Note  Date of Service 2017  Patient's  1926   89 year old, female    Indication: Sepsis  Goal Trough Level: 15-20 mg/L  Day of Therapy: 8  Current Vancomycin regimen:  1250 mg IV q48h    Current estimated CrCl = Estimated Creatinine Clearance: 28.2 mL/min (based on Cr of 1.17).    Creatinine for last 3 days  2017:  7:05 AM Creatinine 1.05 mg/dL  2017:  6:50 AM Creatinine 1.25 mg/dL  2017:  6:37 AM Creatinine 1.17 mg/dL    Recent Vancomycin Levels (past 3 days)  2017:  7:05 AM Vancomycin Level 17.9 mg/L  2017: 11:55 AM Vancomycin Level 17.6 mg/L    Vancomycin IV Administrations (past 72 hours)                   vancomycin 1250 mg in 0.9% NaCl 250 mL PREMIX (mg) 1,250 mg New Bag 17 1232     1,250 mg New Bag 17 1247                Nephrotoxins and other renal medications (Future)    Start     Dose/Rate Route Frequency Ordered Stop    17 1300  vancomycin 1250 mg in 0.9% NaCl 250 mL PREMIX      1,250 mg Intravenous EVERY 48 HOURS 17 0822      17 0845  piperacillin-tazobactam (ZOSYN) 2.25 g vial to attach to  ml bag     Comments:  Pharmacy can adjust dose based on renal function.    2.25 g  over 30 Minutes Intravenous EVERY 6 HOURS 17 0836      17 1959  vancomycin place ramos - receiving intermittent dosing      1 each Does not apply SEE ADMIN INSTRUCTIONS 17               Contrast Orders - past 72 hours     None          Interpretation of levels and current regimen:  Trough level is  Therapeutic    Has serum creatinine changed > 50% in last 72 hours: No    Renal Function: Stable    Plan:  1.  Continue Current Dose  2.  Pharmacy will check trough levels as appropriate in 3-5 Days.    3. Serum creatinine levels will be ordered daily for the first week of therapy and at least twice weekly for subsequent weeks.      Jerri Sneed        .

## 2017-07-07 NOTE — PROGRESS NOTES
[facility]  Hospitalist Progress Note  Piotr Saba MD  07/07/2017    Assessment & Plan   89 y/o NH resident is admitted with ARF and sepsis, not clear sourse of infection, possible sec MRSA UTI ( but neg UC and BC). She developed hypernatremia and the free water through PEG was increased. She was stable, afebrile, stable VS and she was transferred out of CCU on July 3. On early morning July 4 she developed tachycardia and tachypnea and the CXR shows new bilat infiltrates. Most likely aspiration. Tube feeding will be continued as per family brian       Her complex PMHx: advanced dementia, aspirations pneumonia, indwelling urinary cath, UTIs, DM, CKD, feeding tube, chronic sacral wound,           Sepsis possible sec MRSA UTI w neg BC  -- fever, tachypnea, hypoxemia, increased lactic acid ( no fever in the last 24) - on admission. She was getting better until July 4 when she became tachycardic and tachypneic and new CXR infiltrates. Most likely she aspirated.    -- ID consult notes reviewed, cont Vanco and zosyn today and to orals today with Linezolid + Levaquin  -- CXR 6/30 - negative for acute changes. CXR 7/4: bilat infiltrates, will repeat CXR with tachypnea and mild tachycardia on 7/6  -- BC - neg to the date  -- UA with WBC, yeast. UC is neg at this admission but Pos for MRSA on June 14  -- C Diff - neg      Nutrition and long term prognosis  --  tube feeding resumed as per son wishes. We will continue the D5 lower rate as well.  -- her Na improved, stop D5W and give free water flushes   -- SL IVF     Chr indwelling CFoley cath, freq UTIs  -- ok to change Caro q 2 weeks  -- caro was blocked with sediment, flushed and had good urine output.  -- family asking for urology consult, but currently no urologic problems noted  -- patient had 1200 ml residual on admission which were expelled when Caro was changed in ER ( see H&P)       Hypernatremia  -- NA  142<--140<--145<--151<--155<--151 <  "-- 148  -- SL IVF  -- continue free water flushes           ARF on CKD  -- Cr  1.25<--1.04<--1.91<--2.33 <-- 2.84   -- monitor   -- SL iv fluids      Elevated BP  -- started on Amlodipine 2.5 mg daily  -- metoprolol prn HTN, tachycardia         Advanced dementia with nonverbal state, nonabulatory with fixed chronic muscle contraction          DM 2. Poorly controlled.  -- she was on 70/30 bid and Lantus bid at the NH - she will not need both  -- SSI to medium   -- increase Lantus 20 to 24 units bid           Chr sacral decubitus ulcer   -- considered chronic, noninfected   -- continue local care      Chr Anemia  -- Hgb in the same range as before.          DVT Prophylaxis: Pneumatic Compression Devices     Goal of care and COde status  -- Dr. Berg had a long discussion with pt's son, Dr Lewis, about the patient medical condition. He feels pressured and judged by the medical staff but he is trying to respect his mother strong Yazidism believes. And all the major changes in her plan of care need to have \" a clearance\" from their rabbi.   -- He understands her medical situation and the prognosis and he appreciates the care his mother is receiving. The patient is [employer] and she was very strong in her Yazidism beliefs. Her son wants to respect her wishes. According to their Worship he can't change her to DNR. If a time comes and she will need to be intubated the situation will be reassessed. For now she will have full treatment.   -- Medical team discussed about nutrition and fluids. Son wants picc line and long term IV fluids. He understands this increased he risk of infection.     Code Status: Full Code      Disposition:   - > 2 days    Interval History    - breathing comfortably  - she is awake, non-verbal      -Data reviewed today: I reviewed all new labs and imaging over the last 24 hours. I personally reviewed no images or EKG's today.    Physical Exam   Heart Rate: 110, Blood pressure 161/60, " pulse 79, temperature 98.1  F (36.7  C), temperature source Oral, resp. rate (!) 34, height 1.524 m (5'), weight 71.4 kg (157 lb 6.4 oz), SpO2 95 %.  Vitals:    07/02/17 0500 07/03/17 0600 07/06/17 0535   Weight: 68.4 kg (150 lb 12.7 oz) 68 kg (149 lb 14.6 oz) 71.4 kg (157 lb 6.4 oz)     Vital Signs with Ranges  Temp:  [96  F (35.6  C)-98.3  F (36.8  C)] 98.1  F (36.7  C)  Heart Rate:  [] 110  Resp:  [26-40] 34  BP: (108-161)/(55-82) 161/60  SpO2:  [94 %-97 %] 95 %  I/O's Last 24 hours  I/O last 3 completed shifts:  In: 3080 [I.V.:1648; NG/GT:460]  Out: 2400 [Urine:2400]    Constitutional: Non-verbal, awake  Respiratory: Clear to auscultation bilaterally, no crackles or wheezing  Cardiovascular: borderline tachycardia, normal S1 and S2, and no murmur noted  GI: Normal bowel sounds, soft,  distended, non-tender  Skin/Integumen: No rashes, no cyanosis,    Other:      Medications   All medications were reviewed.    IV fluid REPLACEMENT ONLY         linezolid  600 mg Per Feeding Tube Q12H VIRA     levofloxacin  250 mg Per Feeding Tube Daily     insulin glargine  24 Units Subcutaneous BID     insulin aspart  1-6 Units Subcutaneous Q4H     metoprolol  2.5 mg Intravenous Q6H     amLODIPine  2.5 mg Oral Daily     vitamin A-D & C drops  7 mL Per G Tube Daily     acetaminophen  650 mg Rectal Once     acetaminophen  650 mg Per Feeding Tube TID     lactobacillus rhamnosus (GG)  1 capsule Per Feeding Tube Daily     multivitamins with minerals  15 mL Per Feeding Tube Daily     oxyCODONE  2 mg Per G Tube BID     ranitidine  150 mg Per Feeding Tube Daily     sodium chloride (PF)  3 mL Intracatheter Q8H        Data     Recent Labs  Lab 07/07/17  0637 07/06/17  0650 07/06/17  0100 07/05/17  0705  07/03/17  1005   WBC 8.7  --   --  8.7  --  8.6   HGB 10.0*  --   --  10.7*  --  9.8*   MCV 95  --   --  94  --  99     --   --  240  --  189   * 142  --  140  < >  --    POTASSIUM 4.1 4.2 4.2 3.3*  < >  --    CHLORIDE 113*  109  --  106  < >  --    CO2 21 18*  --  22  < >  --    BUN 33* 34*  --  31*  < >  --    CR 1.17* 1.25*  --  1.05*  < >  --    ANIONGAP 11 15*  --  12  < >  --    NELLIE 8.6 8.5  --  7.9*  < >  --    * 367*  --  305*  < >  --    < > = values in this interval not displayed.    No results found for this or any previous visit (from the past 24 hour(s)).    Piotr Saba MD  Pager [phone]

## 2017-07-07 NOTE — PLAN OF CARE
Problem: Goal Outcome Summary  Goal: Goal Outcome Summary  Outcome: No Change  Pt is nonverbal, lethargic. VSS ex RR=34. SpO2=95% on RA. Tele: NSR (tachy at times). NPO, continuous TF @45ml/hr. BG q4h, running in 300s. Repositioned q2h, CDI. Chronic caro catheter with adequate output.

## 2017-07-07 NOTE — PLAN OF CARE
Problem: Goal Outcome Summary  Goal: Goal Outcome Summary  Outcome: No Change  Shift Update:   Pt is nonverbal, slept all night. Tele: NSR. NPO, continuous TF @45ml/hr, residual of 75 and 15 mls. BG q4h, running in 300s. Repositioned q2h, coccyx dressing changed x1, now CDI. No BM this shift. Chronic caro catheter with adequate output. Legs remain contracted. Sepsis protocol fired, lactic of 2.8, gave 250 bolus and started on IVF.

## 2017-07-07 NOTE — PLAN OF CARE
Problem: Goal Outcome Summary  Goal: Goal Outcome Summary  Outcome: No Change   Disoriented x4. Hypertensive. Pt is nonverbal, slept most of shift. VSS on RA exp tachycardic. Tele: sinus tach. NPO, continuous TF @45ml/hr, residual of 120-300 mL. BG q4h, running in 300s. Insuline given per sliding scale. Repositioned q2h, coccyx dressing replaced x1, now CDI.  Repositioned q2 hrs. No BM this shift. chronic caro catheter with adequate output. Legs remain contracted, slight arm movement. Pt seems uncomfortable with movement but otherwise resting comfortably. Will continue to monitor.

## 2017-07-07 NOTE — PROGRESS NOTES
[facility]  Infectious Disease Progress Note          Assessment and Plan:   IMPRESSION:   1.  A 89-year-old female, nursing home resident, prior known aspiration, chronic Fonseca catheter and Alzheimer's dementia readmitted with low-grade sepsis, etiology still not entirely clear, prior treated MRSA UTI , now UC neg, no positive blood cultures.  Still doubt wound is  the issue , not clearcut pneumonia but obvious aspiration risk.   2.  Chronic sacral decubitus wound, somewhat necrotic, but no signs of infection and it does not tract deep had prior I&D done, culture of the wound growing multiple organisms of no significance by itself.   3.  MRSA positive urine culture prior admit, treated, UA abnormal still with with catheter in place, but current UC no sig +.   4.  Minimal infiltrate, mild  hypoxia, mostly resolved, possible aspiration pneumonia.  If so has improved.   5.  Severe dementia.   6.  Recurrent urinary tract infections and urinary colonization with Fonseca catheter in place.   7.  Feeding tube in place, prior probable aspiration, possible etiology to current illness as well.   8.  Diabetes mellitus.   9.  Chronic renal insufficiency, acute worsening now stable.   10.  Sulfa allergy.       RECOMMENDATIONS:   1.  No indication to treat sacral decubitus with antibiotics,  wound culture of no clinical significance unless deep osteo or cellulitis and neither evident. .  Doubt this is the cause of current fever and has already been debrided recently, if it is cause of fever due to deep infection, no long term antibiotic strategy of likely benefit, would require major surgical intervention also.   2.  Urine does not appear to be current issue (recent MRSA) with current cx neg  3.  Not clearcut initial pneumonia.  Day 7+  gram-negative  and MRSA coverage .  Does not appear to have major clinical pneumonia.  If it is aspiration antibiotics likely only of short term benefit been  given,apparently another episode aspiration, although not hypoxic now,   4.  Obviously long-term prognosis poor, especially if recurrently aspirating, Note ethics consult . Plan is full aggressive care  5 Even with aggressive care plan no indication for extended IV, I would favor 1 week enteral empiric levaquin and zyvox, dispositionOK        Interval History:   no complaints not interactive, no fever , no new cxs +, Uc mixed low ct maria esther, initial CXR no clear infiltrate but now bilat infiltrates, despite this no hypoxia, creat now 1.17              Medications:       vancomycin (VANCOCIN) IV  1,250 mg Intravenous Q48H     insulin glargine  20 Units Subcutaneous BID     insulin aspart  1-6 Units Subcutaneous Q4H     piperacillin-tazobactam  2.25 g Intravenous Q6H     metoprolol  2.5 mg Intravenous Q6H     amLODIPine  2.5 mg Oral Daily     vitamin A-D & C drops  7 mL Per G Tube Daily     acetaminophen  650 mg Rectal Once     acetaminophen  650 mg Per Feeding Tube TID     lactobacillus rhamnosus (GG)  1 capsule Per Feeding Tube Daily     multivitamins with minerals  15 mL Per Feeding Tube Daily     oxyCODONE  2 mg Per G Tube BID     ranitidine  150 mg Per Feeding Tube Daily     sodium chloride (PF)  3 mL Intracatheter Q8H     levofloxacin  500 mg Intravenous Q48H     vancomycin place ramos - receiving intermittent dosing  1 each Does not apply See Admin Instructions                  Physical Exam:   Blood pressure 161/62, pulse 79, temperature 97  F (36.1  C), temperature source Axillary, resp. rate 26, height 1.524 m (5'), weight 71.4 kg (157 lb 6.4 oz), SpO2 96 %.  Wt Readings from Last 2 Encounters:   07/06/17 71.4 kg (157 lb 6.4 oz)   06/19/17 69.3 kg (152 lb 12.5 oz)     Vital Signs with Ranges  Temp:  [96  F (35.6  C)-98.3  F (36.8  C)] 97  F (36.1  C)  Heart Rate:  [] 90  Resp:  [18-40] 26  BP: (108-161)/(55-75) 161/62  SpO2:  [93 %-97 %] 96 %    Constitutional: Awake, alert, not interactive as usual    Lungs: Congestion to auscultation bilaterally, no crackles or wheezing no O2   Cardiovascular: Regular rate and rhythm, normal S1 and S2, and no murmur noted   Abdomen: Normal bowel sounds, soft, non-distended, non-tender   Skin: No rashes, no cyanosis, no edema wd not seen today   Other:           Data:   All microbiology laboratory data reviewed.  Recent Labs   Lab Test  07/07/17   0637  07/05/17   0705  07/03/17   1005   WBC  8.7  8.7  8.6   HGB  10.0*  10.7*  9.8*   HCT  31.1*  33.0*  31.9*   MCV  95  94  99   PLT  291  240  189     Recent Labs   Lab Test  07/07/17   0637  07/06/17   0650  07/05/17   0705   CR  1.17*  1.25*  1.05*     Recent Labs   Lab Test  05/08/13   1300   SED  61*     Recent Labs   Lab Test  06/30/17   1654  06/30/17   1640  06/14/17   1319  06/14/17   1230  06/14/17   1200  06/21/16   0653  06/21/16   0652  06/21/16   0636  06/24/14   1200   CULT  No growth  <1000 colonies/mL urogenital maria esther Susceptibility testing not routinely done  No growth  No growth  Moderate growth Escherichia coli  Moderate growth Enterococcus avium  Moderate growth Enterococcus faecalis  Light growth Candida albicans / dubliniensis Candida albicans and Candida   dubliniensis are not routinely speciated Susceptibility testing not routinely   done  Plus Heavy growth Normal skin maria esther  *  >100,000 colonies/mL Methicillin resistant Staphylococcus aureus (MRSA)  50,000 to 100,000 colonies/mL Strain 2 Methicillin resistant Staphylococcus   aureus (MRSA)  Critical Value/Significant Value called to and read back by Elmer Hurd Rn at   [id number]  *  No growth  No growth  >100,000 colonies/mL Enterococcus species*  No growth  10,000 to 50,000 colonies/mL Candida kefyr Susceptibility testing not routinely   done  10,000 to 50,000 colonies/mL Lactobacillus species No further identification  Susceptibility testing not routinely done  *

## 2017-07-08 VITALS
HEART RATE: 107 BPM | OXYGEN SATURATION: 96 % | SYSTOLIC BLOOD PRESSURE: 141 MMHG | HEIGHT: 60 IN | RESPIRATION RATE: 28 BRPM | BODY MASS INDEX: 30.9 KG/M2 | DIASTOLIC BLOOD PRESSURE: 59 MMHG | TEMPERATURE: 96.4 F | WEIGHT: 157.4 LBS

## 2017-07-08 LAB
ANION GAP SERPL CALCULATED.3IONS-SCNC: 11 MMOL/L (ref 3–14)
BUN SERPL-MCNC: 32 MG/DL (ref 7–30)
CALCIUM SERPL-MCNC: 8.8 MG/DL (ref 8.5–10.1)
CHLORIDE SERPL-SCNC: 114 MMOL/L (ref 94–109)
CO2 SERPL-SCNC: 20 MMOL/L (ref 20–32)
CREAT SERPL-MCNC: 0.96 MG/DL (ref 0.52–1.04)
ERYTHROCYTE [DISTWIDTH] IN BLOOD BY AUTOMATED COUNT: 16.9 % (ref 10–15)
GFR SERPL CREATININE-BSD FRML MDRD: 54 ML/MIN/1.7M2
GLUCOSE BLDC GLUCOMTR-MCNC: 236 MG/DL (ref 70–99)
GLUCOSE BLDC GLUCOMTR-MCNC: 238 MG/DL (ref 70–99)
GLUCOSE BLDC GLUCOMTR-MCNC: 270 MG/DL (ref 70–99)
GLUCOSE BLDC GLUCOMTR-MCNC: 279 MG/DL (ref 70–99)
GLUCOSE SERPL-MCNC: 271 MG/DL (ref 70–99)
HCT VFR BLD AUTO: 33.5 % (ref 35–47)
HGB BLD-MCNC: 10.6 G/DL (ref 11.7–15.7)
MCH RBC QN AUTO: 30.5 PG (ref 26.5–33)
MCHC RBC AUTO-ENTMCNC: 31.6 G/DL (ref 31.5–36.5)
MCV RBC AUTO: 96 FL (ref 78–100)
PLATELET # BLD AUTO: 333 10E9/L (ref 150–450)
POTASSIUM SERPL-SCNC: 4.4 MMOL/L (ref 3.4–5.3)
RBC # BLD AUTO: 3.48 10E12/L (ref 3.8–5.2)
SODIUM SERPL-SCNC: 145 MMOL/L (ref 133–144)
WBC # BLD AUTO: 10 10E9/L (ref 4–11)

## 2017-07-08 PROCEDURE — 25000125 ZZHC RX 250: Performed by: INTERNAL MEDICINE

## 2017-07-08 PROCEDURE — 85027 COMPLETE CBC AUTOMATED: CPT | Performed by: INTERNAL MEDICINE

## 2017-07-08 PROCEDURE — 25000132 ZZH RX MED GY IP 250 OP 250 PS 637: Performed by: INTERNAL MEDICINE

## 2017-07-08 PROCEDURE — 00000146 ZZHCL STATISTIC GLUCOSE BY METER IP

## 2017-07-08 PROCEDURE — 27210429 ZZH NUTRITION PRODUCT INTERMEDIATE LITER

## 2017-07-08 PROCEDURE — 25000131 ZZH RX MED GY IP 250 OP 636 PS 637: Performed by: INTERNAL MEDICINE

## 2017-07-08 PROCEDURE — 36415 COLL VENOUS BLD VENIPUNCTURE: CPT | Performed by: INTERNAL MEDICINE

## 2017-07-08 PROCEDURE — 99239 HOSP IP/OBS DSCHRG MGMT >30: CPT | Performed by: INTERNAL MEDICINE

## 2017-07-08 PROCEDURE — 80048 BASIC METABOLIC PNL TOTAL CA: CPT | Performed by: INTERNAL MEDICINE

## 2017-07-08 RX ORDER — LINEZOLID 100 MG/5ML
600 GRANULE, FOR SUSPENSION ORAL EVERY 12 HOURS
Qty: 360 ML | Refills: 0 | Status: SHIPPED | OUTPATIENT
Start: 2017-07-08 | End: 2017-07-14

## 2017-07-08 RX ORDER — LEVOFLOXACIN 25 MG/ML
250 SOLUTION ORAL DAILY
Qty: 60 ML | Refills: 0 | Status: SHIPPED | OUTPATIENT
Start: 2017-07-08 | End: 2017-07-14

## 2017-07-08 RX ORDER — OXYCODONE HCL 5 MG/5 ML
2 SOLUTION, ORAL ORAL EVERY 4 HOURS PRN
Qty: 473 ML | Refills: 0 | Status: ON HOLD | OUTPATIENT
Start: 2017-07-08 | End: 2017-08-09

## 2017-07-08 RX ADMIN — LINEZOLID 600 MG: 100 SUSPENSION ORAL at 09:30

## 2017-07-08 RX ADMIN — Medication 1 CAPSULE: at 09:30

## 2017-07-08 RX ADMIN — MULTIVITAMIN 15 ML: LIQUID ORAL at 09:29

## 2017-07-08 RX ADMIN — METOPROLOL TARTRATE 2.5 MG: 5 INJECTION INTRAVENOUS at 03:15

## 2017-07-08 RX ADMIN — RANITIDINE HYDROCHLORIDE 150 MG: 150 SOLUTION ORAL at 09:30

## 2017-07-08 RX ADMIN — Medication 7 ML: at 09:30

## 2017-07-08 RX ADMIN — METOPROLOL TARTRATE 2.5 MG: 5 INJECTION INTRAVENOUS at 09:51

## 2017-07-08 RX ADMIN — INSULIN GLARGINE 24 UNITS: 100 INJECTION, SOLUTION SUBCUTANEOUS at 09:49

## 2017-07-08 RX ADMIN — METOPROLOL TARTRATE 2.5 MG: 5 INJECTION INTRAVENOUS at 00:19

## 2017-07-08 RX ADMIN — OXYCODONE HYDROCHLORIDE 2 MG: 5 SOLUTION ORAL at 09:30

## 2017-07-08 RX ADMIN — AMLODIPINE BESYLATE 2.5 MG: 2.5 TABLET ORAL at 09:30

## 2017-07-08 RX ADMIN — ACETAMINOPHEN 650 MG: 160 SUSPENSION ORAL at 09:29

## 2017-07-08 NOTE — DISCHARGE SUMMARY
[facility]  Discharge Summary        Celia Lewis MRN# [id number]   YOB: 1926 Age: 89 year old     Date of Admission:  6/30/2017  Date of Discharge:  7/8/2017  Admitting Physician:  Paul Jolly MD  Discharge Physician: Piotr Saba MD  Discharging Service: Hospitalist     Primary Provider:  PCP at [facility]  Primary Care Physician Phone Number: None     Discharge Diagnoses:     1. Fevers possible sepsis secondary to HCAP vs aspiration   2. Severe dementia patient bed bound with tube feedings, patient non-verbal and non-interactive  3. Chronic indwelling caro catheter with hx of MRSA infection, UCx negative, urinary   4. Hypernatremia, nutritional  5. Chronic stage III sacral decubitus ulcer  6. DM II  7. CKD/ARF  8. Elevated BP, start on metoprolol      Problem Oriented Hospital Course   Celia Lewis was admitted on 6/30/2017 by Paul Jolly MD and I would refer you to their history and physical.  The following problems were addressed during her hospitalization:     89 y/o NH resident is admitted with ARF and sepsis, not clear sourse of infection, possible sec MRSA UTI ( but neg UC and BC). She developed hypernatremia and the free water through PEG was increased. She was stable, afebrile, stable VS and she was transferred out of CCU on July 3. On early morning July 4 she developed tachycardia and tachypnea and the CXR shows new bilat infiltrates. Most likely aspiration. Tube feeding will be continued as per family whishes       Her complex PMHx: advanced dementia, aspirations pneumonia, indwelling urinary cath, UTIs, DM, CKD, feeding tube, chronic sacral wound,           Sepsis possible sec MRSA UTI w neg BC  -- fever, tachypnea, hypoxemia, increased lactic acid - on admission. She was getting better until July 4 when she became tachycardic and tachypneic and new CXR infiltrates. Most likely she aspirated.    -- ID consult notes reviewed, cont Vanco and zosyn  "changed to orals with Linezolid + Levaquin for 1 week at discharge.  -- CXR 6/30 - negative for acute changes. CXR 7/4: bilat infiltrates, likely aspiration vs HCAP  -- BC - neg to the date  -- UA with WBC, yeast. UC is neg at this admission but Pos for MRSA on June 14  -- C Diff - neg      Nutrition and long term prognosis  --  tube feeding resumed as per son wishes.    -- her Na improved, with D5W and currently on free water flushes   -- SL IVF      Chr indwelling CFoley cath, freq UTIs  -- ok to change Caro q 2 weeks  -- caro was blocked with sediment, flushed and with good urine output.  -- family asking for urology consult, but currently no urologic problems   -- patient had 1200 ml residual on admission which were expelled when Caro was changed in ER ( see H&P)       Hypernatremia  -- NA  142<--140<--145<--151<--155<--151 < -- 148  -- continue free water flushes       ARF on CKD  -- Cr  0.96 <--1.25<--1.04<--1.91<--2.33 <-- 2.84   -- suspect from her infection/sepsis         Elevated BP  -- she was hypertensive and tachycardic  -- will treat with metoprolol          Advanced dementia with nonverbal state, nonabulatory with fixed chronic muscle contraction          DM 2. Poorly controlled.  -- she was on 70/30 bid and Lantus bid at the NH     -- being discharged with Lantus 30 units bid   -- she will need accuchecks q 4 with ssi          Chr sacral decubitus ulcer   -- considered chronic, noninfected   -- continue local care      Chr Anemia  -- Hgb in the same range as before.       DVT Prophylaxis:   -- she has Pneumatic Compression Devices      Goal of care and CODE status  -- Dr. Berg had a long discussion with pt's son,  Joshua, about the patient medical condition. He feels pressured and judged by the medical staff but he is trying to respect his mother strong Mandaeism believes. And all the major changes in her plan of care need to have \" a clearance\" from their rabbi.   -- He understands " her medical situation and the prognosis and he appreciates the care his mother is receiving. The patient is Taoist [employer] and she was very strong in her Congregation beliefs. Her son wants to respect her wishes. According to their Jehovah's witness he can't change her to DNR. If a time comes and she will need to be intubated the situation will be reassessed. For now she will have full treatment.   -- Medical team discussed about nutrition and fluids. Son wants picc line and long term IV fluids. He understands this increased he risk of infection.            Code Status:      Full Code        Brief Hospital Stay Summary Sent Home With Patient in AVS:       Patient admitted with fever, ARF and some urinary retention.        Important Results:      See below        Pending Results:        Unresulted Labs Ordered in the Past 30 Days of this Admission     No orders found from 5/1/2017 to 7/1/2017.            Discharge Instructions and Follow-Up:            Discharge Disposition:      Discharged to LTC        Discharge Medications:        # addendem :  Adding metoprolol 25 mg liquid via feeding tube BID.    Current Discharge Medication List      START taking these medications    Details   vitamin A-D & C drops (TRI-VI-SOL) 750-400-35 UNIT-MG/ML solution NEW FORMULATION 7 mLs by Per G Tube route daily  Qty: 50 mL, Refills: 0    Associated Diagnoses: Decubitus ulcer of buttock, unspecified laterality, unspecified ulcer stage      linezolid (ZYVOX) 100 MG/5ML suspension 30 mLs (600 mg) by Per Feeding Tube route every 12 hours for 6 days  Qty: 360 mL, Refills: 0    Associated Diagnoses: HCAP (healthcare-associated pneumonia)      levofloxacin (LEVAQUIN) 25 MG/ML solution 10 mLs (250 mg) by Per Feeding Tube route daily for 6 days  Qty: 60 mL, Refills: 0    Associated Diagnoses: HCAP (healthcare-associated pneumonia)         CONTINUE these medications which have CHANGED    Details   insulin glargine (LANTUS) 100 UNIT/ML injection Inject  30 Units Subcutaneous 2 times daily    Associated Diagnoses: Type 2 diabetes mellitus with diabetic nephropathy, unspecified long term insulin use status (H)      ranitidine (ZANTAC) 150 MG/10ML syrup 10 mLs (150 mg) by Per Feeding Tube route daily  Qty: 600 mL    Associated Diagnoses: Gastroesophageal reflux disease without esophagitis      oxyCODONE (ROXICODONE) 5 MG/5ML solution 2 mLs (2 mg) by Per G Tube route every 4 hours as needed for moderate to severe pain  Qty: 473 mL, Refills: 0    Associated Diagnoses: Primary localized osteoarthrosis of shoulder region, unspecified laterality         CONTINUE these medications which have NOT CHANGED    Details   !! INSULIN ASPART SC Inject 1-6 Units Subcutaneous every 6 hours 0800, 1400, 2000, 0200  -250 1 unit  -300 2 units  -350 3 units  -400 4 units  -450 5 units  BG >450 6 units and update MD      Dakins 0.125 % SOLN Externally apply topically 2 times daily BID and PRN.   1. Cleanse wound with microklenze, cleanse periwound area with naomi perineal  2. Moisten kerlix fluff with dakins solution 0.125%, wring out excess, pack wound with kerlix  3. Apply antifungal powder to periwound area, rub in. Apply criticaid over powder.  4. Cover with ABD using minimal medipore tape to secure.  5. Label dressing with date, time, initials. Follow Rigorous PIP measures.      Lactobacillus Acidophilus POWD 1 capsule by Gastric Tube route daily 10 billion units       multivitamin, therapeutic (THERA-VIT) TABS tablet 1 tablet by Gastric Tube route daily      Loperamide HCl (IMODIUM A-D) 1 MG/7.5ML LIQD 4 mg by Gastric Tube route every other day      !! acetaminophen (TYLENOL) 32 mg/mL solution 325 mg by Gastric Tube route daily as needed for fever or mild pain      !! ACETAMINOPHEN  mg by Gastric Tube route 3 times daily       bisacodyl (DULCOLAX) 10 MG suppository Place 10 mg rectally daily as needed for constipation      !! insulin aspart (NOVOLOG  PEN) 100 UNIT/ML soln Inject 1-6 Units Subcutaneous every 4 hours    Associated Diagnoses: Type 2 diabetes mellitus with diabetic nephropathy (H)       !! - Potential duplicate medications found. Please discuss with provider.      STOP taking these medications       insulin NPH-insulin regular (HUMULIN MIX 70/30/NOVOLIN MIX 70/30) injection Comments:   Reason for Stopping:         insulin NPH-insulin regular (HUMULIN MIX 70/30/NOVOLIN MIX 70/30) injection Comments:   Reason for Stopping:                 Allergies:         Allergies   Allergen Reactions     Sulfa Drugs Other (See Comments)     Per nursing home documents, patient has allergy to sulfa           Consultations This Hospital Stay:      Consultation during this admission received from infectious disease        Condition and Physical on Discharge:      Discharge condition: Stable   Vitals: Blood pressure 151/67, pulse 107, temperature 96.7  F (35.9  C), temperature source Axillary, resp. rate 28, height 1.524 m (5'), weight 71.4 kg (157 lb 6.4 oz), SpO2 95 %.     Constitutional: Non verbal - resting comfortable   Lungs: Decreased at the bases, clear upper lungs   Cardiovascular: RRR   Abdomen: Distended, hypoactive bs   Skin: Warm, dry, sacral decubitus ulcer   Other: contracted         Discharge Time:      Greater than 30 minutes.        Image Results From This Hospital Stay (For Non-EPIC Providers):        Results for orders placed or performed during the hospital encounter of 06/30/17   XR Chest Port 1 View    Narrative    CHEST SINGLE VIEW PORTABLE   6/30/2017 4:47 PM     HISTORY: Fever.    COMPARISON: 6/18/2017.    FINDINGS: Note that the evaluation is mildly limited by the rotation  of the patient to the left. A few linear opacities in the left lung  base most likely represent atelectasis or scarring. The lungs are  otherwise clear. Mild cardiomegaly. Atherosclerotic calcification in  the thoracic aorta.      Impression    IMPRESSION: No convincing  evidence of active cardiopulmonary disease.    RENU UMANA MD   XR Chest 2 Views    Narrative    CHEST TWO VIEWS July 4, 2017 4:30 AM     HISTORY: Tachypnea (evaluate for aspiration event).    COMPARISON: 6/30/2017.      Impression    IMPRESSION: Hypoinflated lungs. Patchy bilateral mid to low chest  airspace opacities appear increased and could represent developing  airspace disease and/or atelectasis. Cardiomegaly is stable. There is  vascular congestion and potential pulmonary edema.    PARMINDER HOWARD MD   XR Chest Port 1 View    Narrative    XR CHEST PORT 1 VW 7/6/2017 3:05 PM    HISTORY: Increased respiratory rate.    COMPARISON: July 4, 2017.      Impression    IMPRESSION: No significant change from prior examination. Low lung  volumes. Bibasilar opacities likely reflect atelectasis although  infection cannot be excluded. No pneumothorax. Stable cardiomegaly.    PUNEET CARABALLO MD           Most Recent Lab Results In EPIC (For Non-EPIC Providers):    Most Recent 3 CBC's:  Recent Labs   Lab Test  07/08/17   0727  07/07/17   0637  07/05/17   0705   WBC  10.0  8.7  8.7   HGB  10.6*  10.0*  10.7*   MCV  96  95  94   PLT  333  291  240      Most Recent 3 BMP's:  Recent Labs   Lab Test  07/08/17   0727  07/07/17   0637  07/06/17   0650   NA  145*  145*  142   POTASSIUM  4.4  4.1  4.2   CHLORIDE  114*  113*  109   CO2  20  21  18*   BUN  32*  33*  34*   CR  0.96  1.17*  1.25*   ANIONGAP  11  11  15*   NELLIE  8.8  8.6  8.5   GLC  271*  319*  367*     Most Recent 3 Troponin's:  Recent Labs   Lab Test  06/30/17   1620  08/08/12   1751  08/08/12   1323  08/05/12   1837   01/28/10   1505   TROPI  0.035  0.013  0.022   --    < >   --    TROPONIN   --    --    --   0.00   --   0.00    < > = values in this interval not displayed.     Most Recent 3 INR's:  Recent Labs   Lab Test  06/14/17   1200  06/21/16   0643  06/23/14   2140   INR  1.10  1.37*  1.01     Most Recent 2 LFT's:  Recent Labs   Lab Test  06/30/17   1620   06/14/17   1200   AST  24  20   ALT  26  22   ALKPHOS  81  96   BILITOTAL  0.7  0.5     Most Recent Cholesterol Panel:  Recent Labs   Lab Test 05/26/15   CHOL  159   LDL  77   HDL  40   TRIG  211*     Most Recent 6 Bacteria Isolates From Any Culture (See EPIC Reports for Culture Details):  Recent Labs   Lab Test  06/30/17   1654  06/30/17   1640  06/14/17   1319  06/14/17   1230  06/14/17   1200  06/21/16   0653   CULT  No growth  <1000 colonies/mL urogenital maria esther Susceptibility testing not routinely done  No growth  No growth  Moderate growth Escherichia coli  Moderate growth Enterococcus avium  Moderate growth Enterococcus faecalis  Light growth Candida albicans / dubliniensis Candida albicans and Candida   dubliniensis are not routinely speciated Susceptibility testing not routinely   done  Plus Heavy growth Normal skin maria esther  *  >100,000 colonies/mL Methicillin resistant Staphylococcus aureus (MRSA)  50,000 to 100,000 colonies/mL Strain 2 Methicillin resistant Staphylococcus   aureus (MRSA)  Critical Value/Significant Value called to and read back by Elmer Hurd Rn at   2044 [id number]  *  No growth  No growth     Most Recent TSH, T4 and HgbA1c:   Recent Labs   Lab Test  06/15/17   0833   11/03/10   0710   TSH   --    --   2.69   A1C  8.4*   < >   --     < > = values in this interval not displayed.

## 2017-07-08 NOTE — PROGRESS NOTES
[facility]  Infectious Disease Progress Note          Assessment and Plan:   IMPRESSION:   1.  A 89-year-old female, nursing home resident, prior known aspiration, chronic Fonseca catheter and Alzheimer's dementia readmitted with low-grade sepsis, etiology still not entirely clear, prior treated MRSA UTI , now UC neg, no positive blood cultures.  Still doubt wound is  the issue , not clearcut pneumonia but obvious aspiration risk.   2.  Chronic sacral decubitus wound, somewhat necrotic, but no signs of infection and it does not tract deep had prior I&D done, culture of the wound growing multiple organisms of no significance by itself.   3.  MRSA positive urine culture prior admit, treated, UA abnormal still with with catheter in place, but current UC no sig +.   4.  Minimal infiltrate, mild  hypoxia, mostly resolved, possible aspiration pneumonia.  If so has improved.   5.  Severe dementia.   6.  Recurrent urinary tract infections and urinary colonization with Fonseca catheter in place.   7.  Feeding tube in place, prior probable aspiration, possible etiology to current illness as well.   8.  Diabetes mellitus.   9.  Chronic renal insufficiency, acute worsening now stable.   10.  Sulfa allergy.       RECOMMENDATIONS:   1.  No indication to treat sacral decubitus with antibiotics,  wound culture of no clinical significance unless deep osteo or cellulitis and neither evident. .  Doubt this is the cause of current fever and has already been debrided recently, if it is cause of fever due to deep infection, no long term antibiotic strategy of likely benefit, would require major surgical intervention also.   2.  Urine does not appear to be current issue (recent MRSA) with current cx neg  3.  Not clearcut initial pneumonia.  Day 8+  gram-negative  and MRSA coverage .  Does not appear to have major clinical pneumonia.  If it is aspiration antibiotics likely only of short term benefit been  given,apparently another episode aspiration, although not hypoxic now,   4.  Obviously long-term prognosis poor, especially if recurrently aspirating, Note ethics consult . Plan is still  full aggressive care, almost certain to have recurrent episodes and no preventive, longer or other antibiotic plan likely to change that  5 Even with aggressive care plan no indication for extended IV,if here IV OK but I would favor to enteral empiric levaquin and zyvox for 1 week any time, disposition OK  6 Continues to trigger sepsis protocol, lactic acid persistent elevation likely for other reasons, not clinically septic        Interval History:   no complaints not interactive, no fever , no new cxs +, Uc mixed low ct maria esther, initial CXR no clear infiltrate but now bilat infiltrates, despite this no hypoxia, creat now 1.17              Medications:       linezolid  600 mg Per Feeding Tube Q12H VIRA     levofloxacin  250 mg Per Feeding Tube Daily     insulin glargine  24 Units Subcutaneous BID     insulin aspart  1-6 Units Subcutaneous Q4H     metoprolol  2.5 mg Intravenous Q6H     amLODIPine  2.5 mg Oral Daily     vitamin A-D & C drops  7 mL Per G Tube Daily     acetaminophen  650 mg Rectal Once     acetaminophen  650 mg Per Feeding Tube TID     lactobacillus rhamnosus (GG)  1 capsule Per Feeding Tube Daily     multivitamins with minerals  15 mL Per Feeding Tube Daily     oxyCODONE  2 mg Per G Tube BID     ranitidine  150 mg Per Feeding Tube Daily     sodium chloride (PF)  3 mL Intracatheter Q8H                  Physical Exam:   Blood pressure 159/68, pulse 107, temperature 97.9  F (36.6  C), temperature source Axillary, resp. rate 30, height 1.524 m (5'), weight 71.4 kg (157 lb 6.4 oz), SpO2 95 %.  Wt Readings from Last 2 Encounters:   07/06/17 71.4 kg (157 lb 6.4 oz)   06/19/17 69.3 kg (152 lb 12.5 oz)     Vital Signs with Ranges  Temp:  [96.7  F (35.9  C)-98.1  F (36.7  C)] 97.9  F (36.6  C)  Pulse:  [107] 107  Heart Rate:   [] 95  Resp:  [22-35] 30  BP: (137-187)/(60-86) 159/68  SpO2:  [94 %-97 %] 95 %    Constitutional: Awake, alert, not interactive as usual   Lungs: Congestion to auscultation bilaterally, no crackles or wheezing no O2   Cardiovascular: Regular rate and rhythm, normal S1 and S2, and no murmur noted   Abdomen: Normal bowel sounds, soft, non-distended, non-tender   Skin: No rashes, no cyanosis, no edema wd not seen today   Other:           Data:   All microbiology laboratory data reviewed.  Recent Labs   Lab Test  07/08/17   0727 07/07/17   0637  07/05/17   0705   WBC  10.0  8.7  8.7   HGB  10.6*  10.0*  10.7*   HCT  33.5*  31.1*  33.0*   MCV  96  95  94   PLT  333  291  240     Recent Labs   Lab Test  07/08/17   0727  07/07/17   0637  07/06/17   0650   CR  0.96  1.17*  1.25*     Recent Labs   Lab Test  05/08/13   1300   SED  61*     Recent Labs   Lab Test  06/30/17   1654  06/30/17   1640  06/14/17   1319  06/14/17   1230  06/14/17   1200  06/21/16   0653  06/21/16   0652  06/21/16   0636  06/24/14   1200   CULT  No growth  <1000 colonies/mL urogenital maria esther Susceptibility testing not routinely done  No growth  No growth  Moderate growth Escherichia coli  Moderate growth Enterococcus avium  Moderate growth Enterococcus faecalis  Light growth Candida albicans / dubliniensis Candida albicans and Candida   dubliniensis are not routinely speciated Susceptibility testing not routinely   done  Plus Heavy growth Normal skin maria esther  *  >100,000 colonies/mL Methicillin resistant Staphylococcus aureus (MRSA)  50,000 to 100,000 colonies/mL Strain 2 Methicillin resistant Staphylococcus   aureus (MRSA)  Critical Value/Significant Value called to and read back by Elmer Hurd Rn at   2044 [id number]  *  No growth  No growth  >100,000 colonies/mL Enterococcus species*  No growth  10,000 to 50,000 colonies/mL Candida kefyr Susceptibility testing not routinely   done  10,000 to 50,000 colonies/mL Lactobacillus species No further  identification  Susceptibility testing not routinely done  *

## 2017-07-08 NOTE — PLAN OF CARE
Problem: Goal Outcome Summary  Goal: Goal Outcome Summary  Outcome: No Change  VSS ex RR=34. SpO2=95% on RA. Tele: NSR (tachy at times). NPO, continuous TF @45ml/hr. BG q4h, running in 300s. Repositioned q2h, CDI. Chronic caro catheter with adequate output. Wound care done per order, New dressing in place.  Do not contact Son if pt gets D/C over the weekend, only contact if there are questions and concerns about pt.

## 2017-07-08 NOTE — PLAN OF CARE
Problem: Goal Outcome Summary  Goal: Goal Outcome Summary  Outcome: No Change  Shift Update:   Pt is nonverbal, lethargic. BP elevated, PRN metoprolol given. Tele: NSR (tachy at times). BG q4h, running in 200-300's. Repositioned q2h. Chronic caro catheter with low output. Dressing on coccyx is CDI.  BM x 2 this shift.

## 2017-07-08 NOTE — PROGRESS NOTES
"Care Coordination:    Routinely following.  Appreciate d/c orders.  Understand MD has reached out to pt's family re: discharge plans.  SW has set 2pm ride time.    Noted in d/c summary, \"\"son wants picc line & long term IV fluids.\" Pt does not have a PICC.  Paged hospitalist to clarify.     Noted nutrition note from 7/5: \"Pt originally at goal rate of 55mL/h but then held last night d/t suspected aspiration. Family wishes to continue with nutrition support; MD reinitiated TF at lower rate (45mL/h) and added D5. Provisions still adequate to meet needs.\"     Discussed w/MD: no plan for PICC, pt has a functioning G-tube for nutrition.      Tamar Montes De Oca RN, BSN  Critical access hospital Care Coordinator   Mobile Phone: [phone]    "

## 2017-07-08 NOTE — PROGRESS NOTES
Tee:    I/P:  Per MD pt is ready for d/c.  Per MD pt needs stretcher transport.  Per chart pt is nonverbal (cannot express needs), contracted, requires a mechanical lift, cannot sit.  Tee arranged stretcher transport with [employer] from  for today at 1400.  Tee confirmed with [facility] that pt can arrive today with a transport time of 1400.  Per MD son was left vm regarding d/c plan.  Tee faxed d/c orders.

## 2017-07-08 NOTE — PLAN OF CARE
Problem: Goal Outcome Summary  Goal: Goal Outcome Summary  Outcome: No Change  Pt is nonverbal, lethargic. BP elevated and tachypneic. Tele: NSR (tachy at times). BG q4h, running in 200's. Repositioned q2h. Chronic caro catheter flushed x1, after which had good output. Dressing on coccyx is CDI. Pt DCing to [facility] today.

## 2017-08-04 ENCOUNTER — HOSPITAL ENCOUNTER (INPATIENT)
Facility: CLINIC | Age: 82
LOS: 6 days | Discharge: NURSING, REHAB OR LONG-TERM CARE | DRG: 871 | End: 2017-08-10
Attending: EMERGENCY MEDICINE | Admitting: INTERNAL MEDICINE
Payer: COMMERCIAL

## 2017-08-04 DIAGNOSIS — N39.0 URINARY TRACT INFECTION ASSOCIATED WITH INDWELLING URETHRAL CATHETER, INITIAL ENCOUNTER (H): ICD-10-CM

## 2017-08-04 DIAGNOSIS — T83.511A URINARY TRACT INFECTION ASSOCIATED WITH INDWELLING URETHRAL CATHETER, INITIAL ENCOUNTER (H): ICD-10-CM

## 2017-08-04 DIAGNOSIS — A41.9 SEVERE SEPSIS WITHOUT SEPTIC SHOCK (H): ICD-10-CM

## 2017-08-04 DIAGNOSIS — R65.20 SEVERE SEPSIS WITHOUT SEPTIC SHOCK (H): ICD-10-CM

## 2017-08-04 DIAGNOSIS — M19.019 PRIMARY LOCALIZED OSTEOARTHROSIS OF SHOULDER REGION, UNSPECIFIED LATERALITY: ICD-10-CM

## 2017-08-04 DIAGNOSIS — N39.0 SEPSIS DUE TO URINARY TRACT INFECTION (H): Primary | ICD-10-CM

## 2017-08-04 DIAGNOSIS — A41.9 SEPSIS DUE TO URINARY TRACT INFECTION (H): Primary | ICD-10-CM

## 2017-08-04 LAB
ALBUMIN SERPL-MCNC: 2.8 G/DL (ref 3.4–5)
ALBUMIN UR-MCNC: 30 MG/DL
ALP SERPL-CCNC: 94 U/L (ref 40–150)
ALT SERPL W P-5'-P-CCNC: 24 U/L (ref 0–50)
ANION GAP SERPL CALCULATED.3IONS-SCNC: 12 MMOL/L (ref 3–14)
APPEARANCE UR: ABNORMAL
AST SERPL W P-5'-P-CCNC: 17 U/L (ref 0–45)
BACTERIA #/AREA URNS HPF: ABNORMAL /HPF
BASOPHILS # BLD AUTO: 0 10E9/L (ref 0–0.2)
BASOPHILS NFR BLD AUTO: 0.3 %
BILIRUB SERPL-MCNC: 0.5 MG/DL (ref 0.2–1.3)
BILIRUB UR QL STRIP: NEGATIVE
BUN SERPL-MCNC: 85 MG/DL (ref 7–30)
C DIFF TOX B STL QL: NORMAL
CALCIUM SERPL-MCNC: 9 MG/DL (ref 8.5–10.1)
CHLORIDE SERPL-SCNC: 114 MMOL/L (ref 94–109)
CO2 SERPL-SCNC: 22 MMOL/L (ref 20–32)
COLOR UR AUTO: ABNORMAL
CREAT SERPL-MCNC: 1.48 MG/DL (ref 0.52–1.04)
DIFFERENTIAL METHOD BLD: ABNORMAL
EOSINOPHIL # BLD AUTO: 0.6 10E9/L (ref 0–0.7)
EOSINOPHIL NFR BLD AUTO: 5 %
ERYTHROCYTE [DISTWIDTH] IN BLOOD BY AUTOMATED COUNT: 16.1 % (ref 10–15)
GFR SERPL CREATININE-BSD FRML MDRD: 33 ML/MIN/1.7M2
GLUCOSE BLDC GLUCOMTR-MCNC: 122 MG/DL (ref 70–99)
GLUCOSE BLDC GLUCOMTR-MCNC: 149 MG/DL (ref 70–99)
GLUCOSE BLDC GLUCOMTR-MCNC: 172 MG/DL (ref 70–99)
GLUCOSE BLDC GLUCOMTR-MCNC: 188 MG/DL (ref 70–99)
GLUCOSE BLDC GLUCOMTR-MCNC: 264 MG/DL (ref 70–99)
GLUCOSE BLDC GLUCOMTR-MCNC: 331 MG/DL (ref 70–99)
GLUCOSE BLDC GLUCOMTR-MCNC: 332 MG/DL (ref 70–99)
GLUCOSE SERPL-MCNC: 363 MG/DL (ref 70–99)
GLUCOSE UR STRIP-MCNC: 300 MG/DL
HCT VFR BLD AUTO: 39.6 % (ref 35–47)
HGB BLD-MCNC: 12.5 G/DL (ref 11.7–15.7)
HGB UR QL STRIP: ABNORMAL
IMM GRANULOCYTES # BLD: 0 10E9/L (ref 0–0.4)
IMM GRANULOCYTES NFR BLD: 0.3 %
KETONES BLD-SCNC: 0.2 MMOL/L (ref 0–0.6)
KETONES UR STRIP-MCNC: NEGATIVE MG/DL
LACTATE BLD-SCNC: 3.2 MMOL/L (ref 0.7–2.1)
LACTATE BLD-SCNC: 4 MMOL/L (ref 0.7–2.1)
LEUKOCYTE ESTERASE UR QL STRIP: ABNORMAL
LYMPHOCYTES # BLD AUTO: 3 10E9/L (ref 0.8–5.3)
LYMPHOCYTES NFR BLD AUTO: 26.5 %
MCH RBC QN AUTO: 31.3 PG (ref 26.5–33)
MCHC RBC AUTO-ENTMCNC: 31.6 G/DL (ref 31.5–36.5)
MCV RBC AUTO: 99 FL (ref 78–100)
MONOCYTES # BLD AUTO: 0.6 10E9/L (ref 0–1.3)
MONOCYTES NFR BLD AUTO: 5.2 %
MUCOUS THREADS #/AREA URNS LPF: PRESENT /LPF
NEUTROPHILS # BLD AUTO: 7.2 10E9/L (ref 1.6–8.3)
NEUTROPHILS NFR BLD AUTO: 62.7 %
NITRATE UR QL: POSITIVE
NRBC # BLD AUTO: 0 10*3/UL
NRBC BLD AUTO-RTO: 0 /100
PH UR STRIP: 6.5 PH (ref 5–7)
PLATELET # BLD AUTO: 200 10E9/L (ref 150–450)
POTASSIUM SERPL-SCNC: 4.3 MMOL/L (ref 3.4–5.3)
PROT SERPL-MCNC: 7.6 G/DL (ref 6.8–8.8)
RBC # BLD AUTO: 4 10E12/L (ref 3.8–5.2)
RBC #/AREA URNS AUTO: 137 /HPF (ref 0–2)
SODIUM SERPL-SCNC: 148 MMOL/L (ref 133–144)
SP GR UR STRIP: 1.01 (ref 1–1.03)
SPECIMEN SOURCE: NORMAL
URN SPEC COLLECT METH UR: ABNORMAL
UROBILINOGEN UR STRIP-MCNC: NORMAL MG/DL (ref 0–2)
WBC # BLD AUTO: 11.5 10E9/L (ref 4–11)
WBC #/AREA URNS AUTO: >182 /HPF (ref 0–2)
WBC CLUMPS #/AREA URNS HPF: PRESENT /HPF
YEAST #/AREA URNS HPF: ABNORMAL /HPF

## 2017-08-04 PROCEDURE — 85025 COMPLETE CBC W/AUTO DIFF WBC: CPT | Performed by: EMERGENCY MEDICINE

## 2017-08-04 PROCEDURE — 99285 EMERGENCY DEPT VISIT HI MDM: CPT

## 2017-08-04 PROCEDURE — 87186 SC STD MICRODIL/AGAR DIL: CPT | Performed by: INTERNAL MEDICINE

## 2017-08-04 PROCEDURE — 87800 DETECT AGNT MULT DNA DIREC: CPT | Performed by: EMERGENCY MEDICINE

## 2017-08-04 PROCEDURE — 87186 SC STD MICRODIL/AGAR DIL: CPT | Performed by: EMERGENCY MEDICINE

## 2017-08-04 PROCEDURE — 27210995 ZZH RX 272: Performed by: INTERNAL MEDICINE

## 2017-08-04 PROCEDURE — 00000146 ZZHCL STATISTIC GLUCOSE BY METER IP

## 2017-08-04 PROCEDURE — 12000007 ZZH R&B INTERMEDIATE

## 2017-08-04 PROCEDURE — 25000125 ZZHC RX 250: Performed by: INTERNAL MEDICINE

## 2017-08-04 PROCEDURE — 83605 ASSAY OF LACTIC ACID: CPT | Performed by: EMERGENCY MEDICINE

## 2017-08-04 PROCEDURE — 25000132 ZZH RX MED GY IP 250 OP 250 PS 637: Performed by: INTERNAL MEDICINE

## 2017-08-04 PROCEDURE — 87086 URINE CULTURE/COLONY COUNT: CPT | Performed by: INTERNAL MEDICINE

## 2017-08-04 PROCEDURE — 96361 HYDRATE IV INFUSION ADD-ON: CPT

## 2017-08-04 PROCEDURE — 25000128 H RX IP 250 OP 636: Performed by: EMERGENCY MEDICINE

## 2017-08-04 PROCEDURE — 87088 URINE BACTERIA CULTURE: CPT | Performed by: INTERNAL MEDICINE

## 2017-08-04 PROCEDURE — 25000131 ZZH RX MED GY IP 250 OP 636 PS 637: Performed by: INTERNAL MEDICINE

## 2017-08-04 PROCEDURE — 82010 KETONE BODYS QUAN: CPT | Performed by: EMERGENCY MEDICINE

## 2017-08-04 PROCEDURE — 96372 THER/PROPH/DIAG INJ SC/IM: CPT

## 2017-08-04 PROCEDURE — 87493 C DIFF AMPLIFIED PROBE: CPT | Performed by: EMERGENCY MEDICINE

## 2017-08-04 PROCEDURE — 81001 URINALYSIS AUTO W/SCOPE: CPT | Performed by: EMERGENCY MEDICINE

## 2017-08-04 PROCEDURE — 80053 COMPREHEN METABOLIC PANEL: CPT | Performed by: EMERGENCY MEDICINE

## 2017-08-04 PROCEDURE — 87040 BLOOD CULTURE FOR BACTERIA: CPT | Performed by: EMERGENCY MEDICINE

## 2017-08-04 PROCEDURE — 97602 WOUND(S) CARE NON-SELECTIVE: CPT

## 2017-08-04 PROCEDURE — 96367 TX/PROPH/DG ADDL SEQ IV INF: CPT

## 2017-08-04 PROCEDURE — 25000131 ZZH RX MED GY IP 250 OP 636 PS 637: Performed by: EMERGENCY MEDICINE

## 2017-08-04 PROCEDURE — 87077 CULTURE AEROBIC IDENTIFY: CPT | Performed by: EMERGENCY MEDICINE

## 2017-08-04 PROCEDURE — 36415 COLL VENOUS BLD VENIPUNCTURE: CPT | Performed by: INTERNAL MEDICINE

## 2017-08-04 PROCEDURE — 36415 COLL VENOUS BLD VENIPUNCTURE: CPT | Performed by: EMERGENCY MEDICINE

## 2017-08-04 PROCEDURE — 99213 OFFICE O/P EST LOW 20 MIN: CPT | Mod: 25

## 2017-08-04 PROCEDURE — 96365 THER/PROPH/DIAG IV INF INIT: CPT

## 2017-08-04 PROCEDURE — 83605 ASSAY OF LACTIC ACID: CPT | Performed by: INTERNAL MEDICINE

## 2017-08-04 PROCEDURE — 99223 1ST HOSP IP/OBS HIGH 75: CPT | Mod: AI | Performed by: INTERNAL MEDICINE

## 2017-08-04 RX ORDER — SODIUM CHLORIDE 450 MG/100ML
INJECTION, SOLUTION INTRAVENOUS CONTINUOUS
Status: DISCONTINUED | OUTPATIENT
Start: 2017-08-04 | End: 2017-08-06

## 2017-08-04 RX ORDER — ONDANSETRON 2 MG/ML
4 INJECTION INTRAMUSCULAR; INTRAVENOUS EVERY 6 HOURS PRN
Status: DISCONTINUED | OUTPATIENT
Start: 2017-08-04 | End: 2017-08-10 | Stop reason: HOSPADM

## 2017-08-04 RX ORDER — OXYCODONE HCL 5 MG/5 ML
2 SOLUTION, ORAL ORAL 3 TIMES DAILY
Status: ON HOLD | COMMUNITY
End: 2017-08-23

## 2017-08-04 RX ORDER — SIMETHICONE 40MG/0.6ML
80 SUSPENSION, DROPS(FINAL DOSAGE FORM)(ML) ORAL 3 TIMES DAILY
COMMUNITY

## 2017-08-04 RX ORDER — NICOTINE POLACRILEX 4 MG
15-30 LOZENGE BUCCAL
Status: DISCONTINUED | OUTPATIENT
Start: 2017-08-04 | End: 2017-08-10 | Stop reason: HOSPADM

## 2017-08-04 RX ORDER — NALOXONE HYDROCHLORIDE 0.4 MG/ML
.1-.4 INJECTION, SOLUTION INTRAMUSCULAR; INTRAVENOUS; SUBCUTANEOUS
Status: DISCONTINUED | OUTPATIENT
Start: 2017-08-04 | End: 2017-08-10 | Stop reason: HOSPADM

## 2017-08-04 RX ORDER — SODIUM CHLORIDE 9 MG/ML
INJECTION, SOLUTION INTRAVENOUS CONTINUOUS
Status: CANCELLED | OUTPATIENT
Start: 2017-08-04

## 2017-08-04 RX ORDER — CEFTRIAXONE 1 G/1
1 INJECTION, POWDER, FOR SOLUTION INTRAMUSCULAR; INTRAVENOUS ONCE
Status: COMPLETED | OUTPATIENT
Start: 2017-08-04 | End: 2017-08-04

## 2017-08-04 RX ORDER — OXYCODONE HCL 5 MG/5 ML
2 SOLUTION, ORAL ORAL EVERY 4 HOURS PRN
Status: DISCONTINUED | OUTPATIENT
Start: 2017-08-04 | End: 2017-08-10 | Stop reason: HOSPADM

## 2017-08-04 RX ORDER — SODIUM CHLORIDE 9 MG/ML
1000 INJECTION, SOLUTION INTRAVENOUS CONTINUOUS
Status: DISCONTINUED | OUTPATIENT
Start: 2017-08-04 | End: 2017-08-04

## 2017-08-04 RX ORDER — DEXTROSE MONOHYDRATE 25 G/50ML
25-50 INJECTION, SOLUTION INTRAVENOUS
Status: DISCONTINUED | OUTPATIENT
Start: 2017-08-04 | End: 2017-08-10 | Stop reason: HOSPADM

## 2017-08-04 RX ORDER — ONDANSETRON 4 MG/1
4 TABLET, ORALLY DISINTEGRATING ORAL EVERY 6 HOURS PRN
Status: DISCONTINUED | OUTPATIENT
Start: 2017-08-04 | End: 2017-08-10 | Stop reason: HOSPADM

## 2017-08-04 RX ADMIN — ACETAMINOPHEN 650 MG: 325 SOLUTION ORAL at 21:05

## 2017-08-04 RX ADMIN — INSULIN ASPART 1 UNITS: 100 INJECTION, SOLUTION INTRAVENOUS; SUBCUTANEOUS at 16:32

## 2017-08-04 RX ADMIN — INSULIN ASPART 10 UNITS: 100 INJECTION, SOLUTION INTRAVENOUS; SUBCUTANEOUS at 05:12

## 2017-08-04 RX ADMIN — CEFTRIAXONE 1 G: 1 INJECTION, POWDER, FOR SOLUTION INTRAMUSCULAR; INTRAVENOUS at 06:10

## 2017-08-04 RX ADMIN — SODIUM CHLORIDE 1000 ML: 9 INJECTION, SOLUTION INTRAVENOUS at 05:57

## 2017-08-04 RX ADMIN — METOPROLOL TARTRATE 25 MG: 100 TABLET ORAL at 21:05

## 2017-08-04 RX ADMIN — METOPROLOL TARTRATE 25 MG: 100 TABLET ORAL at 11:35

## 2017-08-04 RX ADMIN — ACETAMINOPHEN 650 MG: 325 SOLUTION ORAL at 11:35

## 2017-08-04 RX ADMIN — SODIUM CHLORIDE: 4.5 INJECTION, SOLUTION INTRAVENOUS at 16:43

## 2017-08-04 RX ADMIN — RANITIDINE HYDROCHLORIDE 150 MG: 150 SOLUTION ORAL at 11:36

## 2017-08-04 RX ADMIN — Medication 15 ML: at 11:36

## 2017-08-04 RX ADMIN — INSULIN ASPART 3 UNITS: 100 INJECTION, SOLUTION INTRAVENOUS; SUBCUTANEOUS at 21:00

## 2017-08-04 RX ADMIN — SODIUM CHLORIDE: 4.5 INJECTION, SOLUTION INTRAVENOUS at 09:18

## 2017-08-04 RX ADMIN — INSULIN GLARGINE 30 UNITS: 100 INJECTION, SOLUTION SUBCUTANEOUS at 21:00

## 2017-08-04 RX ADMIN — ACETAMINOPHEN 650 MG: 325 SOLUTION ORAL at 16:33

## 2017-08-04 RX ADMIN — INSULIN GLARGINE 30 UNITS: 100 INJECTION, SOLUTION SUBCUTANEOUS at 12:13

## 2017-08-04 RX ADMIN — VANCOMYCIN HYDROCHLORIDE 1500 MG: 5 INJECTION, POWDER, LYOPHILIZED, FOR SOLUTION INTRAVENOUS at 06:45

## 2017-08-04 RX ADMIN — INSULIN ASPART 1 UNITS: 100 INJECTION, SOLUTION INTRAVENOUS; SUBCUTANEOUS at 10:24

## 2017-08-04 RX ADMIN — SODIUM CHLORIDE 1000 ML: 9 INJECTION, SOLUTION INTRAVENOUS at 05:16

## 2017-08-04 NOTE — PHARMACY-ADMISSION MEDICATION HISTORY
Admission medication history interview status for the 8/4/2017  admission is complete. See EPIC admission navigator for prior to admission medications     Medication history source reliability:Good    Medication history interview source(s): Nursing Home [facility] ([facility])    Medication history resources (including written lists, pill bottles, clinic record):None    Actions taken by pharmacist (provider contacted, etc):None     Additional medication history information not noted on PTA med list : Note that MAR has record of patient's blood glucose readings, taken at 0200, 0800, 1400, & 2000. For the past three days, typical fasting values appear to be in the 300s, with post-meal values in the 400s.    Medication reconciliation/reorder completed by provider prior to medication history? Yes    Time spent in this activity: 25 minutes    Prior to Admission medications    Medication Sig Last Dose Taking? Auth Provider   oxyCODONE (ROXICODONE) 5 MG/5ML solution Take 2 mg by mouth 3 times daily 8/3/2017 at 2000 Yes Unknown, Entered By History   simethicone (MYLICON) 40 MG/0.6ML suspension Take 80 mg by mouth 3 times daily 8/3/2017 at 2000 Yes Unknown, Entered By History   insulin glargine (LANTUS) 100 UNIT/ML injection Inject 30 Units Subcutaneous 2 times daily 8/3/2017 at 0800 Yes Piotr Saba MD   ranitidine (ZANTAC) 150 MG/10ML syrup 10 mLs (150 mg) by Per Feeding Tube route daily 8/3/2017 at 0800 Yes Piotr Saba MD   vitamin A-D & C drops (TRI-VI-SOL) 750-400-35 UNIT-MG/ML solution NEW FORMULATION 7 mLs by Per G Tube route daily 8/3/2017 at 0800 Yes Piotr Saba MD   oxyCODONE (ROXICODONE) 5 MG/5ML solution 2 mLs (2 mg) by Per G Tube route every 4 hours as needed for moderate to severe pain PRN at PRN Yes Piotr Saba MD   metoprolol (LOPRESSOR) 10 mg/mL SUSP Take 2.5 mLs (25 mg) by mouth 2 times daily 8/3/2017 at 2000 Yes Piotr Saba MD   INSULIN ASPART SC Inject 1-6 Units  Subcutaneous every 6 hours 0800, 1400, 2000, 0200  -250 1 unit  -300 2 units  -350 3 units  -400 4 units  -450 5 units  BG >450 6 units and update MD 8/3/2017 at 0800 Yes Unknown, Entered By History   Dakins 0.125 % SOLN Externally apply topically 2 times daily BID and PRN.   1. Cleanse wound with microklenze, cleanse periwound area with naomi perineal  2. Moisten kerlix fluff with dakins solution 0.125%, wring out excess, pack wound with kerlix  3. Apply antifungal powder to periwound area, rub in. Apply criticaid over powder.  4. Cover with ABD using minimal medipore tape to secure.  5. Label dressing with date, time, initials. Follow Rigorous PIP measures. 8/3/2017 at 1600 Yes Reported, Patient   Lactobacillus Acidophilus POWD 1 capsule by Gastric Tube route daily 10 billion units  8/3/2017 at 0800 Yes Unknown, Entered By History   multivitamin, therapeutic (THERA-VIT) TABS tablet 1 tablet by Gastric Tube route daily 8/3/2017 at 0800 Yes Unknown, Entered By History   Loperamide HCl (IMODIUM A-D) 1 MG/7.5ML LIQD 4 mg by Gastric Tube route every other day 8/3/2017 at 0800 Yes Unknown, Entered By History   acetaminophen (TYLENOL) 32 mg/mL solution 325 mg by Gastric Tube route daily as needed for fever or mild pain PRN at PRN Yes Unknown, Entered By History   ACETAMINOPHEN  mg by Gastric Tube route 3 times daily  8/3/2017 at 2000 Yes Unknown, Entered By History   bisacodyl (DULCOLAX) 10 MG suppository Place 10 mg rectally daily as needed for constipation Past Month at Unknown time Yes Unknown, Entered By History

## 2017-08-04 NOTE — PROGRESS NOTES
[facility] Nurse Inpatient Adult Pressure Injury (PI) Assessment     Initial Assessment of PI(s) on pt's:   Coccyx/sacrum; bilateral outer ears (helix)    Data:   Patient History:    Complex PMHx: advanced dementia, aspirations pneumonia, indwelling urinary cath, UTIs, DM, CKD, feeding tube, chronic sacral wound,   Celia Lewis is a 89-year-old female well known to me from multiple prior admissions, now readmitted with hyperglycemia, possible low-grade fever. Workup so far shows abnormal urinalysis which has generally been true with her having catheter in place but may be a true urinary tract infection. Not much in the way of respiratory symptoms but recurrent aspiration with minimal symptoms except fever previously. Also has an ongoing decubitus wound but does not look infected.   2. Chronic sacral decubitus wound. Prior cultures grew multiple organisms as would always be the case in this setting but nothing to suggest deeper major infection or cellulitis at present.   3. Prior MRSA positive urine culture, current urine abnormal, await culture.   4. History of probable recurrent aspiration, although not particularly clearly documented, previously treated it as aspiration.   5. Severe dementia.   6. Feeding tube in place, aspiration as a prior potential issue.   7. Diabetes mellitus.   8. Chronic mild renal insufficiency, some acute worsening at present.   9. Sulfa allergy.         Current mattress: atmos air  Current pressure relieving devices:  Pillows      Moisture Management:  Incontinence Protocol and Urinary Catheter    Catheter secured? No secondary to severe contractures      Current Diet / Nutrition:       Active Diet Order: TF      Deshawn Assessment and sub scores:  Deshawn Score  Av.3  Min: 7  Max: 1    Labs:   Recent Labs   Lab Test  17   0450   06/15/17   0833  17   1200   13   1300   ALBUMIN  2.8*   < >   --   2.5*   < >   --    HGB  12.5   < >  11.0*  12.4    < >  13.2   INR   --    --    --   1.10   < >   --    WBC  11.5*   < >  13.9*  18.7*   < >  9.3   A1C   --    --   8.4*   --    < >   --    CRP   --    --    --    --    --   11.0*    < > = values in this interval not displayed.                                                                                                                          Pressure Injury Assessment  (location):   Coccyx/sacrum  - Present on admission    Wound History:   Pt just readmitted with wound on the coccyx.  During previous admission wound was debrided, 06-15-17.     Coccyx:          -Size:  5cm x 5cm x up to 2cm.  Wound is about 5% moist, soft slough / 95%% moist, bumpy, shiny red tissue.           -Wound base: slightly soft but unable to probe any tunnels         -Undermining/Tunnes: none noted         -Drainage: scant sero-sang         -jimbo-wound skin: slightly blanchable ecchymosis noted from apporx 1-7 o'clock extending out 1.5cm           Pressure Injury Assessment:   Bilateral outer ears (mid-helix)  -Present on admission  Wound History:   Present on admission; pt lies directly on ears when on sides.  No O2 noted today    Specific Dimensions (length x width x depth, in cm) :     1.  Left ear:  Approx. 2 x 0.5 cm area of scattered lightly scabby tissue that is sloughing off and some red moist areas, scant serosang drainage. New DTI on posterior ear with skin intact.  2.  Right ear: Approx. 1.5cm x .5cm x superficial  cm area of scattered reddened areas, scabs and  moist pale red tissue. Scant serous drainage. Again new DTI on posterior ear with skin intact    Periwound Skin: scattered mild erythema     Odor: none    Pain:  absent           Intervention:     Patient's chart evaluated.      Deshawn Interventions:  Current Deshawn Interventions and Care Plan reviewed and updated, appropriate at this time.    Wounds assessed         -Wounds cleaned MicroKlenz         -Coccyx: packed Aqua cell AG and covered Mepilex sacral          -Bilateral ear (helix): small piece of polymen secures with Comfeel hydrocolloid         -Pt repositioned on Lt side    Orders  In Epic    Supplies  Reviewed     POC: son not in room for discussion           Assessment:      coccyx/sacrum: Now Stage 4 PI, community acquired, no local s/s infections, possible jimbo-wound candida      Bilateral ears:  Mix stage 2 and unstageable PI, community acquired, no local s/s infection           Plan:     Nursing to notify the Provider(s) and re-consult the WO Nurse if wound(s) deteriorate(s)or if the wound care plan needs reevaluation.    Plan of care for wound on coccyx/ears: change dressing on even days and prn  1. Clean all wounds w with MicroKlenz Spray, pat dry  2. Coccyx:        -pack wound with strip of Aqua cell AG,        -dust jimbo-ulcer skin with Desenex brushing off excess       -seal in powder with 3M NO sting skin prep       -Cover all with Mepliex sacral  3. bilateral outer ears:        -skin prep wounds on ears. Let dry       -Cut stirps of Mepilex lite and wrap around ear to secure  4. PIP measures       - Rt and Lt postioning only  -Consider Z-Martin Pillows to help keep pt on side (#[id number] or #[id number] large). *Z-Flows are for positioning, DO NOT SIT ON!   -Keep heels elevated, use heel lift boots in room  - keep HOB @ 30 degrees for TF  -Incontinence protocol prn. No diaper on patient. Use incontinence pad only        -Deshawn Risk: document interventions        -If anticipate long LOS order a pulsate mattress for additional off-loading        -Full skin inspection BID. Document findings      WO Nurse will return: weekly and prn  Face to face time: 35+ Minutes

## 2017-08-04 NOTE — CONSULTS
ID consult dictated   IMPRESSION:   1.  A 89-year-old female, nursing home resident, prior known aspiration, chronic Fonseca catheter and UTIs , advanced Alzheimer's dementia, multiple similar recent admissions, now readmitted with hyperglycemia, leukocytosis, ? low-grade sepsis, etiology still not entirely clear, prior treated MRSA UTI , now UA abnormal, pending  cultures.  Still doubt wound is  the issue , not clearcut pneumonia but obvious aspiration risk.   2.  Chronic sacral decubitus wound, somewhat necrotic, but no signs of infection and it does not tract deep had prior I&D done, prior culture of the wound growing multiple organisms of no significance by itself.   3.  MRSA positive urine culture prior admit, treated, UA abnormal again with with catheter in place, but current UC pending   4.  Minimal infiltrate, mild  hypoxia, mostly resolved, possible aspiration pneumonia.  If so has improved.   5.  Severe dementia.   6.  Recurrent urinary tract infections and urinary colonization with Fonseca catheter in place.   7.  Feeding tube in place, prior probable aspiration, possible etiology to current illness as well.   8.  Diabetes mellitus.   9.  Chronic mild renal insufficiency, acute worsening now  10.  Sulfa allergy.       RECOMMENDATIONS:   1.  No indication to treat sacral decubitus with antibiotics, such a wound culture of no clinical significance unless deep osteo or cellulitis and neither evident. .  Doubt this is the cause of current fever and has already been debrided recently, if it is cause of fever due to deep infection, no long term antibiotic strategy of likely benefit, would require major surgical intervention also.   2.  Urine cx pending as are Bc, no CXR, vanco/ceftriaxone, await cxs and adjust     3.  Obviously long-term prognosis poor, especially if recurrently aspirating, prior discussions and ethics as noted so still restorative care,

## 2017-08-04 NOTE — CONSULTS
"CLINICAL NUTRITION SERVICES  -  ASSESSMENT NOTE      Recommendations Ordered by Registered Dietitian (RD):   Isosource 1.5 at goal of 45 mL/hr (see details below) + daily Certavite     Malnutrition:   Patient does not meet two of the criteria necessary for diagnosing malnutrition        REASON FOR ASSESSMENT  Celia Lewis is a 89 year old female seen by Registered Dietitian for Provider Order - Registered Dietitian to Assess and Order TF per Medical Nutrition protocol      NUTRITION HISTORY  - Information obtained from EMR, pt is non-verbal. Pt is well-known to our services from admits in June. Her G-tube was placed 6/16/2016.  When she d/c'd she was on Isosource 1.5 @ 45 mL/hr which she continued at NH, per their records. This [id number] kcals, 73g PRO, 190g CHO, 16g fiber and 820 mL free water. She receives H2O flushes of 200 mL q 4 hours at the NH.  Home meds: Vit A, D, C; Lactobacillus, daily multivit, Imodium qod.      CURRENT NUTRITION ORDERS  Diet Order:     NPO     PHYSICAL FINDINGS  Observed  No nutrition-related physical findings observed  Obtained from Chart/Interdisciplinary Team  Pressure Ulcers Stage III sacral decubitus ulcer, chronic. Also has a pressure ulcer over the left lower pole of the left ear.     ANTHROPOMETRICS  Height: 5' 0\"  Weight: 142 lbs 10.2 oz (64.7 kg)  Body mass index is 27.86 kg/(m^2).  Weight Status:  Overweight BMI 25-29.9  IBW: 45.4 kg +/- 10%  % IBW: 142%  Weight History: If current wt is accurate pt has lost 15# (9%) in the past month. Wt seems stable prior to that.  Since she has been on TF during this time, which provided adequate calories, would question current wt.  Wt Readings from Last 10 Encounters:   08/04/17 64.7 kg (142 lb 10.2 oz)   07/06/17 71.4 kg (157 lb 6.4 oz)   06/19/17 69.3 kg (152 lb 12.5 oz)   06/24/16 72.2 kg (159 lb 3.2 oz)   06/18/15 83.9 kg (185 lb)   05/29/15 81.6 kg (180 lb)   05/28/15 81.6 kg (180 lb)   05/22/15 81.6 kg (179 lb 12.8 oz) "   06/24/14 77.5 kg (170 lb 13.7 oz)   08/26/12 82.1 kg (181 lb)         LABS  Labs reviewed    MEDICATIONS  Na IVF @ 125 mL/hr  Med SSI  Lantus BID    Dosing Weight 50 kg - adjusted for overweight    ASSESSED NUTRITION NEEDS PER APPROVED PRACTICE GUIDELINES:  Estimated Energy Needs: 0354-3685 kcals (30-35 Kcal/Kg)  Justification: Wound healing guidelines  Estimated Protein Needs: 60-75 grams protein (1.2-1.5 g pro/Kg)  Justification: wound healing - monitor renal status  Estimated Fluid Needs: 1581-9334  mL (1 mL/Kcal)  Justification: maintenance    MALNUTRITION:  % Weight Loss:  > 5% in 1 month (severe malnutrition)  % Intake:  No decreased intake noted  Subcutaneous Fat Loss:  None observed  Muscle Loss:  None observed  Fluid Retention:  None noted    Malnutrition Diagnosis: Patient does not meet two of the above criteria necessary for diagnosing malnutrition      NUTRITION DIAGNOSIS:  Increased nutrient needs (protein) related to healing as evidenced by chronic sacral PI and left ear PI      NUTRITION INTERVENTIONS  Recommendations / Nutrition Prescription  Nutrition Support Enteral:  Type of Feeding Tube: G-tube  Enteral Frequency:  Continuous  Enteral Regimen: Isosource 1.5 @ 45 mL/hr  Total Enteral Provisions: 1620 kcal (32 kcal/kg), 73 g PRO (1.5 g/kg), 190 g CHO, 16.2g fiber, and 820 mL free water  Free Water Flush: 60 mL q 4 hour while on IVF.    Once IVF d/c'd, will increase H2O flushes to 130 mL q 4 hours for total fluid (TF + flushes) = 1600 mL/da.    Daily Certavite per PI protocol. Did not order Vit A, Vit C, or Zinc as pt had been on this regimen past admits and more recently, has been receiving Vit A and C at the NH.      Implementation  Nutrition education: No education needs at this time  EN Schedule - ordered the above TF and Certavite; can initiate TF at goal.  Collaboration and Referral of Nutrition care - discussed with bedside RN      Nutrition Goals  Isosource 1.5 at goal of 45 mL/hr will  meet 100% of assessed needs.      MONITORING AND EVALUATION:  Progress towards goals will be monitored and evaluated per protocol and Practice Guidelines    Bisi Montgomery RD  Pager [phone] (M-F)            [phone] (W/E & Hol)

## 2017-08-04 NOTE — ED NOTES
Bed: ED25  Expected date: 8/4/17  Expected time:   Means of arrival:   Comments:  443 89f/hyperglycemia

## 2017-08-04 NOTE — PLAN OF CARE
Problem: Goal Outcome Summary  Goal: Goal Outcome Summary  Outcome: No Change  Pt non-verbal, nurse manager from [facility] verified that is pt's baseline. Bedrest, reposition w/assist of 2 every 2 hours. VSS. , 122. Medications given through peg tube. IV fluids infusing. Tube feeding infusing. Wound on coccyx, wound consult ordered, cleaned w/microklenz spray and covered w/mepilex. Caro catheter patent, caro catheter pre-existing w/unknown insertion date. Bilateral lower extremities flextion contracted. Skin dry, flaky, w/blanchable redness on bilateral upper extremities. Loose BM x2, fecal incontinent. Will continue to monitor.

## 2017-08-04 NOTE — PROVIDER NOTIFICATION
Writer web paged MD about 0800 insulin. , pt not eating and waiting for nutrition consult, does MD want pt to receive morning insulin and Lantus? Waiting for reply    MD returned call and recommended Lantus to be held until tube feedings begin, correction insulin still to be given.

## 2017-08-04 NOTE — IP AVS SNAPSHOT
` `           [place] [address] [facility]: [phone]                 INTERAGENCY TRANSFER FORM - NOTES (H&P, Discharge Summary, Consults, Procedures, Therapies)   2017                    Hospital Admission Date: 2017  RAÚL MERCEDES   : 1926  Sex: Female        Patient PCP Information     None on File         History & Physicals      H&P signed by Felicitas Cedeño DO at 2017 12:09 AM      Author:  Felicitas Cedeño DO Service:  Hospitalist Author Type:  Physician    Filed:  2017 12:09 AM Date of Service:  2017  7:07 AM Creation Time:  2017  7:43 AM    Status:  Signed :  Felicitas Cedeño DO (Physician)         CHIEF COMPLAINT:  Hyperglycemia and low-grade fever.      HISTORY OF PRESENT ILLNESS:  Raúl Mercedes is a 89-year-old female with a rather complicated past medical history which includes advanced dementia for which she is nonambulatory and nonverbal and resides in a nursing home as well as a history of recurrent infections, chronic sacral decubitus ulcer, history of coronary artery disease and diastolic dysfunction, history of atrial fibrillation, hypertension and type 2 diabetes who was brought to the emergency room tonight for evaluation of hyperglycemia and a low-grade fever.  History is obtained per discussions with ED physician and review of the chart.      The patient was hospitalized here just over a month ago from  to  for management of fevers which was thought to be secondary to a health care associated pneumonia versus aspiration.  Additionally, she was noted to have a history of urine cultures that were positive for MRSA.  During that hospital stay she was evaluated by the Infectious Disease service and was ultimately discharged on 1 week course of Levaquin and linezolid.  It was also during that stay that an ethics consult was done.  It is noted that patient is [employer] and has very strong Druze  beliefs.  Her son stated he had wanted to respect those wishes and according to these [employer] beliefs, he cannot change her code status to a DNR/DNI.  Ultimately the decision was made to continue pursuing restorative cares.  From what I can gather, it sounds as though she has been relatively stable in the four weeks since her discharge.  Overnight staff at her nursing facility noted that she was hyperglycemic with a blood sugar of 332.  She was also noted to have a low-grade fever though it is not clear to what her temperature actually was.  She was transferred to [facility] for further evaluation.      In the emergency room, she was seen and evaluated by Dr. Rubi.  She was afebrile and hemodynamically stable.  Her blood pressures were actually elevated to the 140s-150s systolic.  O2 sats were in the 90s on room air.  Labs were notable for a white count of 11.5, creatinine of 1.48, sodium of 148, glucose of 331 and a lactic acid of 4.0.  She had a grossly abnormal urinalysis with a large leukocyte esterase, greater than 182 WBCs, positive nitrites, moderate blood, many bacteria, mucus yeast and white blood cell clumps.  Additionally, staff noted she had several episodes of foul smelling diarrhea concerning for possible C. diff.  Blood and urine cultures were obtained, as was a stool for C. diff.  In the emergency room, she was given a liter of normal saline, Rocephin, vancomycin and 10 units of insulin.  Although she has an elevated lactate, she appears clinically stable and will be admitted to the general medical floor for ongoing care.      PAST MEDICAL HISTORY:   1.  Advanced dementia.  At baseline patient is nonverbal and nonambulatory.  As she has been bed bound, she has developed contractures and she has noted a pressure ulcer over her left ear and over sacrum.  She has a PEG tube and a chronic Fonseca catheter.   2.  Type 2 diabetes.  Most recent A1c was 8.4 in 06/2017.   3.   Hypertension.   4.  History of coronary artery disease.   5.  Stage III chronic kidney disease.  Her baseline creatinine seems to be around 1.0-1.2.   6.  History of CHF.  Her last echocardiogram was in 08/2012 and ejection fraction at that time was 55-60% with no mention of diastolic dysfunction.     7.  History of nephrolithiasis with findings of a uric acid stone.   8.  Obesity.   9.  Osteoarthritis.   10.  History of recurrent UTIs with prior urine cultures growing MRSA.   11.  History of atrial fibrillation.      PAST SURGICAL HISTORY:     1.  Cystoscopy and lithotripsy in 2012.   2.  History of prior ureteral stent placements.   3.  History of PEG tube placement.      FAMILY HISTORY:  Both her mother and father had a history of strokes.  Brother with history of diabetes.      SOCIAL HISTORY:  The patient has a history of tobacco use, though this was many, many years ago and she reportedly only smoked socially.  She does not consume alcohol.  Again, given the advanced nature of her dementia she is presently bed bound and resides in a nursing home.      HOME MEDICATIONS:  Awaiting completion of medication reconciliation per pharmacy, however, as per her last discharge on 7/08 she is maintained on the following medications:   1.  Lantus 30 units b.i.d.   2.  Zantac 150 mg daily.     3.  Vitamin A, D, and C drops daily.   4.  Oxycodone 5 mg every 4 hours as needed.   5.  Metoprolol 25 mg b.i.d.   6.  Sliding scale NovoLog.   7.  Lactobacillus daily.   8.  Daily multivitamin.   9.  Imodium every other day.     10.  Tylenol daily at 650 mg t.i.d. with an additional 325 mg daily as needed.    11.  Bisacodyl suppositories as needed.        She completed 1 week course of linezolid and Levaquin as was prescribed at the time of that discharge.      ALLERGIES:  Sulfa drugs are listed but no reaction is documented.      REVIEW OF SYSTEMS:  Unable to review complete review of systems given the patient's medical problems and  nonverbal status, as outlined above.      PHYSICAL EXAMINATION:   VITAL SIGNS:  Temperature 98.1, pulse 86, respirations 20, blood pressure 156/82.  O2 sat is 100% on room air.   GENERAL:  The patient is a chronically ill-appearing female lying in bed.  She is nonverbal.  She does not follow any commands nor does she appear to show any sort of response when spoken to.  She does open her eyes and periodically look around the room.  She does not appear uncomfortable or in any acute distress.   HEENT:  Pupils equal, round, reactive to light, extra ocular movements are intact.  Mucous membranes moist.   CARDIOVASCULAR:  Heart rhythm regular, no murmurs, gallops, rubs.  Pulses are +2 and symmetric in bilateral upper extremities, there is no extremity edema.   RESPIRATORY:  Lungs appear clear to auscultation bilaterally.  I cannot appreciate any wheezes, rales or rhonchi.  She does not appear to demonstrate any increased work of breathing.   ABDOMEN:  Soft, nontender, nondistended.  There are positive bowel sounds throughout.  PEG tube is noted.   GENITOURINARY:  Chronic Fonseca catheter is in place and with clear yellow urine.     INTEGUMENT:  Patient has a sacral decubitus ulcer that does not appear to demonstrate any signs of acute infection.  There is no surrounding erythema or purulent drainage.  She also was noted to have a pressure ulcer over the posterior auricle of the left ear.   NEUROLOGIC:  Again patient is nonverbal and not following commands.     EXTREMITIES:  She has no contractures in her lower extremities.      LABORATORY DATA/IMAGING:    BMP shows sodium of 140, potassium 4.3, bicarb 22, creatinine 1.48.  LFTs were grossly unremarkable.  Ketones were within normal limits, a lactic acid was 4.0, blood sugar was 331.    CBC showed a white count 11.5, hemoglobin 12.5, platelet count of 200.      Urinalysis was grossly abnormal with large leukocyte esterase, greater than 182 WBCs, positive nitrites, moderate  blood with greater 137 RBCs, many bacteria, white blood cell clumps, yeast and mucus.     Blood cultures, urine culture and C. diff are pending.      ASSESSMENT AND PLAN:  Celia Lewis is a 89-year-old female with advanced dementia who is nonverbal at baseline and bed bound with other medical problems including recurrent urinary tract infections, hypertension and type 2 diabetes as well as stage III CKD and a chronic stage III sacral decubitus ulcer who was brought to the emergency room for evaluation of low-grade fever and hyperglycemia.  She was found to have elevated creatinine, mild leukocytosis and elevated lactate.  She had several episodes of loose stools well in the emergency room that are concerning for C. diff.  She is being admitted today for ongoing care.   1.  Sepsis, suspected secondary to recurrent urinary tract infection verses possible C. diff.  The patient has history of chronic indwelling Fonseca catheter and prior MRSA infection in the urine.  During her most recent hospitalization a month ago, she was treated for suspected HCAP versus aspiration.  The patient is afebrile in the emergency room.  Her white count is minimally elevated at 11.5 however, her lactate is elevated at 4.0.  Urinalysis is grossly abnormal.  Loose stools in the emergency room are concerning for C. diff.  Her sacral decubitus ulcer appears stable and without signs of acute infection.  In the emergency room, she was given a dose of Rocephin and vancomycin and 1/2 liter of normal saline.  Blood and urine cultures are pending as is a stool for C. diff.  Given her complex history of infections, Infectious Disease service has been consulted and I appreciate their assistance with decision on ongoing antibiotics.  She received a liter of normal saline in the emergency room, will repeat a lactate when she gets to the floor.  It sounds as though she had issues with elevated lactate during her last hospitalization which were not  thought to be secondary to infection; however, her lactate at present is higher than it was during her last stay.   2.  Acute kidney injury on stage III CKD.  Creatinine today is 1.4.  It looks as though her baseline is somewhere around 1.0-1.2.   Presumably prerenal injury.  She was given a liter of normal saline downstairs.  We will continue maintenance IV fluids at 125 cc per hour.   3.  Type 2 diabetes.  Most recent A1c was 8.4 in 6/2017.  She is managed with Lantus and sliding scale NovoLog.  Her blood sugars were in the 300 overnight.  She was given 10 units of insulin in the ER.  Will continue her Lantus and medium dose sliding scale NovoLog every 4 hours.   4.  Advanced dementia.  Baseline nonverbal and noncommunicative.  She is bed bound and has noted contractures.  She has a feeding tube and a chronic Fonseca catheter.  I have placed a consult to nutrition to continue her tube feeds.  Note that during last hospitalization an ethics consult was pursued given her advanced medical conditions and poor prognosis; the patient was noted to be Sh[employer] and ultimately the decision was made to continue treating current acute medical conditions as they arise.   5.  Stage III sacral decubitus ulcer, chronic.  No signs of acute infection.  Wound care nurse has seen consulted.  I do have concerns with her ongoing loose stools and possible C. diff that this could become infected; their assistance with care is greatly appreciated.   6.  Hypertension.  Noted during last hospitalization and was started on metoprolol.  Blood pressures are stable, if not a little elevated, so metoprolol will be continued.  7.  Hypernatremia.  Appears to be chronic and was thought to be secondary to her tube feeds.  Sodium tonight is 148.  Continue to monitor and see if we can adjust tube feeds accordingly.  She will recieve IV fluids with 0.45 normal saline at 125ml per hour.  8.  Deep venous thrombosis prophylaxis:  PCDs.   9.  Code  status:  Full code.      As I anticipate the patient will be here for greater than 2 midnight stay, she has been admitted under inpatient status.         FELICITAS TONEY DO             D: 2017 07:07   T: 2017 07:43   MT: [id number]      Name:     RAÚL MERCEDES   MRN:      [id number]        Account:      [id number]   :      1926           Admitted:     [id number]      Document: [id number][KW1.1]         Revision History        User Key Date/Time User Provider Type Action    > KW1.1 2017 12:09 AM Felicitas Toney DO Physician Sign     [N/A] 2017  7:43 AM Felicitas Toney DO Physician Edit            H&P by Felicitas Toney DO at 2017  7:12 AM     Author:  Felicitas Toney DO Service:  Hospitalist Author Type:  Physician    Filed:  2017  7:13 AM Date of Service:  2017  7:12 AM Creation Time:  2017  7:12 AM    Status:  Signed :  Felicitas Toney DO (Physician)         H&P dictated: #[id number][KW1.1]    Felicitas Toney DO[KW1.2]  Internal Medicine - Hospitalist  2017  7:13 AM[KW1.1]     Revision History        User Key Date/Time User Provider Type Action    > KW1.2 2017  7:13 AM Felicitas Toney DO Physician Sign     KW1.1 2017  7:12 AM Felicitas Toney DO Physician                      Discharge Summaries      Discharge Summaries by Tamar Pineda MD at 2017  6:08 PM     Author:  Tamar Pineda MD Service:  Hospitalist Author Type:  Physician    Filed:  2017  6:40 PM Date of Service:  2017  6:08 PM Creation Time:  2017  6:08 PM    Status:  Signed :  Tamar Pineda MD (Physician)         [facility]    Discharge Summary  Hospitalist    Date of Admission:  2017  Date of Discharge:  8/10/2017  Discharging Provider: Tamar Pineda MD[LD1.1]    Discharge Diagnoses   Sepsis due to urinary tract infection[LD1.2]    History of  Present Illness   Celia Lewis is a 89 year old[LD1.1] woman with PMH significant for advanced dementia who is nonverbal at baseline and bed bound with other medical problems including recurrent urinary tract infections, hypertension and type 2 diabetes as well as stage III CKD and a chronic stage III sacral decubitus ulcer. She was brought to the emergency room for evaluation of low-grade fever and hyperglycemia.  She was found to have elevated creatinine, mild leukocytosis and elevated lactate.  She had several episodes of loose stools well in the emergency room that were initially concerning for C. diff.  She was admitted 8/4 for further evaluation and treatment.  Note that previous admission involved ethics committee and it was determined that full treatment to be continued in line with patient's previously known wishes. Patient does have an involved son, Bibi Salvatore Lewis.     [LD1.2]  Hospital Course   Celia Lewis was admitted on 8/4/2017.  The following problems were addressed during her hospitalization:[LD1.1]    Active Problems:    Advanced dementia    Diabetes mellitus type 2, insulin dependent (H)    Sepsis due to urinary tract infection (H)    Recurrent UTI    Acute renal failure superimposed on stage 3 chronic kidney disease (H)    Sacral decubitus ulcer, stage III (H)    Hypernatremia    Complication of gastrostomy tube (H)      1.  Sepsis, secondary to recurrent urinary tract infection.  C diff negative.   Urine culture > 100,000 E coli sensitive to rocephin. Changed to Ceftin with ID recommending 10 days of BID treatment.   Planning for discharge late morning on 8/10 and have ordered an additional 15 doses to complete treatment.  1/4 blood cultures for Staph epi likely contaminant and vancomycin has been stopped.   Appreciate ID consultation.      2.  Acute kidney injury on stage III CKD.    Creatinine improved after IV fluids. Free water flushes for fluid needs.      3. Poorly functioning  gastric tube.  Old g-tube with poor/broken connector. Spoke with emergency contact, patient's son, Dr. Salvatore Lewis. Gave phone consent for G-tube to be exchanged by IR. Unfortunately this will not be able to be done until morning 8/10. Planning for discharge after this procedure, but patient will likely need to be seen with final discharge order placed.    4.  Type 2 diabetes.  Most recent A1c was 8.4 in 6/2017.  She is managed with Lantus and sliding scale NovoLog.    Will continue her Lantus and medium dose sliding scale NovoLog every 4 hours.       5.  Advanced dementia.  Baseline nonverbal and noncommunicative.  She is bed bound and has noted contractures.  She has a feeding tube and a chronic Fonseca catheter.        6.  Stage III sacral decubitus ulcer, chronic.  No signs of acute infection. Wound care nurse has seen. Wound care recommendations will need to be continued on discharge.       7.  Hypertension. Continue metoprolol.      8.  Hypernatremia. Somewhat chronic and felt secondary to tube feeds. Improved with IV fluids.[LD1.2]    Tamar Mauricio MD    Significant Results and Procedures[LD1.1]   Pending procedure: 8/10 Gastric tube exchange by IR[LD1.2]    Pending Results   These results will be followed up by[LD1.1] facilit[LD1.2]  Unresulted Labs Ordered in the Past 30 Days of this Admission     Date and Time Order Name Status Description    8/5/2017 1123 Blood culture Preliminary     8/5/2017 1123 Blood culture Preliminary     8/4/2017 0529 Blood culture Preliminary           Code Status[LD1.1]   Full Code[LD1.2]       Primary Care Physician   No primary care provider on file.    Physical Exam   Temp: 97.7  F (36.5  C) Temp src: Axillary BP: 112/61 Pulse: 91 Heart Rate: 97 Resp: 20 SpO2: 93 % O2 Device: None (Room air)    Vitals:    08/05/17 0556 08/07/17 0444 08/09/17 0314   Weight: 67.6 kg (149 lb 0.5 oz) 69.5 kg (153 lb 3.5 oz) 69 kg (152 lb 1.9 oz)     Vital Signs with Ranges  Temp:  [97.7  F  (36.5  C)-99.8  F (37.7  C)] 97.7  F (36.5  C)  Pulse:  [91] 91  Heart Rate:  [84-97] 97  Resp:  [16-20] 20  BP: (112-151)/(61-85) 112/61  SpO2:  [93 %-98 %] 93 %  I/O last 3 completed shifts:  In: 1881 [NG/GT:1881]  Out: 1125 [Urine:1125]    Constitutional:[LD1.1] Patient alert, nonverbal. Does look around room. No acute distress. Nontoxic.[LD1.2]  Respiratory:[LD1.1] Clear to auscultation bilaterally without crackles or wheezes.[LD1.2]  Cardiovascular:[LD1.1] Regular rate and rhythm. No murmurs.[LD1.2]  GI:[LD1.1] Abdomen is soft, nondistended. Normoactive bowel sounds. G-tube with old tubing, broken plug, and poorly fitting with tube feeds requiring it to be taped.[LD1.2]  Genitourinary[LD1.1] / Sacrum[LD1.2]:[LD1.1] Indwelling caro. Stage 3 chronic sacral wound with dressing in place.[LD1.2]  Neurologic:[LD1.1] Nonverbal. Alert. Does not move extremities freely. At baseline. Advanced dementia.[LD1.2]    Discharge Disposition[LD1.1]   Discharged to long-term care facility[LD1.2]  Condition at discharge:[LD1.1] Stable[LD1.2]    Consultations This Hospital Stay   PHARMACY TO DOSE VANCO  INFECTIOUS DISEASES IP CONSULT  NUTRITION SERVICES ADULT IP CONSULT  WOUND OSTOMY CONTINENCE NURSE  IP CONSULT  PHARMACY IP CONSULT  SOCIAL WORK IP CONSULT[LD1.1]    Discharge Orders[LD1.3]   No discharge procedures on file.[LD1.1]  Discharge Medications[LD1.3]   Current Discharge Medication List      CONTINUE these medications which have NOT CHANGED    Details   oxyCODONE (ROXICODONE) 5 MG/5ML solution Take 2 mg by mouth 3 times daily      simethicone (MYLICON) 40 MG/0.6ML suspension Take 80 mg by mouth 3 times daily      insulin glargine (LANTUS) 100 UNIT/ML injection Inject 30 Units Subcutaneous 2 times daily    Associated Diagnoses: Type 2 diabetes mellitus with diabetic nephropathy, unspecified long term insulin use status (H)      ranitidine (ZANTAC) 150 MG/10ML syrup 10 mLs (150 mg) by Per Feeding Tube route daily  Qty: 600  mL    Associated Diagnoses: Gastroesophageal reflux disease without esophagitis      vitamin A-D & C drops (TRI-VI-SOL) 750-400-35 UNIT-MG/ML solution NEW FORMULATION 7 mLs by Per G Tube route daily  Qty: 50 mL, Refills: 0    Associated Diagnoses: Decubitus ulcer of buttock, unspecified laterality, unspecified ulcer stage      metoprolol (LOPRESSOR) 10 mg/mL SUSP Take 2.5 mLs (25 mg) by mouth 2 times daily  Qty: 100 mL, Refills: 1    Associated Diagnoses: Benign essential hypertension      INSULIN ASPART SC Inject 1-6 Units Subcutaneous every 6 hours 0800, 1400, 2000, 0200  -250 1 unit  -300 2 units  -350 3 units  -400 4 units  -450 5 units  BG >450 6 units and update MD      Dakins 0.125 % SOLN Externally apply topically 2 times daily BID and PRN.   1. Cleanse wound with microklenze, cleanse periwound area with naomi perineal  2. Moisten kerlix fluff with dakins solution 0.125%, wring out excess, pack wound with kerlix  3. Apply antifungal powder to periwound area, rub in. Apply criticaid over powder.  4. Cover with ABD using minimal medipore tape to secure.  5. Label dressing with date, time, initials. Follow Rigorous PIP measures.      Lactobacillus Acidophilus POWD 1 capsule by Gastric Tube route daily 10 billion units       multivitamin, therapeutic (THERA-VIT) TABS tablet 1 tablet by Gastric Tube route daily      Loperamide HCl (IMODIUM A-D) 1 MG/7.5ML LIQD 4 mg by Gastric Tube route every other day      !! acetaminophen (TYLENOL) 32 mg/mL solution 325 mg by Gastric Tube route daily as needed for fever or mild pain      !! ACETAMINOPHEN  mg by Gastric Tube route 3 times daily       bisacodyl (DULCOLAX) 10 MG suppository Place 10 mg rectally daily as needed for constipation       !! - Potential duplicate medications found. Please discuss with provider.        Allergies   Allergies   Allergen Reactions     Sulfa Drugs Other (See Comments)     Per nursing home documents, patient  has allergy to sulfa     Data[LD1.1]   Most Recent 3 CBC's:  Recent Labs   Lab Test  08/08/17   0655  08/07/17   0703  08/05/17   0618   WBC  10.7  8.9  10.2   HGB  11.6*  11.0*  10.5*   MCV  96  94  97   PLT  208  182  177      Most Recent 3 BMP's:  Recent Labs   Lab Test  08/09/17   0649  08/08/17   0655  08/07/17   0703   NA  140  141  140   POTASSIUM  4.3  4.4  4.3   CHLORIDE  107  109  109   CO2  20  19*  19*   BUN  35*  34*  32*   CR  0.87  0.85  0.90   ANIONGAP  13  13  12   NELLIE  8.9  8.9  8.7   GLC  249*  252*  311*     Most Recent 2 LFT's:  Recent Labs   Lab Test  08/04/17   0450  06/30/17   1620   AST  17  24   ALT  24  26   ALKPHOS  94  81   BILITOTAL  0.5  0.7     Most Recent INR's and Anticoagulation Dosing History:  Anticoagulation Dose History     Recent Dosing and Labs Latest Ref Rng & Units 11/1/2010 11/2/2010 11/3/2010 8/5/2012 6/23/2014 6/21/2016 6/14/2017    INR 0.86 - 1.14 1.02 1.02 0.97 1.09 1.01 1.37(H) 1.10        Most Recent 3 Troponin's:  Recent Labs   Lab Test  06/30/17   1620  08/08/12   1751  08/08/12   1323  08/05/12   1837   01/28/10   1505   TROPI  0.035  0.013  0.022   --    < >   --    TROPONIN   --    --    --   0.00   --   0.00    < > = values in this interval not displayed.     Most Recent Cholesterol Panel:  Recent Labs   Lab Test 05/26/15   CHOL  159   LDL  77   HDL  40   TRIG  211*     Most Recent 6 Bacteria Isolates From Any Culture (See EPIC Reports for Culture Details):  Recent Labs   Lab Test  08/05/17   1148  08/05/17   1140  08/04/17   0605  08/04/17   0555  08/04/17   0508  06/30/17   1654   CULT  No growth after 4 days  No growth after 4 days  Cultured on the 1st day of incubation: Staphylococcus epidermidis  Critical Value/Significant Value, preliminary result only, called to and read   back by JOANN VALDERRAMA RN 8.5.17 0835. TENA  Cultured on the 1st day of incubation: Staphylococcus hominis  (Note)  POSITIVE for STAPHYLOCOCCUS EPIDERMIDIS and POSITIVE for the mecA  gene  (resistant to methicillin) by Verigene multiplex nucleic acid  test. Final identification and antimicrobial susceptibility testing  will be verified by standard methods.    Specimen tested with Verigene multiplex, gram-positive blood culture  nucleic acid test for the following targets: Staph aureus, Staph  epidermidis, Staph lugdunensis, other Staph species, Enterococcus  faecalis, Enterococcus faecium, Streptococcus species, S. agalactiae,  S. anginosus grp., S. pneumoniae, S. pyogenes, Listeria sp., mecA  (methicillin resistance) and Rene/B (vancomycin resistance).    Critical Value/Significant Value called [facility] and [facility] back [facility] at 11:15am 8/5/2017 ()  *  No growth after 5 days  >100,000 colonies/mL Escherichia coli*  No growth  <1000 colonies/mL urogenital maria esther Susceptibility testing not routinely done     Most Recent TSH, T4 and A1c Labs:  Recent Labs   Lab Test  06/15/17   0833   11/03/10   0710   TSH   --    --   2.69   A1C  8.4*   < >   --     < > = values in this interval not displayed.     Results for orders placed or performed during the hospital encounter of 08/04/17   XR Chest Port 1 View    Narrative    CHEST PORTABLE ONE VIEW   8/6/2017 10:30 AM     HISTORY: Tachypnea.    COMPARISON: 7/6/2017.      Impression    IMPRESSION: Opacity at the left mid to low lung is stable and could  represent continued airspace disease and/or atelectasis. Hypoinflated  lungs. Stable enlarged cardiac silhouette.     PARMINDER HOWARD MD[LD1.2]        Revision History        User Key Date/Time User Provider Type Action    > LD1.3 8/9/2017  6:40 PM Tamar Pineda MD Physician Sign     LD1.2 8/9/2017  6:25 PM Tamar Pineda MD Physician      LD1.1 8/9/2017  6:08 PM Tamar Pineda MD Physician                      Consult Notes      Consults signed by Matteo Macias MD at 8/4/2017  1:07 PM      Author:  Matteo Macias MD Service:  Infectious Disease Author Type:   Physician    Filed:  8/4/2017  1:07 PM Date of Service:  8/4/2017 11:23 AM Creation Time:  8/4/2017 11:46 AM    Status:  Signed :  Matteo Macias MD (Physician)         INFECTIOUS DISEASE CONSULTATION       Room 509      REQUESTING PHYSICIAN:  Dr. Felicitas Cedeño       IMPRESSION:   1.  Celia Lewis is a 89-year-old female well known to me from multiple prior admissions, now readmitted with hyperglycemia, possible low-grade fever.  Workup so far shows abnormal urinalysis which has generally been true with her having catheter in place but may be a true urinary tract infection.  Not much in the way of respiratory symptoms but recurrent aspiration with minimal symptoms except fever previously.  Also has an ongoing decubitus wound but does not look infected.   2.  Chronic sacral decubitus wound.  Prior cultures grew multiple organisms as would always be the case in this setting but nothing to suggest deeper major infection or cellulitis at present.   3.  Prior MRSA positive urine culture, current urine abnormal, await culture.   4.  History of probable recurrent aspiration, although not particularly clearly documented, previously treated it as aspiration.   5.  Severe dementia.   6.  Feeding tube in place, aspiration as a prior potential issue.   7.  Diabetes mellitus.   8.  Chronic mild renal insufficiency, some acute worsening at present.   9.  Sulfa allergy.      RECOMMENDATIONS:   1.  No treatment of the sacral decubitus ulcer, at present looks quite clean, does not need an I&D and is not a likely source of infection unless she has bacteremia.   2.  Await current cultures including urine culture and adjust.  If urine positive, probably treat as urinary tract infection even though has catheter in place and not entirely clear it is a true urinary tract infection.   3.  Obviously long-term prognosis is poor, has had previous extensive conversation and ethics consultation and currently on restorative care based  on underlying patient desires and [employer] state.      HISTORY OF PRESENT ILLNESS:  This 89-year-old female is seen in consultation due to probable low-grade sepsis.  The patient has had multiple admissions with underlying Alzheimer dementia and nursing home resident.  She has had documented probable UTI including MRSA in the past and probable aspiration and treated with extensive antibiotics over several admissions.  She will improve on antibiotics, only to have recurrent infections within 2-4 weeks after discontinuing antibiotics.  Most recent episode she had no clear documented infection but probable aspiration.  We treated her with an extended course of IV antibiotics, eight days in the hospital and then 7 days of Levaquin and Zyvox empirically after discharge.  She did relatively well for the next several weeks until currently having increasing blood sugar, some low-grade fever and slight mental status changes at admission.  Did not have any clearcut hypoxia or obvious localizing symptoms, wound was stable but her urine was grossly abnormal.  She has been started on vancomycin and ceftriaxone and appears at baseline currently.  The only other notable on admission is that her creatinine has risen to 1.48 and as has been the case throughout her episodes and even when they are resolving, lactic acid somewhat elevated.      PAST MEDICAL HISTORY:  See prior notes extensively documented previously, history reviewed again, nothing new from prior records.      ALLERGIES:  Sulfa which caused a rash.      SOCIAL AND FAMILY HISTORY:  Lives in a nursing home and has had known MRSA.  Obviously no active alcohol or tobacco use.      REVIEW OF SYSTEMS:  Unobtainable.      PHYSICAL EXAMINATION:   GENERAL:  The patient appears her usual self.  She is staring straight ahead, not interactive or communicative.   VITAL SIGNS:  Have not shown fever to date.  She is not tachycardic at about 80.   HEENT:  No visible  abnormalities although she has new bilateral ear lobe slight decubitus wounds being treated currently by the wound care nurse.   NECK:  Supple and nontender, no meningismus.   HEART:  Not tachycardic, no major murmur.   LUNGS:  Clear bilaterally.   ABDOMEN:  Soft and nontender.  The decubitus wound unremarkable looking, no signs of cellulitis or infection.   EXTREMITIES:  Otherwise unremarkable.      LABORATORY DATA:  White count 11,000.  Creatinine 1.48.  Urine 182 white cells, positive leukocyte esterase.  Cultures all pending.       Thank you much for this consultation.  I will follow the patient with you.         MATTEO BERNARDO MD             D: 2017 11:23   T: 2017 11:46   MT: LOIS      Name:     RAÚL MERCEDES   MRN:      [id number]        Account:       [id number]   :      1926           Consult Date:  2017      Document: [id number]       cc: Felicitas Cedeño DO   [SD1.1]   Revision History        User Key Date/Time User Provider Type Action    > SD1.1 2017  1:07 PM Matteo Bernardo MD Physician Sign            Consults by Matteo Bernardo MD at 2017 11:18 AM     Author:  Matteo Bernardo MD Service:  Infectious Disease Author Type:  Physician    Filed:  2017 11:18 AM Date of Service:  2017 11:18 AM Creation Time:  2017 11:14 AM    Status:  Signed :  Matteo Bernardo MD (Physician)         ID consult dictated   IMPRESSION:   1.  A 89-year-old female, nursing home resident, prior known aspiration, chronic Fonseca catheter and UTIs , advanced Alzheimer's dementia, multiple similar recent admissions, now readmitted with hyperglycemia, leukocytosis, ? low-grade sepsis, etiology still not entirely clear, prior treated MRSA UTI , now UA abnormal, pending  cultures.  Still doubt wound is  the issue , not clearcut pneumonia but obvious aspiration risk.   2.  Chronic sacral decubitus wound, somewhat necrotic, but no signs of infection and it does not tract deep had prior  I&D done, prior culture of the wound growing multiple organisms of no significance by itself.   3.  MRSA positive urine culture prior admit, treated, UA abnormal again with with catheter in place, but current UC pending   4.  Minimal infiltrate, mild  hypoxia, mostly resolved, possible aspiration pneumonia.  If so has improved.   5.  Severe dementia.   6.  Recurrent urinary tract infections and urinary colonization with Fonseca catheter in place.   7.  Feeding tube in place, prior probable aspiration, possible etiology to current illness as well.   8.  Diabetes mellitus.   9.  Chronic mild renal insufficiency, acute worsening now  10.  Sulfa allergy.       RECOMMENDATIONS:   1.  No indication to treat sacral decubitus with antibiotics, such a wound culture of no clinical significance unless deep osteo or cellulitis and neither evident. .  Doubt this is the cause of current fever and has already been debrided recently, if it is cause of fever due to deep infection, no long term antibiotic strategy of likely benefit, would require major surgical intervention also.   2.  Urine cx pending as are Bc, no CXR, vanco/ceftriaxone, await cxs and adjust     3.  Obviously long-term prognosis poor, especially if recurrently aspirating, prior discussions and ethics as noted so still restorative care,    [SD1.1]     Revision History        User Key Date/Time User Provider Type Action    > SD1.1 8/4/2017 11:18 AM Matteo Macias MD Physician Sign            Consults by Bisi Montgomery RD, LD at 8/4/2017 10:45 AM     Author:  Bisi Montgomery RD, LD Service:  Nutrition Author Type:  Registered Dietitian    Filed:  8/4/2017 10:45 AM Date of Service:  8/4/2017 10:45 AM Creation Time:  8/4/2017 10:19 AM    Status:  Signed :  Bisi Montgomery RD, LD (Registered Dietitian)     Consult Orders:    1. Nutrition Services Adult IP Consult [[id number]] ordered by Felicitas Cedeño DO at 08/04/17 0652                CLINICAL NUTRITION  "SERVICES  -  ASSESSMENT NOTE      Recommendations Ordered by Registered Dietitian (RD):   Isosource 1.5 at goal of 45 mL/hr (see details below) + daily Certavite     Malnutrition:   Patient does not meet two of the criteria necessary for diagnosing malnutrition        REASON FOR ASSESSMENT  Celia Lewis is a 89 year old female seen by Registered Dietitian for Provider Order - Registered Dietitian to Assess and Order TF per Medical Nutrition protocol      NUTRITION HISTORY  - Information obtained from EMR, pt is non-verbal. Pt is well-known to our services from admits in June. Her G-tube was placed 6/16/2016.  When she d/c'd she was on Isosource 1.5 @ 45 mL/hr which she continued at NH, per their records. This [id number] kcals, 73g PRO, 190g CHO, 16g fiber and 820 mL free water. She receives H2O flushes of 200 mL q 4 hours at the NH.  Home meds: Vit A, D, C; Lactobacillus, daily multivit, Imodium qod.      CURRENT NUTRITION ORDERS  Diet Order:     NPO     PHYSICAL FINDINGS  Observed  No nutrition-related physical findings observed  Obtained from Chart/Interdisciplinary Team  Pressure Ulcers Stage III sacral decubitus ulcer, chronic. Also has a pressure ulcer over the left lower pole of the left ear.     ANTHROPOMETRICS  Height: 5' 0\"  Weight: 142 lbs 10.2 oz (64.7 kg)  Body mass index is 27.86 kg/(m^2).  Weight Status:  Overweight BMI 25-29.9  IBW: 45.4 kg +/- 10%  % IBW: 142%  Weight History: If current wt is accurate pt has lost 15# (9%) in the past month. Wt seems stable prior to that.  Since she has been on TF during this time, which provided adequate calories, would question current wt.[CM1.1]  Wt Readings from Last 10 Encounters:   08/04/17 64.7 kg (142 lb 10.2 oz)   07/06/17 71.4 kg (157 lb 6.4 oz)   06/19/17 69.3 kg (152 lb 12.5 oz)   06/24/16 72.2 kg (159 lb 3.2 oz)   06/18/15 83.9 kg (185 lb)   05/29/15 81.6 kg (180 lb)   05/28/15 81.6 kg (180 lb)   05/22/15 81.6 kg (179 lb 12.8 oz)   06/24/14 77.5 kg " (170 lb 13.7 oz)   08/26/12 82.1 kg (181 lb)[CM1.2]         LABS  Labs reviewed    MEDICATIONS  Na IVF @ 125 mL/hr  Med SSI  Lantus BID    Dosing Weight 50 kg - adjusted for overweight    ASSESSED NUTRITION NEEDS PER APPROVED PRACTICE GUIDELINES:  Estimated Energy Needs: 2440-4427 kcals (30-35 Kcal/Kg)  Justification: Wound healing guidelines  Estimated Protein Needs: 60-75 grams protein (1.2-1.5 g pro/Kg)  Justification: wound healing - monitor renal status  Estimated Fluid Needs: 2586-4621  mL (1 mL/Kcal)  Justification: maintenance    MALNUTRITION:  % Weight Loss:  > 5% in 1 month (severe malnutrition)  % Intake:  No decreased intake noted  Subcutaneous Fat Loss:  None observed  Muscle Loss:  None observed  Fluid Retention:  None noted    Malnutrition Diagnosis: Patient does not meet two of the above criteria necessary for diagnosing malnutrition      NUTRITION DIAGNOSIS:  Increased nutrient needs (protein) related to healing as evidenced by chronic sacral PI and left ear PI      NUTRITION INTERVENTIONS  Recommendations / Nutrition Prescription  Nutrition Support Enteral:  Type of Feeding Tube: G-tube  Enteral Frequency:  Continuous  Enteral Regimen: Isosource 1.5 @ 45 mL/hr  Total Enteral Provisions: 1620 kcal (32 kcal/kg), 73 g PRO (1.5 g/kg), 190 g CHO, 16.2g fiber, and 820 mL free water  Free Water Flush: 60 mL q 4 hour while on IVF.    Once IVF d/c'd, will increase H2O flushes to 130 mL q 4 hours for total fluid (TF + flushes) = 1600 mL/da.    Daily Certavite per PI protocol. Did not order Vit A, Vit C, or Zinc as pt had been on this regimen past admits and more recently, has been receiving Vit A and C at the NH.      Implementation  Nutrition education: No education needs at this time  EN Schedule - ordered the above TF and Certavite; can initiate TF at goal.  Collaboration and Referral of Nutrition care - discussed with bedside RN      Nutrition Goals  Isosource 1.5 at goal of 45 mL/hr will meet 100% of  assessed needs.      MONITORING AND EVALUATION:  Progress towards goals will be monitored and evaluated per protocol and Practice Guidelines    Bisi Montgomery RD  Pager [phone] (M-F)            [phone] (W/E & Hol)[CM1.1]                     Revision History        User Key Date/Time User Provider Type Action    > CM1.2 8/4/2017 10:45 AM Bisi Montgomery, GAVI, LD Registered Dietitian Sign     CM1.1 8/4/2017 10:19 AM Bisi Montgomery, GAVI, LD Registered Dietitian                      Progress Notes - Physician (Notes from 08/07/17 through 08/10/17)      Progress Notes by Neda Lira LSW at 8/9/2017  3:14 PM     Author:  Neda Lira LSW Service:  Social Work Author Type:      Filed:  8/9/2017  3:17 PM Date of Service:  8/9/2017  3:14 PM Creation Time:  8/9/2017  3:14 PM    Status:  Signed :  Neda Lira LSW ()         MARLON  D: SW following for discharge planning needs.  MARLON was informed that patient will likely be ready for discharge tomorrow after her G-Tube site is fixed.  I: Due to patients Dementia, being non verbal and not being able to tolerate sitting up in a w/c, patient will require stretcher transportation upon discharge.  MARLON spoke to Ana at [facility] and scheduled transportation for tomorrow at 1100.  MARLON faxed the facesheet and PCS to [facility].  MARLON spoke to Monie at [facility] and updated her on the discharge plan and the transport time for tomorrow.  A: Patient is alert and oriented X1.  P: SW will continue to follow and assist with finalizing the discharge plan as appropriate.    SEAN Jarquin  [SB1.1]       Revision History        User Key Date/Time User Provider Type Action    > SB1.1 8/9/2017  3:17 PM Neda Lira LSW  Sign            Progress Notes by Matteo Macias MD at 8/9/2017 10:22 AM     Author:  Matteo Macias MD Service:  Infectious Disease Author Type:  Physician    Filed:  8/9/2017 10:23 AM Date of  Service:  8/9/2017 10:22 AM Creation Time:  8/9/2017 10:22 AM    Status:  Signed :  Matteo Macias MD (Physician)         [facility]  Infectious Disease Progress Note          Assessment and Plan:   IMPRESSION:   1.  Celia Lewis is a 89-year-old female well known to me from multiple prior admissions, now readmitted with hyperglycemia, possible low-grade fever.  Workup so far shows abnormal urinalysis which has generally been true with her having catheter in place but may be a true urinary tract infection.  Not much in the way of respiratory symptoms but recurrent aspiration with minimal symptoms except fever previously.  Also has an ongoing decubitus wound but does not look infected.   2.  Chronic sacral decubitus wound.  Prior cultures grew multiple organisms as would always be the case in this setting but nothing to suggest deeper major infection or cellulitis at present.   3.  Prior MRSA positive urine culture, current urine abnormal, await culture.   4.  History of probable recurrent aspiration, although not particularly clearly documented, previously treated it as aspiration.   5.  Severe dementia.   6.  Feeding tube in place, aspiration as a prior potential issue.   7.  Diabetes mellitus.   8.  Chronic mild renal insufficiency, some acute worsening at present.   9.  Sulfa allergy.   10 Diarrhea, c diff neg  11 BC CN staph likely contaminant      RECOMMENDATIONS:   1.  No treatment of the sacral decubitus ulcer, at present looks quite clean, does not need an I&D and is not a likely source of infection unless she has bacteremia.   2.   urine culture sens E coli , on ceftriaxone while here and improved/stable, continue while here DC vanco CN staph likely contaminant  3.  Obviously long-term prognosis is poor, has had previous extensive conversation and ethics consultation and currently on restorative care based on underlying patient desires and [employer] [place]  4 OK enteral  ceftin 500 bid for 10 days if disposition            Interval History:   no interaction, no fever cxs as above  WBc nl no O2 CXR seen not clear new pneumonia              Medications:       cefuroxime  500 mg Per G Tube Q12H VIRA     fiber modular  1 packet Per Feeding Tube BID     metoprolol  25 mg Oral or Feeding Tube BID     ranitidine  150 mg Per Feeding Tube Daily     insulin glargine  30 Units Subcutaneous BID     acetaminophen  650 mg Per Feeding Tube TID     insulin aspart  1-6 Units Subcutaneous Q4H     multivitamins with minerals  15 mL Per Feeding Tube Daily                  Physical Exam:   Blood pressure 146/85, pulse 91, temperature 99.1  F (37.3  C), temperature source Axillary, resp. rate 18, height 1.524 m (5'), weight 69 kg (152 lb 1.9 oz), SpO2 97 %.  Wt Readings from Last 2 Encounters:   08/09/17 69 kg (152 lb 1.9 oz)   07/06/17 71.4 kg (157 lb 6.4 oz)     Vital Signs with Ranges  Temp:  [98.1  F (36.7  C)-99.1  F (37.3  C)] 99.1  F (37.3  C)  Pulse:  [91] 91  Heart Rate:  [84-91] 84  Resp:  [16-18] 18  BP: (125-146)/(62-85) 146/85  SpO2:  [96 %-98 %] 97 %    Constitutional: Awake, alert, not interactive   Lungs: Clear to auscultation bilaterally, no crackles or wheezing   Cardiovascular: Regular rate and rhythm, normal S1 and S2, and no murmur noted   Abdomen: Normal bowel sounds, soft, non-distended, non-tender   Skin: No rashes, no cyanosis, no edema wd clean   Other:           Data:   All microbiology laboratory data reviewed.  Recent Labs   Lab Test  08/08/17   0655  08/07/17   0703  08/05/17   0618   WBC  10.7  8.9  10.2   HGB  11.6*  11.0*  10.5*   HCT  36.1  33.9*  32.8*   MCV  96  94  97   PLT  208  182  177     Recent Labs   Lab Test  08/09/17   0649  08/08/17   0655  08/07/17   0703   CR  0.87  0.85  0.90     Recent Labs   Lab Test  05/08/13   1300   SED  61*     Recent Labs   Lab Test  08/05/17   1148  08/05/17   1140  08/04/17   0605  08/04/17   0555  08/04/17   0508  06/30/17   1654   06/30/17   1640  06/14/17   1319  06/14/17   1230   CULT  No growth after 4 days  No growth after 4 days  Cultured on the 1st day of incubation: Staphylococcus epidermidis  Critical Value/Significant Value, preliminary result only, called to and read   back by JOANN VALDERRAMA RN 8.5.17 0835. TENA  Cultured on the 1st day of incubation: Staphylococcus hominis  (Note)  POSITIVE for STAPHYLOCOCCUS EPIDERMIDIS and POSITIVE for the mecA  gene (resistant to methicillin) by Xceedium multiplex nucleic acid  test. Final identification and antimicrobial susceptibility testing  will be verified by standard methods.    Specimen tested with Verigene multiplex, gram-positive blood culture  nucleic acid test for the following targets: Staph aureus, Staph  epidermidis, Staph lugdunensis, other Staph species, Enterococcus  faecalis, Enterococcus faecium, Streptococcus species, S. agalactiae,  S. anginosus grp., S. pneumoniae, S. pyogenes, Listeria sp., mecA  (methicillin resistance) and Rene/B (vancomycin resistance).    Critical Value/Significant Value called to and read back by Cheyenne Gilliland RN at [place] [address] at 11:15am 8/5/2017 ()  *  No growth after 5 days  >100,000 colonies/mL Escherichia coli*  No growth  <1000 colonies/mL urogenital maria esther Susceptibility testing not routinely done  No growth  No growth  Moderate growth Escherichia coli  Moderate growth Enterococcus avium  Moderate growth Enterococcus faecalis  Light growth Candida albicans / dubliniensis Candida albicans and Candida   dubliniensis are not routinely speciated Susceptibility testing not routinely   done  Plus Heavy growth Normal skin maria esther  *  >100,000 colonies/mL Methicillin resistant Staphylococcus aureus (MRSA)  50,000 to 100,000 colonies/mL Strain 2 Methicillin resistant Staphylococcus   aureus (MRSA)  Critical Value/Significant Value called to and read back by Elmer Hurd Rn at   [phone]  *[SD1.1]          Revision History        User Key  Date/Time User Provider Type Action    > SD1.1 8/9/2017 10:23 AM Matteo Macias MD Physician Sign            Progress Notes by Tamar Pineda MD at 8/8/2017  5:17 PM     Author:  Tamar Pineda MD Service:  Hospitalist Author Type:  Physician    Filed:  8/8/2017  6:52 PM Date of Service:  8/8/2017  5:17 PM Creation Time:  8/8/2017  5:17 PM    Status:  Signed :  Tamar Pineda MD (Physician)         [facility]    Hospitalist Progress Note    Date of Service (when I saw the patient): 08/08/2017    Assessment & Plan   Celia Lewis is a 89 year old female who was admitted on 8/4/2017. PMH significant for advanced dementia who is nonverbal at baseline and bed bound with other medical problems including recurrent urinary tract infections, hypertension and type 2 diabetes as well as stage III CKD and a chronic stage III sacral decubitus ulcer. She was brought to the emergency room for evaluation of low-grade fever and hyperglycemia.  She was found to have elevated creatinine, mild leukocytosis and elevated lactate.  She had several episodes of loose stools well in the emergency room that were initially concerning for C. diff.  She was admitted 8/4 for further evaluation and treatment.      1.  Sepsis.  C diff negative. Urine culture > 100,000 E coli sensitive to rocephin, which she has been getting. Patient changed to enteral ceftin and will ensure she continues to tolerate this with feeds. Residuals decreased today from yesterday.  1/4 blood cultures for Staph epi likely contaminant and vancomycin has been stopped.   Appreciate ID consultation.      2.  Acute kidney injury on stage III CKD.    Creatinine improved after IV fluids. Free water flushes for fluid needs.      3.  Type 2 diabetes.  Most recent A1c was 8.4 in 6/2017.  She is managed with Lantus and sliding scale NovoLog.    Will continue her Lantus and medium dose sliding scale NovoLog every 4 hours.       4.   Advanced dementia.  Baseline nonverbal and noncommunicative.  She is bed bound and has noted contractures.  She has a feeding tube and a chronic Fonseca catheter.    Nutrition has resumed tube feeds. For second day in a row, patient with increased residuals and not tolerating tube feed rate.      5.  Stage III sacral decubitus ulcer, chronic.  No signs of acute infection. Wound care nurse has seen consulted.        6.  Hypertension. Continue metoprolol.      7.  Hypernatremia. Somewhat chronic and felt secondary to tube feeds. Improved with IV fluids.      DVT Prophylaxis: Pneumatic Compression Devices  Code Status: Full Code     Disposition: Expected discharge tomorrow if tolerating tube feeds and enteral antibiotic.[LD1.1] May discuss patient's g tube with radiology to see if there is any way to change out external tubing as it does not have good connection with feeds. Does not seem to be functioning appropriately. Could also start with xray for check of placement.[LD1.2]     Tamar Mauricio MD    [phone] (P)  Text page (7am to 6pm)    Interval History[LD1.1]   Some questions with nursing about functioning of G tube. Old tubing with poor connection with tube feeds. Seems to be either increased pressure or faulty system. Feeds flow out of tubing at times yet patient with no residuals today.  Still c[LD1.2]hanging to enteral antibiotic[LD1.1] today, however, as seems to be effective except when being disconnected.[LD1.2].    -Data reviewed today: I reviewed all new labs and imaging results over the last 24 hours. I personally reviewed no images or EKG's today.    Physical Exam   Temp: 98.2  F (36.8  C) Temp src: Axillary BP: 137/67 Pulse: 91 Heart Rate: 91 Resp: 18 SpO2: 97 % O2 Device: None (Room air)    Vitals:    08/04/17 1009 08/05/17 0556 08/07/17 0444   Weight: 64.7 kg (142 lb 10.2 oz) 67.6 kg (149 lb 0.5 oz) 69.5 kg (153 lb 3.5 oz)     Vital Signs with Ranges  Temp:  [97.7  F (36.5  C)-98.3  F (36.8   C)] 98.2  F (36.8  C)  Pulse:  [86-91] 91  Heart Rate:  [86-98] 91  Resp:  [15-20] 18  BP: (126-141)/(54-67) 137/67  SpO2:  [96 %-98 %] 97 %  I/O last 3 completed shifts:  In: 158 [NG/GT:158]  Out: 1190 [Urine:1150; Emesis/NG output:40]    Constitutional:[LD1.1] Patient sleeping. Nonverbal. No acute distress. Nontoxic.[LD1.2]  Respiratory:[LD1.1] Clear to auscultation bilaterally. No crackles or wheezes on exam.[LD1.2]  Cardiovascular:[LD1.1] Regular rate and rhythm. No murmurs.[LD1.2]  GI:[LD1.1] Abdomen is soft and nondistended. No tenderness on exam. Normoactive bowel sounds. G tube with poor connection that does not fit tube feeding for long, coming disconnected and spilling. When disconnected, also seems to be increase in pressure coming from patient, pushing contents out, however no residuals.[LD1.2]    Medications     IV fluid REPLACEMENT ONLY         cefuroxime  500 mg Per G Tube Q12H VIRA     fiber modular  1 packet Per Feeding Tube BID     metoprolol  25 mg Oral or Feeding Tube BID     ranitidine  150 mg Per Feeding Tube Daily     insulin glargine  30 Units Subcutaneous BID     acetaminophen  650 mg Per Feeding Tube TID     insulin aspart  1-6 Units Subcutaneous Q4H     multivitamins with minerals  15 mL Per Feeding Tube Daily       Data     Recent Labs  Lab 08/08/17  0655 08/07/17  0703 08/06/17  0712 08/05/17  0618 08/04/17  0450   WBC 10.7 8.9  --  10.2 11.5*   HGB 11.6* 11.0*  --  10.5* 12.5   MCV 96 94  --  97 99    182  --  177 200    140 140 144 148*   POTASSIUM 4.4 4.3 4.2 4.0 4.3   CHLORIDE 109 109 109 115* 114*   CO2 19* 19* 18* 18* 22   BUN 34* 32* 37* 54* 85*   CR 0.85 0.90 0.83 1.01 1.48*   ANIONGAP 13 12 13 11 12   NELLIE 8.9 8.7 7.9* 7.9* 9.0   * 311* 258* 269* 363*   ALBUMIN  --   --   --   --  2.8*   PROTTOTAL  --   --   --   --  7.6   BILITOTAL  --   --   --   --  0.5   ALKPHOS  --   --   --   --  94   ALT  --   --   --   --  24   AST  --   --   --   --  17        Imaging:  No results found for this or any previous visit (from the past 24 hour(s)).[LD1.1]     Revision History        User Key Date/Time User Provider Type Action    > LD1.2 8/8/2017  6:52 PM Tamar Pineda MD Physician Sign     LD1.1 8/8/2017  5:17 PM Tamar Pineda MD Physician             Progress Notes by Neda Lira LSW at 8/8/2017  2:16 PM     Author:  Neda Lira LSW Service:  Social Work Author Type:      Filed:  8/8/2017  2:18 PM Date of Service:  8/8/2017  2:16 PM Creation Time:  8/8/2017  2:16 PM    Status:  Signed :  Neda Lira LSW ()         MARLON  D: MARLON following for discharge planning needs.    I: SW spoke to Monie in Admissions at [facility] and she confirmed that patient does have a bed hold at the facility.  SW updated Monie that patient may be ready for discharge in the next day or two.  A: Patient is nonverbal with a baseline diagnosis of Dementia.  P: SW will continue to follow and assist with finalizing the discharge plan as appropriate.    SEAN Jarquin  [SB1.1]       Revision History        User Key Date/Time User Provider Type Action    > SB1.1 8/8/2017  2:18 PM Neda Lira LSW  Sign            Progress Notes by Matteo Macias MD at 8/8/2017  9:09 AM     Author:  Matteo Macias MD Service:  Infectious Disease Author Type:  Physician    Filed:  8/8/2017  9:12 AM Date of Service:  8/8/2017  9:09 AM Creation Time:  8/8/2017  9:09 AM    Status:  Signed :  Matteo Macias MD (Physician)         [facility]  Infectious Disease Progress Note          Assessment and Plan:   IMPRESSION:   1.  Celia Lewis is a 89-year-old female well known to me from multiple prior admissions, now readmitted with hyperglycemia, possible low-grade fever.  Workup so far shows abnormal urinalysis which has generally been true with her having catheter in place but may be a  true urinary tract infection.  Not much in the way of respiratory symptoms but recurrent aspiration with minimal symptoms except fever previously.  Also has an ongoing decubitus wound but does not look infected.   2.  Chronic sacral decubitus wound.  Prior cultures grew multiple organisms as would always be the case in this setting but nothing to suggest deeper major infection or cellulitis at present.   3.  Prior MRSA positive urine culture, current urine abnormal, await culture.   4.  History of probable recurrent aspiration, although not particularly clearly documented, previously treated it as aspiration.   5.  Severe dementia.   6.  Feeding tube in place, aspiration as a prior potential issue.   7.  Diabetes mellitus.   8.  Chronic mild renal insufficiency, some acute worsening at present.   9.  Sulfa allergy.   10 Diarrhea, c diff neg  11 BC CN staph likely contaminant      RECOMMENDATIONS:   1.  No treatment of the sacral decubitus ulcer, at present looks quite clean, does not need an I&D and is not a likely source of infection unless she has bacteremia.   2.   urine culture sens E coli , on ceftriaxone while here and improved/stable, continue while here DC vanco CN staph likely contaminant  3.  Obviously long-term prognosis is poor, has had previous extensive conversation and ethics consultation and currently on restorative care based on underlying patient desires and [facility]  4 OK enteral ceftin 500 bid for 10 days if disposition now            Interval History:   no interaction, no fever cxs as above  WBc nl no O2 CXR seen not clear new pneumonia              Medications:[SD1.1]       fiber modular  1 packet Per Feeding Tube BID     cefTRIAXone  1 g Intravenous Q24H     metoprolol  25 mg Oral or Feeding Tube BID     ranitidine  150 mg Per Feeding Tube Daily     insulin glargine  30 Units Subcutaneous BID     acetaminophen  650 mg Per Feeding Tube TID     insulin aspart  1-6 Units Subcutaneous  Q4H     multivitamins with minerals  15 mL Per Feeding Tube Daily[SD1.2]                  Physical Exam:[SD1.1]   Blood pressure 141/54, pulse 86, temperature 98.3  F (36.8  C), temperature source Axillary, resp. rate 16, height 1.524 m (5'), weight 69.5 kg (153 lb 3.5 oz), SpO2 96 %.  Wt Readings from Last 2 Encounters:   08/07/17 69.5 kg (153 lb 3.5 oz)   07/06/17 71.4 kg (157 lb 6.4 oz)[SD1.2]     Vital Signs with Ranges[SD1.1]  Temp:  [97.7  F (36.5  C)-98.6  F (37  C)] 98.3  F (36.8  C)  Pulse:  [84-87] 86  Heart Rate:  [84-98] 98  Resp:  [15-20] 16  BP: (123-141)/(54-67) 141/54  SpO2:  [96 %-98 %] 96 %[SD1.2]    Constitutional: Awake, alert, not interactive   Lungs: Clear to auscultation bilaterally, no crackles or wheezing   Cardiovascular: Regular rate and rhythm, normal S1 and S2, and no murmur noted   Abdomen: Normal bowel sounds, soft, non-distended, non-tender   Skin: No rashes, no cyanosis, no edema wd clean   Other:           Data:   All microbiology laboratory data reviewed.[SD1.1]  Recent Labs   Lab Test  08/08/17   0655  08/07/17   0703  08/05/17   0618   WBC  10.7  8.9  10.2   HGB  11.6*  11.0*  10.5*   HCT  36.1  33.9*  32.8*   MCV  96  94  97   PLT  208  182  177     Recent Labs   Lab Test  08/08/17   0655  08/07/17   0703  08/06/17   0712   CR  0.85  0.90  0.83     Recent Labs   Lab Test  05/08/13   1300   SED  61*     Recent Labs   Lab Test  08/05/17   1148  08/05/17   1140  08/04/17   0605  08/04/17   0555  08/04/17   0508  06/30/17   1654  06/30/17   1640  06/14/17   1319  06/14/17   1230   CULT  No growth after 3 days  No growth after 3 days  Cultured on the 1st day of incubation: Staphylococcus epidermidis  Critical Value/Significant Value, preliminary result only, called to and read   back by JOANN VALDERRAMA RN 8.5.17 0835. TENA  Cultured on the 1st day of incubation: Staphylococcus hominis  (Note)  POSITIVE for STAPHYLOCOCCUS EPIDERMIDIS and POSITIVE for the mecA  gene (resistant to methicillin)  by Verigene multiplex nucleic acid  test. Final identification and antimicrobial susceptibility testing  will be verified by standard methods.    Specimen tested with Verigene multiplex, gram-positive blood culture  nucleic acid test for the following targets: Staph aureus, Staph  epidermidis, Staph lugdunensis, other Staph species, Enterococcus  faecalis, Enterococcus faecium, Streptococcus species, S. agalactiae,  S. anginosus grp., S. pneumoniae, S. pyogenes, Listeria sp., mecA  (methicillin resistance) and Rene/B (vancomycin resistance).    Critical Value/Significant Value called to and read back by Cheyenne Gilliland RN at [facility] at 11:15am 8/5/2017 ()  *  No growth after 4 days  >100,000 colonies/mL Escherichia coli*  No growth  <1000 colonies/mL urogenital maria esther Susceptibility testing not routinely done  No growth  No growth  Moderate growth Escherichia coli  Moderate growth Enterococcus avium  Moderate growth Enterococcus faecalis  Light growth Candida albicans / dubliniensis Candida albicans and Candida   dubliniensis are not routinely speciated Susceptibility testing not routinely   done  Plus Heavy growth Normal skin maria esther  *  >100,000 colonies/mL Methicillin resistant Staphylococcus aureus (MRSA)  50,000 to 100,000 colonies/mL Strain 2 Methicillin resistant Staphylococcus   aureus (MRSA)  Critical Value/Significant Value called to and read back by Elmer Hurd Rn at   [id number]  *[SD1.2]          Revision History        User Key Date/Time User Provider Type Action    > SD1.2 8/8/2017  9:12 AM Matteo Macias MD Physician Sign     SD1.1 8/8/2017  9:09 AM Matteo Macias MD Physician             Progress Notes by Tamar Pineda MD at 8/7/2017  4:36 PM     Author:  Tamar Pineda MD Service:  Hospitalist Author Type:  Physician    Filed:  8/7/2017  5:31 PM Date of Service:  8/7/2017  4:36 PM Creation Time:  8/7/2017  4:36 PM    Status:  Signed :  Tamar Pineda  MD Nina (Physician)         [facility]    Hospitalist Progress Note    Date of Service (when I saw the patient): 08/07/2017    Assessment & Plan   Celia Lewis is a 89 year old female who was admitted on 8/4/2017. PMH significant for advanced dementia who is nonverbal at baseline and bed bound with other medical problems including recurrent urinary tract infections, hypertension and type 2 diabetes as well as stage III CKD and a chronic stage III sacral decubitus ulcer. She was brought to the emergency room for evaluation of low-grade fever and hyperglycemia.  She was found to have elevated creatinine, mild leukocytosis and elevated lactate.  She had several episodes of loose stools well in the emergency room that were initially concerning for C. diff.  She was admitted 8/4 for further evaluation and treatment.      1.  Sepsis.  C diff negative. Urine culture > 100,000 E coli sensitive to rocephin, which she has been getting. If tolerating tube feeds without residual problems, could discharge to LTC with enteral Ceftin. 1/4 blood cultures for Staph epi likely contaminant and vancomycin has been stopped.   Appreciate ID consultation.      2.  Acute kidney injury on stage III CKD.    Creatinine improved after IV fluids. Free water flushes for fluid needs.      3.  Type 2 diabetes.  Most recent A1c was 8.4 in 6/2017.  She is managed with Lantus and sliding scale NovoLog.    Will continue her Lantus and medium dose sliding scale NovoLog every 4 hours.       4.  Advanced dementia.  Baseline nonverbal and noncommunicative.  She is bed bound and has noted contractures.  She has a feeding tube and a chronic Fonseca catheter.    Nutrition has resumed tube feeds. For second day in a row, patient with increased residuals and not tolerating tube feed rate.  Is she at increased rate from baseline at facility? Could consider abdominal imaging if needed, but want to ensure not giving increased volume compared  to what she normally handles.  Once tolerating tube feeds, could change to enteral antibiotic and likely discharge to LTC.      5.  Stage III sacral decubitus ulcer, chronic.  No signs of acute infection. Wound care nurse has seen consulted.        6.  Hypertension. Continue metoprolol.      7.  Hypernatremia. Somewhat chronic and felt secondary to tube feeds. Improved with IV fluids.      DVT Prophylaxis: Pneumatic Compression Devices  Code Status: Full Code    Disposition: Expected discharge in coming days. If tube feed residuals improved, could change to enteral antibiotics.     Tamar Mauricio MD    [phone] (P)  Text page (7am to 6pm)    Interval History[LD1.1]   Patient with increased pressure from G tube today per nursing. Tube feeds held temporarily and did later have 200-300 ml residual. Nutrition recommended resuming feeds. No observed aspiration event.[LD1.2]     -Data reviewed today: I reviewed all new labs and imaging results over the last 24 hours. I personally reviewed[LD1.1] no images or EKG's today[LD1.2].    Physical Exam   Temp: 98.6  F (37  C) Temp src: Axillary BP: 123/67 Pulse: 84 Heart Rate: 84 Resp: 20 SpO2: 96 % O2 Device: None (Room air)    Vitals:    08/04/17 1009 08/05/17 0556 08/07/17 0444   Weight: 64.7 kg (142 lb 10.2 oz) 67.6 kg (149 lb 0.5 oz) 69.5 kg (153 lb 3.5 oz)     Vital Signs with Ranges  Temp:  [97.9  F (36.6  C)-98.7  F (37.1  C)] 98.6  F (37  C)  Pulse:  [84-86] 84  Heart Rate:  [72-86] 84  Resp:  [19-22] 20  BP: (123-145)/(64-71) 123/67  SpO2:  [94 %-98 %] 96 %  I/O last 3 completed shifts:  In: 1064 [NG/GT:130]  Out: 2350 [Urine:2150; Emesis/NG output:200]    Constitutional:[LD1.1] Patient awake and alert at time of exam. Nonverbal. Nontoxic. No increased work of breathing.[LD1.2]  Respiratory:[LD1.1] Clear to auscultation bilaterally. Coarse upper airway sounds, but no crackles or wheezes.[LD1.2]   Cardiovascular:[LD1.1] Regular rate and rhythm. No  murmurs.[LD1.2]  GI:[LD1.1] Tube feeds currently at goal via PEG. Soft, nondistended. Normoactive bowel sounds.[LD1.2]     Medications     IV fluid REPLACEMENT ONLY         fiber modular  1 packet Per Feeding Tube BID     cefTRIAXone  1 g Intravenous Q24H     metoprolol  25 mg Oral or Feeding Tube BID     ranitidine  150 mg Per Feeding Tube Daily     insulin glargine  30 Units Subcutaneous BID     acetaminophen  650 mg Per Feeding Tube TID     insulin aspart  1-6 Units Subcutaneous Q4H     multivitamins with minerals  15 mL Per Feeding Tube Daily       Data     Recent Labs  Lab 08/07/17  0703 08/06/17  0712 08/05/17  0618 08/04/17  0450   WBC 8.9  --  10.2 11.5*   HGB 11.0*  --  10.5* 12.5   MCV 94  --  97 99     --  177 200    140 144 148*   POTASSIUM 4.3 4.2 4.0 4.3   CHLORIDE 109 109 115* 114*   CO2 19* 18* 18* 22   BUN 32* 37* 54* 85*   CR 0.90 0.83 1.01 1.48*   ANIONGAP 12 13 11 12   NELLIE 8.7 7.9* 7.9* 9.0   * 258* 269* 363*   ALBUMIN  --   --   --  2.8*   PROTTOTAL  --   --   --  7.6   BILITOTAL  --   --   --  0.5   ALKPHOS  --   --   --  94   ALT  --   --   --  24   AST  --   --   --  17       Imaging:  No results found for this or any previous visit (from the past 24 hour(s)).[LD1.1]     Revision History        User Key Date/Time User Provider Type Action    > LD1.2 8/7/2017  5:31 PM Tamar Pineda MD Physician Sign     LD1.1 8/7/2017  4:36 PM Tamar Pineda MD Physician             Progress Notes by Bisi Montgomery RD, LD at 8/7/2017  9:47 AM     Author:  Bisi Montgomery RD, LD Service:  Nutrition Author Type:  Registered Dietitian    Filed:  8/7/2017 10:00 AM Date of Service:  8/7/2017  9:47 AM Creation Time:  8/7/2017  9:47 AM    Status:  Signed :  Bisi Montgomery RD, LD (Registered Dietitian)         CLINICAL NUTRITION SERVICES - REASSESSMENT NOTE      Recommendations Ordered by Registered Dietitian (RD):   Will add Nutrisource FIber BID        EVALUATION OF  PROGRESS TOWARD GOALS   Diet:  NPO  Nutrition Support Enteral:  Type of Feeding Tube: G-tube  Enteral Frequency:  Continuous  Enteral Regimen: Isosource 1.5 @ 45 mL/hr  Total Enteral Provisions: 1620 kcal (32 kcal/kg), 73 g PRO (1.5 g/kg), 190 g CHO, 16.2g fiber, and 820 mL free water  Free Water Flush: 130 mL q 4 hour for total fluid (TF + flushes) = 1600 mL/day.    Pt having loose stools, will add Nutrisource Fiber BID.      Dosing Weight 50 kg - adjusted for overweight     ASSESSED NUTRITION NEEDS PER APPROVED PRACTICE GUIDELINES:  Estimated Energy Needs: 6139-9105 kcals (30-35 Kcal/Kg)  Justification: Wound healing guidelines  Estimated Protein Needs: 60-75 grams protein (1.2-1.5 g pro/Kg)  Justification: wound healing - monitor renal status  Estimated Fluid Needs: 2825-6317  mL (1 mL/Kcal)  Justification: maintenance      NEW FINDINGS:   8/4 WOCN:       Coccyx/sacrum:Stage 4 PI, community acquired, no local s/s infections, possible jimbo-wound candida      Bilateral ears:  Mix stage 2 and unstageable PI, community acquired, no local s/s infection    BS 200s, on Med SSI and Lanus    Weight has been trending up,?fluid. IVF stopped 8/6, IV Lasix given, one-time dose.[CM1.1]  Vitals:    08/04/17 1009 08/05/17 0556 08/07/17 0444   Weight: 64.7 kg (142 lb 10.2 oz) 67.6 kg (149 lb 0.5 oz) 69.5 kg (153 lb 3.5 oz)[CM1.2]         Previous Goals:   Isosource 1.5 at goal of 45 mL/hr will meet 100% of assessed needs.  Evaluation: Met    Previous Nutrition Diagnosis:   Increased nutrient needs (protein) related to healing as evidenced by chronic sacral PI and left ear PI  Evaluation: No change      CURRENT NUTRITION DIAGNOSIS  Same: Increased nutrient needs (protein) related to healing as evidenced by chronic sacral PI and left ear PI    INTERVENTIONS  Recommendations / Nutrition Prescription  Continue current TF and flushes  Add fiber to improve stools    Implementation  Ordered Nutrisource FIber BID for add'l 6 gm  fiber/day    Goals  Stools will decrease and become more formed      MONITORING AND EVALUATION:  Progress towards goals will be monitored and evaluated per protocol and Practice Guidelines    Bisi Montgomery RD  Pager [phone] (M-F)            [phone] (W/E & Hol)[CM1.1]             Revision History        User Key Date/Time User Provider Type Action    > CM1.2 8/7/2017 10:00 AM Bisi Montgomery, GAVI, [place] Registered Dietitian Sign     CM1.1 8/7/2017  9:47 AM Bisi Montgomery, GAVI, [place] Registered Dietitian             Progress Notes by Neda Lira LSW at 8/7/2017  9:00 AM     Author:  Neda Lira LSW Service:  Social Work Author Type:      Filed:  8/7/2017  9:08 AM Date of Service:  8/7/2017  9:00 AM Creation Time:  8/7/2017  9:00 AM    Status:  Signed :  Neda Lira LSW ()         Care Transition Initial Assessment -   Reason For Consult: discharge planning  Met with: Spoke to patients son, Dr. Salvatore Lewis.[SB1.1]  Active Problems:    Recurrent UTI[SB1.2]         DATA  Lives With: facility resident  Living Arrangements: extended care facility ([facility])  Description of Support System: Supportive, Involved  Who is your support system?: , Children  Support Assessment: Adequate family and caregiver support, Adequate social supports.   Identified issues/concerns regarding health management:             Quality Of Family Relationships: supportive, involved       ASSESSMENT  Cognitive Status: Alert and oriented X1.  Concerns to be addressed:      Per automatic referral, SW spoke to patients son, Dr. Salvatore Lewis, to discuss discharge planning needs.  Patient is a 89-year-old female who was admitted to the hospital on 8-4-17 with a recurrent UTI.  Prior to hospitalization, patient was living at [facility] in the Long Term Care Unit.  Patients son confirmed that family prefers to have patient return there upon discharge.  SW made a referral to  the facility via [facility] on the [facility] to confirm whether patient is holding her bed at the facility or not.  SW will follow up regarding transportation once the discharge plan has been finalized.     PLAN  Financial costs for the patient includes: Discussed coverage for LTC under the patients insurance.  Patient given options and choices for discharge: LTC facility list offered.  Patient/family is agreeable to the plan?  Yes  Patient Goals and Preferences: Return to LTC at discharge.  Patient anticipates discharging to: Return to LTC at discharge.    SEAN Jarquin  [SB1.1]               Revision History        User Key Date/Time User Provider Type Action    > SB1.2 8/7/2017  9:08 AM Neda Lira LSW  Sign     SB1.1 8/7/2017  9:00 AM Neda Lira LSW              Progress Notes by Matteo Macias MD at 8/7/2017  8:56 AM     Author:  Matteo Macias MD Service:  Infectious Disease Author Type:  Physician    Filed:  8/7/2017  8:59 AM Date of Service:  8/7/2017  8:56 AM Creation Time:  8/7/2017  8:56 AM    Status:  Signed :  Matteo Macias MD (Physician)         [facility]  Infectious Disease Progress Note          Assessment and Plan:   IMPRESSION:   1.  Celia Lewis is a 89-year-old female well known to me from multiple prior admissions, now readmitted with hyperglycemia, possible low-grade fever.  Workup so far shows abnormal urinalysis which has generally been true with her having catheter in place but may be a true urinary tract infection.  Not much in the way of respiratory symptoms but recurrent aspiration with minimal symptoms except fever previously.  Also has an ongoing decubitus wound but does not look infected.   2.  Chronic sacral decubitus wound.  Prior cultures grew multiple organisms as would always be the case in this setting but nothing to suggest deeper major infection or cellulitis at present.   3.  Prior MRSA  positive urine culture, current urine abnormal, await culture.   4.  History of probable recurrent aspiration, although not particularly clearly documented, previously treated it as aspiration.   5.  Severe dementia.   6.  Feeding tube in place, aspiration as a prior potential issue.   7.  Diabetes mellitus.   8.  Chronic mild renal insufficiency, some acute worsening at present.   9.  Sulfa allergy.   10 Diarrhea, c diff neg  11 BC CN staph likely contaminant      RECOMMENDATIONS:   1.  No treatment of the sacral decubitus ulcer, at present looks quite clean, does not need an I&D and is not a likely source of infection unless she has bacteremia.   2.   urine culture sens E coli , on ceftriaxone improved/stable, continue while here DC vanco CN staph likely contaminant  3.  Obviously long-term prognosis is poor, has had previous extensive conversation and ethics consultation and currently on restorative care based on underlying patient desires and [facility] [employer] [facility]  4 OK enteral ceftin 500 bid for 10 days if disposition            Interval History:   no interaction, no fever cxs as above  WBc nl              Medications:       cefTRIAXone  1 g Intravenous Q24H     metoprolol  25 mg Oral or Feeding Tube BID     ranitidine  150 mg Per Feeding Tube Daily     insulin glargine  30 Units Subcutaneous BID     acetaminophen  650 mg Per Feeding Tube TID     insulin aspart  1-6 Units Subcutaneous Q4H     multivitamins with minerals  15 mL Per Feeding Tube Daily                  Physical Exam:   Blood pressure 145/69, pulse 86, temperature 98.7  F (37.1  C), temperature source Axillary, resp. rate 22, height 1.524 m (5'), weight 69.5 kg (153 lb 3.5 oz), SpO2 97 %.  Wt Readings from Last 2 Encounters:   08/07/17 69.5 kg (153 lb 3.5 oz)   07/06/17 71.4 kg (157 lb 6.4 oz)     Vital Signs with Ranges  Temp:  [97.5  F (36.4  C)-98.7  F (37.1  C)] 98.7  F (37.1  C)  Pulse:  [78-86] 86  Heart Rate:  [72-86] 86  Resp:  [19-33]  22  BP: (125-145)/(64-73) 145/69  SpO2:  [94 %-98 %] 97 %    Constitutional: Awake, alert, not interactive   Lungs: Clear to auscultation bilaterally, no crackles or wheezing   Cardiovascular: Regular rate and rhythm, normal S1 and S2, and no murmur noted   Abdomen: Normal bowel sounds, soft, non-distended, non-tender   Skin: No rashes, no cyanosis, no edema wd clean   Other:           Data:   All microbiology laboratory data reviewed.  Recent Labs   Lab Test  08/07/17   0703  08/05/17   0618  08/04/17   0450   WBC  8.9  10.2  11.5*   HGB  11.0*  10.5*  12.5   HCT  33.9*  32.8*  39.6   MCV  94  97  99   PLT  182  177  200     Recent Labs   Lab Test  08/07/17   0703  08/06/17   0712  08/05/17   0618   CR  0.90  0.83  1.01     Recent Labs   Lab Test  05/08/13   1300   SED  61*     Recent Labs   Lab Test  08/05/17   1148  08/05/17   1140  08/04/17   0605  08/04/17   0555  08/04/17   0508  06/30/17   1654  06/30/17   1640  06/14/17   1319  06/14/17   1230   CULT  No growth after 1 day  No growth after 1 day  Cultured on the 1st day of incubation: Staphylococcus epidermidis  Critical Value/Significant Value, preliminary result only, called to and read   back by JOANN VALDERRAMA RN 8.5.17 0835. TENA  Cultured on the 1st day of incubation: Staphylococcus hominis  (Note)  POSITIVE for STAPHYLOCOCCUS EPIDERMIDIS and POSITIVE for the mecA  gene (resistant to methicillin) by Taggo multiplex nucleic acid  test. Final identification and antimicrobial susceptibility testing  will be verified by standard methods.    Specimen tested with Radiusigene multiplex, gram-positive blood culture  nucleic acid test for the following targets: Staph aureus, Staph  epidermidis, Staph lugdunensis, other Staph species, Enterococcus  faecalis, Enterococcus faecium, Streptococcus species, S. agalactiae,  S. anginosus grp., S. pneumoniae, S. pyogenes, Listeria sp., mecA  (methicillin resistance) and Rene/B (vancomycin resistance).    Critical  Value/Significant Value called to and read back by Cheyenne Gilliland RN at [facility] at 11:15am 8/5/2017 ()  *  No growth after 2 days  >100,000 colonies/mL Escherichia coli*  No growth  <1000 colonies/mL urogenital maria esther Susceptibility testing not routinely done  No growth  No growth  Moderate growth Escherichia coli  Moderate growth Enterococcus avium  Moderate growth Enterococcus faecalis  Light growth Candida albicans / dubliniensis Candida albicans and Candida   dubliniensis are not routinely speciated Susceptibility testing not routinely   done  Plus Heavy growth Normal skin maria esther  *  >100,000 colonies/mL Methicillin resistant Staphylococcus aureus (MRSA)  50,000 to 100,000 colonies/mL Strain 2 Methicillin resistant Staphylococcus   aureus (MRSA)  Critical Value/Significant Value called to and read back by Elmer Hurd Rn at   2044 [id number]  *[SD1.1]          Revision History        User Key Date/Time User Provider Type Action    > SD1.1 8/7/2017  8:59 AM Matteo Macias MD Physician Sign                  Procedure Notes     No notes of this type exist for this encounter.      Progress Notes - Therapies (Notes from 08/07/17 through 08/10/17)     No notes of this type exist for this encounter.

## 2017-08-04 NOTE — IP AVS SNAPSHOT
` ` Patient Information     Patient Name Sex     Celia Lewis ([id number]) Female 1926       Room Bed    5514 5514-01      Patient Demographics     Address Phone    [facility]  [address] [place] [address] [phone] (Home)      Patient Ethnicity & Race     Ethnic Group Patient Race    American White      Emergency Contact(s)     Name Relation Home Work Mobile    Salvatore Lewis  Son [phone]    JoshuaLeonel Spouse [phone]      Castro & Thi Lewis Son [phone]    JoshuaCat mariano Daughter [phone]      Documents on File        Status Date Received Description       Documents for the Patient    Privacy Notice - [place] Received 12     Face Sheet Received () 01/28/10     External Medication Information Consent       Patient ID       Consent for Services - Hospital/Clinic Received () 10/31/10     Face Sheet Received () 10/31/10     Business/Insurance/Care Coordination/Health Form - Patient.1  12     Business/Insurance/Care Coordination/Health Form - Patient.1  12     Insurance Card Received 12 [employer]    Advance Directives and Living Will Received 12 POLST 11-    Consent for Services - Hospital/Clinic Received () 12     Consent for Services - Hospital/Clinic  () 12 CONSENT FOR SERVICES - CLINIC AND HOD    Consent for EHR Access  13 Copied from existing Consent for services - C/HOD collected on 2012    [facility] Specified Other       Consent for Services - Hospital/Clinic  () 14 CONSENT FOR SERVICE    Consent for Services - [facility]       Consent for Services/Privacy Notice - Hospital/Clinic-[place] Received () 16     Consent for Services/Privacy Notice - Hospital/Clinic       Advance Directives and Living Will Received 16 POLST 07/21/15    Insurance Card Received (Deleted) 12      Advance Directives and Living Will Received (Deleted) 09/04/12 to be deleted       Documents for the Encounter    EMS/Ambulance Record  08/04/17 [facility]    CMS IM for Patient Signature         Admission Information     Attending Provider Admitting Provider Admission Type Admission Date/Time    Felicitas Cedeño DO White, Kirsten Elizabeth, DO Emergency 08/04/17  0438    Discharge Date Hospital Service Auth/Cert Status Service Area     Hospitalist Incomplete [facility]    Unit Room/Bed Admission Status       [address] UNIT 5514/5514-01 Admission (Confirmed)       Admission     Complaint    Recurrent UTI      Hospital Account     Name Acct ID Class Status Primary Coverage    Celia Lewis [id number] Inpatient Open UCARE - UCARE SENIORS NON FPA            Guarantor Account (for Hospital Account #[id number])     Name Relation to Pt Service Area Active? Acct Type    Celia Lewis  FCS Yes Personal/Family    Address Phone          [facility] [address] [place] [address] [phone](H)              Coverage Information (for Hospital Account #[id number])     F/O Payor/Plan Precert #    UCARE/UCARE SENIORS NON FPA     Subscriber Subscriber #    Celia Lewis [id number]    Address Phone    [address]  [place] [address] [phone]

## 2017-08-04 NOTE — PHARMACY-VANCOMYCIN DOSING SERVICE
Pharmacy Vancomycin Initial Note  Date of Service 2017  Patient's  1926  89 year old, female    Indication: Sepsis/UTI    Current estimated CrCl = Estimated Creatinine Clearance: 21.2 mL/min (based on Cr of 1.48).    Creatinine for last 3 days  2017:  4:50 AM Creatinine 1.48 mg/dL    Recent Vancomycin Level(s) for last 3 days  No results found for requested labs within last 72 hours.      Vancomycin IV Administrations (past 72 hours)                   vancomycin (VANCOCIN) 1500 mg in 0.9% NaCl 250 mL PREMIX (mg) 1,500 mg New Bag 17 0645                Nephrotoxins and other renal medications (Future)    Start     Dose/Rate Route Frequency Ordered Stop    17 0600  vancomycin (VANCOCIN) 1500 mg in 0.9% NaCl 250 mL PREMIX      1,500 mg Intravenous EVERY 48 HOURS 17 1238            Contrast Orders - past 72 hours     None                Plan:  1.  Start vancomycin  1500 mg IV q48h.   2.  Goal Trough Level: 15-20 mg/L   3.  Pharmacy will check trough levels as appropriate in prior to 3rd dose or at 48 hours if renal function decreases.    4. Serum creatinine levels will be ordered daily for the first week of therapy and at least twice weekly for subsequent weeks.    5. [place] method utilized to dose vancomycin therapy: Method 1    Aurea Sanderson

## 2017-08-04 NOTE — ED NOTES
[facility]  ED Nurse Handoff Report    ED Chief complaint: Hyperglycemia      ED Diagnosis:   Final diagnoses:   Severe sepsis without septic shock (H)   Urinary tract infection associated with indwelling urethral catheter, initial encounter (H)       Code Status: see MD note    Allergies:   Allergies   Allergen Reactions     Sulfa Drugs Other (See Comments)     Per nursing home documents, patient has allergy to sulfa       Activity level - Baseline/Home:  Total Care    Activity Level - Current:   Total Care     Needed?: No    Isolation: No  Infection: Not Applicable  C-Diff Pending  MRSA    Bariatric?: No    Vital Signs:   Vitals:    08/04/17 0442   BP: 149/77   Resp: 20   Temp: 98.1  F (36.7  C)   TempSrc: Temporal   SpO2: 94%       Cardiac Rhythm: ,        Pain level:      Is this patient confused?: Yes    Patient Report: Initial Complaint: [facility] reported patient to have hyperglycemia and results that show positive for UTI.  Patient has thick cloudy urine: sent and indeed positive for urinary infection and likely source for sepsis.  Home indwelling catheter.  BS elevated: 10 U subq novolog.  1L NS hanging.  Lactic elevated.  Patient at baseline per EMS s/t [facility] report with mentation: does not speak, little purposeful movement, somulent.  Son, Salvatore, updated from RN.  Opened eyes after fluids started, but does not track.  Underside skin on bilat arms reddened and dry.  Bilateral soft boots bilat feet.  Tunneling ulcer on coccyx with packing.  Diarrhea: cdiff sent.  Left ear wound dry.   Rocephin started.  Vanco will hang next.  Total care.  Gtube with syringe connected: Hospitalist and ER MD visualized this--seems that the tip of feed tubing was broken: will need to replace if possible.    Focused Assessment: see above  Tests Performed: see epic  Abnormal Results: see epic  Treatments provided: NS, IV antibiotics    Family Comments: Salvatore, son called    OBS  brochure/video discussed/provided to patient: N/A    ED Medications:   Medications   0.9% sodium chloride BOLUS (1,000 mLs Intravenous New Bag 8/4/17 0516)     Followed by   0.9% sodium chloride infusion (1,000 mLs Intravenous New Bag 8/4/17 0557)   cefTRIAXone (ROCEPHIN) 1 g vial to attach to  mL bag for ADULTS or NS 50 mL bag for PEDS (not administered)   vancomycin (VANCOCIN) 1500 mg in 0.9% NaCl 250 mL PREMIX (not administered)   insulin aspart (NovoLOG) inj (RAPID ACTING) (10 Units Subcutaneous Given 8/4/17 0512)       Drips infusing?:  No      ED NURSE PHONE NUMBER: [phone]

## 2017-08-04 NOTE — H&P
CHIEF COMPLAINT:  Hyperglycemia and low-grade fever.      HISTORY OF PRESENT ILLNESS:  Celia Lewis is a 89-year-old female with a rather complicated past medical history which includes advanced dementia for which she is nonambulatory and nonverbal and resides in a nursing home as well as a history of recurrent infections, chronic sacral decubitus ulcer, history of coronary artery disease and diastolic dysfunction, history of atrial fibrillation, hypertension and type 2 diabetes who was brought to the emergency room tonight for evaluation of hyperglycemia and a low-grade fever.  History is obtained per discussions with ED physician and review of the chart.      The patient was hospitalized here just over a month ago from 6/30 to 7/08 for management of fevers which was thought to be secondary to a health care associated pneumonia versus aspiration.  Additionally, she was noted to have a history of urine cultures that were positive for MRSA.  During that hospital stay she was evaluated by the Infectious Disease service and was ultimately discharged on 1 week course of Levaquin and linezolid.  It was also during that stay that an ethics consult was done.  It is noted that patient is [employer] and has very strong Zoroastrianism beliefs.  Her son stated he had wanted to respect those wishes and according to these Zoroastrianism beliefs, he cannot change her code status to a DNR/DNI.  Ultimately the decision was made to continue pursuing restorative cares.  From what I can gather, it sounds as though she has been relatively stable in the four weeks since her discharge.  Overnight staff at her nursing facility noted that she was hyperglycemic with a blood sugar of 332.  She was also noted to have a low-grade fever though it is not clear to what her temperature actually was.  She was transferred to [facility] Emergency Room for further evaluation.      In the emergency room, she was seen and evaluated by Dr. Rubi.   She was afebrile and hemodynamically stable.  Her blood pressures were actually elevated to the 140s-150s systolic.  O2 sats were in the 90s on room air.  Labs were notable for a white count of 11.5, creatinine of 1.48, sodium of 148, glucose of 331 and a lactic acid of 4.0.  She had a grossly abnormal urinalysis with a large leukocyte esterase, greater than 182 WBCs, positive nitrites, moderate blood, many bacteria, mucus yeast and white blood cell clumps.  Additionally, staff noted she had several episodes of foul smelling diarrhea concerning for possible C. diff.  Blood and urine cultures were obtained, as was a stool for C. diff.  In the emergency room, she was given a liter of normal saline, Rocephin, vancomycin and 10 units of insulin.  Although she has an elevated lactate, she appears clinically stable and will be admitted to the general medical floor for ongoing care.      PAST MEDICAL HISTORY:   1.  Advanced dementia.  At baseline patient is nonverbal and nonambulatory.  As she has been bed bound, she has developed contractures and she has noted a pressure ulcer over her left ear and over sacrum.  She has a PEG tube and a chronic Fonseca catheter.   2.  Type 2 diabetes.  Most recent A1c was 8.4 in 06/2017.   3.  Hypertension.   4.  History of coronary artery disease.   5.  Stage III chronic kidney disease.  Her baseline creatinine seems to be around 1.0-1.2.   6.  History of CHF.  Her last echocardiogram was in 08/2012 and ejection fraction at that time was 55-60% with no mention of diastolic dysfunction.     7.  History of nephrolithiasis with findings of a uric acid stone.   8.  Obesity.   9.  Osteoarthritis.   10.  History of recurrent UTIs with prior urine cultures growing MRSA.   11.  History of atrial fibrillation.      PAST SURGICAL HISTORY:     1.  Cystoscopy and lithotripsy in 2012.   2.  History of prior ureteral stent placements.   3.  History of PEG tube placement.      FAMILY HISTORY:  Both her  mother and father had a history of strokes.  Brother with history of diabetes.      SOCIAL HISTORY:  The patient has a history of tobacco use, though this was many, many years ago and she reportedly only smoked socially.  She does not consume alcohol.  Again, given the advanced nature of her dementia she is presently bed bound and resides in a nursing home.      HOME MEDICATIONS:  Awaiting completion of medication reconciliation per pharmacy, however, as per her last discharge on 7/08 she is maintained on the following medications:   1.  Lantus 30 units b.i.d.   2.  Zantac 150 mg daily.     3.  Vitamin A, D, and C drops daily.   4.  Oxycodone 5 mg every 4 hours as needed.   5.  Metoprolol 25 mg b.i.d.   6.  Sliding scale NovoLog.   7.  Lactobacillus daily.   8.  Daily multivitamin.   9.  Imodium every other day.     10.  Tylenol daily at 650 mg t.i.d. with an additional 325 mg daily as needed.    11.  Bisacodyl suppositories as needed.        She completed 1 week course of linezolid and Levaquin as was prescribed at the time of that discharge.      ALLERGIES:  Sulfa drugs are listed but no reaction is documented.      REVIEW OF SYSTEMS:  Unable to review complete review of systems given the patient's medical problems and nonverbal status, as outlined above.      PHYSICAL EXAMINATION:   VITAL SIGNS:  Temperature 98.1, pulse 86, respirations 20, blood pressure 156/82.  O2 sat is 100% on room air.   GENERAL:  The patient is a chronically ill-appearing female lying in bed.  She is nonverbal.  She does not follow any commands nor does she appear to show any sort of response when spoken to.  She does open her eyes and periodically look around the room.  She does not appear uncomfortable or in any acute distress.   HEENT:  Pupils equal, round, reactive to light, extra ocular movements are intact.  Mucous membranes moist.   CARDIOVASCULAR:  Heart rhythm regular, no murmurs, gallops, rubs.  Pulses are +2 and symmetric in  bilateral upper extremities, there is no extremity edema.   RESPIRATORY:  Lungs appear clear to auscultation bilaterally.  I cannot appreciate any wheezes, rales or rhonchi.  She does not appear to demonstrate any increased work of breathing.   ABDOMEN:  Soft, nontender, nondistended.  There are positive bowel sounds throughout.  PEG tube is noted.   GENITOURINARY:  Chronic Fonseca catheter is in place and with clear yellow urine.     INTEGUMENT:  Patient has a sacral decubitus ulcer that does not appear to demonstrate any signs of acute infection.  There is no surrounding erythema or purulent drainage.  She also was noted to have a pressure ulcer over the posterior auricle of the left ear.   NEUROLOGIC:  Again patient is nonverbal and not following commands.     EXTREMITIES:  She has no contractures in her lower extremities.      LABORATORY DATA/IMAGING:    BMP shows sodium of 140, potassium 4.3, bicarb 22, creatinine 1.48.  LFTs were grossly unremarkable.  Ketones were within normal limits, a lactic acid was 4.0, blood sugar was 331.    CBC showed a white count 11.5, hemoglobin 12.5, platelet count of 200.      Urinalysis was grossly abnormal with large leukocyte esterase, greater than 182 WBCs, positive nitrites, moderate blood with greater 137 RBCs, many bacteria, white blood cell clumps, yeast and mucus.     Blood cultures, urine culture and C. diff are pending.      ASSESSMENT AND PLAN:  Celia Lewis is a 89-year-old female with advanced dementia who is nonverbal at baseline and bed bound with other medical problems including recurrent urinary tract infections, hypertension and type 2 diabetes as well as stage III CKD and a chronic stage III sacral decubitus ulcer who was brought to the emergency room for evaluation of low-grade fever and hyperglycemia.  She was found to have elevated creatinine, mild leukocytosis and elevated lactate.  She had several episodes of loose stools well in the emergency room that  are concerning for C. diff.  She is being admitted today for ongoing care.   1.  Sepsis, suspected secondary to recurrent urinary tract infection verses possible C. diff.  The patient has history of chronic indwelling Fonseca catheter and prior MRSA infection in the urine.  During her most recent hospitalization a month ago, she was treated for suspected HCAP versus aspiration.  The patient is afebrile in the emergency room.  Her white count is minimally elevated at 11.5 however, her lactate is elevated at 4.0.  Urinalysis is grossly abnormal.  Loose stools in the emergency room are concerning for C. diff.  Her sacral decubitus ulcer appears stable and without signs of acute infection.  In the emergency room, she was given a dose of Rocephin and vancomycin and 1/2 liter of normal saline.  Blood and urine cultures are pending as is a stool for C. diff.  Given her complex history of infections, Infectious Disease service has been consulted and I appreciate their assistance with decision on ongoing antibiotics.  She received a liter of normal saline in the emergency room, will repeat a lactate when she gets to the floor.  It sounds as though she had issues with elevated lactate during her last hospitalization which were not thought to be secondary to infection; however, her lactate at present is higher than it was during her last stay.   2.  Acute kidney injury on stage III CKD.  Creatinine today is 1.4.  It looks as though her baseline is somewhere around 1.0-1.2.   Presumably prerenal injury.  She was given a liter of normal saline downstairs.  We will continue maintenance IV fluids at 125 cc per hour.   3.  Type 2 diabetes.  Most recent A1c was 8.4 in 6/2017.  She is managed with Lantus and sliding scale NovoLog.  Her blood sugars were in the 300 overnight.  She was given 10 units of insulin in the ER.  Will continue her Lantus and medium dose sliding scale NovoLog every 4 hours.   4.  Advanced dementia.  Baseline  nonverbal and noncommunicative.  She is bed bound and has noted contractures.  She has a feeding tube and a chronic Fonseca catheter.  I have placed a consult to nutrition to continue her tube feeds.  Note that during last hospitalization an ethics consult was pursued given her advanced medical conditions and poor prognosis; the patient was noted to be Taoist Spiritism and ultimately the decision was made to continue treating current acute medical conditions as they arise.   5.  Stage III sacral decubitus ulcer, chronic.  No signs of acute infection.  Wound care nurse has seen consulted.  I do have concerns with her ongoing loose stools and possible C. diff that this could become infected; their assistance with care is greatly appreciated.   6.  Hypertension.  Noted during last hospitalization and was started on metoprolol.  Blood pressures are stable, if not a little elevated, so metoprolol will be continued.  7.  Hypernatremia.  Appears to be chronic and was thought to be secondary to her tube feeds.  Sodium tonight is 148.  Continue to monitor and see if we can adjust tube feeds accordingly.  She will recieve IV fluids with 0.45 normal saline at 125ml per hour.  8.  Deep venous thrombosis prophylaxis:  PCDs.   9.  Code status:  Full code.      As I anticipate the patient will be here for greater than 2 midnight stay, she has been admitted under inpatient status.         AUGUSTO TONEY DO             D: 2017 07:07   T: 2017 07:43   MT: [id number]      Name:     RAÚL MERCEDES   MRN:      [id number]        Account:      [id number]   :      1926           Admitted:     [id number]      Document: [id number]

## 2017-08-04 NOTE — IP AVS SNAPSHOT
[place] [address] [facility]: [phone]                                              INTERAGENCY TRANSFER FORM - NURSING   2017                    Hospital Admission Date: 2017  RAÚL MERCEDES   : 1926  Sex: Female        Attending Provider: Felicitas Cedeño DO     Allergies:  Sulfa Drugs    Infection:  MRSA-Contact Isolation   Service:  HOSPITALIST    Ht:  1.524 m (5')   Wt:  69 kg (152 lb 1.9 oz)   Admission Wt:  64.7 kg (142 lb 10.2 oz)    BMI:  29.71 kg/m 2   BSA:  1.71 m 2            Patient PCP Information     None on File      Current Code Status     Date Active Code Status Order ID Comments User Context       Prior      Code Status History     Date Active Date Inactive Code Status Order ID Comments User Context    2017  6:24 PM  Full Code [id number]  Tamar Pineda MD Outpatient    2017  7:51 AM 2017  6:24 PM Full Code [id number]  Felicitas Cedeño DO Inpatient    2017 10:22 AM 2017  7:51 AM Full Code [id number]  Piotr Saba MD Outpatient    2017  7:29 PM 2017 10:22 AM Full Code [id number]  Paul Jolly MD Inpatient    2017 10:10 AM 2017  7:29 PM Full Code [id number]  Kraig Mobley MD Outpatient    2017  5:14 PM 2017 10:10 AM Full Code [id number]  Antwan Green MD Inpatient    2016 11:48 AM 2016  5:44 PM Full Code [id number]  Clarence Wilkerson MD Inpatient    2014  3:01 PM 2016 11:48 AM Full Code [id number]  Emily Ca DO Outpatient    2014  3:34 AM 2014  3:01 PM Full Code [id number]  Ni Mccurdy MD Inpatient    2012 12:42 PM 2014  3:34 AM Full Code [id number]  Francesca Knott MD Outpatient    2012  3:47 AM 2012 12:42 PM Full Code [id number]  Peewee De La Cruz MD Inpatient      Advance Directives        Does patient have a scanned Advance Directive/ACP document in EPIC?           Yes        Hospital Problems as of 8/10/2017               Priority Class Noted POA    Advanced dementia Medium  6/24/2014 Yes    Diabetes mellitus type 2, insulin dependent (H) Medium  5/22/2015 Yes    Sepsis due to urinary tract infection (H) Medium  6/14/2017 Yes    Recurrent UTI Medium  8/4/2017 Yes    Acute renal failure superimposed on stage 3 chronic kidney disease (H) Medium  8/9/2017 Yes    Sacral decubitus ulcer, stage III (H) Medium  8/9/2017 Yes    Hypernatremia Medium  8/9/2017 Yes    Complication of gastrostomy tube (H) Medium  8/9/2017 Yes      Non-Hospital Problems as of 8/10/2017              Priority Class Noted    Nephrolithiasis Medium  8/6/2012    Bacteremia Medium  8/11/2012    Actinomyces infection Medium  8/31/2012    Uric acid stone in urine Medium  Unknown    Fall Medium  6/24/2014    UTI (urinary tract infection) Medium  6/24/2014    Scalp laceration Medium  6/24/2014    Atrial fibrillation (H) Medium  5/22/2015    History of urinary tract infection Medium  5/22/2015    Alzheimer's disease Medium  5/22/2015    Primary localized osteoarthrosis of shoulder region Medium  5/22/2015    Morbid obesity (H) Medium  5/22/2015    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) Medium  5/22/2015    Hyperlipidemia Medium  5/22/2015    Candidiasis of skin and nails Medium  5/22/2015    Chronic kidney disease, stage III (moderate) Medium  5/22/2015    Advance care planning Medium  5/29/2015    HCAP (healthcare-associated pneumonia) Medium  6/21/2016    ARF (acute renal failure) (H) Medium  6/30/2017      Immunizations     Name Date      Influenza (IIV3) 10/13/14     Pneumococcal 23 valent 12/01/98          END      ASSESSMENT     Discharge Profile Flowsheet     EXPECTED DISCHARGE     COMMUNICATION ASSESSMENT      Expected Discharge Date  08/10/17 ([facility] at 1100) 08/09/17 1514   Patient's communication style  spoken language (English or Bilingual) 08/04/17 3056    DISCHARGE NEEDS ASSESSMENT     FINAL RESOURCES       "Concerns To Be Addressed  discharge planning concerns 08/07/17 0900   Resources List  Skilled Nursing Facility 08/09/17 1514    Patient/family verbalizes understanding of discharge plan recommendations?  Yes 08/07/17 0900   [phone], Fax: [phone] 08/09/17 1514    Medical Team notified of plan?  yes 08/07/17 0900   Referrals Placed  Post Acute Facilities;Transportation 08/09/17 1514    Transportation Available  family or friend will provide 06/24/14 1319   SKIN      # of Referrals Placed by CTS  Transportation 08/09/17 1514   Inspection of bony prominences  Full 08/09/17 1117    Equipment Used at Home  standard walker;wheelchair/stroller 08/09/12 1131   Skin WDL  ex 08/10/17 0618    ASSESSMENT OF FUNCTIONAL STATUS     Skin Color/Characteristics  redness blanchable 08/10/17 0618    Assesssment of Functional Status  Needs placement in a SNF/TCF for rehabilitation 08/07/17 0900   Skin Temperature  warm 08/10/17 0618    GASTROINTESTINAL (ADULT,PEDIATRIC,OB)     Skin Moisture  dry 08/10/17 0618    GI WDL  ex 08/10/17 0001   Skin Integrity  drain/device(s) 08/10/17 0001    Last Bowel Movement  08/09/17 08/10/17 0001   SAFETY      GI Signs/Symptoms  fecal incontinence 08/10/17 0001   Safety WDL  WDL 08/10/17 0001    Passing flatus  -- (ZOEY) 08/09/17 1117                      Assessment WDL (Within Defined Limits) Definitions           Safety WDL     Effective: 09/28/15    Row Information: <b>WDL Definition:</b> Bed in low position, wheels locked; call light in reach; upper side rails up x 2; ID band on<br> <font color=\"gray\"><i>Item=AS safety wdl>>List=AS safety wdl>>Version=F14</i></font>      Skin WDL     Effective: 09/28/15    Row Information: <b>WDL Definition:</b> Warm; dry; intact; elastic; without discoloration; pressure points without redness<br> <font color=\"gray\"><i>Item=AS skin wdl>>List=AS skin wdl>>Version=F14</i></font>      Vitals     Vital Signs Flowsheet  "    VITAL SIGNS     rFLACC Pain Rating - Cry  0-->no cry (awake or asleep) 08/09/17 1740    Temp  98.4  F (36.9  C) 08/10/17 0038   rFLACC Pain Rating - Consolability  0-->content, relaxed 08/09/17 1740    Temp src  Oral 08/10/17 0038   rFLACC Score: Activity  0 08/09/17 1740    Resp  20 08/10/17 0038   Pain Management Interventions  pillow support provided 08/09/17 1740    Pulse  91 08/08/17 1937   HEIGHT AND WEIGHT      Heart Rate  86 08/10/17 0038   Height  1.524 m (5') 08/04/17 0936    Pulse/Heart Rate Source  Monitor 08/10/17 0038   Weight  69 kg (152 lb 1.9 oz) 08/09/17 0316    BP  124/62 08/10/17 0038   Weight Method  Bed scale 08/07/17 0450    BP Location  Right arm 08/10/17 0038   Bed Scale  Standard (fitted sheet, draw sheet/ pad, cover/flat sheet, blanket, two pillows) 08/07/17 0450    OXYGEN THERAPY     POSITIONING      SpO2  95 % 08/10/17 0038   Body Position  left, semi-prone 08/10/17 0001    O2 Device  None (Room air) 08/10/17 0038   Head of Bed (HOB)  HOB at 20-30 degrees 08/10/17 0001    PAIN/COMFORT     Positioning/Transfer Devices  pillows;in use 08/09/17 1549    Patient Currently in Pain  ZOEY 08/09/17 1740   DAILY CARE      Preferred Pain Scale  rFLACC (Revised Face Legs Arms Cry Consolability Scale) 08/09/17 1108   Activity Type  bedrest 08/10/17 0001    rFLACC Pain Rating: Face  0-->no particular expression or smile 08/09/17 1740   Activity Level of Assistance  assistance, 2 people 08/10/17 0001    rFLACC Pain Rating - Legs  0-->normal position or relaxed 08/09/17 1740   Activity Assistive Device  mechanical lift 08/10/17 0001    rFLACC Pain Rating: Activity  0-->lying quietly, normal position, moves easily 08/09/17 1740                 Patient Lines/Drains/Airways Status    Active LINES/DRAINS/AIRWAYS     Name: Placement date: Placement time: Site: Days: Last dressing change:    Gastrostomy/Enterostomy Gastrostomy LUQ       LUQ   70855     Urethral Catheter Temperature probe 10 fr  06/30/17   1645   Temperature probe   40     Urethral Catheter 08/04/17   0800      5     Peripheral IV 07/04/17 Right Upper forearm 07/04/17   1045   Upper forearm   36     Pressure Ulcer 06/21/16 Right Sacrum 06/21/16   1215    414     Pressure Ulcer 06/21/16 Right Other (Comment) 06/21/16   1215    414     Pressure Injury 06/14/17 Coccyx 3x4cm irregular shaped wound to coccyx 06/14/17   1715    56     Wound Posterior Head Laceration       Head   68721     Wound 06/30/17 Bilateral;Outer Ear Suspected pressure ulcer 06/30/17   1900   Ear   40     Rash 07/01/17 0014 Bilateral upper other (see comments) other (see comments) 07/01/17   0014    40             Patient Lines/Drains/Airways Status    Active PICC/CVC     None            Intake/Output Detail Report     Date Intake         Output   Net    Shift P.O. I.V. NG/GT IV Piggyback Enteral Total Urine Emesis/NG output Total       Noc 08/08/17 2300 - 08/09/17 0659 -- -- -- -- -- -- -- -- -- 0    Day 08/09/17 0700 - 08/09/17 1459 -- -- 1576 -- -- 1576 800 -- 800 776    Sandrine 08/09/17 1500 - 08/09/17 2259 -- -- 0 -- -- -- -- -- -- 0    Noc 08/09/17 2300 - 08/10/17 0659 -- -- -- -- -- -- 550 -- 550 -550    Day 08/10/17 0700 - 08/10/17 1459 -- -- -- -- -- -- -- -- -- 0      Last Void/BM       Most Recent Value    Urine Occurrence     Stool Occurrence 1 at 08/09/2017 0316      Case Management/Discharge Planning     Case Management/Discharge Planning Flowsheet     REFERRAL INFORMATION     COPING/STRESS      Did the Initial Social Work Assessment result in a Social Work Case?  Yes 08/07/17 0900   Major Change/Loss/Stressor  hospitalization 08/07/17 0900    Admission Type  inpatient 08/07/17 0900   Patient Personal Strengths  strong support system 08/07/17 0900    Arrived From  skilled nursing facility 08/07/17 0900   Sources Of Support  adult child(willard);friend(s);other family members;spouse 08/07/17 0900    Referral Source  interdisciplinary rounds 08/07/17 0900   Reaction To  Health Status  unable to assess 08/07/17 0900    # of Referrals Placed by CTS  Transportation 08/09/17 1514   Understanding Of Condition And Treatment  unable to assess 08/07/17 0900    Reason For Consult  discharge planning 08/07/17 0900   EXPECTED DISCHARGE      Record Reviewed  history and physical;medical record 08/07/17 0900   Expected Discharge Date  08/10/17 ([facility] at 1100) 08/09/17 1514     Assigned to Case  SEAN Jarquin 08/09/17 1514   ASSESSMENT/CONCERNS TO BE ADDRESSED      LIVING ENVIRONMENT     Concerns To Be Addressed  discharge planning concerns 08/07/17 0900    Lives With  facility resident 08/07/17 0900   DISCHARGE PLANNING      Living Arrangements  extended care facility ([facility]) 08/07/17 0900   Patient/family verbalizes understanding of discharge plan recommendations?  Yes 08/07/17 0900    Provides Primary Care For  no one, unable/limited ability to care for self 08/07/17 0900   Medical Team notified of plan?  yes 08/07/17 0900    Quality Of Family Relationships  supportive;involved 08/07/17 0900   Transportation Available  family or friend will provide 06/24/14 1319    Able to Return to Prior Living Arrangements  yes 08/07/17 0900   Skilled Nursing Facility  [facility] 08/10/12 1751    HOME SAFETY     Skilled Nursing Facility Phone Number  [phone] 08/10/12 1751    Patient Feels Safe Living in Home?  yes 08/07/17 0900   Outpatient/Agency/Support Group Needs  assisted living facility 08/06/12 0445    ASSESSMENT OF FAMILY/SOCIAL SUPPORT     Equipment Used at Home  standard walker;wheelchair/stroller 08/09/12 1131    Marital Status   08/07/17 0900   FINAL RESOURCES      Who is your support system?  ;Children 08/07/17 0900   Resources List  Skilled Nursing Facility 08/09/17 1514    Spouse's Name  Leonel 08/07/17 0900   Skilled Nursing Facility  [facility] [phone], Fax: [phone] 08/09/17 1519    Description of Support  System  Supportive;Involved 08/07/17 0900   Referrals Placed  Post Acute Facilities;Transportation 08/09/17 1514    Support Assessment  Adequate family and caregiver support;Adequate social supports 08/07/17 0900   ABUSE RISK SCREEN      Quality of Family Relationships  supportive;involved 08/07/17 0900   QUESTION TO PATIENT:  Has a member of your family or a partner(now or in the past) intimidated, hurt, manipulated, or controlled you in any way?  patient declined to answer or is unable to answer 08/04/17 0628    ASSESSMENT OF FUNCTIONAL STATUS     QUESTION TO PATIENT: Do you feel safe going back to the place where you are living?  patient declined to answer or is unable to answer 08/04/17 0628    Assesssment of Functional Status  Needs placement in a SNF/TCF for rehabilitation 08/07/17 0900   OBSERVATION: Is there reason to believe there has been maltreatment of a vulnerable adult (ie. Physical/Sexual/Emotional abuse, self neglect, lack of adequate food, shelter, medical care, or financial exploitation)?  no 08/04/17 0628    EMPLOYMENT     HOMICIDE RISK      Do you work full or part-time?  no 08/07/17 0900   Homicidal Ideation  other (see comments) (ZOEY) 08/04/17 0628

## 2017-08-04 NOTE — IP AVS SNAPSHOT
MRN:[id number]                      After Visit Summary   8/4/2017    Celia Lewis    MRN: [id number]           Thank you!     Thank you for choosing [facility] for your care. Our goal is always to provide you with excellent care. Hearing back from our patients is one way we can continue to improve our services. Please take a few minutes to complete the written survey that you may receive in the mail after you visit with us. Thank you!        Patient Information     Date Of Birth          11/30/1926        About your hospital stay     You were admitted on:  August 4, 2017 You last received care in the:  [address] [facility]    You were discharged on:  August 10, 2017        Reason for your hospital stay       Sepsis with urinary tract infection                  Who to Call     For medical emergencies, please call 911.  For non-urgent questions about your medical care, please call your primary care provider or clinic, None          Attending Provider     Provider Specialty    Juan Rubi MD Emergency Medicine    Felicitas Cedeño DO Internal Medicine       Primary Care Provider    None Specified      After Care Instructions     Activity - Up with nursing assistance           Advance Diet as Tolerated       Follow this diet upon discharge: Orders Placed This Encounter      Adult Formula Drip Feeding: Continuous Isosource 1.5; Gastrostomy; Goal Rate: 45; mL/hr; Medication - Tube Feeding Flush Frequency: At least 15-30 mL water before and after medication administration and with tube clogging            General info for SNF       Length of Stay Estimate: Long Term Care  Condition at Discharge: Stable  Level of care:skilled   Rehabilitation Potential: Poor  Admission H&P remains valid and up-to-date: Yes  Recent Chemotherapy: N/A  Use Nursing Home Standing Orders: Yes            Glucose monitor nursing POCT       Before meals and at bedtime            Yolanda  "instructions       Give two-step Mantoux (PPD) Per Facility Policy Yes                  Follow-up Appointments     Follow Up and recommended labs and tests       Follow up with Nursing home physician in 1 week for hospital follow up, repeat BMP.  The following labs/tests are recommended: BMP.                  Further instructions from your care team       TF via G-tube: Isosource 1.5 (or equivalent) at 45 mL/hr continuous.  FLush 130 mL H2O q 4 hours.  Nutrisource Fiber, 1 packet BID.    You have been discharged back to [facility]  Phone Number is [phone]  Fax Number is [phone]    Pending Results     Date and Time Order Name Status Description    8/9/2017 1042 IR Gastrostomy Tube Change In process     8/5/2017 1123 Blood culture Preliminary     8/5/2017 1123 Blood culture Preliminary             Statement of Approval     Ordered          08/10/17 0928  I have reviewed and agree with all the recommendations and orders detailed in this document.  EFFECTIVE NOW     Approved and electronically signed by:  Chastity Guerin MD             Admission Information     Date & Time Provider Department Dept. Phone    8/4/2017 Felicitas Cedeño DO [place] [address] [facility] [phone]      Your Vitals Were     Blood Pressure Pulse Temperature Respirations Height Weight    164/94 (BP Location: Right arm) 104 97.4  F (36.3  C) (Oral) 16 1.524 m (5') 69 kg (152 lb 1.9 oz)    Pulse Oximetry BMI (Body Mass Index)                96% 29.71 kg/m2          [employer] Information     [employer] lets you send messages to your doctor, view your test results, renew your prescriptions, schedule appointments and more. To sign up, go to [email or web address] . Click on \"Log in\" on the left side of the screen, which will take you to the Welcome page. Then click on \"Sign up Now\" on the right side of the page.     You will be asked to enter the access code listed below, as well as some personal information. " Please follow the directions to create your username and password.     Your access code is: [email or web address]  Expires: 2017 12:54 PM     Your access code will  in 90 days. If you need help or a new code, please call your [facility] clinic or [phone].        Care EveryWhere ID     This is your Care EveryWhere ID. This could be used by other organizations to access your [facility] medical records  [id number]        Equal Access to Services     [facility] : Hadii aad ku hadasho Soomaali, waaxda luqadaha, qaybta kaalmada adeegyada, waxay idiin hayaan adeeg griseldashirayudy labigg . So [facility] [phone].    ATENCIÓN: Si habla español, tiene a rosas disposición servicios gratuitos de asistencia lingüística. [facility] [phone].    We comply with applicable federal civil rights laws and [place] laws. We do not discriminate on the basis of race, color, national origin, age, disability sex, sexual orientation or gender identity.               Review of your medicines      START taking        Dose / Directions    cefuroxime 500 MG tablet   Commonly known as:  CEFTIN   Indication:  Urinary Tract Infection   Used for:  Sepsis due to urinary tract infection (H)        Dose:  500 mg   1 tablet (500 mg) by Per G Tube route every 12 hours   Quantity:  15 tablet   Refills:  0         CONTINUE these medicines which have NOT CHANGED        Dose / Directions    * ACETAMINOPHEN PO        Dose:  650 mg   650 mg by Gastric Tube route 3 times daily   Refills:  0       * acetaminophen 32 mg/mL solution   Commonly known as:  TYLENOL        Dose:  325 mg   325 mg by Gastric Tube route daily as needed for fever or mild pain   Refills:  0       bisacodyl 10 MG Suppository   Commonly known as:  DULCOLAX        Dose:  10 mg   Place 10 mg rectally daily as needed for constipation   Refills:  0       Dakins 0.125 % Soln        Externally apply topically 2 times daily BID and PRN.  1. Cleanse wound with microklenze, cleanse periwound area with  naomi perineal 2. Moisten kerlix fluff with dakins solution 0.125%, wring out excess, pack wound with kerlix 3. Apply antifungal powder to periwound area, rub in. Apply criticaid over powder. 4. Cover with ABD using minimal medipore tape to secure. 5. Label dressing with date, time, initials. Follow Rigorous PIP measures.   Refills:  0       IMODIUM A-D 1 MG/7.5ML Liqd   Generic drug:  Loperamide HCl        Dose:  4 mg   4 mg by Gastric Tube route every other day   Refills:  0       INSULIN ASPART SC        Dose:  1-6 Units   Inject 1-6 Units Subcutaneous every 6 hours 0800, 1400, 2000, 0200 -250 1 unit -300 2 units -350 3 units -400 4 units -450 5 units BG >450 6 units and update MD   Refills:  0       insulin glargine 100 UNIT/ML injection   Commonly known as:  LANTUS   Used for:  Type 2 diabetes mellitus with diabetic nephropathy, unspecified long term insulin use status (H)        Dose:  30 Units   Inject 30 Units Subcutaneous 2 times daily   Refills:  0       Lactobacillus Acidophilus Powd        Dose:  1 capsule   1 capsule by Gastric Tube route daily 10 billion units   Refills:  0       metoprolol 10 mg/mL Susp   Commonly known as:  LOPRESSOR   Used for:  Benign essential hypertension        Dose:  25 mg   Take 2.5 mLs (25 mg) by mouth 2 times daily   Quantity:  100 mL   Refills:  1       multivitamin, therapeutic Tabs tablet        Dose:  1 tablet   1 tablet by Gastric Tube route daily   Refills:  0       * oxyCODONE 5 MG/5ML solution   Commonly known as:  ROXICODONE   Indication:  Chronic Pain        Dose:  2 mg   Take 2 mg by mouth 3 times daily   Refills:  0       * oxyCODONE 5 MG/5ML solution   Commonly known as:  ROXICODONE   Used for:  Primary localized osteoarthrosis of shoulder region, unspecified laterality        Dose:  2 mg   2 mLs (2 mg) by Per G Tube route every 4 hours as needed for moderate to severe pain   Quantity:  473 mL   Refills:  0       ranitidine 150  MG/10ML syrup   Commonly known as:  Zantac   Used for:  Gastroesophageal reflux disease without esophagitis        Dose:  150 mg   10 mLs (150 mg) by Per Feeding Tube route daily   Quantity:  600 mL   Refills:  0       simethicone 40 MG/0.6ML suspension   Commonly known as:  MYLICON        Dose:  80 mg   Take 80 mg by mouth 3 times daily   Refills:  0       vitamin A-D & C drops 750-400-35 UNIT-MG/ML solution NEW FORMULATION   Used for:  Decubitus ulcer of buttock, unspecified laterality, unspecified ulcer stage        Dose:  7 mL   7 mLs by Per G Tube route daily   Quantity:  50 mL   Refills:  0       * Notice:  This list has 4 medication(s) that are the same as other medications prescribed for you. Read the directions carefully, and ask your doctor or other care provider to review them with you.         Where to get your medicines      Some of these will need a paper prescription and others can be bought over the counter. Ask your nurse if you have questions.     You don't need a prescription for these medications     cefuroxime 500 MG tablet         Information about where to get these medications is not yet available     ! Ask your nurse or doctor about these medications     oxyCODONE 5 MG/5ML solution                Protect others around you: Learn how to safely use, store and throw away your medicines at www.disposemymeds.org.             Medication List: This is a list of all your medications and when to take them. Check marks below indicate your daily home schedule. Keep this list as a reference.      Medications           Morning Afternoon Evening Bedtime As Needed    * ACETAMINOPHEN PO   650 mg by Gastric Tube route 3 times daily   Last time this was given:  650 mg on 8/10/2017  9:58 AM                                * acetaminophen 32 mg/mL solution   Commonly known as:  TYLENOL   325 mg by Gastric Tube route daily as needed for fever or mild pain   Last time this was given:  650 mg on 8/10/2017  9:58 AM                                 bisacodyl 10 MG Suppository   Commonly known as:  DULCOLAX   Place 10 mg rectally daily as needed for constipation                                cefuroxime 500 MG tablet   Commonly known as:  CEFTIN   1 tablet (500 mg) by Per G Tube route every 12 hours   Last time this was given:  500 mg on 8/10/2017  9:58 AM                                Dakins 0.125 % Soln   Externally apply topically 2 times daily BID and PRN.  1. Cleanse wound with microklenze, cleanse periwound area with naomi perineal 2. Moisten kerlix fluff with dakins solution 0.125%, wring out excess, pack wound with kerlix 3. Apply antifungal powder to periwound area, rub in. Apply criticaid over powder. 4. Cover with ABD using minimal medipore tape to secure. 5. Label dressing with date, time, initials. Follow Rigorous PIP measures.                                IMODIUM A-D 1 MG/7.5ML Liqd   4 mg by Gastric Tube route every other day   Generic drug:  Loperamide HCl                                INSULIN ASPART SC   Inject 1-6 Units Subcutaneous every 6 hours 0800, 1400, 2000, 0200 -250 1 unit -300 2 units -350 3 units -400 4 units -450 5 units BG >450 6 units and update MD   Last time this was given:  3 Units on 8/10/2017  7:06 AM                                insulin glargine 100 UNIT/ML injection   Commonly known as:  LANTUS   Inject 30 Units Subcutaneous 2 times daily   Last time this was given:  30 Units on 8/10/2017  9:58 AM                                Lactobacillus Acidophilus Powd   1 capsule by Gastric Tube route daily 10 billion units                                metoprolol 10 mg/mL Susp   Commonly known as:  LOPRESSOR   Take 2.5 mLs (25 mg) by mouth 2 times daily   Last time this was given:  25 mg on 8/9/2017  9:19 PM                                multivitamin, therapeutic Tabs tablet   1 tablet by Gastric Tube route daily                                * oxyCODONE 5 MG/5ML  solution   Commonly known as:  ROXICODONE   Take 2 mg by mouth 3 times daily                                * oxyCODONE 5 MG/5ML solution   Commonly known as:  ROXICODONE   2 mLs (2 mg) by Per G Tube route every 4 hours as needed for moderate to severe pain                                ranitidine 150 MG/10ML syrup   Commonly known as:  Zantac   10 mLs (150 mg) by Per Feeding Tube route daily   Last time this was given:  150 mg on 8/10/2017  9:58 AM                                simethicone 40 MG/0.6ML suspension   Commonly known as:  MYLICON   Take 80 mg by mouth 3 times daily                                vitamin A-D & C drops 750-400-35 UNIT-MG/ML solution NEW FORMULATION   7 mLs by Per G Tube route daily                                * Notice:  This list has 4 medication(s) that are the same as other medications prescribed for you. Read the directions carefully, and ask your doctor or other care provider to review them with you.

## 2017-08-04 NOTE — ED NOTES
I spoke with Salvatore MART, her son.  I gave him lab results and notified him she would be admitted, receive IV fluids, and IV antibiotics.  He agreed to the plan and had no questions.

## 2017-08-04 NOTE — IP AVS SNAPSHOT
` [place] [address] [facility]: [phone]            Medication Administration Report for Celia Lewis as of 08/10/17 1057   Legend:    Given Hold Not Given Due Canceled Entry Other Actions    Time Time (Time) Time  Time-Action       Inactive    Active    Linked        Medications 08/04/17 08/05/17 08/06/17 08/07/17 08/08/17 08/09/17 08/10/17    acetaminophen (TYLENOL) solution 650 mg  Dose: 650 mg Freq: 3 TIMES DAILY Route: PER FEEDING   Start: 08/04/17 0900   Admin Instructions: Maximum acetaminophen dose from all sources= 75 mg/kg/day not to exceed 4 grams/day.     1135 (650 mg)-Given       1633 (650 mg)-Given       2105 (650 mg)-Given        0951 (650 mg)-Given       1550 (650 mg)-Given       2239 (650 mg)-Given        0814 (650 mg)-Given       1713 (650 mg)-Given       2157 (650 mg)-Given        1015 (650 mg)-Given       1508 (650 mg)-Given       2145 (650 mg)-Given        1009 (650 mg)-Given       1802 (650 mg)-Given       2148 (650 mg)-Given        0957 (650 mg)-Given       1610 (650 mg)-Given       2118 (650 mg)-Given        0958 (650 mg)-Given       [ ] 1600       [ ] 2200           cefuroxime (CEFTIN) tablet 500 mg  Dose: 500 mg Freq: EVERY 12 HOURS SCHEDULED Route: PER G TUBE  Indications of Use: URINARY TRACT INFECTION  Start: 08/08/17 2000        2148 (500 mg)-Given        0956 (500 mg)-Given       2118 (500 mg)-Given        0958 (500 mg)-Given       [ ] 2000           dextrose 10 % 1,000 mL infusion  Freq: CONTINUOUS PRN Route: IV  PRN Comment: Hypoglycemia prevention  Start: 08/04/17 1012   Admin Instructions: For Hypoglycemia Prevention for patients on long-acting subcutaneous basal insulin (Glargine, Detemir, NPH) or continuous insulin infusion. Whenever nutrition support is held or interrupted:   1) Infuse IV D10W at nutrition support rate  2) Notify provider for further instructions               fiber modular (NUTRISOURCE FIBER) packet 1 packet  Dose: 1 packet  Freq: 2 TIMES DAILY Route: PER FEEDING   Start: 08/07/17 0945   Admin Instructions: Mix each packet with 60 mL water before administering. SUPPLIED BY NUTRITION DEPARTMENT.        1211 (1 packet)-Given       2145 (1 packet)-Given        1102 (1 packet)-Given       2149 (1 packet)-Given        1001 (1 packet)-Given       2121 (1 packet)-Given        (1026)-Not Given       [ ] 2100           glucose 40 % gel 15-30 g  Dose: 15-30 g Freq: EVERY 15 MIN PRN Route: PO  PRN Reason: low blood sugar  Start: 08/04/17 0750   Admin Instructions: Give 15 g for BG 51 to 69 mg/dL IF patient is conscious and able to swallow. Give 30 g for BG less than or equal to 50 mg/dL IF patient is conscious and able to swallow. Do NOT give glucose gel via enteral tube.  IF patient has enteral tube: give apple juice 120 mL (4 oz or 15 g of CHO) via enteral tube for BG 51 to 69 mg/dL.  Give apple juice 240 mL (8 oz or 30 g of CHO) via enteral tube for BG less than or equal to 50 mg/dL.    ~Oral gel is preferable for conscious and able to swallow patient.   ~IF gel unavailable or patient refuses may provide apple juice 120 mL (4 oz or 15 g of CHO). Document juice on I and O flowsheet.              Or  dextrose 50 % injection 25-50 mL  Dose: 25-50 mL Freq: EVERY 15 MIN PRN Route: IV  PRN Reason: low blood sugar  Start: 08/04/17 0750   Admin Instructions: Use if have IV access, BG less than 70 mg/dL and meet dose criteria below:  Dose if conscious and alert (or disorientated) and NPO = 25 mL  Dose if unconscious / not alert = 50 mL  Vesicant.              Or  glucagon injection 1 mg  Dose: 1 mg Freq: EVERY 15 MIN PRN Route: SC  PRN Reason: low blood sugar  PRN Comment: May repeat x 1 only  Start: 08/04/17 0750   Admin Instructions: May give SQ or IM. ONLY use glucagon IF patient has NO IV access AND is UNABLE to swallow AND blood glucose is LESS than or EQUAL to 50 mg/dL.               insulin aspart (NovoLOG) inj (RAPID ACTING)  Dose: 1-6 Units  Freq: EVERY 4 HOURS Route: SC  Start: 08/04/17 0800   Admin Instructions: Correction Scale - MEDIUM INSULIN RESISTANCE DOSING     Do Not give Correction Insulin if BG less than 140.  For  - 189 give 1 unit.  For  - 239 give 2 units.  For  - 289 give 3 units.  For  - 339 give 4 units.  For  - 399 give 5 units.  For BG greater than or equal to 400 give 6 units.  Check blood glucose Q4H and administer based on blood glucose.  Notify provider if glucose greater than or equal to 350 mg/dL after administration of correction dose.  If given at mealtime, must be administered 5 min before meal or immediately after.     1024 (1 Units)-Given [C]       (1202)-Not Given [C]       1632 (1 Units)-Given       2100 (3 Units)-Given        0205 (4 Units)-Given       0536 (3 Units)-Given [C]       0959 (2 Units)-Given       1453 (3 Units)-Given       1912 (2 Units)-Given       2239 (2 Units)-Given        0458 (3 Units)-Given       0815 (3 Units)-Given       1215 (2 Units)-Given       1511 (3 Units)-Given       1834 (3 Units)-Given       2210 (2 Units)-Given        0234 (2 Units)-Given       0656 (3 Units)-Given       1048 (3 Units)-Given [C]       1503 (2 Units)-Given       1857 (2 Units)-Given       2202 (3 Units)-Given [C]        0322 (4 Units)-Given [C]       0610 (3 Units)-Given [C]       1105 (1 Units)-Given [C]       1515 (2 Units)-Given [C]       1930 (2 Units)-Given       2149 (2 Units)-Given [C]               0430 (3 Units)-Given       0736 (3 Units)-Given       1148 (2 Units)-Given [C]       1508 (3 Units)-Given [C]       1907 (3 Units)-Given [C]        0026 (2 Units)-Given       0347 (2 Units)-Given       0706 (3 Units)-Given       [ ] 1100       [ ] 1500       [ ] 1900       [ ] 2300           insulin glargine (LANTUS) injection 30 Units  Dose: 30 Units Freq: 2 TIMES DAILY Route: SC  Start: 08/04/17 0900    (0900)-Not Given [C]       1213 (30 Units)-Given [C]       2100 (30 Units)-Given         1128 (30 Units)-Given       2239 (30 Units)-Given        0815 (30 Units)-Given       2157 (30 Units)-Given        1015 (30 Units)-Given       2144 (30 Units)-Given        1009 (30 Units)-Given       2148 (30 Units)-Given        0956 (30 Units)-Given       2119 (30 Units)-Given        0958 (30 Units)-Given       [ ] 2100           lidocaine 1 % injection 1-30 mL  Dose: 1-30 mL Freq: ONCE PRN Route: SC  PRN Comment: local anesthetic  Start: 08/10/17 0809   Admin Instructions: Dose may be  into smaller doses dependent upon the procedure when ordered by prescriber during the procedure.               May take regular AM medications except those listed below  Freq: CONTINUOUS PRN Route: XX  Start: 08/10/17 0633   Admin Instructions: May take regular AM medications except those listed below               metoprolol (LOPRESSOR) suspension 25 mg  Dose: 25 mg Freq: 2 TIMES DAILY Route: ORAL OR FEED  Start: 08/04/17 0900   Admin Instructions: Shake well.     1135 (25 mg)-Given       2105 (25 mg)-Given        0951 (25 mg)-Given       2026 (25 mg)-Given        0815 (25 mg)-Given       2157 (25 mg)-Given        1015 (25 mg)-Given       2144 (25 mg)-Given        1010 (25 mg)-Given       2148 (25 mg)-Given        0956 (25 mg)-Given       2119 (25 mg)-Given        (1017)-Not Given       [ ] 2100           miconazole (MICATIN; MICRO GUARD) 2 % powder  Freq: EVERY 1 HOUR PRN Route: Top  PRN Reason: other  PRN Comment: topical candidiasis  Start: 08/04/17 1338   Admin Instructions: Apply coccyx jimbo-ulcer skin with each dressing change         1033 ( )-Given             multivitamins with minerals (CERTAVITE/CEROVITE) liquid 15 mL  Dose: 15 mL Freq: DAILY Route: PER FEEDING   Start: 08/04/17 1030    1136 (15 mL)-Given        0951 (15 mL)-Given        0815 (15 mL)-Given        1015 (15 mL)-Given        1010 (15 mL)-Given        0957 (15 mL)-Given        0958 (15 mL)-Given           naloxone (NARCAN) injection 0.1-0.4 mg  Dose:  0.1-0.4 mg Freq: EVERY 2 MIN PRN Route: IV  PRN Reason: opioid reversal  Start: 08/04/17 0750   Admin Instructions: For respiratory rate LESS than or EQUAL to 8.  Partial reversal dose:  0.1 mg titrated q 2 minutes for Analgesia Side Effects Monitoring Sedation Level of 3 (frequently drowsy, arousable, drifts to sleep during conversation).Full reversal dose:  0.4 mg bolus for Analgesia Side Effects Monitoring Sedation Level of 4 (somnolent, minimal or no response to stimulation).               ondansetron (ZOFRAN-ODT) ODT tab 4 mg  Dose: 4 mg Freq: EVERY 6 HOURS PRN Route: PO  PRN Reasons: nausea,vomiting  Start: 08/04/17 0750   Admin Instructions: This is Step 1 of nausea and vomiting management.  If nausea not resolved in 15 minutes, go to Step 2 prochlorperazine (COMPAZINE). Do not push through foil backing. Peel back foil and gently remove. Place on tongue immediately. Administration with liquid unnecessary              Or  ondansetron (ZOFRAN) injection 4 mg  Dose: 4 mg Freq: EVERY 6 HOURS PRN Route: IV  PRN Reasons: nausea,vomiting  Start: 08/04/17 0750   Admin Instructions: This is Step 1 of nausea and vomiting management.  If nausea not resolved in 15 minutes, go to Step 2 prochlorperazine (COMPAZINE).  Irritant.               oxyCODONE (ROXICODONE) solution 2 mg  Dose: 2 mg Freq: EVERY 4 HOURS PRN Route: PER G TUBE  PRN Reason: moderate to severe pain  Start: 08/04/17 0750              ranitidine (Zantac) syrup 150 mg  Dose: 150 mg Freq: DAILY Route: PER FEEDING   Start: 08/04/17 0900    1136 (150 mg)-Given        0951 (150 mg)-Given        0814 (150 mg)-Given        1015 (150 mg)-Given        1010 (150 mg)-Given        0957 (150 mg)-Given        0958 (150 mg)-Given          Completed Medications  Medications 08/04/17 08/05/17 08/06/17 08/07/17 08/08/17 08/09/17 08/10/17         Dose: 50 mL Freq: ONCE Route: PO  Start: 08/10/17 0815   End: 08/10/17 0827          0827 (10 mL)-Given          Discontinued  Medications  Medications 08/04/17 08/05/17 08/06/17 08/07/17 08/08/17 08/09/17 08/10/17         Dose: 1 g Freq: EVERY 24 HOURS Route: IV  Indications of Use: URINARY TRACT INFECTION  Start: 08/06/17 0600   End: 08/08/17 1555      0509 (1 g)-New Bag        0647 (1 g)-New Bag        0610 (1 g)-New Bag       1555-Med Discontinued

## 2017-08-04 NOTE — CONSULTS
INFECTIOUS DISEASE CONSULTATION       Room 509      REQUESTING PHYSICIAN:  Dr. Felicitas Cedeño       IMPRESSION:   1.  Celia Lewis is a 89-year-old female well known to me from multiple prior admissions, now readmitted with hyperglycemia, possible low-grade fever.  Workup so far shows abnormal urinalysis which has generally been true with her having catheter in place but may be a true urinary tract infection.  Not much in the way of respiratory symptoms but recurrent aspiration with minimal symptoms except fever previously.  Also has an ongoing decubitus wound but does not look infected.   2.  Chronic sacral decubitus wound.  Prior cultures grew multiple organisms as would always be the case in this setting but nothing to suggest deeper major infection or cellulitis at present.   3.  Prior MRSA positive urine culture, current urine abnormal, await culture.   4.  History of probable recurrent aspiration, although not particularly clearly documented, previously treated it as aspiration.   5.  Severe dementia.   6.  Feeding tube in place, aspiration as a prior potential issue.   7.  Diabetes mellitus.   8.  Chronic mild renal insufficiency, some acute worsening at present.   9.  Sulfa allergy.      RECOMMENDATIONS:   1.  No treatment of the sacral decubitus ulcer, at present looks quite clean, does not need an I&D and is not a likely source of infection unless she has bacteremia.   2.  Await current cultures including urine culture and adjust.  If urine positive, probably treat as urinary tract infection even though has catheter in place and not entirely clear it is a true urinary tract infection.   3.  Obviously long-term prognosis is poor, has had previous extensive conversation and ethics consultation and currently on restorative care based on underlying patient desires and Caodaism Cheondoism state.      HISTORY OF PRESENT ILLNESS:  This 89-year-old female is seen in consultation due to probable low-grade sepsis.  The  patient has had multiple admissions with underlying Alzheimer dementia and nursing home resident.  She has had documented probable UTI including MRSA in the past and probable aspiration and treated with extensive antibiotics over several admissions.  She will improve on antibiotics, only to have recurrent infections within 2-4 weeks after discontinuing antibiotics.  Most recent episode she had no clear documented infection but probable aspiration.  We treated her with an extended course of IV antibiotics, eight days in the hospital and then 7 days of Levaquin and Zyvox empirically after discharge.  She did relatively well for the next several weeks until currently having increasing blood sugar, some low-grade fever and slight mental status changes at admission.  Did not have any clearcut hypoxia or obvious localizing symptoms, wound was stable but her urine was grossly abnormal.  She has been started on vancomycin and ceftriaxone and appears at baseline currently.  The only other notable on admission is that her creatinine has risen to 1.48 and as has been the case throughout her episodes and even when they are resolving, lactic acid somewhat elevated.      PAST MEDICAL HISTORY:  See prior notes extensively documented previously, history reviewed again, nothing new from prior records.      ALLERGIES:  Sulfa which caused a rash.      SOCIAL AND FAMILY HISTORY:  Lives in a nursing home and has had known MRSA.  Obviously no active alcohol or tobacco use.      REVIEW OF SYSTEMS:  Unobtainable.      PHYSICAL EXAMINATION:   GENERAL:  The patient appears her usual self.  She is staring straight ahead, not interactive or communicative.   VITAL SIGNS:  Have not shown fever to date.  She is not tachycardic at about 80.   HEENT:  No visible abnormalities although she has new bilateral ear lobe slight decubitus wounds being treated currently by the wound care nurse.   NECK:  Supple and nontender, no meningismus.   HEART:  Not  tachycardic, no major murmur.   LUNGS:  Clear bilaterally.   ABDOMEN:  Soft and nontender.  The decubitus wound unremarkable looking, no signs of cellulitis or infection.   EXTREMITIES:  Otherwise unremarkable.      LABORATORY DATA:  White count 11,000.  Creatinine 1.48.  Urine 182 white cells, positive leukocyte esterase.  Cultures all pending.       Thank you much for this consultation.  I will follow the patient with you.         JOSE BERNARDO MD             D: 2017 11:23   T: 2017 11:46   MT: LOIS      Name:     RAÚL MERCEDES   MRN:      [id number]        Account:       [id number]   :      1926           Consult Date:  2017      Document: [id number]       cc: Felicitas Cedeño DO

## 2017-08-04 NOTE — IP AVS SNAPSHOT
[place] [address] [facility]: [phone]                                              INTERAGENCY TRANSFER FORM - LAB / IMAGING / EKG / EMG RESULTS   2017                    Hospital Admission Date: 2017  RAÚL MERCEDES   : 1926  Sex: Female        Attending Provider: Felicitas Cedeño DO     Allergies:  Sulfa Drugs    Infection:  MRSA-Contact Isolation   Service:  HOSPITALIST    Ht:  1.524 m (5')   Wt:  69 kg (152 lb 1.9 oz)   Admission Wt:  64.7 kg (142 lb 10.2 oz)    BMI:  29.71 kg/m 2   BSA:  1.71 m 2            Patient PCP Information     None on File         Lab Results - 3 Days      Glucose by meter [[id number]] (Abnormal)  Resulted: 08/10/17 0656, Result status: Final result    Ordering provider: Felicitas Cedeño DO  08/10/17 0650 Resulting lab: POINT OF CARE TEST, GLUCOSE    Specimen Information    Type Source Collected On     08/10/17 0650          Components       Value Reference Range Flag Lab   Glucose 259 70 - 99 mg/dL H 170            Glucose by meter [[id number]] (Abnormal)  Resulted: 08/10/17 0331, Result status: Final result    Ordering provider: Felicitas Cedeño DO  08/10/17 0300 Resulting lab: POINT OF CARE TEST, GLUCOSE    Specimen Information    Type Source Collected On     08/10/17 0300          Components       Value Reference Range Flag Lab   Glucose 212 70 - 99 mg/dL H 170            Blood culture [[id number]]  Resulted: 08/10/17 0040, Result status: Preliminary result    Ordering provider: Tamar Pineda MD  17 1123 Resulting lab: MICRO RAPID TESTING LAB    Specimen Information    Type Source Collected On   Blood  17 1148          Components       Value Reference Range Flag Lab   Specimen Description Blood Right Arm   FrStHsLb   Special Requests Aerobic and anaerobic bottles received   FrStHsLb   Culture Micro No growth after 5 days   226   Micro Report Status Pending   226            Blood culture [[id number]]   Resulted: 08/10/17 0040, Result status: Preliminary result    Ordering provider: Tamar Pineda MD  08/05/17 1123 Resulting lab: MICRO RAPID TESTING LAB    Specimen Information    Type Source Collected On   Blood  08/05/17 1140          Components       Value Reference Range Flag Lab   Specimen Description Blood Left Arm   FrStHsLb   Special Requests Aerobic and anaerobic bottles received   FrStHsLb   Culture Micro No growth after 5 days   226   Micro Report Status Pending   226            Blood culture [[id number]]  Resulted: 08/10/17 0039, Result status: Final result    Ordering provider: Juan Rubi MD  08/04/17 0529 Resulting lab: MICRO RAPID TESTING LAB    Specimen Information    Type Source Collected On   Blood  08/04/17 0555          Components       Value Reference Range Flag Lab   Specimen Description Blood Left Hand   FrStHsLb   Culture Micro No growth   226   Micro Report Status FINAL 08/10/2017   226            Glucose by meter [[id number]] (Abnormal)  Resulted: 08/09/17 2316, Result status: Final result    Ordering provider: Felicitas Cedeño DO  08/09/17 2309 Resulting lab: POINT OF CARE TEST, GLUCOSE    Specimen Information    Type Source Collected On     08/09/17 2309          Components       Value Reference Range Flag Lab   Glucose 232 70 - 99 mg/dL H 170            Glucose by meter [[id number]] (Abnormal)  Resulted: 08/09/17 2026, Result status: Final result    Ordering provider: Felicitas Cedeño DO  08/09/17 1906 Resulting lab: POINT OF CARE TEST, GLUCOSE    Specimen Information    Type Source Collected On     08/09/17 1906          Components       Value Reference Range Flag Lab   Glucose 267 70 - 99 mg/dL H 170            Glucose by meter [[id number]] (Abnormal)  Resulted: 08/09/17 1721, Result status: Final result    Ordering provider: Felicitas Cedeño DO  08/09/17 1713 Resulting lab: POINT OF CARE TEST, GLUCOSE    Specimen Information    Type Source  Collected On     08/09/17 1713          Components       Value Reference Range Flag Lab   Glucose 254 70 - 99 mg/dL H 170            Glucose by meter [[id number]] (Abnormal)  Resulted: 08/09/17 1520, Result status: Final result    Ordering provider: Felicitas Cedeño DO  08/09/17 1507 Resulting lab: POINT OF CARE TEST, GLUCOSE    Specimen Information    Type Source Collected On     08/09/17 1507          Components       Value Reference Range Flag Lab   Glucose 255 70 - 99 mg/dL H 170            Glucose by meter [[id number]] (Abnormal)  Resulted: 08/09/17 1151, Result status: Final result    Ordering provider: Felicitas Cedeño DO  08/09/17 1144 Resulting lab: POINT OF CARE TEST, GLUCOSE    Specimen Information    Type Source Collected On     08/09/17 1144          Components       Value Reference Range Flag Lab   Glucose 217 70 - 99 mg/dL H 170            Basic metabolic panel [[id number]] (Abnormal)  Resulted: 08/09/17 0723, Result status: Final result    Ordering provider: Tamar Pineda MD  08/09/17 0001 Resulting lab: [facility]    Specimen Information    Type Source Collected On   Blood  08/09/17 0649          Components       Value Reference Range Flag Lab   Sodium 140 133 - 144 mmol/L  FrStHsLb   Potassium 4.3 3.4 - 5.3 mmol/L  FrStHsLb   Chloride 107 94 - 109 mmol/L  FrStHsLb   Carbon Dioxide 20 20 - 32 mmol/L  FrStHsLb   Anion Gap 13 3 - 14 mmol/L  FrStHsLb   Glucose 249 70 - 99 mg/dL H FrStHsLb   Urea Nitrogen 35 7 - 30 mg/dL H FrStHsLb   Creatinine 0.87 0.52 - 1.04 mg/dL  FrStHsLb   GFR Estimate 61 >60 mL/min/1.7m2  FrStHsLb   Comment:  Non  GFR Calc   GFR Estimate If Black 74 >60 mL/min/1.7m2  FrStHsLb   Comment:  African American GFR Calc   Calcium 8.9 8.5 - 10.1 mg/dL  FrStHsLb            Glucose by meter [[id number]] (Abnormal)  Resulted: 08/09/17 0653, Result status: Final result    Ordering provider: Felicitas Cedeño DO  08/09/17 0647  Resulting lab: POINT OF CARE TEST, GLUCOSE    Specimen Information    Type Source Collected On     08/09/17 0647          Components       Value Reference Range Flag Lab   Glucose 241 70 - 99 mg/dL H 170            Glucose by meter [[id number]] (Abnormal)  Resulted: 08/09/17 0321, Result status: Final result    Ordering provider: Felicitas Cedeño DO  08/09/17 0256 Resulting lab: POINT OF CARE TEST, GLUCOSE    Specimen Information    Type Source Collected On     08/09/17 0256          Components       Value Reference Range Flag Lab   Glucose 274 70 - 99 mg/dL H 170            Glucose by meter [[id number]] (Abnormal)  Resulted: 08/08/17 2201, Result status: Final result    Ordering provider: Felicitas Cedeño DO  08/08/17 2149 Resulting lab: POINT OF CARE TEST, GLUCOSE    Specimen Information    Type Source Collected On     08/08/17 2149          Components       Value Reference Range Flag Lab   Glucose 210 70 - 99 mg/dL H 170            Glucose by meter [[id number]] (Abnormal)  Resulted: 08/08/17 1916, Result status: Final result    Ordering provider: Felicitas Cedeño DO  08/08/17 1908 Resulting lab: POINT OF CARE TEST, GLUCOSE    Specimen Information    Type Source Collected On     08/08/17 1908          Components       Value Reference Range Flag Lab   Glucose 192 70 - 99 mg/dL H 170            Glucose by meter [[id number]] (Abnormal)  Resulted: 08/08/17 1525, Result status: Final result    Ordering provider: Felicitas Cedeño DO  08/08/17 1515 Resulting lab: POINT OF CARE TEST, GLUCOSE    Specimen Information    Type Source Collected On     08/08/17 1515          Components       Value Reference Range Flag Lab   Glucose 211 70 - 99 mg/dL H 170            Glucose by meter [[id number]] (Abnormal)  Resulted: 08/08/17 1116, Result status: Final result    Ordering provider: Felicitas Cedeño DO  08/08/17 1108 Resulting lab: POINT OF CARE TEST, GLUCOSE    Specimen Information    Type  Source Collected On     08/08/17 1108          Components       Value Reference Range Flag Lab   Glucose 184 70 - 99 mg/dL H 170            Glucose by meter [[id number]] (Abnormal)  Resulted: 08/08/17 0801, Result status: Final result    Ordering provider: Felicitas Cedeño DO  08/08/17 0755 Resulting lab: POINT OF CARE TEST, GLUCOSE    Specimen Information    Type Source Collected On     08/08/17 0755          Components       Value Reference Range Flag Lab   Glucose 232 70 - 99 mg/dL H 170            Basic metabolic panel [[id number]] (Abnormal)  Resulted: 08/08/17 0745, Result status: Final result    Ordering provider: Tamar Pineda MD  08/08/17 0000 Resulting lab: [facility]    Specimen Information    Type Source Collected On   Blood  08/08/17 0655          Components       Value Reference Range Flag Lab   Sodium 141 133 - 144 mmol/L  FrStHsLb   Potassium 4.4 3.4 - 5.3 mmol/L  FrStHsLb   Chloride 109 94 - 109 mmol/L  FrStHsLb   Carbon Dioxide 19 20 - 32 mmol/L L FrStHsLb   Anion Gap 13 3 - 14 mmol/L  FrStHsLb   Glucose 252 70 - 99 mg/dL H FrStHsLb   Urea Nitrogen 34 7 - 30 mg/dL H FrStHsLb   Creatinine 0.85 0.52 - 1.04 mg/dL  FrStHsLb   GFR Estimate 63 >60 mL/min/1.7m2  FrStHsLb   Comment:  Non  GFR Calc   GFR Estimate If Black 76 >60 mL/min/1.7m2  FrStHsLb   Comment:  African American GFR Calc   Calcium 8.9 8.5 - 10.1 mg/dL  FrStHsLb            CBC with platelets [[id number]] (Abnormal)  Resulted: 08/08/17 0721, Result status: Final result    Ordering provider: Tamar Pineda MD  08/08/17 0000 Resulting lab: [facility]    Specimen Information    Type Source Collected On   Blood  08/08/17 0655          Components       Value Reference Range Flag Lab   WBC 10.7 4.0 - 11.0 10e9/L  FrStHsLb   RBC Count 3.75 3.8 - 5.2 10e12/L L FrStHsLb   Hemoglobin 11.6 11.7 - 15.7 g/dL L FrStHsLb   Hematocrit 36.1 35.0 - 47.0 %  FrStHsLb   MCV 96 78 - 100 fl   FrStHsLb   MCH 30.9 26.5 - 33.0 pg  FrStHsLb   MCHC 32.1 31.5 - 36.5 g/dL  FrStHsLb   RDW 16.5 10.0 - 15.0 % H FrStHsLb   Platelet Count 208 150 - 450 10e9/L  FrStHsLb            Glucose by meter [[id number]] (Abnormal)  Resulted: 08/08/17 0615, Result status: Final result    Ordering provider: Felicitas Cedeño,   08/08/17 0609 Resulting lab: POINT OF CARE TEST, GLUCOSE    Specimen Information    Type Source Collected On     08/08/17 0609          Components       Value Reference Range Flag Lab   Glucose 243 70 - 99 mg/dL H 170            Glucose by meter [[id number]] (Abnormal)  Resulted: 08/08/17 0320, Result status: Final result    Ordering provider: Felicitas Cedeño DO  08/08/17 0312 Resulting lab: POINT OF CARE TEST, GLUCOSE    Specimen Information    Type Source Collected On     08/08/17 0312          Components       Value Reference Range Flag Lab   Glucose 290 70 - 99 mg/dL H 170            Glucose by meter [[id number]] (Abnormal)  Resulted: 08/07/17 2201, Result status: Final result    Ordering provider: Felicitas Cedeño DO  08/07/17 2156 Resulting lab: POINT OF CARE TEST, GLUCOSE    Specimen Information    Type Source Collected On     08/07/17 2156          Components       Value Reference Range Flag Lab   Glucose 260 70 - 99 mg/dL H 170            Glucose by meter [[id number]] (Abnormal)  Resulted: 08/07/17 1856, Result status: Final result    Ordering provider: Felicitas Cedeño DO  08/07/17 1850 Resulting lab: POINT OF CARE TEST, GLUCOSE    Specimen Information    Type Source Collected On     08/07/17 1850          Components       Value Reference Range Flag Lab   Glucose 210 70 - 99 mg/dL H 170            Glucose by meter [[id number]] (Abnormal)  Resulted: 08/07/17 1506, Result status: Final result    Ordering provider: Felicitas Cedeño DO  08/07/17 1500 Resulting lab: POINT OF CARE TEST, GLUCOSE    Specimen Information    Type Source Collected On     08/07/17  1500          Components       Value Reference Range Flag Lab   Glucose 218 70 - 99 mg/dL H 170            Glucose by meter [[id number]] (Abnormal)  Resulted: 08/07/17 1100, Result status: Final result    Ordering provider: Felicitas Cedeño DO  08/07/17 1048 Resulting lab: POINT OF CARE TEST, GLUCOSE    Specimen Information    Type Source Collected On     08/07/17 1048          Components       Value Reference Range Flag Lab   Glucose 269 70 - 99 mg/dL H 170            Blood culture [[id number]] (Abnormal)  Resulted: 08/07/17 1017, Result status: Final result    Ordering provider: Juan Rubi MD  08/04/17 0529 Resulting lab: INFECTIOUS DISEASE DIAGNOSTIC LABORATORY    Specimen Information    Type Source Collected On   Blood  08/04/17 0605          Components       Value Reference Range Flag Lab   Specimen Description Blood Right Hand   226   Special Requests Aerobic and anaerobic bottles received   FrStHsLb   Culture Micro --  A 225   Result:         Cultured on the 1st day of incubation: Staphylococcus epidermidis  Critical Value/Significant Value, preliminary result only, called to and read   back by JOANN VALDERRAMA RN 8.5.17 0835. TENA  Cultured on the 1st day of incubation: Staphylococcus hominis  (Note)  POSITIVE for STAPHYLOCOCCUS EPIDERMIDIS and POSITIVE for the mecA  gene (resistant to methicillin) by [employer] multiplex nucleic acid  test. Final identification and antimicrobial susceptibility testing  will be verified by standard methods.    Specimen tested with [employer] multiplex, gram-positive blood culture  nucleic acid test for the following targets: Staph aureus, Staph  epidermidis, Staph lugdunensis, other Staph species, Enterococcus  faecalis, Enterococcus faecium, Streptococcus species, S. agalactiae,  S. anginosus grp., S. pneumoniae, S. pyogenes, Listeria sp., mecA  (methicillin resistance) and Rene/B (vancomycin resistance).    Critical Value/Significant Value called to and read back by  Cheyenne Gilliland RN at [facility] s tation 55 at 11:15am 8/5/2017 ()     Micro Report Status FINAL 08/07/2017   225   Result:     Organism: Cultured on the 1st day of incubation: Staphylococcus hominis   225   Organism: Cultured on the 1st day of incubation: Staphylococcus epidermidis   225            Magnesium [[id number]]  Resulted: 08/07/17 0728, Result status: Final result    Ordering provider: Felicitas Cedeño, DO  08/07/17 0000 Resulting lab: [facility]    Specimen Information    Type Source Collected On   Blood  08/07/17 0703          Components       Value Reference Range Flag Lab   Magnesium 2.1 1.6 - 2.3 mg/dL  FrStHsLb            Phosphorus [[id number]]  Resulted: 08/07/17 0728, Result status: Final result    Ordering provider: Felicitas Cedeño,   08/07/17 0000 Resulting lab: [facility]    Specimen Information    Type Source Collected On   Blood  08/07/17 0703          Components       Value Reference Range Flag Lab   Phosphorus 2.7 2.5 - 4.5 mg/dL  FrStHsLb            Basic metabolic panel [[id number]] (Abnormal)  Resulted: 08/07/17 0728, Result status: Final result    Ordering provider: Felicitas Cedeño,   08/07/17 0000 Resulting lab: [facility]    Specimen Information    Type Source Collected On   Blood  08/07/17 0703          Components       Value Reference Range Flag Lab   Sodium 140 133 - 144 mmol/L  FrStHsLb   Potassium 4.3 3.4 - 5.3 mmol/L  FrStHsLb   Chloride 109 94 - 109 mmol/L  FrStHsLb   Carbon Dioxide 19 20 - 32 mmol/L L FrStHsLb   Anion Gap 12 3 - 14 mmol/L  FrStHsLb   Glucose 311 70 - 99 mg/dL H FrStHsLb   Urea Nitrogen 32 7 - 30 mg/dL H FrStHsLb   Creatinine 0.90 0.52 - 1.04 mg/dL  FrStHsLb   GFR Estimate 59 >60 mL/min/1.7m2 L FrStHsLb   Comment:  Non  GFR Calc   GFR Estimate If Black 71 >60 mL/min/1.7m2  FrStHsLb   Comment:  African American GFR Calc   Calcium 8.7 8.5 - 10.1 mg/dL  FrStHsLb             CBC with platelets [[id number]] (Abnormal)  Resulted: 08/07/17 0722, Result status: Final result    Ordering provider: Tamar Pineda MD  08/07/17 0000 Resulting lab: [facility]    Specimen Information    Type Source Collected On   Blood  08/07/17 0703          Components       Value Reference Range Flag Lab   WBC 8.9 4.0 - 11.0 10e9/L  FrStHsLb   RBC Count 3.61 3.8 - 5.2 10e12/L L FrStHsLb   Hemoglobin 11.0 11.7 - 15.7 g/dL L FrStHsLb   Hematocrit 33.9 35.0 - 47.0 % L FrStHsLb   MCV 94 78 - 100 fl  FrStHsLb   MCH 30.5 26.5 - 33.0 pg  FrStHsLb   MCHC 32.4 31.5 - 36.5 g/dL  FrStHsLb   RDW 16.0 10.0 - 15.0 % H FrStHsLb   Platelet Count 182 150 - 450 10e9/L  FrStHsLb            Glucose by meter [[id number]] (Abnormal)  Resulted: 08/07/17 0700, Result status: Final result    Ordering provider: Felicitas Cedeño DO  08/07/17 0655 Resulting lab: POINT OF CARE TEST, GLUCOSE    Specimen Information    Type Source Collected On     08/07/17 0655          Components       Value Reference Range Flag Lab   Glucose 280 70 - 99 mg/dL H 170            Glucose by meter [[id number]] (Abnormal)  Resulted: 08/07/17 0226, Result status: Final result    Ordering provider: Felicitas Cedeño DO  08/07/17 0219 Resulting lab: POINT OF CARE TEST, GLUCOSE    Specimen Information    Type Source Collected On     08/07/17 0219          Components       Value Reference Range Flag Lab   Glucose 228 70 - 99 mg/dL H 170            Glucose by meter [[id number]] (Abnormal)  Resulted: 08/06/17 2126, Result status: Final result    Ordering provider: Felicitas Cedeño DO  08/06/17 2122 Resulting lab: POINT OF CARE TEST, GLUCOSE    Specimen Information    Type Source Collected On     08/06/17 2122          Components       Value Reference Range Flag Lab   Glucose 211 70 - 99 mg/dL H 170            Testing Performed By     Lab - Abbreviation Name Director Address Valid Date Range    14 - [facility]  [facility] Unknown [address] [place] [address] 05/08/15 1057 - Present    170 - Unknown POINT OF CARE TEST, GLUCOSE Unknown Unknown 10/31/11 1114 - Present    225 - Unknown INFECTIOUS DISEASE DIAGNOSTIC LABORATORY Unknown [address] [facility] [address] 12/19/14 0954 - Present    226 - Unknown MICRO RAPID TESTING LAB Unknown [address] [facility] [address] 12/19/14 0955 - Present            Unresulted Labs (24h ago through future)    Start       Ordered    08/07/17 0600  Basic metabolic panel  EVERY MONDAY,   Routine     Comments:  Every Monday while on enteral tube feeding.    08/04/17 1016    08/07/17 0600  Magnesium  EVERY MONDAY,   Routine     Comments:  Every Monday while on enteral tube feeding.    08/04/17 1016    08/07/17 0600  Phosphorus  EVERY MONDAY,   Routine     Comments:  Every Monday while on enteral tube feeding.    08/04/17 1016      Encounter-Level Documents:     There are no encounter-level documents.      Order-Level Documents:     There are no order-level documents.

## 2017-08-04 NOTE — ED PROVIDER NOTES
History     Chief Complaint:  High blood sugar, UTI    The history is provided by the EMS personnel. The history is limited by the condition of the patient (non verbal).      Celia Lewis is a non verbal 89 year old female with a complex medical history including diabetes who presents for high blood sugars and possible UTI. Nursing staff take patient's blood sugars this morning and found them to be >500. Staff also concerned as patient may have ongoing UTI given her latest UA results therefore called 911 services. Low grade fever at nursing facility. Patient recently admitted on 6/30 for sepsis, thought to be MRSA UTI versus aspiration pneumonia.    Allergies:  Sulfa drugs      Medications:    oxyCODONE (ROXICODONE) 5 MG/5ML solution  insulin glargine (LANTUS) 100 UNIT/ML injection  insulin NPH (HUMULIN N/NOVOLIN N) 100 UNIT/ML injection  Lactobacillus Acidophilus POWD  multivitamin, therapeutic (THERA-VIT) TABS tablet  Loperamide HCl (IMODIUM A-D) 1 MG/7.5ML LIQD  acetaminophen (TYLENOL) 32 mg/mL solution  insulin aspart (NOVOLOG PEN) 100 UNIT/ML soln  ACETAMINOPHEN PO  nystatin (MYCOSTATIN) 274029 UNIT/GM POWD  RANITIDINE HCL PO  bisacodyl (DULCOLAX) 10 MG suppository     Past Medical History:    Actinomyces infection             Advanced directives, counseling/discussion, POLST completed 5/29/15  Full Code                       Alzheimer's disease                Arthritis                        Atrial fibrillation (H)                 Calculus of kidney  CKD                 Candidiasis of skin and nails              Congestive heart failure, unspecified             Coronary artery disease                     Heart disease, unspecified                 Hyperlipidemia  Hypertension               Morbid obesity (H)                  Other and unspecified hyperlipidemia                        Personal history of urinary (tract) infection                 Primary localized osteoarthrosis, shoulder region                  Renal failure, unspecified                   Thyroid disease                      Type II diabetes mellitus   Sepsis  Nephrolithiasis  Bacteremia  Actinomyces infection  Dementia  UTI     Past Surgical History:    Cystoscopy, insert stent urethra  Cystoscopy ureteroscopy  Lithotripsy ureters     Family History:    Cerebrovascular disease: mother, father  Diabetes: brother     Social History:  The patient was accompanied to the ED by EMS.  Smoking Status: former smoker  Smokeless Tobacco: no  Alcohol Use: no  Marital Status:   [2]      Review of Systems   Reason unable to perform ROS: patient nonverbal.     Physical Exam     Patient Vitals for the past 24 hrs:   BP Temp Temp src Heart Rate Resp SpO2   08/04/17 0442 149/77 98.1  F (36.7  C) Temporal 92 20 94 %           Physical Exam  Constitutional: Sleeping, does not respond to questions.  HENT:   Head: Atraumatic  Right Ear: Normal  Left Ear: Normal  Nose: Nose normal.   Mouth/Throat: Oropharynx is clear and moist. No erythema or exudate.   Eyes: Conjunctivae and EOM are normal. Pupils are equal, round, and reactive to light. No discharge  Neck: Normal range of motion. Neck supple.   Cardiovascular: Normal rate, regular rhythm, no murmur gallops or rubs. Intact distal pulses.    Pulmonary/Chest: Slightly diminished.  Abdominal: Soft. Non tender.  No masses. Distended with G-tube in place no surrounding inflammation. Soft easily reducible umbilical hernia.   Musculoskeletal: No edema. No bony deformity. Contractures of both lower extremities.   Lymphadenopathy:     The patient has no cervical adenopathy.   Neurological: Does not respond to questions. No facial asymmetry. No obvious acute focal deficit.  Skin: Skin is  dry. No rash noted. Stage 3 decubitus ulcer over coccyx, No active drainage or inflammation.   Psychiatric: The patient has a normal mood and affect.      Emergency Department Course   Laboratory:  0459 Glucometer: 332 (H) 3641:  Glucometer: 331 (H)   CBC: WBC 11.5 (H), ow wnl (HGB 12.5, )  CMP:  (H),  (H),  (H), BUN 85 (H), Creat 1.48 (H), Alb 2.8 (L), ow wnl  Lactic acid: 4.0 (HH)    Ketone beta hydrox quant: 0.2    UA with Micro: , Blood moderate, Prot alb 30, Nitrite positive, leuk est large, WC >182 (H),  (H), WBC clumps present, Bacteria many, yeast many, mucous present, ow wnl  C diff tox B PCR: in process    Interventions:  0.9% sodium chloride infusion 1000 mL  Insulin 10 units  Rocephin 1g IV  Vancocin 1500 mg IV    Emergency Department Course:  Nursing notes and vitals reviewed.  I performed an exam of the patient as documented above.     The work up above was undertaken.     The patient received the intervention(s) above.     Findings and plan explained to the son who consents to admission. Discussed the patient with Dr. Cedeño, who will admit the patient for further monitoring, evaluation, and treatment.       Impression & Plan    CMS Diagnoses:   The patient has signs of Severe Sepsis as evidenced by:    1. 2 SIRS criteria, AND  2. Suspected infection, AND   3. Organ dysfunction: Lactic Acid >2    Time severe sepsis diagnosis confirmed = 0450 as this was the time when Lactate resulted, and the level was >2      3 Hour Severe Sepsis Bundle Completion:  1. Initial Lactic Acid Result:   Recent Labs   Lab Test  08/04/17   0450  07/07/17   1802  07/07/17   0055   LACT  4.0*  3.0*  2.8*     2. Blood Cultures before Antibiotics: Yes  3. Broad Spectrum Antibiotics Administered: Yes     Anti-infectives (Future)    Start     Dose/Rate Route Frequency Ordered Stop    08/04/17 0535  vancomycin (VANCOCIN) 1500 mg in 0.9% NaCl 250 mL PREMIX      1,500 mg Intravenous ONCE 08/04/17 0534      08/04/17 0531  cefTRIAXone (ROCEPHIN) 1 g vial to attach to  mL bag for ADULTS or NS 50 mL bag for PEDS      1 g  over 30 Minutes Intravenous ONCE 08/04/17 0530          4. 1000 ml of IV fluids.    the patient  was transferred out of the ED prior to the 6 hour hesham.     Medical Decision Making:  Celia Lewis is a 89 year old female nursing home resident who was sent in for high blood sugars and low grade fever. Of note, patient's blood sugars were reported to be 500 at nursing facility and initial blood sugar here was 370.  Work up here does reveal patient appears to have urinary tract infection and possible severe sepsis given lactic acid of 4.0. Serum ketones negative without other signs to suggest DKA. She is not tachycardic or hypotensive. No other signs of septic shock Of note, she was admitted approximately 1 month ago at which time they though it might be MRSA UTI versus aspiration pneumonia. She is currently full code and in light of this will be treated per these wishes. Blood cultures were drawn and sent. She was started on Rocephin and Vancomycin. I spoke with Dr. Cedeño of the hospitalist service and will be admitted to the [facility] for further evaluation and treatment.    Diagnosis:    ICD-10-CM    1. Severe sepsis without septic shock (H) A41.9     R65.20    2. Urinary tract infection associated with indwelling urethral catheter, initial encounter (H) T83.511A     N39.0        Disposition:  Admitted.    Lamine HERNADEZ, am serving as a scribe at 6:09 AM on 8/4/2017 to document services personally performed by Dr. Rubi, based on my observations and the provider's statements to me.     EMERGENCY DEPARTMENT       Juan Rubi MD  08/04/17 0615

## 2017-08-05 LAB
ANION GAP SERPL CALCULATED.3IONS-SCNC: 11 MMOL/L (ref 3–14)
BACTERIA SPEC CULT: ABNORMAL
BUN SERPL-MCNC: 54 MG/DL (ref 7–30)
CALCIUM SERPL-MCNC: 7.9 MG/DL (ref 8.5–10.1)
CHLORIDE SERPL-SCNC: 115 MMOL/L (ref 94–109)
CO2 SERPL-SCNC: 18 MMOL/L (ref 20–32)
CREAT SERPL-MCNC: 1.01 MG/DL (ref 0.52–1.04)
ERYTHROCYTE [DISTWIDTH] IN BLOOD BY AUTOMATED COUNT: 15.9 % (ref 10–15)
GFR SERPL CREATININE-BSD FRML MDRD: 51 ML/MIN/1.7M2
GLUCOSE BLDC GLUCOMTR-MCNC: 208 MG/DL (ref 70–99)
GLUCOSE BLDC GLUCOMTR-MCNC: 212 MG/DL (ref 70–99)
GLUCOSE BLDC GLUCOMTR-MCNC: 223 MG/DL (ref 70–99)
GLUCOSE BLDC GLUCOMTR-MCNC: 225 MG/DL (ref 70–99)
GLUCOSE BLDC GLUCOMTR-MCNC: 264 MG/DL (ref 70–99)
GLUCOSE BLDC GLUCOMTR-MCNC: 279 MG/DL (ref 70–99)
GLUCOSE BLDC GLUCOMTR-MCNC: 302 MG/DL (ref 70–99)
GLUCOSE SERPL-MCNC: 269 MG/DL (ref 70–99)
HCT VFR BLD AUTO: 32.8 % (ref 35–47)
HGB BLD-MCNC: 10.5 G/DL (ref 11.7–15.7)
MCH RBC QN AUTO: 31.1 PG (ref 26.5–33)
MCHC RBC AUTO-ENTMCNC: 32 G/DL (ref 31.5–36.5)
MCV RBC AUTO: 97 FL (ref 78–100)
MICRO REPORT STATUS: ABNORMAL
MICROORGANISM SPEC CULT: ABNORMAL
PLATELET # BLD AUTO: 177 10E9/L (ref 150–450)
POTASSIUM SERPL-SCNC: 4 MMOL/L (ref 3.4–5.3)
RBC # BLD AUTO: 3.38 10E12/L (ref 3.8–5.2)
SODIUM SERPL-SCNC: 144 MMOL/L (ref 133–144)
SPECIMEN SOURCE: ABNORMAL
VANCOMYCIN SERPL-MCNC: 10.3 MG/L
WBC # BLD AUTO: 10.2 10E9/L (ref 4–11)

## 2017-08-05 PROCEDURE — 87040 BLOOD CULTURE FOR BACTERIA: CPT | Performed by: INTERNAL MEDICINE

## 2017-08-05 PROCEDURE — 25000132 ZZH RX MED GY IP 250 OP 250 PS 637: Performed by: INTERNAL MEDICINE

## 2017-08-05 PROCEDURE — 85027 COMPLETE CBC AUTOMATED: CPT | Performed by: INTERNAL MEDICINE

## 2017-08-05 PROCEDURE — 27210995 ZZH RX 272: Performed by: INTERNAL MEDICINE

## 2017-08-05 PROCEDURE — 25000131 ZZH RX MED GY IP 250 OP 636 PS 637: Performed by: INTERNAL MEDICINE

## 2017-08-05 PROCEDURE — 80048 BASIC METABOLIC PNL TOTAL CA: CPT | Performed by: INTERNAL MEDICINE

## 2017-08-05 PROCEDURE — 80202 ASSAY OF VANCOMYCIN: CPT | Performed by: INTERNAL MEDICINE

## 2017-08-05 PROCEDURE — 36415 COLL VENOUS BLD VENIPUNCTURE: CPT | Performed by: INTERNAL MEDICINE

## 2017-08-05 PROCEDURE — 25000128 H RX IP 250 OP 636: Performed by: INTERNAL MEDICINE

## 2017-08-05 PROCEDURE — 27210429 ZZH NUTRITION PRODUCT INTERMEDIATE LITER

## 2017-08-05 PROCEDURE — 12000007 ZZH R&B INTERMEDIATE

## 2017-08-05 PROCEDURE — 99233 SBSQ HOSP IP/OBS HIGH 50: CPT | Performed by: INTERNAL MEDICINE

## 2017-08-05 PROCEDURE — 25000125 ZZHC RX 250: Performed by: INTERNAL MEDICINE

## 2017-08-05 PROCEDURE — 00000146 ZZHCL STATISTIC GLUCOSE BY METER IP

## 2017-08-05 RX ORDER — CEFTRIAXONE 1 G/1
1 INJECTION, POWDER, FOR SOLUTION INTRAMUSCULAR; INTRAVENOUS ONCE
Status: COMPLETED | OUTPATIENT
Start: 2017-08-05 | End: 2017-08-05

## 2017-08-05 RX ORDER — CEFTRIAXONE 1 G/1
1 INJECTION, POWDER, FOR SOLUTION INTRAMUSCULAR; INTRAVENOUS EVERY 24 HOURS
Status: DISCONTINUED | OUTPATIENT
Start: 2017-08-06 | End: 2017-08-08

## 2017-08-05 RX ADMIN — RANITIDINE HYDROCHLORIDE 150 MG: 150 SOLUTION ORAL at 09:51

## 2017-08-05 RX ADMIN — INSULIN ASPART 4 UNITS: 100 INJECTION, SOLUTION INTRAVENOUS; SUBCUTANEOUS at 02:05

## 2017-08-05 RX ADMIN — INSULIN ASPART 2 UNITS: 100 INJECTION, SOLUTION INTRAVENOUS; SUBCUTANEOUS at 09:59

## 2017-08-05 RX ADMIN — ACETAMINOPHEN 650 MG: 325 SOLUTION ORAL at 09:51

## 2017-08-05 RX ADMIN — INSULIN GLARGINE 30 UNITS: 100 INJECTION, SOLUTION SUBCUTANEOUS at 22:39

## 2017-08-05 RX ADMIN — INSULIN GLARGINE 30 UNITS: 100 INJECTION, SOLUTION SUBCUTANEOUS at 11:28

## 2017-08-05 RX ADMIN — METOPROLOL TARTRATE 25 MG: 100 TABLET ORAL at 20:26

## 2017-08-05 RX ADMIN — CEFTRIAXONE 1 G: 1 INJECTION, POWDER, FOR SOLUTION INTRAMUSCULAR; INTRAVENOUS at 05:44

## 2017-08-05 RX ADMIN — INSULIN ASPART 2 UNITS: 100 INJECTION, SOLUTION INTRAVENOUS; SUBCUTANEOUS at 22:39

## 2017-08-05 RX ADMIN — INSULIN ASPART 3 UNITS: 100 INJECTION, SOLUTION INTRAVENOUS; SUBCUTANEOUS at 14:53

## 2017-08-05 RX ADMIN — METOPROLOL TARTRATE 25 MG: 100 TABLET ORAL at 09:51

## 2017-08-05 RX ADMIN — ACETAMINOPHEN 650 MG: 325 SOLUTION ORAL at 22:39

## 2017-08-05 RX ADMIN — VANCOMYCIN HYDROCHLORIDE 1500 MG: 5 INJECTION, POWDER, LYOPHILIZED, FOR SOLUTION INTRAVENOUS at 20:23

## 2017-08-05 RX ADMIN — INSULIN ASPART 3 UNITS: 100 INJECTION, SOLUTION INTRAVENOUS; SUBCUTANEOUS at 05:36

## 2017-08-05 RX ADMIN — SODIUM CHLORIDE: 4.5 INJECTION, SOLUTION INTRAVENOUS at 09:31

## 2017-08-05 RX ADMIN — INSULIN ASPART 2 UNITS: 100 INJECTION, SOLUTION INTRAVENOUS; SUBCUTANEOUS at 19:12

## 2017-08-05 RX ADMIN — SODIUM CHLORIDE: 4.5 INJECTION, SOLUTION INTRAVENOUS at 16:25

## 2017-08-05 RX ADMIN — SODIUM CHLORIDE: 4.5 INJECTION, SOLUTION INTRAVENOUS at 01:27

## 2017-08-05 RX ADMIN — ACETAMINOPHEN 650 MG: 325 SOLUTION ORAL at 15:50

## 2017-08-05 RX ADMIN — Medication 15 ML: at 09:51

## 2017-08-05 NOTE — PROGRESS NOTES
Lor GOLDMAN from [facility] lab called about lab results for this pt.  Positive Cocci in clusters from right hand drawn yesterday.

## 2017-08-05 NOTE — PROGRESS NOTES
[facility]    Hospitalist Progress Note    Date of Service (when I saw the patient): 08/05/2017    Assessment & Plan   Celia Lewis is a 89 year old female who was admitted on 8/4/2017. PMH significant for advanced dementia who is nonverbal at baseline and bed bound with other medical problems including recurrent urinary tract infections, hypertension and type 2 diabetes as well as stage III CKD and a chronic stage III sacral decubitus ulcer. She was brought to the emergency room for evaluation of low-grade fever and hyperglycemia.  She was found to have elevated creatinine, mild leukocytosis and elevated lactate.  She had several episodes of loose stools well in the emergency room that were initially concerning for C. diff.  She was admitted 8/4 for further evaluation and treatment.    1.  Sepsis.  C diff negative. Urine culture > 100,000 E coli. One blood culture positive for Staph epi. Rechecking blood cultures to evaluate if this is likely contaminant. Will continue rocephin and vancomycin at this time. Greatly appreciate ID consultation and ongoing recommendations.    2.  Acute kidney injury on stage III CKD.    Creatinine improved after IV fluids. Will continue 1/2NS at 125 ml/hour.    3.  Type 2 diabetes.  Most recent A1c was 8.4 in 6/2017.  She is managed with Lantus and sliding scale NovoLog.    Will continue her Lantus and medium dose sliding scale NovoLog every 4 hours.     4.  Advanced dementia.  Baseline nonverbal and noncommunicative.  She is bed bound and has noted contractures.  She has a feeding tube and a chronic Fonseca catheter.    Nutrition has been consulted to continue tube feeds.  Note that during previous hospitalization, ethics consult was placed regarding her advanced medical conditions and poor prognosis; the patient was noted to be [employer] Yaz[employer] and ultimately the decision was made to continue treating current acute medical conditions as they arise.     5.  Stage III  sacral decubitus ulcer, chronic.  No signs of acute infection. Infectious disease agrees. Wound care nurse has seen consulted.      6.  Hypertension. Continue metoprolol.    7.  Hypernatremia. Somewhat chronic and felt secondary to tube feeds. Improving with IV fluids.    DVT Prophylaxis: Pneumatic Compression Devices  Code Status: Full Code    Disposition: Expected discharge in coming days depending on final/repeat cultures and appropriate antibiotics.    Tamar Mauricio MD    [phone] (P)  Text page (7am to 6pm)    Interval History   No changes. Continue treating based on culture results, as above.    -Data reviewed today: I reviewed all new labs and imaging results over the last 24 hours. I personally reviewed no images or EKG's today.    Physical Exam   Temp: 98.4  F (36.9  C) Temp src: Axillary BP: 136/65   Heart Rate: 81 Resp: 18 SpO2: 97 % O2 Device: None (Room air)    Vitals:    08/04/17 1009 08/05/17 0556   Weight: 64.7 kg (142 lb 10.2 oz) 67.6 kg (149 lb 0.5 oz)     Vital Signs with Ranges  Temp:  [98  F (36.7  C)-98.4  F (36.9  C)] 98.4  F (36.9  C)  Heart Rate:  [75-81] 81  Resp:  [18-22] 18  BP: (128-136)/(65-75) 136/65  SpO2:  [96 %-98 %] 97 %  I/O last 3 completed shifts:  In: 6337.33 [I.V.:3675.33; NG/GT:2662]  Out: 800 [Urine:800]    Constitutional: Patient curled up in bed on right side, staring ahead. Non-communicative. Not interacting. At baseline.  Respiratory: Clear to auscultation bilaterally, no crackles or wheezes.   Cardiovascular: Regular rate and rhythm, no murmur.  GI: Soft, nondistended. No response to palpation. Normoactive bowel sounds. PEG tube noted with tube feeds running.  Genitourinary: Chronic indwelling caro in place.   Skin/Integumen: Chronic sacral decubitus ulcer with no surrounding erythema or drainage.    Medications     NaCl 125 mL/hr at 08/05/17 0931     IV fluid REPLACEMENT ONLY         metoprolol  25 mg Oral or Feeding Tube BID     ranitidine  150 mg Per  Feeding Tube Daily     insulin glargine  30 Units Subcutaneous BID     acetaminophen  650 mg Per Feeding Tube TID     insulin aspart  1-6 Units Subcutaneous Q4H     multivitamins with minerals  15 mL Per Feeding Tube Daily     [START ON 8/6/2017] vancomycin (VANCOCIN) IV  1,500 mg Intravenous Q48H       Data     Recent Labs  Lab 08/05/17  0618 08/04/17  0450   WBC 10.2 11.5*   HGB 10.5* 12.5   MCV 97 99    200    148*   POTASSIUM 4.0 4.3   CHLORIDE 115* 114*   CO2 18* 22   BUN 54* 85*   CR 1.01 1.48*   ANIONGAP 11 12   NELLIE 7.9* 9.0   * 363*   ALBUMIN  --  2.8*   PROTTOTAL  --  7.6   BILITOTAL  --  0.5   ALKPHOS  --  94   ALT  --  24   AST  --  17       Imaging:  No results found for this or any previous visit (from the past 24 hour(s)).

## 2017-08-05 NOTE — PLAN OF CARE
Problem: Goal Outcome Summary  Goal: Goal Outcome Summary  Outcome: No Change  Alert, nonverbal per baseline.  VSS. NPO, tube feeding infusing. Repositioned with ceiling lift. Incontinent of large loose stool x1, coccyx wound dressing replaced. Tylenol given for assumed pain. Plan: IV fluids, IV abx. Son, Dr. Salvatore Lewis, can be only reached through his pager @ [phone] till tomorrow @ 1994, can call on his phone after that.

## 2017-08-05 NOTE — PLAN OF CARE
Problem: Goal Outcome Summary  Goal: Goal Outcome Summary  Outcome: No Change  patient VSs On RA. Baseline dementia ,non-verbal. Iso contact & enteric. Tube feeding running 45ml/hr. chronic Fonseca. Turn & reposition Q2hrs. Dressing intact on coccyx. Iv infusing.& 264 ISS given.no BM for this shifte Will continue monitoring.

## 2017-08-05 NOTE — PLAN OF CARE
Problem: Goal Outcome Summary  Goal: Goal Outcome Summary  Outcome: No Change  Pt disoriented x4 at baseline. Bedrest, reposition w/assist of 2qhr. VSS. Tube feeding infusing @45 mL/hr. Wound on coccyx, CDI. Fonseca catheter patent with good output. Bilateral lower extremities flextion contracted. Skin dry, flaky, w/blanchable redness on bilateral upper extremities. Will continue to monitor.

## 2017-08-06 ENCOUNTER — APPOINTMENT (OUTPATIENT)
Dept: GENERAL RADIOLOGY | Facility: CLINIC | Age: 82
DRG: 871 | End: 2017-08-06
Attending: INTERNAL MEDICINE
Payer: COMMERCIAL

## 2017-08-06 LAB
ANION GAP SERPL CALCULATED.3IONS-SCNC: 13 MMOL/L (ref 3–14)
BUN SERPL-MCNC: 37 MG/DL (ref 7–30)
CALCIUM SERPL-MCNC: 7.9 MG/DL (ref 8.5–10.1)
CHLORIDE SERPL-SCNC: 109 MMOL/L (ref 94–109)
CO2 SERPL-SCNC: 18 MMOL/L (ref 20–32)
CREAT SERPL-MCNC: 0.83 MG/DL (ref 0.52–1.04)
GFR SERPL CREATININE-BSD FRML MDRD: 64 ML/MIN/1.7M2
GLUCOSE BLDC GLUCOMTR-MCNC: 211 MG/DL (ref 70–99)
GLUCOSE BLDC GLUCOMTR-MCNC: 216 MG/DL (ref 70–99)
GLUCOSE BLDC GLUCOMTR-MCNC: 242 MG/DL (ref 70–99)
GLUCOSE BLDC GLUCOMTR-MCNC: 256 MG/DL (ref 70–99)
GLUCOSE BLDC GLUCOMTR-MCNC: 284 MG/DL (ref 70–99)
GLUCOSE BLDC GLUCOMTR-MCNC: 288 MG/DL (ref 70–99)
GLUCOSE SERPL-MCNC: 258 MG/DL (ref 70–99)
MAGNESIUM SERPL-MCNC: 2 MG/DL (ref 1.6–2.3)
PHOSPHATE SERPL-MCNC: 2.4 MG/DL (ref 2.5–4.5)
POTASSIUM SERPL-SCNC: 4.2 MMOL/L (ref 3.4–5.3)
SODIUM SERPL-SCNC: 140 MMOL/L (ref 133–144)
VANCOMYCIN SERPL-MCNC: 21 MG/L

## 2017-08-06 PROCEDURE — 80048 BASIC METABOLIC PNL TOTAL CA: CPT | Performed by: INTERNAL MEDICINE

## 2017-08-06 PROCEDURE — 83735 ASSAY OF MAGNESIUM: CPT | Performed by: INTERNAL MEDICINE

## 2017-08-06 PROCEDURE — 80202 ASSAY OF VANCOMYCIN: CPT | Performed by: INTERNAL MEDICINE

## 2017-08-06 PROCEDURE — 12000007 ZZH R&B INTERMEDIATE

## 2017-08-06 PROCEDURE — 27210429 ZZH NUTRITION PRODUCT INTERMEDIATE LITER

## 2017-08-06 PROCEDURE — 25000131 ZZH RX MED GY IP 250 OP 636 PS 637: Performed by: INTERNAL MEDICINE

## 2017-08-06 PROCEDURE — 25000128 H RX IP 250 OP 636: Performed by: INTERNAL MEDICINE

## 2017-08-06 PROCEDURE — 27210995 ZZH RX 272: Performed by: INTERNAL MEDICINE

## 2017-08-06 PROCEDURE — 36415 COLL VENOUS BLD VENIPUNCTURE: CPT | Performed by: INTERNAL MEDICINE

## 2017-08-06 PROCEDURE — 99233 SBSQ HOSP IP/OBS HIGH 50: CPT | Performed by: INTERNAL MEDICINE

## 2017-08-06 PROCEDURE — 00000146 ZZHCL STATISTIC GLUCOSE BY METER IP

## 2017-08-06 PROCEDURE — 71010 XR CHEST PORT 1 VW: CPT

## 2017-08-06 PROCEDURE — 25000125 ZZHC RX 250: Performed by: INTERNAL MEDICINE

## 2017-08-06 PROCEDURE — 25000132 ZZH RX MED GY IP 250 OP 250 PS 637: Performed by: INTERNAL MEDICINE

## 2017-08-06 PROCEDURE — 84100 ASSAY OF PHOSPHORUS: CPT | Performed by: INTERNAL MEDICINE

## 2017-08-06 RX ORDER — FUROSEMIDE 10 MG/ML
40 INJECTION INTRAMUSCULAR; INTRAVENOUS ONCE
Status: COMPLETED | OUTPATIENT
Start: 2017-08-06 | End: 2017-08-06

## 2017-08-06 RX ORDER — CEFAZOLIN SODIUM 1 G/50ML
1250 SOLUTION INTRAVENOUS EVERY 24 HOURS
Status: DISCONTINUED | OUTPATIENT
Start: 2017-08-06 | End: 2017-08-07

## 2017-08-06 RX ADMIN — METOPROLOL TARTRATE 25 MG: 100 TABLET ORAL at 08:15

## 2017-08-06 RX ADMIN — INSULIN ASPART 2 UNITS: 100 INJECTION, SOLUTION INTRAVENOUS; SUBCUTANEOUS at 22:10

## 2017-08-06 RX ADMIN — ACETAMINOPHEN 650 MG: 325 SOLUTION ORAL at 17:13

## 2017-08-06 RX ADMIN — INSULIN ASPART 3 UNITS: 100 INJECTION, SOLUTION INTRAVENOUS; SUBCUTANEOUS at 08:15

## 2017-08-06 RX ADMIN — SODIUM CHLORIDE: 4.5 INJECTION, SOLUTION INTRAVENOUS at 02:41

## 2017-08-06 RX ADMIN — METOPROLOL TARTRATE 25 MG: 100 TABLET ORAL at 21:57

## 2017-08-06 RX ADMIN — INSULIN GLARGINE 30 UNITS: 100 INJECTION, SOLUTION SUBCUTANEOUS at 08:15

## 2017-08-06 RX ADMIN — INSULIN ASPART 3 UNITS: 100 INJECTION, SOLUTION INTRAVENOUS; SUBCUTANEOUS at 04:58

## 2017-08-06 RX ADMIN — RANITIDINE HYDROCHLORIDE 150 MG: 150 SOLUTION ORAL at 08:14

## 2017-08-06 RX ADMIN — INSULIN GLARGINE 30 UNITS: 100 INJECTION, SOLUTION SUBCUTANEOUS at 21:57

## 2017-08-06 RX ADMIN — INSULIN ASPART 3 UNITS: 100 INJECTION, SOLUTION INTRAVENOUS; SUBCUTANEOUS at 15:11

## 2017-08-06 RX ADMIN — ACETAMINOPHEN 650 MG: 325 SOLUTION ORAL at 21:57

## 2017-08-06 RX ADMIN — VANCOMYCIN HYDROCHLORIDE 1250 MG: 5 INJECTION, POWDER, LYOPHILIZED, FOR SOLUTION INTRAVENOUS at 21:58

## 2017-08-06 RX ADMIN — Medication 15 ML: at 08:15

## 2017-08-06 RX ADMIN — CEFTRIAXONE 1 G: 1 INJECTION, POWDER, FOR SOLUTION INTRAMUSCULAR; INTRAVENOUS at 05:09

## 2017-08-06 RX ADMIN — ACETAMINOPHEN 650 MG: 325 SOLUTION ORAL at 08:14

## 2017-08-06 RX ADMIN — INSULIN ASPART 2 UNITS: 100 INJECTION, SOLUTION INTRAVENOUS; SUBCUTANEOUS at 12:15

## 2017-08-06 RX ADMIN — FUROSEMIDE 40 MG: 10 INJECTION, SOLUTION INTRAVENOUS at 10:55

## 2017-08-06 RX ADMIN — INSULIN ASPART 3 UNITS: 100 INJECTION, SOLUTION INTRAVENOUS; SUBCUTANEOUS at 18:34

## 2017-08-06 NOTE — PROGRESS NOTES
[facility]    Hospitalist Progress Note    Date of Service (when I saw the patient): 08/06/2017    Assessment & Plan   Celia Lewis is a 89 year old female who was admitted on 8/4/2017. PMH significant for advanced dementia who is nonverbal at baseline and bed bound with other medical problems including recurrent urinary tract infections, hypertension and type 2 diabetes as well as stage III CKD and a chronic stage III sacral decubitus ulcer. She was brought to the emergency room for evaluation of low-grade fever and hyperglycemia.  She was found to have elevated creatinine, mild leukocytosis and elevated lactate.  She had several episodes of loose stools well in the emergency room that were initially concerning for C. diff.  She was admitted 8/4 for further evaluation and treatment.     1.  Sepsis.  C diff negative. Urine culture > 100,000 E coli. One blood culture positive for Staph epi. Rechecked blood cultures to evaluate if this is contaminant. Will continue rocephin and vancomycin at this time. Appreciate ID consultation.     2.  Acute kidney injury on stage III CKD.    Creatinine improved after IV fluids. However, patient did develop increased RR with end-expiratory wheezing noted. Stopping fluids and giving dose of lasix 40mg IV x1. Will monitor renal function. Free water flushes to be increased for overall fluid needs.     3.  Type 2 diabetes.  Most recent A1c was 8.4 in 6/2017.  She is managed with Lantus and sliding scale NovoLog.    Will continue her Lantus and medium dose sliding scale NovoLog every 4 hours.      4.  Advanced dementia.  Baseline nonverbal and noncommunicative.  She is bed bound and has noted contractures.  She has a feeding tube and a chronic Fonseca catheter.    Nutrition has resumed tube feeds. These were stopped temporarily this morning with increased respirations. Sats fine, afebrile, remainder VS unremarkable. As long as minimal residuals these can be  resumed.  Note that during previous hospitalization, ethics consult was placed regarding her advanced medical conditions and poor prognosis; the patient was noted to be Muslim Che[employer]ism and ultimately the decision was made to continue treating current acute medical conditions as they arise.      5.  Stage III sacral decubitus ulcer, chronic.  No signs of acute infection. Infectious disease agrees. Wound care nurse has seen consulted.       6.  Hypertension. Continue metoprolol.     7.  Hypernatremia. Somewhat chronic and felt secondary to tube feeds. Improved with IV fluids.     DVT Prophylaxis: Pneumatic Compression Devices  Code Status: Full Code     Disposition: Expected discharge in coming days depending on final/repeat cultures and appropriate antibiotics.    Tamar Mauricio MD    [phone] (P)  Text page (7am to 6pm)    Interval History   No acute events overnight, though patient did develop increased respirations this morning. Satting 97% on room air and remainder of VS unremarkable. Stat CXR ordered and tube feeds held. Planning to d/c IV fluids and give 1x dose IV lasix. Resume tube feeds as tolerated.    -Data reviewed today: I reviewed all new labs and imaging results over the last 24 hours. I personally reviewed chest x-ray.    Physical Exam   Temp: 98.1  F (36.7  C) Temp src: Oral BP: 144/71 Pulse: 80 Heart Rate: 80 Resp: (!) 33 SpO2: 98 % O2 Device: None (Room air)    Vitals:    08/04/17 1009 08/05/17 0556   Weight: 64.7 kg (142 lb 10.2 oz) 67.6 kg (149 lb 0.5 oz)     Vital Signs with Ranges  Temp:  [97.9  F (36.6  C)-98.4  F (36.9  C)] 98.1  F (36.7  C)  Pulse:  [76-80] 80  Heart Rate:  [74-80] 80  Resp:  [16-33] 33  BP: ()/(50-71) 144/71  SpO2:  [96 %-99 %] 98 %  I/O last 3 completed shifts:  In: 4363 [I.V.:2159; NG/GT:2204]  Out: 1500 [Urine:1500]    Constitutional: Patient alert, looking around room. Nonverbal, unable to communicate. Slight increase work of breathing with increased RR.    Respiratory: Clear to auscultation throughout. Patient with end expiratory wheezing as well as what sounds like upper airway sounds. No observed aspiration.  Cardiovascular: Regular rate and rhythm. No murmurs.  GI: Soft, nondistended. Normoactive bowel sounds. PEG noted with tube feeds currently paused.  Skin/Integumen: Chronic sacral decubitus ulcer with no surrounding erythema.    Medications     IV fluid REPLACEMENT ONLY         furosemide  40 mg Intravenous Once     cefTRIAXone  1 g Intravenous Q24H     vancomycin place ramos - receiving intermittent dosing  1 each Does not apply See Admin Instructions     metoprolol  25 mg Oral or Feeding Tube BID     ranitidine  150 mg Per Feeding Tube Daily     insulin glargine  30 Units Subcutaneous BID     acetaminophen  650 mg Per Feeding Tube TID     insulin aspart  1-6 Units Subcutaneous Q4H     multivitamins with minerals  15 mL Per Feeding Tube Daily       Data     Recent Labs  Lab 08/06/17  0712 08/05/17  0618 08/04/17  0450   WBC  --  10.2 11.5*   HGB  --  10.5* 12.5   MCV  --  97 99   PLT  --  177 200    144 148*   POTASSIUM 4.2 4.0 4.3   CHLORIDE 109 115* 114*   CO2 18* 18* 22   BUN 37* 54* 85*   CR 0.83 1.01 1.48*   ANIONGAP 13 11 12   NELLIE 7.9* 7.9* 9.0   * 269* 363*   ALBUMIN  --   --  2.8*   PROTTOTAL  --   --  7.6   BILITOTAL  --   --  0.5   ALKPHOS  --   --  94   ALT  --   --  24   AST  --   --  17       Imaging:  No results found for this or any previous visit (from the past 24 hour(s)).

## 2017-08-06 NOTE — PHARMACY-VANCOMYCIN DOSING SERVICE
Pharmacy Vancomycin Note  Date of Service 2017  Patient's  [id number]   89 year old, female    Indication: Sepsis/UTI  Goal Trough Level: 15-20 mg/L  Day of Therapy: 2  Current Vancomycin regimen:  1500 mg IV q48h    Current estimated CrCl = Estimated Creatinine Clearance: 31.7 mL/min (based on Cr of 1.01).    Creatinine for last 3 days  2017:  4:50 AM Creatinine 1.48 mg/dL  2017:  6:18 AM Creatinine 1.01 mg/dL    Recent Vancomycin Levels (past 3 days)  2017:  6:10 PM Vancomycin Level 10.3 mg/L    Vancomycin IV Administrations (past 72 hours)                   vancomycin (VANCOCIN) 1500 mg in 0.9% NaCl 250 mL PREMIX (mg) 1,500 mg New Bag 17 0645                Nephrotoxins and other renal medications (Future)    Start     Dose/Rate Route Frequency Ordered Stop    17  vancomycin (VANCOCIN) 1500 mg in 0.9% NaCl 250 mL PREMIX      1,500 mg Intravenous ONCE 17 1909               Contrast Orders - past 72 hours     None          Interpretation of levels and current regimen:  Trough level is  Subtherapeutic    Has serum creatinine changed > 50% in last 72 hours: Improving    Urine output: Low urine output    Renal Function: Stable    Plan:  1.  Will give Vancomycin 1500 mg IV x 1 dose and redose based on levels.  2.  Pharmacy will check trough levels as appropriate in 1-3 Days.    3. Serum creatinine levels will be ordered daily for the first week of therapy and at least twice weekly for subsequent weeks.      Nohemi Irby, PharmD        .

## 2017-08-06 NOTE — PLAN OF CARE
Problem: Goal Outcome Summary  Goal: Goal Outcome Summary  Outcome: No Change  Opens eyes intermittently, nonverbal @ baseline.  VSS. NPO, tube feed infusing, residuals 5 cc and 120 cc. Reposition Q2H with lift. Incontinent of moderate loose stool x1, sacral dressing replaced due to soiling.  Plan: IV Fluids, IV abx.

## 2017-08-06 NOTE — PLAN OF CARE
Problem: Goal Outcome Summary  Goal: Goal Outcome Summary  Outcome: Improving  Pt alert, disoriented and unable to speak. Repositioned to her R side. TF@45cc/hr, dressing intact. Mepilex dressing changed on coccyx. Dressing on bilateral ears intact. NPO. VSS.

## 2017-08-06 NOTE — PLAN OF CARE
Problem: Goal Outcome Summary  Goal: Goal Outcome Summary  Outcome: No Change  Non-verbal. VSS. Tachypneic/dyspneic this AM with RR at 30. Sats 96 on RA. LS with expiratory wheezes. MD notified. Lasix given and CXR done. IVF d/c'ed. Breathing slightly improved since. TF at 45mL/hr. NPO. G tube dressing changed. Incontinent of loose stool. Fonseca patent. Wound cares due to be done today; not able to do them on day shift. Bedrest this shift. Plan pending clinical improvement. Continue to monitor.

## 2017-08-06 NOTE — PROGRESS NOTES
SW:  D:  Call placed and message left at [facility] to check the bed hold status.  Awaiting a return call.  P:  Will continue to follow.

## 2017-08-06 NOTE — PLAN OF CARE
Problem: Goal Outcome Summary  Goal: Goal Outcome Summary  Outcome: No Change  Pt is non verbal, repositioned per schedule, gastric tube feeding continuous, vitals stable.

## 2017-08-07 LAB
ANION GAP SERPL CALCULATED.3IONS-SCNC: 12 MMOL/L (ref 3–14)
BACTERIA SPEC CULT: ABNORMAL
BUN SERPL-MCNC: 32 MG/DL (ref 7–30)
CALCIUM SERPL-MCNC: 8.7 MG/DL (ref 8.5–10.1)
CHLORIDE SERPL-SCNC: 109 MMOL/L (ref 94–109)
CO2 SERPL-SCNC: 19 MMOL/L (ref 20–32)
CREAT SERPL-MCNC: 0.9 MG/DL (ref 0.52–1.04)
ERYTHROCYTE [DISTWIDTH] IN BLOOD BY AUTOMATED COUNT: 16 % (ref 10–15)
GFR SERPL CREATININE-BSD FRML MDRD: 59 ML/MIN/1.7M2
GLUCOSE BLDC GLUCOMTR-MCNC: 210 MG/DL (ref 70–99)
GLUCOSE BLDC GLUCOMTR-MCNC: 218 MG/DL (ref 70–99)
GLUCOSE BLDC GLUCOMTR-MCNC: 228 MG/DL (ref 70–99)
GLUCOSE BLDC GLUCOMTR-MCNC: 260 MG/DL (ref 70–99)
GLUCOSE BLDC GLUCOMTR-MCNC: 269 MG/DL (ref 70–99)
GLUCOSE BLDC GLUCOMTR-MCNC: 280 MG/DL (ref 70–99)
GLUCOSE SERPL-MCNC: 311 MG/DL (ref 70–99)
HCT VFR BLD AUTO: 33.9 % (ref 35–47)
HGB BLD-MCNC: 11 G/DL (ref 11.7–15.7)
Lab: ABNORMAL
MAGNESIUM SERPL-MCNC: 2.1 MG/DL (ref 1.6–2.3)
MCH RBC QN AUTO: 30.5 PG (ref 26.5–33)
MCHC RBC AUTO-ENTMCNC: 32.4 G/DL (ref 31.5–36.5)
MCV RBC AUTO: 94 FL (ref 78–100)
MICRO REPORT STATUS: ABNORMAL
MICROORGANISM SPEC CULT: ABNORMAL
MICROORGANISM SPEC CULT: ABNORMAL
PHOSPHATE SERPL-MCNC: 2.7 MG/DL (ref 2.5–4.5)
PLATELET # BLD AUTO: 182 10E9/L (ref 150–450)
POTASSIUM SERPL-SCNC: 4.3 MMOL/L (ref 3.4–5.3)
RBC # BLD AUTO: 3.61 10E12/L (ref 3.8–5.2)
SODIUM SERPL-SCNC: 140 MMOL/L (ref 133–144)
SPECIMEN SOURCE: ABNORMAL
WBC # BLD AUTO: 8.9 10E9/L (ref 4–11)

## 2017-08-07 PROCEDURE — 25000132 ZZH RX MED GY IP 250 OP 250 PS 637: Performed by: INTERNAL MEDICINE

## 2017-08-07 PROCEDURE — 36415 COLL VENOUS BLD VENIPUNCTURE: CPT | Performed by: INTERNAL MEDICINE

## 2017-08-07 PROCEDURE — 00000146 ZZHCL STATISTIC GLUCOSE BY METER IP

## 2017-08-07 PROCEDURE — 12000007 ZZH R&B INTERMEDIATE

## 2017-08-07 PROCEDURE — 84100 ASSAY OF PHOSPHORUS: CPT | Performed by: INTERNAL MEDICINE

## 2017-08-07 PROCEDURE — 25000125 ZZHC RX 250: Performed by: INTERNAL MEDICINE

## 2017-08-07 PROCEDURE — 25000128 H RX IP 250 OP 636: Performed by: INTERNAL MEDICINE

## 2017-08-07 PROCEDURE — 85027 COMPLETE CBC AUTOMATED: CPT | Performed by: INTERNAL MEDICINE

## 2017-08-07 PROCEDURE — 99232 SBSQ HOSP IP/OBS MODERATE 35: CPT | Performed by: INTERNAL MEDICINE

## 2017-08-07 PROCEDURE — 25000131 ZZH RX MED GY IP 250 OP 636 PS 637: Performed by: INTERNAL MEDICINE

## 2017-08-07 PROCEDURE — 80048 BASIC METABOLIC PNL TOTAL CA: CPT | Performed by: INTERNAL MEDICINE

## 2017-08-07 PROCEDURE — 83735 ASSAY OF MAGNESIUM: CPT | Performed by: INTERNAL MEDICINE

## 2017-08-07 PROCEDURE — 27210429 ZZH NUTRITION PRODUCT INTERMEDIATE LITER

## 2017-08-07 RX ORDER — GUAR GUM
1 PACKET (EA) ORAL 2 TIMES DAILY
Status: DISCONTINUED | OUTPATIENT
Start: 2017-08-07 | End: 2017-08-10 | Stop reason: HOSPADM

## 2017-08-07 RX ADMIN — CEFTRIAXONE 1 G: 1 INJECTION, POWDER, FOR SOLUTION INTRAMUSCULAR; INTRAVENOUS at 06:47

## 2017-08-07 RX ADMIN — ACETAMINOPHEN 650 MG: 325 SOLUTION ORAL at 21:45

## 2017-08-07 RX ADMIN — INSULIN ASPART 3 UNITS: 100 INJECTION, SOLUTION INTRAVENOUS; SUBCUTANEOUS at 10:48

## 2017-08-07 RX ADMIN — INSULIN GLARGINE 30 UNITS: 100 INJECTION, SOLUTION SUBCUTANEOUS at 21:44

## 2017-08-07 RX ADMIN — INSULIN ASPART 2 UNITS: 100 INJECTION, SOLUTION INTRAVENOUS; SUBCUTANEOUS at 02:34

## 2017-08-07 RX ADMIN — METOPROLOL TARTRATE 25 MG: 100 TABLET ORAL at 10:15

## 2017-08-07 RX ADMIN — ACETAMINOPHEN 650 MG: 325 SOLUTION ORAL at 10:15

## 2017-08-07 RX ADMIN — INSULIN ASPART 2 UNITS: 100 INJECTION, SOLUTION INTRAVENOUS; SUBCUTANEOUS at 18:57

## 2017-08-07 RX ADMIN — Medication 1 PACKET: at 12:11

## 2017-08-07 RX ADMIN — INSULIN ASPART 3 UNITS: 100 INJECTION, SOLUTION INTRAVENOUS; SUBCUTANEOUS at 06:56

## 2017-08-07 RX ADMIN — Medication 1 PACKET: at 21:45

## 2017-08-07 RX ADMIN — INSULIN ASPART 3 UNITS: 100 INJECTION, SOLUTION INTRAVENOUS; SUBCUTANEOUS at 22:02

## 2017-08-07 RX ADMIN — Medication 15 ML: at 10:15

## 2017-08-07 RX ADMIN — ACETAMINOPHEN 650 MG: 325 SOLUTION ORAL at 15:08

## 2017-08-07 RX ADMIN — INSULIN GLARGINE 30 UNITS: 100 INJECTION, SOLUTION SUBCUTANEOUS at 10:15

## 2017-08-07 RX ADMIN — METOPROLOL TARTRATE 25 MG: 100 TABLET ORAL at 21:44

## 2017-08-07 RX ADMIN — RANITIDINE HYDROCHLORIDE 150 MG: 150 SOLUTION ORAL at 10:15

## 2017-08-07 RX ADMIN — INSULIN ASPART 2 UNITS: 100 INJECTION, SOLUTION INTRAVENOUS; SUBCUTANEOUS at 15:03

## 2017-08-07 NOTE — PLAN OF CARE
Problem: Goal Outcome Summary  Goal: Goal Outcome Summary  Outcome: No Change  Pt is alert but non verbal. VSS on RA. LS with expiratory wheezes. Bedrest. Assist of 2 with cares. Needs repositioning every 2 hours.TF at 45mL/hr. NPO. Incontinent of loose stool. Fonseca patent and draining adequate amount of urine. IV saline locked. Will continue to monitor.

## 2017-08-07 NOTE — PLAN OF CARE
Problem: Goal Outcome Summary  Goal: Goal Outcome Summary  Outcome: No Change  Pt nonverbal with dementia at baseline. IV saline locked. Continuous tube feed, G tube patent. Chronic caro in place putting out clear yellow urine. Intermittent incontinence of bowel. Q 4 blood glucose checks covered with Novolog. Turned and repositioned every 2 hours. Will continue to monitor.

## 2017-08-07 NOTE — PROGRESS NOTES
Care Transition Initial Assessment - SW  Reason For Consult: discharge planning  Met with: Spoke to patients son, Dr. Salvatore Lewis.  Active Problems:    Recurrent UTI         DATA  Lives With: facility resident  Living Arrangements: extended care facility ([facility])  Description of Support System: Supportive, Involved  Who is your support system?: , Children  Support Assessment: Adequate family and caregiver support, Adequate social supports.   Identified issues/concerns regarding health management:             Quality Of Family Relationships: supportive, involved       ASSESSMENT  Cognitive Status: Alert and oriented X1.  Concerns to be addressed:      Per automatic referral, SW spoke to patients son, Dr. Salvatore Lewis, to discuss discharge planning needs.  Patient is a 89-year-old female who was admitted to the hospital on 8-4-17 with a recurrent UTI.  Prior to hospitalization, patient was living at [facility] in the Long Term Care Unit.  Patients son confirmed that family prefers to have patient return there upon discharge.  SW made a referral to the facility via [facility] on the [facility] to confirm whether patient is holding her bed at the facility or not.  SW will follow up regarding transportation once the discharge plan has been finalized.     PLAN  Financial costs for the patient includes: Discussed coverage for LTC under the patients insurance.  Patient given options and choices for discharge: LTC facility list offered.  Patient/family is agreeable to the plan?  Yes  Patient Goals and Preferences: Return to LTC at discharge.  Patient anticipates discharging to: Return to LTC at discharge.    SEAN Jarquin

## 2017-08-07 NOTE — PHARMACY-VANCOMYCIN DOSING SERVICE
Pharmacy Vancomycin Note  Date of Service 2017  Patient's  [id number]   89 year old, female    Indication: Sepsis  Goal Trough Level: 15-20 mg/L  Day of Therapy: 3  Current Vancomycin regimen:  Intermittent dosing - level of 21 ~ 23 hours post last dose Vanco    Current estimated CrCl = Estimated Creatinine Clearance: 38.6 mL/min (based on Cr of 0.83).    Creatinine for last 3 days  2017:  4:50 AM Creatinine 1.48 mg/dL  2017:  6:18 AM Creatinine 1.01 mg/dL  2017:  7:12 AM Creatinine 0.83 mg/dL    Recent Vancomycin Levels (past 3 days)  2017:  6:10 PM Vancomycin Level 10.3 mg/L  2017:  6:50 PM Vancomycin Level 21.0 mg/L    Vancomycin IV Administrations (past 72 hours)                   vancomycin (VANCOCIN) 1500 mg in 0.9% NaCl 250 mL PREMIX (mg) 1,500 mg New Bag 17                Nephrotoxins and other renal medications (Future)    Start     Dose/Rate Route Frequency Ordered Stop    17  vancomycin place ramos - receiving intermittent dosing      1 each Does not apply SEE ADMIN INSTRUCTIONS 17               Contrast Orders - past 72 hours     None          Interpretation of levels and current regimen:  Trough level is  Therapeutic    Has serum creatinine changed > 50% in last 72 hours: No    Urine output:  good urine output    Renal Function: Improving    Plan:  1.  Initiate dose of 1250 mg q24h at 2200 this PM - creat improving  2.  Pharmacy will check trough levels as appropriate in 1-3 Days.    3. Serum creatinine levels will be ordered daily for the first week of therapy and at least twice weekly for subsequent weeks.      Francia Christiansen        .

## 2017-08-07 NOTE — PROGRESS NOTES
[facility]    Hospitalist Progress Note    Date of Service (when I saw the patient): 08/07/2017    Assessment & Plan   Celia Lewis is a 89 year old female who was admitted on 8/4/2017. PMH significant for advanced dementia who is nonverbal at baseline and bed bound with other medical problems including recurrent urinary tract infections, hypertension and type 2 diabetes as well as stage III CKD and a chronic stage III sacral decubitus ulcer. She was brought to the emergency room for evaluation of low-grade fever and hyperglycemia.  She was found to have elevated creatinine, mild leukocytosis and elevated lactate.  She had several episodes of loose stools well in the emergency room that were initially concerning for C. diff.  She was admitted 8/4 for further evaluation and treatment.      1.  Sepsis.  C diff negative. Urine culture > 100,000 E coli sensitive to rocephin, which she has been getting. If tolerating tube feeds without residual problems, could discharge to LTC with enteral Ceftin. 1/4 blood cultures for Staph epi likely contaminant and vancomycin has been stopped.   Appreciate ID consultation.      2.  Acute kidney injury on stage III CKD.    Creatinine improved after IV fluids. Free water flushes for fluid needs.      3.  Type 2 diabetes.  Most recent A1c was 8.4 in 6/2017.  She is managed with Lantus and sliding scale NovoLog.    Will continue her Lantus and medium dose sliding scale NovoLog every 4 hours.       4.  Advanced dementia.  Baseline nonverbal and noncommunicative.  She is bed bound and has noted contractures.  She has a feeding tube and a chronic Fonseca catheter.    Nutrition has resumed tube feeds. For second day in a row, patient with increased residuals and not tolerating tube feed rate.  Is she at increased rate from baseline at facility? Could consider abdominal imaging if needed, but want to ensure not giving increased volume compared to what she normally  handles.  Once tolerating tube feeds, could change to enteral antibiotic and likely discharge to LTC.      5.  Stage III sacral decubitus ulcer, chronic.  No signs of acute infection. Wound care nurse has seen consulted.        6.  Hypertension. Continue metoprolol.      7.  Hypernatremia. Somewhat chronic and felt secondary to tube feeds. Improved with IV fluids.      DVT Prophylaxis: Pneumatic Compression Devices  Code Status: Full Code    Disposition: Expected discharge in coming days. If tube feed residuals improved, could change to enteral antibiotics.     Tamar Mauricio MD    [phone] (P)  Text page (7am to 6pm)    Interval History   Patient with increased pressure from G tube today per nursing. Tube feeds held temporarily and did later have 200-300 ml residual. Nutrition recommended resuming feeds. No observed aspiration event.     -Data reviewed today: I reviewed all new labs and imaging results over the last 24 hours. I personally reviewed no images or EKG's today.    Physical Exam   Temp: 98.6  F (37  C) Temp src: Axillary BP: 123/67 Pulse: 84 Heart Rate: 84 Resp: 20 SpO2: 96 % O2 Device: None (Room air)    Vitals:    08/04/17 1009 08/05/17 0556 08/07/17 0444   Weight: 64.7 kg (142 lb 10.2 oz) 67.6 kg (149 lb 0.5 oz) 69.5 kg (153 lb 3.5 oz)     Vital Signs with Ranges  Temp:  [97.9  F (36.6  C)-98.7  F (37.1  C)] 98.6  F (37  C)  Pulse:  [84-86] 84  Heart Rate:  [72-86] 84  Resp:  [19-22] 20  BP: (123-145)/(64-71) 123/67  SpO2:  [94 %-98 %] 96 %  I/O last 3 completed shifts:  In: 1064 [NG/GT:130]  Out: 2350 [Urine:2150; Emesis/NG output:200]    Constitutional: Patient awake and alert at time of exam. Nonverbal. Nontoxic. No increased work of breathing.  Respiratory: Clear to auscultation bilaterally. Coarse upper airway sounds, but no crackles or wheezes.   Cardiovascular: Regular rate and rhythm. No murmurs.  GI: Tube feeds currently at goal via PEG. Soft, nondistended. Normoactive bowel sounds.      Medications     IV fluid REPLACEMENT ONLY         fiber modular  1 packet Per Feeding Tube BID     cefTRIAXone  1 g Intravenous Q24H     metoprolol  25 mg Oral or Feeding Tube BID     ranitidine  150 mg Per Feeding Tube Daily     insulin glargine  30 Units Subcutaneous BID     acetaminophen  650 mg Per Feeding Tube TID     insulin aspart  1-6 Units Subcutaneous Q4H     multivitamins with minerals  15 mL Per Feeding Tube Daily       Data     Recent Labs  Lab 08/07/17  0703 08/06/17  0712 08/05/17  0618 08/04/17  0450   WBC 8.9  --  10.2 11.5*   HGB 11.0*  --  10.5* 12.5   MCV 94  --  97 99     --  177 200    140 144 148*   POTASSIUM 4.3 4.2 4.0 4.3   CHLORIDE 109 109 115* 114*   CO2 19* 18* 18* 22   BUN 32* 37* 54* 85*   CR 0.90 0.83 1.01 1.48*   ANIONGAP 12 13 11 12   NELLIE 8.7 7.9* 7.9* 9.0   * 258* 269* 363*   ALBUMIN  --   --   --  2.8*   PROTTOTAL  --   --   --  7.6   BILITOTAL  --   --   --  0.5   ALKPHOS  --   --   --  94   ALT  --   --   --  24   AST  --   --   --  17       Imaging:  No results found for this or any previous visit (from the past 24 hour(s)).

## 2017-08-07 NOTE — PROGRESS NOTES
[facility]  Infectious Disease Progress Note          Assessment and Plan:   IMPRESSION:   1.  Celia Lewis is a 89-year-old female well known to me from multiple prior admissions, now readmitted with hyperglycemia, possible low-grade fever.  Workup so far shows abnormal urinalysis which has generally been true with her having catheter in place but may be a true urinary tract infection.  Not much in the way of respiratory symptoms but recurrent aspiration with minimal symptoms except fever previously.  Also has an ongoing decubitus wound but does not look infected.   2.  Chronic sacral decubitus wound.  Prior cultures grew multiple organisms as would always be the case in this setting but nothing to suggest deeper major infection or cellulitis at present.   3.  Prior MRSA positive urine culture, current urine abnormal, await culture.   4.  History of probable recurrent aspiration, although not particularly clearly documented, previously treated it as aspiration.   5.  Severe dementia.   6.  Feeding tube in place, aspiration as a prior potential issue.   7.  Diabetes mellitus.   8.  Chronic mild renal insufficiency, some acute worsening at present.   9.  Sulfa allergy.   10 Diarrhea, c diff neg  11 BC CN staph likely contaminant      RECOMMENDATIONS:   1.  No treatment of the sacral decubitus ulcer, at present looks quite clean, does not need an I&D and is not a likely source of infection unless she has bacteremia.   2.   urine culture sens E coli , on ceftriaxone improved/stable, continue while here DC vanco CN staph likely contaminant  3.  Obviously long-term prognosis is poor, has had previous extensive conversation and ethics consultation and currently on restorative care based on underlying patient desires and J[employer] [facility]  4 OK enteral ceftin 500 bid for 10 days if disposition            Interval History:   no interaction, no fever cxs as above  WBc nl               Medications:       cefTRIAXone  1 g Intravenous Q24H     metoprolol  25 mg Oral or Feeding Tube BID     ranitidine  150 mg Per Feeding Tube Daily     insulin glargine  30 Units Subcutaneous BID     acetaminophen  650 mg Per Feeding Tube TID     insulin aspart  1-6 Units Subcutaneous Q4H     multivitamins with minerals  15 mL Per Feeding Tube Daily                  Physical Exam:   Blood pressure 145/69, pulse 86, temperature 98.7  F (37.1  C), temperature source Axillary, resp. rate 22, height 1.524 m (5'), weight 69.5 kg (153 lb 3.5 oz), SpO2 97 %.  Wt Readings from Last 2 Encounters:   08/07/17 69.5 kg (153 lb 3.5 oz)   07/06/17 71.4 kg (157 lb 6.4 oz)     Vital Signs with Ranges  Temp:  [97.5  F (36.4  C)-98.7  F (37.1  C)] 98.7  F (37.1  C)  Pulse:  [78-86] 86  Heart Rate:  [72-86] 86  Resp:  [19-33] 22  BP: (125-145)/(64-73) 145/69  SpO2:  [94 %-98 %] 97 %    Constitutional: Awake, alert, not interactive   Lungs: Clear to auscultation bilaterally, no crackles or wheezing   Cardiovascular: Regular rate and rhythm, normal S1 and S2, and no murmur noted   Abdomen: Normal bowel sounds, soft, non-distended, non-tender   Skin: No rashes, no cyanosis, no edema wd clean   Other:           Data:   All microbiology laboratory data reviewed.  Recent Labs   Lab Test  08/07/17   0703  08/05/17   0618  08/04/17   0450   WBC  8.9  10.2  11.5*   HGB  11.0*  10.5*  12.5   HCT  33.9*  32.8*  39.6   MCV  94  97  99   PLT  182  177  200     Recent Labs   Lab Test  08/07/17   0703  08/06/17   0712  08/05/17   0618   CR  0.90  0.83  1.01     Recent Labs   Lab Test  05/08/13   1300   SED  61*     Recent Labs   Lab Test  08/05/17   1148  08/05/17   1140  08/04/17   0605  08/04/17   0555  08/04/17   0508  06/30/17   1654  06/30/17   1640  06/14/17   1319  06/14/17   1230   CULT  No growth after 1 day  No growth after 1 day  Cultured on the 1st day of incubation: Staphylococcus epidermidis  Critical Value/Significant Value, preliminary  result only, called to and read   back by JOANN VALDERRAMA RN 8.5.17 0835. TENA  Cultured on the 1st day of incubation: Staphylococcus hominis  (Note)  POSITIVE for STAPHYLOCOCCUS EPIDERMIDIS and POSITIVE for the mecA  gene (resistant to methicillin) by SoThreeigene multiplex nucleic acid  test. Final identification and antimicrobial susceptibility testing  will be verified by standard methods.    Specimen tested with Verigene multiplex, gram-positive blood culture  nucleic acid test for the following targets: Staph aureus, Staph  epidermidis, Staph lugdunensis, other Staph species, Enterococcus  faecalis, Enterococcus faecium, Streptococcus species, S. agalactiae,  S. anginosus grp., S. pneumoniae, S. pyogenes, Listeria sp., mecA  (methicillin resistance) and Rene/B (vancomycin resistance).    Critical Value/Significant Value called to and read back by Cheyenne Gilliland RN at [facility] at 11:15am 8/5/2017 ()  *  No growth after 2 days  >100,000 colonies/mL Escherichia coli*  No growth  <1000 colonies/mL urogenital maria esther Susceptibility testing not routinely done  No growth  No growth  Moderate growth Escherichia coli  Moderate growth Enterococcus avium  Moderate growth Enterococcus faecalis  Light growth Candida albicans / dubliniensis Candida albicans and Candida   dubliniensis are not routinely speciated Susceptibility testing not routinely   done  Plus Heavy growth Normal skin maria esther  *  >100,000 colonies/mL Methicillin resistant Staphylococcus aureus (MRSA)  50,000 to 100,000 colonies/mL Strain 2 Methicillin resistant Staphylococcus   aureus (MRSA)  Critical Value/Significant Value called to and read back by Elmer Hurd Rn at   [phone]  *

## 2017-08-07 NOTE — PROGRESS NOTES
CLINICAL NUTRITION SERVICES - REASSESSMENT NOTE      Recommendations Ordered by Registered Dietitian (RD):   Will add Nutrisource FIber BID        EVALUATION OF PROGRESS TOWARD GOALS   Diet:  NPO  Nutrition Support Enteral:  Type of Feeding Tube: G-tube  Enteral Frequency:  Continuous  Enteral Regimen: Isosource 1.5 @ 45 mL/hr  Total Enteral Provisions: 1620 kcal (32 kcal/kg), 73 g PRO (1.5 g/kg), 190 g CHO, 16.2g fiber, and 820 mL free water  Free Water Flush: 130 mL q 4 hour for total fluid (TF + flushes) = 1600 mL/day.    Pt having loose stools, will add Nutrisource Fiber BID.      Dosing Weight 50 kg - adjusted for overweight     ASSESSED NUTRITION NEEDS PER APPROVED PRACTICE GUIDELINES:  Estimated Energy Needs: 4416-7527 kcals (30-35 Kcal/Kg)  Justification: Wound healing guidelines  Estimated Protein Needs: 60-75 grams protein (1.2-1.5 g pro/Kg)  Justification: wound healing - monitor renal status  Estimated Fluid Needs: 3542-9483  mL (1 mL/Kcal)  Justification: maintenance      NEW FINDINGS:   8/4 WOCN:       Coccyx/sacrum:Stage 4 PI, community acquired, no local s/s infections, possible jimbo-wound candida      Bilateral ears:  Mix stage 2 and unstageable PI, community acquired, no local s/s infection    BS 200s, on Med SSI and Lanus    Weight has been trending up,?fluid. IVF stopped 8/6, IV Lasix given, one-time dose.  Vitals:    08/04/17 1009 08/05/17 0556 08/07/17 0444   Weight: 64.7 kg (142 lb 10.2 oz) 67.6 kg (149 lb 0.5 oz) 69.5 kg (153 lb 3.5 oz)         Previous Goals:   Isosource 1.5 at goal of 45 mL/hr will meet 100% of assessed needs.  Evaluation: Met    Previous Nutrition Diagnosis:   Increased nutrient needs (protein) related to healing as evidenced by chronic sacral PI and left ear PI  Evaluation: No change      CURRENT NUTRITION DIAGNOSIS  Same: Increased nutrient needs (protein) related to healing as evidenced by chronic sacral PI and left ear PI    INTERVENTIONS  Recommendations / Nutrition  Prescription  Continue current TF and flushes  Add fiber to improve stools    Implementation  Ordered Nutrisource FIber BID for add'l 6 gm fiber/day    Goals  Stools will decrease and become more formed      MONITORING AND EVALUATION:  Progress towards goals will be monitored and evaluated per protocol and Practice Guidelines    Bisi Montgomery, RD  Pager [phone] (M-F)            [phone] (W/E & Hol)

## 2017-08-08 LAB
ANION GAP SERPL CALCULATED.3IONS-SCNC: 13 MMOL/L (ref 3–14)
BUN SERPL-MCNC: 34 MG/DL (ref 7–30)
CALCIUM SERPL-MCNC: 8.9 MG/DL (ref 8.5–10.1)
CHLORIDE SERPL-SCNC: 109 MMOL/L (ref 94–109)
CO2 SERPL-SCNC: 19 MMOL/L (ref 20–32)
CREAT SERPL-MCNC: 0.85 MG/DL (ref 0.52–1.04)
ERYTHROCYTE [DISTWIDTH] IN BLOOD BY AUTOMATED COUNT: 16.5 % (ref 10–15)
GFR SERPL CREATININE-BSD FRML MDRD: 63 ML/MIN/1.7M2
GLUCOSE BLDC GLUCOMTR-MCNC: 184 MG/DL (ref 70–99)
GLUCOSE BLDC GLUCOMTR-MCNC: 192 MG/DL (ref 70–99)
GLUCOSE BLDC GLUCOMTR-MCNC: 210 MG/DL (ref 70–99)
GLUCOSE BLDC GLUCOMTR-MCNC: 211 MG/DL (ref 70–99)
GLUCOSE BLDC GLUCOMTR-MCNC: 232 MG/DL (ref 70–99)
GLUCOSE BLDC GLUCOMTR-MCNC: 243 MG/DL (ref 70–99)
GLUCOSE BLDC GLUCOMTR-MCNC: 290 MG/DL (ref 70–99)
GLUCOSE SERPL-MCNC: 252 MG/DL (ref 70–99)
HCT VFR BLD AUTO: 36.1 % (ref 35–47)
HGB BLD-MCNC: 11.6 G/DL (ref 11.7–15.7)
MCH RBC QN AUTO: 30.9 PG (ref 26.5–33)
MCHC RBC AUTO-ENTMCNC: 32.1 G/DL (ref 31.5–36.5)
MCV RBC AUTO: 96 FL (ref 78–100)
PLATELET # BLD AUTO: 208 10E9/L (ref 150–450)
POTASSIUM SERPL-SCNC: 4.4 MMOL/L (ref 3.4–5.3)
RBC # BLD AUTO: 3.75 10E12/L (ref 3.8–5.2)
SODIUM SERPL-SCNC: 141 MMOL/L (ref 133–144)
WBC # BLD AUTO: 10.7 10E9/L (ref 4–11)

## 2017-08-08 PROCEDURE — 99207 ZZC CDG-MDM COMPONENT: MEETS MODERATE - UP CODED: CPT | Performed by: INTERNAL MEDICINE

## 2017-08-08 PROCEDURE — 25000132 ZZH RX MED GY IP 250 OP 250 PS 637: Performed by: INTERNAL MEDICINE

## 2017-08-08 PROCEDURE — 12000007 ZZH R&B INTERMEDIATE

## 2017-08-08 PROCEDURE — 00000146 ZZHCL STATISTIC GLUCOSE BY METER IP

## 2017-08-08 PROCEDURE — 85027 COMPLETE CBC AUTOMATED: CPT | Performed by: INTERNAL MEDICINE

## 2017-08-08 PROCEDURE — 25000128 H RX IP 250 OP 636: Performed by: INTERNAL MEDICINE

## 2017-08-08 PROCEDURE — 27210429 ZZH NUTRITION PRODUCT INTERMEDIATE LITER

## 2017-08-08 PROCEDURE — 36415 COLL VENOUS BLD VENIPUNCTURE: CPT | Performed by: INTERNAL MEDICINE

## 2017-08-08 PROCEDURE — 25000131 ZZH RX MED GY IP 250 OP 636 PS 637: Performed by: INTERNAL MEDICINE

## 2017-08-08 PROCEDURE — 99232 SBSQ HOSP IP/OBS MODERATE 35: CPT | Performed by: INTERNAL MEDICINE

## 2017-08-08 PROCEDURE — 80048 BASIC METABOLIC PNL TOTAL CA: CPT | Performed by: INTERNAL MEDICINE

## 2017-08-08 PROCEDURE — 25000125 ZZHC RX 250: Performed by: INTERNAL MEDICINE

## 2017-08-08 RX ORDER — CEFUROXIME AXETIL 500 MG/1
500 TABLET ORAL EVERY 12 HOURS SCHEDULED
Status: DISCONTINUED | OUTPATIENT
Start: 2017-08-08 | End: 2017-08-10 | Stop reason: HOSPADM

## 2017-08-08 RX ADMIN — INSULIN GLARGINE 30 UNITS: 100 INJECTION, SOLUTION SUBCUTANEOUS at 21:48

## 2017-08-08 RX ADMIN — ACETAMINOPHEN 650 MG: 325 SOLUTION ORAL at 21:48

## 2017-08-08 RX ADMIN — INSULIN ASPART 2 UNITS: 100 INJECTION, SOLUTION INTRAVENOUS; SUBCUTANEOUS at 21:49

## 2017-08-08 RX ADMIN — Medication 15 ML: at 10:10

## 2017-08-08 RX ADMIN — MICONAZOLE NITRATE: 2 POWDER TOPICAL at 10:33

## 2017-08-08 RX ADMIN — ACETAMINOPHEN 650 MG: 325 SOLUTION ORAL at 10:09

## 2017-08-08 RX ADMIN — Medication 1 PACKET: at 21:49

## 2017-08-08 RX ADMIN — INSULIN ASPART 3 UNITS: 100 INJECTION, SOLUTION INTRAVENOUS; SUBCUTANEOUS at 06:10

## 2017-08-08 RX ADMIN — INSULIN ASPART 4 UNITS: 100 INJECTION, SOLUTION INTRAVENOUS; SUBCUTANEOUS at 03:22

## 2017-08-08 RX ADMIN — METOPROLOL TARTRATE 25 MG: 100 TABLET ORAL at 10:10

## 2017-08-08 RX ADMIN — ACETAMINOPHEN 650 MG: 325 SOLUTION ORAL at 18:02

## 2017-08-08 RX ADMIN — INSULIN ASPART 2 UNITS: 100 INJECTION, SOLUTION INTRAVENOUS; SUBCUTANEOUS at 19:30

## 2017-08-08 RX ADMIN — INSULIN GLARGINE 30 UNITS: 100 INJECTION, SOLUTION SUBCUTANEOUS at 10:09

## 2017-08-08 RX ADMIN — CEFTRIAXONE 1 G: 1 INJECTION, POWDER, FOR SOLUTION INTRAMUSCULAR; INTRAVENOUS at 06:10

## 2017-08-08 RX ADMIN — METOPROLOL TARTRATE 25 MG: 100 TABLET ORAL at 21:48

## 2017-08-08 RX ADMIN — Medication 1 PACKET: at 11:02

## 2017-08-08 RX ADMIN — CEFUROXIME AXETIL 500 MG: 500 TABLET ORAL at 21:48

## 2017-08-08 RX ADMIN — INSULIN ASPART 1 UNITS: 100 INJECTION, SOLUTION INTRAVENOUS; SUBCUTANEOUS at 11:05

## 2017-08-08 RX ADMIN — RANITIDINE HYDROCHLORIDE 150 MG: 150 SOLUTION ORAL at 10:10

## 2017-08-08 RX ADMIN — INSULIN ASPART 2 UNITS: 100 INJECTION, SOLUTION INTRAVENOUS; SUBCUTANEOUS at 15:15

## 2017-08-08 NOTE — PLAN OF CARE
Problem: Goal Outcome Summary  Goal: Goal Outcome Summary  Outcome: No Change  Pt VSS on RA. Nonverbal with dementia is her baseline. G tube patent and infusing tube feed, chronic caro patent. New dressings applied to ears and coccyx. Turned and repositioned q2 hours. Pt is NPO, Q 4 blood glucose checks, sliding scale insulin given.  Will continue to monitor.

## 2017-08-08 NOTE — PROGRESS NOTES
[facility]    Hospitalist Progress Note    Date of Service (when I saw the patient): 08/08/2017    Assessment & Plan   Celia Lewis is a 89 year old female who was admitted on 8/4/2017. PMH significant for advanced dementia who is nonverbal at baseline and bed bound with other medical problems including recurrent urinary tract infections, hypertension and type 2 diabetes as well as stage III CKD and a chronic stage III sacral decubitus ulcer. She was brought to the emergency room for evaluation of low-grade fever and hyperglycemia.  She was found to have elevated creatinine, mild leukocytosis and elevated lactate.  She had several episodes of loose stools well in the emergency room that were initially concerning for C. diff.  She was admitted 8/4 for further evaluation and treatment.      1.  Sepsis.  C diff negative. Urine culture > 100,000 E coli sensitive to rocephin, which she has been getting. Patient changed to enteral ceftin and will ensure she continues to tolerate this with feeds. Residuals decreased today from yesterday.  1/4 blood cultures for Staph epi likely contaminant and vancomycin has been stopped.   Appreciate ID consultation.      2.  Acute kidney injury on stage III CKD.    Creatinine improved after IV fluids. Free water flushes for fluid needs.      3.  Type 2 diabetes.  Most recent A1c was 8.4 in 6/2017.  She is managed with Lantus and sliding scale NovoLog.    Will continue her Lantus and medium dose sliding scale NovoLog every 4 hours.       4.  Advanced dementia.  Baseline nonverbal and noncommunicative.  She is bed bound and has noted contractures.  She has a feeding tube and a chronic Fonseca catheter.    Nutrition has resumed tube feeds. For second day in a row, patient with increased residuals and not tolerating tube feed rate.      5.  Stage III sacral decubitus ulcer, chronic.  No signs of acute infection. Wound care nurse has seen consulted.        6.  Hypertension.  Continue metoprolol.      7.  Hypernatremia. Somewhat chronic and felt secondary to tube feeds. Improved with IV fluids.      DVT Prophylaxis: Pneumatic Compression Devices  Code Status: Full Code     Disposition: Expected discharge tomorrow if tolerating tube feeds and enteral antibiotic. May discuss patient's g tube with radiology to see if there is any way to change out external tubing as it does not have good connection with feeds. Does not seem to be functioning appropriately. Could also start with xray for check of placement.     Tamar Mauricio MD    [phone] (P)  Text page (7am to 6pm)    Interval History   Some questions with nursing about functioning of G tube. Old tubing with poor connection with tube feeds. Seems to be either increased pressure or faulty system. Feeds flow out of tubing at times yet patient with no residuals today.  Still changing to enteral antibiotic today, however, as seems to be effective except when being disconnected..    -Data reviewed today: I reviewed all new labs and imaging results over the last 24 hours. I personally reviewed no images or EKG's today.    Physical Exam   Temp: 98.2  F (36.8  C) Temp src: Axillary BP: 137/67 Pulse: 91 Heart Rate: 91 Resp: 18 SpO2: 97 % O2 Device: None (Room air)    Vitals:    08/04/17 1009 08/05/17 0556 08/07/17 0444   Weight: 64.7 kg (142 lb 10.2 oz) 67.6 kg (149 lb 0.5 oz) 69.5 kg (153 lb 3.5 oz)     Vital Signs with Ranges  Temp:  [97.7  F (36.5  C)-98.3  F (36.8  C)] 98.2  F (36.8  C)  Pulse:  [86-91] 91  Heart Rate:  [86-98] 91  Resp:  [15-20] 18  BP: (126-141)/(54-67) 137/67  SpO2:  [96 %-98 %] 97 %  I/O last 3 completed shifts:  In: 158 [NG/GT:158]  Out: 1190 [Urine:1150; Emesis/NG output:40]    Constitutional: Patient sleeping. Nonverbal. No acute distress. Nontoxic.  Respiratory: Clear to auscultation bilaterally. No crackles or wheezes on exam.  Cardiovascular: Regular rate and rhythm. No murmurs.  GI: Abdomen is soft and  nondistended. No tenderness on exam. Normoactive bowel sounds. G tube with poor connection that does not fit tube feeding for long, coming disconnected and spilling. When disconnected, also seems to be increase in pressure coming from patient, pushing contents out, however no residuals.    Medications     IV fluid REPLACEMENT ONLY         cefuroxime  500 mg Per G Tube Q12H VIRA     fiber modular  1 packet Per Feeding Tube BID     metoprolol  25 mg Oral or Feeding Tube BID     ranitidine  150 mg Per Feeding Tube Daily     insulin glargine  30 Units Subcutaneous BID     acetaminophen  650 mg Per Feeding Tube TID     insulin aspart  1-6 Units Subcutaneous Q4H     multivitamins with minerals  15 mL Per Feeding Tube Daily       Data     Recent Labs  Lab 08/08/17  0655 08/07/17  0703 08/06/17  0712 08/05/17  0618 08/04/17  0450   WBC 10.7 8.9  --  10.2 11.5*   HGB 11.6* 11.0*  --  10.5* 12.5   MCV 96 94  --  97 99    182  --  177 200    140 140 144 148*   POTASSIUM 4.4 4.3 4.2 4.0 4.3   CHLORIDE 109 109 109 115* 114*   CO2 19* 19* 18* 18* 22   BUN 34* 32* 37* 54* 85*   CR 0.85 0.90 0.83 1.01 1.48*   ANIONGAP 13 12 13 11 12   NELLIE 8.9 8.7 7.9* 7.9* 9.0   * 311* 258* 269* 363*   ALBUMIN  --   --   --   --  2.8*   PROTTOTAL  --   --   --   --  7.6   BILITOTAL  --   --   --   --  0.5   ALKPHOS  --   --   --   --  94   ALT  --   --   --   --  24   AST  --   --   --   --  17       Imaging:  No results found for this or any previous visit (from the past 24 hour(s)).

## 2017-08-08 NOTE — PLAN OF CARE
Problem: Goal Outcome Summary  Goal: Goal Outcome Summary  Outcome: No Change  Patient is nonverbal with baseline dementia, contracture of extremities, NPO with continuous tube feeding, mechanical lift, g-tube and Fonseca patent.  She is incontinence of bowel, repositioning every 2 hours, strict bedrest, on contact and enteric isolation. Will continue to monitor.

## 2017-08-08 NOTE — PROGRESS NOTES
MARLON  D: SW following for discharge planning needs.    I: MARLON spoke to Monie in Admissions at [facility] and she confirmed that patient does have a bed hold at the facility.  SW updated Monie that patient may be ready for discharge in the next day or two.  A: Patient is nonverbal with a baseline diagnosis of Dementia.  P: SW will continue to follow and assist with finalizing the discharge plan as appropriate.    Neda Lira, SEAN

## 2017-08-08 NOTE — PROGRESS NOTES
[facility]  Infectious Disease Progress Note          Assessment and Plan:   IMPRESSION:   1.  Celia Lewis is a 89-year-old female well known to me from multiple prior admissions, now readmitted with hyperglycemia, possible low-grade fever.  Workup so far shows abnormal urinalysis which has generally been true with her having catheter in place but may be a true urinary tract infection.  Not much in the way of respiratory symptoms but recurrent aspiration with minimal symptoms except fever previously.  Also has an ongoing decubitus wound but does not look infected.   2.  Chronic sacral decubitus wound.  Prior cultures grew multiple organisms as would always be the case in this setting but nothing to suggest deeper major infection or cellulitis at present.   3.  Prior MRSA positive urine culture, current urine abnormal, await culture.   4.  History of probable recurrent aspiration, although not particularly clearly documented, previously treated it as aspiration.   5.  Severe dementia.   6.  Feeding tube in place, aspiration as a prior potential issue.   7.  Diabetes mellitus.   8.  Chronic mild renal insufficiency, some acute worsening at present.   9.  Sulfa allergy.   10 Diarrhea, c diff neg  11 BC CN staph likely contaminant      RECOMMENDATIONS:   1.  No treatment of the sacral decubitus ulcer, at present looks quite clean, does not need an I&D and is not a likely source of infection unless she has bacteremia.   2.   urine culture sens E coli , on ceftriaxone while here and improved/stable, continue while here DC vanco CN staph likely contaminant  3.  Obviously long-term prognosis is poor, has had previous extensive conversation and ethics consultation and currently on restorative care based on underlying patient desires and [employer] [facility]ian[facility]  4 OK enteral ceftin 500 bid for 10 days if disposition now            Interval History:   no interaction, no fever cxs as above  WBc nl no O2  CXR seen not clear new pneumonia              Medications:       fiber modular  1 packet Per Feeding Tube BID     cefTRIAXone  1 g Intravenous Q24H     metoprolol  25 mg Oral or Feeding Tube BID     ranitidine  150 mg Per Feeding Tube Daily     insulin glargine  30 Units Subcutaneous BID     acetaminophen  650 mg Per Feeding Tube TID     insulin aspart  1-6 Units Subcutaneous Q4H     multivitamins with minerals  15 mL Per Feeding Tube Daily                  Physical Exam:   Blood pressure 141/54, pulse 86, temperature 98.3  F (36.8  C), temperature source Axillary, resp. rate 16, height 1.524 m (5'), weight 69.5 kg (153 lb 3.5 oz), SpO2 96 %.  Wt Readings from Last 2 Encounters:   08/07/17 69.5 kg (153 lb 3.5 oz)   07/06/17 71.4 kg (157 lb 6.4 oz)     Vital Signs with Ranges  Temp:  [97.7  F (36.5  C)-98.6  F (37  C)] 98.3  F (36.8  C)  Pulse:  [84-87] 86  Heart Rate:  [84-98] 98  Resp:  [15-20] 16  BP: (123-141)/(54-67) 141/54  SpO2:  [96 %-98 %] 96 %    Constitutional: Awake, alert, not interactive   Lungs: Clear to auscultation bilaterally, no crackles or wheezing   Cardiovascular: Regular rate and rhythm, normal S1 and S2, and no murmur noted   Abdomen: Normal bowel sounds, soft, non-distended, non-tender   Skin: No rashes, no cyanosis, no edema wd clean   Other:           Data:   All microbiology laboratory data reviewed.  Recent Labs   Lab Test  08/08/17   0655  08/07/17   0703  08/05/17   0618   WBC  10.7  8.9  10.2   HGB  11.6*  11.0*  10.5*   HCT  36.1  33.9*  32.8*   MCV  96  94  97   PLT  208  182  177     Recent Labs   Lab Test  08/08/17   0655  08/07/17   0703  08/06/17   0712   CR  0.85  0.90  0.83     Recent Labs   Lab Test  05/08/13   1300   SED  61*     Recent Labs   Lab Test  08/05/17   1148  08/05/17   1140  08/04/17   0605  08/04/17   0555  08/04/17   0508  06/30/17   1654  06/30/17   1640  06/14/17   1319  06/14/17   1230   CULT  No growth after 3 days  No growth after 3 days  Cultured on the  1st day of incubation: Staphylococcus epidermidis  Critical Value/Significant Value, preliminary result only, called to and read   back by JOANN VALDERRAMA RN 8.5.17 0835. TENA  Cultured on the 1st day of incubation: Staphylococcus hominis  (Note)  POSITIVE for STAPHYLOCOCCUS EPIDERMIDIS and POSITIVE for the mecA  gene (resistant to methicillin) by iNest Realtyigene multiplex nucleic acid  test. Final identification and antimicrobial susceptibility testing  will be verified by standard methods.    Specimen tested with Verigene multiplex, gram-positive blood culture  nucleic acid test for the following targets: Staph aureus, Staph  epidermidis, Staph lugdunensis, other Staph species, Enterococcus  faecalis, Enterococcus faecium, Streptococcus species, S. agalactiae,  S. anginosus grp., S. pneumoniae, S. pyogenes, Listeria sp., mecA  (methicillin resistance) and Rene/B (vancomycin resistance).    Critical Value/Significant Value called to and read back by Cheyenne Gilliland RN at [place] [id number] at 11:15am 8/5/2017 ()  *  No growth after 4 days  >100,000 colonies/mL Escherichia coli*  No growth  <1000 colonies/mL urogenital maria esther Susceptibility testing not routinely done  No growth  No growth  Moderate growth Escherichia coli  Moderate growth Enterococcus avium  Moderate growth Enterococcus faecalis  Light growth Candida albicans / dubliniensis Candida albicans and Candida   dubliniensis are not routinely speciated Susceptibility testing not routinely   done  Plus Heavy growth Normal skin maria esther  *  >100,000 colonies/mL Methicillin resistant Staphylococcus aureus (MRSA)  50,000 to 100,000 colonies/mL Strain 2 Methicillin resistant Staphylococcus   aureus (MRSA)  Critical Value/Significant Value called to and read back by Elmer Hurd Rn at   [phone]  *

## 2017-08-09 PROBLEM — N18.30 ACUTE RENAL FAILURE SUPERIMPOSED ON STAGE 3 CHRONIC KIDNEY DISEASE (H): Status: ACTIVE | Noted: 2017-08-09

## 2017-08-09 PROBLEM — K94.20 COMPLICATION OF GASTROSTOMY TUBE (H): Status: ACTIVE | Noted: 2017-08-09

## 2017-08-09 PROBLEM — E87.0 HYPERNATREMIA: Status: ACTIVE | Noted: 2017-08-09

## 2017-08-09 PROBLEM — N17.9 ACUTE RENAL FAILURE SUPERIMPOSED ON STAGE 3 CHRONIC KIDNEY DISEASE (H): Status: ACTIVE | Noted: 2017-08-09

## 2017-08-09 PROBLEM — N39.0 SEPSIS DUE TO URINARY TRACT INFECTION (H): Status: ACTIVE | Noted: 2017-06-14

## 2017-08-09 PROBLEM — L89.153: Status: ACTIVE | Noted: 2017-08-09

## 2017-08-09 LAB
ANION GAP SERPL CALCULATED.3IONS-SCNC: 13 MMOL/L (ref 3–14)
BUN SERPL-MCNC: 35 MG/DL (ref 7–30)
CALCIUM SERPL-MCNC: 8.9 MG/DL (ref 8.5–10.1)
CHLORIDE SERPL-SCNC: 107 MMOL/L (ref 94–109)
CO2 SERPL-SCNC: 20 MMOL/L (ref 20–32)
CREAT SERPL-MCNC: 0.87 MG/DL (ref 0.52–1.04)
GFR SERPL CREATININE-BSD FRML MDRD: 61 ML/MIN/1.7M2
GLUCOSE BLDC GLUCOMTR-MCNC: 217 MG/DL (ref 70–99)
GLUCOSE BLDC GLUCOMTR-MCNC: 232 MG/DL (ref 70–99)
GLUCOSE BLDC GLUCOMTR-MCNC: 241 MG/DL (ref 70–99)
GLUCOSE BLDC GLUCOMTR-MCNC: 254 MG/DL (ref 70–99)
GLUCOSE BLDC GLUCOMTR-MCNC: 255 MG/DL (ref 70–99)
GLUCOSE BLDC GLUCOMTR-MCNC: 267 MG/DL (ref 70–99)
GLUCOSE BLDC GLUCOMTR-MCNC: 274 MG/DL (ref 70–99)
GLUCOSE SERPL-MCNC: 249 MG/DL (ref 70–99)
POTASSIUM SERPL-SCNC: 4.3 MMOL/L (ref 3.4–5.3)
SODIUM SERPL-SCNC: 140 MMOL/L (ref 133–144)

## 2017-08-09 PROCEDURE — 36415 COLL VENOUS BLD VENIPUNCTURE: CPT | Performed by: INTERNAL MEDICINE

## 2017-08-09 PROCEDURE — 25000125 ZZHC RX 250: Performed by: INTERNAL MEDICINE

## 2017-08-09 PROCEDURE — 25000132 ZZH RX MED GY IP 250 OP 250 PS 637: Performed by: INTERNAL MEDICINE

## 2017-08-09 PROCEDURE — 00000146 ZZHCL STATISTIC GLUCOSE BY METER IP

## 2017-08-09 PROCEDURE — 25000131 ZZH RX MED GY IP 250 OP 636 PS 637: Performed by: INTERNAL MEDICINE

## 2017-08-09 PROCEDURE — 99238 HOSP IP/OBS DSCHRG MGMT 30/<: CPT | Performed by: INTERNAL MEDICINE

## 2017-08-09 PROCEDURE — 12000007 ZZH R&B INTERMEDIATE

## 2017-08-09 PROCEDURE — 80048 BASIC METABOLIC PNL TOTAL CA: CPT | Performed by: INTERNAL MEDICINE

## 2017-08-09 RX ORDER — OXYCODONE HCL 5 MG/5 ML
2 SOLUTION, ORAL ORAL EVERY 4 HOURS PRN
Qty: 473 ML | Refills: 0 | Status: ON HOLD | DISCHARGE
Start: 2017-08-09 | End: 2017-08-23

## 2017-08-09 RX ORDER — CEFUROXIME AXETIL 500 MG/1
500 TABLET ORAL EVERY 12 HOURS
Qty: 15 TABLET | Refills: 0 | DISCHARGE
Start: 2017-08-09 | End: 2017-08-20

## 2017-08-09 RX ADMIN — ACETAMINOPHEN 650 MG: 325 SOLUTION ORAL at 09:57

## 2017-08-09 RX ADMIN — Medication 15 ML: at 09:57

## 2017-08-09 RX ADMIN — Medication 1 PACKET: at 10:01

## 2017-08-09 RX ADMIN — INSULIN ASPART 3 UNITS: 100 INJECTION, SOLUTION INTRAVENOUS; SUBCUTANEOUS at 07:36

## 2017-08-09 RX ADMIN — INSULIN ASPART 3 UNITS: 100 INJECTION, SOLUTION INTRAVENOUS; SUBCUTANEOUS at 19:07

## 2017-08-09 RX ADMIN — ACETAMINOPHEN 650 MG: 325 SOLUTION ORAL at 21:18

## 2017-08-09 RX ADMIN — INSULIN ASPART 2 UNITS: 100 INJECTION, SOLUTION INTRAVENOUS; SUBCUTANEOUS at 11:48

## 2017-08-09 RX ADMIN — RANITIDINE HYDROCHLORIDE 150 MG: 150 SOLUTION ORAL at 09:57

## 2017-08-09 RX ADMIN — INSULIN ASPART 3 UNITS: 100 INJECTION, SOLUTION INTRAVENOUS; SUBCUTANEOUS at 04:30

## 2017-08-09 RX ADMIN — INSULIN GLARGINE 30 UNITS: 100 INJECTION, SOLUTION SUBCUTANEOUS at 09:56

## 2017-08-09 RX ADMIN — ACETAMINOPHEN 650 MG: 325 SOLUTION ORAL at 16:10

## 2017-08-09 RX ADMIN — Medication 1 PACKET: at 21:21

## 2017-08-09 RX ADMIN — CEFUROXIME AXETIL 500 MG: 500 TABLET ORAL at 21:18

## 2017-08-09 RX ADMIN — METOPROLOL TARTRATE 25 MG: 100 TABLET ORAL at 21:19

## 2017-08-09 RX ADMIN — CEFUROXIME AXETIL 500 MG: 500 TABLET ORAL at 09:56

## 2017-08-09 RX ADMIN — INSULIN GLARGINE 30 UNITS: 100 INJECTION, SOLUTION SUBCUTANEOUS at 21:19

## 2017-08-09 RX ADMIN — INSULIN ASPART 3 UNITS: 100 INJECTION, SOLUTION INTRAVENOUS; SUBCUTANEOUS at 15:08

## 2017-08-09 RX ADMIN — METOPROLOL TARTRATE 25 MG: 100 TABLET ORAL at 09:56

## 2017-08-09 NOTE — PROGRESS NOTES
MARLON  D: SW following for discharge planning needs.  MARLON was informed that patient will likely be ready for discharge tomorrow after her G-Tube site is fixed.  I: Due to patients Dementia, being non verbal and not being able to tolerate sitting up in a w/c, patient will require stretcher transportation upon discharge.  MARLON spoke to Ana at [facility] and scheduled transportation for tomorrow at 1100.  MARLON faxed the facesheet and PCS to [facility].  MARLON spoke to Monie at [facility] and updated her on the discharge plan and the transport time for tomorrow.  A: Patient is alert and oriented X1.  P: SW will continue to follow and assist with finalizing the discharge plan as appropriate.    Neda Lira, SEAN

## 2017-08-09 NOTE — DISCHARGE SUMMARY
[facility]    Discharge Summary  Hospitalist    Date of Admission:  8/4/2017  Date of Discharge:  8/10/2017  Discharging Provider: Tamar Mauricio MD    Discharge Diagnoses   Sepsis due to urinary tract infection    History of Present Illness   Celia Lewis is a 89 year old woman with PMH significant for advanced dementia who is nonverbal at baseline and bed bound with other medical problems including recurrent urinary tract infections, hypertension and type 2 diabetes as well as stage III CKD and a chronic stage III sacral decubitus ulcer. She was brought to the emergency room for evaluation of low-grade fever and hyperglycemia.  She was found to have elevated creatinine, mild leukocytosis and elevated lactate.  She had several episodes of loose stools well in the emergency room that were initially concerning for C. diff.  She was admitted 8/4 for further evaluation and treatment.  Note that previous admission involved ethics committee and it was determined that full treatment to be continued in line with patient's previously known wishes. Patient does have an involved son, Dr. Salvatore Lewis.       Hospital Course   Celia Lewis was admitted on 8/4/2017.  The following problems were addressed during her hospitalization:    Active Problems:    Advanced dementia    Diabetes mellitus type 2, insulin dependent (H)    Sepsis due to urinary tract infection (H)    Recurrent UTI    Acute renal failure superimposed on stage 3 chronic kidney disease (H)    Sacral decubitus ulcer, stage III (H)    Hypernatremia    Complication of gastrostomy tube (H)      1.  Sepsis, secondary to recurrent urinary tract infection.  C diff negative.   Urine culture > 100,000 E coli sensitive to rocephin. Changed to Ceftin with ID recommending 10 days of BID treatment.   Planning for discharge late morning on 8/10 and have ordered an additional 15 doses to complete treatment.  1/4 blood cultures for Staph epi likely  contaminant and vancomycin has been stopped.   Appreciate ID consultation.      2.  Acute kidney injury on stage III CKD.    Creatinine improved after IV fluids. Free water flushes for fluid needs.      3. Poorly functioning gastric tube.  Old g-tube with poor/broken connector. Spoke with emergency contact, patient's son, Dr. Salvatore Lewis. Gave phone consent for G-tube to be exchanged by IR. Unfortunately this will not be able to be done until morning 8/10. Planning for discharge after this procedure, but patient will likely need to be seen with final discharge order placed.    4.  Type 2 diabetes.  Most recent A1c was 8.4 in 6/2017.  She is managed with Lantus and sliding scale NovoLog.    Will continue her Lantus and medium dose sliding scale NovoLog every 4 hours.       5.  Advanced dementia.  Baseline nonverbal and noncommunicative.  She is bed bound and has noted contractures.  She has a feeding tube and a chronic Fonseca catheter.        6.  Stage III sacral decubitus ulcer, chronic.  No signs of acute infection. Wound care nurse has seen. Wound care recommendations will need to be continued on discharge.       7.  Hypertension. Continue metoprolol.      8.  Hypernatremia. Somewhat chronic and felt secondary to tube feeds. Improved with IV fluids.    Tamar Mauricio MD    Significant Results and Procedures   Pending procedure: 8/10 Gastric tube exchange by IR    Pending Results   These results will be followed up by facilit  Unresulted Labs Ordered in the Past 30 Days of this Admission     Date and Time Order Name Status Description    8/5/2017 1123 Blood culture Preliminary     8/5/2017 1123 Blood culture Preliminary     8/4/2017 0529 Blood culture Preliminary           Code Status   Full Code       Primary Care Physician   No primary care provider on file.    Physical Exam   Temp: 97.7  F (36.5  C) Temp src: Axillary BP: 112/61 Pulse: 91 Heart Rate: 97 Resp: 20 SpO2: 93 % O2 Device: None (Room air)     Vitals:    08/05/17 0556 08/07/17 0444 08/09/17 0314   Weight: 67.6 kg (149 lb 0.5 oz) 69.5 kg (153 lb 3.5 oz) 69 kg (152 lb 1.9 oz)     Vital Signs with Ranges  Temp:  [97.7  F (36.5  C)-99.8  F (37.7  C)] 97.7  F (36.5  C)  Pulse:  [91] 91  Heart Rate:  [84-97] 97  Resp:  [16-20] 20  BP: (112-151)/(61-85) 112/61  SpO2:  [93 %-98 %] 93 %  I/O last 3 completed shifts:  In: 1881 [NG/GT:1881]  Out: 1125 [Urine:1125]    Constitutional: Patient alert, nonverbal. Does look around room. No acute distress. Nontoxic.  Respiratory: Clear to auscultation bilaterally without crackles or wheezes.  Cardiovascular: Regular rate and rhythm. No murmurs.  GI: Abdomen is soft, nondistended. Normoactive bowel sounds. G-tube with old tubing, broken plug, and poorly fitting with tube feeds requiring it to be taped.  Genitourinary / Sacrum: Indwelling caro. Stage 3 chronic sacral wound with dressing in place.  Neurologic: Nonverbal. Alert. Does not move extremities freely. At baseline. Advanced dementia.    Discharge Disposition   Discharged to long-term care facility  Condition at discharge: Stable    Consultations This Hospital Stay   PHARMACY TO DOSE VANCO  INFECTIOUS DISEASES IP CONSULT  NUTRITION SERVICES ADULT IP CONSULT  WOUND OSTOMY CONTINENCE NURSE  IP CONSULT  PHARMACY IP CONSULT  SOCIAL WORK IP CONSULT    Discharge Orders   No discharge procedures on file.  Discharge Medications   Current Discharge Medication List      CONTINUE these medications which have NOT CHANGED    Details   oxyCODONE (ROXICODONE) 5 MG/5ML solution Take 2 mg by mouth 3 times daily      simethicone (MYLICON) 40 MG/0.6ML suspension Take 80 mg by mouth 3 times daily      insulin glargine (LANTUS) 100 UNIT/ML injection Inject 30 Units Subcutaneous 2 times daily    Associated Diagnoses: Type 2 diabetes mellitus with diabetic nephropathy, unspecified long term insulin use status (H)      ranitidine (ZANTAC) 150 MG/10ML syrup 10 mLs (150 mg) by Per Feeding  Tube route daily  Qty: 600 mL    Associated Diagnoses: Gastroesophageal reflux disease without esophagitis      vitamin A-D & C drops (TRI-VI-SOL) 750-400-35 UNIT-MG/ML solution NEW FORMULATION 7 mLs by Per G Tube route daily  Qty: 50 mL, Refills: 0    Associated Diagnoses: Decubitus ulcer of buttock, unspecified laterality, unspecified ulcer stage      metoprolol (LOPRESSOR) 10 mg/mL SUSP Take 2.5 mLs (25 mg) by mouth 2 times daily  Qty: 100 mL, Refills: 1    Associated Diagnoses: Benign essential hypertension      INSULIN ASPART SC Inject 1-6 Units Subcutaneous every 6 hours 0800, 1400, 2000, 0200  -250 1 unit  -300 2 units  -350 3 units  -400 4 units  -450 5 units  BG >450 6 units and update MD      Dakins 0.125 % SOLN Externally apply topically 2 times daily BID and PRN.   1. Cleanse wound with microklenze, cleanse periwound area with naomi perineal  2. Moisten kerlix fluff with dakins solution 0.125%, wring out excess, pack wound with kerlix  3. Apply antifungal powder to periwound area, rub in. Apply criticaid over powder.  4. Cover with ABD using minimal medipore tape to secure.  5. Label dressing with date, time, initials. Follow Rigorous PIP measures.      Lactobacillus Acidophilus POWD 1 capsule by Gastric Tube route daily 10 billion units       multivitamin, therapeutic (THERA-VIT) TABS tablet 1 tablet by Gastric Tube route daily      Loperamide HCl (IMODIUM A-D) 1 MG/7.5ML LIQD 4 mg by Gastric Tube route every other day      !! acetaminophen (TYLENOL) 32 mg/mL solution 325 mg by Gastric Tube route daily as needed for fever or mild pain      !! ACETAMINOPHEN  mg by Gastric Tube route 3 times daily       bisacodyl (DULCOLAX) 10 MG suppository Place 10 mg rectally daily as needed for constipation       !! - Potential duplicate medications found. Please discuss with provider.        Allergies   Allergies   Allergen Reactions     Sulfa Drugs Other (See Comments)     Per  nursing home documents, patient has allergy to sulfa     Data   Most Recent 3 CBC's:  Recent Labs   Lab Test  08/08/17   0655  08/07/17   0703  08/05/17   0618   WBC  10.7  8.9  10.2   HGB  11.6*  11.0*  10.5*   MCV  96  94  97   PLT  208  182  177      Most Recent 3 BMP's:  Recent Labs   Lab Test  08/09/17   0649  08/08/17   0655  08/07/17   0703   NA  140  141  140   POTASSIUM  4.3  4.4  4.3   CHLORIDE  107  109  109   CO2  20  19*  19*   BUN  35*  34*  32*   CR  0.87  0.85  0.90   ANIONGAP  13  13  12   NELLIE  8.9  8.9  8.7   GLC  249*  252*  311*     Most Recent 2 LFT's:  Recent Labs   Lab Test  08/04/17   0450  06/30/17   1620   AST  17  24   ALT  24  26   ALKPHOS  94  81   BILITOTAL  0.5  0.7     Most Recent INR's and Anticoagulation Dosing History:  Anticoagulation Dose History     Recent Dosing and Labs Latest Ref Rng & Units 11/1/2010 11/2/2010 11/3/2010 8/5/2012 6/23/2014 6/21/2016 6/14/2017    INR 0.86 - 1.14 1.02 1.02 0.97 1.09 1.01 1.37(H) 1.10        Most Recent 3 Troponin's:  Recent Labs   Lab Test  06/30/17   1620  08/08/12   1751  08/08/12   1323  08/05/12   1837   01/28/10   1505   TROPI  0.035  0.013  0.022   --    < >   --    TROPONIN   --    --    --   0.00   --   0.00    < > = values in this interval not displayed.     Most Recent Cholesterol Panel:  Recent Labs   Lab Test 05/26/15   CHOL  159   LDL  77   HDL  40   TRIG  211*     Most Recent 6 Bacteria Isolates From Any Culture (See EPIC Reports for Culture Details):  Recent Labs   Lab Test  08/05/17   1148  08/05/17   1140  08/04/17   0605  08/04/17   0555  08/04/17   0508  06/30/17   1654   CULT  No growth after 4 days  No growth after 4 days  Cultured on the 1st day of incubation: Staphylococcus epidermidis  Critical Value/Significant Value, preliminary result only, called to and read   back by JOANN VALDERRAMA RN 8.5.17 0835. TENA  Cultured on the 1st day of incubation: Staphylococcus hominis  (Note)  POSITIVE for STAPHYLOCOCCUS EPIDERMIDIS and  POSITIVE for the mecA  gene (resistant to methicillin) by Verigene multiplex nucleic acid  test. Final identification and antimicrobial susceptibility testing  will be verified by standard methods.    Specimen tested with Verigene multiplex, gram-positive blood culture  nucleic acid test for the following targets: Staph aureus, Staph  epidermidis, Staph lugdunensis, other Staph species, Enterococcus  faecalis, Enterococcus faecium, Streptococcus species, S. agalactiae,  S. anginosus grp., S. pneumoniae, S. pyogenes, Listeria sp., mecA  (methicillin resistance) and Reen/B (vancomycin resistance).    Critical Value/Significant Value called to and read back by Cheyenne Gilliland RN at [facility] at 11:15am 8/5/2017 ()  *  No growth after 5 days  >100,000 colonies/mL Escherichia coli*  No growth  <1000 colonies/mL urogenital maria esther Susceptibility testing not routinely done     Most Recent TSH, T4 and A1c Labs:  Recent Labs   Lab Test  06/15/17   0833   11/03/10   0710   TSH   --    --   2.69   A1C  8.4*   < >   --     < > = values in this interval not displayed.     Results for orders placed or performed during the hospital encounter of 08/04/17   XR Chest Port 1 View    Narrative    CHEST PORTABLE ONE VIEW   8/6/2017 10:30 AM     HISTORY: Tachypnea.    COMPARISON: 7/6/2017.      Impression    IMPRESSION: Opacity at the left mid to low lung is stable and could  represent continued airspace disease and/or atelectasis. Hypoinflated  lungs. Stable enlarged cardiac silhouette.     PARMINDER HOWARD MD

## 2017-08-09 NOTE — PLAN OF CARE
Problem: Goal Outcome Summary  Goal: Goal Outcome Summary  Outcome: No Change  VSS. Non verbal. Repo Q2. Tube feeding @ 45 ml/hr. Dressings CDI. G tube replacement @0730 tomorrow. Discharging in am, transport coming @ 1100.

## 2017-08-09 NOTE — PROGRESS NOTES
LMOAM with dosing instructions. Advised to call the clinic with any questions or concerns as well as any changes to medication, diet or dosing. Clinic number provided.    Anticoagulation Summary  As of 7/12/2017    INR goal: 2.0-3.0   TTR: 72.4 % (1.9 y)   Today's INR: 2.3   Maintenance plan: 2.5 mg (2.5 mg x 1) on W, Th; 1.25 mg (2.5 mg x 0.5) all other days   Weekly total: 11.25 mg   Plan last modified: Rosa Esteban RN (6/14/2017)   Next INR check: 8/9/2017   Target end date: Indefinite    Indications   Chronic atrial fibrillation (CMS/HCC) [I48.2]  Atrial fibrillation (CMS/HCC) [I48.91]         Anticoagulation Episode Summary     INR check location: [facility]    Preferred lab:     Send INR reminders to: DURGA (OPEN ENROLLMENT) ACS CARD/EP    Comments: New STAC due 8/14/17; ACL Jay; 2.5mg tablets      Anticoagulation Care Providers     Provider Role Specialty Phone number    Rian Lucas MD Referring Electrophysiology [phone]    Rolo Russ MD  Internal Medicine - Clinical Cardiac Electrophysiology [phone]         [facility]  Infectious Disease Progress Note          Assessment and Plan:   IMPRESSION:   1.  Celia Lewis is a 89-year-old female well known to me from multiple prior admissions, now readmitted with hyperglycemia, possible low-grade fever.  Workup so far shows abnormal urinalysis which has generally been true with her having catheter in place but may be a true urinary tract infection.  Not much in the way of respiratory symptoms but recurrent aspiration with minimal symptoms except fever previously.  Also has an ongoing decubitus wound but does not look infected.   2.  Chronic sacral decubitus wound.  Prior cultures grew multiple organisms as would always be the case in this setting but nothing to suggest deeper major infection or cellulitis at present.   3.  Prior MRSA positive urine culture, current urine abnormal, await culture.   4.  History of probable recurrent aspiration, although not particularly clearly documented, previously treated it as aspiration.   5.  Severe dementia.   6.  Feeding tube in place, aspiration as a prior potential issue.   7.  Diabetes mellitus.   8.  Chronic mild renal insufficiency, some acute worsening at present.   9.  Sulfa allergy.   10 Diarrhea, c diff neg  11 BC CN staph likely contaminant      RECOMMENDATIONS:   1.  No treatment of the sacral decubitus ulcer, at present looks quite clean, does not need an I&D and is not a likely source of infection unless she has bacteremia.   2.   urine culture sens E coli , on ceftriaxone while here and improved/stable, continue while here DC vanco CN staph likely contaminant  3.  Obviously long-term prognosis is poor, has had previous extensive conversation and ethics consultation and currently on restorative care based on underlying patient desires and [employer] nella  4 OK enteral ceftin 500 bid for 10 days if disposition            Interval History:   no interaction, no fever cxs as above  WBc nl no O2 CXR  seen not clear new pneumonia              Medications:       cefuroxime  500 mg Per G Tube Q12H VIRA     fiber modular  1 packet Per Feeding Tube BID     metoprolol  25 mg Oral or Feeding Tube BID     ranitidine  150 mg Per Feeding Tube Daily     insulin glargine  30 Units Subcutaneous BID     acetaminophen  650 mg Per Feeding Tube TID     insulin aspart  1-6 Units Subcutaneous Q4H     multivitamins with minerals  15 mL Per Feeding Tube Daily                  Physical Exam:   Blood pressure 146/85, pulse 91, temperature 99.1  F (37.3  C), temperature source Axillary, resp. rate 18, height 1.524 m (5'), weight 69 kg (152 lb 1.9 oz), SpO2 97 %.  Wt Readings from Last 2 Encounters:   08/09/17 69 kg (152 lb 1.9 oz)   07/06/17 71.4 kg (157 lb 6.4 oz)     Vital Signs with Ranges  Temp:  [98.1  F (36.7  C)-99.1  F (37.3  C)] 99.1  F (37.3  C)  Pulse:  [91] 91  Heart Rate:  [84-91] 84  Resp:  [16-18] 18  BP: (125-146)/(62-85) 146/85  SpO2:  [96 %-98 %] 97 %    Constitutional: Awake, alert, not interactive   Lungs: Clear to auscultation bilaterally, no crackles or wheezing   Cardiovascular: Regular rate and rhythm, normal S1 and S2, and no murmur noted   Abdomen: Normal bowel sounds, soft, non-distended, non-tender   Skin: No rashes, no cyanosis, no edema wd clean   Other:           Data:   All microbiology laboratory data reviewed.  Recent Labs   Lab Test  08/08/17   0655  08/07/17   0703  08/05/17   0618   WBC  10.7  8.9  10.2   HGB  11.6*  11.0*  10.5*   HCT  36.1  33.9*  32.8*   MCV  96  94  97   PLT  208  182  177     Recent Labs   Lab Test  08/09/17   0649  08/08/17   0655  08/07/17   0703   CR  0.87  0.85  0.90     Recent Labs   Lab Test  05/08/13   1300   SED  61*     Recent Labs   Lab Test  08/05/17   1148  08/05/17   1140  08/04/17   0605  08/04/17   0555  08/04/17   0508  06/30/17   1654  06/30/17   1640  06/14/17   1319  06/14/17   1230   CULT  No growth after 4 days  No growth after 4 days  Cultured on the 1st  day of incubation: Staphylococcus epidermidis  Critical Value/Significant Value, preliminary result only, called to and read   back by JOANN VALDERRAMA RN 8.5.17 0835. TENA  Cultured on the 1st day of incubation: Staphylococcus hominis  (Note)  POSITIVE for STAPHYLOCOCCUS EPIDERMIDIS and POSITIVE for the mecA  gene (resistant to methicillin) by [employer] multiplex nucleic acid  test. Final identification and antimicrobial susceptibility testing  will be verified by standard methods.    Specimen tested with Verigene multiplex, gram-positive blood culture  nucleic acid test for the following targets: Staph aureus, Staph  epidermidis, Staph lugdunensis, other Staph species, Enterococcus  faecalis, Enterococcus faecium, Streptococcus species, S. agalactiae,  S. anginosus grp., S. pneumoniae, S. pyogenes, Listeria sp., mecA  (methicillin resistance) and Rene/B (vancomycin resistance).    Critical Value/Significant Value called to and read back by Cheyenne Gilliland RN at [place] [id number] at 11:15am 8/5/2017 ()  *  No growth after 5 days  >100,000 colonies/mL Escherichia coli*  No growth  <1000 colonies/mL urogenital maria esther Susceptibility testing not routinely done  No growth  No growth  Moderate growth Escherichia coli  Moderate growth Enterococcus avium  Moderate growth Enterococcus faecalis  Light growth Candida albicans / dubliniensis Candida albicans and Candida   dubliniensis are not routinely speciated Susceptibility testing not routinely   done  Plus Heavy growth Normal skin maria esther  *  >100,000 colonies/mL Methicillin resistant Staphylococcus aureus (MRSA)  50,000 to 100,000 colonies/mL Strain 2 Methicillin resistant Staphylococcus   aureus (MRSA)  Critical Value/Significant Value called to and read back by Elmer Hurd Rn at   [id number]  *

## 2017-08-09 NOTE — PLAN OF CARE
Problem: Goal Outcome Summary  Goal: Goal Outcome Summary  Outcome: No Change  Pt non verbal and non responsive. No indicators of pain present. Repositioned Q2. drg to coccyx CDI, changed on day shift. Drg changed to G tube site. Feedings cont @ 45/hr. Connection on G tubing not working properly, tubing from feedings falls out from G tube even with tape being applied around connection site. 0 residual, abd soft and nondistended, flushes flow well to gravity. hospitalist aware, will contact IR to see if G tubing can be changed. VSS on RA.

## 2017-08-09 NOTE — PLAN OF CARE
Problem: Goal Outcome Summary  Goal: Goal Outcome Summary  Outcome: No Change  Pt non verbal, repositioned per order, tube feeding continuous at  45 per hour, caro patent, blood  Glucose 274, 3 unit of insulin administered per sidling scale.

## 2017-08-09 NOTE — PLAN OF CARE
Problem: Goal Outcome Summary  Goal: Goal Outcome Summary  Pt non verbal. No evidence of pain. Turn q 2h and frequent oral cares. Incontinent of bowel. Fonseca in place with adequate output. Tube feeds at goal. Coccyx dressing CDI. Glucose 210, insulin given per orders. Gtube connection keeps coming undone as if there is pressure in tube. Connection taped, abd soft and nondistended. Hospitalist aware, IR to take a look at it tomorrow? Flushes with no residual. Will continue to monitor.

## 2017-08-10 ENCOUNTER — APPOINTMENT (OUTPATIENT)
Dept: INTERVENTIONAL RADIOLOGY/VASCULAR | Facility: CLINIC | Age: 82
DRG: 871 | End: 2017-08-10
Attending: INTERNAL MEDICINE
Payer: COMMERCIAL

## 2017-08-10 VITALS
RESPIRATION RATE: 16 BRPM | HEIGHT: 60 IN | HEART RATE: 104 BPM | TEMPERATURE: 97.4 F | DIASTOLIC BLOOD PRESSURE: 94 MMHG | BODY MASS INDEX: 29.86 KG/M2 | WEIGHT: 152.12 LBS | OXYGEN SATURATION: 96 % | SYSTOLIC BLOOD PRESSURE: 164 MMHG

## 2017-08-10 LAB
BACTERIA SPEC CULT: NO GROWTH
GLUCOSE BLDC GLUCOMTR-MCNC: 212 MG/DL (ref 70–99)
GLUCOSE BLDC GLUCOMTR-MCNC: 259 MG/DL (ref 70–99)
MICRO REPORT STATUS: NORMAL
SPECIMEN SOURCE: NORMAL

## 2017-08-10 PROCEDURE — 27210905 ZZH KIT CR7

## 2017-08-10 PROCEDURE — 00000146 ZZHCL STATISTIC GLUCOSE BY METER IP

## 2017-08-10 PROCEDURE — 27210915 ZZ H TUBE GASTRO CR4

## 2017-08-10 PROCEDURE — 49450 REPLACE G/C TUBE PERC: CPT

## 2017-08-10 PROCEDURE — 99207 ZZC NO CHARGE VISIT/PATIENT NOT SEEN: CPT | Performed by: INTERNAL MEDICINE

## 2017-08-10 PROCEDURE — 25000132 ZZH RX MED GY IP 250 OP 250 PS 637: Performed by: INTERNAL MEDICINE

## 2017-08-10 PROCEDURE — 25000131 ZZH RX MED GY IP 250 OP 636 PS 637: Performed by: INTERNAL MEDICINE

## 2017-08-10 PROCEDURE — 0D20XUZ CHANGE FEEDING DEVICE IN UPPER INTESTINAL TRACT, EXTERNAL APPROACH: ICD-10-PCS | Performed by: RADIOLOGY

## 2017-08-10 RX ORDER — LIDOCAINE HYDROCHLORIDE 10 MG/ML
1-30 INJECTION, SOLUTION INFILTRATION; PERINEURAL
Status: DISCONTINUED | OUTPATIENT
Start: 2017-08-10 | End: 2017-08-10 | Stop reason: HOSPADM

## 2017-08-10 RX ADMIN — INSULIN GLARGINE 30 UNITS: 100 INJECTION, SOLUTION SUBCUTANEOUS at 09:58

## 2017-08-10 RX ADMIN — INSULIN ASPART 2 UNITS: 100 INJECTION, SOLUTION INTRAVENOUS; SUBCUTANEOUS at 00:26

## 2017-08-10 RX ADMIN — ACETAMINOPHEN 650 MG: 325 SOLUTION ORAL at 09:58

## 2017-08-10 RX ADMIN — INSULIN ASPART 3 UNITS: 100 INJECTION, SOLUTION INTRAVENOUS; SUBCUTANEOUS at 07:06

## 2017-08-10 RX ADMIN — CEFUROXIME AXETIL 500 MG: 500 TABLET ORAL at 09:58

## 2017-08-10 RX ADMIN — INSULIN ASPART 2 UNITS: 100 INJECTION, SOLUTION INTRAVENOUS; SUBCUTANEOUS at 03:47

## 2017-08-10 RX ADMIN — Medication 15 ML: at 09:58

## 2017-08-10 RX ADMIN — RANITIDINE HYDROCHLORIDE 150 MG: 150 SOLUTION ORAL at 09:58

## 2017-08-10 NOTE — PROGRESS NOTES
Phone consent was obtained from son Salvatore Lewis after explaining the procedure, risks and benefits and consent is in IR on the chart.     Pari Leitschuh CNP ([phone])

## 2017-08-10 NOTE — PROGRESS NOTES
MARLON  D: Per MD order, patient okay to discharge today back to her LTC facility.  I: MARLON faxed the discharge orders and confirmed that transportation is arranged for today at 1100.  MARLON spoke to Admissions at [facility] to update them that patient is ready for discharge today and the time of transportation.  MARLON updated patients son,  Salvatore Sotoxler and he is aware of the discharge plan for today.  A: Patient is alert and oriented X1.  Patients son is aware and in agreement with the plan for patient to discharge back to her LTC facility today.  P: Patient to discharge back to [facility].  MARLON will be available to assist as needed.    Neda Lira, SEAN

## 2017-08-10 NOTE — PLAN OF CARE
Problem: Goal Outcome Summary  Goal: Goal Outcome Summary  Outcome: Adequate for Discharge Date Met:  08/10/17  Admitted with UTI. G tube replaced today in IR. Nonverbal. ZOEY orientation, CMS, and mood. Pain minimal when using FLACC scale. Bedrest, assist of 2 to turn, mechanical lift. G tube residual 10, flushed appropriately after medications given, dressing CDI. Contact ISO for MRSA in urine. Tachy on RA. Dressings changed on wounds, now CDI. Discharged via medical transport with belongings to facility. Discharged without incident.

## 2017-08-10 NOTE — PLAN OF CARE
Problem: Goal Outcome Summary  Goal: Goal Outcome Summary  Outcome: No Change  Pt was repositioned per scheduled, non verbal, g-tube continuously running at 45ml/h, glucose monitor every 4 hour, Coccyx dressing clean, dry and intact,  Fonseca in place with adequate output. Discharging today after fixing g tube, ride arranged at 11 am..

## 2017-08-10 NOTE — PROGRESS NOTES
[facility]  Infectious Disease Progress Note          Assessment and Plan:   IMPRESSION:   1.  Celia Lewis is a 89-year-old female well known to me from multiple prior admissions, now readmitted with hyperglycemia, possible low-grade fever.  Workup so far shows abnormal urinalysis which has generally been true with her having catheter in place but may be a true urinary tract infection.  Not much in the way of respiratory symptoms but recurrent aspiration with minimal symptoms except fever previously.  Also has an ongoing decubitus wound but does not look infected.   2.  Chronic sacral decubitus wound.  Prior cultures grew multiple organisms as would always be the case in this setting but nothing to suggest deeper major infection or cellulitis at present.   3.  Prior MRSA positive urine culture, current urine abnormal, await culture.   4.  History of probable recurrent aspiration, although not particularly clearly documented, previously treated it as aspiration.   5.  Severe dementia.   6.  Feeding tube in place, aspiration as a prior potential issue.   7.  Diabetes mellitus.   8.  Chronic mild renal insufficiency, some acute worsening at present.   9.  Sulfa allergy.   10 Diarrhea, c diff neg  11 BC CN staph likely contaminant      RECOMMENDATIONS:   1.  No treatment of the sacral decubitus ulcer, at present looks quite clean, does not need an I&D and is not a likely source of infection unless she has bacteremia.   2.   urine culture sens E coli , on ceftriaxone while here and improved/stable, continue while here DC vanco CN staph likely contaminant  3.  Obviously long-term prognosis is poor, has had previous extensive conversation and ethics consultation and currently on restorative care based on underlying patient desires and [employer] nella  4 OK enteral ceftin 500 bid for 10 days and disposition plan            Interval History:   no interaction, no fever cxs as above  WBc nl no  O2 CXR seen not clear new pneumonia              Medications:       cefuroxime  500 mg Per G Tube Q12H VIRA     fiber modular  1 packet Per Feeding Tube BID     metoprolol  25 mg Oral or Feeding Tube BID     ranitidine  150 mg Per Feeding Tube Daily     insulin glargine  30 Units Subcutaneous BID     acetaminophen  650 mg Per Feeding Tube TID     insulin aspart  1-6 Units Subcutaneous Q4H     multivitamins with minerals  15 mL Per Feeding Tube Daily                  Physical Exam:   Blood pressure (!) 164/94, pulse 104, temperature 97.4  F (36.3  C), temperature source Oral, resp. rate 16, height 1.524 m (5'), weight 69 kg (152 lb 1.9 oz), SpO2 96 %.  Wt Readings from Last 2 Encounters:   08/09/17 69 kg (152 lb 1.9 oz)   07/06/17 71.4 kg (157 lb 6.4 oz)     Vital Signs with Ranges  Temp:  [97.4  F (36.3  C)-99.8  F (37.7  C)] 97.4  F (36.3  C)  Pulse:  [104] 104  Heart Rate:  [] 108  Resp:  [16-20] 16  BP: (112-164)/(61-94) 164/94  SpO2:  [93 %-100 %] 96 %    Constitutional: Awake, alert, not interactive   Lungs: Clear to auscultation bilaterally, no crackles or wheezing   Cardiovascular: Regular rate and rhythm, normal S1 and S2, and no murmur noted   Abdomen: Normal bowel sounds, soft, non-distended, non-tender   Skin: No rashes, no cyanosis, no edema wd clean   Other:           Data:   All microbiology laboratory data reviewed.  Recent Labs   Lab Test  08/08/17   0655  08/07/17   0703  08/05/17   0618   WBC  10.7  8.9  10.2   HGB  11.6*  11.0*  10.5*   HCT  36.1  33.9*  32.8*   MCV  96  94  97   PLT  208  182  177     Recent Labs   Lab Test  08/09/17   0649  08/08/17   0655  08/07/17   0703   CR  0.87  0.85  0.90     Recent Labs   Lab Test  05/08/13   1300   SED  61*     Recent Labs   Lab Test  08/05/17   1148  08/05/17   1140  08/04/17   0605  08/04/17   0555  08/04/17   0508  06/30/17   1654  06/30/17   1640  06/14/17   1319  06/14/17   1230   CULT  No growth after 5 days  No growth after 5 days  Cultured  on the 1st day of incubation: Staphylococcus epidermidis  Critical Value/Significant Value, preliminary result only, called to and read   back by JOANN VALDERRAMA RN 8.5.17 0835. TENA  Cultured on the 1st day of incubation: Staphylococcus hominis  (Note)  POSITIVE for STAPHYLOCOCCUS EPIDERMIDIS and POSITIVE for the mecA  gene (resistant to methicillin) by [employer] multiplex nucleic acid  test. Final identification and antimicrobial susceptibility testing  will be verified by standard methods.    Specimen tested with Verigene multiplex, gram-positive blood culture  nucleic acid test for the following targets: Staph aureus, Staph  epidermidis, Staph lugdunensis, other Staph species, Enterococcus  faecalis, Enterococcus faecium, Streptococcus species, S. agalactiae,  S. anginosus grp., S. pneumoniae, S. pyogenes, Listeria sp., mecA  (methicillin resistance) and Rene/B (vancomycin resistance).    Critical Value/Significant Value called to and read back by Cheyenne Gilliland RN at [place] [address] at 11:15am 8/5/2017 ()  *  No growth  >100,000 colonies/mL Escherichia coli*  No growth  <1000 colonies/mL urogenital maria esther Susceptibility testing not routinely done  No growth  No growth  Moderate growth Escherichia coli  Moderate growth Enterococcus avium  Moderate growth Enterococcus faecalis  Light growth Candida albicans / dubliniensis Candida albicans and Candida   dubliniensis are not routinely speciated Susceptibility testing not routinely   done  Plus Heavy growth Normal skin maria esther  *  >100,000 colonies/mL Methicillin resistant Staphylococcus aureus (MRSA)  50,000 to 100,000 colonies/mL Strain 2 Methicillin resistant Staphylococcus   aureus (MRSA)  Critical Value/Significant Value called to and read back by Elmer Hurd Rn at   2044 [id number]  *

## 2017-08-12 ENCOUNTER — APPOINTMENT (OUTPATIENT)
Dept: GENERAL RADIOLOGY | Facility: CLINIC | Age: 82
End: 2017-08-12
Attending: EMERGENCY MEDICINE
Payer: COMMERCIAL

## 2017-08-12 ENCOUNTER — HOSPITAL ENCOUNTER (EMERGENCY)
Facility: CLINIC | Age: 82
Discharge: HOME | End: 2017-08-12
Attending: EMERGENCY MEDICINE | Admitting: EMERGENCY MEDICINE
Payer: COMMERCIAL

## 2017-08-12 VITALS
BODY MASS INDEX: 28.9 KG/M2 | TEMPERATURE: 98.3 F | DIASTOLIC BLOOD PRESSURE: 80 MMHG | WEIGHT: 148 LBS | OXYGEN SATURATION: 99 % | RESPIRATION RATE: 16 BRPM | SYSTOLIC BLOOD PRESSURE: 145 MMHG

## 2017-08-12 DIAGNOSIS — K94.23 GASTROSTOMY TUBE OBSTRUCTION (H): ICD-10-CM

## 2017-08-12 PROCEDURE — 40000986 XR ABDOMEN PORT F1 VW

## 2017-08-12 PROCEDURE — 99283 EMERGENCY DEPT VISIT LOW MDM: CPT

## 2017-08-12 NOTE — ED AVS SNAPSHOT
Emergency Department    [address] [place] [address]    Phone:  [phone]    Fax:  [phone]                                       Celia Lewis   MRN: [id number]    Department:   Emergency Department   Date of Visit:  8/12/2017           Patient Information     Date Of Birth          11/30/1926        Your diagnoses for this visit were:     Gastrostomy tube obstruction (H)        You were seen by Darren Ponce MD.      Follow-up Information     Follow up In 1 day.    Why:  with your PMD         Discharge Instructions         Discharge Instructions: Caring for Your Gastrostomy Tube (G-Tube)  You have been discharged with a gastrostomy tube, or G-tube. The G-tube was inserted through your belly (abdominal) wall and into your stomach. The tube will provide you with food, fluids, and medicine. Your G-tube may move in and out slightly. If the tube comes out all the way in the first few weeks after placement, don t put it back in. Call your healthcare provider right away. Don t wait until the next day. This is important because the G-tube tract through the skin may close very quickly, often within 24 hours. After the first few weeks, if the tube comes out, ask your provider how to get a spare tube. Ask your provider how to replace it at home.   General guidelines for use    Wash your hands thoroughly with soap and warm water before starting your feeding.    During the feeding and for 1 hour after, sit in a chair or sit up in bed.    Before feeding begins, check to see that your stomach is empty. You will need a syringe for the following steps:     Insert the tip of an empty syringe into the end of the G-tube.    Pull back on the syringe to withdraw contents of the stomach.    Don t begin the feeding if more than 100 mL remains from the previous feeding.    Clean the area around the tube with mild soap and water.    Pat the area dry during bathing and as needed.    Clean the area  more often if it gets wet or is leaking some discharge (weeping).    Keep the disk (flange) a few millimeters off the skin. This should leave just enough room for a gauze sponge. Pulling the flange too tightly can damage the skin. But leaving the flange too loose leads to leaking around the G-tube. Your healthcare team will go over these guidelines before you leave the hospital.  [place] feeding method    Fill the feeding bag with the prescribed amount of formula. Run the fluid to the end of the tube to clear out any air. Clamp the tube.    Connect the end of the feeding bag tubing to the G-tube.    Hang the bag at least 18 inches above the level of your G-tube.    Open the clamp and allow the formula to flow into the G-tube.    Follow with the prescribed amount of water.    After each feeding, rinse the bag and tubing. Every 24 hours, wash the bag and tubing with soapy water and rinse thoroughly.  Pump feeding method    Fill the feeding bag with the prescribed amount of formula. Run the fluid to the end of the tube to clear out any air. Clamp the tube.    Connect the end of the feeding bag tubing to the G-tube. Set the pump rate of flow to the prescribed rate per hour.    Open the clamp on the tubing; press the start button on your pump.    When feeding is complete, disconnect the feeding set.    Connect the tip of an empty syringe to the feeding tube and slowly push in the prescribed amount of water.    After each feeding, rinse the bag and tubing. Every 24 hours, wash the bag and tubing with soapy water and rinse thoroughly.  Syringe feeding method    Remove the plunger from a syringe and connect the syringe to the G-tube.    Hold the syringe upright and pour the formula into the syringe.    Refill the syringe as the formula reaches the bottom of the syringe.    Repeat the process until the prescribed amount of formula is given.    Follow the feeding with the prescribed amount of water.    After each feeding,  rinse the bag and tubing. Every 24 hours, wash the bag and tubing with soapy water and rinse thoroughly.  When to call your provider  Call your healthcare provider right away if you have any of the following:    The tube comes out     The tube is blocked    Vomiting    Fever above 100.4 F (38.0 C)    Diarrhea that lasts more than 2 days    Signs of infection (redness, swelling, or warmth at the tube site)    Drainage from the tube site   Date Last Reviewed: 8/1/2016 2000-2017 [employer]. [address] [place], [place] [address]. All rights reserved. This information is not intended as a substitute for professional medical care. Always follow your healthcare professional's instructions.          24 Hour Appointment Hotline       To make an appointment at any [facility], call [phone] ([phone]). If you don't have a family doctor or clinic, we will help you find one. [facility] clinics are conveniently located to serve the needs of you and your family.             Review of your medicines      Our records show that you are taking the medicines listed below. If these are incorrect, please call your family doctor or clinic.        Dose / Directions Last dose taken    * ACETAMINOPHEN PO   Dose:  650 mg        650 mg by Gastric Tube route 3 times daily   Refills:  0        * acetaminophen 32 mg/mL solution   Commonly known as:  TYLENOL   Dose:  325 mg        325 mg by Gastric Tube route daily as needed for fever or mild pain   Refills:  0        bisacodyl 10 MG Suppository   Commonly known as:  DULCOLAX   Dose:  10 mg        Place 10 mg rectally daily as needed for constipation   Refills:  0        cefuroxime 500 MG tablet   Commonly known as:  CEFTIN   Dose:  500 mg   Indication:  Urinary Tract Infection   Quantity:  15 tablet        1 tablet (500 mg) by Per G Tube route every 12 hours   Refills:  0        Dakins 0.125 % Soln        Externally apply topically 2 times daily BID and PRN.   1. Cleanse wound with microklenze, cleanse periwound area with naomi perineal 2. Moisten kerlix fluff with dakins solution 0.125%, wring out excess, pack wound with kerlix 3. Apply antifungal powder to periwound area, rub in. Apply criticaid over powder. 4. Cover with ABD using minimal medipore tape to secure. 5. Label dressing with date, time, initials. Follow Rigorous PIP measures.   Refills:  0        IMODIUM A-D 1 MG/7.5ML Liqd   Dose:  4 mg   Generic drug:  Loperamide HCl        4 mg by Gastric Tube route every other day   Refills:  0        INSULIN ASPART SC   Dose:  1-6 Units        Inject 1-6 Units Subcutaneous every 6 hours 0800, 1400, 2000, 0200 -250 1 unit -300 2 units -350 3 units -400 4 units -450 5 units BG >450 6 units and update MD   Refills:  0        insulin glargine 100 UNIT/ML injection   Commonly known as:  LANTUS   Dose:  30 Units        Inject 30 Units Subcutaneous 2 times daily   Refills:  0        Lactobacillus Acidophilus Powd   Dose:  1 capsule        1 capsule by Gastric Tube route daily 10 billion units   Refills:  0        metoprolol 10 mg/mL Susp   Commonly known as:  LOPRESSOR   Dose:  25 mg   Quantity:  100 mL        Take 2.5 mLs (25 mg) by mouth 2 times daily   Refills:  1        multivitamin, therapeutic Tabs tablet   Dose:  1 tablet        1 tablet by Gastric Tube route daily   Refills:  0        * oxyCODONE 5 MG/5ML solution   Commonly known as:  ROXICODONE   Dose:  2 mg   Indication:  Chronic Pain        Take 2 mg by mouth 3 times daily   Refills:  0        * oxyCODONE 5 MG/5ML solution   Commonly known as:  ROXICODONE   Dose:  2 mg   Quantity:  473 mL        2 mLs (2 mg) by Per G Tube route every 4 hours as needed for moderate to severe pain   Refills:  0        ranitidine 150 MG/10ML syrup   Commonly known as:  Zantac   Dose:  150 mg   Quantity:  600 mL        10 mLs (150 mg) by Per Feeding Tube route daily   Refills:  0        simethicone 40  MG/0.6ML suspension   Commonly known as:  MYLICON   Dose:  80 mg        Take 80 mg by mouth 3 times daily   Refills:  0        vitamin A-D & C drops 750-400-35 UNIT-MG/ML solution NEW FORMULATION   Dose:  7 mL   Quantity:  50 mL        7 mLs by Per G Tube route daily   Refills:  0        * Notice:  This list has 4 medication(s) that are the same as other medications prescribed for you. Read the directions carefully, and ask your doctor or other care provider to review them with you.            Procedures and tests performed during your visit     XR Abdomen Port 1 View      Orders Needing Specimen Collection     None      Pending Results     No orders found from 8/10/2017 to 8/13/2017.            Pending Culture Results     No orders found from 8/10/2017 to 8/13/2017.            Pending Results Instructions     If you had any lab results that were not finalized at the time of your Discharge, you can call the ED Lab Result RN at [phone]. You will be contacted by this team for any positive Lab results or changes in treatment. The nurses are available 7 days a week from 10A to 6:30P.  You can leave a message 24 hours per day and they will return your call.        Test Results From Your Hospital Stay        8/12/2017  1:18 PM      Narrative     XR ABDOMEN PORT F1 VW 8/12/2017 1:16 PM    HISTORY: Nasogastric tube replacement.    COMPARISON: August 10, 2017.        Impression     IMPRESSION: Gastrostomy tube in place, with contrast outlining the  gastric body.    PUNEET CARABALLO MD                Clinical Quality Measure: Blood Pressure Screening     Your blood pressure was checked while you were in the emergency department today. The last reading we obtained was  BP: 140/78 . Please read the guidelines below about what these numbers mean and what you should do about them.  If your systolic blood pressure (the top number) is less than 120 and your diastolic blood pressure (the bottom number) is less than 80, then your  "blood pressure is normal. There is nothing more that you need to do about it.  If your systolic blood pressure (the top number) is 120-139 or your diastolic blood pressure (the bottom number) is 80-89, your blood pressure may be higher than it should be. You should have your blood pressure rechecked within a year by a primary care provider.  If your systolic blood pressure (the top number) is 140 or greater or your diastolic blood pressure (the bottom number) is 90 or greater, you may have high blood pressure. High blood pressure is treatable, but if left untreated over time it can put you at risk for heart attack, stroke, or kidney failure. You should have your blood pressure rechecked by a primary care provider within the next 4 weeks.  If your provider in the emergency department today gave you specific instructions to follow-up with your doctor or provider even sooner than that, you should follow that instruction and not wait for up to 4 weeks for your follow-up visit.        Thank you for choosing [facility]       Thank you for choosing [facility] for your care. Our goal is always to provide you with excellent care. Hearing back from our patients is one way we can continue to improve our services. Please take a few minutes to complete the written survey that you may receive in the mail after you visit with us. Thank you!        [email or web address] Information     [employer] lets you send messages to your doctor, view your test results, renew your prescriptions, schedule appointments and more. To sign up, go to [email or web address] . Click on \"Log in\" on the left side of the screen, which will take you to the Welcome page. Then click on \"Sign up Now\" on the right side of the page.     You will be asked to enter the access code listed below, as well as some personal information. Please follow the directions to create your username and password.     Your access code is: [email or web address]  Expires: 9/17/2017 12:54 PM     Your access " code will  in 90 days. If you need help or a new code, please call your [facility] clinic or [phone].        Care EveryWhere ID     This is your Care EveryWhere ID. This could be used by other organizations to access your [facility] medical records  [id number]        Equal Access to Services     CECILIA GATES : Khadijah Basurto, waaxda luqadaha, qaybta kaalmada magalie, peg mroales. So [facility] [phone].    ATENCIÓN: Si habla español, tiene a rosas disposición servicios gratuitos de asistencia lingüística. Llame al [phone].    We comply with applicable federal civil rights laws and [place] laws. We do not discriminate on the basis of race, color, national origin, age, disability sex, sexual orientation or gender identity.            After Visit Summary       This is your record. Keep this with you and show to your community pharmacist(s) and doctor(s) at your next visit.

## 2017-08-12 NOTE — ED NOTES
Dr Ponce in room. Able to clear obstruction of G tube. Flushed with water without difficulty. Will obtain xray.

## 2017-08-12 NOTE — ED AVS SNAPSHOT
Emergency Department    [address] [place] [address]    Phone:  [phone]    Fax:  [phone]                                       Celia Lewis   MRN: [id number]    Department:   Emergency Department   Date of Visit:  8/12/2017           After Visit Summary Signature Page     I have received my discharge instructions, and my questions have been answered. I have discussed any challenges I see with this plan with the nurse or doctor.    ..........................................................................................................................................  Patient/Patient Representative Signature      ..........................................................................................................................................  Patient Representative Print Name and Relationship to Patient    ..................................................               ................................................  Date                                            Time    ..........................................................................................................................................  Reviewed by Signature/Title    ...................................................              ..............................................  Date                                                            Time

## 2017-08-12 NOTE — DISCHARGE INSTRUCTIONS
Discharge Instructions: Caring for Your Gastrostomy Tube (G-Tube)  You have been discharged with a gastrostomy tube, or G-tube. The G-tube was inserted through your belly (abdominal) wall and into your stomach. The tube will provide you with food, fluids, and medicine. Your G-tube may move in and out slightly. If the tube comes out all the way in the first few weeks after placement, don t put it back in. Call your healthcare provider right away. Don t wait until the next day. This is important because the G-tube tract through the skin may close very quickly, often within 24 hours. After the first few weeks, if the tube comes out, ask your provider how to get a spare tube. Ask your provider how to replace it at home.   General guidelines for use    Wash your hands thoroughly with soap and warm water before starting your feeding.    During the feeding and for 1 hour after, sit in a chair or sit up in bed.    Before feeding begins, check to see that your stomach is empty. You will need a syringe for the following steps:     Insert the tip of an empty syringe into the end of the G-tube.    Pull back on the syringe to withdraw contents of the stomach.    Don t begin the feeding if more than 100 mL remains from the previous feeding.    Clean the area around the tube with mild soap and water.    Pat the area dry during bathing and as needed.    Clean the area more often if it gets wet or is leaking some discharge (weeping).    Keep the disk (flange) a few millimeters off the skin. This should leave just enough room for a gauze sponge. Pulling the flange too tightly can damage the skin. But leaving the flange too loose leads to leaking around the G-tube. Your healthcare team will go over these guidelines before you leave the hospital.  [place] feeding method    Fill the feeding bag with the prescribed amount of formula. Run the fluid to the end of the tube to clear out any air. Clamp the tube.    Connect the end of the  feeding bag tubing to the G-tube.    Hang the bag at least 18 inches above the level of your G-tube.    Open the clamp and allow the formula to flow into the G-tube.    Follow with the prescribed amount of water.    After each feeding, rinse the bag and tubing. Every 24 hours, wash the bag and tubing with soapy water and rinse thoroughly.  Pump feeding method    Fill the feeding bag with the prescribed amount of formula. Run the fluid to the end of the tube to clear out any air. Clamp the tube.    Connect the end of the feeding bag tubing to the G-tube. Set the pump rate of flow to the prescribed rate per hour.    Open the clamp on the tubing; press the start button on your pump.    When feeding is complete, disconnect the feeding set.    Connect the tip of an empty syringe to the feeding tube and slowly push in the prescribed amount of water.    After each feeding, rinse the bag and tubing. Every 24 hours, wash the bag and tubing with soapy water and rinse thoroughly.  Syringe feeding method    Remove the plunger from a syringe and connect the syringe to the G-tube.    Hold the syringe upright and pour the formula into the syringe.    Refill the syringe as the formula reaches the bottom of the syringe.    Repeat the process until the prescribed amount of formula is given.    Follow the feeding with the prescribed amount of water.    After each feeding, rinse the bag and tubing. Every 24 hours, wash the bag and tubing with soapy water and rinse thoroughly.  When to call your provider  Call your healthcare provider right away if you have any of the following:    The tube comes out     The tube is blocked    Vomiting    Fever above 100.4 F (38.0 C)    Diarrhea that lasts more than 2 days    Signs of infection (redness, swelling, or warmth at the tube site)    Drainage from the tube site   Date Last Reviewed: 8/1/2016 2000-2017 [employer]. [address] [place] [address], [place] [address]. All rights  reserved. This information is not intended as a substitute for professional medical care. Always follow your healthcare professional's instructions.

## 2017-08-12 NOTE — ED PROVIDER NOTES
History     Chief Complaint:  Clogged G tube     HPI   HPI limited due to patient's mental state. HPI retrieved form nursing home note.    Celia Lewis is a 89 year old female who presents to the ED for evaluation of clogged G tube.     Tube clogged with medication administration. Dementia, diabetic, UTI, on antibiotics. Tube clogged with administration of antibiotics. Writer crushed and melted with hot water but tube clogged, tried to unclog with Sodium Bicarb and Zenpep caps x2. Clog persists. Called to on call and obtained orders to send to ER for clogged tube. On call was [facility]. Also left voicemail for Dr. Lewis to update.   Allergies:  Sulfa drugs     Medications:    oxyCODONE (ROXICODONE) 5 MG/5ML solution   cefuroxime (CEFTIN) 500 MG tablet   oxyCODONE (ROXICODONE) 5 MG/5ML solution   simethicone (MYLICON) 40 MG/0.6ML suspension   insulin glargine (LANTUS) 100 UNIT/ML injection   ranitidine (ZANTAC) 150 MG/10ML syrup   vitamin A-D & C drops (TRI-VI-SOL) 750-400-35 UNIT-MG/ML solution NEW FORMULATION   metoprolol (LOPRESSOR) 10 mg/mL SUSP   INSULIN ASPART SC   Dakins 0.125 % SOLN   Lactobacillus Acidophilus POWD   multivitamin, therapeutic (THERA-VIT) TABS tablet   Loperamide HCl (IMODIUM A-D) 1 MG/7.5ML LIQD   acetaminophen (TYLENOL) 32 mg/mL solution   ACETAMINOPHEN PO   bisacodyl (DULCOLAX) 10 MG suppository     Past Medical History:    Actinomyces infection  Alzheimer's disease  Arthritis  A-fib  Calculus of kidney  Candidiasis of skin & nails  CHF  CAD  Hypertension  Morbid obesity  Hyperlipidemia  UTI  Shoulder osteoarthrosis  Renal failure  Thyroid disease  Diabetes  Uric acid stones  CKD  Nephrolithiasis  Kidney stone  Pyelonephritis  Bacteremia  Sacral decubitus ulcer    Past Surgical History:    Urethra stent  Gastrotomy tube placement    Family History:    Cerebrovascular disease  Diabetes    Social History:  Smoking status: Former smoker  Alcohol use: No  Presents to ED   Marital Status:   " [2]     Review of Systems   Unable to perform ROS: Dementia     Physical Exam     Patient Vitals for the past 24 hrs:   BP Temp Temp src Heart Rate Resp SpO2 Weight   08/12/17 1135 135/72 98.3  F (36.8  C) Temporal 96 16 97 % 67.1 kg (148 lb)     Physical Exam    General: intermittently is awake but mostly somnolent.  Head: No signs of trauma.   Mouth/Throat: Oropharynx is clear and moist.   Eyes: Conjunctivae are normal. Pupils are equal, round, and reactive to light.   Neck: No stridor.   CV: Normal rate and regular rhythm.    Resp: Effort normal and breath sounds normal. No respiratory distress.   GI: Soft. There is no tenderness or guarding. G tube in the LUQ.  MSK: No deformity.  Contractures L>R in the UE.  Resting with hips and knees flexed.   Neuro: Left arm contracted. Right arm more flaccid. Unresponsive to verbal command. Actively tries to keep her eyes closed during examination of her pupils. PERRLA,    Skin: Skin is warm and dry. No trauma noted.     Emergency Department Course     Imaging:  Radiographic findings were not reported due to patient's mental state. There was no one present with the patient in the ED either.     XR Abdomen Port 1 View  IMPRESSION: Gastrostomy tube in place, with contrast outlining the  gastric body. As read by Radiology.    Emergency Department Course:  Past medical records, nursing notes, and vitals reviewed.  1145: I performed an exam of the patient and obtained history, as documented above.    I unclogged the patient's G tube with \"clog zapper\" x2 then physically removing the G tube and palpating the outside of the tube just distal to the balloon. The tube passed easily back into the stomach.    The patient was sent for an abdomen x-ray while in the emergency department, findings above.    Patient transferred back to [facility].      Impression & Plan      Medical Decision Making:  Celia Lewis is a 89 year old female who " presents for evaluation of clogged gastrostromy tube.  This is not a recent placement so unclogging attempted in ED.  I was able to successfully unclog the feeding tube in the patient. IR was not consulted therefore, tube was confirmed by abdomen x-ray. Ok to use tube.   No signs of cellulitis, perforation, mal-placement in soft tissues, etc. Stable to be transferred back to [facility].      Diagnosis:    ICD-10-CM   1. Gastrostomy tube obstruction (H) K94.29     Disposition: Patient transferred back to [facility].      Wilda Milina  8/12/2017    EMERGENCY DEPARTMENT    I, Wilda Milian, am serving as a scribe at 11:45 AM on 8/12/2017 to document services personally performed by Darren Ponce MD based on my observations and the provider's statements to me.        Darren Ponce MD  08/12/17 9798

## 2017-08-12 NOTE — ED NOTES
Bed: ED06  Expected date:   Expected time:   Means of arrival:   Comments:  435  Plugged g-tube  4892

## 2017-08-20 ENCOUNTER — APPOINTMENT (OUTPATIENT)
Dept: CT IMAGING | Facility: CLINIC | Age: 82
DRG: 871 | End: 2017-08-20
Attending: EMERGENCY MEDICINE
Payer: COMMERCIAL

## 2017-08-20 ENCOUNTER — APPOINTMENT (OUTPATIENT)
Dept: GENERAL RADIOLOGY | Facility: CLINIC | Age: 82
DRG: 871 | End: 2017-08-20
Attending: EMERGENCY MEDICINE
Payer: COMMERCIAL

## 2017-08-20 ENCOUNTER — HOSPITAL ENCOUNTER (INPATIENT)
Facility: CLINIC | Age: 82
LOS: 4 days | Discharge: NURSING, REHAB OR LONG-TERM CARE | DRG: 871 | End: 2017-08-24
Attending: EMERGENCY MEDICINE | Admitting: INTERNAL MEDICINE
Payer: COMMERCIAL

## 2017-08-20 DIAGNOSIS — M19.019 PRIMARY LOCALIZED OSTEOARTHROSIS OF SHOULDER REGION, UNSPECIFIED LATERALITY: ICD-10-CM

## 2017-08-20 DIAGNOSIS — I63.433 CEREBROVASCULAR ACCIDENT (CVA) DUE TO BILATERAL EMBOLISM OF POSTERIOR CEREBRAL ARTERIES (H): ICD-10-CM

## 2017-08-20 DIAGNOSIS — A41.9 SEPSIS, DUE TO UNSPECIFIED ORGANISM: Primary | ICD-10-CM

## 2017-08-20 DIAGNOSIS — F02.80 LATE ONSET ALZHEIMER'S DISEASE WITHOUT BEHAVIORAL DISTURBANCE (H): ICD-10-CM

## 2017-08-20 DIAGNOSIS — M25.50 MULTIPLE JOINT PAIN: ICD-10-CM

## 2017-08-20 DIAGNOSIS — E11.21 TYPE 2 DIABETES MELLITUS WITH DIABETIC NEPHROPATHY, UNSPECIFIED LONG TERM INSULIN USE STATUS: ICD-10-CM

## 2017-08-20 DIAGNOSIS — L89.153 SACRAL DECUBITUS ULCER, STAGE III (H): ICD-10-CM

## 2017-08-20 DIAGNOSIS — G30.1 LATE ONSET ALZHEIMER'S DISEASE WITHOUT BEHAVIORAL DISTURBANCE (H): ICD-10-CM

## 2017-08-20 DIAGNOSIS — R41.82 ALTERED MENTAL STATUS, UNSPECIFIED ALTERED MENTAL STATUS TYPE: ICD-10-CM

## 2017-08-20 PROBLEM — I63.9 CVA (CEREBRAL VASCULAR ACCIDENT) (H): Status: ACTIVE | Noted: 2017-08-20

## 2017-08-20 LAB
ALBUMIN SERPL-MCNC: 3.3 G/DL (ref 3.4–5)
ALBUMIN UR-MCNC: 100 MG/DL
ALP SERPL-CCNC: 82 U/L (ref 40–150)
ALT SERPL W P-5'-P-CCNC: 28 U/L (ref 0–50)
ANION GAP SERPL CALCULATED.3IONS-SCNC: 12 MMOL/L (ref 3–14)
APPEARANCE UR: ABNORMAL
AST SERPL W P-5'-P-CCNC: 23 U/L (ref 0–45)
BASE EXCESS BLDV CALC-SCNC: 1.4 MMOL/L
BASOPHILS # BLD AUTO: 0.1 10E9/L (ref 0–0.2)
BASOPHILS NFR BLD AUTO: 0.6 %
BILIRUB SERPL-MCNC: 0.5 MG/DL (ref 0.2–1.3)
BILIRUB UR QL STRIP: NEGATIVE
BUN SERPL-MCNC: 60 MG/DL (ref 7–30)
CALCIUM SERPL-MCNC: 9.2 MG/DL (ref 8.5–10.1)
CHLORIDE SERPL-SCNC: 107 MMOL/L (ref 94–109)
CHOLEST SERPL-MCNC: 269 MG/DL
CO2 SERPL-SCNC: 23 MMOL/L (ref 20–32)
COLOR UR AUTO: YELLOW
CREAT SERPL-MCNC: 1.13 MG/DL (ref 0.52–1.04)
CRP SERPL-MCNC: 7 MG/L (ref 0–8)
DIFFERENTIAL METHOD BLD: ABNORMAL
EOSINOPHIL # BLD AUTO: 0.9 10E9/L (ref 0–0.7)
EOSINOPHIL NFR BLD AUTO: 6.2 %
ERYTHROCYTE [DISTWIDTH] IN BLOOD BY AUTOMATED COUNT: 17.1 % (ref 10–15)
GFR SERPL CREATININE-BSD FRML MDRD: 45 ML/MIN/1.7M2
GLUCOSE BLDC GLUCOMTR-MCNC: 201 MG/DL (ref 70–99)
GLUCOSE SERPL-MCNC: 258 MG/DL (ref 70–99)
GLUCOSE UR STRIP-MCNC: NEGATIVE MG/DL
HCO3 BLDV-SCNC: 27 MMOL/L (ref 21–28)
HCT VFR BLD AUTO: 40.4 % (ref 35–47)
HDLC SERPL-MCNC: 30 MG/DL
HGB BLD-MCNC: 12.8 G/DL (ref 11.7–15.7)
HGB UR QL STRIP: ABNORMAL
HYALINE CASTS #/AREA URNS LPF: 55 /LPF (ref 0–2)
IMM GRANULOCYTES # BLD: 0.1 10E9/L (ref 0–0.4)
IMM GRANULOCYTES NFR BLD: 0.5 %
INR PPP: 0.98 (ref 0.86–1.14)
INTERPRETATION ECG - MUSE: NORMAL
KETONES UR STRIP-MCNC: NEGATIVE MG/DL
LACTATE BLD-SCNC: 4.4 MMOL/L (ref 0.7–2.1)
LACTATE BLD-SCNC: 5.6 MMOL/L (ref 0.7–2.1)
LDLC SERPL CALC-MCNC: ABNORMAL MG/DL
LEUKOCYTE ESTERASE UR QL STRIP: ABNORMAL
LIPASE SERPL-CCNC: 174 U/L (ref 73–393)
LYMPHOCYTES # BLD AUTO: 3.6 10E9/L (ref 0.8–5.3)
LYMPHOCYTES NFR BLD AUTO: 25 %
MCH RBC QN AUTO: 32.1 PG (ref 26.5–33)
MCHC RBC AUTO-ENTMCNC: 31.7 G/DL (ref 31.5–36.5)
MCV RBC AUTO: 101 FL (ref 78–100)
MONOCYTES # BLD AUTO: 1 10E9/L (ref 0–1.3)
MONOCYTES NFR BLD AUTO: 6.9 %
NEUTROPHILS # BLD AUTO: 8.7 10E9/L (ref 1.6–8.3)
NEUTROPHILS NFR BLD AUTO: 60.8 %
NITRATE UR QL: NEGATIVE
NONHDLC SERPL-MCNC: 239 MG/DL
NRBC # BLD AUTO: 0 10*3/UL
NRBC BLD AUTO-RTO: 0 /100
NT-PROBNP SERPL-MCNC: 1031 PG/ML (ref 0–1800)
O2/TOTAL GAS SETTING VFR VENT: NORMAL %
PCO2 BLDV: 49 MM HG (ref 40–50)
PH BLDV: 7.36 PH (ref 7.32–7.43)
PH UR STRIP: 5.5 PH (ref 5–7)
PLATELET # BLD AUTO: 317 10E9/L (ref 150–450)
PO2 BLDV: 32 MM HG (ref 25–47)
POTASSIUM SERPL-SCNC: 5.1 MMOL/L (ref 3.4–5.3)
PROT SERPL-MCNC: 8.1 G/DL (ref 6.8–8.8)
RADIOLOGIST FLAGS: ABNORMAL
RBC # BLD AUTO: 3.99 10E12/L (ref 3.8–5.2)
RBC #/AREA URNS AUTO: 24 /HPF (ref 0–2)
SODIUM SERPL-SCNC: 142 MMOL/L (ref 133–144)
SOURCE: ABNORMAL
SP GR UR STRIP: 1.02 (ref 1–1.03)
TRIGL SERPL-MCNC: 1049 MG/DL
TROPONIN I SERPL-MCNC: 0.66 UG/L (ref 0–0.04)
TROPONIN I SERPL-MCNC: 0.71 UG/L (ref 0–0.04)
UROBILINOGEN UR STRIP-MCNC: NORMAL MG/DL (ref 0–2)
WBC # BLD AUTO: 14.3 10E9/L (ref 4–11)
WBC #/AREA URNS AUTO: >182 /HPF (ref 0–2)
YEAST #/AREA URNS HPF: ABNORMAL /HPF

## 2017-08-20 PROCEDURE — 99285 EMERGENCY DEPT VISIT HI MDM: CPT | Mod: 25

## 2017-08-20 PROCEDURE — 25000128 H RX IP 250 OP 636: Performed by: EMERGENCY MEDICINE

## 2017-08-20 PROCEDURE — 83605 ASSAY OF LACTIC ACID: CPT | Performed by: EMERGENCY MEDICINE

## 2017-08-20 PROCEDURE — 80053 COMPREHEN METABOLIC PANEL: CPT | Performed by: EMERGENCY MEDICINE

## 2017-08-20 PROCEDURE — 83721 ASSAY OF BLOOD LIPOPROTEIN: CPT | Performed by: EMERGENCY MEDICINE

## 2017-08-20 PROCEDURE — 25000128 H RX IP 250 OP 636: Performed by: INTERNAL MEDICINE

## 2017-08-20 PROCEDURE — 25000131 ZZH RX MED GY IP 250 OP 636 PS 637: Performed by: INTERNAL MEDICINE

## 2017-08-20 PROCEDURE — 20000003 ZZH R&B ICU

## 2017-08-20 PROCEDURE — 70450 CT HEAD/BRAIN W/O DYE: CPT

## 2017-08-20 PROCEDURE — 81001 URINALYSIS AUTO W/SCOPE: CPT | Performed by: EMERGENCY MEDICINE

## 2017-08-20 PROCEDURE — 87077 CULTURE AEROBIC IDENTIFY: CPT | Performed by: EMERGENCY MEDICINE

## 2017-08-20 PROCEDURE — 96365 THER/PROPH/DIAG IV INF INIT: CPT

## 2017-08-20 PROCEDURE — 85025 COMPLETE CBC W/AUTO DIFF WBC: CPT | Performed by: EMERGENCY MEDICINE

## 2017-08-20 PROCEDURE — 85610 PROTHROMBIN TIME: CPT | Performed by: EMERGENCY MEDICINE

## 2017-08-20 PROCEDURE — 87040 BLOOD CULTURE FOR BACTERIA: CPT | Performed by: EMERGENCY MEDICINE

## 2017-08-20 PROCEDURE — 80061 LIPID PANEL: CPT | Performed by: EMERGENCY MEDICINE

## 2017-08-20 PROCEDURE — 25000132 ZZH RX MED GY IP 250 OP 250 PS 637: Performed by: INTERNAL MEDICINE

## 2017-08-20 PROCEDURE — 82803 BLOOD GASES ANY COMBINATION: CPT | Performed by: EMERGENCY MEDICINE

## 2017-08-20 PROCEDURE — 83690 ASSAY OF LIPASE: CPT | Performed by: EMERGENCY MEDICINE

## 2017-08-20 PROCEDURE — 84484 ASSAY OF TROPONIN QUANT: CPT | Performed by: EMERGENCY MEDICINE

## 2017-08-20 PROCEDURE — 84484 ASSAY OF TROPONIN QUANT: CPT | Performed by: INTERNAL MEDICINE

## 2017-08-20 PROCEDURE — 99223 1ST HOSP IP/OBS HIGH 75: CPT | Mod: AI | Performed by: INTERNAL MEDICINE

## 2017-08-20 PROCEDURE — 71010 XR CHEST PORT 1 VW: CPT

## 2017-08-20 PROCEDURE — 40000275 ZZH STATISTIC RCP TIME EA 10 MIN

## 2017-08-20 PROCEDURE — 96375 TX/PRO/DX INJ NEW DRUG ADDON: CPT

## 2017-08-20 PROCEDURE — 83880 ASSAY OF NATRIURETIC PEPTIDE: CPT | Performed by: EMERGENCY MEDICINE

## 2017-08-20 PROCEDURE — 83605 ASSAY OF LACTIC ACID: CPT | Performed by: INTERNAL MEDICINE

## 2017-08-20 PROCEDURE — 36415 COLL VENOUS BLD VENIPUNCTURE: CPT | Performed by: INTERNAL MEDICINE

## 2017-08-20 PROCEDURE — 87186 SC STD MICRODIL/AGAR DIL: CPT | Performed by: EMERGENCY MEDICINE

## 2017-08-20 PROCEDURE — 00000146 ZZHCL STATISTIC GLUCOSE BY METER IP

## 2017-08-20 PROCEDURE — 87800 DETECT AGNT MULT DNA DIREC: CPT | Performed by: EMERGENCY MEDICINE

## 2017-08-20 PROCEDURE — 86140 C-REACTIVE PROTEIN: CPT | Performed by: EMERGENCY MEDICINE

## 2017-08-20 RX ORDER — BISACODYL 10 MG
10 SUPPOSITORY, RECTAL RECTAL DAILY PRN
Status: DISCONTINUED | OUTPATIENT
Start: 2017-08-20 | End: 2017-08-24 | Stop reason: HOSPADM

## 2017-08-20 RX ORDER — LACTOBACILLUS RHAMNOSUS GG 10B CELL
1 CAPSULE ORAL DAILY
Status: DISCONTINUED | OUTPATIENT
Start: 2017-08-21 | End: 2017-08-24 | Stop reason: HOSPADM

## 2017-08-20 RX ORDER — BISACODYL 10 MG
10 SUPPOSITORY, RECTAL RECTAL DAILY PRN
Status: DISCONTINUED | OUTPATIENT
Start: 2017-08-20 | End: 2017-08-20

## 2017-08-20 RX ORDER — PIPERACILLIN SODIUM, TAZOBACTAM SODIUM 3; .375 G/15ML; G/15ML
3.38 INJECTION, POWDER, LYOPHILIZED, FOR SOLUTION INTRAVENOUS EVERY 6 HOURS
Status: DISCONTINUED | OUTPATIENT
Start: 2017-08-21 | End: 2017-08-23

## 2017-08-20 RX ORDER — NALOXONE HYDROCHLORIDE 0.4 MG/ML
.1-.4 INJECTION, SOLUTION INTRAMUSCULAR; INTRAVENOUS; SUBCUTANEOUS
Status: DISCONTINUED | OUTPATIENT
Start: 2017-08-20 | End: 2017-08-24 | Stop reason: HOSPADM

## 2017-08-20 RX ORDER — NICOTINE POLACRILEX 4 MG
15-30 LOZENGE BUCCAL
Status: DISCONTINUED | OUTPATIENT
Start: 2017-08-20 | End: 2017-08-24 | Stop reason: HOSPADM

## 2017-08-20 RX ORDER — SODIUM CHLORIDE 9 MG/ML
INJECTION, SOLUTION INTRAVENOUS CONTINUOUS
Status: DISCONTINUED | OUTPATIENT
Start: 2017-08-20 | End: 2017-08-22

## 2017-08-20 RX ORDER — ONDANSETRON 4 MG/1
4 TABLET, ORALLY DISINTEGRATING ORAL EVERY 6 HOURS PRN
Status: DISCONTINUED | OUTPATIENT
Start: 2017-08-20 | End: 2017-08-24 | Stop reason: HOSPADM

## 2017-08-20 RX ORDER — DEXTROSE MONOHYDRATE 25 G/50ML
25-50 INJECTION, SOLUTION INTRAVENOUS
Status: DISCONTINUED | OUTPATIENT
Start: 2017-08-20 | End: 2017-08-24 | Stop reason: HOSPADM

## 2017-08-20 RX ORDER — SODIUM HYPOCHLORITE 2.5 MG/ML
SOLUTION TOPICAL 2 TIMES DAILY
Status: DISCONTINUED | OUTPATIENT
Start: 2017-08-20 | End: 2017-08-24 | Stop reason: HOSPADM

## 2017-08-20 RX ORDER — POLYETHYLENE GLYCOL 3350 17 G/17G
17 POWDER, FOR SOLUTION ORAL DAILY PRN
Status: DISCONTINUED | OUTPATIENT
Start: 2017-08-20 | End: 2017-08-22

## 2017-08-20 RX ORDER — GUAR GUM
1 PACKET (EA) ORAL 2 TIMES DAILY
COMMUNITY
End: 2017-09-23

## 2017-08-20 RX ORDER — OXYCODONE HCL 5 MG/5 ML
2 SOLUTION, ORAL ORAL 3 TIMES DAILY
Status: DISCONTINUED | OUTPATIENT
Start: 2017-08-20 | End: 2017-08-22

## 2017-08-20 RX ORDER — ACETAMINOPHEN 325 MG/1
650 TABLET ORAL EVERY 4 HOURS PRN
Status: DISCONTINUED | OUTPATIENT
Start: 2017-08-20 | End: 2017-08-24 | Stop reason: HOSPADM

## 2017-08-20 RX ORDER — ASPIRIN 300 MG/1
300 SUPPOSITORY RECTAL DAILY
Status: DISCONTINUED | OUTPATIENT
Start: 2017-08-20 | End: 2017-08-21

## 2017-08-20 RX ORDER — AMOXICILLIN 250 MG
1-2 CAPSULE ORAL 2 TIMES DAILY PRN
Status: DISCONTINUED | OUTPATIENT
Start: 2017-08-20 | End: 2017-08-24

## 2017-08-20 RX ORDER — PIPERACILLIN SODIUM, TAZOBACTAM SODIUM 3; .375 G/15ML; G/15ML
3.38 INJECTION, POWDER, LYOPHILIZED, FOR SOLUTION INTRAVENOUS ONCE
Status: COMPLETED | OUTPATIENT
Start: 2017-08-20 | End: 2017-08-20

## 2017-08-20 RX ORDER — OXYCODONE HCL 5 MG/5 ML
2 SOLUTION, ORAL ORAL EVERY 4 HOURS PRN
Status: DISCONTINUED | OUTPATIENT
Start: 2017-08-20 | End: 2017-08-24 | Stop reason: HOSPADM

## 2017-08-20 RX ORDER — ACETAMINOPHEN 650 MG/1
650 SUPPOSITORY RECTAL EVERY 4 HOURS PRN
Status: DISCONTINUED | OUTPATIENT
Start: 2017-08-20 | End: 2017-08-21

## 2017-08-20 RX ORDER — ONDANSETRON 2 MG/ML
4 INJECTION INTRAMUSCULAR; INTRAVENOUS EVERY 6 HOURS PRN
Status: DISCONTINUED | OUTPATIENT
Start: 2017-08-20 | End: 2017-08-24 | Stop reason: HOSPADM

## 2017-08-20 RX ORDER — DEXAMETHASONE SODIUM PHOSPHATE 10 MG/ML
10 INJECTION, SOLUTION INTRAMUSCULAR; INTRAVENOUS ONCE
Status: COMPLETED | OUTPATIENT
Start: 2017-08-20 | End: 2017-08-20

## 2017-08-20 RX ORDER — SIMETHICONE 40MG/0.6ML
80 SUSPENSION, DROPS(FINAL DOSAGE FORM)(ML) ORAL 3 TIMES DAILY
Status: DISCONTINUED | OUTPATIENT
Start: 2017-08-20 | End: 2017-08-22

## 2017-08-20 RX ADMIN — SODIUM CHLORIDE 250 ML: 9 INJECTION, SOLUTION INTRAVENOUS at 19:04

## 2017-08-20 RX ADMIN — VANCOMYCIN HYDROCHLORIDE 750 MG: 5 INJECTION, POWDER, LYOPHILIZED, FOR SOLUTION INTRAVENOUS at 20:51

## 2017-08-20 RX ADMIN — PIPERACILLIN SODIUM,TAZOBACTAM SODIUM 3.38 G: 3; .375 INJECTION, POWDER, FOR SOLUTION INTRAVENOUS at 19:58

## 2017-08-20 RX ADMIN — SODIUM CHLORIDE 100 ML: 9 INJECTION, SOLUTION INTRAVENOUS at 23:46

## 2017-08-20 RX ADMIN — HYOSCYAMINE SULFATE: 16 SOLUTION at 22:29

## 2017-08-20 RX ADMIN — OXYCODONE HYDROCHLORIDE 2 MG: 5 SOLUTION ORAL at 21:26

## 2017-08-20 RX ADMIN — VANCOMYCIN HYDROCHLORIDE 500 MG: 500 INJECTION, POWDER, LYOPHILIZED, FOR SOLUTION INTRAVENOUS at 23:47

## 2017-08-20 RX ADMIN — ASPIRIN 300 MG: 300 SUPPOSITORY RECTAL at 21:24

## 2017-08-20 RX ADMIN — DEXAMETHASONE SODIUM PHOSPHATE 10 MG: 10 INJECTION, SOLUTION INTRAMUSCULAR; INTRAVENOUS at 20:23

## 2017-08-20 RX ADMIN — INSULIN ASPART 3 UNITS: 100 INJECTION, SOLUTION INTRAVENOUS; SUBCUTANEOUS at 22:29

## 2017-08-20 RX ADMIN — SIMETHICONE 80 MG: 20 EMULSION ORAL at 22:29

## 2017-08-20 RX ADMIN — SODIUM CHLORIDE 1000 ML: 9 INJECTION, SOLUTION INTRAVENOUS at 20:25

## 2017-08-20 RX ADMIN — SODIUM CHLORIDE: 9 INJECTION, SOLUTION INTRAVENOUS at 21:25

## 2017-08-20 ASSESSMENT — ENCOUNTER SYMPTOMS: SHORTNESS OF BREATH: 1

## 2017-08-20 NOTE — IP AVS SNAPSHOT
MRN:[id number]                      After Visit Summary   8/20/2017    Celia Lewis    MRN: [id number]           Thank you!     Thank you for choosing [facility] for your care. Our goal is always to provide you with excellent care. Hearing back from our patients is one way we can continue to improve our services. Please take a few minutes to complete the written survey that you may receive in the mail after you visit with us. Thank you!        Patient Information     Date Of Birth          11/30/1926        Designated Caregiver       Most Recent Value    Caregiver    Will someone help with your care after discharge? yes    Name of designated caregiver lives at [facility] care nursing [place]    Phone number of caregiver n/a    Caregiver address n/a      About your hospital stay     You were admitted on:  August 20, 2017 You last received care in the:  [facility] [address] [facility]    You were discharged on:  August 24, 2017        Reason for your hospital stay       You were admitted for sepsis likely due to urinary tract infection.                  Who to Call     For medical emergencies, please call 911.  For non-urgent questions about your medical care, please call your primary care provider or clinic, None          Attending Provider     Provider Specialty    Salvatore Meneses MD Emergency Medicine    PeterAntwan albrecht MD Internal Medicine       Primary Care Provider    None Specified      After Care Instructions     Activity - Up with nursing assistance           Advance Diet as Tolerated       Follow this diet upon discharge:       Adult Formula Drip Feeding: Continuous Isosource 1.5; Gastrostomy; Goal Rate: 45; mL/hr; Medication - Tube Feeding Flush Frequency: At least 15-30 mL water before and after medication administration and with tube clogging; Amout to Send (Nutrition...      NPO for Medical/Clinical Reasons Except for: No Exceptions  Free water flush 200 ml  q4h            General info for SNF       Length of Stay Estimate: Long Term Care  Condition at Discharge: Stable  Level of care:skilled   Rehabilitation Potential: Poor  Admission H&P remains valid and up-to-date: Yes  Recent Chemotherapy: N/A  Use Nursing Home Standing Orders: N/A            Glucose monitor nursing POCT       Before meals and at bedtime            Mantoux instructions       Give two-step Mantoux (PPD) Per Facility Policy Yes            Wound care (specify)       Continue wound cares to coccyx and bilateral ears as previously prescribed.                  Follow-up Appointments     Follow Up and recommended labs and tests       Follow up with Nursing home physician.  Follow up BMP in 2-4 days to check sodium.                  Further instructions from your care team       [facility] to [facility] [phone]    Pending Results     Date and Time Order Name Status Description    8/23/2017 0000 Blood culture Preliminary     8/22/2017 1033 Urine Culture Aerobic Bacterial Preliminary     8/22/2017 1028 Blood culture Preliminary     8/22/2017 1028 Blood culture Preliminary             Statement of Approval     Ordered          08/24/17 1246  I have reviewed and agree with all the recommendations and orders detailed in this document.  EFFECTIVE NOW     Approved and electronically signed by:  Lucian Ochoa MD             Admission Information     Date & Time Provider Department Dept. Phone    8/20/2017 Antwan Green MD [address] [facility] [phone]      Your Vitals Were     Blood Pressure Temperature Respirations Height Weight Pulse Oximetry    158/86 (BP Location: Left arm) 97.6  F (36.4  C) (Oral) 17 1.524 m (5') 69.5 kg (153 lb 3.5 oz) 99%    BMI (Body Mass Index)                   29.92 kg/m2           MyChart Information     [employer] lets you send messages to your doctor, view your test results, renew your prescriptions, schedule appointments and more.  "To sign up, go to [email or web address] . Click on \"Log in\" on the left side of the screen, which will take you to the Welcome page. Then click on \"Sign up Now\" on the right side of the page.     You will be asked to enter the access code listed below, as well as some personal information. Please follow the directions to create your username and password.     Your access code is: [email or web address]  Expires: 2017 12:54 PM     Your access code will  in 90 days. If you need help or a new code, please call your [facility] clinic or [phone].        Care EveryWhere ID     This is your Care EveryWhere ID. This could be used by other organizations to access your [facility] medical records  [id number]        Equal Access to Services     CECILIA GATES : Khadijah Basurto, joshua salcedo, gisel mejias, peg proctor . So [facility] [phone].    ATENCIÓN: Si habla español, tiene a rosas disposición servicios gratuitos de asistencia lingüística. [place] al [phone].    We comply with applicable federal civil rights laws and [place] laws. We do not discriminate on the basis of race, color, national origin, age, disability sex, sexual orientation or gender identity.               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 If we are uncertain of the size of tablets/capsules you have at home, strength may be listed as something that might have changed.        Dose / Directions    insulin glargine 100 UNIT/ML injection   Commonly known as:  LANTUS   This may have changed:  how much to take   Used for:  Type 2 diabetes mellitus with diabetic nephropathy, unspecified long term insulin use status (H)        Dose:  35 Units   Inject 35 Units Subcutaneous 2 times daily   Refills:  0       * oxyCODONE 5 MG/5ML solution   Commonly known as:  ROXICODONE   Indication:  Chronic Pain   This may have changed:  how to take this   Used for:  Multiple joint pain, Sacral decubitus ulcer, stage III (H)        Dose:  2 mg   2 mLs (2 mg) by Per G Tube route 3 times daily   Quantity:  15 mL   Refills:  0       * oxyCODONE 5 MG/5ML solution   Commonly known as:  ROXICODONE   This may have changed:  Another medication with the same name was changed. Make sure you understand how and when to take each.   Used for:  Primary localized osteoarthrosis of shoulder region, unspecified laterality        Dose:  2 mg   2 mLs (2 mg) by Per G Tube route every 4 hours as needed for moderate to severe pain   Quantity:  15 mL   Refills:  0       * Notice:  This list has 2 medication(s) that are the same as other medications prescribed for you. Read the directions carefully, and ask your doctor or other care provider to review them with you.      CONTINUE these medicines which have NOT CHANGED        Dose / Directions    * ACETAMINOPHEN PO        Dose:  650 mg   650 mg by Gastric Tube route 3 times daily   Refills:  0       * acetaminophen 32 mg/mL solution   Commonly known as:  TYLENOL        Dose:  325 mg   325 mg by Gastric Tube route daily as needed for fever or mild pain   Refills:  0       bisacodyl 10 MG Suppository    Commonly known as:  DULCOLAX        Dose:  10 mg   Place 10 mg rectally daily as needed for constipation   Refills:  0       Dakins 0.125 % Soln        Externally apply topically 2 times daily BID and PRN.  1. Cleanse wound with microklenze, cleanse periwound area with naomi perineal 2. Moisten kerlix fluff with dakins solution 0.125%, wring out excess, pack wound with kerlix 3. Apply antifungal powder to periwound area, rub in. Apply criticaid over powder. 4. Cover with ABD using minimal medipore tape to secure. 5. Label dressing with date, time, initials. Follow Rigorous PIP measures.   Refills:  0       fiber modular packet        Dose:  1 packet   1 packet by Per Feeding Tube route 2 times daily   Refills:  0       IMODIUM A-D 1 MG/7.5ML Liqd   Generic drug:  Loperamide HCl        Dose:  4 mg   4 mg by Gastric Tube route every other day   Refills:  0       INSULIN ASPART SC        Dose:  1-6 Units   Inject 1-6 Units Subcutaneous every 6 hours 0800, 1400, 2000, 0200 -250 1 unit -300 2 units -350 3 units -400 4 units -450 5 units BG >450 6 units and update MD   Refills:  0       Lactobacillus Acidophilus Powd        Dose:  1 capsule   1 capsule by Gastric Tube route daily 10 billion units   Refills:  0       metoprolol 10 mg/mL Susp   Commonly known as:  LOPRESSOR        Dose:  25 mg   Take 25 mg by mouth 2 times daily   Refills:  0       multivitamin, therapeutic Tabs tablet        Dose:  1 tablet   1 tablet by Gastric Tube route daily   Refills:  0       ranitidine 150 MG/10ML syrup   Commonly known as:  Zantac   Used for:  Gastroesophageal reflux disease without esophagitis        Dose:  150 mg   10 mLs (150 mg) by Per Feeding Tube route daily   Quantity:  600 mL   Refills:  0       simethicone 40 MG/0.6ML suspension   Commonly known as:  MYLICON        Dose:  80 mg   Take 80 mg by mouth 3 times daily   Refills:  0       vitamin A-D & C drops 750-400-35 UNIT-MG/ML solution NEW  FORMULATION   Used for:  Decubitus ulcer of buttock, unspecified laterality, unspecified ulcer stage        Dose:  7 mL   7 mLs by Per G Tube route daily   Quantity:  50 mL   Refills:  0       * Notice:  This list has 2 medication(s) that are the same as other medications prescribed for you. Read the directions carefully, and ask your doctor or other care provider to review them with you.         Where to get your medicines      Some of these will need a paper prescription and others can be bought over the counter. Ask your nurse if you have questions.     You don't need a prescription for these medications     aspirin 325 MG tablet    fluconazole 40 MG/ML suspension    insulin glargine 100 UNIT/ML injection    levofloxacin 250 MG tablet         Information about where to get these medications is not yet available     ! Ask your nurse or doctor about these medications     oxyCODONE 5 MG/5ML solution    oxyCODONE 5 MG/5ML solution                Protect others around you: Learn how to safely use, store and throw away your medicines at www.disposemymeds.org.             Medication List: This is a list of all your medications and when to take them. Check marks below indicate your daily home schedule. Keep this list as a reference.      Medications           Morning Afternoon Evening Bedtime As Needed    * ACETAMINOPHEN PO   650 mg by Gastric Tube route 3 times daily                                * acetaminophen 32 mg/mL solution   Commonly known as:  TYLENOL   325 mg by Gastric Tube route daily as needed for fever or mild pain                                aspirin 325 MG tablet   1 tablet (325 mg) by Per G Tube route daily   Last time this was given:  325 mg on 8/24/2017  9:29 AM                                bisacodyl 10 MG Suppository   Commonly known as:  DULCOLAX   Place 10 mg rectally daily as needed for constipation                                Dakins 0.125 % Soln   Externally apply topically 2 times daily  BID and PRN.  1. Cleanse wound with microklenze, cleanse periwound area with naomi perineal 2. Moisten kerlix fluff with dakins solution 0.125%, wring out excess, pack wound with kerlix 3. Apply antifungal powder to periwound area, rub in. Apply criticaid over powder. 4. Cover with ABD using minimal medipore tape to secure. 5. Label dressing with date, time, initials. Follow Rigorous PIP measures.                                fiber modular packet   1 packet by Per Feeding Tube route 2 times daily   Last time this was given:  1 packet on 8/24/2017  9:29 AM                                fluconazole 40 MG/ML suspension   Commonly known as:  DIFLUCAN   5 mLs (200 mg) by Per PEG tube route daily for 2 doses                                IMODIUM A-D 1 MG/7.5ML Liqd   4 mg by Gastric Tube route every other day   Generic drug:  Loperamide HCl                                INSULIN ASPART SC   Inject 1-6 Units Subcutaneous every 6 hours 0800, 1400, 2000, 0200 -250 1 unit -300 2 units -350 3 units -400 4 units -450 5 units BG >450 6 units and update MD   Last time this was given:  6 Units on 8/24/2017  3:03 PM                                insulin glargine 100 UNIT/ML injection   Commonly known as:  LANTUS   Inject 35 Units Subcutaneous 2 times daily   Last time this was given:  40 Units on 8/24/2017 10:18 AM                                Lactobacillus Acidophilus Powd   1 capsule by Gastric Tube route daily 10 billion units                                levofloxacin 250 MG tablet   Commonly known as:  LEVAQUIN   1 tablet (250 mg) by Per G Tube route daily for 6 days                                metoprolol 10 mg/mL Susp   Commonly known as:  LOPRESSOR   Take 25 mg by mouth 2 times daily                                multivitamin, therapeutic Tabs tablet   1 tablet by Gastric Tube route daily                                * oxyCODONE 5 MG/5ML solution   Commonly known as:  ROXICODONE   2  mLs (2 mg) by Per G Tube route 3 times daily   Last time this was given:  2 mg on 8/24/2017  9:28 AM                                * oxyCODONE 5 MG/5ML solution   Commonly known as:  ROXICODONE   2 mLs (2 mg) by Per G Tube route every 4 hours as needed for moderate to severe pain   Last time this was given:  2 mg on 8/24/2017  9:28 AM                                ranitidine 150 MG/10ML syrup   Commonly known as:  Zantac   10 mLs (150 mg) by Per Feeding Tube route daily   Last time this was given:  150 mg on 8/24/2017  9:28 AM                                simethicone 40 MG/0.6ML suspension   Commonly known as:  MYLICON   Take 80 mg by mouth 3 times daily   Last time this was given:  80 mg on 8/24/2017  9:28 AM                                vitamin A-D & C drops 750-400-35 UNIT-MG/ML solution NEW FORMULATION   7 mLs by Per G Tube route daily   Last time this was given:  7 mLs on 8/24/2017  9:28 AM                                * Notice:  This list has 4 medication(s) that are the same as other medications prescribed for you. Read the directions carefully, and ask your doctor or other care provider to review them with you.              More Information        Methicillin-Resistant Staphylococcus aureus (MRSA)  What is MRSA?  Staphylococcus aureus bacteria or  staph  is a very common germ. It is found on the skin or in the nose of many people. Sometimes the bacteria causes no problem, or it causes a mild infection. But it can also cause severe infections of the skin, lungs, blood, or other organs or tissues. Some staph infections can be easily treated with antibiotics. But one type of staph, Methicillin-Resistant staphylococcus areas (MRSA) can t. It s called Methicillin-Resistant because the antibiotic methicillin, which used to be effective treatment, no longer works. MRSA is common in hospitals and nursing homes or long-term care facilities. It is also spreading among healthy children and adults outside the  health care system. There are two different ways MRSA can appear in the body:    Colonization: When a person carries the MRSA bacteria (often in nose or on skin), but is healthy. This person can spread MRSA to others.    Infection: When a person gets sick because of the bacteria, it's called being infected with MRSA. This person can also spread MRSA to others. If not treated properly, MRSA infections can be very serious.    What are the symptoms of MRSA infection?  MRSA skin infections start as small red bumps on the skin that look like pimples or spider bites. The small bumps usually get larger and become swollen, painful, warm to the touch and filled with pus. MRSA can also start in other ways, and it can spread deeper into the body. Common places and symptoms include:    Urinary tract: pain and burning when urinating, the need to urinate more often, fever    Blood: high fever, chills, nausea and vomiting, shortness of breath, confusion    Bone, muscle, or other tissue infections    Lungs: pneumonia in one or both lungs    Surgical wound infections    Heart: Infection of the lining of the heart called endocarditis  Who s at risk?  Anyone can get a staph infection. But certain factors make infection more likely, including:    A current or recent stay in the hospital    Living in a nursing home or long-term care facility or other crowded areas, like  barracks or senior living    Taking antibiotics    Having certain health conditions, such as diabetes or HIV    Getting kidney dialysis    Sharing sports equipment, razors or other sharp objects  How does MRSA spread?  MRSA usually spreads through skin-to-skin contact, whether through contact with an infected person or on the hands of health care workers who work with infected patients. MRSA can also spread through contact with contaminated objects, such as cart handles and bedrails or shared towels or athletic equipment.  How is MRSA treated?  MRSA infections are  usually treated with antibiotics. These may be in pill form or through a vein (intravenous or IV). If you have a skin abscess, we may drain it.   Patients who test positive for MRSA colonization may go through a process called decolonization. We apply a topical antibiotic inside your nose to kill the bacteria. We may also wash your skin with a special soap.  Controlling and Preventing MRSA: In the hospital  Hospitals and nursing homes control and prevent MRSA by doing the following:    Handwashing. This is the single most important way to prevent the spread of germs.    Protective clothing. Health care workers and visitors may wear gloves and a gown when entering the room of a patient with MRSA. They remove these items before leaving.    Avoid antibiotic overuse. Too much use can cause germs to resist some antibiotics.    Monitoring. Hospitals monitor the spread of MRSA and educate all staff on the best ways to prevent it.  What you can do as a patient    Ask all hospital staff to wash their hands before touching you. Don t be afraid to speak up!    Wash your own hands often with soap and warm water, or use an alcohol-based hand gel. This is especially important:    After using the bathroom    After touching a bandage    Before eating    Encourage family and friends to wash hands as well    If you need to have a test done, such as an X-ray, follow instructions from staff. You may need to change into a clean hospital gown and wash your hands just before leaving your room.  Controlling and Preventing MRSA: at Home  Patients:    Wash your hands often with soap and warm water, or use an alcohol-based hand gel. This is especially important:    After using the bathroom    After touching a bandage    Before eating    Follow instructions we give you for caring for surgical wounds or any tubes that you have, such as a catheter or dialysis port.    Keep cuts and scrapes clean and covered until they heal.    Do not share  towels, razors, clothing or athletic equipment.    Take all antibiotics your doctor prescribed. Don t take half doses or stop the antibiotics, even if you feel better.  Caregivers:    Wash your hands well with soap and warm water before and after any contact with the patient. You can use an alcohol-based hand gel if your hands aren t visibly dirty.    Wear disposable gloves when changing a bandage, touching an infected wound or handling dirty laundry. Throw away the gloves after each use. Then wash your hands well.    Change the patient s bedding once a week, or more often if it s soiled with feces or body fluids. Wash and dry it alone in a washer and dryer using the warmest temperatures recommended on the labels. Use liquid bleach during the wash cycle if the label permits.    Clean surfaces like tabletops and sinks really well. Keep bathrooms, toilets and bedside commodes clean. A mixture of 1/4 cup of bleach to 1 quart of water works great for this.  Understanding drug resistance  Hard-to-kill (resistant) germs, such as MRSA, develop when antibiotics are taken longer than needed. They can also develop when antibiotics are taken when they aren't needed, or are not taken exactly as directed. This is because any germs that survive treatment with an antibiotic can multiply and thus create more resistant germs. The more often antibiotics are used, the more chances resistant germs have to develop. This is why your care team may not prescribe antibiotics unless he or she is certain that they are needed.  Date Last Reviewed: 10/1/2016    7320-2429 [employer]. [address] [place] [address], [place] [address]. All rights reserved. This information is not intended as a substitute for professional medical care. Always follow your healthcare professional's instructions.

## 2017-08-20 NOTE — ED PROVIDER NOTES
History     Chief Complaint:  Shortness of Breath       The history is limited by the condition of the patient.      Celia Lewis is a 89 year old female who presents with via EMS for altered mental status for the last 24 hours.  Patient is demented at baseline, and does not have any voluntary movements, does not follow commands.  However per EMS the nursing home states that she's been slightly less responsive and generalized with altered state for 24 hours.  No further history available    Patient is full code      Timeline:  1839: Arrival. Arrived on oxygen  1840: Vitals gathered  1842: IV placed    Allergies:  Sulfa Drugs       Medications:    Roxicodone  Ceftin  Mylicon  Lantus   Zantac  Lopressor  Insulin ASPART  Dakins 0.125 % solution  Lactobacillus acidophilus POWD  Acetaminophen   Dulcolax    Past Medical History:    Actinomyces  Arthritis  Atrial fibrillation  Calculus of kidney  Candidiasis of skin and nails  Congestive heart failure  Coronary artery disease  Heart disease  Hyp ht/kd NOS I-IV w hf  Hypertension  Morbid obesity  Hyperlipidemia  UTI history  Primary localized osteoarthrosis  Renal failure  Thyroid disease  Type 2 diabetes   Uric acid stone in urine  Hyperlipidemia   Fall  Chronic kidney disease  Scalp laceration  Advanced dementia   Actinomyces infection  Bacteremia   Nephrolithiasis  Sacral decubitus ulcer  Hypernatremia  Complication of gastrostomy tube  Sepsis due to urinary tract infection  Atrial fibrillation  Alzheimer's disease    Past Surgical History:    Cystoscopy, insert stent urethra, combined  Cystoscopy,+Ureteroscopy  HC X-ray abdomen ap view  Laser holmium lithotripsy ureters, insert stent combined    Family History:    Cerebrovascular disease  Diabetes      Social History:  Presents via EMS   Tobacco use: Former smoker   Alcohol use: No  PCP: No primary care provider on file.    Marital Status:        Review of Systems   Respiratory: Positive for shortness of  breath.        Physical Exam     Patient Vitals for the past 24 hrs:   BP Temp Temp src Resp SpO2 Height Weight   08/20/17 2024 - 98.3  F (36.8  C) Oral - - 1.524 m (5') 65.9 kg (145 lb 4.5 oz)   08/20/17 2000 115/59 - - 25 97 % - -   08/20/17 1955 139/69 - - 30 96 % - -   08/20/17 1954 139/69 - - (!) 31 92 % - -   08/1935 - 100.8  F (38.2  C) Rectal (!) 32 - - -   08/20/17 1851 - - - - 96 % - -   08/20/17 1845 156/84 - - (!) 46 99 % - -           Physical Exam    General: Pt seen on \[facility] [place] [facility]\"",intermittently looking around the room, nonverbal eyes open.    Eyes: Tracking well, clear conj BL  ENT:Dry mucous membranes, supple neck.  CV:Low-grade tachycardia, no murmurs noted.   Respiratory: Mild tachypnea, auscultation challenging given shallow breaths satting 90% in room air, 94% on 2 L nasal cannula   Abd: Soft, non tender, no guarding, no rebound. Non distended  Skin: No skin changes, no edema or rash present to extremities  Neuro:Nonverbal at baseline, intermittently seen moving bilateral hands.  Bilateral lower extremity superior to have contractures.    Psych:Deferred    Emergency Department Course     Imaging:  Radiographic findings were communicated with the family who voiced understanding of the findings.    CT Head w/o Contrast:     IMPRESSION:     1. Findings concerning for an acute infarct within the left occipital  lobe. Mild regional edema without significant mass effect or midline  shift. Hypodensity in this region also extends into the left temporal  lobe and region of ischemia in this area cannot be excluded.  Evaluation for regions of acute ischemia would be better appreciated  with MR.  2. New area of encephalomalacia within the paramedian left frontal  lobe likely from previous infarct although an acute component of  ischemia is difficult to exclude. Indeterminate age infarcts in  bilateral thalami. Again this would be better evaluated with  above-mentioned MRI.  3. Fairly extensive  confluent periventricular white matter  hypodensities which are nonspecific, but likely related to chronic  microvascular ischemic disease. Moderate diffuse parenchymal volume  loss. These findings have progressed since previous MR from 6/24/2014.    Preliminary result per radiology.     XR Chest Port 1 view:     IMPRESSION:  1. Essentially stable chest x-ray since prior study dated 8/6/2017  with left basilar opacity likely due to atelectasis, infiltrate,  effusion, and/or enlarged cardiac silhouette.  2. Hypoaeration is again noted.     Preliminary result per radiology.     Laboratory:    Lipid panel reflex: In process    CRP inflammation: 7.0     Blood culture: In process    Blood culture: In process    Lactic acid whole blood: 5.6 (HH)     Troponin: 0.658 (HH)     Blood gas nevous: WNL     UA with microscopic: Urine Bloo trace (A), Protein Albumin Urine 100 (A), Leukocyte Esterase Urine large (A), WBC/HPF >182 (H), RBC/HPF 24 (H), Yeast urine few (A), Hyaline Casts 55 (H)      Nt probnp inpatient: 1031     CBC: WBC 14.3 (H) o/w WNL ( HGB 12.8, )     CMP: Glucose 258 (H), Bun 60 (H), (Creatinine 1.13 (H)), GFR Estimate 45 (L), Albumin 3.3 (L)    Lipase: 174     Interventions:    1846:0.9% Sodium Chloride NS 1L IV Bolus   1933: Vancocin 750 mg in NaCl 0.9% ml Intermittent  1933: Zosyn 3.375 g vial attach to  mL bag  1933:0.9% Sodium Chloride NS 1L IV Bolus   1846:0.9% Sodium Chloride NS 1L IV Bolus   2008: Decadron 10 mg IV Injection      Emergency Department Course:  Past medical records, nursing notes, and vitals reviewed.  1839: I performed an exam of the patient and obtained history, as documented above.  IV inserted and blood drawn.   Above interventions provided.   The patient was sent for a CT head while in the emergency department, findings above.   The patient was sent for a XR Chest while in the emergency department, findings above.   1857: I rechecked the patient. Explained findings to the  patient's son.   1940: Discussion with Dr. Green.  I personally reviewed the laboratory results with the son and answered all related questions prior to admission.  Findings and plan explained to the son who consents to admission.     1940: Discussed the patient with Dr. Green, who will admit the patient to a ER bed for further monitoring, evaluation, and treatment.        Impression & Plan      Medical Decision Making:    Celia Lewis is a 89 year old female who presents to the emergency department with altered mental status. Work up has yielded two reasons for this is: sepsis, the other is probably an acute left occipital stroke. At this point the patient is well out of the window for TPA and is otherwise not an ideal candidate. Also given Xray findings, recent stoke, and elevated lactate and white count and oxygen needs, will treat empirically for sepsis favoring aspiration pneumonitis or pneumonia as cause. She is getting Vancocin and Zosyn  which should cover anaerobes. Airway is protected at this time, but does have significant secretions and a diminished gag and ability to clear, setting 90% on room air, up to 94% on 2 Liters nasal cannula. With this in mind we will admit to the ICU as patient may need airway management in the next 24 hours. I've discussed this with Dr. Green who generously agrees to accept care of the patient. Will monitor very cafe fully until an ICU bed is available. I do appreciate a slight elevation in Trop, may be likely to otherwise febrile septic picture with organ failure. Will trend in the inpatient setting.  Will also order Decadron four Marvel edema surrounding acute stroke, will alert neurology and likely MRI in the inpatient setting.    Critical Care time:  was 35 minutes for this patient excluding procedures.    Diagnosis:    ICD-10-CM   1. Sepsis, due to unspecified organism (H) A41.9                       2. Altered mental status, unspecified altered mental  status type R41.82       Disposition:  Admitted to Dr. Green    Discharge Medications:  Current Discharge Medication List        Jeb Westbrook  8/20/2017    EMERGENCY DEPARTMENT    I, Jeb Westbrook am serving as a scribe at 6:39 PM on 8/20/2017 to document services personally performed by Uriel Meneses MD based on my observations and the provider's statements to me.       Salvatore Meneses MD  08/20/17 [id number]

## 2017-08-20 NOTE — IP AVS SNAPSHOT
` `     [place] [address] [facility]: [phone]                 INTERAGENCY TRANSFER FORM - NOTES (H&P, Discharge Summary, Consults, Procedures, Therapies)   2017                    Hospital Admission Date: 2017  RAÚL MERCEDES   : 1926  Sex: Female        Patient PCP Information     None on File      History & Physicals     No notes of this type exist for this encounter.      Discharge Summaries     No notes of this type exist for this encounter.         Consult Notes      Consults by Linda Hatch RD, LD at 2017  1:46 PM     Author:  Linda Hatch RD, LD Service:  Nutrition Author Type:  Registered Dietitian    Filed:  2017  2:09 PM Date of Service:  2017  1:46 PM Creation Time:  2017  1:40 PM    Status:  Addendum :  Linad Hatch RD, LD (Registered Dietitian)     Consult Orders:    1. Nutrition Services Adult IP Consult [[id number]] ordered by Antwan Green MD at 17                CLINICAL NUTRITION SERVICES  -  ASSESSMENT NOTE      RECOMMENDATIONS FOR MD/PROVIDER TO ORDER:[MH1.1]   When IVF D/C'd recommend increase H20 flushes as previous[MH1.2] 200 mL every 4 hrs (1200 mL).    Total Fluid (TF + Flushes):  2020 mL.    Recommendations Ordered by Registered Dietitian (RD):[MH1.1]   Resume TF Isosource 1.5 at 15 mL/hr;  Increase by 15 mL every 12 hrs to goal 45 mL/hr =[MH1.2] 1620 kcals, 73 gm pro (1.5 gm/kg), 820 mL H20, 16 gm fiber, 190 gm CHO[MH1.1], 5544 International Units Vit A, 324 mg Vit C, 22 mg Zinc.[MH1.2]  Nutrisource Fiber 1 packet BID = 30 kcals, 6 gm fiber.    Total (TF + Nutrisource Fiber BID):  1650 kcals (33 kcal/kg), 22 gm fiber.[MH1.1]    Free H20 60 mL every 4 hrs[MH1.3]    Per Pressure Injury Protocol and as pt received at NH:  Certavite daily  Tri-Vi-Sol 7 ml once daily = 5250 International Units Vit A, 245 mg Vit C, 2800 International Units Vit D.  Total (TF + Tri-Vi-Sol):  10,794 International Units  "Vit A, 569 mg Vit C.[MH1.2]   Malnutrition:[MH1.1]   Patient does not meet two of the criteria necessary for diagnosing malnutrition[MH1.2]        REASON FOR ASSESSMENT  Celia Lewis is a 89 year old female seen by Registered Dietitian for Provider Order - Registered Dietitian to Assess and Order TF per Medical Nutrition protocol and Screen - TF or Parenteral Nutrition      NUTRITION HISTORY  - Information obtained from EPIC records and paper chart.  - Patient is[MH1.1] NPO[MH1.2] at nursing home.  - Tube feeding history:  Pt was on TF Isosource 1.5 at 45 mL/hr = 1620 kcals, 73 gm pro (1.5 gm/kg), 820 mL H20, 16 gm fiber, 190 gm CHO.  She also received Nutrisource Fiber 1 packet BID = 30 kcals, 6 gm fiber.  Total (TF + Nutrisource Fiber BID):  1650 kcals (33 kcal/kg), 22 gm fiber.  H20 flushes were 200 mL every 4 hrs (1200 mL).  Total Fluid (TF + Flushes):  2020 mL.   - Pt was [facility] discharged (Aug) from [facility] on similar regimen[MH1.1].[MH1.2]  Pt was also receiving Pressure Injury micronutrients - Theravite daily and Tri-Vi-Sol 7 ml daily.    CURRENT NUTRITION ORDERS  Diet Order:     NPO   Was asked to resume TF via existing G-tube    Current Intake/Tolerance:  NPO x 2 days.[MH1.1]    SLP completed bedside swallow eval -  severe to profound oral dysphagia[MH1.2]     PHYSICAL FINDINGS  Observed[MH1.1]  No nutrition-related physical findings observed[MH1.2]  Obtained from Chart/Interdisciplinary Team[MH1.1]  Pressure Ulcers - Stage 3 sacral decubitus ulcer on admission per notes.  WOCN consult pending.[MH1.2]    ANTHROPOMETRICS  Height: 5' 0\"  Weight:[MH1.1]  65.9 kg ([MH1.2]145 lbs 4.53 oz[MH1.1])[MH1.2]  Body mass index is 28.37 kg/(m^2).  Weight Status:[MH1.1]  Overweight BMI 25-29.9[MH1.2]  IBW:[MH1.1]  45.4 kg[MH1.2]  % IBW:[MH1.1]  145%[MH1.2]  Weight History:[MH1.1]  5.5 kg (8%) weight loss over past 1.5 months.[MH1.2]    Wt Readings from Last 20 Encounters:   08/20/17 65.9 kg (145 lb 4.5 oz)   08/12/17 " 67.1 kg (148 lb)   08/09/17 69 kg (152 lb 1.9 oz)   07/06/17 71.4 kg (157 lb 6.4 oz)   06/19/17 69.3 kg (152 lb 12.5 oz)   06/24/16 72.2 kg (159 lb 3.2 oz)   06/18/15 83.9 kg (185 lb)   05/29/15 81.6 kg (180 lb)   05/28/15 81.6 kg (180 lb)   05/22/15 81.6 kg (179 lb 12.8 oz)   06/24/14 77.5 kg (170 lb 13.7 oz)   08/26/12 82.1 kg (181 lb)   08/25/12 82.1 kg (181 lb)   08/21/12 82.3 kg (181 lb 7 oz)   08/13/12 87.3 kg (192 lb 8 oz)[MH1.4]       LABS  Labs reviewed[MH1.1]  BUN 51 (H)  Cr 1.03 (NL) - Pt with CKD-stage 3  TG 1049 (H)  Hgb A1C 8.2 (H)  BGM:  301, 291, 264 - Pt with DM-2[MH1.2]    MEDICATIONS  Medications reviewed[MH1.1]  High Resistant SSI - for glycemic control.  Anticipate Lantus will resume when TF started, per MD notes.  Culturelle - for bowel health  IVF NaCl at 125 mL/hr - for fluid delivery[MH1.2]    Dosing Weight[MH1.1]:  50.5 kg (adjusted for overwt)[MH1.2]     ASSESSED NUTRITION NEEDS PER APPROVED PRACTICE GUIDELINES:  Estimated Energy Needs:[MH1.1]  0327-2493[MH1.2] kcals ([MH1.1]30-35 Kcal/Kg[MH1.2])  Justification:[MH1.1] wound healing[MH1.2]  Estimated Protein Needs:[MH1.1]  60-76[MH1.2] grams protein ([MH1.1]1.2-1.5 g pro/Kg[MH1.2])  Justification:[MH1.1] wound healing, CKD and preservation of lean body mass[MH1.2]  Estimated Fluid Needs:[MH1.1]  8440-0534[MH1.2] mL ([MH1.1]1 mL/Kcal[MH1.2])  Justification:[MH1.1] maintenance[MH1.2]    MALNUTRITION:  % Weight Loss:[MH1.1]  > 5% in 1 month (severe malnutrition)[MH1.2]  % Intake:[MH1.1]  No decreased intake noted[MH1.2]  Subcutaneous Fat Loss:[MH1.1]  None observed[MH1.2]  Muscle Loss:[MH1.1]  None observed[MH1.2]  Fluid Retention:[MH1.1]  None noted[MH1.2]    Malnutrition Diagnosis:[MH1.1] Patient does not meet two of the above criteria necessary for diagnosing malnutrition[MH1.2]    NUTRITION DIAGNOSIS:[MH1.1]  Inadequate enteral nutrition infusion[MH1.2] related to[MH1.1] TF held[MH1.2] as evidenced by[MH1.1] meeting 0% estimated needs and  plan TF resumption today.[MH1.2]      NUTRITION INTERVENTIONS  Recommendations / Nutrition Prescription[MH1.1]  Resume TF Isosource 1.5 at 15 mL/hr;  Increase by 15 mL every 12 hrs to goal 45 mL/hr =[MH1.2] 1620 kcals, 73 gm pro (1.5 gm/kg), 820 mL H20, 16 gm fiber, 190 gm CHO[MH1.1], 5544 International Units Vit A, 324 mg Vit C, 22 mg Zinc.[MH1.2]  Nutrisource Fiber 1 packet BID = 30 kcals, 6 gm fiber.    Total (TF + Nutrisource Fiber BID):  1650 kcals (33 kcal/kg), 22 gm fiber.[MH1.1]    Free H20 60 mL every 4 hrs[MH1.3]    Per Pressure Injury Protocol and as pt received at NH:  Certavite daily  Tri-Vi-Sol 7 ml once daily = 5250 International Units Vit A, 245 mg Vit C, 2800 International Units Vit D.  Total (TF + Tri-Vi-Sol):  10,794 International Units Vit A, 569 mg Vit C.    When IVF D/C'd recommend increase H20 flushes as previous[MH1.2] 200 mL every 4 hrs (1200 mL).    Total Fluid (TF + Flushes):  2020 mL.     Implementation  Nutrition education:[MH1.1] Not appropriate at this time due to patient condition  Collaboration and Referral of Nutrition care - Discussed pt during ICU interdisciplinary rounds this morning.  EN Composition, EN Schedule - Entered above TF orders in [facility]  Feeding Tube Flush - Ordered above H20 flushes in EPIC  Medical Food Supplement - Ordered Nutrisource Fiber as above  Multivitamin/Mineral - Ordered Certavite and Tri-Vi-Sol as above[MH1.2]    Nutrition Goals[MH1.1]  TF + Nutrisource Fiber will meet % estimated needs.[MH1.2]    MONITORING AND EVALUATION:[MH1.1]  Progress towards goals will be monitored and evaluated per protocol and Practice Guidelines[MH1.2]    Linda Hatch RD, LD, CNSC[MH1.1]             Revision History        User Key Date/Time User Provider Type Action    > MH1.3 8/21/2017  2:09 PM Linda Hatch, GAVI, LD Registered Dietitian Addend     MH1.4 8/21/2017  2:05 PM Linda Hatch RD, LD Registered Dietitian Sign     1.2 8/21/2017  1:46 PM Mamie  Linda FELIPE RD, [place] Registered Dietitian      MH1.1 8/21/2017  1:40 PM Linda Hatch RD, [place] Registered Dietitian             Consults by Tristan Lai MD at 8/21/2017  8:22 AM     Author:  Tristan Lai MD Service:  Neuro ICU Author Type:  Physician    Filed:  8/21/2017  8:22 AM Date of Service:  8/21/2017  8:22 AM Creation Time:  8/21/2017  7:59 AM    Status:  Signed :  Tristan Lai MD (Physician)     Consult Orders:    1. Neurology IP Consult: Patient to be seen: Routine - within 24 hours; CVA; Consultant may enter orders: Yes [[id number]] ordered by Antwan Green MD at 08/20/17 2023                  [facility] [place]  [facility]    Vascular Neurology / Stroke Consultation Note     Celia Lewis MRN# [id number]   YOB: 1926 Age: 89 year old    Code Status:[AZ1.1]Full Code[AZ1.2]   Date of Admission: 8/20/2017  Date of Consult:[AZ1.1] 08/21/2017[AZ1.2]    _________________________________[AZ1.1]   Primary Care Physician   No primary care provider on file.[AZ1.2]  ______________________________________________[AZ1.1]         Assessment & Plan[AZ1.2]   ______________________________________________[AZ1.1]  (I63.433) Cerebrovascular accident (CVA) due to bilateral embolism of posterior cerebral arteries (H)  (primary encounter diagnosis)[AZ1.3]  --Bilateral occipital strokes  --Possible bifrontal strokes  --Left frontal microhemorrhage  --Anticoagulation and advanced antithrombotics will be limited.[AZ1.1]   (A41.9) Sepsis, due to unspecified organism (H)[AZ1.3]  --Severe UTI  --Management per hospitalists.[AZ1.1]   (R41.82) Altered mental status, unspecified altered mental status type[AZ1.3]  --Likely related to combination of UTI and strokes[AZ1.1]  (G30.1,  F02.80) Late onset Alzheimer's disease without behavioral disturbance[AZ1.3]  --Severe, end-stage AD  --Recovery is unlikely  --Palliative care consult will be the most  appropriate  #. DVT Prophylaxis  --Mechanical only due to microbleeing.   #. PT/OT/Speech  --Start evaluations  #. Nutrition / GI Prophylaxis  --Per recommendations of speech therapy    I talked to patient's son. I explained the findings, prognosis and advised on palliative care. Patient is an [employer]ai L[employer] day and and her advanced directives will be guided by .     #. Code Status: Full Code     TIME:   Neurocritical care time:  45 minutes for evaluation and management of stroke and stupor. Patient is critically ill has a very high risk of deterioration  ----------------------------------------------------------------------------------  ----------------------------------------------------------------------------------  Reason for consult: I was asked by Dr. Green to evaluate this patient for stroke.[AZ1.1]    Chief Complaint[AZ1.2]   ______________________________________________  Decreased mental status  History is obtained from the electronic health record and patient's son[AZ1.1]    History of Present Illness[AZ1.2]   ______________________________________________  89-year-old female with a history of advanced Alzheimer's dementia, nonverbal and bed bound at baseline, recurrent urinary tract infection, diabetes mellitus type 2, hypertension, chronic kidney disease stage III, chronic stage III sacral decubitus ulcer, dysphagia status post gastrostomy tube placement who was brought to the emergency room from her care facility at [facility] with change in mental state.  Per the notes from the care facility, the patient is nonverbal at baseline, however, today she was found to be more altered and less responsive than her usual baseline.  She was also found to have a temperature in excess of 101 and was brought to the emergency room.  The patient was just discharged from [facility] on 08/10/2017 where she had presented with sepsis due to urinary tract infection.  She was discharged on Ceftin with  about 7 days of treatment left at that time.  The history was obtained based on my discussion with her son, . Salvatore Lewis, who is allergist and immunologist and also review of nursing home documents.  Reportedly, there was no recent diarrhea or vomiting.  No cough.  Per Dr. Salvatore Lewis, her son, he felt that she was actually getting better during his last visit with her on Thursday.  In the emergency room, the patient was seen by Dr. Sita valenzuela, she had a basic workup done, which showed elevated white cell count of 14.3.  She was also febrile with fever up to 100.8.  Chest x-ray showed left basilar opacity and notably a CT head showing findings concerning for learning for an acute infarct within the left occipital lobe with mild wheeze, no edema, without significant mass effect or midline shift.       Overnight, stuporous, occasionally opens eyes. 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     Advanced directives, counseling/discussion, POLST completed 5/29/15  Full Code 5/29/2015     Alzheimer's disease 5/22/2015     Arthritis     shoulder     Atrial fibrillation (H) 5/22/2015     Calculus of kidney      Candidiasis of skin and nails 5/22/2015     Congestive heart failure, unspecified      Coronary artery disease      Heart disease, unspecified     diastolic dysfunction     Hyp ht/kd NOS I-IV w hf 5/22/2015     Hypertension      Morbid obesity (H) 5/22/2015     Other and unspecified hyperlipidemia 5/22/2015     Personal history of urinary (tract) infection 5/22/2015     Primary localized osteoarthrosis, shoulder region 5/22/2015     Renal failure, unspecified     secondary to diuresis     Thyroid disease      Type II or unspecified type diabetes mellitus with renal manifestations, not stated as uncontrolled(250.40) 5/22/2015     Type II or unspecified type  diabetes mellitus without mention of complication, not stated as uncontrolled      Uric acid stone in urine      Past Surgical History[AZ1.2]   ______________________________________________[AZ1.1]  Past Surgical History:   Procedure Laterality Date     CYSTOSCOPY, INSERT STENT URETHRA, COMBINED  2012    Procedure: COMBINED CYSTOSCOPY, INSERT STENT URETHRA;  cystoscopy, left  ureteral stent placement, left pyelogram;  Surgeon: Eulalio Bernardo MD;  Location: [facility] OR     CYSTOSCOPY,+URETEROSCOPY  05    with placement of (L) ureteral JJ stent     HC X-RAY ABDOMEN AP VIEW (KUB)  05     LASER HOLMIUM LITHOTRIPSY URETER(S), INSERT STENT, COMBINED  2012    Procedure: COMBINED CYSTOSCOPY, URETEROSCOPY, LASER HOLMIUM LITHOTRIPSY URETER(S), INSERT STENT;  CYSTOSCOPY, LEFT URETEROSCOPY, LASER HOLMIUM LITHOTRIPSY ,left ureteral stent exchange;  Surgeon: Eulalio Bernardo MD;  Location: [facility] OR     Prior to Admission Medications[AZ1.2]   ______________________________________________[AZ1.1]  Prior to Admission Medications   Prescriptions Last Dose Informant Patient Reported? Taking?   ACETAMINOPHEN PO 2017 at 1400 penitentiary Yes Yes   Si mg by Gastric Tube route 3 times daily    Dakins 0.125 % SOLN 2017 at 1600 penitentiary Yes Yes   Sig: Externally apply topically 2 times daily BID and PRN.   1. Cleanse wound with microklenze, cleanse periwound area with naomi perineal  2. Moisten kerlix fluff with dakins solution 0.125%, wring out excess, pack wound with kerlix  3. Apply antifungal powder to periwound area, rub in. Apply criticaid over powder.  4. Cover with ABD using minimal medipore tape to secure.  5. Label dressing with date, time, initials. Follow Rigorous PIP measures.   INSULIN ASPART SC 2017 at 1400 penitentiary Yes Yes   Sig: Inject 1-6 Units Subcutaneous every 6 hours 0800, 1400, 2000, 0200  -250 1 unit  -300 2 units  -350 3 units  BG  351-400 4 units  -450 5 units  BG >450 6 units and update MD   Lactobacillus Acidophilus POWD 2017 at 0800 Nursing Home Yes Yes   Si capsule by Gastric Tube route daily 10 billion units    Loperamide HCl (IMODIUM A-D) 1 MG/7.5ML LIQD 2017 at 0800 Nursing Home Yes Yes   Si mg by Gastric Tube route every other day   acetaminophen (TYLENOL) 32 mg/mL solution 8/15/2017 at Unknown time Nursing Home Yes Yes   Si mg by Gastric Tube route daily as needed for fever or mild pain   bisacodyl (DULCOLAX) 10 MG suppository  at ILn Nursing Home Yes Yes   Sig: Place 10 mg rectally daily as needed for constipation   fiber modular (NUTRISOURCE FIBER) packet 2017 at 0800 [facility] Home Yes Yes   Si packet by Per Feeding Tube route 2 times daily   insulin glargine (LANTUS) 100 UNIT/ML injection 2017 at 0800 detention No Yes   Sig: Inject 30 Units Subcutaneous 2 times daily   metoprolol (LOPRESSOR) 10 mg/mL SUSP 2017 at 0800 Nursing Home Yes Yes   Sig: Take 25 mg by mouth 2 times daily   multivitamin, therapeutic (THERA-VIT) TABS tablet 2017 at 0800 [facility] Home Yes Yes   Si tablet by Gastric Tube route daily   oxyCODONE (ROXICODONE) 5 MG/5ML solution 2017 at 1400 detention Yes Yes   Sig: Take 2 mg by mouth 3 times daily   oxyCODONE (ROXICODONE) 5 MG/5ML solution 8/15/2017 at Unknown time detention No Yes   Si mLs (2 mg) by Per G Tube route every 4 hours as needed for moderate to severe pain   ranitidine (ZANTAC) 150 MG/10ML syrup 2017 at 0800 detention No Yes   Sig: 10 mLs (150 mg) by Per Feeding Tube route daily   simethicone (MYLICON) 40 MG/0.6ML suspension 2017 at 1400 detention Yes Yes   Sig: Take 80 mg by mouth 3 times daily   vitamin A-D & C drops (TRI-VI-SOL) 750-400-35 UNIT-MG/ML solution NEW FORMULATION 2017 at 0800 detention No Yes   Si mLs by Per G Tube route daily      Facility-Administered Medications: None     Allergies  "  Allergies   Allergen Reactions     Sulfa Drugs Other (See Comments)     Per nursing home documents, patient has allergy to sulfa       Social History[AZ1.2]   ______________________________________________[AZ1.1]  Social History     Social History     Marital status:      Spouse name: Leonel     Number of children: N/A     Years of education: N/A     Occupational History      Retired     Social History Main Topics     Smoking status: Former Smoker     Smokeless tobacco: Never Used      Comment: She was a social smoker many years ago     Alcohol use No     Drug use: No     Sexual activity: Not Asked     Other Topics Concern     None     Social History Narrative         Family History[AZ1.2]   ______________________________________________[AZ1.1]  Family History   Problem Relation Age of Onset     CEREBROVASCULAR DISEASE Mother      CEREBROVASCULAR DISEASE Father      CANCER Other      Lung     DIABETES Brother        Review of Systems[AZ1.2]   ______________________________________________  Review of systems is not obtainable due to patient factors - mental status[AZ1.1]      Physical Exam[AZ1.2]   ______________________________________________  Weight:[AZ1.1]145 lbs 4.53 oz[AZ1.2]; Height:[AZ1.1]5' 0\"  Temp: 97.3  F (36.3  C) Temp src: Axillary BP: 153/83   Heart Rate: 88 Resp: 12 SpO2: 95 % O2 Device: None (Room air) Oxygen Delivery: 2 LPM[AZ1.2]  General Appearance:  No acute distress  Neuro:       Mental Status Exam:  Stuporous. Mute. Does not follow commands.  Mental status is decreased        Cranial Nerves:  Pupils 3 mm, reactive. Occulocephalis reflexes are present.  Corneal reflexes present. Face symmetric. Other CN are untestable           Motor:  Minimal movements to stimulation       Reflexes:  Low DTR, toes equivocal       Sensory:  Untestable                    Coordination:   Untestable       Gait:  Untestable  Neck: no nuchal rigidity, normal thyroid. No carotid bruits.    Cardiovascular: " Regular rate and rhythm, no m/r/g  Lungs: Clear to auscultation  Abdomen: Soft, not tender, not distended  Extremities: No clubbing, no cyanosis, no edema[AZ1.1]    Data[AZ1.2]   ______________________________________________  All Data personally reviewed:       Labs:   CBC RESULTS:[AZ1.1]     Recent Labs  Lab 08/21/17  0625 08/20/17 1853   WBC 10.8 14.3*   RBC 3.76* 3.99   HGB 11.9 12.8   HCT 37.3 40.4    317[AZ1.2]     Basic Metabolic Panel:[AZ1.1]   Recent Labs   Lab Test  08/21/17 0625  08/20/17 1853 08/09/17   0649  08/08/17   0655   NA   --   142  140  141   POTASSIUM   --   5.1  4.3  4.4   CHLORIDE   --   107  107  109   CO2   --   23  20  19*   BUN   --   60*  35*  34*   CR  1.01  1.13*  0.87  0.85   GLC   --   258*  249*  252*   NELLIE   --   9.2  8.9  8.9[AZ1.2]     Liver panel:[AZ1.1]  Recent Labs   Lab Test  08/20/17   1853  08/04/17   0450  06/30/17   1620  06/14/17   1200  06/24/16   0806   PROTTOTAL  8.1  7.6  8.1  8.2  6.0*   ALBUMIN  3.3*  2.8*  2.9*  2.5*  1.6*   BILITOTAL  0.5  0.5  0.7  0.5  0.7   ALKPHOS  82  94  81  96  155*   AST  23  17  24  20  24   ALT  28  24  26  22  48[AZ1.2]     INR:[AZ1.1]  Recent Labs   Lab Test  08/20/17   1853  06/14/17   1200  06/21/16   0643  06/23/14   2140  08/05/12   1834   INR  0.98  1.10  1.37*  1.01  1.09[AZ1.2]      Lipid Profile:[AZ1.1]  Recent Labs   Lab Test  08/20/17   1853 05/26/15   CHOL  269*  159   HDL  30*  40   LDL  Cannot estimate LDL when triglyceride exceeds 400 mg/dL  77   TRIG  1049*  211*   CHOLHDLRATIO   --   4.0[AZ1.2]     Thyroid Panel:[AZ1.1]  Recent Labs   Lab Test  11/03/10   0710  06/01/10   1523  01/27/10   1534  11/16/09   1459   TSH  2.69  4.11  4.05  4.30[AZ1.2]      Vitamin B12:[AZ1.1]   Recent Labs   Lab Test  01/27/10   1534  11/16/09   1459   B12  1000  188*[AZ1.2]      Vitamin D level:[AZ1.1] No lab results found.[AZ1.2]  A1C:[AZ1.1]   Recent Labs   Lab Test  08/21/17   0625  06/15/17   0833  06/21/16   0643  05/26/15  06/24/14   0703   A1C  8.2*  8.4*  8.2*  6.7*  6.5*[AZ1.2]     Troponin I:[AZ1.1]   Recent Labs   Lab Test  08/21/17   0625  08/20/17   2330  08/20/17   1853  06/30/17   1620  08/08/12   1751  08/08/12   1323  05/19/10   0646   TROPI  0.558*  0.706*  0.658*  0.035  0.013  0.022  <0.012[AZ1.2]     Ammonia:[AZ1.1] No lab results found.[AZ1.2]  CK:[AZ1.1]   Recent Labs   Lab Test  05/19/10   0646   CKT  110[AZ1.2]        CRP inflammation:[AZ1.1]   Recent Labs   Lab Test  08/20/17   1853  05/08/13   1300  10/12/12   0839  08/31/12   0800   CRP  7.0  11.0*  7.4  19.6*[AZ1.2]     ESR:[AZ1.1]   Recent Labs   Lab Test  05/08/13   1300  10/12/12   0839  08/31/12   0800   SED  61*  47*  60*[AZ1.2]       MEAGHAN:[AZ1.1] No lab results found.[AZ1.2]    ANCA:[AZ1.1] No lab results found.[AZ1.2]   Drug Screen:[AZ1.1] No lab results found.[AZ1.2]  Alcohol level:[AZ1.1]No lab results found.[AZ1.2]  UA Results:[AZ1.1]  Recent Labs   Lab Test  08/20/17 1957 06/30/17   1654   COLOR  Yellow   < >  Yellow   APPEARANCE  Cloudy   < >  Cloudy   URINEGLC  Negative   < >  Negative   URINEBILI  Negative   < >  Negative   URINEKETONE  Negative   < >  Negative   SG  1.016   < >  1.010   UBLD  Trace*   < >  Small*   URINEPH  5.5   < >  5.5   PROTEIN  100*   < >  30*   UROBILINOGEN   --    --   0.2   NITRITE  Negative   < >  Negative   LEUKEST  Large*   < >  Large*   RBCU  24*   < >  2-5*   WBCU  >182*   < >  25-50*    < > = values in this interval not displayed.[AZ1.2]     Most Recent 6 Bacteria Isolates From Any Culture (See EPIC Reports for Culture Details):[AZ1.1]  Recent Labs   Lab Test  08/20/17   1930  08/20/17   1855  08/05/17   1148  08/05/17   1140  08/04/17   0605  08/04/17   0555   CULT  No growth after 8 hours  No growth after 10 hours  No growth  No growth  Cultured on the 1st day of incubation: Staphylococcus epidermidis  Critical Value/Significant Value, preliminary result only, called to and read   back by JOANN VALDERRAMA RN  8.5.17 0835. TENA  Cultured on the 1st day of incubation: Staphylococcus hominis  (Note)  POSITIVE for STAPHYLOCOCCUS EPIDERMIDIS and POSITIVE for the mecA  gene (resistant to methicillin) by Verigene multiplex nucleic acid  test. Final identification and antimicrobial susceptibility testing  will be verified by standard methods.    Specimen tested with Verigene multiplex, gram-positive blood culture  nucleic acid test for the following targets: Staph aureus, Staph  epidermidis, Staph lugdunensis, other Staph species, Enterococcus  faecalis, Enterococcus faecium, Streptococcus species, S. agalactiae,  S. anginosus grp., S. pneumoniae, S. pyogenes, Listeria sp., mecA  (methicillin resistance) and Rene/B (vancomycin resistance).    Critical Value/Significant Value called to and read back by Cheyenne Gilliland RN at [facility] [place] 55 at 11:15am 8/5/2017 ()  *  No growth[AZ1.2]        Cardiac US:   --       Neurophysiology:   --       Imaging:   All imaging studies were reviewed personally  CT head:[AZ1.1]   1. Findings concerning for an acute infarct within the left occipital lobe. Mild regional edema without significant mass effect or midline  shift. Hypodensity in this region also extends into the left temporal lobe and region of ischemia in this area cannot be excluded.  Evaluation for regions of acute ischemia would be better appreciated with MR.  2. New area of encephalomalacia within the paramedian left frontal lobe likely from previous infarct although an acute component of  ischemia is difficult to exclude. Indeterminate age infarcts in bilateral thalami. Again this would be better evaluated with  above-mentioned MRI.  3. Fairly extensive confluent periventricular white matter hypodensities which are nonspecific, but likely related to chronic  microvascular ischemic disease. Moderate diffuse parenchymal volume loss. These findings have progressed since previous MR from 6/24/2014.[AZ1.2]  MRI brain:[AZ1.1]   1.  Areas of infarction in both the right and left occipital regions. There is T1 hyperintensity in the cortex of these areas of infarction.  The T1 hyperintensity is probably due to petechial hemorrhage secondary to laminar necrosis. This would suggest that these areas of  infarction are subacute.  2. Questionable areas of restricted diffusion in both the right and left frontal regions. These could also be due to subacute infarcts but  they could be secondary to T2 shine through.  3. Susceptibility artifacts in both the right and left frontal regions consistent with hemosiderin deposition secondary to small chronic  microbleeds.  4. Age-related changes including generalized atrophy of the brain. White matter changes in the cerebral hemispheres consistent with small vessel ischemic disease in this age patient.[AZ1.2]      [AZ1.1]       Revision History        User Key Date/Time User Provider Type Action    > AZ1.3 8/21/2017  8:22 AM Tristan Lai MD Physician Sign     AZ1.2 8/21/2017  8:00 AM Tristan Lai MD Physician      AZ1.1 8/21/2017  7:59 AM Tristan Lai MD Physician                      Progress Notes - Physician (Notes from 08/21/17 through 08/24/17)      Progress Notes by Suzi Finnegan LICSW at 8/24/2017  3:29 PM     Author:  Suzi Finnegan LICSW Service:  (none) Author Type:      Filed:  8/24/2017  4:07 PM Date of Service:  8/24/2017  3:29 PM Creation Time:  8/24/2017  3:29 PM    Status:  Addendum :  Suzi Finnegan LICSW ()         SW:  D/C is anticipated once Cdiff culture is back.  Writer contacted [facility] and spoke with Emily.  Notified her of the pending Cdiff culture and she reports patient doesn't have a roommate.    Tentative transportation set up for 1[KH1.1]800[KH1.2] via stretcher.  Per nursing, patient has significant dementia with contractures and is not able to be sit safely in a straight back w/c.  Time of  transport can be changed if time Cdiff culture is still pending at 1730.[KH1.1]    Update:  [place] back from Emily [phone].  She misspoke earlier and reports patient has a roommate, thus if she is positive for Cdiff, they will need to move her into a private room and cannot make this move till Friday.  Awaiting to hear results of culture.[KH1.3]  Writer did call son Dr Lewis and updated him regarding the d/c plan today or tomorrow pending Cdiff culture results.[KH1.2]  Explained bedside nurse will call and let him know if his mother is discharged today.  [facility] once culture results are back by calling [phone] and then fax orders and scripts once MD orders are signed.[KH1.4]     Revision History        User Key Date/Time User Provider Type Action    > KH1.4 8/24/2017  4:07 PM [facility]  Addend     KH1.2 8/24/2017  3:53 PM [facility]  Addend     KH1.3 8/24/2017  3:41 PM Suzi Finnegan, [facility]  Addend     KH1.1 8/24/2017  3:33 PM Suzi Finnegan [facility]  Sign            Progress Notes by Piotr Mercado MD at 8/23/2017 11:28 AM     Author:  Piotr Mercado MD Service:  Hospitalist Author Type:  Physician    Filed:  8/23/2017 11:56 AM Date of Service:  8/23/2017 11:28 AM Creation Time:  8/23/2017 11:28 AM    Status:  Signed :  Piotr Mercado MD (Physician)         [facility]    Hospitalist Progress Note      Assessment & Plan   Celia Lewis is a 89 year old female who was admitted on 8/20/2017.  Past history of advanced dementia (non-verbal, bed-bound, s/p G-tube), HTN, DM II, CKD, chronic decubitus ulcer who presents with decreased responsiveness, found to have sepsis and subacute CVA.    Acute/subacute ischemic occipital stroke.  Admission CT head concerning for acute left occipital CVA.  MRI 8/21 with right and left occipital infarcts, possibly  subacute.  DM uncontrolled with A1C of 8.2%.  TTE 8/21 with EF 55-60%, no shunt.  No full anticoagulation due to evidence of microbleeds and no statin due to severe dementia per Neuro.  - continue daily aspirin, Neuro has signed off      Sepsis, most likely due to UTI.  Febrile to 101, tachycardic with leukocytosis and lactate 5.6.  UA abnormal, CXR with possible left basilar opacity, though appearing unchanged from prior.  Initiated on vanco and zoysn at admission.  - both admission blood cultures growing Staph epi, suspect contaminant despite both positive  - repeat blood cultures 8/22 ngtd  - stop vanco and zosyn, start levofloxacin  - no urine culture obtained at admission  -[PB1.1] persistent elevation of lactate, appears this is her baseline per chart review and will not monitor further[PB1.2]      Advanced Alzheimer's dementia.  Baseline nonverbal status, bed-bound and s/p G-tube.  She was found to be more altered than her normal self.  She is not on any specific treatment for her Alzheimer's dementia.     - appreciate Nutrition recs for tube feeds      Stage III sacral decubitus ulcer and bilateral earlobe ulcers.  The patient had this during the prior admission.  No clinical evidence of active infection.    - WOC following       Elevated troponin.  Serial troponin mildly elevated but largely flat.  TTE with normal EF and no WMA, suspect demand ischemia in setting of sepsis.       Diabetes mellitus type 2.  A1C this admission 8.2%.  PTA regimen of Lantus 30 units bid with SSI.  - remains hyperglycemic, will increase Lantus to 35 units bid (give 5 units x1 now)  - continue high dose SSI      Chronic kidney disease, stage III.  Baseline Cr about 0.9.  Admission Cr 1.13.  - Cr 0.8 on 8/23    Hypernatremia.  Na 150 on 8/22, likely due to holding free water through feeding tube with NS infusion.  - Na improved to 145 on 8/23, continue D5W at 50 ml/hr and PTA free water flush with Na check in AM      Benign  essential hypertension.  Prior to admission, she is on metoprolol.   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  Son,  Salvatore Joshua, who is an immunologist switched code status to DNR/DNI on 8/21 following conversation with Dr. Sevilla ([facility]).  Wants to continue with all other medical interventions, including pressors.  Family is not interested in Palliative Care.    DVT Prophylaxis: Pneumatic Compression Devices  Code Status: DNR/DNI    Disposition: Expected discharge tomorrow if Na continues to improve and continues to improve with transition to levofloxacin.      Updated son, Bibi Joshua, via phone 8/2[PB1.1]3.[PB1.2]    Piotr Mercado    Interval History[PB1.1]   Patient is lying awake in bed, does not respond to questions or track.  No acute events overnight.[PB1.2]    -Data reviewed today: I reviewed all new labs and imaging results over the last 24 hours. I personally reviewed no images or EKG's today.    Physical Exam   Temp: 97.3  F (36.3  C) Temp src: Axillary BP: 162/81   Heart Rate: 83 Resp: 26 SpO2: 96 % O2 Device: None (Room air)    Vitals:    08/20/17 2024 08/22/17 0558 08/23/17 0609   Weight: 65.9 kg (145 lb 4.5 oz) 64.8 kg (142 lb 13.7 oz) 69.1 kg (152 lb 5.4 oz)     Vital Signs with Ranges  Temp:  [97.2  F (36.2  C)-98.2  F (36.8  C)] 97.3  F (36.3  C)  Heart Rate:  [83-88] 83  Resp:  [20-26] 26  BP: (136-162)/(75-86) 162/81  SpO2:  [95 %-100 %] 96 %  I/O last 3 completed shifts:  In: 1392 [I.V.:692; NG/GT:700]  Out: 1225 [Urine:1225]    Constitutional: Well developed, well nourished female in no acute distress  Respiratory: lungs clear bilaterally without crackles or wheezes, no tachypnea  Cardiovascular: regular rate and rhythm, normal S1/S2 without murmur, rubs or gallops, no peripheral edema  GI: abdomen soft, no signs of tenderness, non-distended,  normal bowel sounds, G-tube in place  Skin/Integumen:  No rash or bruising,[PB1.1] decub ulcer not examined today[PB1.2]  Other:[PB1.1] Awake, does not follow commands or track, non-verbal[PB1.2]    Medications     D5W 50 mL/hr at 08/22/17 1325     - MEDICATION INSTRUCTIONS -         insulin glargine  35 Units Subcutaneous BID     insulin glargine  5 Units Subcutaneous Once     oxyCODONE  2 mg Per Feeding Tube TID     simethicone  80 mg Per Feeding Tube TID     miconazole   Topical BID     multivitamins with minerals  15 mL Per Feeding Tube Daily     fiber modular  1 packet Per Feeding Tube BID     aspirin  325 mg Oral Daily    Or     aspirin  300 mg Rectal Daily     piperacillin-tazobactam  3.375 g Intravenous Q6H     insulin aspart  1-12 Units Subcutaneous Q4H     sodium hypochlorite   Topical BID     lactobacillus rhamnosus (GG)  1 capsule Oral or Feeding Tube Daily     ranitidine  150 mg Per Feeding Tube Daily       Data     Recent Labs  Lab 08/23/17  0916 08/23/17  0000 08/22/17  1630 08/22/17  0802 08/21/17  2325 08/21/17  0750 08/21/17  0625 08/20/17  2330 08/20/17  1853   WBC 7.9  --   --  8.7  --   --  10.8  --  14.3*   HGB 11.0*  --   --  10.2*  --   --  11.9  --  12.8   MCV 99  --   --  99  --   --  99  --  101*     --   --  237  --   --  256  --  317   INR  --   --   --   --   --   --   --   --  0.98   * 147* 148* 150*  --  144  --   --  142   POTASSIUM 3.4  --   --  3.7  --  5.1  --   --  5.1   CHLORIDE 112*  --   --  117*  --  112*  --   --  107   CO2 21  --   --  21  --  22  --   --  23   BUN 31*  --   --  37*  --  51*  --   --  60*   CR 0.84  --   --  0.85 0.87 1.03 1.01  --  1.13*   ANIONGAP 12  --   --  12  --  10  --   --  12   NELLIE 8.1*  --   --  8.0*  --  8.5  --   --  9.2   *  --   --  211*  --  288*  --   --  258*   ALBUMIN  --   --   --   --   --   --   --   --  3.3*   PROTTOTAL  --   --   --   --   --   --   --   --  8.1   BILITOTAL  --   --   --   --   --   --   --   --   0.5   ALKPHOS  --   --   --   --   --   --   --   --  82   ALT  --   --   --   --   --   --   --   --  28   AST  --   --   --   --   --   --   --   --  23   LIPASE  --   --   --   --   --   --   --   --  174   TROPI  --   --   --   --   --   --  0.558* 0.706* 0.658*       No results found for this or any previous visit (from the past 24 hour(s)).[PB1.1]       Revision History        User Key Date/Time User Provider Type Action    > PB1.2 8/23/2017 11:56 AM Piotr Mercado MD Physician Sign     PB1.1 8/23/2017 11:28 AM Piotr Mercado MD Physician             Progress Notes by Piotr Mercado MD at 8/22/2017 10:34 AM     Author:  Piotr Mercado MD Service:  Hospitalist Author Type:  Physician    Filed:  8/22/2017  3:54 PM Date of Service:  8/22/2017 10:34 AM Creation Time:  8/22/2017 10:34 AM    Status:  Signed :  Piotr Mercado MD (Physician)         [facility]    Hospitalist Progress Note      Assessment & Plan   Celia Lewis is a 89 year old female who was admitted on 8/20/2017.  Past history of advanced dementia (non-verbal, bed-bound, s/p G-tube), HTN, DM II, CKD, chronic decubitus ulcer who presents with decreased responsiveness, found to have sepsis and subacute CVA.    Acute/subacute ischemic occipital stroke.  Admission CT head concerning for acute left occipital CVA.  MRI 8/21 with right and left occipital infarcts, possibly subacute.  DM uncontrolled with A1C of 8.2%.  TTE 8/21 with EF 55-60%, no shunt.  - continue daily aspirin, no full anticoagulation due to evidence of microbleeds per Neuro  - LDL cannot be calculated due to high triglyceride levels, Neuro recommends against statin due to advanced dementia      Sepsis, most likely due to UTI.  Febrile to 101, tachycardic with leukocytosis and lactate 5.6.  UA abnormal, CXR with possible left basilar opacity, though appearing unchanged from prior.  Initiated on vanco and zoysn at admission.  - both admission blood cultures growing  Staph epi, significance unclear - ?ulcer as source  - repeat blood cultures today  - continue vanco and zosyn  - unfortunately, no urine culture was done at admission and cannot be added per discussion with lab 8/22, will attempt to collect now  - lactate remains mildly elevated at 2.9 this AM, but clinically improved, will monitor      Advanced Alzheimer's dementia.  Baseline nonverbal status, bed-bound and s/p G-tube.  She was found to be more altered than her normal self.  She is not on any specific treatment for her Alzheimer's dementia.     -[PB1.1] appreciate Nutrition recs for tube feeds[PB1.2]      Stage III sacral decubitus ulcer[PB1.1] and bilateral earlobe ulcers.  Th[PB1.3]e patient had this during the prior admission.  No clinical evidence of active infection.    - WOC[PB1.1] following[PB1.3]       Elevated troponin.  A troponin was elevated at 0.65.  Unsure if she had a cardiac event.  The patient is nonverbal and unable to express any complaints.   - management as above      Diabetes mellitus type 2.  A1C this admission 8.2%.  PTA regimen of Lantus 30 units bid with SSI.  - hyperglycemic with resumption of tube feeds, will resume PTA Lantus 30 units bid (give 15 units x1 now)  - continue high dose SSI      Chronic kidney disease, stage III.  Baseline Cr about 0.9.  Admission Cr 1.13.  - Cr 0.8 today    Hypernatremia.  Na 150 on 8/22, likely due to holding free water through feeding tube with NS infusion.  - stop NS, start D5W at 50 ml/hr  - resume free water flush 200ml q4h via feeding tube  - Na at 1600      Benign essential hypertension.  Prior to admission, she is on metoprolol.   - hold metoprolol for permissive HTN       GOALS OF CARE:  Patient is an [employer] and any changes in treatment plan/goals of care must be discussed with .  Son, Dr. Salvatore Lewis, who is an immunologist switched code status to DNR/DNI on 8/21 following conversation with Dr. Sevilla ([facility]).  Wants to continue  with all other medical interventions, including pressors.  Family is not interested in Palliative Care.    DVT Prophylaxis: Pneumatic Compression Devices  Code Status: DNR/DNI    Disposition: Expected discharge in 2 days once bacteremia clear, transitioned to oral antibiotic, electrolytes normalized.[PB1.1]      Updated son, Dr. Lewis, via phone 8/22.[PB1.2]    Piotr Mercado    Interval History[PB1.1]   Patient appears more alert today.  Non-verbal which is baseline.  No acute events overnight.[PB1.3]    -Data reviewed today: I reviewed all new labs and imaging results over the last 24 hours. I personally reviewed no images or EKG's today.    Physical Exam   Temp: 98  F (36.7  C) Temp src: Axillary BP: 149/79   Heart Rate: 90 Resp: 16 SpO2: 96 % O2 Device: None (Room air)    Vitals:    08/20/17 2024 08/22/17 0558   Weight: 65.9 kg (145 lb 4.5 oz) 64.8 kg (142 lb 13.7 oz)     Vital Signs with Ranges  Temp:  [96.3  F (35.7  C)-98  F (36.7  C)] 98  F (36.7  C)  Heart Rate:  [83-98] 90  Resp:  [14-16] 16  BP: (128-160)/(68-86) 149/79  SpO2:  [95 %-99 %] 96 %  I/O last 3 completed shifts:  In: 2347 [I.V.:1987; NG/GT:360]  Out: 1300 [Urine:1300]    Constitutional: Well developed, well nourished female in no acute distress  Respiratory:[PB1.1] lungs clear bilaterally without crackles or wheezes, no tachypnea[PB1.3]  Cardiovascular: regular rate and rhythm, normal S1/S2 without murmur, rubs or gallops, no peripheral edema  GI: abdomen soft, no signs of tenderness, non-distended, normal bowel sounds, G-tube in place  Skin/Integumen:  No rash or bruising[PB1.1], stage 3 decub ulcer without signs of cellulitis or purulent drainage[PB1.3]  Other:[PB1.1] Opens eyes during exam, does not interact[PB1.3]    Medications     IV infusion builder WITH additives       - MEDICATION INSTRUCTIONS -         vancomycin (VANCOCIN) IV  1,500 mg Intravenous Q24H     insulin glargine  30 Units Subcutaneous BID     multivitamins with minerals   15 mL Per Feeding Tube Daily     fiber modular  1 packet Per Feeding Tube BID     aspirin  325 mg Oral Daily    Or     aspirin  300 mg Rectal Daily     piperacillin-tazobactam  3.375 g Intravenous Q6H     insulin aspart  1-12 Units Subcutaneous Q4H     sodium hypochlorite   Topical BID     lactobacillus rhamnosus (GG)  1 capsule Oral or Feeding Tube Daily     oxyCODONE  2 mg Oral TID     ranitidine  150 mg Per Feeding Tube Daily     simethicone  80 mg Oral TID       Data     Recent Labs  Lab 08/22/17  0802 08/21/17  2325 08/21/17  0750 08/21/17  0625 08/20/17  2330 08/20/17  1853   WBC 8.7  --   --  10.8  --  14.3*   HGB 10.2*  --   --  11.9  --  12.8   MCV 99  --   --  99  --  101*     --   --  256  --  317   INR  --   --   --   --   --  0.98   *  --  144  --   --  142   POTASSIUM 3.7  --  5.1  --   --  5.1   CHLORIDE 117*  --  112*  --   --  107   CO2 21  --  22  --   --  23   BUN 37*  --  51*  --   --  60*   CR 0.85 0.87 1.03 1.01  --  1.13*   ANIONGAP 12  --  10  --   --  12   NELLIE 8.0*  --  8.5  --   --  9.2   *  --  288*  --   --  258*   ALBUMIN  --   --   --   --   --  3.3*   PROTTOTAL  --   --   --   --   --  8.1   BILITOTAL  --   --   --   --   --  0.5   ALKPHOS  --   --   --   --   --  82   ALT  --   --   --   --   --  28   AST  --   --   --   --   --  23   LIPASE  --   --   --   --   --  174   TROPI  --   --   --  0.558* 0.706* 0.658*       No results found for this or any previous visit (from the past 24 hour(s)).[PB1.1]       Revision History        User Key Date/Time User Provider Type Action    > PB1.2 8/22/2017  3:54 PM Piotr Mercado MD Physician Sign     PB1.3 8/22/2017 10:59 AM Piotr Mercado MD Physician      PB1.1 8/22/2017 10:34 AM Piotr Mercado MD Physician             Progress Notes by Snow Bethea APRN CNP at 8/22/2017  9:06 AM     Author:  Snow Bethea APRN CNP Service:  Neurology Author Type:  Nurse Practitioner    Filed:  8/22/2017  9:43 AM Date of  Service:  8/22/2017  9:06 AM Creation Time:  8/22/2017  9:06 AM    Status:  Attested :  Snow Bethea APRN CNP (Nurse Practitioner)    Cosigner:  Tristan Lai MD at 8/22/2017 10:53 AM        Attestation signed by Tristan Lai MD at 8/22/2017 10:53 AM        Physician Attestation   I, Tristan Lai, saw and evaluated Celia Lewis as part of a shared visit.  I have reviewed and discussed with the advanced practice provider their history, physical and plan.    I personally reviewed the vital signs, medications, labs and imaging.    My key history or physical exam findings: No changed    Key management decisions made by me: Patient needs to be transferred to TCU. Will sign off    Tristan Lai  Date of Service (when I saw the patient): 08/22/17                               [facility]  [facility]  Neurology Daily Note      Admission Date:8/20/2017   Date of service: 08/22/2017   Hospital Day: 3      Assessment & Plan   _______________________________  #. (I63.433) Cerebrovascular accident (CVA) due to bilateral embolism of posterior cerebral arteries (H)  (primary encounter diagnosis)  --Bilateral occipital strokes  --Possible bifrontal strokes  --Left frontal microhemorrhage  --Anticoagulation and advanced antithrombotics will be limited due to microhemorrhage  --echo unremarkable  --B12 normal  --TSH normal  --lipids mildly elevated with , would not recommend statin due to dementia  #. (A41.9) Sepsis, due to unspecified organism (H)  --Severe UTI  --Management per hospitalists.   #. (R41.82) Altered mental status, unspecified altered mental status type  --Likely related to combination of UTI and strokes  #. (G30.1,  F02.80) Late onset Alzheimer's disease without behavioral disturbance  --Severe, end-stage AD  --Recovery is unlikely  --Palliative care consult will be the most appropriate  -----care is dictated by Rabbi due to  patient being [employer]  #. PT/OT/Speech  --continue evaluations  -----per family, will return to LTC at [facility]  #. Nutrition  --Per speech therapy evaluation   #. DVT Prophylaxis  --defer to primary service    Code Status: DNR/DNI    Disposition: pending   No further neurological workup recommended. Will sign off.    Interval History   _______________________________  Patient presented with change in mental status. Patient is nonverbal at baseline, but was found to be more altered and less responsive than her baseline. She was recently discharged from [facility] with treatment for sepsis due to UTI. CT head was concerned for acute infarct in left occipital lobe. MRI clarified multiple bilateral infarcts and microbleed. Long discussion regarding likely outcome, but patient is [employer] and care will be directed by .    Review of Systems   _______________________________  Review of systems is limited by patient factors - mental status  Physical Exam   _______________________________  Vitals: Temp: 98  F (36.7  C) Temp src: Axillary BP: 149/79   Heart Rate: 90 Resp: 16 SpO2: 96 % O2 Device: None (Room air)    Vital Signs with Ranges: Temp:  [96.3  F (35.7  C)-98  F (36.7  C)] 98  F (36.7  C)  Heart Rate:  [83-98] 90  Resp:  [14-16] 16  BP: (128-160)/(68-86) 149/79  SpO2:  [95 %-99 %] 96 %    General Appearance:  No acute distress[MC1.1]  Neuro:       Mental Status Exam:  Stuporous. Mute. Mental status is decreased       Cranial Nerves:  Pupils 3 mm, reactive. Does not track. Occulocephalis reflexes are present. Corneal reflexes absent. Face symmetric. Other CN are untestable           Motor:  Minimal movements to stimulation, contractures in all extremities       Reflexes:  Low DTR, toes equivocal       Sensory:  Untestable                    Coordination:   Untestable       Gait:  Untestable[MC1.2]  Cardiovascular: Regular rate and rhythm, no m/r/g  Lungs: Clear to auscultation  Abdomen: Soft, not tender, not  distended  Extremities: No clubbing, no cyanosis, no edema    Medications   _______________________________    - MEDICATION INSTRUCTIONS -       NaCl 125 mL/hr at 08/22/17 0035       vancomycin (VANCOCIN) IV  1,500 mg Intravenous Q24H     multivitamins with minerals  15 mL Per Feeding Tube Daily     fiber modular  1 packet Per Feeding Tube BID     aspirin  325 mg Oral Daily    Or     aspirin  300 mg Rectal Daily     insulin glargine  15 Units Subcutaneous BID     piperacillin-tazobactam  3.375 g Intravenous Q6H     insulin aspart  1-12 Units Subcutaneous Q4H     sodium hypochlorite   Topical BID     lactobacillus rhamnosus (GG)  1 capsule Oral or Feeding Tube Daily     oxyCODONE  2 mg Oral TID     ranitidine  150 mg Per Feeding Tube Daily     simethicone  80 mg Oral TID       Data   _______________________________      Lab Data:   All data was reviewed by me personally  CBC RESULTS:  Recent Labs   Lab Test  08/22/17   0802  08/21/17   0625  08/20/17   1853   WBC  8.7  10.8  14.3*   RBC  3.29*  3.76*  3.99   HGB  10.2*  11.9  12.8   HCT  32.7*  37.3  40.4   PLT  237  256  317     Basic Metabolic Panel:  Recent Labs   Lab Test  08/22/17   0802  08/21/17   2325  08/21/17   0750   08/20/17   1853   NA  150*   --   144   --   142   POTASSIUM  3.7   --   5.1   --   5.1   CHLORIDE  117*   --   112*   --   107   CO2  21   --   22   --   23   BUN  37*   --   51*   --   60*   CR  0.85  0.87  1.03   < >  1.13*   GLC  211*   --   288*   --   258*   NELLIE  8.0*   --   8.5   --   9.2    < > = values in this interval not displayed.     Liver panel:  Recent Labs   Lab Test  08/20/17   1853  08/04/17   0450  06/30/17   1620  06/14/17   1200  06/24/16   0806   PROTTOTAL  8.1  7.6  8.1  8.2  6.0*   ALBUMIN  3.3*  2.8*  2.9*  2.5*  1.6*   BILITOTAL  0.5  0.5  0.7  0.5  0.7   ALKPHOS  82  94  81  96  155*   AST  23  17  24  20  24   ALT  28  24  26  22  48     Coagulation  Recent Labs   Lab Test  08/20/17   1853  06/14/17   1200   06/21/16   0643  06/23/14   2140  08/05/12   1834  11/03/10   0710  11/02/10   0736  11/01/10   0629   INR  0.98  1.10  1.37*  1.01  1.09  0.97  1.02  1.02   PTT   --    --   29   --   30  31  32  25      Lipid Profile:  Recent Labs   Lab Test  08/20/17   1853 05/26/15   CHOL  269*  159   HDL  30*  40   LDL  Cannot estimate LDL when triglyceride exceeds 400 mg/dL  103*  77   TRIG  1049*  211*   CHOLHDLRATIO   --   4.0     Thyroid Panel:  Recent Labs   Lab Test  08/21/17   0750  11/03/10   0710  06/01/10   1523   TSH  1.19  2.69  4.11      Vitamin B12:   Recent Labs   Lab Test  08/21/17   0750  01/27/10   1534  11/16/09   1459   B12  645  1000  188*      Vitamin D level:   Recent Labs   Lab Test  08/21/17   0750   VITDT  26     A1C:   Recent Labs   Lab Test  08/21/17   0625  06/15/17   0833  06/21/16   0643 05/26/15  06/24/14   0703   A1C  8.2*  8.4*  8.2*  6.7*  6.5*     Troponin I:   Recent Labs   Lab Test  08/21/17   0625  08/20/17   2330  08/20/17   1853  06/30/17   1620  08/08/12   1751  08/08/12   1323  05/19/10   0646   TROPI  0.558*  0.706*  0.658*  0.035  0.013  0.022  <0.012     CK:   Recent Labs   Lab Test  05/19/10   0646   CKT  110       CRP inflammation:   Recent Labs   Lab Test  08/21/17   0750  08/20/17   1853  05/08/13   1300  10/12/12   0839  08/31/12   0800   CRP  5.4  7.0  11.0*  7.4  19.6*     ESR:   Recent Labs   Lab Test  05/08/13   1300  10/12/12   0839  08/31/12   0800   SED  61*  47*  60*       UA Results:  Recent Labs   Lab Test  08/20/17   1957   06/30/17   1654   COLOR  Yellow   < >  Yellow   APPEARANCE  Cloudy   < >  Cloudy   URINEGLC  Negative   < >  Negative   URINEBILI  Negative   < >  Negative   URINEKETONE  Negative   < >  Negative   SG  1.016   < >  1.010   UBLD  Trace*   < >  Small*   URINEPH  5.5   < >  5.5   PROTEIN  100*   < >  30*   UROBILINOGEN   --    --   0.2   NITRITE  Negative   < >  Negative   LEUKEST  Large*   < >  Large*   RBCU  24*   < >  2-5*   WBCU  >182*   < >   25-50*    < > = values in this interval not displayed.        Cardiac US:   The left ventricle is normal in size. The visual ejection fraction is estimated at 55-60%. No significant valvular heart disease. Small pericardial effusion There are no echocardiographic indications of cardiac tamponade. There is no color Doppler evidence of an atrial shunt. A contrast injection (Bubble Study) was performed that was negative for flow across the interatrial septum.       Imaging:   All imaging studies were reviewed personally  CT head 8/20/17:   1. Findings concerning for an acute infarct within the left occipital lobe. Mild regional edema without significant mass effect or midline shift. Hypodensity in this region also extends into the left temporal lobe and region of ischemia in this area cannot be excluded. Evaluation for regions of acute ischemia would be better appreciated with MR.  2. New area of encephalomalacia within the paramedian left frontal lobe likely from previous infarct although an acute component of ischemia is difficult to exclude. Indeterminate age infarcts in bilateral thalami. Again this would be better evaluated with above-mentioned MRI.  3. Fairly extensive confluent periventricular white matter hypodensities which are nonspecific, but likely related to chronic microvascular ischemic disease. Moderate diffuse parenchymal volume loss. These findings have progressed since previous MR from 6/24/2014.    MRI brain 8/21/17:   1. Areas of infarction in both the right and left occipital regions. There is T1 hyperintensity in the cortex of these areas of infarction. The T1 hyperintensity is probably due to petechial hemorrhage secondary to laminar necrosis. This would suggest that these areas of infarction are subacute.  2. Questionable areas of restricted diffusion in both the right and left frontal regions. These could also be due to subacute infarcts but they could be secondary to T2 shine through.  3.  Susceptibility artifacts in both the right and left frontal regions consistent with hemosiderin deposition secondary to small chronic microbleeds.  4. Age-related changes including generalized atrophy of the brain. White matter changes in the cerebral hemispheres consistent with small vessel ischemic disease in this age patient.           Snow Bethea, FNP-BC[MC1.1]       Revision History        User Key Date/Time User Provider Type Action    > MC1.2 8/22/2017  9:43 AM Snow Bethea APRN CNP Nurse Practitioner Sign     MC1.1 8/22/2017  9:06 AM Snow Bethea APRN CNP Nurse Practitioner             Progress Notes by Catherine Alfaro LSW at 8/22/2017 10:24 AM     Author:  Catherine Alfaro LSW Service:  Social Work Author Type:      Filed:  8/22/2017 10:25 AM Date of Service:  8/22/2017 10:24 AM Creation Time:  8/22/2017 10:24 AM    Status:  Signed :  Catherine Alfaro LSW ()         SWS PROGRESS NOTE:     I: SW spoke with admissions at [facility]. They have a bed for pt when she is ready. SW will keep [facility] updated on pt's DC progress.   P: Pt will DC to [facility] once medically stable. SW following.    SABA Britton, LGSW *[phone][AM1.1]     Revision History        User Key Date/Time User Provider Type Action    > AM1.1 8/22/2017 10:25 AM Catherine Alfaro LSW  Sign            Progress Notes by Dominga Luo PA-C at 8/21/2017  8:03 PM     Author:  Dominga Luo PA-C Service:  Hospitalist Author Type:  Physician Assistant - C    Filed:  8/21/2017  8:05 PM Date of Service:  8/21/2017  8:03 PM Creation Time:  8/21/2017  8:03 PM    Status:  Signed :  Dominag Luo PA-C (Physician Assistant - C)         Cross Cover Note      Paged re: Blood culture that was positive for GP cocci in cluster. Will continue Vancomycin and Zosyn and await further results.[JF1.1]    Dominga Luo[JF1.2],  ASHLEY[JF1.1]     Revision History        User Key Date/Time User Provider Type Action    > JF1.2 8/21/2017  8:05 PM Dominga Luo PA-C Physician Assistant - C Sign     JF1.1 8/21/2017  8:03 PM Dominga Luo PA-C Physician Assistant - TRISTEN             Progress Notes by Flavia Nicole RN at 8/21/2017  2:26 PM     Author:  Flavia Nicole RN Service:  [facility] Nurse Author Type:  Enterstomal Therapist    Filed:  8/21/2017  2:35 PM Date of Service:  8/21/2017  2:26 PM Creation Time:  8/21/2017  2:26 PM    Status:  Signed :  Flavia Nicole RN (Enterstomal Therapist)         [facility] Nurse Inpatient Adult Pressure Injury (PI) Assessment     Initial Assessment of PI(s) on pt's:   Coccyx/sacrum; bilateral outer ears (helix)    Data:   Patient History:    Celia Lewis is a 89 year old female who was admitted on 8/20/2017.  Past history of advanced dementia (non-verbal, bed-bound, s/p G-tube), HTN, DM II, CKD, chronic decubitus ulcer who presents with decreased responsiveness, found to have sepsis and subacute CVA     2. Chronic sacral decubitus wound. Prior cultures grew multiple organisms as would always be the case in this setting but nothing to suggest deeper major infection or cellulitis at present.   3. Prior MRSA positive urine culture, current urine abnormal, await culture.   4. History of probable recurrent aspiration, although not particularly clearly documented, previously treated it as aspiration.   5. Severe dementia.   6. Feeding tube in place, aspiration as a prior potential issue.   7. Diabetes mellitus.   8. Chronic mild renal insufficiency, some acute worsening at present.   9. Sulfa allergy.         Current mattress: atmos air  Current pressure relieving devices:  Pillows      Moisture Management:  Incontinence Protocol and Urinary Catheter    Catheter secured? No secondary to severe contractures      Current Diet / Nutrition:       Active Diet  Order: TF      Deshawn Assessment and sub scores:  Deshawn Score  Avg: 10.1  Min: 8  Max: 12       Labs:   Recent Labs   Lab Test  08/21/17   0750  08/21/17   0625  08/20/17   1853   ALBUMIN   --    --   3.3*   HGB   --   11.9  12.8   INR   --    --   0.98   WBC   --   10.8  14.3*   A1C   --   8.2*   --    CRP  5.4   --   7.0                                                                                                                            Pressure Injury Assessment  (location):   Coccyx/sacrum  - Present on admission    Wound History:   Pt just readmitted with wound on the coccyx.  Last  debrided, 06-15-17 during hospitalization    Coccyx:          -Size:  3cm x 3cm x up to 2cm.  Wound is about 5% moist, soft slough / 95% moist,shiny red tissue.           -Wound base: slightly soft but unable to probe any tunnels. This base with palpable bone just under tissue         -Undermining/Tunnels: none noted         -Drainage: scant sero-sang         -jimbo-wound skin: slightly blanchable ecchymosis noted from apporx 1-8 o'clock extending out 1.5cm           Pressure Injury Assessment:   Bilateral outer ears (mid-helix)  -Present on admission  Wound History:   Present on admission; pt lies directly on ears when on sides.  No O2 noted today    Specific Dimensions (length x width x depth, in cm) :     1.  Left ear:  Approx. 2 x 0.5 cm area of dried scabbed thin eschar   2.  Right ear:  Decrease .5cm x .5cm x superficial dried scabbed area. No drainage, no jimbo-wound erythema    Periwound Skin: scattered mild erythema     Odor: none    Pain:  absent           Intervention:     Patient's chart evaluated.      Deshawn Interventions:  Current Deshawn Interventions and Care Plan reviewed and updated, appropriate at this time.    Wounds assessed         -Wounds cleaned MicroKlenz         -Coccyx: moistened 2 x 2 of Dakins solution         -Bilateral ear (helix): apply small piece of Mepilex lite         -Pt repositioned on Lt  side    Orders  In Epic    Supplies  Reviewed     POC: son not in room for discussion           Assessment:      coccyx/sacrum: Now Stage 4 PI, community acquired, no local s/s infections, possible jimbo-wound candida      Bilateral ears:  Mix stage 2 and unstageable PI, community acquired, no local s/s infection           Plan:     Nursing to notify the Provider(s) and re-consult the [facility] Nurse if wound(s) deteriorate(s)or if the wound care plan needs reevaluation.    Plan of care for wound on coccyx/ears: change dressing on even days and prn  1. Clean all wounds w with MicroKlenz Spray, pat dry  2. Coccyx: Odd days and prn       -pack wound moistened 2x2 with Dakins solution        -dust jimbo-ulcer skin with Desenex brushing off excess       -seal in powder with 3M NO sting skin prep       -Cover all with Mepliex   Border  3. bilateral outer ears:        -skin prep wounds on ears. Let dry       -Cut stirps of Mepilex lite and wrap around ear to secure  4. PIP measures       - Rt and Lt postioning only  -Consider Z-Martin Pillows to help keep pt on side (#[id number] or #[id number]- large). *Z-Flows are for positioning, DO NOT SIT ON!   -Keep heels elevated, use heel lift boots in room  - keep HOB @ 30 degrees for TF  -Incontinence protocol prn. No diaper on patient. Use incontinence pad only        -Deshawn Risk: document interventions        -If anticipate long LOS order a pulsate mattress for additional off-loading        -Full skin inspection BID. Document findings      [facility] Nurse will return: weekly and prn  Face to face time: 15+ Minutes[JP1.1]       Revision History        User Key Date/Time User Provider Type Action    > JP1.1 8/21/2017  2:35 PM Flavia Nicole RN Enterstomal Therapist Sign            Progress Notes by Abigail Nevarez PA-C at 8/21/2017  2:21 PM     Author:  Abigail Nevarez PA-C Service:  ICU Author Type:  Physician Assistant - TRISTEN    Filed:  8/21/2017  2:23 PM Date of Service:  8/21/2017  2:21 PM Creation  "Time:  8/21/2017  2:21 PM    Status:  Signed :  Abigail Nevarez PA-C (Physician Assistant - C)         ICU Multi-Disciplinary Note  Patient condition reviewed and discussed while on multidisciplinary rounds today. 89 y/o female with advanced dementia admitted with decreased responsiveness found to have sepsis and CVA. On abx currently with elevated LA.     The Critical Care service will continue to follow peripherally while patient is within the ICU. We are readily available should issues arise.  Please feel free to contact us for anything with which we may be of assistance.    Abigail Nevarez[AS1.1]          Revision History        User Key Date/Time User Provider Type Action    > AS1.1 8/21/2017  2:23 PM Abigail Nevarez PA-C Physician Assistant - C Sign            Progress Notes by Margie Ray SLP at 8/21/2017 10:00 AM     Author:  Margie Ray SLP Service:  (none) Author Type:  Speech Language Pathologist    Filed:  8/21/2017 10:00 AM Date of Service:  8/21/2017 10:00 AM Creation Time:  8/21/2017 10:00 AM    Status:  Signed :  Margie Ray SLP (Speech Language Pathologist)            08/21/17 0957   General Information   Onset Date 08/20/17   Start of Care Date 08/21/17   Referring Physician Antwan Green MD   Patient Profile Review/OT: Additional Occupational Profile Info See Profile for full history and prior level of function   Patient/Family Goals Statement Unable to state   Swallowing Evaluation Bedside swallow evaluation   Behaviorial Observations Uncooperative;Lethargic   Mode of current nutrition NPO   Respiratory Status O2 Supply   Type of O2 supply Nasal cannula   Comments Celia Lewis is a 89 year old female who was admitted on 8/20/2017.  Past history of advanced dementia (non-verbal, bed-bound, s/p G-tube), HTN, DM II, CKD, chronic decubitus ulcer who presents with decreased responsiveness, found to have sepsis and subacute CVA. Per Neurology note, \"MRI: --Bilateral occipital " "strokes, Possible bifrontal strokes, Left frontal, microhemorrhage\" Pt also presents with severe UTI. \"Severe,end-stage AD --Recovery is unlikely --Palliative care consult will be the most appropriate.\" Previous ST note in 2010 with mild dysphagia noted. Pt has been NPO with PEG tube.    Clinical Swallow Evaluation   Oral Musculature unable to assess due to poor participation/comprehension   Oral Musculature Comments unable    Additional evaluation(s) completed today No   Clinical Swallow Eval: Thin Liquid Texture Trial   Mode of Presentation, Thin Liquids cup   Volume of Liquid or Food Presented trace amounts to lightly moistened lips   Oral Phase of Swallow Poor AP movement;Premature pharyngeal entry;Residue in oral cavity   Pharyngeal Phase of Swallow intact   Diagnostic Statement Pt accepted trace amounts of thin water with multiple swallows observed with no overt s/sx of aspiration   Swallow Eval: Clinical Impressions   Skilled Criteria for Therapy Intervention No significant expected  improvement in functional status   Functional Assessment Scale (FAS) 1   Treatment Diagnosis Severe to profound oral dysphagia   Diet texture recommendations NPO   Anticipated Discharge Disposition extended care facility   Risks and Benefits of Treatment have been explained. Yes   Patient, family and/or staff in agreement with Plan of Care Yes   Clinical Impression Comments SLP: Pt alert with her eyes open during bedside swallow evaluation. Pt nonverbal at baseline and NPO with TF. Pt unable to follow commands, oral cavity unable to be visualized. Pt did not respond to attempts at spoon sips of nectar thick liquids or applesauce. Thin water via cup attempted with pt forming labial seal around cup, taking trace amount of liquids and quickly closing mouth. Multiple swallows were able to be visualized with small amounts of thin water and no overt s/sx of aspiration. Pt presents with severe to profound oral dysphagia due to advanced " dementia, UTI with sepsis and subacute CVA. Per chart review, pt chronically NPO and has not had recent SLP services. Recommended: Continue NPO with continued oral cares and lightly moistened swab for comfort. Dysphagia at baseline with ST services not warranted at this time. ST to sign off.    Total Evaluation Time   Total Evaluation Time (Minutes) 25[AQ1.1]        Revision History        User Key Date/Time User Provider Type Action    > AQ1.1 8/21/2017 10:00 AM Margie Ray, SLP Speech Language Pathologist Sign            Progress Notes by Piotr Mercado MD at 8/21/2017  7:34 AM     Author:  Piotr Mercado MD Service:  Hospitalist Author Type:  Physician    Filed:  8/21/2017  9:46 AM Date of Service:  8/21/2017  7:34 AM Creation Time:  8/21/2017  7:34 AM    Status:  Addendum :  Piotr Mercado MD (Physician)         [facility]    Hospitalist Progress Note      Assessment & Plan   Celia Lewis is a 89 year old female who was admitted on 8/20/2017.  Past history of advanced dementia (non-verbal, bed-bound, s/p G-tube), HTN, DM II, CKD, chronic decubitus ulcer who presents with decreased responsiveness, found to have sepsis and subacute CVA.    Acute/subacute ischemic occipital stroke.  Admission CT head concerning for acute left occipital CVA.  MRI 8/21 with right and left occipital infarcts, possibly subacute.    - continue daily aspirin  - Neuro[PB1.1] recommending Palliative care consultation[PB1.2]  - LDL cannot be calculated due to high triglyceride levels  - DM uncontrolled with A1C of 8.2%  - serial trops elevated (no ischemic changes on EKG) but flat, will obtain TTE      Sepsis, most likely due to urinary versus pneumonia.  Febrile to 101, tachycardic with leukocytosis and lactate 5.6.  UA abnormal, CXR with possible left basilar opacity, though appearing unchanged from prior.  Initiated on vanco and zoysn at admission.  - afebrile, leukocytosis and tachycardia resolved  overnight  - continue vanco and zosyn  - blood and urine cultures ngtd  -[PB1.1] lactate remains elevated, continue  ml/hr, monitor lactate q2h x 3[PB1.2]      Advanced Alzheimer's dementia.  Baseline nonverbal status, bed-bound and s/p G-tube.  She was found to be more altered than her normal self.  She is not on any specific treatment for her Alzheimer's dementia.     - Nutrition consult for tube feeds      Stage III sacral decubitus ulcer.  The patient had this during the prior admission.  No clinical evidence of active infection.    - WOC consult pending        Elevated troponin.  A troponin was elevated at 0.65.  Unsure if she had a cardiac event.  The patient is nonverbal and unable to express any complaints.   - management as above      Diabetes mellitus type 2.  A1C this admission 8.2%.  - resume PTA Lantus dosing once tube feeds initiated  - continue high dose SSI       Chronic kidney disease, stage III.  Baseline Cr about 0.9.  Admission Cr 1.13.  - Cr 1.01 today, will continue IVF as above and repeat BMP in AM      Benign essential hypertension.  Prior to admission, she is on metoprolol.   - hold metoprolol for permissive HTN       GOALS OF CARE:  This was discussed at admission with son who is an allergist and immunologist, Dr. Salvatore Lewis.  He did mention that the patient is an [employer] and she would have wanted everything done for her care.  He wanted to get a better assessment of prognosis with an MRI to see the extent of the stroke, but at the moment he did want Celia to be a full code and pursue all aggressive treatment.[PB1.1]     ADDENDUM:  Spoke with Dr. Lewis after he had discussed things with their Rabbi, Dr. Sevilla.  They have decided to make patient DNR/DNI but want to continue all medical interventions up to that point, including pressors.  They are not interested in Palliative Care consultation and have requested this be discontinued.[PB1.3]      DVT Prophylaxis: Pneumatic  Compression Devices  Code Status: Full Code    Disposition: Expected discharge in 2 days once work-up complete, sepsis resolved.[PB1.1]  Transfer out of ICU.[PB1.2]    Piotr Mercado    Interval History[PB1.1]   Patient is non-verbal, arouses to minimally to moderate physical stimuli.  No acute events overnight.[PB1.2]    -Data reviewed today: I reviewed all new labs and imaging results over the last 24 hours. I personally reviewed[PB1.1] no images or EKG's today[PB1.2].    Physical Exam   Temp: 97.3  F (36.3  C) Temp src: Axillary BP: 153/83   Heart Rate: 88 Resp: 12 SpO2: 95 % O2 Device: None (Room air) Oxygen Delivery: 2 LPM  Vitals:    08/20/17 2024   Weight: 65.9 kg (145 lb 4.5 oz)     Vital Signs with Ranges  Temp:  [97.3  F (36.3  C)-100.8  F (38.2  C)] 97.3  F (36.3  C)  Heart Rate:  [] 88  Resp:  [9-46] 12  BP: (115-170)/(58-89) 153/83  SpO2:  [92 %-100 %] 95 %  I/O last 3 completed shifts:  In: 1607.92 [I.V.:1547.92; NG/GT:60]  Out: 485 [Urine:485]    Constitutional: Well developed, well nourished[PB1.1] female[PB1.2] in no acute distress  Respiratory:[PB1.1] difficult exam due to small inspiratory volumes, no wheezing, no clear crackles[PB1.2]  Cardiovascular: regular rate and rhythm, normal S1/S2 without murmur, rubs or gallops, no peripheral edema  GI: abdomen soft,[PB1.1] no signs of tenderness[PB1.2], non-distended, normal bowel sounds[PB1.1], G-tube in place[PB1.2]  Skin/Integumen:  No rash or bruising  Other:[PB1.1]  somewhat opens eyes with moderate physical stimuli[PB1.2],[PB1.1] no gross cranial nerve deficits[PB1.2]    Medications     - MEDICATION INSTRUCTIONS -       NaCl 125 mL/hr at 08/20/17 2125       aspirin EC  325 mg Oral Daily    Or     aspirin  300 mg Rectal Daily     piperacillin-tazobactam  3.375 g Intravenous Q6H     insulin aspart  1-12 Units Subcutaneous Q4H     sodium hypochlorite   Topical BID     lactobacillus rhamnosus (GG)  1 capsule Oral or Feeding Tube Daily      oxyCODONE  2 mg Oral TID     ranitidine  150 mg Per Feeding Tube Daily     simethicone  80 mg Oral TID     vancomycin place ramos - receiving intermittent dosing  1 each Does not apply See Admin Instructions       Data     Recent Labs  Lab 08/21/17  0625 08/20/17  2330 08/20/17  1853   WBC 10.8  --  14.3*   HGB 11.9  --  12.8   MCV 99  --  101*     --  317   INR  --   --  0.98   NA  --   --  142   POTASSIUM  --   --  5.1   CHLORIDE  --   --  107   CO2  --   --  23   BUN  --   --  60*   CR 1.01  --  1.13*   ANIONGAP  --   --  12   NELLIE  --   --  9.2   GLC  --   --  258*   ALBUMIN  --   --  3.3*   PROTTOTAL  --   --  8.1   BILITOTAL  --   --  0.5   ALKPHOS  --   --  82   ALT  --   --  28   AST  --   --  23   LIPASE  --   --  174   TROPI 0.558* 0.706* 0.658*       Recent Results (from the past 24 hour(s))   XR Chest Port 1 View    Narrative    CHEST PORTABLE ONE VIEW    8/20/2017 7:00 PM     HISTORY: AMS and hypoxia.    COMPARISON: Chest x-rays dated 8/6/2017.    FINDINGS:  Stable opacity in the left mid to lower lung could  represent airspace disease and/or atelectasis. Lungs are persistently  hypoaerated. Cardiac silhouette remains prominent. No pneumothorax or  right pleural fluid collection. Probable left pleural fluid collection  is present. No fracture.      Impression    IMPRESSION:  1. Essentially stable chest x-ray since prior study dated 8/6/2017  with left basilar opacity likely due to atelectasis, infiltrate,  effusion, and/or enlarged cardiac silhouette.  2. Hypoaeration is again noted.     BRITTNEY CUI MD   CT Head w/o Contrast   Result Value    Radiologist flags Finding concerning for an acute infarct (AA)    Narrative    CT SCAN OF THE HEAD WITHOUT CONTRAST   8/20/2017 7:19 PM     HISTORY: AMS.    TECHNIQUE:  Axial images of the head and coronal reformations without  IV contrast material. Radiation dose for this scan was reduced using  automated exposure control, adjustment of the mA and/or kV  according  to patient size, or iterative reconstruction technique.    COMPARISON: Head CT 6/24/2014.    FINDINGS: Focal loss of gray-white matter differentiation and apparent  gyral swelling in the medial left occipital lobe is concerning for an  acute infarct. Abnormal hypodensity also extends into the left  temporal lobe. Fairly well-defined hypodensity in the paramedian left  frontal lobe with loss of the cortex in that region may be from  previous infarct although it is new compared to prior CT. Extensive  confluent periventricular white matter hypodensities. Moderate diffuse  parenchymal volume loss. There appears to be infarcts within the  thalami bilaterally of indeterminate age.    No evidence of acute intracranial hemorrhage. Mild regional edema in  the left occipital lobe without significant midline shift. The  ventricles are not enlarged out of proportion to the cerebral sulci  and there is no evidence of hydrocephalus. The basal cisterns are  patent.    There is no evidence of trauma. The visualized portions of the sinuses  and mastoids appear normal.      Impression    IMPRESSION:     1. Findings concerning for an acute infarct within the left occipital  lobe. Mild regional edema without significant mass effect or midline  shift. Hypodensity in this region also extends into the left temporal  lobe and region of ischemia in this area cannot be excluded.  Evaluation for regions of acute ischemia would be better appreciated  with MR.  2. New area of encephalomalacia within the paramedian left frontal  lobe likely from previous infarct although an acute component of  ischemia is difficult to exclude. Indeterminate age infarcts in  bilateral thalami. Again this would be better evaluated with  above-mentioned MRI.  3. Fairly extensive confluent periventricular white matter  hypodensities which are nonspecific, but likely related to chronic  microvascular ischemic disease. Moderate diffuse parenchymal  volume  loss. These findings have progressed since previous MR from 6/24/2014.    [Critical Result: Finding concerning for an acute infarct]    Finding was identified on 8/20/2017 7:21 PM.     Dr. Salvatore Meneses was contacted by me on 8/20/2017 7:31 PM and  verbalized understanding of the critical result.     KY FLOR MD   MRI Brain w/o contrast (use if contrast contraindicated)    Narrative    MR BRAIN WITHOUT CONTRAST August 21, 2017 6:04 AM     HISTORY: CVI.    TECHNIQUE: Multiplanar, multisequence MRI of the brain without and  with IV contrast material.    COMPARISON: CT dated 8/20/2017.    FINDINGS: There is generalized atrophy of the brain.  White matter  changes are present in the cerebral hemispheres that are consistent  with small vessel ischemic disease in this age patient.  Diffusion-weighted images reveal areas of restricted diffusion in both  the right and left occipital lobes and in the medial aspect of the  left posterior temporal region. These are in the distribution of both  the right and left posterior cerebral arteries. There are areas of T1  hyperintensity in the cortex of both the right and left occipital  regions, these are in the areas of restricted diffusion. 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  There is a small area of restricted diffusion in the left inferior  cerebellum. There is a suggestion of a small area of restricted  diffusion in the left frontal lobe and in the white matter of both the  right and left frontal lobes. These areas of questionable restricted  diffusion are not well seen on the ADC images and these areas could be  secondary to T2 shine through. On gradient-echo images there are areas  of susceptibility artifact in the left frontal lobe. There is also a  small susceptibility artifact in the right frontal lobe. These are  consistent with hemosiderin deposition secondary to chronic  microbleeds.         Impression     IMPRESSION:   1. Areas of infarction in both the right and left occipital regions.  There is T1 hyperintensity in the cortex of these areas of infarction.  The T1 hyperintensity is probably due to petechial hemorrhage  secondary to laminar necrosis. This would suggest that these areas of  infarction are subacute.  2. Questionable areas of restricted diffusion in both the right and  left frontal regions. These could also be due to subacute infarcts but  they could be secondary to T2 shine through.  3. 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  4. Age-related changes including generalized atrophy of the brain.  White matter changes in the cerebral hemispheres consistent with small  vessel ischemic disease in this age patient.[PB1.1]          Revision History        User Key Date/Time User Provider Type Action    > PB1.3 8/21/2017  9:46 AM Piotr Mercado MD Physician Addend     PB1.2 8/21/2017  8:17 AM Piotr Mercado MD Physician Sign     PB1.1 8/21/2017  7:34 AM Piotr Mercado MD Physician                   Procedure Notes     No notes of this type exist for this encounter.      Progress Notes - Therapies (Notes from 08/21/17 through 08/24/17)     No notes of this type exist for this encounter.

## 2017-08-20 NOTE — IP AVS SNAPSHOT
[place] [address] [facility]: [phone]                                              INTERAGENCY TRANSFER FORM - PHYSICIAN ORDERS   2017                    Hospital Admission Date: 2017  RAÚL MERCEDES   : 1926  Sex: Female        Attending Provider: Antwan Green MD     Allergies:  Sulfa Drugs    Infection:  MRSA-Contact Isolation   Service:  HOSPITALIST    Ht:  1.524 m (5')   Wt:  69.5 kg (153 lb 3.5 oz)   Admission Wt:  65.9 kg (145 lb 4.5 oz)    BMI:  29.92 kg/m 2   BSA:  1.72 m 2            Patient PCP Information     None on File      ED Clinical Impression     Diagnosis Description Comment Added By Time Added    Sepsis, due to unspecified organism (H) [A41.9] Sepsis, due to unspecified organism (H) [A41.9]  Salvatore Meneses MD 2017  7:35 PM    Altered mental status, unspecified altered mental status type [R41.82] Altered mental status, unspecified altered mental status type [R41.82]  Salvatore Meneses MD 2017  7:35 PM      Hospital Problems as of 2017              Priority Class Noted POA    CVA (cerebral vascular accident) (H) Medium  2017 Yes      Non-Hospital Problems as of 2017              Priority Class Noted    Nephrolithiasis Medium  2012    Bacteremia Medium  2012    Actinomyces infection Medium  2012    Uric acid stone in urine Medium  Unknown    Fall Medium  2014    UTI (urinary tract infection) Medium  2014    Scalp laceration Medium  2014    Advanced dementia Medium  2014    Atrial fibrillation (H) Medium  2015    History of urinary tract infection Medium  2015    Alzheimer's disease Medium  2015    Primary localized osteoarthrosis of shoulder region Medium  2015    Morbid obesity (H) Medium  2015    Diabetes mellitus type 2, insulin dependent (H) Medium  2015    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) Medium  5/22/2015    Hyperlipidemia Medium  5/22/2015    Candidiasis of skin and nails Medium  5/22/2015    Chronic kidney disease, stage III (moderate) Medium  5/22/2015    Advance care planning Medium  5/29/2015    HCAP (healthcare-associated pneumonia) Medium  6/21/2016    Sepsis due to urinary tract infection (H) Medium  6/14/2017    ARF (acute renal failure) (H) Medium  6/30/2017    Recurrent UTI Medium  8/4/2017    Acute renal failure superimposed on stage 3 chronic kidney disease (H) Medium  8/9/2017    Sacral decubitus ulcer, stage III (H) Medium  8/9/2017    Hypernatremia Medium  8/9/2017    Complication of gastrostomy tube (H) Medium  8/9/2017      Code Status History     Date Active Date Inactive Code Status Order ID Comments User Context    8/23/2017  3:10 PM  DNR/DNI [id number]  Piotr Mercado MD Outpatient    8/21/2017  9:44 AM 8/23/2017  3:10 PM DNR/DNI [id number]  Piotr Mercado MD Inpatient    8/20/2017  8:51 PM 8/21/2017  9:44 AM Full Code [id number]  Antwan Green MD Inpatient    8/9/2017  6:24 PM 8/20/2017  8:51 PM Full Code [id number]  Tamar Pineda MD Outpatient    8/4/2017  7:51 AM 8/9/2017  6:24 PM Full Code [id number]  Felicitas Cedeño DO Inpatient    7/8/2017 10:22 AM 8/4/2017  7:51 AM Full Code [id number]  Piotr aSba MD Outpatient    6/30/2017  7:29 PM 7/8/2017 10:22 AM Full Code [id number]  Paul Jolly MD Inpatient    6/19/2017 10:10 AM 6/30/2017  7:29 PM Full Code [id number]  Kraig Mobley MD Outpatient    6/14/2017  5:14 PM 6/19/2017 10:10 AM Full Code [id number]  Antwan Green MD Inpatient    6/21/2016 11:48 AM 6/24/2016  5:44 PM Full Code [id number]  Clarence Wilkerson MD Inpatient    6/24/2014  3:01 PM 6/21/2016 11:48 AM Full Code [id number]  Emily Ca DO Outpatient    6/24/2014  3:34 AM 6/24/2014  3:01 PM Full Code [id number]  Ni Mccurdy MD Inpatient    8/14/2012 12:42 PM 6/24/2014  3:34 AM Full Code [id number]  Francesca Knott MD  Outpatient    8/6/2012  3:47 AM 8/14/2012 12:42 PM Full Code [id number]  Peewee De La Cruz MD Inpatient         Medication Review      START taking        Dose / Directions Comments    aspirin 325 MG tablet   Used for:  Cerebrovascular accident (CVA) due to bilateral embolism of posterior cerebral arteries (H)        Dose:  325 mg   1 tablet (325 mg) by Per G Tube route daily   Quantity:  120 tablet   Refills:  2        fluconazole 40 MG/ML suspension   Commonly known as:  DIFLUCAN   Indication:  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 If we are uncertain of the size of tablets/capsules you have at home, strength may be listed as something that might have changed.        Dose / Directions Comments    insulin glargine 100 UNIT/ML injection   Commonly known as:  LANTUS   This may have changed:  how much to take   Used for:  Type 2 diabetes mellitus with diabetic nephropathy, unspecified long term insulin use status (H)        Dose:  35 Units   Inject 35 Units Subcutaneous 2 times daily   Refills:  0        * oxyCODONE 5 MG/5ML solution   Commonly known as:  ROXICODONE   Indication:  Chronic Pain   This may have changed:  how to take this   Used for:  Multiple joint pain, Sacral decubitus ulcer, stage III (H)        Dose:  2 mg   2 mLs (2 mg) by Per G Tube route 3 times daily   Quantity:  15 mL   Refills:  0        * oxyCODONE 5 MG/5ML solution   Commonly known as:  ROXICODONE   This may have changed:  Another medication with the same name was changed. Make sure you understand how and when to take  each.   Used for:  Primary localized osteoarthrosis of shoulder region, unspecified laterality        Dose:  2 mg   2 mLs (2 mg) by Per G Tube route every 4 hours as needed for moderate to severe pain   Quantity:  15 mL   Refills:  0        * Notice:  This list has 2 medication(s) that are the same as other medications prescribed for you. Read the directions carefully, and ask your doctor or other care provider to review them with you.      CONTINUE these medications which have NOT CHANGED        Dose / Directions Comments    * ACETAMINOPHEN PO        Dose:  650 mg   650 mg by Gastric Tube route 3 times daily   Refills:  0        * acetaminophen 32 mg/mL solution   Commonly known as:  TYLENOL        Dose:  325 mg   325 mg by Gastric Tube route daily as needed for fever or mild pain   Refills:  0        bisacodyl 10 MG Suppository   Commonly known as:  DULCOLAX        Dose:  10 mg   Place 10 mg rectally daily as needed for constipation   Refills:  0        Dakins 0.125 % Soln        Externally apply topically 2 times daily BID and PRN.  1. Cleanse wound with microklenze, cleanse periwound area with naomi perineal 2. Moisten kerlix fluff with dakins solution 0.125%, wring out excess, pack wound with kerlix 3. Apply antifungal powder to periwound area, rub in. Apply criticaid over powder. 4. Cover with ABD using minimal medipore tape to secure. 5. Label dressing with date, time, initials. Follow Rigorous PIP measures.   Refills:  0        fiber modular packet        Dose:  1 packet   1 packet by Per Feeding Tube route 2 times daily   Refills:  0        IMODIUM A-D 1 MG/7.5ML Liqd   Generic drug:  Loperamide HCl        Dose:  4 mg   4 mg by Gastric Tube route every other day   Refills:  0        INSULIN ASPART SC        Dose:  1-6 Units   Inject 1-6 Units Subcutaneous every 6 hours 0800, 1400, 2000, 0200 -250 1 unit -300 2 units -350 3 units -400 4 units -450 5 units BG >450 6 units and  update MD   Refills:  0        Lactobacillus Acidophilus Powd        Dose:  1 capsule   1 capsule by Gastric Tube route daily 10 billion units   Refills:  0        metoprolol 10 mg/mL Susp   Commonly known as:  LOPRESSOR        Dose:  25 mg   Take 25 mg by mouth 2 times daily   Refills:  0        multivitamin, therapeutic Tabs tablet        Dose:  1 tablet   1 tablet by Gastric Tube route daily   Refills:  0        ranitidine 150 MG/10ML syrup   Commonly known as:  Zantac   Used for:  Gastroesophageal reflux disease without esophagitis        Dose:  150 mg   10 mLs (150 mg) by Per Feeding Tube route daily   Quantity:  600 mL   Refills:  0        simethicone 40 MG/0.6ML suspension   Commonly known as:  MYLICON        Dose:  80 mg   Take 80 mg by mouth 3 times daily   Refills:  0        vitamin A-D & C drops 750-400-35 UNIT-MG/ML solution NEW FORMULATION   Used for:  Decubitus ulcer of buttock, unspecified laterality, unspecified ulcer stage        Dose:  7 mL   7 mLs by Per G Tube route daily   Quantity:  50 mL   Refills:  0        * Notice:  This list has 2 medication(s) that are the same as other medications prescribed for you. Read the directions carefully, and ask your doctor or other care provider to review them with you.              Further instructions from your care team       Discharge to [facility] [phone]    Summary of Visit     Reason for your hospital stay       You were admitted for sepsis likely due to urinary tract infection.             After Care     Activity - Up with nursing assistance           Advance Diet as Tolerated       Follow this diet upon discharge:       Adult Formula Drip Feeding: Continuous Isosource 1.5; Gastrostomy; Goal Rate: 45; mL/hr; Medication - Tube Feeding Flush Frequency: At least 15-30 mL water before and after medication administration and with tube clogging; Amout to Send (Nutrition...      NPO for Medical/Clinical Reasons Except for: No  Exceptions  Free water flush 200 ml q4h       General info for SNF       Length of Stay Estimate: Long Term Care  Condition at Discharge: Stable  Level of care:skilled   Rehabilitation Potential: Poor  Admission H&P remains valid and up-to-date: Yes  Recent Chemotherapy: N/A  Use Nursing Home Standing Orders: N/A       Glucose monitor nursing POCT       Before meals and at bedtime       Mantoux instructions       Give two-step Mantoux (PPD) Per Facility Policy Yes       Wound care (specify)       Continue wound cares to coccyx and bilateral ears as previously prescribed.             Follow-Up Appointment Instructions     Future Labs/Procedures    Follow Up and recommended labs and tests     Comments:    Follow up with Nursing home physician.  Follow up BMP in 2-4 days to check sodium.      Follow-Up Appointment Instructions     Follow Up and recommended labs and tests       Follow up with Nursing home physician.  Follow up BMP in 2-4 days to check sodium.             Statement of Approval     Ordered          08/24/17 1627  I have reviewed and agree with all the recommendations and orders detailed in this document.  EFFECTIVE NOW     Approved and electronically signed by:  Lucian Ochoa MD

## 2017-08-20 NOTE — IP AVS SNAPSHOT
` ` Patient Information     Patient Name Sex     Celia Lewis ([id number]) Female 1926       Room Bed    [facility] 0704-01      Patient Demographics     Address Phone    [facility]  [address] [place] [address] [phone] (Home)  [phone] (Mobile)      Patient Ethnicity & Race     Ethnic Group Patient Race    American White      Emergency Contact(s)     Name Relation Home Work Mobile    Salvatore Lewis . Son [phone]    JoshuaLeonel Spouse [phone]      Castro & Thi Lewis Son [phone]    JoshuaCat Daughter [phone]      Documents on File        Status Date Received Description       Documents for the Patient    Privacy Notice - [place] Received 12     Face Sheet Received () 01/28/10     External Medication Information Consent       Patient ID       Consent for Services - Hospital/Clinic Received () 10/31/10     Face Sheet Received () 10/31/10     Business/Insurance/Care Coordination/Health Form - Patient.1  12     Business/Insurance/Care Coordination/Health Form - Patient.1  12     Insurance Card Received 12 [employer]    Advance Directives and Living Will Received 12 POLST 11-    Consent for Services - Hospital/Clinic Received () 12     Consent for Services - Hospital/Clinic  () 12 CONSENT FOR SERVICES - CLINIC AND HOD    Consent for EHR Access  13 Copied from existing Consent for services - C/HOD collected on 2012    [facility] Specified Other       Consent for Services - Hospital/Clinic  () 14 CONSENT FOR SERVICE    Consent for Services - [facility]       Consent for Services/Privacy Notice - Hospital/Clinic-Esign Received () 16     Consent for Services/Privacy Notice - Hospital/Clinic       Advance Directives and Living Will Received 16 POLST 07/21/15    Privacy Notice -  [place] Received 08/21/17     Insurance Card Received (Deleted) 08/05/12     Advance Directives and Living Will Received (Deleted) 09/04/12 to be deleted       Documents for the Encounter    CMS IM for Patient Signature Received 08/23/17 1MM    EMS/Ambulance Record  08/20/17 [facility]    Discharge Attachment   METHICILLIN-RESISTANT STAPHYLOCOCCUS AUREUS (MRSA) (ENGLISH)      Admission Information     Attending Provider Admitting Provider Admission Type Admission Date/Time    Antwan Green MD Bhattarai, Nimesh, MD Emergency 08/20/17  1841    Discharge Date Hospital Service Auth/Cert Status Service Area     Hospitalist Incomplete [facility] SERVICES    Unit Room/Bed Admission Status        [facility] 0704/0704-01 Admission (Confirmed)       Admission     Complaint    CVA (cerebral vascular accident) (H)      Hospital Account     Name Acct ID Class Status Primary Coverage    Celia Lewis [id number] Inpatient Open UCARE - UCARE SENIORS NON FPA            Guarantor Account (for Hospital Account #[id number])     Name Relation to Pt Service Area Active? Acct Type    Celia Lewis  FCS Yes Personal/Family    Address Phone          [facility]  [address] [place] [address] [phone](H)              Coverage Information (for Hospital Account #[id number])     F/O Payor/Plan Precert #    UCARE/UCARE SENIORS NON FPA     Subscriber Subscriber #    Celia Lewis [id number]    Address Phone    [address]  [place] [address] [phone]

## 2017-08-20 NOTE — IP AVS SNAPSHOT
[place] [address] [facility]: [phone]                                              INTERAGENCY TRANSFER FORM - LAB / IMAGING / EKG / EMG RESULTS   2017                    Hospital Admission Date: 2017  RAÚL MERCEDES   : 1926  Sex: Female        Attending Provider: Antwan Green MD     Allergies:  Sulfa Drugs    Infection:  MRSA-Contact Isolation   Service:  HOSPITALIST    Ht:  1.524 m (5')   Wt:  69.5 kg (153 lb 3.5 oz)   Admission Wt:  65.9 kg (145 lb 4.5 oz)    BMI:  29.92 kg/m 2   BSA:  1.72 m 2            Patient PCP Information     None on File         Lab Results - 3 Days      Urine Culture Aerobic Bacterial [[id number]] (Abnormal)  Resulted: 17 1419, Result status: Preliminary result    Ordering provider: Piotr Mercado MD  17 1033 Resulting lab: [facility]    Specimen Information    Type Source Collected On   Catheterized Urine Urine catheter 17 0429          Components       Value Reference Range Flag Lab   Specimen Description Catheterized Urine      Special Requests Specimen received in preservative   75   Culture Micro --  A 225   Result:         >100,000 colonies/mL  Candida albicans / dubliniensis  Candida albicans and Candida dubliniensis are not routinely speciated  Susceptibility testing not routinely done     Culture Micro Culture in progress   225   Result:              Blood culture [[id number]]  Resulted: 17 1339, Result status: Preliminary result    Ordering provider: Piotr Mercado MD  17 0000 Resulting lab: [facility]    Specimen Information    Type Source Collected On   Blood  17 0916   Comment:  Left Hand          Components       Value Reference Range Flag Lab   Specimen Description Blood Left Hand      Culture Micro No growth after 1 day   75            Glucose by meter [[id number]] (Abnormal)  Resulted: 17 1330, Result status: Final result     Ordering provider: Antwan Green MD  08/24/17 1326 Resulting lab: POINT OF CARE TEST, GLUCOSE    Specimen Information    Type Source Collected On     08/24/17 1326          Components       Value Reference Range Flag Lab   Glucose 283 70 - 99 mg/dL H 170            C difficile culture [[id number]] (Abnormal)  Resulted: 08/24/17 1114, Result status: Final result    Ordering provider: Antwan Green MD  08/24/17 1010 Resulting lab: [facility]    Specimen Information    Type Source Collected On   Feces  08/24/17 1010          Components       Value Reference Range Flag Lab   Specimen Descrip Feces   FrStHsLb   C Difficile Culture Canceled, Test credited NEG^Negative A FrStHsLb   Comment:         Clostridium difficile Toxin B by PCR is the preferred and most sensitive   assay. Culture will be done by special request only or by reflex if there is a   specimen inhibitor that interferes with the PCR assay.              Clostridium difficile toxin B PCR [[id number]]  Resulted: 08/24/17 1106, Result status: In process    Ordering provider: Antwan Green MD  08/24/17 1010 Resulting lab: [facility]    Specimen Information    Type Source Collected On   Stool  08/24/17 1010            Glucose by meter [[id number]] (Abnormal)  Resulted: 08/24/17 1016, Result status: Final result    Ordering provider: Antwan Green MD  08/24/17 0925 Resulting lab: POINT OF CARE TEST, GLUCOSE    Specimen Information    Type Source Collected On     08/24/17 0925          Components       Value Reference Range Flag Lab   Glucose 234 70 - 99 mg/dL H 170            Sodium [[id number]]  Resulted: 08/24/17 0949, Result status: Final result    Ordering provider: Piotr Mercado MD  08/24/17 0000 Resulting lab: [facility]    Specimen Information    Type Source Collected On   Blood  08/24/17 0830          Components       Value Reference Range Flag Lab   Sodium 135 133 - 144 mmol/L  FrStHsLb             Creatinine [[id number]]  Resulted: 08/24/17 0949, Result status: Final result    Ordering provider: Piotr Mercado MD  08/24/17 0000 Resulting lab: [facility]    Specimen Information    Type Source Collected On   Blood  08/24/17 0830          Components       Value Reference Range Flag Lab   Creatinine 0.74 0.52 - 1.04 mg/dL  FrStHsLb   GFR Estimate 73 >60 mL/min/1.7m2  FrStHsLb   Comment:  Non  GFR Calc   GFR Estimate If Black 89 >60 mL/min/1.7m2  FrStHsLb   Comment:   GFR Calc            CBC with platelets [[id number]] (Abnormal)  Resulted: 08/24/17 0923, Result status: Final result    Ordering provider: Piotr Mercado MD  08/24/17 0000 Resulting lab: [facility]    Specimen Information    Type Source Collected On   Blood  08/24/17 0830          Components       Value Reference Range Flag Lab   WBC 9.4 4.0 - 11.0 10e9/L  FrStHsLb   RBC Count 3.61 3.8 - 5.2 10e12/L L FrStHsLb   Hemoglobin 11.3 11.7 - 15.7 g/dL L FrStHsLb   Hematocrit 34.2 35.0 - 47.0 % L FrStHsLb   MCV 95 78 - 100 fl  FrStHsLb   MCH 31.3 26.5 - 33.0 pg  FrStHsLb   MCHC 33.0 31.5 - 36.5 g/dL  FrStHsLb   RDW 16.1 10.0 - 15.0 % H FrStHsLb   Platelet Count 223 150 - 450 10e9/L  FrStHsLb            Glucose by meter [[id number]] (Abnormal)  Resulted: 08/24/17 0611, Result status: Final result    Ordering provider: Antwan Green MD  08/24/17 0559 Resulting lab: POINT OF CARE TEST, GLUCOSE    Specimen Information    Type Source Collected On     08/24/17 0559          Components       Value Reference Range Flag Lab   Glucose 257 70 - 99 mg/dL H 170            Blood culture [[id number]]  Resulted: 08/24/17 0543, Result status: Preliminary result    Ordering provider: Piotr Mercado MD  08/22/17 1028 Resulting lab: [facility]    Specimen Information    Type Source Collected On   Blood  08/22/17 2754   Comment:  Left Hand          Components       Value Reference Range Flag Lab    Specimen Description Blood Left Hand      Special Requests PEDIATRIC AEROBIC BOTTLE ONLY   FrStHsLb   Culture Micro No growth after 2 days   226            Blood culture [[id number]]  Resulted: 08/24/17 0543, Result status: Preliminary result    Ordering provider: Piotr Mercado MD  08/22/17 1028 Resulting lab: [facility]    Specimen Information    Type Source Collected On   Blood  08/22/17 1200   Comment:  Right Hand          Components       Value Reference Range Flag Lab   Specimen Description Blood Right Hand      Special Requests PEDIATRIC AEROBIC BOTTLE ONLY   FrStHsLb   Culture Micro No growth after 2 days   226            Glucose by meter [[id number]] (Abnormal)  Resulted: 08/24/17 0206, Result status: Final result    Ordering provider: Antwan Green MD  08/24/17 0158 Resulting lab: POINT OF CARE TEST, GLUCOSE    Specimen Information    Type Source Collected On     08/24/17 0158          Components       Value Reference Range Flag Lab   Glucose 222 70 - 99 mg/dL H 170            Glucose by meter [[id number]] (Abnormal)  Resulted: 08/23/17 2201, Result status: Final result    Ordering provider: Antwan Green MD  08/23/17 2112 Resulting lab: POINT OF CARE TEST, GLUCOSE    Specimen Information    Type Source Collected On     08/23/17 2112          Components       Value Reference Range Flag Lab   Glucose 217 70 - 99 mg/dL H 170            Glucose by meter [[id number]] (Abnormal)  Resulted: 08/23/17 1830, Result status: Final result    Ordering provider: Antwan Green MD  08/23/17 1437 Resulting lab: POINT OF CARE TEST, GLUCOSE    Specimen Information    Type Source Collected On     08/23/17 1437          Components       Value Reference Range Flag Lab   Glucose 296 70 - 99 mg/dL H 170            Glucose by meter [[id number]] (Abnormal)  Resulted: 08/23/17 1830, Result status: Final result    Ordering provider: Antwan Green MD  08/23/17 1805 Resulting lab: POINT OF CARE TEST,  GLUCOSE    Specimen Information    Type Source Collected On     08/23/17 1805          Components       Value Reference Range Flag Lab   Glucose 265 70 - 99 mg/dL H 170            Glucose by meter [[id number]] (Abnormal)  Resulted: 08/23/17 1040, Result status: Final result    Ordering provider: Antwan Green MD  08/23/17 1038 Resulting lab: POINT OF CARE TEST, GLUCOSE    Specimen Information    Type Source Collected On     08/23/17 1038          Components       Value Reference Range Flag Lab   Glucose 263 70 - 99 mg/dL H 170            Basic metabolic panel [[id number]] (Abnormal)  Resulted: 08/23/17 0944, Result status: Final result    Ordering provider: Piotr Mercado MD  08/23/17 0000 Resulting lab: [facility]    Specimen Information    Type Source Collected On   Blood  08/23/17 0916          Components       Value Reference Range Flag Lab   Sodium 145 133 - 144 mmol/L H FrStHsLb   Potassium 3.4 3.4 - 5.3 mmol/L  FrStHsLb   Chloride 112 94 - 109 mmol/L H FrStHsLb   Carbon Dioxide 21 20 - 32 mmol/L  FrStHsLb   Anion Gap 12 3 - 14 mmol/L  FrStHsLb   Glucose 268 70 - 99 mg/dL H FrStHsLb   Urea Nitrogen 31 7 - 30 mg/dL H FrStHsLb   Creatinine 0.84 0.52 - 1.04 mg/dL  FrStHsLb   GFR Estimate 63 >60 mL/min/1.7m2  FrStHsLb   Comment:  Non  GFR Calc   GFR Estimate If Black 77 >60 mL/min/1.7m2  FrStHsLb   Comment:  African American GFR Calc   Calcium 8.1 8.5 - 10.1 mg/dL L FrStHsLb            CBC with platelets [[id number]] (Abnormal)  Resulted: 08/23/17 0929, Result status: Final result    Ordering provider: Piotr Mercado MD  08/23/17 0000 Resulting lab: [facility]    Specimen Information    Type Source Collected On   Blood  08/23/17 0916          Components       Value Reference Range Flag Lab   WBC 7.9 4.0 - 11.0 10e9/L  FrStHsLb   RBC Count 3.45 3.8 - 5.2 10e12/L L FrStHsLb   Hemoglobin 11.0 11.7 - 15.7 g/dL L FrStHsLb   Hematocrit 34.2 35.0 - 47.0 % L FrStHsLb    MCV 99 78 - 100 fl  FrStHsLb   MCH 31.9 26.5 - 33.0 pg  FrStHsLb   MCHC 32.2 31.5 - 36.5 g/dL  FrStHsLb   RDW 16.9 10.0 - 15.0 % H FrStHsLb   Platelet Count 210 150 - 450 10e9/L  FrStHsLb            Lactic acid whole blood [[id number]] (Abnormal)  Resulted: 08/23/17 0928, Result status: Final result    Ordering provider: Salvatore Michelle MD  08/23/17 0000 Resulting lab: [facility]    Specimen Information    Type Source Collected On   Blood  08/23/17 0916          Components       Value Reference Range Flag Lab   Lactic Acid 3.5 0.7 - 2.0 mmol/L H FrStHsLb            Blood culture [[id number]] (Abnormal)  Resulted: 08/23/17 0724, Result status: Final result    Ordering provider: Salvatore Meneses MD  08/20/17 1849 Resulting lab: [facility]    Specimen Information    Type Source Collected On   Blood Arm, Right 08/20/17 1930   Comment:  Right Arm          Components       Value Reference Range Flag Lab   Specimen Description Blood Right Arm      Special Requests Aerobic and anaerobic bottles received   FrStHsLb   Culture Micro --  A 225   Result:         Cultured on the 1st day of incubation:  Staphylococcus epidermidis     Culture Micro --   225   Result:         Critical Value/Significant Value, preliminary result only, called to and read back by  Peewee Shepard RN SH73, @1956 8/21/17..     Culture Micro Susceptibility testing done on previous specimen   225   Result:              Blood culture [[id number]] (Abnormal)  Resulted: 08/23/17 0723, Result status: Final result    Ordering provider: Salvatore Meneses MD  08/20/17 6849 Resulting lab: [facility]    Specimen Information    Type Source Collected On   Blood Arm, Left 08/20/17 1855   Comment:  Left Hand          Components       Value Reference Range Flag Lab   Specimen Description Blood Left Hand      Special Requests Aerobic and anaerobic  bottles received   FrStHsLb   Culture Micro --  A 75   Result:         Cultured on the 1st day of incubation:  Staphylococcus epidermidis  This isolate DOES NOT demonstrate inducible clindamycin resistance in vitro. Clindamycin   is susceptible and could be used when indicated, however, erythromycin is resistant and   should not be used.     Culture Micro --   75   Result:         Critical Value/Significant Value, preliminary result only, called to and read back by  Miranda Ellsworth RN SH75, @1708 8/21/17..     Culture Micro --   75   Result:         (Note)  POSITIVE for STAPHYLOCOCCUS EPIDERMIDIS and POSITIVE for the mecA  gene (resistant to methicillin) by iValidate.me multiplex nucleic acid  test. Final identification and antimicrobial susceptibility testing  will be verified by standard methods.    Specimen tested with Verigene multiplex, gram-positive blood culture  nucleic acid test for the following targets: Staph aureus, Staph  epidermidis, Staph lugdunensis, other Staph species, Enterococcus  faecalis, Enterococcus faecium, Streptococcus species, S. agalactiae,  S. anginosus grp., S. pneumoniae, S. pyogenes, Listeria sp., mecA  (methicillin resistance) and Rene/B (vancomycin resistance).    Critical Value/Significant Value called to and read back by Peewee Shepard RN SH73,#[id number] 8/21/17..              Glucose by meter [[id number]] (Abnormal)  Resulted: 08/23/17 0635, Result status: Final result    Ordering provider: Antwan Green MD  08/23/17 0605 Resulting lab: POINT OF CARE TEST, GLUCOSE    Specimen Information    Type Source Collected On     08/23/17 0605          Components       Value Reference Range Flag Lab   Glucose 263 70 - 99 mg/dL H 170            Glucose by meter [[id number]] (Abnormal)  Resulted: 08/23/17 0231, Result status: Final result    Ordering provider: Antwan Green MD  08/23/17 0225 Resulting lab: POINT OF CARE TEST, GLUCOSE    Specimen Information    Type Source Collected On      08/23/17 0225          Components       Value Reference Range Flag Lab   Glucose 194 70 - 99 mg/dL H 170            Sodium [[id number]] (Abnormal)  Resulted: 08/23/17 0025, Result status: Final result    Ordering provider: Salvatore Michelle MD  08/22/17 2222 Resulting lab: [facility]    Specimen Information    Type Source Collected On   Blood  08/23/17 0000          Components       Value Reference Range Flag Lab   Sodium 147 133 - 144 mmol/L H FrStHsLb            Glucose by meter [[id number]] (Abnormal)  Resulted: 08/22/17 2131, Result status: Final result    Ordering provider: Antwan Green MD  08/22/17 2123 Resulting lab: POINT OF CARE TEST, GLUCOSE    Specimen Information    Type Source Collected On     08/22/17 2123          Components       Value Reference Range Flag Lab   Glucose 218 70 - 99 mg/dL H 170            Glucose by meter [[id number]] (Abnormal)  Resulted: 08/22/17 1751, Result status: Final result    Ordering provider: Antwan Green MD  08/22/17 1732 Resulting lab: POINT OF CARE TEST, GLUCOSE    Specimen Information    Type Source Collected On     08/22/17 1732          Components       Value Reference Range Flag Lab   Glucose 237 70 - 99 mg/dL H 170            Sodium [[id number]] (Abnormal)  Resulted: 08/22/17 1704, Result status: Final result    Ordering provider: Piotr Mercado MD  08/22/17 1033 Resulting lab: [facility]    Specimen Information    Type Source Collected On   Blood  08/22/17 1630          Components       Value Reference Range Flag Lab   Sodium 148 133 - 144 mmol/L H FrStHsLb            Lactic acid whole blood [[id number]] (Abnormal)  Resulted: 08/22/17 1652, Result status: Final result    Ordering provider: Piotr Mercado MD  08/22/17 1033 Resulting lab: [facility]    Specimen Information    Type Source Collected On   Blood  08/22/17 1630          Components       Value Reference Range Flag Lab   Lactic Acid 3.0 0.7 - 2.0  mmol/L H FrStHsLb            Glucose by meter [[id number]] (Abnormal)  Resulted: 08/22/17 1356, Result status: Final result    Ordering provider: Antwan Green MD  08/22/17 1351 Resulting lab: POINT OF CARE TEST, GLUCOSE    Specimen Information    Type Source Collected On     08/22/17 1351          Components       Value Reference Range Flag Lab   Glucose 199 70 - 99 mg/dL H 170            Glucose by meter [[id number]] (Abnormal)  Resulted: 08/22/17 1021, Result status: Final result    Ordering provider: Antwan Green MD  08/22/17 1018 Resulting lab: POINT OF CARE TEST, GLUCOSE    Specimen Information    Type Source Collected On     08/22/17 1018          Components       Value Reference Range Flag Lab   Glucose 187 70 - 99 mg/dL H 170            Basic metabolic panel [[id number]] (Abnormal)  Resulted: 08/22/17 0841, Result status: Final result    Ordering provider: Piotr Mercado MD  08/22/17 0000 Resulting lab: [facility]    Specimen Information    Type Source Collected On   Blood  08/22/17 0802          Components       Value Reference Range Flag Lab   Sodium 150 133 - 144 mmol/L H FrStHsLb   Potassium 3.7 3.4 - 5.3 mmol/L  FrStHsLb   Chloride 117 94 - 109 mmol/L H FrStHsLb   Carbon Dioxide 21 20 - 32 mmol/L  FrStHsLb   Anion Gap 12 3 - 14 mmol/L  FrStHsLb   Glucose 211 70 - 99 mg/dL H FrStHsLb   Urea Nitrogen 37 7 - 30 mg/dL H FrStHsLb   Creatinine 0.85 0.52 - 1.04 mg/dL  FrStHsLb   GFR Estimate 63 >60 mL/min/1.7m2  FrStHsLb   Comment:  Non  GFR Calc   GFR Estimate If Black 76 >60 mL/min/1.7m2  FrStHsLb   Comment:  African American GFR Calc   Calcium 8.0 8.5 - 10.1 mg/dL L FrStHsLb            Lactic acid whole blood [[id number]] (Abnormal)  Resulted: 08/22/17 0831, Result status: Final result    Ordering provider: Piotr Mercado MD  08/22/17 0000 Resulting lab: [facility]    Specimen Information    Type Source Collected On   Blood  08/22/17 0802           Components       Value Reference Range Flag Lab   Lactic Acid 2.9 0.7 - 2.0 mmol/L H FrStHsLb            CBC with platelets [[id number]] (Abnormal)  Resulted: 08/22/17 0823, Result status: Final result    Ordering provider: Piotr Mercado MD  08/22/17 0000 Resulting lab: [facility]    Specimen Information    Type Source Collected On   Blood  08/22/17 0802          Components       Value Reference Range Flag Lab   WBC 8.7 4.0 - 11.0 10e9/L  FrStHsLb   RBC Count 3.29 3.8 - 5.2 10e12/L L FrStHsLb   Hemoglobin 10.2 11.7 - 15.7 g/dL L FrStHsLb   Hematocrit 32.7 35.0 - 47.0 % L FrStHsLb   MCV 99 78 - 100 fl  FrStHsLb   MCH 31.0 26.5 - 33.0 pg  FrStHsLb   MCHC 31.2 31.5 - 36.5 g/dL L FrStHsLb   RDW 16.8 10.0 - 15.0 % H FrStHsLb   Platelet Count 237 150 - 450 10e9/L  FrStHsLb            Glucose by meter [[id number]] (Abnormal)  Resulted: 08/22/17 0556, Result status: Final result    Ordering provider: Antwan Green MD  08/22/17 0547 Resulting lab: POINT OF CARE TEST, GLUCOSE    Specimen Information    Type Source Collected On     08/22/17 0547          Components       Value Reference Range Flag Lab   Glucose 173 70 - 99 mg/dL H 170            Glucose by meter [[id number]] (Abnormal)  Resulted: 08/22/17 0206, Result status: Final result    Ordering provider: Antwan Green MD  08/22/17 0151 Resulting lab: POINT OF CARE TEST, GLUCOSE    Specimen Information    Type Source Collected On     08/22/17 0151          Components       Value Reference Range Flag Lab   Glucose 198 70 - 99 mg/dL H 170            Vancomycin level [[id number]]  Resulted: 08/21/17 2358, Result status: Final result    Ordering provider: Antwan Green MD  08/21/17 1700 Resulting lab: [facility]    Specimen Information    Type Source Collected On   Blood  08/21/17 2325          Components       Value Reference Range Flag Lab   [facility] 10.6 mg/L  [facility]   Comment:         Traditional Dosing therapeutic  Range:         Trough 8-20 mg/L         Peak 20-50 mg/L              Creatinine [[id number]]  Resulted: 08/21/17 2346, Result status: Final result    Ordering provider: Aurea Sanderson [facility]  08/21/17 2019 Resulting lab: [facility]    Specimen Information    Type Source Collected On   Blood  08/21/17 2325          Components       Value Reference Range Flag Lab   Creatinine 0.87 0.52 - 1.04 mg/dL  FrStHsLb   GFR Estimate 61 >60 mL/min/1.7m2  FrStHsLb   Comment:  Non  GFR Calc   GFR Estimate If Black 74 >60 mL/min/1.7m2  FrStHsLb   Comment:   GFR Calc            Glucose by meter [[id number]] (Abnormal)  Resulted: 08/21/17 2206, Result status: Final result    Ordering provider: Antwan Green MD  08/21/17 2200 Resulting lab: POINT OF CARE TEST, GLUCOSE    Specimen Information    Type Source Collected On     08/21/17 2200          Components       Value Reference Range Flag Lab   Glucose 213 70 - 99 mg/dL H 170            Glucose by meter [[id number]] (Abnormal)  Resulted: 08/21/17 1756, Result status: Final result    Ordering provider: Antwan Green MD  08/21/17 1734 Resulting lab: POINT OF CARE TEST, GLUCOSE    Specimen Information    Type Source Collected On     08/21/17 1734          Components       Value Reference Range Flag Lab   Glucose 226 70 - 99 mg/dL H 170            Lactic acid whole blood [[id number]] (Abnormal)  Resulted: 08/21/17 1558, Result status: Final result    Ordering provider: Piotr Mercado MD  08/21/17 0813 Resulting lab: [facility]    Specimen Information    Type Source Collected On   Blood  08/21/17 1535          Components       Value Reference Range Flag Lab   Lactic Acid 3.1 0.7 - 2.1 mmol/L H FrStHsLb            Vitamin D Deficiency [[id number]]  Resulted: 08/21/17 1448, Result status: Final result    Ordering provider: Piotr Mercado MD  08/21/17 0740 Resulting lab: [facility]     Specimen Information    Type Source Collected On   Blood  08/21/17 0750          Components       Value Reference Range Flag Lab   Vitamin D Deficiency screening 26 20 - 75 ug/L  51   Comment:         Season, race, dietary intake, and treatment affect the concentration of   25-hydroxy-Vitamin D. Values may decrease during winter months and increase   during summer months. Values 20-29 ug/L may indicate Vitamin D insufficiency   and values <20 ug/L may indicate Vitamin D deficiency.  Vitamin D determination is routinely performed by an immunoassay specific for   25 hydroxyvitamin D3.  If an individual is on vitamin D2 (ergocalciferol)   supplementation, please specify 25 OH vitamin D2 and D3 level determination by   LCMSMS test VITD23.              Magnesium [[id number]]  Resulted: 08/21/17 1432, Result status: Final result    Ordering provider: Antwan Green MD  08/21/17 1314 Resulting lab: [facility]    Specimen Information    Type Source Collected On     08/21/17 1314          Components       Value Reference Range Flag Lab   Magnesium 2.2 1.6 - 2.3 mg/dL  FrStHsLb            Phosphorus [[id number]]  Resulted: 08/21/17 1432, Result status: Final result    Ordering provider: Antwan Green MD  08/21/17 1314 Resulting lab: [facility]    Specimen Information    Type Source Collected On     08/21/17 1314          Components       Value Reference Range Flag Lab   Phosphorus 2.9 2.5 - 4.5 mg/dL  FrStHsLb            Glucose by meter [[id number]] (Abnormal)  Resulted: 08/21/17 1422, Result status: Final result    Ordering provider: Antwan Green MD  08/21/17 1418 Resulting lab: POINT OF CARE TEST, GLUCOSE    Specimen Information    Type Source Collected On     08/21/17 1418          Components       Value Reference Range Flag Lab   Glucose 208 70 - 99 mg/dL H 170            Magnesium [[id number]]  Resulted: 08/21/17 1409, Result status: Final result    Ordering provider:  Piotr Mercado MD  08/21/17 0750 Resulting lab: [facility]    Specimen Information    Type Source Collected On     08/21/17 0750          Components       Value Reference Range Flag Lab   Magnesium Canceled, Test credited 1.6 - 2.3 mg/dL  FrStHsLb   Comment:  Quantity not sufficient  LAB TO REORDER AND DRAW.              Phosphorus [[id number]]  Resulted: 08/21/17 1409, Result status: Final result    Ordering provider: Piotr Mercado MD  08/21/17 0750 Resulting lab: [facility]    Specimen Information    Type Source Collected On     08/21/17 0750          Components       Value Reference Range Flag Lab   Phosphorus Canceled, Test credited 2.5 - 4.5 mg/dL  FrStHsLb   Comment:  Quantity not sufficient  LAB TO REORDER AND DRAW.              Lactic acid whole blood [[id number]] (Abnormal)  Resulted: 08/21/17 1329, Result status: Final result    Ordering provider: Piotr Mercado MD  08/21/17 0813 [facility] [facility]    Specimen Information    Type Source Collected On   Blood  08/21/17 1314          Components       Value Reference Range Flag Lab   Lactic Acid 3.8 0.7 - 2.1 mmol/L H FrStHsLb            Vitamin B12 [[id number]]  Resulted: 08/21/17 1253, Result status: Final result    Ordering provider: Piotr Mercado MD  08/21/17 0748 Resulting lab: [facility]    Specimen Information    Type Source Collected On   Blood  08/21/17 0750          Components       Value Reference Range Flag Lab   Vitamin B12 645 193 - 986 pg/mL  51            LDL cholesterol direct [[id number]] (Abnormal)  Resulted: 08/21/17 1219, Result status: Final result    Ordering provider: Salvatore Meneses MD  08/20/17 1853 Resulting lab: [facility]    Specimen Information    Type Source Collected On     08/20/17 1853          Components       Value Reference Range Flag Lab   LDL Cholesterol Direct 103 <100 mg/dL H 51    Comment:  Desirable:       <100 mg/dl            Lactic acid whole blood [[id number]] (Abnormal)  Resulted: 08/21/17 1134, Result status: Edited Result - FINAL    Ordering provider: Piotr Mercado MD  08/21/17 0813 Resulting lab: [facility]    Specimen Information    Type Source Collected On   Blood  08/21/17 1058          Components       Value Reference Range Flag Lab   Lactic Acid 4.1 0.7 - 2.1 mmol/L  FrStHsLb   Comment:         Critical Value called to and read back by  WILFREDO FRAGA IN ICU AT 1134 BAR              Glucose by meter [[id number]] (Abnormal)  Resulted: 08/21/17 1106, Result status: Final result    Ordering provider: Antwan Green MD  08/21/17 1102 Resulting lab: POINT OF CARE TEST, GLUCOSE    Specimen Information    Type Source Collected On     08/21/17 1102          Components       Value Reference Range Flag Lab   Glucose 264 70 - 99 mg/dL H 170            TSH with free T4 reflex [[id number]]  Resulted: 08/21/17 0833, Result status: Final result    Ordering provider: Piotr Mercado MD  08/21/17 0748 Resulting lab: [facility]    Specimen Information    Type Source Collected On   Blood  08/21/17 0750          Components       Value Reference Range Flag Lab   TSH 1.19 0.40 - 4.00 mU/L  FrStHsLb            CRP inflammation [[id number]]  Resulted: 08/21/17 0829, Result status: Final result    Ordering provider: Piotr Mercado MD  08/21/17 0768 Resulting lab: [facility]    Specimen Information    Type Source Collected On   Blood  08/21/17 0750          Components       Value Reference Range Flag Lab   CRP Inflammation 5.4 0.0 - 8.0 mg/L  FrStHsLb            Basic metabolic panel [[id number]] (Abnormal)  Resulted: 08/21/17 0829, Result status: Final result    Ordering provider: Piotr Mercado MD  08/21/17 0781 Resulting lab: [facility]    Specimen Information    Type Source Collected On   Blood  08/21/17 0750          Components        Value Reference Range Flag Lab   Sodium 144 133 - 144 mmol/L  FrStHsLb   Potassium 5.1 3.4 - 5.3 mmol/L  FrStHsLb   Chloride 112 94 - 109 mmol/L H FrStHsLb   Carbon Dioxide 22 20 - 32 mmol/L  FrStHsLb   Anion Gap 10 3 - 14 mmol/L  FrStHsLb   Glucose 288 70 - 99 mg/dL H FrStHsLb   Urea Nitrogen 51 7 - 30 mg/dL H FrStHsLb   Creatinine 1.03 0.52 - 1.04 mg/dL  FrStHsLb   GFR Estimate 50 >60 mL/min/1.7m2 L FrStHsLb   Comment:  Non  GFR Calc   GFR Estimate If Black 61 >60 mL/min/1.7m2  FrStHsLb   Comment:  African American GFR Calc   Calcium 8.5 8.5 - 10.1 mg/dL  FrStHsLb            Lactic acid whole blood [[id number]] (Abnormal)  Resulted: 08/21/17 0805, Result status: Final result    Ordering provider: Piotr Mercado MD  08/21/17 0733 Resulting lab: [facility]    Specimen Information    Type Source Collected On   Blood  08/21/17 0750          Components       Value Reference Range Flag Lab   Lactic Acid 4.4 0.7 - 2.1 mmol/L  FrStHsLb   Comment:         Critical Value called to and read back by  RADHA HOPSON IN ICU AT 0805 BAR              Troponin I [[id number]] (Abnormal)  Resulted: 08/21/17 0702, Result status: Final result    Ordering provider: Antwan Green MD  08/21/17 0000 Resulting lab: [facility]    Specimen Information    Type Source Collected On   Blood  08/21/17 0625          Components       Value Reference Range Flag Lab   Troponin I ES 0.558 0.000 - 0.045 ug/L  FrStHsLb   Comment:         The 99th percentile for upper reference range is 0.045 ug/L.  Troponin values   in the range of 0.045 - 0.120 ug/L may be associated with risks of adverse   clinical events.  Previous critical documented              Creatinine [[id number]] (Abnormal)  Resulted: 08/21/17 0658, Result status: Final result    Ordering provider: Antwan Green MD  08/21/17 0000 Resulting lab: [facility]    Specimen Information    Type Source Collected On    Blood  08/21/17 0625          Components       Value Reference Range Flag Lab   Creatinine 1.01 0.52 - 1.04 mg/dL  FrStHsLb   GFR Estimate 51 >60 mL/min/1.7m2 L FrStHsLb   Comment:  Non  GFR Calc   GFR Estimate If Black 62 >60 mL/min/1.7m2  FrStHsLb   Comment:   GFR Calc            Hemoglobin A1c [[id number]] (Abnormal)  Resulted: 08/21/17 0656, Result status: Final result    Ordering provider: Antwan Green MD  08/21/17 0000 Resulting lab: [facility]    Specimen Information    Type Source Collected On   Blood  08/21/17 0625          Components       Value Reference Range Flag Lab   Hemoglobin A1C 8.2 4.3 - 6.0 % H FrStHsLb            CBC with platelets [[id number]] (Abnormal)  Resulted: 08/21/17 0641, Result status: Final result    Ordering provider: Antwan Green MD  08/21/17 0000 Resulting lab: [facility]    Specimen Information    Type Source Collected On   Blood  08/21/17 0625          Components       Value Reference Range Flag Lab   WBC 10.8 4.0 - 11.0 10e9/L  FrStHsLb   RBC Count 3.76 3.8 - 5.2 10e12/L L FrStHsLb   Hemoglobin 11.9 11.7 - 15.7 g/dL  FrStHsLb   Hematocrit 37.3 35.0 - 47.0 %  FrStHsLb   MCV 99 78 - 100 fl  FrStHsLb   MCH 31.6 26.5 - 33.0 pg  FrStHsLb   MCHC 31.9 31.5 - 36.5 g/dL  FrStHsLb   RDW 16.7 10.0 - 15.0 % H FrStHsLb   Platelet Count 256 150 - 450 10e9/L  FrStHsLb            Glucose by meter [[id number]] (Abnormal)  Resulted: 08/21/17 0626, Result status: Final result    Ordering provider: Antwan Green MD  08/21/17 0621 Resulting lab: POINT OF CARE TEST, GLUCOSE    Specimen Information    Type Source Collected On     08/21/17 0621          Components       Value Reference Range Flag Lab   Glucose 291 70 - 99 mg/dL H 170            Testing Performed By     Lab - Abbreviation Name Director Address Valid Date Range    14 - FrStHsLb [facility] Unknown [address] [address] 05/08/15  1057 - Present    45 - [id number] MISYS Unknown Unknown 01/28/02 0000 - Present    51 - Unknown [facility] [place] Unknown [address] [facility] [address] 12/31/14 1010 - Present    75 - Unknown [facility] Unknown [address] [facility] [address] 01/15/15 1019 - Present    170 - Unknown POINT OF CARE TEST, GLUCOSE Unknown Unknown 10/31/11 1114 - Present    225 - Unknown [facility] Unknown [address] [facility] [address] 12/19/14 0954 - Present    226 - Unknown MICRO RAPID TESTING LAB Unknown [address] [facility] [address] 12/19/14 0955 - Present            Unresulted Labs (24h ago through future)    Start       Ordered    08/28/17 0600  Basic metabolic panel  EVERY MONDAY,   Routine     Comments:  Every Monday while on enteral tube feeding.    08/21/17 1338    08/28/17 0600  Magnesium  EVERY MONDAY,   Routine     Comments:  Every Monday while on enteral tube feeding.    08/21/17 1338    08/28/17 0600  Phosphorus  EVERY MONDAY,   Routine     Comments:  Every Monday while on enteral tube feeding.    08/21/17 1338    08/28/17 0600  Prealbumin  EVERY MONDAY,   Routine     Comments:  Every Monday while on enteral tube feeding.    08/21/17 1338         Imaging Results - 3 Days      MRI Brain w/o contrast (use if contrast contraindicated) [[id number]]  Resulted: 08/21/17 1010, Result status: Final result    Ordering provider: Antwan Green MD  08/20/17 2051 Resulted by: Domenic Morgan MD    Performed: 08/21/17 0524 - 08/21/17 0604 Resulting lab: RADIOLOGY RESULTS    Narrative:       MR BRAIN WITHOUT CONTRAST August 21, 2017 6:04 AM     HISTORY: CVI.    TECHNIQUE: Multiplanar, multisequence MRI of the brain without and  with IV contrast material.    COMPARISON: CT dated 8/20/2017.    FINDINGS: There is generalized atrophy of the brain.  White matter  changes are present in the cerebral hemispheres that  are consistent  with small vessel ischemic disease in this age patient.  Diffusion-weighted images reveal areas of restricted diffusion in both  the right and left occipital lobes and in the medial aspect of the  left posterior temporal region. These are in the distribution of both  the right and left posterior cerebral arteries. There are areas of T1  hyperintensity in the cortex of both the right and left occipital  regions, these are in the areas of restricted diffusion. 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  There is a small area of restricted diffusion in the left inferior  cerebellum. There is a suggestion of a small area of restricted  diffusion in the left frontal lobe and in the white matter of both the  right and left frontal lobes. These areas of questionable restricted  diffusion are not well seen on the ADC images and these areas could be  secondary to T2 shine through. On gradient-echo images there are areas  of susceptibility artifact in the left frontal lobe. There is also a  small susceptibility artifact in the right frontal lobe. These are  consistent with hemosiderin deposition secondary to chronic  microbleeds.         Impression:       IMPRESSION:   1. Areas of infarction in both the right and left occipital regions.  There is T1 hyperintensity in the cortex of these areas of infarction.  The T1 hyperintensity is probably due to petechial hemorrhage  secondary to laminar necrosis. This would suggest that these areas of  infarction are subacute.  2. Questionable areas of restricted diffusion in both the right and  left frontal regions. These could also be due to subacute infarcts but  they could be secondary to T2 shine through.  3. 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  4. Age-related changes including generalized atrophy of the brain.  White  matter changes in the cerebral hemispheres consistent with small  vessel ischemic disease in this age patient.    KAREN SHELL MD      Testing Performed By     Lab - Abbreviation Name Director Address Valid Date Range    104 - Rad Rslts RADIOLOGY RESULTS Unknown Unknown 05 1553 - Present               ECG/EMG Results      Echo Complete Bubble [[id number]]  Resulted: 17 1048, Result status: Edited Result - FINAL    Ordering provider: Piotr Mercado MD  17 0748 Resulted by: Pascual Fleming MD    Performed: 17 1116 - 17 1116 Resulting lab: RADIOLOGY RESULTS    Narrative:       [id number]  [id number]  [id number]  [id number]^PRETTY^PIOTR^           [facility]  Echocardiography Laboratory  [facility] [place] [address]        Name: RAÚL MERCEDES  MRN: [id number]  : 1926  Study Date: 2017 10:48 AM  Age: 89 yrs  Gender: Female  Patient Location: [facility]  Reason For Study: CVA  Ordering Physician: PIOTR MERCADO  Performed By: Duane Brook, RDCS, FASE     BSA: 1.6 m2  Height: 60 in  Weight: 145 lb  HR: 87  BP: 140/87 mmHg  _____________________________________________________________________________  __        Procedure  Complete Portable Echo Adult.  _____________________________________________________________________________  __        Interpretation Summary     The left ventricle is normal in size.  The visual ejection fraction is estimated at 55-60%.  No significant valvular heart disease.  Small pericardial effusion  There are no echocardiographic indications of cardiac tamponade.  There is no color Doppler evidence of an atrial shunt.  A contrast injection (Bubble Study) was performed that was negative for flow  across the interatrial septum.  _____________________________________________________________________________  __        Left Ventricle  The left ventricle is normal in size. There is normal left ventricular wall  thickness. The visual ejection  fraction is estimated at 55-60%. No regional  wall motion abnormalities noted.     Right Ventricle  Thickened right ventricle free wall, moderate. The right ventricle is normal  in size and function.     Atria  The left atrium is borderline dilated. Right atrial size is normal. There is  no color Doppler evidence of an atrial shunt. A contrast injection (Bubble  Study) was performed that was negative for flow across the interatrial septum.     Mitral Valve  The mitral valve leaflets are mildly thickened. There is mild (1+) mitral  regurgitation.        Tricuspid Valve  There is mild (1+) tricuspid regurgitation. Normal IVC (1.5-2.5cm) with >50%  respiratory collapse; right atrial pressure is estimated at 5-10mmHg.     Aortic Valve  There is trivial trileaflet aortic sclerosis. There is trace aortic  regurgitation.     Pulmonic Valve  There is trace pulmonic valvular regurgitation.     Vessels  Normal size aorta. The aortic root is normal size.     Pericardium  Small pericardial effusion. There are no echocardiographic indications of  cardiac tamponade.        Rhythm  The rhythm was normal sinus.  _____________________________________________________________________________  __  MMode/2D Measurements & Calculations     IVSd: 1.1 cm  LVIDd: 4.4 cm  LVIDs: 2.8 cm  LVPWd: 1.0 cm  FS: 37.1 %  EDV(Teich): 86.7 ml  ESV(Teich): 28.3 ml  LV mass(C)d: 160.6 grams  LV mass(C)dI: 98.7 grams/m2  Ao root diam: 3.2 cm  LA dimension: 4.0 cm  asc Aorta Diam: 2.8 cm  LA/Ao: 1.3        Doppler Measurements & Calculations  MV E max ramesh: 99.7 cm/sec  MV A max ramesh: 102.7 cm/sec  MV E/A: 0.97  MV dec time: 0.14 sec  Lateral E/e': 21.8              _____________________________________________________________________________  __        Report approved by: Selina Hernandez 08/21/2017 12:46 PM       1    Type Source Collected On     08/21/17 1048          View Image (below)        Echocardiogram Complete [[id number]]  Resulted: 08/21/17  1116, Result status: In process    Ordering provider: Piotr Mercado MD  08/21/17 0748 Performed: 08/21/17 1116 - 08/21/17 1116    Resulting lab: [facility]                   Encounter-Level Documents:     There are no encounter-level documents.      Order-Level Documents:     There are no order-level documents.

## 2017-08-20 NOTE — IP AVS SNAPSHOT
` [place] [address] [facility]: [phone]                                              INTERAGENCY TRANSFER FORM - NURSING   2017                    Hospital Admission Date: 2017  RAÚL MERCEDES   : 1926  Sex: Female        Attending Provider: Antwan Green MD     Allergies:  Sulfa Drugs    Infection:  MRSA-Contact Isolation   Service:  HOSPITALIST    Ht:  1.524 m (5')   Wt:  69.5 kg (153 lb 3.5 oz)   Admission Wt:  65.9 kg (145 lb 4.5 oz)    BMI:  29.92 kg/m 2   BSA:  1.72 m 2            Patient PCP Information     None on File      Current Code Status     Date Active Code Status Order ID Comments User Context       Prior      Code Status History     Date Active Date Inactive Code Status Order ID Comments User Context    2017  3:10 PM  DNR/DNI [id number]  Piotr Mercado MD Outpatient    2017  9:44 AM 2017  3:10 PM DNR/DNI [id number]  Piotr Mercado MD Inpatient    2017  8:51 PM 2017  9:44 AM Full Code [id number]  Antwan Green MD Inpatient    2017  6:24 PM 2017  8:51 PM Full Code [id number]  Tamar Pineda MD Outpatient    2017  7:51 AM 2017  6:24 PM Full Code [id number]  Felicitas Cedeño DO Inpatient    2017 10:22 AM 2017  7:51 AM Full Code [id number]  Piotr Saba MD Outpatient    2017  7:29 PM 2017 10:22 AM Full Code [id number]  Paul Jolly MD Inpatient    2017 10:10 AM 2017  7:29 PM Full Code [id number]  Kraig Mobley MD Outpatient    2017  5:14 PM 2017 10:10 AM Full Code [id number]  Antwan Green MD Inpatient    2016 11:48 AM 2016  5:44 PM Full Code [id number]  Clarence Wilkerson MD Inpatient    2014  3:01 PM 2016 11:48 AM Full Code [id number]  Emily Ca DO Outpatient    2014  3:34 AM 2014  3:01 PM Full Code [id number]  Ni Mccurdy MD Inpatient    2012 12:42 PM 2014  3:34 AM Full Code [id number]  Francesca Knott,  MD Outpatient    8/6/2012  3:47 AM 8/14/2012 12:42 PM Full Code [id number]  Peewee De La Cruz MD Inpatient      Advance Directives        Does patient have a scanned Advance Directive/ACP document in EPIC?           Yes        Hospital Problems as of 8/24/2017              Priority Class Noted POA    CVA (cerebral vascular accident) (H) Medium  8/20/2017 Yes      Non-Hospital Problems as of 8/24/2017              Priority Class Noted    Nephrolithiasis Medium  8/6/2012    Bacteremia Medium  8/11/2012    Actinomyces infection Medium  8/31/2012    Uric acid stone in urine Medium  Unknown    Fall Medium  6/24/2014    UTI (urinary tract infection) Medium  6/24/2014    Scalp laceration Medium  6/24/2014    Advanced dementia Medium  6/24/2014    Atrial fibrillation (H) Medium  5/22/2015    History of urinary tract infection Medium  5/22/2015    Alzheimer's disease Medium  5/22/2015    Primary localized osteoarthrosis of shoulder region Medium  5/22/2015    Morbid obesity (H) Medium  5/22/2015    Diabetes mellitus type 2, insulin dependent (H) Medium  5/22/2015    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) Medium  5/22/2015    Hyperlipidemia Medium  5/22/2015    Candidiasis of skin and nails Medium  5/22/2015    Chronic kidney disease, stage III (moderate) Medium  5/22/2015    Advance care planning Medium  5/29/2015    HCAP (healthcare-associated pneumonia) Medium  6/21/2016    Sepsis due to urinary tract infection (H) Medium  6/14/2017    ARF (acute renal failure) (H) Medium  6/30/2017    Recurrent UTI Medium  8/4/2017    Acute renal failure superimposed on stage 3 chronic kidney disease (H) Medium  8/9/2017    Sacral decubitus ulcer, stage III (H) Medium  8/9/2017    Hypernatremia Medium  8/9/2017    Complication of gastrostomy tube (H) Medium  8/9/2017      Immunizations     Name Date      Influenza (IIV3) 10/13/14     Pneumococcal 23 valent 12/01/98          END      ASSESSMENT     Discharge  Profile Flowsheet     EXPECTED DISCHARGE     Patient's communication style  spoken language (English or Bilingual) 08/20/17 2221    Expected Discharge Date  08/24/17 ([facility] if Cdiff neg) 08/24/17 1552   FINAL RESOURCES      DISCHARGE NEEDS ASSESSMENT     Resources List  Skilled Nursing Facility 08/09/17 1514    Concerns To Be Addressed  discharge planning concerns 08/07/17 0900   Referrals Placed  Post Acute Facilities;Transportation 08/09/17 1514    Patient/family verbalizes understanding of discharge plan recommendations?  Yes 08/24/17 1554   SKIN      Anticipated Changes Related to Illness  none 08/20/17 2221   Inspection of bony prominences  Full 08/24/17 1257    Transportation Available  agency transportation 08/10/17 1416   Full except areas not inspected   Coccyx 08/23/17 0233    # of Referrals Placed by CTS  Post Acute Facilities;Transportation 08/24/17 1554   Skin WDL  ex 08/24/17 1257    Equipment Used at Home  standard walker;wheelchair/stroller 08/09/12 1131   Skin Color/Characteristics  bruised (ecchymotic);redness blanchable;pale;other (see comments) (large coccyx wound) 08/24/17 1257    GASTROINTESTINAL (ADULT,PEDIATRIC,OB)     Skin Temperature  warm 08/24/17 1257    GI WDL  ex 08/24/17 1257   Skin Moisture  dry 08/24/17 1257    Abdominal Appearance  rounded;distended 08/24/17 1127   Skin Elasticity  slow return to original state 08/24/17 1127    All Quadrants Bowel Sounds  audible and active in all quadrants 08/24/17 1127   Skin Integrity  abrasion(s);bruise(s);wound(s);pressure ulcer(s) (pressure ulcer on coccyx) 08/24/17 1257    Last Bowel Movement  08/24/17 08/24/17 1127   SAFETY      GI Signs/Symptoms  fecal incontinence 08/24/17 1257   Safety WDL  WDL 08/24/17 1257    Passing flatus  yes 08/24/17 1127   Safety Equipment  other (comment) 08/22/17 1537    COMMUNICATION ASSESSMENT                        Assessment WDL (Within Defined Limits) Definitions           Safety WDL      "Effective: 09/28/15    Row Information: <b>WDL Definition:</b> Bed in low position, wheels locked; call light in reach; upper side rails up x 2; ID band on<br> <font color=\"gray\"><i>Item=AS safety wdl>>List=AS safety wdl>>Version=F14</i></font>      Skin WDL     Effective: 09/28/15    Row Information: <b>WDL Definition:</b> Warm; dry; intact; elastic; without discoloration; pressure points without redness<br> <font color=\"gray\"><i>Item=AS skin wdl>>List=AS skin wdl>>Version=F14</i></font>      Vitals     Vital Signs Flowsheet     COMMENTS     BSA (Calculated - sq m)  1.67 08/20/17 2103    Comments  transfer to 704 1 with FS and N/A 08/21/17 1456   BMI (Calculated)  28.43 08/20/17 2103    VITAL SIGNS     POSITIONING      Temp  97.6  F (36.4  C) 08/24/17 1617   Body Position  supine 08/24/17 1448    Temp src  Oral 08/24/17 1617   Head of Bed (HOB)  HOB at 30 degrees 08/24/17 1257    Resp  17 08/24/17 1617   Positioning/Transfer Devices  pillows;in use 08/24/17 1127    Heart Rate  82 08/24/17 1617   Chair  Shifted weight 08/24/17 1257    Pulse/Heart Rate Source  Monitor 08/24/17 1617   DAILY CARE      BP  158/86 08/24/17 1617   Activity Type  activity adjusted per tolerance 08/24/17 1127    BP Location  Left arm 08/24/17 1617   Activity Level of Assistance  assistance, 2 people 08/24/17 1246    OXYGEN THERAPY     ECG      SpO2  99 % 08/24/17 1617   ECG Rhythm  Normal sinus rhythm 08/21/17 1331    O2 Device  None (Room air) 08/24/17 1617   Ectopy  None 08/20/17 2140    Oxygen Delivery  2 LPM 08/21/17 0412   Lead Monitored  Lead II;V 1 08/20/17 2140    PAIN/COMFORT     Rhythm Comment  ST Segment Normal 08/20/17 2010    Patient Currently in Pain  other (see comments) 08/21/17 0412   Equipment  electrodes changed;telemetry batteries changed 08/24/17 1246    Preferred Pain Scale  PAINAD (Pain Assessment in Advance Dementia Scale) 08/21/17 0412   RESPIRATORY MONITORING      FACES Pain Rating  0-->no hurt 08/21/17 1128   " Respiratory Monitoring (EtCO2)  0 mmHg 08/20/17 2010    PAINAD Breathing  0-->normal 08/24/17 0708   EKG MONITORING      PAINAD Negative Vocalization  0-->none 08/24/17 0708   Cardiac Regularity  Regular 08/20/17 1908    PAINAD Facial Expression  0-->smiling or inexpressive 08/24/17 0708   ANALGESIA SIDE EFFECTS MONITORING      PAINAD Body Language  0-->relaxed 08/24/17 0708   Side Effects Monitoring: Respiratory Quality  R 08/23/17 1937    PAINAD Consolability  0-->no need to console 08/24/17 0708   Side Effects Monitoring: Respiratory Depth  N 08/23/17 1937    PAINAD Score  0 08/24/17 0708   Side Effects Monitoring: Sedation Level  S 08/23/17 1937    Pain Management Interventions  pillow support provided;unnecessary movement minimized 08/23/17 1820   [place] COMA SCALE      HEIGHT AND WEIGHT     Best Eye Response  3-->(E3) to speech 08/24/17 1127    Height  1.524 m (5') 08/20/17 2103   Best Motor Response  4-->(M4) withdraws from pain 08/24/17 1127    Weight  69.5 kg (153 lb 3.5 oz) 08/24/17 0603   Best Verbal Response  1-->(V1) none 08/24/17 1127    Weight Method  Bed scale 08/24/17 0603   [place] Coma Scale Score  8 08/24/17 1127            Patient Lines/Drains/Airways Status    Active LINES/DRAINS/AIRWAYS     Name: Placement date: Placement time: Site: Days: Last dressing change:    Gastrostomy/Enterostomy Gastrostomy LUQ       LUQ   [id number]     Urethral Catheter Temperature probe 10 fr 06/30/17   1645   Temperature probe   55     Urethral Catheter 08/04/17   0800      20     Urethral Catheter          [id number]     Peripheral IV 08/22/17 Right 08/22/17         2     Pressure Ulcer 06/21/16 Right Sacrum 06/21/16   1215    429     Pressure Ulcer 06/21/16 Right Other (Comment) 06/21/16   1215    429     Pressure Injury 06/14/17 Coccyx 3x4cm irregular shaped wound to coccyx 06/14/17   1715    71     Wound Posterior Head Laceration       Head   [id number]     Wound 06/30/17 Bilateral;Outer Ear Suspected pressure ulcer  06/30/17   1900   Ear   54     Rash 07/01/17 0014 Bilateral upper other (see comments) other (see comments) 07/01/17   0014    54             Patient Lines/Drains/Airways Status    Active PICC/CVC     None            Intake/Output Detail Report     Date Intake         Output   Net    Shift P.O. I.V. NG/GT IV Piggyback Enteral Total Urine Emesis/NG output Total       Day 08/23/17 0700 - 08/23/17 1459 -- 850 400 -- -- 1250 1200 -- 1200 50    Sandrine 08/23/17 1500 - 08/23/17 2259 -- 487 960 -- -- 1447 800 -- 800 647    Noc 08/23/17 2300 - 08/24/17 0659 -- 729 -- -- -- 729 450 -- 450 279    Day 08/24/17 0700 - 08/24/17 1459 0 -- -- -- -- -- 600 -- 600 -600    Sandrine 08/24/17 1500 - 08/24/17 2259 -- -- -- -- -- -- -- -- -- 0      Last Void/BM       Most Recent Value    Urine Occurrence 1 at 08/24/2017 0200    Stool Occurrence       Case Management/Discharge Planning     Case Management/Discharge Planning Flowsheet     REFERRAL INFORMATION     Anticipated Changes Related to Illness  none 08/20/17 2221    Arrived From  skilled nursing facility 08/07/17 0900   Transportation Available  agency transportation 08/10/17 1416    # of Referrals Placed by CTS  Post Acute Facilities;Transportation 08/24/17 1554   Skilled Nursing [facility] 08/10/12 1751     Assigned to Case  theo san 08/24/17 1553   [facility] Phone Number  [phone] 08/10/12 1751    LIVING ENVIRONMENT     Outpatient/Agency/Support Group Needs  assisted living facility 08/06/12 0445    Lives With  facility resident 08/20/17 2221   Equipment Used at Home  standard walker;wheelchair/stroller 08/09/12 1131    Living Arrangements  extended care facility ([facility]) 08/07/17 0900   FINAL RESOURCES      Provides Primary Care For  no one, unable/limited ability to care for self 08/20/17 2221   Resources List  Skilled Nursing Facility 08/09/17 1514    Caregiving Concerns  n/a 08/20/17 2221   Referrals Placed  Post Acute  Facilities;Transportation 08/09/17 1514    COPING/STRESS     ABUSE RISK SCREEN      Major Change/Loss/Stressor  hospitalization 08/20/17 2221   QUESTION TO PATIENT:  Has a member of your family or a partner(now or in the past) intimidated, hurt, manipulated, or controlled you in any way?  patient declined to answer or is unable to answer 08/20/17 2221    Patient Personal Strengths  strong support system 08/20/17 2221   QUESTION TO PATIENT: Do you feel safe going back to the place where you are living?  patient declined to answer or is unable to answer 08/20/17 2221    EXPECTED DISCHARGE     OBSERVATION: Is there reason to believe there has been maltreatment of a vulnerable adult (ie. Physical/Sexual/Emotional abuse, self neglect, lack of adequate food, shelter, medical care, or financial exploitation)?  no 08/20/17 1925    Expected Discharge Date  08/24/17 ([facility] if Cdiff neg) 08/24/17 1552   (R) MENTAL HEALTH SUICIDE RISK      ASSESSMENT/CONCERNS TO BE ADDRESSED     Are you depressed or being treated for depression?  No 08/20/17 2224    Concerns To Be Addressed  discharge planning concerns 08/07/17 0900   HOMICIDE RISK      DISCHARGE PLANNING     Homicidal Ideation  other (see comments) (nonverbal) 08/20/17 2221    Patient/family verbalizes understanding of discharge plan recommendations?  Yes 08/24/17 1554

## 2017-08-20 NOTE — IP AVS SNAPSHOT
` `     [place] [address] [facility]: [phone]            Medication Administration Report for Celia Lewis as of 08/24/17 1758   Legend:    Given Hold Not Given Due Canceled Entry Other Actions    Time Time (Time) Time  Time-Action       Inactive    Active    Linked        Medications 08/18/17 08/19/17 08/20/17 08/21/17 08/22/17 08/23/17 08/24/17    acetaminophen (TYLENOL) tablet 650 mg  Dose: 650 mg Freq: EVERY 4 HOURS PRN Route: PO  PRN Reason: mild pain  Start: 08/20/17 2051   Admin Instructions: Maximum acetaminophen dose from all sources = 75 mg/kg/day not to exceed 4 grams/day.                     Or  acetaminophen (TYLENOL) solution 650 mg  Dose: 650 mg Freq: EVERY 4 HOURS PRN Route: PER NG TUBE  PRN Reason: mild pain  Start: 08/20/17 2051   Admin Instructions: Maximum acetaminophen dose from all sources= 75 mg/kg/day not to exceed 4 grams/day.           1740 (650 mg)-Given           aspirin tablet 325 mg  Dose: 325 mg Freq: DAILY Route: PO  Start: 08/22/17 0900   Admin Instructions: On admission and daily.         0846 (325 mg)-Given        0814 (325 mg)-Given        0929 (325 mg)-Given          Or  aspirin Suppository 300 mg  Dose: 300 mg Freq: DAILY Route: RE  Start: 08/22/17 0900   Admin Instructions: On admission and daily. If unable to take PO.                                    bisacodyl (DULCOLAX) Suppository 10 mg  Dose: 10 mg Freq: DAILY PRN Route: RE  PRN Reason: constipation  Start: 08/20/17 2056              fiber modular (NUTRISOURCE FIBER) packet 1 packet  Dose: 1 packet Freq: 2 TIMES DAILY Route: PER FEEDING   Start: 08/21/17 2100   Admin Instructions: Powder  Mix each packet with 60 mL water before administering. SUPPLIED BY NUTRITION DEPARTMENT.        2021 (1 packet)-Given        0846 (1 packet)-Given       (2234)-Not Given        0814 (1 packet)-Given       2119 (1 packet)-Given        0929 (1 packet)-Given       [ ] 2100           fluconazole (DIFLUCAN) suspension  200 mg  Dose: 200 mg Freq: DAILY Route: PER PEG TUBE  Indications of Use: CANDIDIASIS  Start: 08/24/17 1730   End: 08/27/17 0859   Admin Instructions: Shake well.           RobbyJoby (200 mg)-Given           glucose 40 % gel 15-30 g  Dose: 15-30 g Freq: EVERY 15 MIN PRN Route: PO  PRN Reason: low blood sugar  Start: 08/20/17 2051   Admin Instructions: Give 15 g for BG 51 to 69 mg/dL IF patient is conscious and able to swallow. Give 30 g for BG less than or equal to 50 mg/dL IF patient is conscious and able to swallow. Do NOT give glucose gel via enteral tube.  IF patient has enteral tube: give apple juice 120 mL (4 oz or 15 g of CHO) via enteral tube for BG 51 to 69 mg/dL.  Give apple juice 240 mL (8 oz or 30 g of CHO) via enteral tube for BG less than or equal to 50 mg/dL.    ~Oral gel is preferable for conscious and able to swallow patient.   ~IF gel unavailable or patient refuses may provide apple juice 120 mL (4 oz or 15 g of CHO). Document juice on I and O flowsheet.              Or  dextrose 50 % injection 25-50 mL  Dose: 25-50 mL Freq: EVERY 15 MIN PRN Route: IV  PRN Reason: low blood sugar  Start: 08/20/17 2051   Admin Instructions: Use if have IV access, BG less than 70 mg/dL and meet dose criteria below:  Dose if conscious and alert (or disorientated) and NPO = 25 mL  Dose if unconscious / not alert = 50 mL  Vesicant.              Or  glucagon injection 1 mg  Dose: 1 mg Freq: EVERY 15 MIN PRN Route: SC  PRN Reason: low blood sugar  PRN Comment: May repeat x 1 only  Start: 08/20/17 2051   Admin Instructions: May give SQ or IM. ONLY use glucagon IF patient has NO IV access AND is UNABLE to swallow AND blood glucose is LESS than or EQUAL to 50 mg/dL.               hydrALAZINE (APRESOLINE) injection 10-20 mg  Dose: 10-20 mg Freq: EVERY 1 HOUR PRN Route: IV  PRN Reasons: high blood pressure,other  PRN Comment: for Systolic Blood Pressure greater than 200 mmHg or Diastolic Blood Pressure greater than 110 mmHg  Start:  08/21/17 0405   Admin Instructions: USE if Heart Rate less than 60 bpm upon antihypertensive initiation or Heart Rate falls to less than 60 bpm during labetalol.  Give 10 mg, wait 30 minutes. If not effective then repeat 10 mg. Wait 1 hour. If not effective then give 20 mg.  May add prn enalaprilat (if ordered) if Systolic Blood Pressure parameter is not met after reaching hydralazine 20 mg. Notify provider within 1 hour if Blood Pressure parameters are not met.               insulin aspart (NovoLOG) inj (RAPID ACTING)  Dose: 1-12 Units Freq: EVERY 4 HOURS Route: SC  Start: 08/20/17 2200   Admin Instructions: Correction Scale - HIGH INSULIN RESISTANCE DOSING     Do Not give Correction Insulin if BG less than 140  For  - 164 give 1 unit.  For  - 189 give 2 units.  For  - 214 give 3 units.  For  - 239 give 4 units.  For  - 264 give 5 units.  For  - 289 give 6 units.  For  - 314 give 7 units.  For  - 339 give 8 units  For  - 364 give 9 units  For  - 389 give 10 units  For  - 414 give 11 units  For BG greater than or equal to 415 give 12 units  Check blood glucose Q4H and administer based on blood glucose.  Notify provider if glucose greater than or equal to 350 mg/dL after administration of correction dose.  If given at mealtime, must be administered 5 min before meal or immediately after.       2229 (3 Units)-Given        0202 (7 Units)-Given [C]       0622 (7 Units)-Given       1103 (5 Units)-Given       1419 (3 Units)-Given [C]       1751 (4 Units)-Given       2222 (3 Units)-Given [C]        0158 (3 Units)-Given       0617 (2 Units)-Given       1040 (2 Units)-Given       1407 (3 Units)-Given       1740 (4 Units)-Given       2129 (4 Units)-Given [C]        0307 (3 Units)-Given       0704 (5 Units)-Given       1158 (5 Units)-Given       1458 (7 Units)-Given       1807 (6 Units)-Given [C]       2119 (4 Units)-Given [C]        0317 (4 Units)-Given        0620 (5 Units)-Given       0929 (4 Units)-Given       1503 (6 Units)-Given       1742 (6 Units)-Given [C]       [ ] 2200           insulin glargine (LANTUS) injection 40 Units  Dose: 40 Units Freq: 2 TIMES DAILY Route: SC  Start: 08/24/17 0900          1018 (40 Units)-Given       [ ] 2100           labetalol (NORMODYNE/TRANDATE) injection 10-40 mg  Dose: 10-40 mg Freq: EVERY 10 MIN PRN Route: IV  PRN Reasons: high blood pressure,other  PRN Comment: for Systolic Blood Pressure greater than 200 mmHg or Diastolic Blood Pressure greater than 110 mmHg  Start: 08/21/17 0405   Admin Instructions: Use if Heart Rate 60 bpm or greater upon antihpertensive initiation.  Hold if Heart Rate less than 60 bpm.  Increase or repeat the dose if Blood Pressure goal not met. Give 10 mg, wait 10 minutes.  If not effective then give 20 mg. Wait 10 min.  If not effective then give 40 mg.  May add prn hydrazaline (if ordered) if Systolic Blood Pressure parameter is not met after reaching labetalol 40mg.  If hydrALAZINE not ordered can use enalapril (if ordered) (MAX daily dose: 300 mg / 24 hrs)   Notify provider within 1 hour if Blood Pressure parameters are not met.               lactated ringers BOLUS 500 mL  Dose: 500 mL Freq: ONCE PRN Route: IV  PRN Reason: other  PRN Comment: IF hypotension or hemorrhage.  Start: 08/20/17 2051   Admin Instructions: Administer quickly to patient tolerance IF positioning and increased IV rate are ineffective in patient with massive hemorrhage and hypotension. Discontinue upon discharge from ICU.               lactobacillus rhamnosus (GG) (CULTURELL) capsule 1 capsule  Dose: 1 capsule Freq: DAILY Route: ORAL OR FEED  Start: 08/21/17 0900   Admin Instructions: Administer at least 2 hours before or after oral antibiotics. Capsules may be opened.        1045 (1 capsule)-Given        0846 (1 capsule)-Given        1200 (1 capsule)-Given        0929 (1 capsule)-Given           levofloxacin (LEVAQUIN) infusion  250 mg  Dose: 250 mg Freq: EVERY 24 HOURS Route: IV  Indications of Use: SEPSIS  Last Dose: 250 mg (08/24/17 1249)  Start: 08/24/17 1200   Admin Instructions: Irritant. Administer at a rate of no greater than 100 mL/hr           1249 (250 mg)-New Bag           Medication Instruction  Freq: CONTINUOUS PRN Route: XX  Start: 08/20/17 2051   Admin Instructions: No D5W IV solutions unless patient hypoglycemic.  Pharmacy to remove all dextrose from iv fluid orders as able.               miconazole (MICATIN; MICRO GUARD) 2 % powder  Freq: 2 TIMES DAILY Route: Top  Start: 08/22/17 1200   Admin Instructions: Apply to affected area.           1355 ( )-Given       2131 ( )-Given        0815 ( )-Given       2131 ( )-Given        0930 ( )-Given       [ ] 2100           multivitamins with minerals (CERTAVITE/CEROVITE) liquid 15 mL  Dose: 15 mL Freq: DAILY Route: PER FEEDING   Start: 08/21/17 1400       1612 (15 mL)-Given        0845 (15 mL)-Given        1159 (15 mL)-Given        0928 (15 mL)-Given           naloxone (NARCAN) injection 0.1-0.4 mg  Dose: 0.1-0.4 mg Freq: EVERY 2 MIN PRN Route: IV  PRN Reason: opioid reversal  Start: 08/20/17 2051   Admin Instructions: For respiratory rate LESS than or EQUAL to 8.  Partial reversal dose:  0.1 mg titrated q 2 minutes for Analgesia Side Effects Monitoring Sedation Level of 3 (frequently drowsy, arousable, drifts to sleep during conversation).Full reversal dose:  0.4 mg bolus for Analgesia Side Effects Monitoring Sedation Level of 4 (somnolent, minimal or no response to stimulation).               ondansetron (ZOFRAN-ODT) ODT tab 4 mg  Dose: 4 mg Freq: EVERY 6 HOURS PRN Route: PO  PRN Reasons: nausea,vomiting  Start: 08/20/17 2051   Admin Instructions: This is Step 1 of nausea and vomiting management.  If nausea not resolved in 15 minutes, go to Step 2 prochlorperazine (COMPAZINE). Do not push through foil backing. Peel back foil and gently remove. Place on tongue immediately.  Administration with liquid unnecessary              Or  ondansetron (ZOFRAN) injection 4 mg  Dose: 4 mg Freq: EVERY 6 HOURS PRN Route: IV  PRN Reasons: nausea,vomiting  Start: 08/20/17 2051   Admin Instructions: This is Step 1 of nausea and vomiting management.  If nausea not resolved in 15 minutes, go to Step 2 prochlorperazine (COMPAZINE).  Irritant.               oxyCODONE (ROXICODONE) solution 2 mg  Dose: 2 mg Freq: 3 TIMES DAILY Route: PER FEEDING   Indications of Use: CHRONIC PAIN  Start: 08/22/17 1600        1704 (2 mg)-Given       2130 (2 mg)-Given        0814 (2 mg)-Given       1620 (2 mg)-Given       2117 (2 mg)-Given        0928 (2 mg)-Given       1739 (2 mg)-Given       [ ] 2200           oxyCODONE (ROXICODONE) solution 2 mg  Dose: 2 mg Freq: EVERY 4 HOURS PRN Route: PER G TUBE  PRN Reason: moderate to severe pain  Start: 08/20/17 2057      (2124)-Not Given        (1045)-Not Given [C]        (0845)-Not Given [C]             ranitidine (Zantac) syrup 150 mg  Dose: 150 mg Freq: DAILY Route: PER FEEDING   Start: 08/21/17 0900       1047 (150 mg)-Given        0847 (150 mg)-Given        0814 (150 mg)-Given        0928 (150 mg)-Given           simethicone (MYLICON) suspension 80 mg  Dose: 80 mg Freq: 3 TIMES DAILY Route: PER FEEDING   Start: 08/22/17 1600        1704 (80 mg)-Given       2129 (80 mg)-Given        0814 (80 mg)-Given       1620 (80 mg)-Given       2119 (80 mg)-Given        0928 (80 mg)-Given       1740 (80 mg)-Given       [ ] 2200           sodium hypochlorite (half-strength DAKINS) external solution  Freq: 2 TIMES DAILY Route: Top  Start: 08/20/17 2100   Admin Instructions: 1. Cleanse wound with microklenze, cleanse periwound area with naomi perineal<br>2. Moisten kerlix fluff with dakins solution, wring out excess, pack wound with kerlix<br>3. Apply antifungal powder to periwound area, rub in. Apply criticaid over powder.<br>4. Cover with ABD using minimal medipore tape to secure.<br>5. Label  dressing with date, time, initials. Follow Rigorous PIP measures.       2229 ( )-Given        1047 ( )-Given       (2134)-Not Given [C]        1355 ( )-Given       2130 ( )-Given        0816 ( )-Given       2131 ( )-Given        0930 ( )-Given       [ ] 2100           vitamin A-D & C drops (TRI-VI-SOL) drops SOLN 7 mL  Dose: 7 mL Freq: DAILY Route: PER FEEDING   Start: 08/23/17 1230         1620 (7 mL)-Given        0928 (7 mL)-Given          Completed Medications  Medications 08/18/17 08/19/17 08/20/17 08/21/17 08/22/17 08/23/17 08/24/17         Dose: 15 Units Freq: ONCE Route: SC  Start: 08/22/17 1045   End: 08/22/17 1232        1232 (15 Units)-Given               Dose: 5 Units Freq: ONCE Route: SC  Start: 08/23/17 1130   End: 08/23/17 1302         1302 (5 Units)-Given              Dose: 500 mg Freq: ONCE Route: IV  Indications of Use: SEPSIS  Last Dose: 500 mg (08/23/17 1303)  Start: 08/23/17 1145   End: 08/23/17 1403   Admin Instructions: Irritant. Administer at a rate of no greater than 100 mL/hr          1303 (500 mg)-New Bag           Discontinued Medications  Medications 08/18/17 08/19/17 08/20/17 08/21/17 08/22/17 08/23/17 08/24/17         Rate: 125 mL/hr Freq: CONTINUOUS Route: IV  Start: 08/20/17 2115   End: 08/22/17 1033      2125 ( )-New Bag        0730 ( )-Rate/Dose Change [C]       0820 ( )-Rate/Dose Change       1622 ( )-New Bag        0035 ( )-New Bag       1033-Med Discontinued           Rate: 50 mL/hr Freq: CONTINUOUS Route: IV  Start: 08/22/17 1045   End: 08/22/17 1255               1255-Med Discontinued           Rate: 50 mL/hr Freq: CONTINUOUS Route: IV  Start: 08/22/17 1300   End: 08/24/17 1256        1325 ( )-New Bag        1620 ( )-New Bag        1256-Med Discontinued         Dose: 15 Units Freq: 2 TIMES DAILY Route: SC  Start: 08/21/17 2100   End: 08/22/17 1033       2223 (15 Units)-Given        0854 (15 Units)-Given       1033-Med Discontinued           Dose: 30 Units Freq: 2 TIMES DAILY  Route: SC  Start: 08/22/17 2100   End: 08/23/17 1127        2241 (30 Units)-Given        0814 (30 Units)-Given       1127-Med Discontinued          Dose: 35 Units Freq: 2 TIMES DAILY Route: SC  Start: 08/23/17 2100   End: 08/24/17 0738         2121 (35 Units)-Given        0738-Med Discontinued         Dose: 2 mg Freq: 3 TIMES DAILY Route: PO  Indications of Use: CHRONIC PAIN  Start: 08/20/17 2200   End: 08/22/17 1040      2126 (2 mg)-Given        1047 (2 mg)-Given       1612 (2 mg)-Given       2220 (2 mg)-Given        0846 (2 mg)-Given [C]       1040-Med Discontinued           Dose: 3.375 g Freq: EVERY 6 HOURS Route: IV  Indications of Use: SEPSIS  Start: 08/21/17 0200   End: 08/23/17 1134       0156 (3.375 g)-New Bag       0820 (3.375 g)-New Bag       1415 (3.375 g)-New Bag       2021 (3.375 g)-New Bag        0103 (3.375 g)-New Bag       0843 (3.375 g)-New Bag       1512 (3.375 g)-New Bag       2130 (3.375 g)-New Bag        0306 (3.375 g)-New Bag       0757 (3.375 g)-New Bag       1134-Med Discontinued          Dose: 17 g Freq: DAILY PRN Route: PER FEEDING   PRN Reason: constipation  Start: 08/22/17 1039   End: 08/24/17 1316   Admin Instructions: Give in 8oz of  water, juice, or soda. Hold for loose stools.  This is the second step of a three step constipation treatment protocol.  1 Packet = 17 grams. Mixed prescribed dose in 8 ounces of water. Follow with 8 oz. of water.           1316-Med Discontinued         Dose: 17 g Freq: DAILY PRN Route: PO  PRN Reason: constipation  Start: 08/20/17 2051   End: 08/22/17 1040   Admin Instructions: Give in 8oz of  water, juice, or soda. Hold for loose stools.  This is the second step of a three step constipation treatment protocol.  1 Packet = 17 grams. Mixed prescribed dose in 8 ounces of water. Follow with 8 oz. of water.         1040-Med Discontinued           Dose: 1-2 tablet Freq: 2 TIMES DAILY PRN Route: PO  PRN Comment: constipation   Start: 08/20/17 2051   End:  08/24/17 1316   Admin Instructions: If no bowel movement in 24 hours, increase to 2 tablets PO BID.  Hold for loose stools.   This is the first step of a three step constipation treatment protocol.           1316-Med Discontinued         Dose: 80 mg Freq: 3 TIMES DAILY Route: PO  Start: 08/20/17 2200   End: 08/22/17 1040      2229 (80 mg)-Given        1052 (80 mg)-Given       1612 (80 mg)-Given       2221 (80 mg)-Given        0845 (80 mg)-Given       1040-Med Discontinued           Dose: 1,500 mg Freq: EVERY 24 HOURS Route: IV  Indications of Use: SEPSIS  Start: 08/22/17 0100   End: 08/23/17 1126   Admin Instructions: For an adult with peripheral catheter and dose of 2-2.5 g, infuse over 2 hours.  IF central catheter, doses below 2 g may be given over 1 hour.         0154 (1,500 mg)-New Bag        0122 (1,500 mg)-New Bag       1126-Med Discontinued          Dose: 1 each Freq: SEE ADMIN INSTRUCTIONS Route: XX  Indications of Use: SEPSIS  Start: 08/20/17 2117   End: 08/22/17 0035   Admin Instructions: This order is meant to notify providers that this patient is receiving intermittent doses of Vancomycin. Do NOT chart on this order.         0035-Med Discontinued      Medications 08/18/17 08/19/17 08/20/17 08/21/17 08/22/17 08/23/17 08/24/17

## 2017-08-21 ENCOUNTER — APPOINTMENT (OUTPATIENT)
Dept: MRI IMAGING | Facility: CLINIC | Age: 82
DRG: 871 | End: 2017-08-21
Attending: INTERNAL MEDICINE
Payer: COMMERCIAL

## 2017-08-21 ENCOUNTER — APPOINTMENT (OUTPATIENT)
Dept: SPEECH THERAPY | Facility: CLINIC | Age: 82
DRG: 871 | End: 2017-08-21
Attending: INTERNAL MEDICINE
Payer: COMMERCIAL

## 2017-08-21 ENCOUNTER — APPOINTMENT (OUTPATIENT)
Dept: CARDIOLOGY | Facility: CLINIC | Age: 82
DRG: 871 | End: 2017-08-21
Attending: HOSPITALIST
Payer: COMMERCIAL

## 2017-08-21 LAB
ANION GAP SERPL CALCULATED.3IONS-SCNC: 10 MMOL/L (ref 3–14)
BUN SERPL-MCNC: 51 MG/DL (ref 7–30)
CALCIUM SERPL-MCNC: 8.5 MG/DL (ref 8.5–10.1)
CHLORIDE SERPL-SCNC: 112 MMOL/L (ref 94–109)
CO2 SERPL-SCNC: 22 MMOL/L (ref 20–32)
CREAT SERPL-MCNC: 0.87 MG/DL (ref 0.52–1.04)
CREAT SERPL-MCNC: 1.01 MG/DL (ref 0.52–1.04)
CREAT SERPL-MCNC: 1.03 MG/DL (ref 0.52–1.04)
CRP SERPL-MCNC: 5.4 MG/L (ref 0–8)
DEPRECATED CALCIDIOL+CALCIFEROL SERPL-MC: 26 UG/L (ref 20–75)
ERYTHROCYTE [DISTWIDTH] IN BLOOD BY AUTOMATED COUNT: 16.7 % (ref 10–15)
GFR SERPL CREATININE-BSD FRML MDRD: 50 ML/MIN/1.7M2
GFR SERPL CREATININE-BSD FRML MDRD: 51 ML/MIN/1.7M2
GFR SERPL CREATININE-BSD FRML MDRD: 61 ML/MIN/1.7M2
GLUCOSE BLDC GLUCOMTR-MCNC: 208 MG/DL (ref 70–99)
GLUCOSE BLDC GLUCOMTR-MCNC: 213 MG/DL (ref 70–99)
GLUCOSE BLDC GLUCOMTR-MCNC: 226 MG/DL (ref 70–99)
GLUCOSE BLDC GLUCOMTR-MCNC: 264 MG/DL (ref 70–99)
GLUCOSE BLDC GLUCOMTR-MCNC: 291 MG/DL (ref 70–99)
GLUCOSE BLDC GLUCOMTR-MCNC: 301 MG/DL (ref 70–99)
GLUCOSE SERPL-MCNC: 288 MG/DL (ref 70–99)
HBA1C MFR BLD: 8.2 % (ref 4.3–6)
HCT VFR BLD AUTO: 37.3 % (ref 35–47)
HGB BLD-MCNC: 11.9 G/DL (ref 11.7–15.7)
LACTATE BLD-SCNC: 3.1 MMOL/L (ref 0.7–2.1)
LACTATE BLD-SCNC: 3.8 MMOL/L (ref 0.7–2.1)
LACTATE BLD-SCNC: 4.1 MMOL/L (ref 0.7–2.1)
LACTATE BLD-SCNC: 4.4 MMOL/L (ref 0.7–2.1)
LDLC SERPL DIRECT ASSAY-MCNC: 103 MG/DL
MAGNESIUM SERPL-MCNC: 2.2 MG/DL (ref 1.6–2.3)
MAGNESIUM SERPL-MCNC: NORMAL MG/DL (ref 1.6–2.3)
MCH RBC QN AUTO: 31.6 PG (ref 26.5–33)
MCHC RBC AUTO-ENTMCNC: 31.9 G/DL (ref 31.5–36.5)
MCV RBC AUTO: 99 FL (ref 78–100)
PHOSPHATE SERPL-MCNC: 2.9 MG/DL (ref 2.5–4.5)
PHOSPHATE SERPL-MCNC: NORMAL MG/DL (ref 2.5–4.5)
PLATELET # BLD AUTO: 256 10E9/L (ref 150–450)
POTASSIUM SERPL-SCNC: 5.1 MMOL/L (ref 3.4–5.3)
RBC # BLD AUTO: 3.76 10E12/L (ref 3.8–5.2)
SODIUM SERPL-SCNC: 144 MMOL/L (ref 133–144)
TROPONIN I SERPL-MCNC: 0.56 UG/L (ref 0–0.04)
TSH SERPL DL<=0.005 MIU/L-ACNC: 1.19 MU/L (ref 0.4–4)
VANCOMYCIN SERPL-MCNC: 10.6 MG/L
VIT B12 SERPL-MCNC: 645 PG/ML (ref 193–986)
WBC # BLD AUTO: 10.8 10E9/L (ref 4–11)

## 2017-08-21 PROCEDURE — 84100 ASSAY OF PHOSPHORUS: CPT | Performed by: INTERNAL MEDICINE

## 2017-08-21 PROCEDURE — 00000146 ZZHCL STATISTIC GLUCOSE BY METER IP

## 2017-08-21 PROCEDURE — 80048 BASIC METABOLIC PNL TOTAL CA: CPT | Performed by: HOSPITALIST

## 2017-08-21 PROCEDURE — 80202 ASSAY OF VANCOMYCIN: CPT | Performed by: INTERNAL MEDICINE

## 2017-08-21 PROCEDURE — 85027 COMPLETE CBC AUTOMATED: CPT | Performed by: INTERNAL MEDICINE

## 2017-08-21 PROCEDURE — 93306 TTE W/DOPPLER COMPLETE: CPT

## 2017-08-21 PROCEDURE — 82306 VITAMIN D 25 HYDROXY: CPT | Performed by: HOSPITALIST

## 2017-08-21 PROCEDURE — 40000264 ECHO COMPLETE BUBBLE

## 2017-08-21 PROCEDURE — 25000131 ZZH RX MED GY IP 250 OP 636 PS 637: Performed by: HOSPITALIST

## 2017-08-21 PROCEDURE — 83735 ASSAY OF MAGNESIUM: CPT | Performed by: INTERNAL MEDICINE

## 2017-08-21 PROCEDURE — 40000893 ZZH STATISTIC PT IP EVAL DEFER: Performed by: PHYSICAL THERAPIST

## 2017-08-21 PROCEDURE — 40000225 ZZH STATISTIC SLP WARD VISIT: Performed by: SPEECH-LANGUAGE PATHOLOGIST

## 2017-08-21 PROCEDURE — 84484 ASSAY OF TROPONIN QUANT: CPT | Performed by: INTERNAL MEDICINE

## 2017-08-21 PROCEDURE — 12000000 ZZH R&B MED SURG/OB

## 2017-08-21 PROCEDURE — 36415 COLL VENOUS BLD VENIPUNCTURE: CPT | Performed by: HOSPITALIST

## 2017-08-21 PROCEDURE — 25000128 H RX IP 250 OP 636: Performed by: HOSPITALIST

## 2017-08-21 PROCEDURE — 93306 TTE W/DOPPLER COMPLETE: CPT | Mod: 26 | Performed by: INTERNAL MEDICINE

## 2017-08-21 PROCEDURE — 92610 EVALUATE SWALLOWING FUNCTION: CPT | Mod: GN | Performed by: SPEECH-LANGUAGE PATHOLOGIST

## 2017-08-21 PROCEDURE — 27210429 ZZH NUTRITION PRODUCT INTERMEDIATE LITER

## 2017-08-21 PROCEDURE — 97602 WOUND(S) CARE NON-SELECTIVE: CPT

## 2017-08-21 PROCEDURE — 82565 ASSAY OF CREATININE: CPT | Performed by: INTERNAL MEDICINE

## 2017-08-21 PROCEDURE — 70551 MRI BRAIN STEM W/O DYE: CPT

## 2017-08-21 PROCEDURE — 36415 COLL VENOUS BLD VENIPUNCTURE: CPT | Performed by: INTERNAL MEDICINE

## 2017-08-21 PROCEDURE — 84443 ASSAY THYROID STIM HORMONE: CPT | Performed by: HOSPITALIST

## 2017-08-21 PROCEDURE — 82607 VITAMIN B-12: CPT | Performed by: HOSPITALIST

## 2017-08-21 PROCEDURE — 86140 C-REACTIVE PROTEIN: CPT | Performed by: HOSPITALIST

## 2017-08-21 PROCEDURE — 99233 SBSQ HOSP IP/OBS HIGH 50: CPT | Performed by: HOSPITALIST

## 2017-08-21 PROCEDURE — 99212 OFFICE O/P EST SF 10 MIN: CPT

## 2017-08-21 PROCEDURE — 25000132 ZZH RX MED GY IP 250 OP 250 PS 637: Performed by: INTERNAL MEDICINE

## 2017-08-21 PROCEDURE — 83036 HEMOGLOBIN GLYCOSYLATED A1C: CPT | Performed by: INTERNAL MEDICINE

## 2017-08-21 PROCEDURE — 83605 ASSAY OF LACTIC ACID: CPT | Performed by: HOSPITALIST

## 2017-08-21 PROCEDURE — 25000128 H RX IP 250 OP 636: Performed by: INTERNAL MEDICINE

## 2017-08-21 RX ORDER — GUAR GUM
1 PACKET (EA) ORAL 2 TIMES DAILY
Status: DISCONTINUED | OUTPATIENT
Start: 2017-08-21 | End: 2017-08-24 | Stop reason: HOSPADM

## 2017-08-21 RX ORDER — ASPIRIN 325 MG
325 TABLET ORAL DAILY
Status: DISCONTINUED | OUTPATIENT
Start: 2017-08-22 | End: 2017-08-24 | Stop reason: HOSPADM

## 2017-08-21 RX ORDER — LABETALOL HYDROCHLORIDE 5 MG/ML
10-40 INJECTION, SOLUTION INTRAVENOUS EVERY 10 MIN PRN
Status: DISCONTINUED | OUTPATIENT
Start: 2017-08-21 | End: 2017-08-24 | Stop reason: HOSPADM

## 2017-08-21 RX ORDER — HYDRALAZINE HYDROCHLORIDE 20 MG/ML
10-20 INJECTION INTRAMUSCULAR; INTRAVENOUS
Status: DISCONTINUED | OUTPATIENT
Start: 2017-08-21 | End: 2017-08-24 | Stop reason: HOSPADM

## 2017-08-21 RX ORDER — ASPIRIN 300 MG/1
300 SUPPOSITORY RECTAL DAILY
Status: DISCONTINUED | OUTPATIENT
Start: 2017-08-22 | End: 2017-08-24 | Stop reason: HOSPADM

## 2017-08-21 RX ORDER — ENALAPRILAT 1.25 MG/ML
0.62 INJECTION INTRAVENOUS EVERY 6 HOURS PRN
Status: DISCONTINUED | OUTPATIENT
Start: 2017-08-21 | End: 2017-08-21

## 2017-08-21 RX ORDER — SODIUM CHLORIDE 9 MG/ML
INJECTION, SOLUTION INTRAVENOUS CONTINUOUS
Status: CANCELLED | OUTPATIENT
Start: 2017-08-21

## 2017-08-21 RX ADMIN — ASPIRIN 300 MG: 300 SUPPOSITORY RECTAL at 10:44

## 2017-08-21 RX ADMIN — HYOSCYAMINE SULFATE: 16 SOLUTION at 10:47

## 2017-08-21 RX ADMIN — INSULIN ASPART 7 UNITS: 100 INJECTION, SOLUTION INTRAVENOUS; SUBCUTANEOUS at 06:22

## 2017-08-21 RX ADMIN — PIPERACILLIN SODIUM,TAZOBACTAM SODIUM 3.38 G: 3; .375 INJECTION, POWDER, FOR SOLUTION INTRAVENOUS at 01:56

## 2017-08-21 RX ADMIN — PIPERACILLIN SODIUM,TAZOBACTAM SODIUM 3.38 G: 3; .375 INJECTION, POWDER, FOR SOLUTION INTRAVENOUS at 14:15

## 2017-08-21 RX ADMIN — SODIUM CHLORIDE: 9 INJECTION, SOLUTION INTRAVENOUS at 16:22

## 2017-08-21 RX ADMIN — OXYCODONE HYDROCHLORIDE 2 MG: 5 SOLUTION ORAL at 10:47

## 2017-08-21 RX ADMIN — OXYCODONE HYDROCHLORIDE 2 MG: 5 SOLUTION ORAL at 22:20

## 2017-08-21 RX ADMIN — INSULIN ASPART 3 UNITS: 100 INJECTION, SOLUTION INTRAVENOUS; SUBCUTANEOUS at 14:19

## 2017-08-21 RX ADMIN — PIPERACILLIN SODIUM,TAZOBACTAM SODIUM 3.38 G: 3; .375 INJECTION, POWDER, FOR SOLUTION INTRAVENOUS at 20:21

## 2017-08-21 RX ADMIN — OXYCODONE HYDROCHLORIDE 2 MG: 5 SOLUTION ORAL at 16:12

## 2017-08-21 RX ADMIN — SODIUM CHLORIDE 500 ML: 9 INJECTION, SOLUTION INTRAVENOUS at 12:20

## 2017-08-21 RX ADMIN — SIMETHICONE 80 MG: 20 EMULSION ORAL at 16:12

## 2017-08-21 RX ADMIN — SIMETHICONE 80 MG: 20 EMULSION ORAL at 22:21

## 2017-08-21 RX ADMIN — INSULIN ASPART 3 UNITS: 100 INJECTION, SOLUTION INTRAVENOUS; SUBCUTANEOUS at 22:22

## 2017-08-21 RX ADMIN — Medication 1 PACKET: at 20:21

## 2017-08-21 RX ADMIN — INSULIN ASPART 4 UNITS: 100 INJECTION, SOLUTION INTRAVENOUS; SUBCUTANEOUS at 17:51

## 2017-08-21 RX ADMIN — RANITIDINE HYDROCHLORIDE 150 MG: 150 SOLUTION ORAL at 10:47

## 2017-08-21 RX ADMIN — SIMETHICONE 80 MG: 20 EMULSION ORAL at 10:52

## 2017-08-21 RX ADMIN — INSULIN ASPART 5 UNITS: 100 INJECTION, SOLUTION INTRAVENOUS; SUBCUTANEOUS at 11:03

## 2017-08-21 RX ADMIN — INSULIN ASPART 7 UNITS: 100 INJECTION, SOLUTION INTRAVENOUS; SUBCUTANEOUS at 02:02

## 2017-08-21 RX ADMIN — INSULIN GLARGINE 15 UNITS: 100 INJECTION, SOLUTION SUBCUTANEOUS at 22:23

## 2017-08-21 RX ADMIN — Medication 1 CAPSULE: at 10:45

## 2017-08-21 RX ADMIN — PIPERACILLIN SODIUM,TAZOBACTAM SODIUM 3.38 G: 3; .375 INJECTION, POWDER, FOR SOLUTION INTRAVENOUS at 08:20

## 2017-08-21 RX ADMIN — MULTIVITAMIN 15 ML: LIQUID ORAL at 16:12

## 2017-08-21 ASSESSMENT — VISUAL ACUITY: OU: OTHER (SEE COMMENT)

## 2017-08-21 NOTE — PLAN OF CARE
Problem: Goal Outcome Summary  Goal: Goal Outcome Summary  PT/OT: Pt is dependent for all cares and mobility at baseline. Non-verbal per notes. No skilled PT/OT intervention needs identified at this time.

## 2017-08-21 NOTE — PROVIDER NOTIFICATION
"Text page to Dr. Mercado with critical lab. \"Positive blood culture growing gram positive cocci in clusters, blood drawn on 8/20/17 at 1855 from L hand. Expect rapid ID results in 2-3 hours. Lactic acid level trending down, now 3.1\".  Call back, no new orders. Lactic acid will be drawn again tomorrow morning.  "

## 2017-08-21 NOTE — PROGRESS NOTES
[facility] Nurse Inpatient Adult Pressure Injury (PI) Assessment     Initial Assessment of PI(s) on pt's:   Coccyx/sacrum; bilateral outer ears (helix)    Data:   Patient History:    Celia Lewis is a 89 year old female who was admitted on 8/20/2017.  Past history of advanced dementia (non-verbal, bed-bound, s/p G-tube), HTN, DM II, CKD, chronic decubitus ulcer who presents with decreased responsiveness, found to have sepsis and subacute CVA     2. Chronic sacral decubitus wound. Prior cultures grew multiple organisms as would always be the case in this setting but nothing to suggest deeper major infection or cellulitis at present.   3. Prior MRSA positive urine culture, current urine abnormal, await culture.   4. History of probable recurrent aspiration, although not particularly clearly documented, previously treated it as aspiration.   5. Severe dementia.   6. Feeding tube in place, aspiration as a prior potential issue.   7. Diabetes mellitus.   8. Chronic mild renal insufficiency, some acute worsening at present.   9. Sulfa allergy.         Current mattress: atmos air  Current pressure relieving devices:  Pillows      Moisture Management:  Incontinence Protocol and Urinary Catheter    Catheter secured? No secondary to severe contractures      Current Diet / Nutrition:       Active Diet Order: TF      Deshawn Assessment and sub scores:  Deshawn Score  Avg: 10.1  Min: 8  Max: 12       Labs:   Recent Labs   Lab Test  08/21/17   0750  08/21/17   0625  08/20/17   1853   ALBUMIN   --    --   3.3*   HGB   --   11.9  12.8   INR   --    --   0.98   WBC   --   10.8  14.3*   A1C   --   8.2*   --    CRP  5.4   --   7.0                                                                                                                            Pressure Injury Assessment  (location):   Coccyx/sacrum  - Present on admission    Wound History:   Pt just readmitted with wound on the coccyx.  Last  debrided,  06-15-17 during hospitalization    Coccyx:          -Size:  3cm x 3cm x up to 2cm.  Wound is about 5% moist, soft slough / 95% moist,shiny red tissue.           -Wound base: slightly soft but unable to probe any tunnels. This base with palpable bone just under tissue         -Undermining/Tunnels: none noted         -Drainage: scant sero-sang         -jimbo-wound skin: slightly blanchable ecchymosis noted from apporx 1-8 o'clock extending out 1.5cm           Pressure Injury Assessment:   Bilateral outer ears (mid-helix)  -Present on admission  Wound History:   Present on admission; pt lies directly on ears when on sides.  No O2 noted today    Specific Dimensions (length x width x depth, in cm) :     1.  Left ear:  Approx. 2 x 0.5 cm area of dried scabbed thin eschar   2.  Right ear:  Decrease .5cm x .5cm x superficial dried scabbed area. No drainage, no jimbo-wound erythema    Periwound Skin: scattered mild erythema     Odor: none    Pain:  absent           Intervention:     Patient's chart evaluated.      Deshawn Interventions:  Current Deshawn Interventions and Care Plan reviewed and updated, appropriate at this time.    Wounds assessed         -Wounds cleaned MicroKlenz         -Coccyx: moistened 2 x 2 of Dakins solution         -Bilateral ear (helix): apply small piece of Mepilex lite         -Pt repositioned on Lt side    Orders  In Epic    Supplies  Reviewed     POC: son not in room for discussion           Assessment:      coccyx/sacrum: Now Stage 4 PI, community acquired, no local s/s infections, possible jimbo-wound candida      Bilateral ears:  Mix stage 2 and unstageable PI, community acquired, no local s/s infection           Plan:     Nursing to notify the Provider(s) and re-consult the [facility] Nurse if wound(s) deteriorate(s)or if the wound care plan needs reevaluation.    Plan of care for wound on coccyx/ears: change dressing on even days and prn  1. Clean all wounds w with MicroKlenz Spray, pat dry  2. Coccyx:  Odd days and prn       -pack wound moistened 2x2 with Dakins solution        -dust jimbo-ulcer skin with Desenex brushing off excess       -seal in powder with 3M NO sting skin prep       -Cover all with Mepliex   Border  3. bilateral outer ears:        -skin prep wounds on ears. Let dry       -Cut stirps of Mepilex lite and wrap around ear to secure  4. PIP measures       - Rt and Lt postioning only  -Consider Z-Martin Pillows to help keep pt on side (#[id number] or #[id number]). *Z-Flows are for positioning, DO NOT SIT ON!   -Keep heels elevated, use heel lift boots in room  - keep HOB @ 30 degrees for TF  -Incontinence protocol prn. No diaper on patient. Use incontinence pad only        -Deshawn Risk: document interventions        -If anticipate long LOS order a pulsate mattress for additional off-loading        -Full skin inspection BID. Document findings      WOC Nurse will return: weekly and prn  Face to face time: 15+ Minutes

## 2017-08-21 NOTE — H&P
PRIMARY CARE PROVIDER:  None stated.      DATE OF ADMISSION:  08/20/2017      CHIEF COMPLAINT:  Change in mental state.      HISTORY OF PRESENT ILLNESS:  Ms. Celia Lewis is a 89-year-old female with a history of advanced Alzheimer's dementia, nonverbal and bed bound at baseline, recurrent urinary tract infection, diabetes mellitus type 2, hypertension, chronic kidney disease stage III, chronic stage III sacral decubitus ulcer, dysphagia status post gastrostomy tube placement who was brought to the emergency room from her care facility at [facility] with change in mental state.  Per the notes from the care facility, the patient is nonverbal at baseline, however, today she was found to be more altered and less responsive than her usual baseline.  She was also found to have a temperature in excess of 101 and was brought to the emergency room.  The patient was just discharged from [facility] on 08/10/2017 where she had presented with sepsis due to urinary tract infection.  She was discharged on Ceftin with about 7 days of treatment left at that time.  The history was obtained based on my discussion with her son,  Salvatore Sotoxler, who is allergist and immunologist and also review of nursing home documents.  Reportedly, there was no recent diarrhea or vomiting.  No cough.  Per Dr. Salvatore Lewis, her son, he felt that she was actually getting better during his last visit with her on Thursday.  In the emergency room, the patient was seen by Dr. Sita valenzuela, she had a basic workup done, which showed elevated white cell count of 14.3.  She was also febrile with fever up to 100.8.  Chest x-ray showed left basilar opacity and notably a CT head showing findings concerning for learning for an acute infarct within the left occipital lobe with mild wheeze, no edema, without significant mass effect or midline shift.      PAST MEDICAL HISTORY:   1.  Advanced Alzheimer's dementia.   2.  Baseline nonverbal status.    3.  Diabetes mellitus type 2.   4.  Chronic kidney disease, stage III.   5.  Dysphagia, status post gastrostomy tube placement.   6.  Hypertension.   7.  Recurrent UTIs.   8.  Nephrolithiasis.   9.  Atrial fibrillation.   10.  Hyperlipidemia.      ALLERGIES:  Sulfa.      SOCIAL AND PERSONAL HISTORY:  She is a nursing home resident.  She is nonverbal at baseline due to advanced dementia.  No tobacco, alcohol or illicit drug use.  She is reportedly total care at a care facility.      FAMILY HISTORY:  Reviewed and is noncontributory.      HOME MEDICATIONS:    Prior to Admission Medications   Prescriptions Last Dose Informant Patient Reported? Taking?   ACETAMINOPHEN PO 2017 at 1400 assisted Yes Yes   Si mg by Gastric Tube route 3 times daily    Dakins 0.125 % SOLN 2017 at 1600 assisted Yes Yes   Sig: Externally apply topically 2 times daily BID and PRN.   1. Cleanse wound with microklenze, cleanse periwound area with naomi perineal  2. Moisten kerlix fluff with dakins solution 0.125%, wring out excess, pack wound with kerlix  3. Apply antifungal powder to periwound area, rub in. Apply criticaid over powder.  4. Cover with ABD using minimal medipore tape to secure.  5. Label dressing with date, time, initials. Follow Rigorous PIP measures.   INSULIN ASPART SC 2017 at 1400 assisted Yes Yes   Sig: Inject 1-6 Units Subcutaneous every 6 hours 0800, 1400, 2000, 0200  -250 1 unit  -300 2 units  -350 3 units  -400 4 units  -450 5 units  BG >450 6 units and update MD   Lactobacillus Acidophilus POWD 2017 at 0800 Nursing Home Yes Yes   Si capsule by Gastric Tube route daily 10 billion units    Loperamide HCl (IMODIUM A-D) 1 MG/7.5ML LIQD 2017 at 0800 Nursing Home Yes Yes   Si mg by Gastric Tube route every other day   acetaminophen (TYLENOL) 32 mg/mL solution 8/15/2017 at Unknown time Nursing Home Yes Yes   Si mg by Gastric Tube route daily  as needed for fever or mild pain   bisacodyl (DULCOLAX) 10 MG suppository  at prn Nursing Home Yes Yes   Sig: Place 10 mg rectally daily as needed for constipation   fiber modular (NUTRISOURCE FIBER) packet 2017 at 0800 [facility] Home Yes Yes   Si packet by Per Feeding Tube route 2 times daily   metoprolol (LOPRESSOR) 10 mg/mL SUSP 2017 at 0800 [facility] Home Yes Yes   Sig: Take 25 mg by mouth 2 times daily   multivitamin, therapeutic (THERA-VIT) TABS tablet 2017 at 0800 [facility] Home Yes Yes   Si tablet by Gastric Tube route daily   ranitidine (ZANTAC) 150 MG/10ML syrup 2017 at 0800 [facility] No Yes   Sig: 10 mLs (150 mg) by Per Feeding Tube route daily   simethicone (MYLICON) 40 MG/0.6ML suspension 2017 at 1400 [facility] Yes Yes   Sig: Take 80 mg by mouth 3 times daily   vitamin A-D & C drops (TRI-VI-SOL) 750-400-35 UNIT-MG/ML solution NEW FORMULATION 2017 at 0800 [facility] No Yes   Si mLs by Per G Tube route daily      Facility-Administered Medications: None        REVIEW OF SYSTEMS:  A complete review of system is unobtainable because of the patient's nonverbal status, but is negative for anything else other than that reported in the HPI based on our discussion with the son and review of nursing home records.      PHYSICAL EXAMINATION:   GENERAL:  Ms. Lewis is a 89-year-old female.  She does open her eyes to verbal cues, but does not follow any commands at all.  She is awake.   VITAL SIGNS:  Temperature 100.8, blood pressure 115/59, heart rate 108, respiration rate 25, O2 sats 97% on 2 liters via nasal cannula.   HEENT:  Atraumatic, normocephalic.   NECK:  Supple.   PULMONARY:  Coarse breath sounds bilaterally.  Normal effort of breathing.  She appears to be maintaining her airway adequately at the moment.   CARDIOVASCULAR:  Tachycardic, no murmur.   ABDOMEN:  Soft.  Gastrostomy tube in place.   EXTREMITIES:  Contractures of bilateral lower extremities.  She has a  stage III sacral decubitus and also pressure ulcers of bilateral ear.   NEUROLOGIC:  She opens eyes to verbal commands, does not follow any commands, difficult to assess motor strength in her extremities.  Pupil is 4 mm bilaterally and reactive to light.      LABORATORY AND DIAGNOSTIC DATA:  Sodium is 142, potassium is 5.1, creatinine is 1.13.  Troponin is 0.65.  Lactic acid is 5.6, blood glucose is 258.  White cell count is 14.3.  UA shows large leukocyte esterase and more than 182 WBC.  CT head with findings concerning for acute infarct within the left occipital lobe.  Mild recently edema without significant mass effect or midline shift.  Hyperdensity in the region also extends into the left temporal lobe and reason of ischemia in this area cannot be excluded.      ASSESSMENT AND PLAN:  Ms. Lewis is a 89-year-old female with multiple medical problems as described above who was brought to the emergency room from her long-term care facility with change in mental state.      1.  Acute ischemic cerebrovascular accident of the left occipital lobe with regional edema.  The patient was sent to the emergency room with worsening mental state.  She does have CT findings concerning for acute infarct.  There was also hyperintensity in the region of the left temporal lobe so ischemia cannot be excluded in this area.  At this time, the patient is nonverbal and not following commands.  I had a long discussion with her son, Dr. Salvatore Lewis, who is an allergist and immunologist who wanted her to be a full code and everything pursued.  At this time, given the concern for potential maintenance of airway, she will be admitted to the ICU.  We will get an MRI and Neurology will be consulted.  We will place her on aspirin 325 mg daily through her G-tube.  Please see discussion about goals of care below.      2. Sepsis, most likely due to urinary tract infection versus pneumonia.  The patient was found to be febrile with temperature  of 101 at the care facility.  She has a high white cell count.  UA is abnormal.  There were concerns for infiltrate on the chest x-ray.  She was started on vancomycin and Zosyn.  We will continue that.  She also had a high lactate of 5.6.  I will place her on IV fluids and recheck her lactate in a couple of hours.  We will await results of blood culture.      3.  Advanced Alzheimer's dementia with baseline nonverbal status.  The patient is a nursing home resident and is apparently total care at baseline.  She was found to be more altered than her normal self.  She is not on any specific treatment for her Alzheimer's dementia.        4.  Lactic acidosis.  Her lactate at presentation was 5.6 most likely due to septic process.  We will repeat in 2 hours after hydration as mentioned above.      5.  Stage III sacral decubitus with bilateral pressure ulcer of the ears.  The patient had this during the prior admission.  No clinical evidence of active infection.  We will place a wound care consultation.      6.  Elevated troponin.  A troponin was elevated at 0.65.  Unsure if she had a cardiac event.  The patient is nonverbal and unable to express any complaints.  I will just trend the troponin for now.      7.  Diabetes mellitus type 2.  Her blood sugars were elevated at 258.  I will place her on high intensity sliding scale.  The patient is on Lantus 30 units twice daily.  I will put her on a reduced dose of 15 units b.i.d.  Once her tube feeding is started, that can be bumped up to her prior to admission dose.      8.  Chronic kidney disease, stage III.  This is more than likely at baseline.  Monitor intermittently.  Avoid nephrotoxins.      9.  Dysphagia, status post gastrostomy tube.  The patient does have a gastrostomy tube in place.  Will place a nutrition consult.      10.  Benign essential hypertension.  Prior to admission, she is on metoprolol.  I will hold off on that and allow permissive hypertension because of  the acute stroke.      GOALS OF CARE:  This was discussed with son who is an allergist and immunologist, Dr. Salvatore Mercedes.  He did mention that the patient is an [employer] and she would have wanted everything done for her care.  He wanted to get a better assessment of prognosis with an MRI to see the extent of the stroke, but at the moment he did want Raúl to be a full code and pursue all aggressive treatment.      ANTICIPATED LENGTH OF STAY:  More than 2 midnights.      CODE STATUS:  Full code.      DISPOSITION:  To ICU.         VICTOR MANUEL HERRING MD             D: 2017 20:42   T: 2017 22:00   MT: LQ      Name:     RAÚL MERCEDES   MRN:      [id number]        Account:      [id number]   :      1926           Admitted:     [id number]      Document: [id number]

## 2017-08-21 NOTE — PROGRESS NOTES
ICU Multi-Disciplinary Note  Patient condition reviewed and discussed while on multidisciplinary rounds today. 89 y/o female with advanced dementia admitted with decreased responsiveness found to have sepsis and CVA. On abx currently with elevated LA.     The Critical Care service will continue to follow peripherally while patient is within the ICU. We are readily available should issues arise.  Please feel free to contact us for anything with which we may be of assistance.    Abigail Nevarez

## 2017-08-21 NOTE — PROGRESS NOTES
"   08/21/17 0957   General Information   Onset Date 08/20/17   Start of Care Date 08/21/17   Referring Physician Antwan Green MD   Patient Profile Review/OT: Additional Occupational Profile Info See Profile for full history and prior level of function   Patient/Family Goals Statement Unable to state   Swallowing Evaluation Bedside swallow evaluation   Behaviorial Observations Uncooperative;Lethargic   Mode of current nutrition NPO   Respiratory Status O2 Supply   Type of O2 supply Nasal cannula   Comments Celia Lewis is a 89 year old female who was admitted on 8/20/2017.  Past history of advanced dementia (non-verbal, bed-bound, s/p G-tube), HTN, DM II, CKD, chronic decubitus ulcer who presents with decreased responsiveness, found to have sepsis and subacute CVA. Per Neurology note, \"MRI: --Bilateral occipital strokes, Possible bifrontal strokes, Left frontal, microhemorrhage\" Pt also presents with severe UTI. \"Severe,end-stage AD --Recovery is unlikely --Palliative care consult will be the most appropriate.\" Previous ST note in 2010 with mild dysphagia noted. Pt has been NPO with PEG tube.    Clinical Swallow Evaluation   Oral Musculature unable to assess due to poor participation/comprehension   Oral Musculature Comments unable    Additional evaluation(s) completed today No   Clinical Swallow Eval: Thin Liquid Texture Trial   Mode of Presentation, Thin Liquids cup   Volume of Liquid or Food Presented trace amounts to lightly moistened lips   Oral Phase of Swallow Poor AP movement;Premature pharyngeal entry;Residue in oral cavity   Pharyngeal Phase of Swallow intact   Diagnostic Statement Pt accepted trace amounts of thin water with multiple swallows observed with no overt s/sx of aspiration   Swallow Eval: Clinical Impressions   Skilled Criteria for Therapy Intervention No significant expected  improvement in functional status   Functional Assessment Scale (FAS) 1   Treatment Diagnosis Severe to " profound oral dysphagia   Diet texture recommendations NPO   Anticipated Discharge Disposition extended care facility   Risks and Benefits of Treatment have been explained. Yes   Patient, family and/or staff in agreement with Plan of Care Yes   Clinical Impression Comments SLP: Pt alert with her eyes open during bedside swallow evaluation. Pt nonverbal at baseline and NPO with TF. Pt unable to follow commands, oral cavity unable to be visualized. Pt did not respond to attempts at spoon sips of nectar thick liquids or applesauce. Thin water via cup attempted with pt forming labial seal around cup, taking trace amount of liquids and quickly closing mouth. Multiple swallows were able to be visualized with small amounts of thin water and no overt s/sx of aspiration. Pt presents with severe to profound oral dysphagia due to advanced dementia, UTI with sepsis and subacute CVA. Per chart review, pt chronically NPO and has not had recent SLP services. Recommended: Continue NPO with continued oral cares and lightly moistened swab for comfort. Dysphagia at baseline with ST services not warranted at this time. ST to sign off.    Total Evaluation Time   Total Evaluation Time (Minutes) 25

## 2017-08-21 NOTE — PHARMACY-ADMISSION MEDICATION HISTORY
Admission medication history interview status for the 8/20/2017  admission is complete. See EPIC admission navigator for prior to admission medications     Medication history source reliability:Good    Actions taken by pharmacist (provider contacted, etc):Obtained MAR from Daniel      Additional medication history information not noted on PTA med list : Patient recently finished a 7-day course of cefuroxime 500 mg BID    Medication reconciliation/reorder completed by provider prior to medication history? No    Time spent in this activity: 20 minutes     Prior to Admission medications    Medication Sig Last Dose Taking? Auth Provider   metoprolol (LOPRESSOR) 10 mg/mL SUSP Take 25 mg by mouth 2 times daily 8/20/2017 at 0800 Yes Unknown, Entered By History   fiber modular (NUTRISOURCE FIBER) packet 1 packet by Per Feeding Tube route 2 times daily 8/20/2017 at 0800 Yes Unknown, Entered By History   oxyCODONE (ROXICODONE) 5 MG/5ML solution 2 mLs (2 mg) by Per G Tube route every 4 hours as needed for moderate to severe pain 8/15/2017 at Unknown time Yes Tamar Pineda MD   oxyCODONE (ROXICODONE) 5 MG/5ML solution Take 2 mg by mouth 3 times daily 8/20/2017 at 1400 Yes Unknown, Entered By History   simethicone (MYLICON) 40 MG/0.6ML suspension Take 80 mg by mouth 3 times daily 8/20/2017 at 1400 Yes Unknown, Entered By History   insulin glargine (LANTUS) 100 UNIT/ML injection Inject 30 Units Subcutaneous 2 times daily 8/20/2017 at 0800 Yes Piotr Saba MD   ranitidine (ZANTAC) 150 MG/10ML syrup 10 mLs (150 mg) by Per Feeding Tube route daily 8/20/2017 at 0800 Yes Piotr Saba MD   vitamin A-D & C drops (TRI-VI-SOL) 750-400-35 UNIT-MG/ML solution NEW FORMULATION 7 mLs by Per G Tube route daily 8/20/2017 at 0800 Yes Piotr Saba MD   INSULIN ASPART SC Inject 1-6 Units Subcutaneous every 6 hours 0800, 1400, 2000, 0200  -250 1 unit  -300 2 units  -350 3 units  -400 4 units  BG  401-450 5 units  BG >450 6 units and update MD 8/20/2017 at 1400 Yes Unknown, Entered By History   Dakins 0.125 % SOLN Externally apply topically 2 times daily BID and PRN.   1. Cleanse wound with microklenze, cleanse periwound area with naomi perineal  2. Moisten kerlix fluff with dakins solution 0.125%, wring out excess, pack wound with kerlix  3. Apply antifungal powder to periwound area, rub in. Apply criticaid over powder.  4. Cover with ABD using minimal medipore tape to secure.  5. Label dressing with date, time, initials. Follow Rigorous PIP measures. 8/20/2017 at 1600 Yes Reported, Patient   Lactobacillus Acidophilus POWD 1 capsule by Gastric Tube route daily 10 billion units  8/20/2017 at 0800 Yes Unknown, Entered By History   multivitamin, therapeutic (THERA-VIT) TABS tablet 1 tablet by Gastric Tube route daily 8/20/2017 at 0800 Yes Unknown, Entered By History   Loperamide HCl (IMODIUM A-D) 1 MG/7.5ML LIQD 4 mg by Gastric Tube route every other day 8/20/2017 at 0800 Yes Unknown, Entered By History   acetaminophen (TYLENOL) 32 mg/mL solution 325 mg by Gastric Tube route daily as needed for fever or mild pain 8/15/2017 at Unknown time Yes Unknown, Entered By History   ACETAMINOPHEN  mg by Gastric Tube route 3 times daily  8/20/2017 at 1400 Yes Unknown, Entered By History   bisacodyl (DULCOLAX) 10 MG suppository Place 10 mg rectally daily as needed for constipation  at prn Yes Unknown, Entered By History

## 2017-08-21 NOTE — PROGRESS NOTES
[facility]    Hospitalist Progress Note      Assessment & Plan   Celia Lewis is a 89 year old female who was admitted on 8/20/2017.  Past history of advanced dementia (non-verbal, bed-bound, s/p G-tube), HTN, DM II, CKD, chronic decubitus ulcer who presents with decreased responsiveness, found to have sepsis and subacute CVA.    Acute/subacute ischemic occipital stroke.  Admission CT head concerning for acute left occipital CVA.  MRI 8/21 with right and left occipital infarcts, possibly subacute.    - continue daily aspirin  - Neuro recommending Palliative care consultation  - LDL cannot be calculated due to high triglyceride levels  - DM uncontrolled with A1C of 8.2%  - serial trops elevated (no ischemic changes on EKG) but flat, will obtain TTE      Sepsis, most likely due to urinary versus pneumonia.  Febrile to 101, tachycardic with leukocytosis and lactate 5.6.  UA abnormal, CXR with possible left basilar opacity, though appearing unchanged from prior.  Initiated on vanco and zoysn at admission.  - afebrile, leukocytosis and tachycardia resolved overnight  - continue vanco and zosyn  - blood and urine cultures ngtd  - lactate remains elevated, continue  ml/hr, monitor lactate q2h x 3      Advanced Alzheimer's dementia.  Baseline nonverbal status, bed-bound and s/p G-tube.  She was found to be more altered than her normal self.  She is not on any specific treatment for her Alzheimer's dementia.     - Nutrition consult for tube feeds      Stage III sacral decubitus ulcer.  The patient had this during the prior admission.  No clinical evidence of active infection.    - WOC consult pending        Elevated troponin.  A troponin was elevated at 0.65.  Unsure if she had a cardiac event.  The patient is nonverbal and unable to express any complaints.   - management as above      Diabetes mellitus type 2.  A1C this admission 8.2%.  - resume PTA Lantus dosing once tube feeds initiated  -  continue high dose SSI       Chronic kidney disease, stage III.  Baseline Cr about 0.9.  Admission Cr 1.13.  - Cr 1.01 today, will continue IVF as above and repeat BMP in AM      Benign essential hypertension.  Prior to admission, she is on metoprolol.   - hold metoprolol for permissive HTN       GOALS OF CARE:  This was discussed at admission with son who is an allergist and immunologist, Dr. Salvatore Lewis.  He did mention that the patient is an [employer] and she would have wanted everything done for her care.  He wanted to get a better assessment of prognosis with an MRI to see the extent of the stroke, but at the moment he did want Celia to be a full code and pursue all aggressive treatment.     ADDENDUM:  Spoke with Dr. Lewis after he had discussed things with their Rabbi, Dr. Sevilla.  They have decided to make patient DNR/DNI but want to continue all medical interventions up to that point, including pressors.  They are not interested in Palliative Care consultation and have requested this be discontinued.      DVT Prophylaxis: Pneumatic Compression Devices  Code Status: Full Code    Disposition: Expected discharge in 2 days once work-up complete, sepsis resolved.  Transfer out of ICU.    Piotr Mercado    Interval History   Patient is non-verbal, arouses to minimally to moderate physical stimuli.  No acute events overnight.    -Data reviewed today: I reviewed all new labs and imaging results over the last 24 hours. I personally reviewed no images or EKG's today.    Physical Exam   Temp: 97.3  F (36.3  C) Temp src: Axillary BP: 153/83   Heart Rate: 88 Resp: 12 SpO2: 95 % O2 Device: None (Room air) Oxygen Delivery: 2 LPM  Vitals:    08/20/17 2024   Weight: 65.9 kg (145 lb 4.5 oz)     Vital Signs with Ranges  Temp:  [97.3  F (36.3  C)-100.8  F (38.2  C)] 97.3  F (36.3  C)  Heart Rate:  [] 88  Resp:  [9-46] 12  BP: (115-170)/(58-89) 153/83  SpO2:  [92 %-100 %] 95 %  I/O last 3 completed shifts:  In:  1607.92 [I.V.:1547.92; NG/GT:60]  Out: 485 [Urine:485]    Constitutional: Well developed, well nourished female in no acute distress  Respiratory: difficult exam due to small inspiratory volumes, no wheezing, no clear crackles  Cardiovascular: regular rate and rhythm, normal S1/S2 without murmur, rubs or gallops, no peripheral edema  GI: abdomen soft, no signs of tenderness, non-distended, normal bowel sounds, G-tube in place  Skin/Integumen:  No rash or bruising  Other:  somewhat opens eyes with moderate physical stimuli, no gross cranial nerve deficits    Medications     - MEDICATION INSTRUCTIONS -       NaCl 125 mL/hr at 08/20/17 2125       aspirin EC  325 mg Oral Daily    Or     aspirin  300 mg Rectal Daily     piperacillin-tazobactam  3.375 g Intravenous Q6H     insulin aspart  1-12 Units Subcutaneous Q4H     sodium hypochlorite   Topical BID     lactobacillus rhamnosus (GG)  1 capsule Oral or Feeding Tube Daily     oxyCODONE  2 mg Oral TID     ranitidine  150 mg Per Feeding Tube Daily     simethicone  80 mg Oral TID     vancomycin place ramos - receiving intermittent dosing  1 each Does not apply See Admin Instructions       Data     Recent Labs  Lab 08/21/17  0625 08/20/17  2330 08/20/17  1853   WBC 10.8  --  14.3*   HGB 11.9  --  12.8   MCV 99  --  101*     --  317   INR  --   --  0.98   NA  --   --  142   POTASSIUM  --   --  5.1   CHLORIDE  --   --  107   CO2  --   --  23   BUN  --   --  60*   CR 1.01  --  1.13*   ANIONGAP  --   --  12   NELLIE  --   --  9.2   GLC  --   --  258*   ALBUMIN  --   --  3.3*   PROTTOTAL  --   --  8.1   BILITOTAL  --   --  0.5   ALKPHOS  --   --  82   ALT  --   --  28   AST  --   --  23   LIPASE  --   --  174   TROPI 0.558* 0.706* 0.658*       Recent Results (from the past 24 hour(s))   XR Chest Port 1 View    Narrative    CHEST PORTABLE ONE VIEW    8/20/2017 7:00 PM     HISTORY: AMS and hypoxia.    COMPARISON: Chest x-rays dated 8/6/2017.    FINDINGS:  Stable opacity in the  left mid to lower lung could  represent airspace disease and/or atelectasis. Lungs are persistently  hypoaerated. Cardiac silhouette remains prominent. No pneumothorax or  right pleural fluid collection. Probable left pleural fluid collection  is present. No fracture.      Impression    IMPRESSION:  1. Essentially stable chest x-ray since prior study dated 8/6/2017  with left basilar opacity likely due to atelectasis, infiltrate,  effusion, and/or enlarged cardiac silhouette.  2. Hypoaeration is again noted.     BRITTNEY CUI MD   CT Head w/o Contrast   Result Value    Radiologist flags Finding concerning for an acute infarct (AA)    Narrative    CT SCAN OF THE HEAD WITHOUT CONTRAST   8/20/2017 7:19 PM     HISTORY: AMS.    TECHNIQUE:  Axial images of the head and coronal reformations without  IV contrast material. Radiation dose for this scan was reduced using  automated exposure control, adjustment of the mA and/or kV according  to patient size, or iterative reconstruction technique.    COMPARISON: Head CT 6/24/2014.    FINDINGS: Focal loss of gray-white matter differentiation and apparent  gyral swelling in the medial left occipital lobe is concerning for an  acute infarct. Abnormal hypodensity also extends into the left  temporal lobe. Fairly well-defined hypodensity in the paramedian left  frontal lobe with loss of the cortex in that region may be from  previous infarct although it is new compared to prior CT. Extensive  confluent periventricular white matter hypodensities. Moderate diffuse  parenchymal volume loss. There appears to be infarcts within the  thalami bilaterally of indeterminate age.    No evidence of acute intracranial hemorrhage. Mild regional edema in  the left occipital lobe without significant midline shift. The  ventricles are not enlarged out of proportion to the cerebral sulci  and there is no evidence of hydrocephalus. The basal cisterns are  patent.    There is no evidence of trauma. The  visualized portions of the sinuses  and mastoids appear normal.      Impression    IMPRESSION:     1. Findings concerning for an acute infarct within the left occipital  lobe. Mild regional edema without significant mass effect or midline  shift. Hypodensity in this region also extends into the left temporal  lobe and region of ischemia in this area cannot be excluded.  Evaluation for regions of acute ischemia would be better appreciated  with MR.  2. New area of encephalomalacia within the paramedian left frontal  lobe likely from previous infarct although an acute component of  ischemia is difficult to exclude. Indeterminate age infarcts in  bilateral thalami. Again this would be better evaluated with  above-mentioned MRI.  3. Fairly extensive confluent periventricular white matter  hypodensities which are nonspecific, but likely related to chronic  microvascular ischemic disease. Moderate diffuse parenchymal volume  loss. These findings have progressed since previous MR from 6/24/2014.    [Critical Result: Finding concerning for an acute infarct]    Finding was identified on 8/20/2017 7:21 PM.     Dr. Salvatore Meneses was contacted by me on 8/20/2017 7:31 PM and  verbalized understanding of the critical result.     KY FLOR MD   MRI Brain w/o contrast (use if contrast contraindicated)    Narrative    MR BRAIN WITHOUT CONTRAST August 21, 2017 6:04 AM     HISTORY: CVI.    TECHNIQUE: Multiplanar, multisequence MRI of the brain without and  with IV contrast material.    COMPARISON: CT dated 8/20/2017.    FINDINGS: There is generalized atrophy of the brain.  White matter  changes are present in the cerebral hemispheres that are consistent  with small vessel ischemic disease in this age patient.  Diffusion-weighted images reveal areas of restricted diffusion in both  the right and left occipital lobes and in the medial aspect of the  left posterior temporal region. These are in the distribution of both  the  right and left posterior cerebral arteries. There are areas of T1  hyperintensity in the cortex of both the right and left occipital  regions, these are in the areas of restricted diffusion. 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  There is a small area of restricted diffusion in the left inferior  cerebellum. There is a suggestion of a small area of restricted  diffusion in the left frontal lobe and in the white matter of both the  right and left frontal lobes. These areas of questionable restricted  diffusion are not well seen on the ADC images and these areas could be  secondary to T2 shine through. On gradient-echo images there are areas  of susceptibility artifact in the left frontal lobe. There is also a  small susceptibility artifact in the right frontal lobe. These are  consistent with hemosiderin deposition secondary to chronic  microbleeds.         Impression    IMPRESSION:   1. Areas of infarction in both the right and left occipital regions.  There is T1 hyperintensity in the cortex of these areas of infarction.  The T1 hyperintensity is probably due to petechial hemorrhage  secondary to laminar necrosis. This would suggest that these areas of  infarction are subacute.  2. Questionable areas of restricted diffusion in both the right and  left frontal regions. These could also be due to subacute infarcts but  they could be secondary to T2 shine through.  3. 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  4. Age-related changes including generalized atrophy of the brain.  White matter changes in the cerebral hemispheres consistent with small  vessel ischemic disease in this age patient.

## 2017-08-21 NOTE — CONSULTS
[facility] [place]  [facility]    Vascular Neurology / Stroke Consultation Note     Celia Leiws MRN# [id number]   YOB: 1926 Age: 89 year old    Code Status:Full Code   Date of Admission: 8/20/2017  Date of Consult: 08/21/2017    _________________________________   Primary Care Physician   No primary care provider on file.  ______________________________________________         Assessment & Plan   ______________________________________________  (I63.433) Cerebrovascular accident (CVA) due to bilateral embolism of posterior cerebral arteries (H)  (primary encounter diagnosis)  --Bilateral occipital strokes  --Possible bifrontal strokes  --Left frontal microhemorrhage  --Anticoagulation and advanced antithrombotics will be limited.   (A41.9) Sepsis, due to unspecified organism (H)  --Severe UTI  --Management per hospitalists.   (R41.82) Altered mental status, unspecified altered mental status type  --Likely related to combination of UTI and strokes  (G30.1,  F02.80) Late onset Alzheimer's disease without behavioral disturbance  --Severe, end-stage AD  --Recovery is unlikely  --Palliative care consult will be the most appropriate  #. DVT Prophylaxis  --Mechanical only due to microbleeing.   #. PT/OT/Speech  --Start evaluations  #. Nutrition / GI Prophylaxis  --Per recommendations of speech therapy    I talked to patient's son. I explained the findings, prognosis and advised on palliative care. Patient is an [employer] and and her advanced directives will be guided by .     #. Code Status: Full Code     TIME:   Neurocritical care time:  45 minutes for evaluation and management of stroke and stupor. Patient is critically ill has a very high risk of deterioration  ----------------------------------------------------------------------------------  ----------------------------------------------------------------------------------  Reason for consult: I was asked by  Dr. Green to evaluate this patient for stroke.    Chief Complaint   ______________________________________________  Decreased mental status  History is obtained from the electronic health record and patient's son    History of Present Illness   ______________________________________________  89-year-old female with a history of advanced Alzheimer's dementia, nonverbal and bed bound at baseline, recurrent urinary tract infection, diabetes mellitus type 2, hypertension, chronic kidney disease stage III, chronic stage III sacral decubitus ulcer, dysphagia status post gastrostomy tube placement who was brought to the emergency room from her care facility at [facility] with change in mental state.  Per the notes from the care facility, the patient is nonverbal at baseline, however, today she was found to be more altered and less responsive than her usual baseline.  She was also found to have a temperature in excess of 101 and was brought to the emergency room.  The patient was just discharged from [facility] on 08/10/2017 where she had presented with sepsis due to urinary tract infection.  She was discharged on Ceftin with about 7 days of treatment left at that time.  The history was obtained based on my discussion with her son, Dr. Salvatore Lewis, who is allergist and immunologist and also review of nursing home documents.  Reportedly, there was no recent diarrhea or vomiting.  No cough.  Per Dr. Salvatore Lewis, her son, he felt that she was actually getting better during his last visit with her on Thursday.  In the emergency room, the patient was seen by Dr. Sita valenzuela, she had a basic workup done, which showed elevated white cell count of 14.3.  She was also febrile with fever up to 100.8.  Chest x-ray showed left basilar opacity and notably a CT head showing findings concerning for learning for an acute infarct within the left occipital lobe with mild wheeze, no edema, without significant mass  effect or midline shift.       Overnight, stuporous, occasionally opens eyes. No clear weakness.   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     Advanced directives, counseling/discussion, POLST completed 5/29/15  Full Code 5/29/2015     Alzheimer's disease 5/22/2015     Arthritis     shoulder     Atrial fibrillation (H) 5/22/2015     Calculus of kidney      Candidiasis of skin and nails 5/22/2015     Congestive heart failure, unspecified      Coronary artery disease      Heart disease, unspecified     diastolic dysfunction     Hyp ht/kd NOS I-IV w hf 5/22/2015     Hypertension      Morbid obesity (H) 5/22/2015     Other and unspecified hyperlipidemia 5/22/2015     Personal history of urinary (tract) infection 5/22/2015     Primary localized osteoarthrosis, shoulder region 5/22/2015     Renal failure, unspecified     secondary to diuresis     Thyroid disease      Type II or unspecified type diabetes mellitus with renal manifestations, not stated as uncontrolled(250.40) 5/22/2015     Type II or unspecified type diabetes mellitus without mention of complication, not stated as uncontrolled      Uric acid stone in urine      Past Surgical History   ______________________________________________  Past Surgical History:   Procedure Laterality Date     CYSTOSCOPY, INSERT STENT URETHRA, COMBINED  8/6/2012    Procedure: COMBINED CYSTOSCOPY, INSERT STENT URETHRA;  cystoscopy, left  ureteral stent placement, left pyelogram;  Surgeon: Eulalio Bernardo MD;  Location: [facility] OR     CYSTOSCOPY,+URETEROSCOPY  8/19/05    with placement of (L) ureteral JJ stent     HC X-RAY ABDOMEN AP VIEW (KUB)  8/19/05     LASER HOLMIUM LITHOTRIPSY URETER(S), INSERT STENT, COMBINED  8/21/2012    Procedure: COMBINED CYSTOSCOPY, URETEROSCOPY, LASER HOLMIUM LITHOTRIPSY URETER(S), INSERT STENT;  CYSTOSCOPY, LEFT URETEROSCOPY, LASER HOLMIUM  LITHOTRIPSY ,left ureteral stent exchange;  Surgeon: Eulalio Bernardo MD;  Location: [facility] OR     Prior to Admission Medications   ______________________________________________  Prior to Admission Medications   Prescriptions Last Dose Informant Patient Reported? Taking?   ACETAMINOPHEN PO 2017 at 1400 [facility] Yes Yes   Si mg by Gastric Tube route 3 times daily    Dakins 0.125 % SOLN 2017 at 1600 [facility] Yes Yes   Sig: Externally apply topically 2 times daily BID and PRN.   1. Cleanse wound with microklenze, cleanse periwound area with naomi perineal  2. Moisten kerlix fluff with dakins solution 0.125%, wring out excess, pack wound with kerlix  3. Apply antifungal powder to periwound area, rub in. Apply criticaid over powder.  4. Cover with ABD using minimal medipore tape to secure.  5. Label dressing with date, time, initials. Follow Rigorous PIP measures.   INSULIN ASPART SC 2017 at 1400 [facility] Yes Yes   Sig: Inject 1-6 Units Subcutaneous every 6 hours 0800, 1400, 2000, 0200  -250 1 unit  -300 2 units  -350 3 units  -400 4 units  -450 5 units  BG >450 6 units and update MD   Lactobacillus Acidophilus POWD 2017 at 0800 Nursing Home Yes Yes   Si capsule by Gastric Tube route daily 10 billion units    Loperamide HCl (IMODIUM A-D) 1 MG/7.5ML LIQD 2017 at 0800 Nursing Home Yes Yes   Si mg by Gastric Tube route every other day   acetaminophen (TYLENOL) 32 mg/mL solution 8/15/2017 at Unknown time Nursing Home Yes Yes   Si mg by Gastric Tube route daily as needed for fever or mild pain   bisacodyl (DULCOLAX) 10 MG suppository  at prn Nursing Home Yes Yes   Sig: Place 10 mg rectally daily as needed for constipation   fiber modular (NUTRISOURCE FIBER) packet 2017 at 0800 Nursing Home Yes Yes   Si packet by Per Feeding Tube route 2 times daily   insulin glargine (LANTUS) 100 UNIT/ML injection 2017 at 0800 Nursing  Home No Yes   Sig: Inject 30 Units Subcutaneous 2 times daily   metoprolol (LOPRESSOR) 10 mg/mL SUSP 2017 at 0800 [facility] Yes Yes   Sig: Take 25 mg by mouth 2 times daily   multivitamin, therapeutic (THERA-VIT) TABS tablet 2017 at 0800 [facility] Yes Yes   Si tablet by Gastric Tube route daily   oxyCODONE (ROXICODONE) 5 MG/5ML solution 2017 at 1400 long-term Yes Yes   Sig: Take 2 mg by mouth 3 times daily   oxyCODONE (ROXICODONE) 5 MG/5ML solution 8/15/2017 at Unknown time long-term No Yes   Si mLs (2 mg) by Per G Tube route every 4 hours as needed for moderate to severe pain   ranitidine (ZANTAC) 150 MG/10ML syrup 2017 at 0800 long-term No Yes   Sig: 10 mLs (150 mg) by Per Feeding Tube route daily   simethicone (MYLICON) 40 MG/0.6ML suspension 2017 at 1400 long-term Yes Yes   Sig: Take 80 mg by mouth 3 times daily   vitamin A-D & C drops (TRI-VI-SOL) 750-400-35 UNIT-MG/ML solution NEW FORMULATION 2017 at 0800 long-term No Yes   Si mLs by Per G Tube route daily      Facility-Administered Medications: None     Allergies   Allergies   Allergen Reactions     Sulfa Drugs Other (See Comments)     Per nursing home documents, patient has allergy to sulfa       Social History   ______________________________________________  Social History     Social History     Marital status:      Spouse name: Leonel     Number of children: N/A     Years of education: N/A     Occupational History      Retired     Social History Main Topics     Smoking status: Former Smoker     Smokeless tobacco: Never Used      Comment: She was a social smoker many years ago     Alcohol use No     Drug use: No     Sexual activity: Not Asked     Other Topics Concern     None     Social History Narrative         Family History   ______________________________________________  Family History   Problem Relation Age of Onset     CEREBROVASCULAR DISEASE Mother      CEREBROVASCULAR DISEASE  "Father      CANCER Other      Lung     DIABETES Brother        Review of Systems   ______________________________________________  Review of systems is not obtainable due to patient factors - mental status      Physical Exam   ______________________________________________  Weight:145 lbs 4.53 oz; Height:5' 0\"  Temp: 97.3  F (36.3  C) Temp src: Axillary BP: 153/83   Heart Rate: 88 Resp: 12 SpO2: 95 % O2 Device: None (Room air) Oxygen Delivery: 2 LPM  General Appearance:  No acute distress  Neuro:       Mental Status Exam:  Stuporous. Mute. Does not follow commands.  Mental status is decreased        Cranial Nerves:  Pupils 3 mm, reactive. Occulocephalis reflexes are present.  Corneal reflexes present. Face symmetric. Other CN are untestable           Motor:  Minimal movements to stimulation       Reflexes:  Low DTR, toes equivocal       Sensory:  Untestable                    Coordination:   Untestable       Gait:  Untestable  Neck: no nuchal rigidity, normal thyroid. No carotid bruits.    Cardiovascular: Regular rate and rhythm, no m/r/g  Lungs: Clear to auscultation  Abdomen: Soft, not tender, not distended  Extremities: No clubbing, no cyanosis, no edema    Data   ______________________________________________  All Data personally reviewed:       Labs:   CBC RESULTS:     Recent Labs  Lab 08/21/17  0625 08/20/17  1853   WBC 10.8 14.3*   RBC 3.76* 3.99   HGB 11.9 12.8   HCT 37.3 40.4    317     Basic Metabolic Panel:   Recent Labs   Lab Test  08/21/17   0625  08/20/17   1853  08/09/17   0649  08/08/17   0655   NA   --   142  140  141   POTASSIUM   --   5.1  4.3  4.4   CHLORIDE   --   107  107  109   CO2   --   23  20  19*   BUN   --   60*  35*  34*   CR  1.01  1.13*  0.87  0.85   GLC   --   258*  249*  252*   NELLIE   --   9.2  8.9  8.9     Liver panel:  Recent Labs   Lab Test  08/20/17   1853  08/04/17   0450  06/30/17   1620  06/14/17   1200  06/24/16   0806   PROTTOTAL  8.1  7.6  8.1  8.2  6.0*   ALBUMIN  " 3.3*  2.8*  2.9*  2.5*  1.6*   BILITOTAL  0.5  0.5  0.7  0.5  0.7   ALKPHOS  82  94  81  96  155*   AST  23  17  24  20  24   ALT  28  24  26  22  48     INR:  Recent Labs   Lab Test  08/20/17   1853  06/14/17   1200  06/21/16   0643  06/23/14   2140  08/05/12   1834   INR  0.98  1.10  1.37*  1.01  1.09      Lipid Profile:  Recent Labs   Lab Test  08/20/17   1853 05/26/15   CHOL  269*  159   HDL  30*  40   LDL  Cannot estimate LDL when triglyceride exceeds 400 mg/dL  77   TRIG  1049*  211*   CHOLHDLRATIO   --   4.0     Thyroid Panel:  Recent Labs   Lab Test  11/03/10   0710  06/01/10   1523  01/27/10   1534  11/16/09   1459   TSH  2.69  4.11  4.05  4.30      Vitamin B12:   Recent Labs   Lab Test  01/27/10   1534  11/16/09   1459   B12  1000  188*      Vitamin D level: No lab results found.  A1C:   Recent Labs   Lab Test  08/21/17   0625  06/15/17   0833  06/21/16   0643 05/26/15  06/24/14   0703   A1C  8.2*  8.4*  8.2*  6.7*  6.5*     Troponin I:   Recent Labs   Lab Test  08/21/17   0625  08/20/17   2330  08/20/17   1853  06/30/17   1620  08/08/12   1751  08/08/12   1323  05/19/10   0646   TROPI  0.558*  0.706*  0.658*  0.035  0.013  0.022  <0.012     Ammonia: No lab results found.  CK:   Recent Labs   Lab Test  05/19/10   0646   CKT  110        CRP inflammation:   Recent Labs   Lab Test  08/20/17   1853  05/08/13   1300  10/12/12   0839  08/31/12   0800   CRP  7.0  11.0*  7.4  19.6*     ESR:   Recent Labs   Lab Test  05/08/13   1300  10/12/12   0839  08/31/12   0800   SED  61*  47*  60*       MEAGHAN: No lab results found.    ANCA: No lab results found.   Drug Screen: No lab results found.  Alcohol level:No lab results found.  UA Results:  Recent Labs   Lab Test  08/20/17 1957 06/30/17   1654   COLOR  Yellow   < >  Yellow   APPEARANCE  Cloudy   < >  Cloudy   URINEGLC  Negative   < >  Negative   URINEBILI  Negative   < >  Negative   URINEKETONE  Negative   < >  Negative   SG  1.016   < >  1.010   UBLD  Trace*   < >   Small*   URINEPH  5.5   < >  5.5   PROTEIN  100*   < >  30*   UROBILINOGEN   --    --   0.2   NITRITE  Negative   < >  Negative   LEUKEST  Large*   < >  Large*   RBCU  24*   < >  2-5*   WBCU  >182*   < >  25-50*    < > = values in this interval not displayed.     Most Recent 6 Bacteria Isolates From Any Culture (See EPIC Reports for Culture Details):  Recent Labs   Lab Test  08/20/17   1930  08/20/17   1855  08/05/17   1148  08/05/17   1140  08/04/17   0605  08/04/17   0555   CULT  No growth after 8 hours  No growth after 10 hours  No growth  No growth  Cultured on the 1st day of incubation: Staphylococcus epidermidis  Critical Value/Significant Value, preliminary result only, called to and read   back by JOANN VALDERRAMA RN 8.5.17 0835. TENA  Cultured on the 1st day of incubation: Staphylococcus hominis  (Note)  POSITIVE for STAPHYLOCOCCUS EPIDERMIDIS and POSITIVE for the mecA  gene (resistant to methicillin) by [employer] multiplex nucleic acid  test. Final identification and antimicrobial susceptibility testing  will be verified by standard methods.    Specimen tested with [employer] multiplex, gram-positive blood culture  nucleic acid test for the following targets: Staph aureus, Staph  epidermidis, Staph lugdunensis, other Staph species, Enterococcus  faecalis, Enterococcus faecium, Streptococcus species, S. agalactiae,  S. anginosus grp., S. pneumoniae, S. pyogenes, Listeria sp., mecA  (methicillin resistance) and Rene/B (vancomycin resistance).    Critical Value/Significant Value called to and read back by Cheyenne Gilliland RN at [facility] [place] [facility] at 11:15am 8/5/2017 ()  *  No growth        Cardiac US:   --       Neurophysiology:   --       Imaging:   All imaging studies were reviewed personally  CT head:   1. Findings concerning for an acute infarct within the left occipital lobe. Mild regional edema without significant mass effect or midline  shift. Hypodensity in this region also extends into the left temporal  lobe and region of ischemia in this area cannot be excluded.  Evaluation for regions of acute ischemia would be better appreciated with MR.  2. New area of encephalomalacia within the paramedian left frontal lobe likely from previous infarct although an acute component of  ischemia is difficult to exclude. Indeterminate age infarcts in bilateral thalami. Again this would be better evaluated with  above-mentioned MRI.  3. Fairly extensive confluent periventricular white matter hypodensities which are nonspecific, but likely related to chronic  microvascular ischemic disease. Moderate diffuse parenchymal volume loss. These findings have progressed since previous MR from 6/24/2014.  MRI brain:   1. Areas of infarction in both the right and left occipital regions. There is T1 hyperintensity in the cortex of these areas of infarction.  The T1 hyperintensity is probably due to petechial hemorrhage secondary to laminar necrosis. This would suggest that these areas of  infarction are subacute.  2. Questionable areas of restricted diffusion in both the right and left frontal regions. These could also be due to subacute infarcts but  they could be secondary to T2 shine through.  3. Susceptibility artifacts in both the right and left frontal regions consistent with hemosiderin deposition secondary to small chronic  microbleeds.  4. Age-related changes including generalized atrophy of the brain. White matter changes in the cerebral hemispheres consistent with small vessel ischemic disease in this age patient.

## 2017-08-21 NOTE — PLAN OF CARE
Problem: Goal Outcome Summary  Goal: Goal Outcome Summary  Outcome: No Change  Stable throughout the night, NSR 80s, -180 (parameters to keep SBP<200), Sp02>94% on 2L, afebrile, no s/s pain. Pt nonverbal at baseline; PERRL intact, eyes open to voice; does not track, does not follow commands, withdrawls to pain, bilat LE contracted. Fonseca intact: making adequate UO. Incontinent of stool, skin kept clean, dry and intact. Stage III coccyx: packed with gauze soaked dakins and covered with mepilex, scabbed pressure ulcers to bilat outer ears. PEG intact, no drainage, no residuals noted. Spoke with Dr. Lewis (son) on phone; desires to contact Dr. Sevilla @ [facility] to discuss POC.

## 2017-08-21 NOTE — ED NOTES
DATE:  8/20/2017   TIME OF RECEIPT FROM LAB: 1943  LAB TEST:  troponin  LAB VALUE:  0.658  RESULTS GIVEN WITH READ-BACK TO (PROVIDER):  Dr. Meneses  TIME LAB VALUE REPORTED TO PROVIDER:   1945

## 2017-08-21 NOTE — PLAN OF CARE
Problem: Sepsis (Adult)  Goal: Signs and Symptoms of Listed Potential Problems Will be Absent or Manageable (Sepsis)  Signs and symptoms of listed potential problems will be absent or manageable by discharge/transition of care (reference Sepsis (Adult) CPG).   Outcome: Therapy, progress toward functional goals is gradual  Does not blink to threat, opens eyes spotanously and to pain.  Withdraws  To deep pain, mainly keep legs in fetal position.  RUE decerebrate at times,  Afebrile, good u/o DNR/DNi, LA improving after fld. Bolus.  Family updated.  Transfer to  soon.

## 2017-08-21 NOTE — CONSULTS
CLINICAL NUTRITION SERVICES  -  ASSESSMENT NOTE      RECOMMENDATIONS FOR MD/PROVIDER TO ORDER:   When IVF D/C'd recommend increase H20 flushes as previous 200 mL every 4 hrs (1200 mL).    Total Fluid (TF + Flushes):  2020 mL.    Recommendations Ordered by Registered Dietitian (RD):   Resume TF Isosource 1.5 at 15 mL/hr;  Increase by 15 mL every 12 hrs to goal 45 mL/hr = 1620 kcals, 73 gm pro (1.5 gm/kg), 820 mL H20, 16 gm fiber, 190 gm CHO, 5544 International Units Vit A, 324 mg Vit C, 22 mg Zinc.  Nutrisource Fiber 1 packet BID = 30 kcals, 6 gm fiber.    Total (TF + Nutrisource Fiber BID):  1650 kcals (33 kcal/kg), 22 gm fiber.    Free H20 60 mL every 4 hrs    Per Pressure Injury Protocol and as pt received at NH:  Certavite daily  Tri-Vi-Sol 7 ml once daily = 5250 International Units Vit A, 245 mg Vit C, 2800 International Units Vit D.  Total (TF + Tri-Vi-Sol):  10,794 International Units Vit A, 569 mg Vit C.   Malnutrition:   Patient does not meet two of the criteria necessary for diagnosing malnutrition        REASON FOR ASSESSMENT  Celia Lewis is a 89 year old female seen by Registered Dietitian for Provider Order - Registered Dietitian to Assess and Order TF per Medical Nutrition protocol and Screen - TF or Parenteral Nutrition      NUTRITION HISTORY  - Information obtained from EPIC records and paper chart.  - Patient is NPO at nursing home.  - Tube feeding history:  Pt was on TF Isosource 1.5 at 45 mL/hr = 1620 kcals, 73 gm pro (1.5 gm/kg), 820 mL H20, 16 gm fiber, 190 gm CHO.  She also received Nutrisource Fiber 1 packet BID = 30 kcals, 6 gm fiber.  Total (TF + Nutrisource Fiber BID):  1650 kcals (33 kcal/kg), 22 gm fiber.  H20 flushes were 200 mL every 4 hrs (1200 mL).  Total Fluid (TF + Flushes):  2020 mL.   - Pt was recently discharged (Aug) from [facility] on similar regimen.  Pt was also receiving Pressure Injury micronutrients - Theravite daily and Tri-Vi-Sol 7 ml daily.    CURRENT NUTRITION  "ORDERS  Diet Order:     NPO   Was asked to resume TF via existing G-tube    Current Intake/Tolerance:  NPO x 2 days.    SLP completed bedside swallow eval -  severe to profound oral dysphagia     PHYSICAL FINDINGS  Observed  No nutrition-related physical findings observed  Obtained from Chart/Interdisciplinary Team  Pressure Ulcers - Stage 3 sacral decubitus ulcer on admission per notes.  WOCN consult pending.    ANTHROPOMETRICS  Height: 5' 0\"  Weight:  65.9 kg (145 lbs 4.53 oz)  Body mass index is 28.37 kg/(m^2).  Weight Status:  Overweight BMI 25-29.9  IBW:  45.4 kg  % IBW:  145%  Weight History:  5.5 kg (8%) weight loss over past 1.5 months.    Wt Readings from Last 20 Encounters:   08/20/17 65.9 kg (145 lb 4.5 oz)   08/12/17 67.1 kg (148 lb)   08/09/17 69 kg (152 lb 1.9 oz)   07/06/17 71.4 kg (157 lb 6.4 oz)   06/19/17 69.3 kg (152 lb 12.5 oz)   06/24/16 72.2 kg (159 lb 3.2 oz)   06/18/15 83.9 kg (185 lb)   05/29/15 81.6 kg (180 lb)   05/28/15 81.6 kg (180 lb)   05/22/15 81.6 kg (179 lb 12.8 oz)   06/24/14 77.5 kg (170 lb 13.7 oz)   08/26/12 82.1 kg (181 lb)   08/25/12 82.1 kg (181 lb)   08/21/12 82.3 kg (181 lb 7 oz)   08/13/12 87.3 kg (192 lb 8 oz)       LABS  Labs reviewed  BUN 51 (H)  Cr 1.03 (NL) - Pt with CKD-stage 3  TG 1049 (H)  Hgb A1C 8.2 (H)  BGM:  301, 291, 264 - Pt with DM-2    MEDICATIONS  Medications reviewed  High Resistant SSI - for glycemic control.  Anticipate Lantus will resume when TF started, per MD notes.  Culturelle - for bowel health  IVF NaCl at 125 mL/hr - for fluid delivery    Dosing Weight:  50.5 kg (adjusted for overwt)     ASSESSED NUTRITION NEEDS PER APPROVED PRACTICE GUIDELINES:  Estimated Energy Needs:  8968-0146 kcals (30-35 Kcal/Kg)  Justification: wound healing  Estimated Protein Needs:  60-76 grams protein (1.2-1.5 g pro/Kg)  Justification: wound healing, CKD and preservation of lean body mass  Estimated Fluid Needs:  9238-6503 mL (1 mL/Kcal)  Justification: " maintenance    MALNUTRITION:  % Weight Loss:  > 5% in 1 month (severe malnutrition)  % Intake:  No decreased intake noted  Subcutaneous Fat Loss:  None observed  Muscle Loss:  None observed  Fluid Retention:  None noted    Malnutrition Diagnosis: Patient does not meet two of the above criteria necessary for diagnosing malnutrition    NUTRITION DIAGNOSIS:  Inadequate enteral nutrition infusion related to TF held as evidenced by meeting 0% estimated needs and plan TF resumption today.      NUTRITION INTERVENTIONS  Recommendations / Nutrition Prescription  Resume TF Isosource 1.5 at 15 mL/hr;  Increase by 15 mL every 12 hrs to goal 45 mL/hr = 1620 kcals, 73 gm pro (1.5 gm/kg), 820 mL H20, 16 gm fiber, 190 gm CHO, 5544 International Units Vit A, 324 mg Vit C, 22 mg Zinc.  Nutrisource Fiber 1 packet BID = 30 kcals, 6 gm fiber.    Total (TF + Nutrisource Fiber BID):  1650 kcals (33 kcal/kg), 22 gm fiber.    Free H20 60 mL every 4 hrs    Per Pressure Injury Protocol and as pt received at NH:  Certavite daily  Tri-Vi-Sol 7 ml once daily = 5250 International Units Vit A, 245 mg Vit C, 2800 International Units Vit D.  Total (TF + Tri-Vi-Sol):  10,794 International Units Vit A, 569 mg Vit C.    When IVF D/C'd recommend increase H20 flushes as previous 200 mL every 4 hrs (1200 mL).    Total Fluid (TF + Flushes):  2020 mL.     Implementation  Nutrition education: Not appropriate at this time due to patient condition  Collaboration and Referral of Nutrition care - Discussed pt during ICU interdisciplinary rounds this morning.  EN Composition, EN Schedule - Entered above TF orders in [facility]  Feeding Tube Flush - Ordered above H20 flushes in EPIC  Medical Food Supplement - Ordered Nutrisource Fiber as above  Multivitamin/Mineral - Ordered Certavite and Tri-Vi-Sol as above    Nutrition Goals  TF + Nutrisource Fiber will meet % estimated needs.    MONITORING AND EVALUATION:  Progress towards goals will be monitored and evaluated  per protocol and Practice Guidelines    Linda Hatch RD, LD, CNSC

## 2017-08-21 NOTE — PLAN OF CARE
Problem: Goal Outcome Summary  Goal: Goal Outcome Summary  Discharge Planner SLP   Patient plan for discharge: Unable to state  Current status: SLP: Pt alert with her eyes open during bedside swallow evaluation. Pt nonverbal at baseline and NPO with TF. Pt unable to follow commands, oral cavity unable to be visualized. Pt did not respond to attempts at spoon sips of nectar thick liquids or applesauce. Thin water via cup attempted with pt forming labial seal around cup, taking trace amount of liquids and quickly closing mouth. Multiple swallows were able to be visualized with small amounts of thin water and no overt s/sx of aspiration. Pt presents with severe to profound oral dysphagia due to advanced dementia, UTI with sepsis and subacute CVA. Per chart review, pt chronically NPO and has not had recent SLP services. Recommended: Continue NPO with continued oral cares and lightly moistened swab for comfort. Dysphagia at baseline with ST services not warranted at this time. ST to sign off.   Barriers to return to prior living situation: None from ST standpoint  Recommendations for discharge: Return to [facility] with continued NPO and TF  Rationale for recommendations: Pt at baseline dysphagia with chronic NPO with TF; ST to sign off       Entered by: Margie Ray 08/21/2017 10:00 AM

## 2017-08-21 NOTE — PHARMACY-VANCOMYCIN DOSING SERVICE
Pharmacy Vancomycin Initial Note  Date of Service 2017  Patient's  1926  89 year old, female    Indication: Sepsis  Current estimated CrCl = Estimated Creatinine Clearance: 28.1 mL/min (based on Cr of 1.13).  Creatinine for last 3 days  2017:  6:53 PM Creatinine 1.13 mg/dL      Vancomycin IV Administrations (past 72 hours)                   vancomycin (VANCOCIN) 750 mg in NaCl 0.9 % 250 mL intermittent infusion (mg) 750 mg New Bag 17              Nephrotoxins and other renal medications (Future)    Start     Dose/Rate Route Frequency Ordered Stop    17 0200  piperacillin-tazobactam (ZOSYN) 3.375 g vial to attach to  mL bag     Comments:  Pharmacy can adjust dose based on renal function.    3.375 g  over 30 Minutes Intravenous EVERY 6 HOURS 17  vancomycin (VANCOCIN) 500 mg in NaCl 0.9 % 100 mL intermittent infusion      500 mg Intravenous ONCE 17  vancomycin place ramos - receiving intermittent dosing      1 each Does not apply SEE ADMIN INSTRUCTIONS 17           Plan:  1. Will dose intermittently due to acute renal changes (bump in Cr from baseline).  750mg given in ED. Will give additional 500mg to total about ~20mg/kg.   2.  Goal Trough Level: 15-20 mg/L   3.  Pharmacy will check trough levels as appropriate in 23 hours.    4. Serum creatinine levels will be ordered daily for the first week of therapy and at least twice weekly for subsequent weeks.    5. [place] method utilized to dose vancomycin therapy: travis Faulkner

## 2017-08-22 LAB
ANION GAP SERPL CALCULATED.3IONS-SCNC: 12 MMOL/L (ref 3–14)
BUN SERPL-MCNC: 37 MG/DL (ref 7–30)
CALCIUM SERPL-MCNC: 8 MG/DL (ref 8.5–10.1)
CHLORIDE SERPL-SCNC: 117 MMOL/L (ref 94–109)
CO2 SERPL-SCNC: 21 MMOL/L (ref 20–32)
CREAT SERPL-MCNC: 0.85 MG/DL (ref 0.52–1.04)
ERYTHROCYTE [DISTWIDTH] IN BLOOD BY AUTOMATED COUNT: 16.8 % (ref 10–15)
GFR SERPL CREATININE-BSD FRML MDRD: 63 ML/MIN/1.7M2
GLUCOSE BLDC GLUCOMTR-MCNC: 173 MG/DL (ref 70–99)
GLUCOSE BLDC GLUCOMTR-MCNC: 187 MG/DL (ref 70–99)
GLUCOSE BLDC GLUCOMTR-MCNC: 198 MG/DL (ref 70–99)
GLUCOSE BLDC GLUCOMTR-MCNC: 199 MG/DL (ref 70–99)
GLUCOSE BLDC GLUCOMTR-MCNC: 218 MG/DL (ref 70–99)
GLUCOSE BLDC GLUCOMTR-MCNC: 237 MG/DL (ref 70–99)
GLUCOSE SERPL-MCNC: 211 MG/DL (ref 70–99)
HCT VFR BLD AUTO: 32.7 % (ref 35–47)
HGB BLD-MCNC: 10.2 G/DL (ref 11.7–15.7)
LACTATE BLD-SCNC: 2.9 MMOL/L (ref 0.7–2)
LACTATE BLD-SCNC: 3 MMOL/L (ref 0.7–2)
MCH RBC QN AUTO: 31 PG (ref 26.5–33)
MCHC RBC AUTO-ENTMCNC: 31.2 G/DL (ref 31.5–36.5)
MCV RBC AUTO: 99 FL (ref 78–100)
PLATELET # BLD AUTO: 237 10E9/L (ref 150–450)
POTASSIUM SERPL-SCNC: 3.7 MMOL/L (ref 3.4–5.3)
RBC # BLD AUTO: 3.29 10E12/L (ref 3.8–5.2)
SODIUM SERPL-SCNC: 148 MMOL/L (ref 133–144)
SODIUM SERPL-SCNC: 150 MMOL/L (ref 133–144)
WBC # BLD AUTO: 8.7 10E9/L (ref 4–11)

## 2017-08-22 PROCEDURE — 25000132 ZZH RX MED GY IP 250 OP 250 PS 637

## 2017-08-22 PROCEDURE — 25000132 ZZH RX MED GY IP 250 OP 250 PS 637: Performed by: INTERNAL MEDICINE

## 2017-08-22 PROCEDURE — 25000128 H RX IP 250 OP 636: Performed by: INTERNAL MEDICINE

## 2017-08-22 PROCEDURE — 25000131 ZZH RX MED GY IP 250 OP 636 PS 637: Performed by: HOSPITALIST

## 2017-08-22 PROCEDURE — 36415 COLL VENOUS BLD VENIPUNCTURE: CPT | Performed by: HOSPITALIST

## 2017-08-22 PROCEDURE — 25000128 H RX IP 250 OP 636: Performed by: HOSPITALIST

## 2017-08-22 PROCEDURE — 00000146 ZZHCL STATISTIC GLUCOSE BY METER IP

## 2017-08-22 PROCEDURE — 84295 ASSAY OF SERUM SODIUM: CPT | Performed by: HOSPITALIST

## 2017-08-22 PROCEDURE — 27210429 ZZH NUTRITION PRODUCT INTERMEDIATE LITER

## 2017-08-22 PROCEDURE — 80048 BASIC METABOLIC PNL TOTAL CA: CPT | Performed by: HOSPITALIST

## 2017-08-22 PROCEDURE — 83605 ASSAY OF LACTIC ACID: CPT | Performed by: HOSPITALIST

## 2017-08-22 PROCEDURE — 87040 BLOOD CULTURE FOR BACTERIA: CPT | Performed by: HOSPITALIST

## 2017-08-22 PROCEDURE — 85027 COMPLETE CBC AUTOMATED: CPT | Performed by: HOSPITALIST

## 2017-08-22 PROCEDURE — 25000132 ZZH RX MED GY IP 250 OP 250 PS 637: Performed by: HOSPITALIST

## 2017-08-22 PROCEDURE — 12000000 ZZH R&B MED SURG/OB

## 2017-08-22 PROCEDURE — 99233 SBSQ HOSP IP/OBS HIGH 50: CPT | Performed by: HOSPITALIST

## 2017-08-22 RX ORDER — DEXTROSE MONOHYDRATE 50 MG/ML
INJECTION, SOLUTION INTRAVENOUS CONTINUOUS
Status: DISCONTINUED | OUTPATIENT
Start: 2017-08-22 | End: 2017-08-24

## 2017-08-22 RX ORDER — SIMETHICONE 40MG/0.6ML
80 SUSPENSION, DROPS(FINAL DOSAGE FORM)(ML) ORAL 3 TIMES DAILY
Status: DISCONTINUED | OUTPATIENT
Start: 2017-08-22 | End: 2017-08-24 | Stop reason: HOSPADM

## 2017-08-22 RX ORDER — OXYCODONE HCL 5 MG/5 ML
2 SOLUTION, ORAL ORAL 3 TIMES DAILY
Status: DISCONTINUED | OUTPATIENT
Start: 2017-08-22 | End: 2017-08-24 | Stop reason: HOSPADM

## 2017-08-22 RX ORDER — POLYETHYLENE GLYCOL 3350 17 G/17G
17 POWDER, FOR SOLUTION ORAL DAILY PRN
Status: DISCONTINUED | OUTPATIENT
Start: 2017-08-22 | End: 2017-08-24

## 2017-08-22 RX ADMIN — OXYCODONE HYDROCHLORIDE 2 MG: 5 SOLUTION ORAL at 17:04

## 2017-08-22 RX ADMIN — VANCOMYCIN HYDROCHLORIDE 1500 MG: 5 INJECTION, POWDER, LYOPHILIZED, FOR SOLUTION INTRAVENOUS at 01:54

## 2017-08-22 RX ADMIN — OXYCODONE HYDROCHLORIDE 2 MG: 5 SOLUTION ORAL at 08:46

## 2017-08-22 RX ADMIN — HYOSCYAMINE SULFATE: 16 SOLUTION at 21:30

## 2017-08-22 RX ADMIN — Medication 1 PACKET: at 08:46

## 2017-08-22 RX ADMIN — INSULIN ASPART 4 UNITS: 100 INJECTION, SOLUTION INTRAVENOUS; SUBCUTANEOUS at 17:40

## 2017-08-22 RX ADMIN — INSULIN GLARGINE 15 UNITS: 100 INJECTION, SOLUTION SUBCUTANEOUS at 08:54

## 2017-08-22 RX ADMIN — PIPERACILLIN SODIUM,TAZOBACTAM SODIUM 3.38 G: 3; .375 INJECTION, POWDER, FOR SOLUTION INTRAVENOUS at 15:12

## 2017-08-22 RX ADMIN — MICONAZOLE NITRATE: 2 POWDER TOPICAL at 13:55

## 2017-08-22 RX ADMIN — PIPERACILLIN SODIUM,TAZOBACTAM SODIUM 3.38 G: 3; .375 INJECTION, POWDER, FOR SOLUTION INTRAVENOUS at 21:30

## 2017-08-22 RX ADMIN — Medication 1 CAPSULE: at 08:46

## 2017-08-22 RX ADMIN — ASPIRIN 325 MG ORAL TABLET 325 MG: 325 PILL ORAL at 08:46

## 2017-08-22 RX ADMIN — SODIUM CHLORIDE: 9 INJECTION, SOLUTION INTRAVENOUS at 00:35

## 2017-08-22 RX ADMIN — INSULIN ASPART 4 UNITS: 100 INJECTION, SOLUTION INTRAVENOUS; SUBCUTANEOUS at 21:29

## 2017-08-22 RX ADMIN — MICONAZOLE NITRATE: 2 POWDER TOPICAL at 21:31

## 2017-08-22 RX ADMIN — INSULIN ASPART 3 UNITS: 100 INJECTION, SOLUTION INTRAVENOUS; SUBCUTANEOUS at 14:07

## 2017-08-22 RX ADMIN — INSULIN ASPART 2 UNITS: 100 INJECTION, SOLUTION INTRAVENOUS; SUBCUTANEOUS at 06:17

## 2017-08-22 RX ADMIN — INSULIN GLARGINE 15 UNITS: 100 INJECTION, SOLUTION SUBCUTANEOUS at 12:32

## 2017-08-22 RX ADMIN — OXYCODONE HYDROCHLORIDE 2 MG: 5 SOLUTION ORAL at 21:30

## 2017-08-22 RX ADMIN — INSULIN ASPART 3 UNITS: 100 INJECTION, SOLUTION INTRAVENOUS; SUBCUTANEOUS at 01:58

## 2017-08-22 RX ADMIN — MULTIVITAMIN 15 ML: LIQUID ORAL at 08:45

## 2017-08-22 RX ADMIN — DEXTROSE MONOHYDRATE: 50 INJECTION, SOLUTION INTRAVENOUS at 13:25

## 2017-08-22 RX ADMIN — SIMETHICONE 80 MG: 20 EMULSION ORAL at 08:45

## 2017-08-22 RX ADMIN — HYOSCYAMINE SULFATE: 16 SOLUTION at 13:55

## 2017-08-22 RX ADMIN — RANITIDINE HYDROCHLORIDE 150 MG: 150 SOLUTION ORAL at 08:47

## 2017-08-22 RX ADMIN — SIMETHICONE 80 MG: 20 EMULSION ORAL at 21:29

## 2017-08-22 RX ADMIN — PIPERACILLIN SODIUM,TAZOBACTAM SODIUM 3.38 G: 3; .375 INJECTION, POWDER, FOR SOLUTION INTRAVENOUS at 08:43

## 2017-08-22 RX ADMIN — INSULIN GLARGINE 30 UNITS: 100 INJECTION, SOLUTION SUBCUTANEOUS at 22:41

## 2017-08-22 RX ADMIN — SIMETHICONE 80 MG: 20 EMULSION ORAL at 17:04

## 2017-08-22 RX ADMIN — PIPERACILLIN SODIUM,TAZOBACTAM SODIUM 3.38 G: 3; .375 INJECTION, POWDER, FOR SOLUTION INTRAVENOUS at 01:03

## 2017-08-22 RX ADMIN — INSULIN ASPART 2 UNITS: 100 INJECTION, SOLUTION INTRAVENOUS; SUBCUTANEOUS at 10:40

## 2017-08-22 ASSESSMENT — VISUAL ACUITY
OU: OTHER (SEE COMMENT)
OU: OTHER (SEE COMMENT)

## 2017-08-22 NOTE — PROGRESS NOTES
[facility]    Hospitalist Progress Note      Assessment & Plan   Celia Lewis is a 89 year old female who was admitted on 8/20/2017.  Past history of advanced dementia (non-verbal, bed-bound, s/p G-tube), HTN, DM II, CKD, chronic decubitus ulcer who presents with decreased responsiveness, found to have sepsis and subacute CVA.    Acute/subacute ischemic occipital stroke.  Admission CT head concerning for acute left occipital CVA.  MRI 8/21 with right and left occipital infarcts, possibly subacute.  DM uncontrolled with A1C of 8.2%.  TTE 8/21 with EF 55-60%, no shunt.  - continue daily aspirin, no full anticoagulation due to evidence of microbleeds per Neuro  - LDL cannot be calculated due to high triglyceride levels, Neuro recommends against statin due to advanced dementia      Sepsis, most likely due to UTI.  Febrile to 101, tachycardic with leukocytosis and lactate 5.6.  UA abnormal, CXR with possible left basilar opacity, though appearing unchanged from prior.  Initiated on vanco and zoysn at admission.  - both admission blood cultures growing Staph epi, significance unclear - ?ulcer as source  - repeat blood cultures today  - continue vanco and zosyn  - unfortunately, no urine culture was done at admission and cannot be added per discussion with lab 8/22, will attempt to collect now  - lactate remains mildly elevated at 2.9 this AM, but clinically improved, will monitor      Advanced Alzheimer's dementia.  Baseline nonverbal status, bed-bound and s/p G-tube.  She was found to be more altered than her normal self.  She is not on any specific treatment for her Alzheimer's dementia.     - appreciate Nutrition recs for tube feeds      Stage III sacral decubitus ulcer and bilateral earlobe ulcers.  The patient had this during the prior admission.  No clinical evidence of active infection.    - WOC following       Elevated troponin.  A troponin was elevated at 0.65.  Unsure if she had a cardiac  event.  The patient is nonverbal and unable to express any complaints.   - management as above      Diabetes mellitus type 2.  A1C this admission 8.2%.  PTA regimen of Lantus 30 units bid with SSI.  - hyperglycemic with resumption of tube feeds, will resume PTA Lantus 30 units bid (give 15 units x1 now)  - continue high dose SSI      Chronic kidney disease, stage III.  Baseline Cr about 0.9.  Admission Cr 1.13.  - Cr 0.8 today    Hypernatremia.  Na 150 on 8/22, likely due to holding free water through feeding tube with NS infusion.  - stop NS, start D5W at 50 ml/hr  - resume free water flush 200ml q4h via feeding tube  - Na at 1600      Benign essential hypertension.  Prior to admission, she is on metoprolol.   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  Son, Dr. Salvatore Lewis, who is an immunologist switched code status to DNR/DNI on 8/21 following conversation with Dr. Sevilla ([facility]  Wants to continue with all other medical interventions, including pressors.  Family is not interested in Palliative Care.    DVT Prophylaxis: Pneumatic Compression Devices  Code Status: DNR/DNI    Disposition: Expected discharge in 2 days once bacteremia clear, transitioned to oral antibiotic, electrolytes normalized.      Updated son, Dr. Lewis, via phone 8/22.    Piotr Mercado    Interval History   Patient appears more alert today.  Non-verbal which is baseline.  No acute events overnight.    -Data reviewed today: I reviewed all new labs and imaging results over the last 24 hours. I personally reviewed no images or EKG's today.    Physical Exam   Temp: 98  F (36.7  C) Temp src: Axillary BP: 149/79   Heart Rate: 90 Resp: 16 SpO2: 96 % O2 Device: None (Room air)    Vitals:    08/20/17 2024 08/22/17 0558   Weight: 65.9 kg (145 lb 4.5 oz) 64.8 kg (142 lb 13.7 oz)     Vital Signs with Ranges  Temp:  [96.3  F (35.7   C)-98  F (36.7  C)] 98  F (36.7  C)  Heart Rate:  [83-98] 90  Resp:  [14-16] 16  BP: (128-160)/(68-86) 149/79  SpO2:  [95 %-99 %] 96 %  I/O last 3 completed shifts:  In: 2347 [I.V.:1987; NG/GT:360]  Out: 1300 [Urine:1300]    Constitutional: Well developed, well nourished female in no acute distress  Respiratory: lungs clear bilaterally without crackles or wheezes, no tachypnea  Cardiovascular: regular rate and rhythm, normal S1/S2 without murmur, rubs or gallops, no peripheral edema  GI: abdomen soft, no signs of tenderness, non-distended, normal bowel sounds, G-tube in place  Skin/Integumen:  No rash or bruising, stage 3 decub ulcer without signs of cellulitis or purulent drainage  Other: Opens eyes during exam, does not interact    Medications     IV infusion builder WITH additives       - MEDICATION INSTRUCTIONS -         vancomycin (VANCOCIN) IV  1,500 mg Intravenous Q24H     insulin glargine  30 Units Subcutaneous BID     multivitamins with minerals  15 mL Per Feeding Tube Daily     fiber modular  1 packet Per Feeding Tube BID     aspirin  325 mg Oral Daily    Or     aspirin  300 mg Rectal Daily     piperacillin-tazobactam  3.375 g Intravenous Q6H     insulin aspart  1-12 Units Subcutaneous Q4H     sodium hypochlorite   Topical BID     lactobacillus rhamnosus (GG)  1 capsule Oral or Feeding Tube Daily     oxyCODONE  2 mg Oral TID     ranitidine  150 mg Per Feeding Tube Daily     simethicone  80 mg Oral TID       Data     Recent Labs  Lab 08/22/17  0802 08/21/17  2325 08/21/17  0750 08/21/17  0625 08/20/17  2330 08/20/17  1853   WBC 8.7  --   --  10.8  --  14.3*   HGB 10.2*  --   --  11.9  --  12.8   MCV 99  --   --  99  --  101*     --   --  256  --  317   INR  --   --   --   --   --  0.98   *  --  144  --   --  142   POTASSIUM 3.7  --  5.1  --   --  5.1   CHLORIDE 117*  --  112*  --   --  107   CO2 21  --  22  --   --  23   BUN 37*  --  51*  --   --  60*   CR 0.85 0.87 1.03 1.01  --  1.13*    ANIONGAP 12  --  10  --   --  12   NELLIE 8.0*  --  8.5  --   --  9.2   *  --  288*  --   --  258*   ALBUMIN  --   --   --   --   --  3.3*   PROTTOTAL  --   --   --   --   --  8.1   BILITOTAL  --   --   --   --   --  0.5   ALKPHOS  --   --   --   --   --  82   ALT  --   --   --   --   --  28   AST  --   --   --   --   --  23   LIPASE  --   --   --   --   --  174   TROPI  --   --   --  0.558* 0.706* 0.658*       No results found for this or any previous visit (from the past 24 hour(s)).

## 2017-08-22 NOTE — PHARMACY-VANCOMYCIN DOSING SERVICE
Pharmacy Vancomycin Note  Date of Service 2017  Patient's  1926   89 year old, female    Indication: Sepsis  Goal Trough Level: 15-20 mg/L  Day of Therapy: 1  Current Vancomycin regimen:  1250 mg IV once    Current estimated CrCl = Estimated Creatinine Clearance: 36.4 mL/min (based on Cr of 0.87).    Creatinine for last 3 days  2017:  6:53 PM Creatinine 1.13 mg/dL  2017:  6:25 AM Creatinine 1.01 mg/dL;  7:50 AM Creatinine 1.03 mg/dL; 11:25 PM Creatinine 0.87 mg/dL    Recent Vancomycin Levels (past 3 days)  2017: 11:25 PM Vancomycin Level 10.6 mg/L    Vancomycin IV Administrations (past 72 hours)                   vancomycin (VANCOCIN) 500 mg in NaCl 0.9 % 100 mL intermittent infusion (mg) 500 mg New Bag 17    vancomycin (VANCOCIN) 750 mg in NaCl 0.9 % 250 mL intermittent infusion (mg) 750 mg New Bag 17                Nephrotoxins and other renal medications (Future)    Start     Dose/Rate Route Frequency Ordered Stop    17 0100  vancomycin (VANCOCIN) 1500 mg in 0.9% NaCl 250 mL PREMIX      1,500 mg Intravenous EVERY 24 HOURS 17 0033      17 0200  piperacillin-tazobactam (ZOSYN) 3.375 g vial to attach to  mL bag     Comments:  Pharmacy can adjust dose based on renal function.    3.375 g  over 30 Minutes Intravenous EVERY 6 HOURS 17               Contrast Orders - past 72 hours     None          Interpretation of levels and current regimen:  Trough level is  10.6    Has serum creatinine changed > 50% in last 72 hours: No    Urine output:  good urine output    Renal Function: Stable    Plan:  1.  Increase Dose to 1500mg q24h  2.  Pharmacy will check trough levels as appropriate in 1-3 Days.    3. Serum creatinine levels will be ordered daily for the first week of therapy and at least twice weekly for subsequent weeks.      Domenic Knutson        .

## 2017-08-22 NOTE — PROGRESS NOTES
[facility]  Neuroscience and Spine [place]  Neurology Daily Note      Admission Date:8/20/2017   Date of service: 08/22/2017   Hospital Day: 3      Assessment & Plan   _______________________________  #. (I63.433) Cerebrovascular accident (CVA) due to bilateral embolism of posterior cerebral arteries (H)  (primary encounter diagnosis)  --Bilateral occipital strokes  --Possible bifrontal strokes  --Left frontal microhemorrhage  --Anticoagulation and advanced antithrombotics will be limited due to microhemorrhage  --echo unremarkable  --B12 normal  --TSH normal  --lipids mildly elevated with , would not recommend statin due to dementia  #. (A41.9) Sepsis, due to unspecified organism (H)  --Severe UTI  --Management per hospitalists.   #. (R41.82) Altered mental status, unspecified altered mental status type  --Likely related to combination of UTI and strokes  #. (G30.1,  F02.80) Late onset Alzheimer's disease without behavioral disturbance  --Severe, end-stage AD  --Recovery is unlikely  --Palliative care consult will be the most appropriate  -----care is dictated by [facility] due to patient being [employer]  #. PT/OT/Speech  --continue evaluations  -----per family, will return to LTC at [facility]  #. Nutrition  --Per speech therapy evaluation   #. DVT Prophylaxis  --defer to primary service    Code Status: DNR/DNI    Disposition: pending   No further neurological workup recommended. Will sign off.    Interval History   _______________________________  Patient presented with change in mental status. Patient is nonverbal at baseline, but was found to be more altered and less responsive than her baseline. She was recently discharged from [facility] with treatment for sepsis due to UTI. CT head was concerned for acute infarct in left occipital lobe. MRI clarified multiple bilateral infarcts and microbleed. Long discussion regarding likely outcome, but patient is [employer] and care will be directed by  .    Review of Systems   _______________________________  Review of systems is limited by patient factors - mental status  Physical Exam   _______________________________  Vitals: Temp: 98  F (36.7  C) Temp src: Axillary BP: 149/79   Heart Rate: 90 Resp: 16 SpO2: 96 % O2 Device: None (Room air)    Vital Signs with Ranges: Temp:  [96.3  F (35.7  C)-98  F (36.7  C)] 98  F (36.7  C)  Heart Rate:  [83-98] 90  Resp:  [14-16] 16  BP: (128-160)/(68-86) 149/79  SpO2:  [95 %-99 %] 96 %    General Appearance:  No acute distress  Neuro:       Mental Status Exam:  Stuporous. Mute. Mental status is decreased       Cranial Nerves:  Pupils 3 mm, reactive. Does not track. Occulocephalis reflexes are present. Corneal reflexes absent. Face symmetric. Other CN are untestable           Motor:  Minimal movements to stimulation, contractures in all extremities       Reflexes:  Low DTR, toes equivocal       Sensory:  Untestable                    Coordination:   Untestable       Gait:  Untestable  Cardiovascular: Regular rate and rhythm, no m/r/g  Lungs: Clear to auscultation  Abdomen: Soft, not tender, not distended  Extremities: No clubbing, no cyanosis, no edema    Medications   _______________________________    - MEDICATION INSTRUCTIONS -       NaCl 125 mL/hr at 08/22/17 0035       vancomycin (VANCOCIN) IV  1,500 mg Intravenous Q24H     multivitamins with minerals  15 mL Per Feeding Tube Daily     fiber modular  1 packet Per Feeding Tube BID     aspirin  325 mg Oral Daily    Or     aspirin  300 mg Rectal Daily     insulin glargine  15 Units Subcutaneous BID     piperacillin-tazobactam  3.375 g Intravenous Q6H     insulin aspart  1-12 Units Subcutaneous Q4H     sodium hypochlorite   Topical BID     lactobacillus rhamnosus (GG)  1 capsule Oral or Feeding Tube Daily     oxyCODONE  2 mg Oral TID     ranitidine  150 mg Per Feeding Tube Daily     simethicone  80 mg Oral TID       Data   _______________________________      Lab Data:    All data was reviewed by me personally  CBC RESULTS:  Recent Labs   Lab Test  08/22/17   0802  08/21/17   0625  08/20/17   1853   WBC  8.7  10.8  14.3*   RBC  3.29*  3.76*  3.99   HGB  10.2*  11.9  12.8   HCT  32.7*  37.3  40.4   PLT  237  256  317     Basic Metabolic Panel:  Recent Labs   Lab Test  08/22/17   0802  08/21/17   2325  08/21/17   0750   08/20/17   1853   NA  150*   --   144   --   142   POTASSIUM  3.7   --   5.1   --   5.1   CHLORIDE  117*   --   112*   --   107   CO2  21   --   22   --   23   BUN  37*   --   51*   --   60*   CR  0.85  0.87  1.03   < >  1.13*   GLC  211*   --   288*   --   258*   NELLIE  8.0*   --   8.5   --   9.2    < > = values in this interval not displayed.     Liver panel:  Recent Labs   Lab Test  08/20/17   1853  08/04/17   0450  06/30/17   1620  06/14/17   1200  06/24/16   0806   PROTTOTAL  8.1  7.6  8.1  8.2  6.0*   ALBUMIN  3.3*  2.8*  2.9*  2.5*  1.6*   BILITOTAL  0.5  0.5  0.7  0.5  0.7   ALKPHOS  82  94  81  96  155*   AST  23  17  24  20  24   ALT  28  24  26  22  48     Coagulation  Recent Labs   Lab Test  08/20/17   1853  06/14/17   1200  06/21/16   0643  06/23/14   2140  08/05/12   1834  11/03/10   0710  11/02/10   0736  11/01/10   0629   INR  0.98  1.10  1.37*  1.01  1.09  0.97  1.02  1.02   PTT   --    --   29   --   30  31  32  25      Lipid Profile:  Recent Labs   Lab Test  08/20/17   1853 05/26/15   CHOL  269*  159   HDL  30*  40   LDL  Cannot estimate LDL when triglyceride exceeds 400 mg/dL  103*  77   TRIG  1049*  211*   CHOLHDLRATIO   --   4.0     Thyroid Panel:  Recent Labs   Lab Test  08/21/17   0750  11/03/10   0710  06/01/10   1523   TSH  1.19  2.69  4.11      Vitamin B12:   Recent Labs   Lab Test  08/21/17   0750  01/27/10   1534  11/16/09   1459   B12  645  1000  188*      Vitamin D level:   Recent Labs   Lab Test  08/21/17   0750   VITDT  26     A1C:   Recent Labs   Lab Test  08/21/17   0625  06/15/17   0833  06/21/16   0643 05/26/15  06/24/14   0703    A1C  8.2*  8.4*  8.2*  6.7*  6.5*     Troponin I:   Recent Labs   Lab Test  08/21/17   0625  08/20/17   2330  08/20/17   1853  06/30/17   1620  08/08/12   1751  08/08/12   1323  05/19/10   0646   TROPI  0.558*  0.706*  0.658*  0.035  0.013  0.022  <0.012     CK:   Recent Labs   Lab Test  05/19/10   0646   CKT  110       CRP inflammation:   Recent Labs   Lab Test  08/21/17   0750  08/20/17   1853  05/08/13   1300  10/12/12   0839  08/31/12   0800   CRP  5.4  7.0  11.0*  7.4  19.6*     ESR:   Recent Labs   Lab Test  05/08/13   1300  10/12/12   0839  08/31/12   0800   SED  61*  47*  60*       UA Results:  Recent Labs   Lab Test  08/20/17   1957   06/30/17   1654   COLOR  Yellow   < >  Yellow   APPEARANCE  Cloudy   < >  Cloudy   URINEGLC  Negative   < >  Negative   URINEBILI  Negative   < >  Negative   URINEKETONE  Negative   < >  Negative   SG  1.016   < >  1.010   UBLD  Trace*   < >  Small*   URINEPH  5.5   < >  5.5   PROTEIN  100*   < >  30*   UROBILINOGEN   --    --   0.2   NITRITE  Negative   < >  Negative   LEUKEST  Large*   < >  Large*   RBCU  24*   < >  2-5*   WBCU  >182*   < >  25-50*    < > = values in this interval not displayed.        Cardiac US:   The left ventricle is normal in size. The visual ejection fraction is estimated at 55-60%. No significant valvular heart disease. Small pericardial effusion There are no echocardiographic indications of cardiac tamponade. There is no color Doppler evidence of an atrial shunt. A contrast injection (Bubble Study) was performed that was negative for flow across the interatrial septum.       Imaging:   All imaging studies were reviewed personally  CT head 8/20/17:   1. Findings concerning for an acute infarct within the left occipital lobe. Mild regional edema without significant mass effect or midline shift. Hypodensity in this region also extends into the left temporal lobe and region of ischemia in this area cannot be excluded. Evaluation for regions of acute  ischemia would be better appreciated with MR.  2. New area of encephalomalacia within the paramedian left frontal lobe likely from previous infarct although an acute component of ischemia is difficult to exclude. Indeterminate age infarcts in bilateral thalami. Again this would be better evaluated with above-mentioned MRI.  3. Fairly extensive confluent periventricular white matter hypodensities which are nonspecific, but likely related to chronic microvascular ischemic disease. Moderate diffuse parenchymal volume loss. These findings have progressed since previous MR from 6/24/2014.    MRI brain 8/21/17:   1. Areas of infarction in both the right and left occipital regions. There is T1 hyperintensity in the cortex of these areas of infarction. The T1 hyperintensity is probably due to petechial hemorrhage secondary to laminar necrosis. This would suggest that these areas of infarction are subacute.  2. Questionable areas of restricted diffusion in both the right and left frontal regions. These could also be due to subacute infarcts but they could be secondary to T2 shine through.  3. Susceptibility artifacts in both the right and left frontal regions consistent with hemosiderin deposition secondary to small chronic microbleeds.  4. Age-related changes including generalized atrophy of the brain. White matter changes in the cerebral hemispheres consistent with small vessel ischemic disease in this age patient.           Snow Bethea, FNP-BC

## 2017-08-22 NOTE — PROGRESS NOTES
SWS PROGRESS NOTE:     I: SW spoke with admissions at [facility]. They have a bed for pt when she is ready. SW will keep [facility] updated on pt's DC progress.   P: Pt will DC to [facility] once medically stable. SW following.    Catherine Alfaro, MSW, LGSW [phone]

## 2017-08-22 NOTE — PLAN OF CARE
Problem: Goal Outcome Summary  Goal: Goal Outcome Summary  Outcome: No Change  Arrived 1500. VSS, afebrile and on RA. Tele reading NSR. Unable to participate in full neuro assessment, does not follow commands. Does not respond to visual threat. Flat, withdrawn affect. Bedrest. TF started at 15 mL/hr at 1615. No nausea. PCDs on as well as [place] boots. Coccyx dressing in place, CDI; surrounding skin blanchable erythema, barrier cream applied. Small BM smear. Fonseca catheter patent.

## 2017-08-22 NOTE — PLAN OF CARE
Problem: Goal Outcome Summary  Goal: Goal Outcome Summary  Outcome: No Change  Pt is alert but unable to assess orientation, pt nonverbal. Not able to follow neuro commands. Pt has some contractions in RUE and both LE appears contracted. Pt blood cultures were positive for staph and cocci clusters. Pt remains on IV ABX. VSS. Tele NSR . Fonseca patent. Pt has TF and 60 ml flush Q4 HR. TF rate increased to 30 ml/hr at 0400. Pt is repoed every 2hrs. Pt has not required any prn pain medication.  DC plan pending to [facility].

## 2017-08-22 NOTE — PROGRESS NOTES
Cross Cover Note      Paged re: Blood culture that was positive for GP cocci in cluster. Will continue Vancomycin and Zosyn and await further results.    Dominga Luo PA-C

## 2017-08-23 LAB
ANION GAP SERPL CALCULATED.3IONS-SCNC: 12 MMOL/L (ref 3–14)
BACTERIA SPEC CULT: ABNORMAL
BUN SERPL-MCNC: 31 MG/DL (ref 7–30)
CALCIUM SERPL-MCNC: 8.1 MG/DL (ref 8.5–10.1)
CHLORIDE SERPL-SCNC: 112 MMOL/L (ref 94–109)
CO2 SERPL-SCNC: 21 MMOL/L (ref 20–32)
CREAT SERPL-MCNC: 0.84 MG/DL (ref 0.52–1.04)
ERYTHROCYTE [DISTWIDTH] IN BLOOD BY AUTOMATED COUNT: 16.9 % (ref 10–15)
GFR SERPL CREATININE-BSD FRML MDRD: 63 ML/MIN/1.7M2
GLUCOSE BLDC GLUCOMTR-MCNC: 194 MG/DL (ref 70–99)
GLUCOSE BLDC GLUCOMTR-MCNC: 217 MG/DL (ref 70–99)
GLUCOSE BLDC GLUCOMTR-MCNC: 263 MG/DL (ref 70–99)
GLUCOSE BLDC GLUCOMTR-MCNC: 263 MG/DL (ref 70–99)
GLUCOSE BLDC GLUCOMTR-MCNC: 265 MG/DL (ref 70–99)
GLUCOSE BLDC GLUCOMTR-MCNC: 296 MG/DL (ref 70–99)
GLUCOSE SERPL-MCNC: 268 MG/DL (ref 70–99)
HCT VFR BLD AUTO: 34.2 % (ref 35–47)
HGB BLD-MCNC: 11 G/DL (ref 11.7–15.7)
LACTATE BLD-SCNC: 3.5 MMOL/L (ref 0.7–2)
Lab: ABNORMAL
Lab: ABNORMAL
MCH RBC QN AUTO: 31.9 PG (ref 26.5–33)
MCHC RBC AUTO-ENTMCNC: 32.2 G/DL (ref 31.5–36.5)
MCV RBC AUTO: 99 FL (ref 78–100)
PLATELET # BLD AUTO: 210 10E9/L (ref 150–450)
POTASSIUM SERPL-SCNC: 3.4 MMOL/L (ref 3.4–5.3)
RBC # BLD AUTO: 3.45 10E12/L (ref 3.8–5.2)
SODIUM SERPL-SCNC: 145 MMOL/L (ref 133–144)
SODIUM SERPL-SCNC: 147 MMOL/L (ref 133–144)
SPECIMEN SOURCE: ABNORMAL
SPECIMEN SOURCE: ABNORMAL
WBC # BLD AUTO: 7.9 10E9/L (ref 4–11)

## 2017-08-23 PROCEDURE — 27210429 ZZH NUTRITION PRODUCT INTERMEDIATE LITER

## 2017-08-23 PROCEDURE — 85027 COMPLETE CBC AUTOMATED: CPT | Performed by: HOSPITALIST

## 2017-08-23 PROCEDURE — 87186 SC STD MICRODIL/AGAR DIL: CPT | Performed by: HOSPITALIST

## 2017-08-23 PROCEDURE — 25000128 H RX IP 250 OP 636: Performed by: HOSPITALIST

## 2017-08-23 PROCEDURE — 87088 URINE BACTERIA CULTURE: CPT | Performed by: HOSPITALIST

## 2017-08-23 PROCEDURE — 99239 HOSP IP/OBS DSCHRG MGMT >30: CPT | Performed by: HOSPITALIST

## 2017-08-23 PROCEDURE — 25000132 ZZH RX MED GY IP 250 OP 250 PS 637

## 2017-08-23 PROCEDURE — 87086 URINE CULTURE/COLONY COUNT: CPT | Performed by: HOSPITALIST

## 2017-08-23 PROCEDURE — 36415 COLL VENOUS BLD VENIPUNCTURE: CPT | Performed by: INTERNAL MEDICINE

## 2017-08-23 PROCEDURE — 87040 BLOOD CULTURE FOR BACTERIA: CPT | Performed by: HOSPITALIST

## 2017-08-23 PROCEDURE — 87106 FUNGI IDENTIFICATION YEAST: CPT | Performed by: HOSPITALIST

## 2017-08-23 PROCEDURE — 83605 ASSAY OF LACTIC ACID: CPT | Performed by: INTERNAL MEDICINE

## 2017-08-23 PROCEDURE — 80048 BASIC METABOLIC PNL TOTAL CA: CPT | Performed by: HOSPITALIST

## 2017-08-23 PROCEDURE — 25000132 ZZH RX MED GY IP 250 OP 250 PS 637: Performed by: INTERNAL MEDICINE

## 2017-08-23 PROCEDURE — 25000131 ZZH RX MED GY IP 250 OP 636 PS 637: Performed by: HOSPITALIST

## 2017-08-23 PROCEDURE — 84295 ASSAY OF SERUM SODIUM: CPT | Performed by: INTERNAL MEDICINE

## 2017-08-23 PROCEDURE — 99232 SBSQ HOSP IP/OBS MODERATE 35: CPT | Performed by: HOSPITALIST

## 2017-08-23 PROCEDURE — 25000128 H RX IP 250 OP 636: Performed by: INTERNAL MEDICINE

## 2017-08-23 PROCEDURE — 00000146 ZZHCL STATISTIC GLUCOSE BY METER IP

## 2017-08-23 PROCEDURE — 12000000 ZZH R&B MED SURG/OB

## 2017-08-23 PROCEDURE — 36415 COLL VENOUS BLD VENIPUNCTURE: CPT | Performed by: HOSPITALIST

## 2017-08-23 RX ORDER — LEVOFLOXACIN 5 MG/ML
250 INJECTION, SOLUTION INTRAVENOUS EVERY 24 HOURS
Status: DISCONTINUED | OUTPATIENT
Start: 2017-08-24 | End: 2017-08-24 | Stop reason: HOSPADM

## 2017-08-23 RX ORDER — OXYCODONE HCL 5 MG/5 ML
2 SOLUTION, ORAL ORAL EVERY 4 HOURS PRN
Qty: 15 ML | Refills: 0 | Status: ON HOLD | DISCHARGE
Start: 2017-08-23 | End: 2017-09-27

## 2017-08-23 RX ORDER — LEVOFLOXACIN 250 MG/1
250 TABLET, FILM COATED ORAL DAILY
Qty: 6 TABLET | Refills: 0 | DISCHARGE
Start: 2017-08-24 | End: 2017-08-30

## 2017-08-23 RX ORDER — LEVOFLOXACIN 5 MG/ML
500 INJECTION, SOLUTION INTRAVENOUS ONCE
Status: COMPLETED | OUTPATIENT
Start: 2017-08-23 | End: 2017-08-23

## 2017-08-23 RX ORDER — ASPIRIN 325 MG
325 TABLET ORAL DAILY
Qty: 120 TABLET | Refills: 2 | DISCHARGE
Start: 2017-08-23

## 2017-08-23 RX ORDER — OXYCODONE HCL 5 MG/5 ML
2 SOLUTION, ORAL ORAL 3 TIMES DAILY
Qty: 15 ML | Refills: 0 | Status: ON HOLD | DISCHARGE
Start: 2017-08-23 | End: 2017-09-27

## 2017-08-23 RX ADMIN — VANCOMYCIN HYDROCHLORIDE 1500 MG: 5 INJECTION, POWDER, LYOPHILIZED, FOR SOLUTION INTRAVENOUS at 01:22

## 2017-08-23 RX ADMIN — Medication 1 CAPSULE: at 12:00

## 2017-08-23 RX ADMIN — LEVOFLOXACIN 500 MG: 5 INJECTION, SOLUTION INTRAVENOUS at 13:03

## 2017-08-23 RX ADMIN — INSULIN ASPART 6 UNITS: 100 INJECTION, SOLUTION INTRAVENOUS; SUBCUTANEOUS at 18:07

## 2017-08-23 RX ADMIN — INSULIN GLARGINE 30 UNITS: 100 INJECTION, SOLUTION SUBCUTANEOUS at 08:14

## 2017-08-23 RX ADMIN — INSULIN GLARGINE 35 UNITS: 100 INJECTION, SOLUTION SUBCUTANEOUS at 21:21

## 2017-08-23 RX ADMIN — PIPERACILLIN SODIUM,TAZOBACTAM SODIUM 3.38 G: 3; .375 INJECTION, POWDER, FOR SOLUTION INTRAVENOUS at 07:57

## 2017-08-23 RX ADMIN — OXYCODONE HYDROCHLORIDE 2 MG: 5 SOLUTION ORAL at 21:17

## 2017-08-23 RX ADMIN — INSULIN ASPART 5 UNITS: 100 INJECTION, SOLUTION INTRAVENOUS; SUBCUTANEOUS at 11:58

## 2017-08-23 RX ADMIN — Medication 1 PACKET: at 08:14

## 2017-08-23 RX ADMIN — OXYCODONE HYDROCHLORIDE 2 MG: 5 SOLUTION ORAL at 08:14

## 2017-08-23 RX ADMIN — HYOSCYAMINE SULFATE: 16 SOLUTION at 08:16

## 2017-08-23 RX ADMIN — Medication 7 ML: at 16:20

## 2017-08-23 RX ADMIN — INSULIN ASPART 5 UNITS: 100 INJECTION, SOLUTION INTRAVENOUS; SUBCUTANEOUS at 07:04

## 2017-08-23 RX ADMIN — HYOSCYAMINE SULFATE: 16 SOLUTION at 21:31

## 2017-08-23 RX ADMIN — MULTIVITAMIN 15 ML: LIQUID ORAL at 11:59

## 2017-08-23 RX ADMIN — OXYCODONE HYDROCHLORIDE 2 MG: 5 SOLUTION ORAL at 16:20

## 2017-08-23 RX ADMIN — RANITIDINE HYDROCHLORIDE 150 MG: 150 SOLUTION ORAL at 08:14

## 2017-08-23 RX ADMIN — DEXTROSE MONOHYDRATE: 50 INJECTION, SOLUTION INTRAVENOUS at 16:20

## 2017-08-23 RX ADMIN — ASPIRIN 325 MG ORAL TABLET 325 MG: 325 PILL ORAL at 08:14

## 2017-08-23 RX ADMIN — SIMETHICONE 80 MG: 20 EMULSION ORAL at 16:20

## 2017-08-23 RX ADMIN — INSULIN ASPART 3 UNITS: 100 INJECTION, SOLUTION INTRAVENOUS; SUBCUTANEOUS at 03:07

## 2017-08-23 RX ADMIN — PIPERACILLIN SODIUM,TAZOBACTAM SODIUM 3.38 G: 3; .375 INJECTION, POWDER, FOR SOLUTION INTRAVENOUS at 03:06

## 2017-08-23 RX ADMIN — SIMETHICONE 80 MG: 20 EMULSION ORAL at 08:14

## 2017-08-23 RX ADMIN — INSULIN ASPART 7 UNITS: 100 INJECTION, SOLUTION INTRAVENOUS; SUBCUTANEOUS at 14:58

## 2017-08-23 RX ADMIN — Medication 1 PACKET: at 21:19

## 2017-08-23 RX ADMIN — MICONAZOLE NITRATE: 2 POWDER TOPICAL at 21:31

## 2017-08-23 RX ADMIN — MICONAZOLE NITRATE: 2 POWDER TOPICAL at 08:15

## 2017-08-23 RX ADMIN — INSULIN GLARGINE 5 UNITS: 100 INJECTION, SOLUTION SUBCUTANEOUS at 13:02

## 2017-08-23 RX ADMIN — INSULIN ASPART 4 UNITS: 100 INJECTION, SOLUTION INTRAVENOUS; SUBCUTANEOUS at 21:19

## 2017-08-23 RX ADMIN — SIMETHICONE 80 MG: 20 EMULSION ORAL at 21:19

## 2017-08-23 ASSESSMENT — VISUAL ACUITY: OU: OTHER (SEE COMMENT)

## 2017-08-23 NOTE — PLAN OF CARE
Problem: Goal Outcome Summary  Goal: Goal Outcome Summary  Outcome: No Change  Non verbal. Not following verbal commands. Somnolent, awakens briefly during cares. Bedrest. Turned side to side q2. Dressings changed on bilateral ears and coccyx. Small BM smear, incontinent. Fonseca catheter in place, patent. NPO, on tube feed at goal rate of 45 mL/h4. Elevated sodium trending down, on D5W at 50 mL/hr and also receiving free water flushes of 200 mL q4 hours via gtube.

## 2017-08-23 NOTE — DISCHARGE SUMMARY
[facility]    Discharge Summary  Hospitalist    Date of Admission:  8/20/2017  Date of Discharge:  8/24/2017  Discharging Provider: Lucian Ochoa MD      Discharge Diagnoses   Acute ischemic occipital stroke  Sepsis, likely due to UTI  Advanced Alzheimer's dementia  Chronic stage III sacral decubitus ulcer  Bilateral earlobe ulcers  Demand ischemia  DM II  CKD stage III  Hypernatremia  HTN    History of Present Illness   Celia Lewis is an 89 year old female who presented with change in mental status, found to have sepsis in addition to acute CVA    Hospital Course   Celia Lewis was admitted on 8/20/2017.  The following problems were addressed during her hospitalization:    Acute ischemic occipital stroke.  Admission CT head concerning for acute left occipital CVA.  MRI 8/21 with right and left occipital infarcts, possibly subacute.  DM uncontrolled with A1C of 8.2%.  TTE 8/21 with EF 55-60%, no shunt.  No full anticoagulation due to evidence of microbleeds and no statin due to severe dementia per Neuro.  Will continue on daily aspirin alone.      Sepsis, most likely due to UTI.  Febrile to 101, tachycardic with leukocytosis and lactate 5.6.  UA abnormal, unfortunately no culture obtained at admission.  CXR with possible left basilar opacity, though appearing unchanged from prior.  Initiated on vanco and zoysn at admission.  Both admission blood cultures growing Staph epi, suspect contaminant despite both positive, repeat cultures 8/22 and 8/23 ngtd.  Transitioned from vanco/zosyn to levo on 8/23 with plan to continue to complete a 10-day course of antibiotics.  Note she has a chronic elevation of lactate, but currently at baseline. Urine culture grew Candida >100,00 colonies/ml. Curbside consult with ID, 3 days of diflucan. Fonseca catheter changed prior to discharge.      Advanced Alzheimer's dementia.  Baseline nonverbal status, bed-bound and s/p G-tube.  She was found to be more  altered than her normal self.  She is not on any specific treatment for her Alzheimer's dementia.  Currently lying awake in bed, non-verbal and not responding to questions or commands, which is baseline.  Continue PTA tube feeds.      Stage III sacral decubitus ulcer and bilateral earlobe ulcers.  The patient had this during the prior admission.  No clinical evidence of active infection.  WOC was consulted for wound care.      Demand ischemia.  Serial troponin mildly elevated but largely flat.  TTE with normal EF and no WMA, suspect demand ischemia in setting of sepsis.       Diabetes mellitus type 2.  A1C this admission 8.2%.  PTA regimen of Lantus 30 units bid with SSI.  Hyperglycemic since admission, increased Lantus to 35 units bid and continue PTA SSI. BS still in 200s but now off D5W and expect improvement. Needs to increase Lantus further if BS remains elevated.      Chronic kidney disease, stage III.  Baseline Cr about 0.9.  Admission Cr 1.13, returned to baseline with IVF.     Hypernatremia.  Na 150 on 8/22, likely due to holding free water through feeding tube with NS infusion.  Level normalized with resumption of free water flushes and D5W. Needs to recheck BMP in 2-3 days to make sure it remains normal.       Benign essential hypertension.  Metoprolol held at admission for permissive HTN, resumed at discharge.      GOALS OF CARE:  Patient is an [employer] and any changes in treatment plan/goals of care must be discussed with .  Son, Dr. Salvatore Lewis, who is an immunologist switched code status to DNR/DNI on 8/21 following conversation with Dr. Sevilla ([facility]).  Wants to continue with all other medical interventions, including pressors.  Family is not interested in Palliative Care.    Lucian Ochoa MD  Hospitalist    Significant Results and Procedures   See imaging below    Pending Results   These results will be followed up by: Physician at LTAC.  Unresulted Labs Ordered in the Past 30 Days of  this Admission     Date and Time Order Name Status Description    8/23/2017 0000 Blood culture Preliminary     8/22/2017 1033 Urine Culture Aerobic Bacterial Preliminary     8/22/2017 1028 Blood culture Preliminary     8/22/2017 1028 Blood culture Preliminary           Code Status   DNR / DNI       Primary Care Physician   No primary care provider on file.    Constitutional: well developed, well nourished, in no acute distress  Eyes: pupils equal, round and reactive to light, no icterus  HEENT: head normocephalic, atraumatic, mucous membranes moist   Respiratory: lungs clear to auscultation bilaterally, no crackles or wheezes, no tachypnea  Cardiovascular: regular rate and rhythm, normal S1/S2, no murmur, rubs or gallops  GI: abdomen soft, non-tender, non-distended, normal bowel sounds, feeding tube in place.  Lymph/Hematologic: No peripheral edema  Skin: No rash or bruising  Musculoskeletal: Extremities warm and well perfused  Neurologic: Alert, does not follow command or track object. Non verbal.       Discharge Disposition   Discharged to long-term care facility  Condition at discharge: Stable    Consultations This Hospital Stay   WOUND OSTOMY CONTINENCE NURSE  IP CONSULT  NEUROLOGY IP CONSULT  PHYSICAL THERAPY ADULT IP CONSULT  OCCUPATIONAL THERAPY ADULT IP CONSULT  SPEECH LANGUAGE PATH ADULT IP CONSULT  SWALLOW EVAL SPEECH PATH AT BEDSIDE IP CONSULT  SMOKING CESSATION PROGRAM IP CONSULT  PHYSICAL THERAPY ADULT IP CONSULT  OCCUPATIONAL THERAPY ADULT IP CONSULT  PHARMACY TO DOSE VANCO  NUTRITION SERVICES ADULT IP CONSULT  PHARMACY TO DOSE VANCO  PALLIATIVE CARE ADULT IP CONSULT  PHARMACY IP CONSULT  PHARMACY TO DOSE VANCO    Time Spent on this Encounter   Lucian HERNADEZ, personally saw the patient today and spent greater than 30 minutes discharging this patient.    Discharge Orders     General info for SNF   Length of Stay Estimate: Long Term Care  Condition at Discharge: Stable  Level of care:skilled    Rehabilitation Potential: Poor  Admission H&P remains valid and up-to-date: Yes  Recent Chemotherapy: N/A  Use Nursing Home Standing Orders: N/A     Mantoux instructions   Give two-step Mantoux (PPD) Per Facility Policy Yes     Reason for your hospital stay   You were admitted for sepsis likely due to urinary tract infection.     Glucose monitor nursing POCT   Before meals and at bedtime     Wound care (specify)   Continue wound cares to coccyx and bilateral ears as previously prescribed.     Activity - Up with nursing assistance     Follow Up and recommended labs and tests   Follow up with Nursing home physician.  Follow up BMP in 2-4 days to check sodium.     DNR/DNI     Advance Diet as Tolerated   Follow this diet upon discharge:      Adult Formula Drip Feeding: Continuous Isosource 1.5; Gastrostomy; Goal Rate: 45; mL/hr; Medication - Tube Feeding Flush Frequency: At least 15-30 mL water before and after medication administration and with tube clogging; Amout to Send (Nutrition...     NPO for Medical/Clinical Reasons Except for: No Exceptions  Free water flush 200 ml q4h       Discharge Medications   Discharge Medication List as of 8/24/2017  5:58 PM      START taking these medications    Details   fluconazole (DIFLUCAN) 40 MG/ML suspension 5 mLs (200 mg) by Per PEG tube route daily for 2 doses, Disp-150 mL, Transitional      aspirin 325 MG tablet 1 tablet (325 mg) by Per G Tube route daily, Disp-120 tablet, R-2, Transitional      levofloxacin (LEVAQUIN) 250 MG tablet 1 tablet (250 mg) by Per G Tube route daily for 6 days, Disp-6 tablet, R-0, Transitional         CONTINUE these medications which have CHANGED    Details   !! oxyCODONE (ROXICODONE) 5 MG/5ML solution 2 mLs (2 mg) by Per G Tube route 3 times daily, Disp-15 mL, R-0, Transitional      !! oxyCODONE (ROXICODONE) 5 MG/5ML solution 2 mLs (2 mg) by Per G Tube route every 4 hours as needed for moderate to severe pain, Disp-15 mL, R-0, Transitional       insulin glargine (LANTUS) 100 UNIT/ML injection Inject 35 Units Subcutaneous 2 times daily, Transitional       !! - Potential duplicate medications found. Please discuss with provider.      CONTINUE these medications which have NOT CHANGED    Details   metoprolol (LOPRESSOR) 10 mg/mL SUSP Take 25 mg by mouth 2 times daily, Historical      fiber modular (NUTRISOURCE FIBER) packet 1 packet by Per Feeding Tube route 2 times daily, Historical      simethicone (MYLICON) 40 MG/0.6ML suspension Take 80 mg by mouth 3 times daily, Historical      ranitidine (ZANTAC) 150 MG/10ML syrup 10 mLs (150 mg) by Per Feeding Tube route daily, Disp-600 mL, Transitional      vitamin A-D & C drops (TRI-VI-SOL) 750-400-35 UNIT-MG/ML solution NEW FORMULATION 7 mLs by Per G Tube route daily, Disp-50 mL, R-0, Local Print      INSULIN ASPART SC Inject 1-6 Units Subcutaneous every 6 hours 0800, 1400, 2000, 0200  -250 1 unit  -300 2 units  -350 3 units  -400 4 units  -450 5 units  BG >450 6 units and update MD, Historical      Dakins 0.125 % SOLN Externally apply topically 2 times daily BID and PRN.   1. Cleanse wound with microklenze, cleanse periwound area with naomi perineal  2. Moisten kerlix fluff with dakins solution 0.125%, wring out excess, pack wound with kerlix  3. Apply antifungal powde r to periwound area, rub in. Apply criticaid over powder.  4. Cover with ABD using minimal medipore tape to secure.  5. Label dressing with date, time, initials. Follow Rigorous PIP measures., Historical      Lactobacillus Acidophilus POWD 1 capsule by Gastric Tube route daily 10 billion units , Historical      multivitamin, therapeutic (THERA-VIT) TABS tablet 1 tablet by Gastric Tube route daily, Historical      Loperamide HCl (IMODIUM A-D) 1 MG/7.5ML LIQD 4 mg by Gastric Tube route every other day, Historical      !! acetaminophen (TYLENOL) 32 mg/mL solution 325 mg by Gastric Tube route daily as needed for fever or mild  pain, Historical      !! ACETAMINOPHEN  mg by Gastric Tube route 3 times daily , Historical      bisacodyl (DULCOLAX) 10 MG suppository Place 10 mg rectally daily as needed for constipation, Historical       !! - Potential duplicate medications found. Please discuss with provider.        Allergies   Allergies   Allergen Reactions     Sulfa Drugs Other (See Comments)     Per nursing home documents, patient has allergy to sulfa     Data   Most Recent 3 CBC's:  Recent Labs   Lab Test  08/24/17   0830  08/23/17   0916  08/22/17   0802   WBC  9.4  7.9  8.7   HGB  11.3*  11.0*  10.2*   MCV  95  99  99   PLT  223  210  237      Most Recent 3 BMP's:  Recent Labs   Lab Test  08/24/17   0830  08/23/17   0916  08/23/17   0000   08/22/17   0802   08/21/17   0750   NA  135  145*  147*   < >  150*   --   144   POTASSIUM   --   3.4   --    --   3.7   --   5.1   CHLORIDE   --   112*   --    --   117*   --   112*   CO2   --   21   --    --   21   --   22   BUN   --   31*   --    --   37*   --   51*   CR  0.74  0.84   --    --   0.85   < >  1.03   ANIONGAP   --   12   --    --   12   --   10   NELLIE   --   8.1*   --    --   8.0*   --   8.5   GLC   --   268*   --    --   211*   --   288*    < > = values in this interval not displayed.     Most Recent 2 LFT's:  Recent Labs   Lab Test  08/20/17   1853  08/04/17   0450   AST  23  17   ALT  28  24   ALKPHOS  82  94   BILITOTAL  0.5  0.5     Most Recent INR's and Anticoagulation Dosing History:  Anticoagulation Dose History     Recent Dosing and Labs Latest Ref Rng & Units 11/2/2010 11/3/2010 8/5/2012 6/23/2014 6/21/2016 6/14/2017 8/20/2017    INR 0.86 - 1.14 1.02 0.97 1.09 1.01 1.37(H) 1.10 0.98        Most Recent 3 Troponin's:  Recent Labs   Lab Test  08/21/17   0625  08/20/17   2330  08/20/17   1853   08/05/12   1837   01/28/10   1505   TROPI  0.558*  0.706*  0.658*   < >   --    < >   --    TROPONIN   --    --    --    --   0.00   --   0.00    < > = values in this interval not  displayed.     Most Recent Cholesterol Panel:  Recent Labs   Lab Test  08/20/17   1853   CHOL  269*   LDL  Cannot estimate LDL when triglyceride exceeds 400 mg/dL  103*   HDL  30*   TRIG  1049*     Most Recent 6 Bacteria Isolates From Any Culture (See EPIC Reports for Culture Details):  Recent Labs   Lab Test  08/23/17   0916  08/23/17   0429  08/22/17   1200  08/22/17   1155  08/20/17   1930  08/20/17   1855   CULT  No growth after 1 day  >100,000 colonies/mL  Candida albicans / dubliniensis  Candida albicans and Candida dubliniensis are not routinely speciated  Susceptibility testing not routinely done  *  Culture in progress  No growth after 2 days  No growth after 2 days  Cultured on the 1st day of incubation:  Staphylococcus epidermidis  *  Critical Value/Significant Value, preliminary result only, called to and read back by  Peewee Shepard RN SH73, @1956 8/21/17.DH.    Susceptibility testing done on previous specimen  Cultured on the 1st day of incubation:  Staphylococcus epidermidis  This isolate DOES NOT demonstrate inducible clindamycin resistance in vitro. Clindamycin   is susceptible and could be used when indicated, however, erythromycin is resistant and   should not be used.  *  Critical Value/Significant Value, preliminary result only, called to and read back by  Miranda Ellsworth RN SH75, @1701 8/21/17.Dh.    (Note)  POSITIVE for STAPHYLOCOCCUS EPIDERMIDIS and POSITIVE for the mecA  gene (resistant to methicillin) by [employer] multiplex nucleic acid  test. Final identification and antimicrobial susceptibility testing  will be verified by standard methods.    Specimen tested with [employer] multiplex, gram-positive blood culture  nucleic acid test for the following targets: Staph aureus, Staph  epidermidis, Staph lugdunensis, other Staph species, Enterococcus  faecalis, Enterococcus faecium, Streptococcus species, S. agalactiae,  S. anginosus grp., S. pneumoniae, S. pyogenes, Listeria sp.,  mecA  (methicillin resistance) and Rene/B (vancomycin resistance).    Critical Value/Significant Value called to and read back by Peewee Shepard RN SH73,#[id number] 8/21/17.DH.       Most Recent TSH, T4 and A1c Labs:  Recent Labs   Lab Test  08/21/17   0750  08/21/17   0625   TSH  1.19   --    A1C   --   8.2*     Results for orders placed or performed during the hospital encounter of 08/20/17   CT Head w/o Contrast     Value    Radiologist flags Finding concerning for an acute infarct (AA)    Narrative    CT SCAN OF THE HEAD WITHOUT CONTRAST   8/20/2017 7:19 PM     HISTORY: AMS.    TECHNIQUE:  Axial images of the head and coronal reformations without  IV contrast material. Radiation dose for this scan was reduced using  automated exposure control, adjustment of the mA and/or kV according  to patient size, or iterative reconstruction technique.    COMPARISON: Head CT 6/24/2014.    FINDINGS: Focal loss of gray-white matter differentiation and apparent  gyral swelling in the medial left occipital lobe is concerning for an  acute infarct. Abnormal hypodensity also extends into the left  temporal lobe. Fairly well-defined hypodensity in the paramedian left  frontal lobe with loss of the cortex in that region may be from  previous infarct although it is new compared to prior CT. Extensive  confluent periventricular white matter hypodensities. Moderate diffuse  parenchymal volume loss. There appears to be infarcts within the  thalami bilaterally of indeterminate age.    No evidence of acute intracranial hemorrhage. Mild regional edema in  the left occipital lobe without significant midline shift. The  ventricles are not enlarged out of proportion to the cerebral sulci  and there is no evidence of hydrocephalus. The basal cisterns are  patent.    There is no evidence of trauma. The visualized portions of the sinuses  and mastoids appear normal.      Impression    IMPRESSION:     1. Findings concerning for an acute infarct within  the left occipital  lobe. Mild regional edema without significant mass effect or midline  shift. Hypodensity in this region also extends into the left temporal  lobe and region of ischemia in this area cannot be excluded.  Evaluation for regions of acute ischemia would be better appreciated  with MR.  2. New area of encephalomalacia within the paramedian left frontal  lobe likely from previous infarct although an acute component of  ischemia is difficult to exclude. Indeterminate age infarcts in  bilateral thalami. Again this would be better evaluated with  above-mentioned MRI.  3. Fairly extensive confluent periventricular white matter  hypodensities which are nonspecific, but likely related to chronic  microvascular ischemic disease. Moderate diffuse parenchymal volume  loss. These findings have progressed since previous MR from 6/24/2014.    [Critical Result: Finding concerning for an acute infarct]    Finding was identified on 8/20/2017 7:21 PM.     Dr. Salvatore Meneses was contacted by me on 8/20/2017 7:31 PM and  verbalized understanding of the critical result.     KY FLOR MD   XR Chest Port 1 View    Narrative    CHEST PORTABLE ONE VIEW    8/20/2017 7:00 PM     HISTORY: AMS and hypoxia.    COMPARISON: Chest x-rays dated 8/6/2017.    FINDINGS:  Stable opacity in the left mid to lower lung could  represent airspace disease and/or atelectasis. Lungs are persistently  hypoaerated. Cardiac silhouette remains prominent. No pneumothorax or  right pleural fluid collection. Probable left pleural fluid collection  is present. No fracture.      Impression    IMPRESSION:  1. Essentially stable chest x-ray since prior study dated 8/6/2017  with left basilar opacity likely due to atelectasis, infiltrate,  effusion, and/or enlarged cardiac silhouette.  2. Hypoaeration is again noted.     BRITTNEY CUI MD   MRI Brain w/o contrast (use if contrast contraindicated)    Narrative    MR BRAIN WITHOUT CONTRAST August 21,  2017 6:04 AM     HISTORY: CVI.    TECHNIQUE: Multiplanar, multisequence MRI of the brain without and  with IV contrast material.    COMPARISON: CT dated 8/20/2017.    FINDINGS: There is generalized atrophy of the brain.  White matter  changes are present in the cerebral hemispheres that are consistent  with small vessel ischemic disease in this age patient.  Diffusion-weighted images reveal areas of restricted diffusion in both  the right and left occipital lobes and in the medial aspect of the  left posterior temporal region. These are in the distribution of both  the right and left posterior cerebral arteries. There are areas of T1  hyperintensity in the cortex of both the right and left occipital  regions, these are in the areas of restricted diffusion. 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  There is a small area of restricted diffusion in the left inferior  cerebellum. There is a suggestion of a small area of restricted  diffusion in the left frontal lobe and in the white matter of both the  right and left frontal lobes. These areas of questionable restricted  diffusion are not well seen on the ADC images and these areas could be  secondary to T2 shine through. On gradient-echo images there are areas  of susceptibility artifact in the left frontal lobe. There is also a  small susceptibility artifact in the right frontal lobe. These are  consistent with hemosiderin deposition secondary to chronic  microbleeds.         Impression    IMPRESSION:   1. Areas of infarction in both the right and left occipital regions.  There is T1 hyperintensity in the cortex of these areas of infarction.  The T1 hyperintensity is probably due to petechial hemorrhage  secondary to laminar necrosis. This would suggest that these areas of  infarction are subacute.  2. Questionable areas of restricted diffusion in both the right and  left frontal regions. These could also be due to subacute infarcts  but  they could be secondary to T2 shine through.  3. 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  4. Age-related changes including generalized atrophy of the brain.  White matter changes in the cerebral hemispheres consistent with small  vessel ischemic disease in this age patient.    KAREN SHELL MD       Echo Complete Bubble [[id number]] Collected: 17 1048      Order Status: Completed Updated: 17 1518     Narrative:       [id number]  [facility]  [id number]  [id number]^PRETTY^ANTONIO^           [facility]  Echocardiography Laboratory  [address] [place], [place] [address]        Name: RAÚL MERCEDES  MRN: [id number]  : 1926  Study Date: 2017 10:48 AM  Age: 89 yrs  Gender: Female  Patient Location: [facility]  Reason For Study: CVA  Ordering Physician: ANTONIO OLSEN  Performed By: Duane Brook, RDCS, FASE     BSA: 1.6 m2  Height: 60 in  Weight: 145 lb  HR: 87  BP: 140/87 mmHg  _____________________________________________________________________________  __        Procedure  Complete Portable Echo Adult.  _____________________________________________________________________________  __        Interpretation Summary     The left ventricle is normal in size.  The visual ejection fraction is estimated at 55-60%.  No significant valvular heart disease.  Small pericardial effusion  There are no echocardiographic indications of cardiac tamponade.  There is no color Doppler evidence of an atrial shunt.  A contrast injection (Bubble Study) was performed that was negative for flow  across the interatrial septum.  _____________________________________________________________________________  __        Left Ventricle  The left ventricle is normal in size. There is normal left ventricular wall  thickness. The visual ejection fraction is estimated at 55-60%. No regional  wall motion abnormalities  noted.     Right Ventricle  Thickened right ventricle free wall, moderate. The right ventricle is normal  in size and function.     Atria  The left atrium is borderline dilated. Right atrial size is normal. There is  no color Doppler evidence of an atrial shunt. A contrast injection (Bubble  Study) was performed that was negative for flow across the interatrial septum.     Mitral Valve  The mitral valve leaflets are mildly thickened. There is mild (1+) mitral  regurgitation.        Tricuspid Valve  There is mild (1+) tricuspid regurgitation. Normal IVC (1.5-2.5cm) with >50%  respiratory collapse; right atrial pressure is estimated at 5-10mmHg.     Aortic Valve  There is trivial trileaflet aortic sclerosis. There is trace aortic  regurgitation.     Pulmonic Valve  There is trace pulmonic valvular regurgitation.     Vessels  Normal size aorta. The aortic root is normal size.     Pericardium  Small pericardial effusion. There are no echocardiographic indications of  cardiac tamponade.        Rhythm  The rhythm was normal sinus.  _____________________________________________________________________________  __  MMode/2D Measurements & Calculations     IVSd: 1.1 cm  LVIDd: 4.4 cm  LVIDs: 2.8 cm  LVPWd: 1.0 cm  FS: 37.1 %  EDV(Teich): 86.7 ml  ESV(Teich): 28.3 ml  LV mass(C)d: 160.6 grams  LV mass(C)dI: 98.7 grams/m2  Ao root diam: 3.2 cm  LA dimension: 4.0 cm  asc Aorta Diam: 2.8 cm  LA/Ao: 1.3        Doppler Measurements & Calculations  MV E max ramesh: 99.7 cm/sec  MV A max ramesh: 102.7 cm/sec  MV E/A: 0.97  MV dec time: 0.14 sec  Lateral E/e': 21.8              _____________________________________________________________________________  __        Report approved by: Selina Hernandez 08/21/2017 12:46 PM

## 2017-08-23 NOTE — PROGRESS NOTES
[facility]    Hospitalist Progress Note      Assessment & Plan   Celia Lewis is a 89 year old female who was admitted on 8/20/2017.  Past history of advanced dementia (non-verbal, bed-bound, s/p G-tube), HTN, DM II, CKD, chronic decubitus ulcer who presents with decreased responsiveness, found to have sepsis and subacute CVA.    Acute/subacute ischemic occipital stroke.  Admission CT head concerning for acute left occipital CVA.  MRI 8/21 with right and left occipital infarcts, possibly subacute.  DM uncontrolled with A1C of 8.2%.  TTE 8/21 with EF 55-60%, no shunt.  No full anticoagulation due to evidence of microbleeds and no statin due to severe dementia per Neuro.  - continue daily aspirin, Neuro has signed off      Sepsis, most likely due to UTI.  Febrile to 101, tachycardic with leukocytosis and lactate 5.6.  UA abnormal, CXR with possible left basilar opacity, though appearing unchanged from prior.  Initiated on vanco and zoysn at admission.  - both admission blood cultures growing Staph epi, suspect contaminant despite both positive  - repeat blood cultures 8/22 ngtd  - stop vanco and zosyn, start levofloxacin  - no urine culture obtained at admission  - persistent elevation of lactate, appears this is her baseline per chart review and will not monitor further      Advanced Alzheimer's dementia.  Baseline nonverbal status, bed-bound and s/p G-tube.  She was found to be more altered than her normal self.  She is not on any specific treatment for her Alzheimer's dementia.     - appreciate Nutrition recs for tube feeds      Stage III sacral decubitus ulcer and bilateral earlobe ulcers.  The patient had this during the prior admission.  No clinical evidence of active infection.    - WOC following       Elevated troponin.  Serial troponin mildly elevated but largely flat.  TTE with normal EF and no WMA, suspect demand ischemia in setting of sepsis.       Diabetes mellitus type 2.  A1C this  admission 8.2%.  PTA regimen of Lantus 30 units bid with SSI.  - remains hyperglycemic, will increase Lantus to 35 units bid (give 5 units x1 now)  - continue high dose SSI      Chronic kidney disease, stage III.  Baseline Cr about 0.9.  Admission Cr 1.13.  - Cr 0.8 on 8/23    Hypernatremia.  Na 150 on 8/22, likely due to holding free water through feeding tube with NS infusion.  - Na improved to 145 on 8/23, continue D5W at 50 ml/hr and PTA free water flush with Na check in AM      Benign essential hypertension.  Prior to admission, she is on metoprolol.   - hold metoprolol for permissive HTN, ok to resume tomorrow      GOALS OF CARE:  Patient is an [employer] and any changes in treatment plan/goals of care must be discussed with .  Son, Dr. Salvatore Lewis, who is an immunologist switched code status to DNR/DNI on 8/21 following conversation with Dr. Sevilla ([facility]).  Wants to continue with all other medical interventions, including pressors.  Family is not interested in Palliative Care.    DVT Prophylaxis: Pneumatic Compression Devices  Code Status: DNR/DNI    Disposition: Expected discharge tomorrow if Na continues to improve and continues to improve with transition to levofloxacin.      Updated son, Dr. Lewis, via phone 8/23.    Piotr Mercado    Interval History   Patient is lying awake in bed, does not respond to questions or track.  No acute events overnight.    -Data reviewed today: I reviewed all new labs and imaging results over the last 24 hours. I personally reviewed no images or EKG's today.    Physical Exam   Temp: 97.3  F (36.3  C) Temp src: Axillary BP: 162/81   Heart Rate: 83 Resp: 26 SpO2: 96 % O2 Device: None (Room air)    Vitals:    08/20/17 2024 08/22/17 0558 08/23/17 0609   Weight: 65.9 kg (145 lb 4.5 oz) 64.8 kg (142 lb 13.7 oz) 69.1 kg (152 lb 5.4 oz)     Vital Signs with Ranges  Temp:  [97.2  F (36.2  C)-98.2  F (36.8  C)] 97.3  F (36.3  C)  Heart Rate:  [83-88] 83  Resp:  [20-26]  26  BP: (136-162)/(75-86) 162/81  SpO2:  [95 %-100 %] 96 %  I/O last 3 completed shifts:  In: 1392 [I.V.:692; NG/GT:700]  Out: 1225 [Urine:1225]    Constitutional: Well developed, well nourished female in no acute distress  Respiratory: lungs clear bilaterally without crackles or wheezes, no tachypnea  Cardiovascular: regular rate and rhythm, normal S1/S2 without murmur, rubs or gallops, no peripheral edema  GI: abdomen soft, no signs of tenderness, non-distended, normal bowel sounds, G-tube in place  Skin/Integumen:  No rash or bruising, decub ulcer not examined today  Other: Awake, does not follow commands or track, non-verbal    Medications     D5W 50 mL/hr at 08/22/17 1325     - MEDICATION INSTRUCTIONS -         insulin glargine  35 Units Subcutaneous BID     insulin glargine  5 Units Subcutaneous Once     oxyCODONE  2 mg Per Feeding Tube TID     simethicone  80 mg Per Feeding Tube TID     miconazole   Topical BID     multivitamins with minerals  15 mL Per Feeding Tube Daily     fiber modular  1 packet Per Feeding Tube BID     aspirin  325 mg Oral Daily    Or     aspirin  300 mg Rectal Daily     piperacillin-tazobactam  3.375 g Intravenous Q6H     insulin aspart  1-12 Units Subcutaneous Q4H     sodium hypochlorite   Topical BID     lactobacillus rhamnosus (GG)  1 capsule Oral or Feeding Tube Daily     ranitidine  150 mg Per Feeding Tube Daily       Data     Recent Labs  Lab 08/23/17  0916 08/23/17  0000 08/22/17  1630 08/22/17  0802 08/21/17  2325 08/21/17  0750 08/21/17  0625 08/20/17  2330 08/20/17  1853   WBC 7.9  --   --  8.7  --   --  10.8  --  14.3*   HGB 11.0*  --   --  10.2*  --   --  11.9  --  12.8   MCV 99  --   --  99  --   --  99  --  101*     --   --  237  --   --  256  --  317   INR  --   --   --   --   --   --   --   --  0.98   * 147* 148* 150*  --  144  --   --  142   POTASSIUM 3.4  --   --  3.7  --  5.1  --   --  5.1   CHLORIDE 112*  --   --  117*  --  112*  --   --  107   CO2 21   --   --  21  --  22  --   --  23   BUN 31*  --   --  37*  --  51*  --   --  60*   CR 0.84  --   --  0.85 0.87 1.03 1.01  --  1.13*   ANIONGAP 12  --   --  12  --  10  --   --  12   NELLIE 8.1*  --   --  8.0*  --  8.5  --   --  9.2   *  --   --  211*  --  288*  --   --  258*   ALBUMIN  --   --   --   --   --   --   --   --  3.3*   PROTTOTAL  --   --   --   --   --   --   --   --  8.1   BILITOTAL  --   --   --   --   --   --   --   --  0.5   ALKPHOS  --   --   --   --   --   --   --   --  82   ALT  --   --   --   --   --   --   --   --  28   AST  --   --   --   --   --   --   --   --  23   LIPASE  --   --   --   --   --   --   --   --  174   TROPI  --   --   --   --   --   --  0.558* 0.706* 0.658*       No results found for this or any previous visit (from the past 24 hour(s)).

## 2017-08-23 NOTE — PLAN OF CARE
Problem: Goal Outcome Summary  Goal: Goal Outcome Summary  Outcome: No Change  Pt is non-verbal, ZOEY orientation. Somnolent, alert at times. Unable to follow neuro commands. VSS. Tele SR. NPO, TF and 200 mL flushes q4h. Bedrest, T&R q2h. Fonseca in place, patent. Incontinent bowel. U/C pending. NA+ elevated, MD aware, pt on 5% dextrose and getting labs re-drawn in AM. Orders for wound care- ulcer on coccyx with dressing in place. Dressing on ears bilaterally. Plan to d/c to LTC.

## 2017-08-23 NOTE — PROVIDER NOTIFICATION
Call to MD on call, pt with NA at 148, no recheck until am, want NA checked sooner? Also, slight bump in lactic with draw at same time. MD entered orders for Na checks over NOC and lactic re-check in am with morning labs.

## 2017-08-24 VITALS
SYSTOLIC BLOOD PRESSURE: 162 MMHG | BODY MASS INDEX: 30.08 KG/M2 | WEIGHT: 153.22 LBS | OXYGEN SATURATION: 99 % | RESPIRATION RATE: 16 BRPM | DIASTOLIC BLOOD PRESSURE: 81 MMHG | HEIGHT: 60 IN | TEMPERATURE: 97.6 F

## 2017-08-24 LAB
C DIFF STL QL CULT: ABNORMAL
C DIFF TOX B STL QL: NEGATIVE
CREAT SERPL-MCNC: 0.74 MG/DL (ref 0.52–1.04)
ERYTHROCYTE [DISTWIDTH] IN BLOOD BY AUTOMATED COUNT: 16.1 % (ref 10–15)
GFR SERPL CREATININE-BSD FRML MDRD: 73 ML/MIN/1.7M2
GLUCOSE BLDC GLUCOMTR-MCNC: 222 MG/DL (ref 70–99)
GLUCOSE BLDC GLUCOMTR-MCNC: 234 MG/DL (ref 70–99)
GLUCOSE BLDC GLUCOMTR-MCNC: 257 MG/DL (ref 70–99)
GLUCOSE BLDC GLUCOMTR-MCNC: 265 MG/DL (ref 70–99)
GLUCOSE BLDC GLUCOMTR-MCNC: 283 MG/DL (ref 70–99)
HCT VFR BLD AUTO: 34.2 % (ref 35–47)
HGB BLD-MCNC: 11.3 G/DL (ref 11.7–15.7)
MCH RBC QN AUTO: 31.3 PG (ref 26.5–33)
MCHC RBC AUTO-ENTMCNC: 33 G/DL (ref 31.5–36.5)
MCV RBC AUTO: 95 FL (ref 78–100)
PLATELET # BLD AUTO: 223 10E9/L (ref 150–450)
RBC # BLD AUTO: 3.61 10E12/L (ref 3.8–5.2)
SODIUM SERPL-SCNC: 135 MMOL/L (ref 133–144)
SPECIMEN SOURCE: ABNORMAL
SPECIMEN SOURCE: NORMAL
WBC # BLD AUTO: 9.4 10E9/L (ref 4–11)

## 2017-08-24 PROCEDURE — 27210429 ZZH NUTRITION PRODUCT INTERMEDIATE LITER

## 2017-08-24 PROCEDURE — 25000132 ZZH RX MED GY IP 250 OP 250 PS 637: Performed by: HOSPITALIST

## 2017-08-24 PROCEDURE — 84295 ASSAY OF SERUM SODIUM: CPT | Performed by: HOSPITALIST

## 2017-08-24 PROCEDURE — 25000128 H RX IP 250 OP 636: Performed by: HOSPITALIST

## 2017-08-24 PROCEDURE — 82565 ASSAY OF CREATININE: CPT | Performed by: HOSPITALIST

## 2017-08-24 PROCEDURE — 00000146 ZZHCL STATISTIC GLUCOSE BY METER IP

## 2017-08-24 PROCEDURE — 25000132 ZZH RX MED GY IP 250 OP 250 PS 637: Performed by: INTERNAL MEDICINE

## 2017-08-24 PROCEDURE — 87493 C DIFF AMPLIFIED PROBE: CPT | Performed by: INTERNAL MEDICINE

## 2017-08-24 PROCEDURE — 25000131 ZZH RX MED GY IP 250 OP 636 PS 637: Performed by: HOSPITALIST

## 2017-08-24 PROCEDURE — 25000132 ZZH RX MED GY IP 250 OP 250 PS 637

## 2017-08-24 PROCEDURE — 85027 COMPLETE CBC AUTOMATED: CPT | Performed by: HOSPITALIST

## 2017-08-24 PROCEDURE — 36415 COLL VENOUS BLD VENIPUNCTURE: CPT | Performed by: HOSPITALIST

## 2017-08-24 PROCEDURE — 87075 CULTR BACTERIA EXCEPT BLOOD: CPT | Performed by: INTERNAL MEDICINE

## 2017-08-24 RX ORDER — FLUCONAZOLE 40 MG/ML
200 POWDER, FOR SUSPENSION ORAL DAILY
Qty: 150 ML | DISCHARGE
Start: 2017-08-24 | End: 2017-08-26

## 2017-08-24 RX ORDER — FLUCONAZOLE 40 MG/ML
200 POWDER, FOR SUSPENSION ORAL DAILY
Status: DISCONTINUED | OUTPATIENT
Start: 2017-08-24 | End: 2017-08-24 | Stop reason: HOSPADM

## 2017-08-24 RX ADMIN — MULTIVITAMIN 15 ML: LIQUID ORAL at 09:28

## 2017-08-24 RX ADMIN — ACETAMINOPHEN 650 MG: 325 SOLUTION ORAL at 17:40

## 2017-08-24 RX ADMIN — OXYCODONE HYDROCHLORIDE 2 MG: 5 SOLUTION ORAL at 17:39

## 2017-08-24 RX ADMIN — OXYCODONE HYDROCHLORIDE 2 MG: 5 SOLUTION ORAL at 09:28

## 2017-08-24 RX ADMIN — Medication 7 ML: at 09:28

## 2017-08-24 RX ADMIN — LEVOFLOXACIN 250 MG: 5 INJECTION, SOLUTION INTRAVENOUS at 12:49

## 2017-08-24 RX ADMIN — MICONAZOLE NITRATE: 2 POWDER TOPICAL at 09:30

## 2017-08-24 RX ADMIN — RANITIDINE HYDROCHLORIDE 150 MG: 150 SOLUTION ORAL at 09:28

## 2017-08-24 RX ADMIN — Medication 1 CAPSULE: at 09:29

## 2017-08-24 RX ADMIN — SIMETHICONE 80 MG: 20 EMULSION ORAL at 09:28

## 2017-08-24 RX ADMIN — INSULIN ASPART 6 UNITS: 100 INJECTION, SOLUTION INTRAVENOUS; SUBCUTANEOUS at 17:42

## 2017-08-24 RX ADMIN — SIMETHICONE 80 MG: 20 EMULSION ORAL at 17:40

## 2017-08-24 RX ADMIN — INSULIN ASPART 5 UNITS: 100 INJECTION, SOLUTION INTRAVENOUS; SUBCUTANEOUS at 06:20

## 2017-08-24 RX ADMIN — INSULIN ASPART 6 UNITS: 100 INJECTION, SOLUTION INTRAVENOUS; SUBCUTANEOUS at 15:03

## 2017-08-24 RX ADMIN — INSULIN ASPART 4 UNITS: 100 INJECTION, SOLUTION INTRAVENOUS; SUBCUTANEOUS at 09:29

## 2017-08-24 RX ADMIN — INSULIN ASPART 4 UNITS: 100 INJECTION, SOLUTION INTRAVENOUS; SUBCUTANEOUS at 03:17

## 2017-08-24 RX ADMIN — HYOSCYAMINE SULFATE: 16 SOLUTION at 09:30

## 2017-08-24 RX ADMIN — FLUCONAZOLE 200 MG: 40 POWDER, FOR SUSPENSION ORAL at 17:40

## 2017-08-24 RX ADMIN — ASPIRIN 325 MG ORAL TABLET 325 MG: 325 PILL ORAL at 09:29

## 2017-08-24 RX ADMIN — Medication 1 PACKET: at 09:29

## 2017-08-24 RX ADMIN — INSULIN GLARGINE 40 UNITS: 100 INJECTION, SOLUTION SUBCUTANEOUS at 10:18

## 2017-08-24 ASSESSMENT — VISUAL ACUITY
OU: OTHER (SEE COMMENT)

## 2017-08-24 NOTE — PLAN OF CARE
Problem: Goal Outcome Summary  Goal: Goal Outcome Summary  Outcome: Improving  Patient is here due to stroke and sepsis from UTI. A&O to self only. Neuros difficult to assess, patient is non-verbal and not cooperative in assessment. VSS. Tele NSR. Tube feed at goal - 45ml/hr - tolerating well. Up with A2 + lift. No nonverbal indicators of pain. Patient to discharge to [facility]. Continue to monitor and follow POC.

## 2017-08-24 NOTE — PROGRESS NOTES
SW:  D/C is anticipated once Cdiff culture is back.  Writer contacted [facility] and spoke with Emily.  Notified her of the pending Cdiff culture and she reports patient doesn't have a roommate.    Tentative transportation set up for 1800 via stretcher.  Per nursing, patient has significant dementia with contractures and is not able to be sit safely in a straight back w/c.  Time of transport can be changed if time Cdiff culture is still pending at 1730.    Update:  Emmons back from Emily [phone].  She misspoke earlier and reports patient has a roommate, thus if she is positive for Cdiff, they will need to move her into a private room and cannot make this move till Friday.  Awaiting to hear results of culture.  Writer did call son  Joshua and updated him regarding the d/c plan today or tomorrow pending Cdiff culture results.  Explained bedside nurse will call and let him know if his mother is discharged today.  Bedside nurse/charge will call [facility] also to update them once culture results are back by calling [phone] and then fax orders and scripts once MD orders are signed.

## 2017-08-24 NOTE — PLAN OF CARE
Problem: Goal Outcome Summary  Goal: Goal Outcome Summary  Outcome: No Change  VSS, BP elevated but within parameters. Afebrile. On RA, expiratory wheezes noted when HOB lowered for cares, diminished breath sounds at bilateral bases. Neuros unchanged, pt does not speak and keeps eyes tightly closed during attempted assessments. Possible R gaze preference, RUE and BLE contracted. Fonseca with good output of cloudy yellow urine. Tolerating TF running at goal of 45 mL/hr with free water flushes of 200 mL q4 hours, low to no residuals. Incontinent of one loose stool today. Dressing changed on coccyx wound and bilateral ear wounds per POC, order verified with WOC RASHMI Doshi. Bedrest, turned to R and L side q2 hours. On D5W at 50 mL/hr, elevated BG with SSI Aspart, lantus dose adjusted by MD.

## 2017-08-24 NOTE — PLAN OF CARE
Problem: Goal Outcome Summary  Goal: Goal Outcome Summary  Outcome: No Change  Pt is nonverbal and ZOEY to  orientation. Pt has been somnolent but opens eyes when spoken to, Pt's has trouble following neuro commands. VSS. Tele NSR. NPO TF 45 ml/hr and 200 ml flushes Q 4 . T/R Q2. Dressing changed coccyx pressure ulcer. Wound bed is slough and red. Wound care completed per order. Bilateral LE edema. Plan to DC to LTC.

## 2017-08-25 NOTE — PROGRESS NOTES
Pt somnolent/obtunded and ZOEY orientation. Minimally moves ext, not to command, pulls legs up will fully. Closes eyes shut tightly when this writer attempts to assess pupillary response. Tele SR. Nonverbal. PEG tube dressing intact, flushes well with minimal residual. NPO. BG covered prior to d/c. LS dim. Fonseca changed and patent. Coccyx wound changed per order, packing intact. Ear dressings intact, to be changed at facility tomorrow. Rooke boots in place and T/R q2h with oral cares. Medicated with scheduled oxycodone and PRN tylenol prior to d/c. Inc of bowel x1. Family updated by charge RN of d/c prior to transport. Pt d/c via [facility] stretcher at 1830.

## 2017-08-27 LAB
BACTERIA SPEC CULT: ABNORMAL
BACTERIA SPEC CULT: ABNORMAL
Lab: ABNORMAL
SPECIMEN SOURCE: ABNORMAL

## 2017-08-28 LAB
BACTERIA SPEC CULT: NO GROWTH
BACTERIA SPEC CULT: NO GROWTH
Lab: NORMAL
Lab: NORMAL
SPECIMEN SOURCE: NORMAL
SPECIMEN SOURCE: NORMAL

## 2017-08-29 LAB
BACTERIA SPEC CULT: NO GROWTH
SPECIMEN SOURCE: NORMAL

## 2017-09-23 ENCOUNTER — HOSPITAL ENCOUNTER (INPATIENT)
Facility: CLINIC | Age: 82
LOS: 4 days | Discharge: NURSING, REHAB OR LONG-TERM CARE | DRG: 871 | End: 2017-09-27
Attending: EMERGENCY MEDICINE | Admitting: INTERNAL MEDICINE
Payer: COMMERCIAL

## 2017-09-23 ENCOUNTER — APPOINTMENT (OUTPATIENT)
Dept: CT IMAGING | Facility: CLINIC | Age: 82
DRG: 871 | End: 2017-09-23
Attending: EMERGENCY MEDICINE
Payer: COMMERCIAL

## 2017-09-23 ENCOUNTER — APPOINTMENT (OUTPATIENT)
Dept: GENERAL RADIOLOGY | Facility: CLINIC | Age: 82
DRG: 871 | End: 2017-09-23
Attending: EMERGENCY MEDICINE
Payer: COMMERCIAL

## 2017-09-23 DIAGNOSIS — T83.511A URINARY TRACT INFECTION ASSOCIATED WITH INDWELLING URETHRAL CATHETER, INITIAL ENCOUNTER (H): ICD-10-CM

## 2017-09-23 DIAGNOSIS — A41.9 SEVERE SEPSIS (H): ICD-10-CM

## 2017-09-23 DIAGNOSIS — L89.153 SACRAL DECUBITUS ULCER, STAGE III (H): ICD-10-CM

## 2017-09-23 DIAGNOSIS — R65.20 SEVERE SEPSIS (H): ICD-10-CM

## 2017-09-23 DIAGNOSIS — M19.019 PRIMARY LOCALIZED OSTEOARTHROSIS OF SHOULDER REGION, UNSPECIFIED LATERALITY: ICD-10-CM

## 2017-09-23 DIAGNOSIS — M25.50 MULTIPLE JOINT PAIN: ICD-10-CM

## 2017-09-23 DIAGNOSIS — N39.0 URINARY TRACT INFECTION ASSOCIATED WITH INDWELLING URETHRAL CATHETER, INITIAL ENCOUNTER (H): ICD-10-CM

## 2017-09-23 PROBLEM — J69.0 ASPIRATION PNEUMONIA (H): Status: ACTIVE | Noted: 2017-09-23

## 2017-09-23 LAB
ALBUMIN SERPL-MCNC: 3.4 G/DL (ref 3.4–5)
ALBUMIN UR-MCNC: 100 MG/DL
ALP SERPL-CCNC: 64 U/L (ref 40–150)
ALT SERPL W P-5'-P-CCNC: 28 U/L (ref 0–50)
ANION GAP SERPL CALCULATED.3IONS-SCNC: 11 MMOL/L (ref 3–14)
APPEARANCE UR: ABNORMAL
AST SERPL W P-5'-P-CCNC: 22 U/L (ref 0–45)
BACTERIA #/AREA URNS HPF: ABNORMAL /HPF
BASOPHILS # BLD AUTO: 0.1 10E9/L (ref 0–0.2)
BASOPHILS NFR BLD AUTO: 0.6 %
BILIRUB SERPL-MCNC: 0.5 MG/DL (ref 0.2–1.3)
BILIRUB UR QL STRIP: NEGATIVE
BUN SERPL-MCNC: 67 MG/DL (ref 7–30)
CALCIUM SERPL-MCNC: 9.1 MG/DL (ref 8.5–10.1)
CHLORIDE SERPL-SCNC: 111 MMOL/L (ref 94–109)
CO2 SERPL-SCNC: 27 MMOL/L (ref 20–32)
COLOR UR AUTO: YELLOW
CREAT SERPL-MCNC: 1.38 MG/DL (ref 0.52–1.04)
DIFFERENTIAL METHOD BLD: ABNORMAL
EOSINOPHIL # BLD AUTO: 0.4 10E9/L (ref 0–0.7)
EOSINOPHIL NFR BLD AUTO: 3 %
ERYTHROCYTE [DISTWIDTH] IN BLOOD BY AUTOMATED COUNT: 14.4 % (ref 10–15)
GFR SERPL CREATININE-BSD FRML MDRD: 36 ML/MIN/1.7M2
GLUCOSE BLDC GLUCOMTR-MCNC: 250 MG/DL (ref 70–99)
GLUCOSE BLDC GLUCOMTR-MCNC: 273 MG/DL (ref 70–99)
GLUCOSE SERPL-MCNC: 294 MG/DL (ref 70–99)
GLUCOSE UR STRIP-MCNC: NEGATIVE MG/DL
HCT VFR BLD AUTO: 45.8 % (ref 35–47)
HGB BLD-MCNC: 14.4 G/DL (ref 11.7–15.7)
HGB UR QL STRIP: ABNORMAL
IMM GRANULOCYTES # BLD: 0.1 10E9/L (ref 0–0.4)
IMM GRANULOCYTES NFR BLD: 0.4 %
INTERPRETATION ECG - MUSE: NORMAL
KETONES UR STRIP-MCNC: 5 MG/DL
LACTATE BLD-SCNC: 3.9 MMOL/L (ref 0.7–2)
LACTATE SERPL-SCNC: 3.5 MMOL/L (ref 0.4–2)
LEUKOCYTE ESTERASE UR QL STRIP: ABNORMAL
LYMPHOCYTES # BLD AUTO: 2.8 10E9/L (ref 0.8–5.3)
LYMPHOCYTES NFR BLD AUTO: 22.2 %
MCH RBC QN AUTO: 32.2 PG (ref 26.5–33)
MCHC RBC AUTO-ENTMCNC: 31.4 G/DL (ref 31.5–36.5)
MCV RBC AUTO: 103 FL (ref 78–100)
MONOCYTES # BLD AUTO: 0.8 10E9/L (ref 0–1.3)
MONOCYTES NFR BLD AUTO: 6.2 %
MUCOUS THREADS #/AREA URNS LPF: PRESENT /LPF
NEUTROPHILS # BLD AUTO: 8.4 10E9/L (ref 1.6–8.3)
NEUTROPHILS NFR BLD AUTO: 67.6 %
NITRATE UR QL: NEGATIVE
NRBC # BLD AUTO: 0 10*3/UL
NRBC BLD AUTO-RTO: 0 /100
PH UR STRIP: 6.5 PH (ref 5–7)
PLATELET # BLD AUTO: 235 10E9/L (ref 150–450)
POTASSIUM SERPL-SCNC: 4.4 MMOL/L (ref 3.4–5.3)
PROT SERPL-MCNC: 8.1 G/DL (ref 6.8–8.8)
RBC # BLD AUTO: 4.47 10E12/L (ref 3.8–5.2)
RBC #/AREA URNS AUTO: 5 /HPF (ref 0–2)
SODIUM SERPL-SCNC: 149 MMOL/L (ref 133–144)
SOURCE: ABNORMAL
SP GR UR STRIP: 1.02 (ref 1–1.03)
UROBILINOGEN UR STRIP-MCNC: NORMAL MG/DL (ref 0–2)
WBC # BLD AUTO: 12.4 10E9/L (ref 4–11)
WBC #/AREA URNS AUTO: 74 /HPF (ref 0–2)
WBC CLUMPS #/AREA URNS HPF: PRESENT /HPF

## 2017-09-23 PROCEDURE — 40000885 ZZH STATISTIC STEP DOWN HRS EVENING

## 2017-09-23 PROCEDURE — 81001 URINALYSIS AUTO W/SCOPE: CPT | Performed by: EMERGENCY MEDICINE

## 2017-09-23 PROCEDURE — 70450 CT HEAD/BRAIN W/O DYE: CPT

## 2017-09-23 PROCEDURE — 83605 ASSAY OF LACTIC ACID: CPT | Performed by: EMERGENCY MEDICINE

## 2017-09-23 PROCEDURE — 96365 THER/PROPH/DIAG IV INF INIT: CPT

## 2017-09-23 PROCEDURE — 25000132 ZZH RX MED GY IP 250 OP 250 PS 637: Performed by: EMERGENCY MEDICINE

## 2017-09-23 PROCEDURE — 85025 COMPLETE CBC W/AUTO DIFF WBC: CPT | Performed by: EMERGENCY MEDICINE

## 2017-09-23 PROCEDURE — 40000884 ZZH STATISTIC STEP DOWN HRS NIGHT

## 2017-09-23 PROCEDURE — 99285 EMERGENCY DEPT VISIT HI MDM: CPT | Mod: 25

## 2017-09-23 PROCEDURE — 93005 ELECTROCARDIOGRAM TRACING: CPT

## 2017-09-23 PROCEDURE — 25000132 ZZH RX MED GY IP 250 OP 250 PS 637: Performed by: INTERNAL MEDICINE

## 2017-09-23 PROCEDURE — 36415 COLL VENOUS BLD VENIPUNCTURE: CPT

## 2017-09-23 PROCEDURE — 21400002 ZZH R&B CCU CICU CRITICAL

## 2017-09-23 PROCEDURE — 25000131 ZZH RX MED GY IP 250 OP 636 PS 637: Performed by: INTERNAL MEDICINE

## 2017-09-23 PROCEDURE — 25000125 ZZHC RX 250: Performed by: INTERNAL MEDICINE

## 2017-09-23 PROCEDURE — 25000128 H RX IP 250 OP 636

## 2017-09-23 PROCEDURE — 51702 INSERT TEMP BLADDER CATH: CPT

## 2017-09-23 PROCEDURE — 87088 URINE BACTERIA CULTURE: CPT | Performed by: EMERGENCY MEDICINE

## 2017-09-23 PROCEDURE — 80053 COMPREHEN METABOLIC PANEL: CPT | Performed by: EMERGENCY MEDICINE

## 2017-09-23 PROCEDURE — 71010 XR CHEST 1 VW: CPT

## 2017-09-23 PROCEDURE — 87040 BLOOD CULTURE FOR BACTERIA: CPT | Performed by: EMERGENCY MEDICINE

## 2017-09-23 PROCEDURE — 99223 1ST HOSP IP/OBS HIGH 75: CPT | Mod: AI | Performed by: INTERNAL MEDICINE

## 2017-09-23 PROCEDURE — 36415 COLL VENOUS BLD VENIPUNCTURE: CPT | Performed by: EMERGENCY MEDICINE

## 2017-09-23 PROCEDURE — 25800025 ZZH RX 258: Performed by: INTERNAL MEDICINE

## 2017-09-23 PROCEDURE — 87086 URINE CULTURE/COLONY COUNT: CPT | Performed by: EMERGENCY MEDICINE

## 2017-09-23 PROCEDURE — 96367 TX/PROPH/DG ADDL SEQ IV INF: CPT

## 2017-09-23 PROCEDURE — 25000128 H RX IP 250 OP 636: Performed by: INTERNAL MEDICINE

## 2017-09-23 PROCEDURE — 25000128 H RX IP 250 OP 636: Performed by: EMERGENCY MEDICINE

## 2017-09-23 PROCEDURE — 87186 SC STD MICRODIL/AGAR DIL: CPT | Performed by: EMERGENCY MEDICINE

## 2017-09-23 PROCEDURE — 00000146 ZZHCL STATISTIC GLUCOSE BY METER IP

## 2017-09-23 PROCEDURE — 40000886 ZZH STATISTIC STEP DOWN HRS DAY

## 2017-09-23 RX ORDER — DEXTROSE MONOHYDRATE 25 G/50ML
25-50 INJECTION, SOLUTION INTRAVENOUS
Status: DISCONTINUED | OUTPATIENT
Start: 2017-09-23 | End: 2017-09-27 | Stop reason: HOSPADM

## 2017-09-23 RX ORDER — LOPERAMIDE HYDROCHLORIDE 1 MG/5ML
4 SOLUTION ORAL EVERY OTHER DAY
Status: DISCONTINUED | OUTPATIENT
Start: 2017-09-25 | End: 2017-09-27 | Stop reason: HOSPADM

## 2017-09-23 RX ORDER — ACETAMINOPHEN 650 MG/1
650 SUPPOSITORY RECTAL ONCE
Status: COMPLETED | OUTPATIENT
Start: 2017-09-23 | End: 2017-09-23

## 2017-09-23 RX ORDER — PIPERACILLIN SODIUM, TAZOBACTAM SODIUM 3; .375 G/15ML; G/15ML
2.25 INJECTION, POWDER, LYOPHILIZED, FOR SOLUTION INTRAVENOUS EVERY 6 HOURS
Status: DISCONTINUED | OUTPATIENT
Start: 2017-09-23 | End: 2017-09-23

## 2017-09-23 RX ORDER — NALOXONE HYDROCHLORIDE 0.4 MG/ML
.1-.4 INJECTION, SOLUTION INTRAMUSCULAR; INTRAVENOUS; SUBCUTANEOUS
Status: DISCONTINUED | OUTPATIENT
Start: 2017-09-23 | End: 2017-09-27 | Stop reason: HOSPADM

## 2017-09-23 RX ORDER — ASPIRIN 325 MG
325 TABLET ORAL DAILY
Status: DISCONTINUED | OUTPATIENT
Start: 2017-09-24 | End: 2017-09-27 | Stop reason: HOSPADM

## 2017-09-23 RX ORDER — NICOTINE POLACRILEX 4 MG
15-30 LOZENGE BUCCAL
Status: DISCONTINUED | OUTPATIENT
Start: 2017-09-23 | End: 2017-09-27 | Stop reason: HOSPADM

## 2017-09-23 RX ORDER — SIMETHICONE 40MG/0.6ML
80 SUSPENSION, DROPS(FINAL DOSAGE FORM)(ML) ORAL 3 TIMES DAILY
Status: DISCONTINUED | OUTPATIENT
Start: 2017-09-23 | End: 2017-09-27 | Stop reason: HOSPADM

## 2017-09-23 RX ORDER — METOPROLOL TARTRATE 25 MG/1
25 TABLET, FILM COATED ORAL 2 TIMES DAILY
Status: DISCONTINUED | OUTPATIENT
Start: 2017-09-23 | End: 2017-09-27 | Stop reason: HOSPADM

## 2017-09-23 RX ORDER — ACETAMINOPHEN 325 MG/1
650 TABLET ORAL 3 TIMES DAILY
Status: DISCONTINUED | OUTPATIENT
Start: 2017-09-23 | End: 2017-09-25

## 2017-09-23 RX ORDER — PIPERACILLIN SODIUM, TAZOBACTAM SODIUM 3; .375 G/15ML; G/15ML
3.38 INJECTION, POWDER, LYOPHILIZED, FOR SOLUTION INTRAVENOUS ONCE
Status: COMPLETED | OUTPATIENT
Start: 2017-09-23 | End: 2017-09-23

## 2017-09-23 RX ORDER — ONDANSETRON 4 MG/1
4 TABLET, ORALLY DISINTEGRATING ORAL EVERY 6 HOURS PRN
Status: DISCONTINUED | OUTPATIENT
Start: 2017-09-23 | End: 2017-09-27 | Stop reason: HOSPADM

## 2017-09-23 RX ORDER — PIPERACILLIN SODIUM, TAZOBACTAM SODIUM 2; .25 G/10ML; G/10ML
2.25 INJECTION, POWDER, LYOPHILIZED, FOR SOLUTION INTRAVENOUS EVERY 6 HOURS
Status: DISCONTINUED | OUTPATIENT
Start: 2017-09-23 | End: 2017-09-24

## 2017-09-23 RX ORDER — ONDANSETRON 2 MG/ML
4 INJECTION INTRAMUSCULAR; INTRAVENOUS EVERY 6 HOURS PRN
Status: DISCONTINUED | OUTPATIENT
Start: 2017-09-23 | End: 2017-09-27 | Stop reason: HOSPADM

## 2017-09-23 RX ORDER — DEXTROSE MONOHYDRATE, SODIUM CHLORIDE, AND POTASSIUM CHLORIDE 50; 1.49; 4.5 G/1000ML; G/1000ML; G/1000ML
INJECTION, SOLUTION INTRAVENOUS CONTINUOUS
Status: DISCONTINUED | OUTPATIENT
Start: 2017-09-23 | End: 2017-09-26

## 2017-09-23 RX ORDER — LIDOCAINE 40 MG/G
CREAM TOPICAL
Status: DISCONTINUED | OUTPATIENT
Start: 2017-09-23 | End: 2017-09-25

## 2017-09-23 RX ORDER — NITROGLYCERIN 0.4 MG/1
0.4 TABLET SUBLINGUAL EVERY 5 MIN PRN
Status: DISCONTINUED | OUTPATIENT
Start: 2017-09-23 | End: 2017-09-23

## 2017-09-23 RX ORDER — OXYCODONE HCL 5 MG/5 ML
2 SOLUTION, ORAL ORAL 3 TIMES DAILY
Status: DISCONTINUED | OUTPATIENT
Start: 2017-09-23 | End: 2017-09-27 | Stop reason: HOSPADM

## 2017-09-23 RX ORDER — OXYCODONE HCL 5 MG/5 ML
2 SOLUTION, ORAL ORAL EVERY 4 HOURS PRN
Status: DISCONTINUED | OUTPATIENT
Start: 2017-09-23 | End: 2017-09-27 | Stop reason: HOSPADM

## 2017-09-23 RX ORDER — SODIUM CHLORIDE 9 MG/ML
1000 INJECTION, SOLUTION INTRAVENOUS CONTINUOUS
Status: DISCONTINUED | OUTPATIENT
Start: 2017-09-23 | End: 2017-09-24 | Stop reason: CLARIF

## 2017-09-23 RX ORDER — ACETAMINOPHEN 650 MG/1
650 SUPPOSITORY RECTAL ONCE
Status: DISCONTINUED | OUTPATIENT
Start: 2017-09-23 | End: 2017-09-23

## 2017-09-23 RX ORDER — NITROGLYCERIN 0.4 MG/1
0.4 TABLET SUBLINGUAL EVERY 5 MIN PRN
Status: DISCONTINUED | OUTPATIENT
Start: 2017-09-23 | End: 2017-09-25

## 2017-09-23 RX ORDER — BISACODYL 10 MG
10 SUPPOSITORY, RECTAL RECTAL DAILY PRN
Status: DISCONTINUED | OUTPATIENT
Start: 2017-09-23 | End: 2017-09-27 | Stop reason: HOSPADM

## 2017-09-23 RX ORDER — LACTOBACILLUS RHAMNOSUS GG 10B CELL
1 CAPSULE ORAL DAILY
Status: DISCONTINUED | OUTPATIENT
Start: 2017-09-24 | End: 2017-09-27 | Stop reason: HOSPADM

## 2017-09-23 RX ADMIN — ACETAMINOPHEN 650 MG: 325 TABLET, FILM COATED ORAL at 21:10

## 2017-09-23 RX ADMIN — ACETAMINOPHEN 650 MG: 325 TABLET, FILM COATED ORAL at 18:32

## 2017-09-23 RX ADMIN — METOPROLOL TARTRATE 25 MG: 25 TABLET ORAL at 21:10

## 2017-09-23 RX ADMIN — POTASSIUM CHLORIDE, DEXTROSE MONOHYDRATE AND SODIUM CHLORIDE: 150; 5; 450 INJECTION, SOLUTION INTRAVENOUS at 18:25

## 2017-09-23 RX ADMIN — ACETAMINOPHEN 650 MG: 650 SUPPOSITORY RECTAL at 07:00

## 2017-09-23 RX ADMIN — SODIUM CHLORIDE 1000 ML: 9 INJECTION, SOLUTION INTRAVENOUS at 13:24

## 2017-09-23 RX ADMIN — OXYCODONE HYDROCHLORIDE 2 MG: 5 SOLUTION ORAL at 18:32

## 2017-09-23 RX ADMIN — INSULIN ASPART 3 UNITS: 100 INJECTION, SOLUTION INTRAVENOUS; SUBCUTANEOUS at 19:14

## 2017-09-23 RX ADMIN — INSULIN ASPART 3 UNITS: 100 INJECTION, SOLUTION INTRAVENOUS; SUBCUTANEOUS at 21:27

## 2017-09-23 RX ADMIN — PIPERACILLIN SODIUM,TAZOBACTAM SODIUM 3.38 G: 3; .375 INJECTION, POWDER, FOR SOLUTION INTRAVENOUS at 16:21

## 2017-09-23 RX ADMIN — SIMETHICONE 80 MG: 20 EMULSION ORAL at 21:13

## 2017-09-23 RX ADMIN — SODIUM HYPOCHLORITE: 1.25 SOLUTION TOPICAL at 21:11

## 2017-09-23 RX ADMIN — VANCOMYCIN HYDROCHLORIDE 1500 MG: 5 INJECTION, POWDER, LYOPHILIZED, FOR SOLUTION INTRAVENOUS at 15:22

## 2017-09-23 RX ADMIN — PIPERACILLIN SODIUM,TAZOBACTAM SODIUM 2.25 G: 2; .25 INJECTION, POWDER, FOR SOLUTION INTRAVENOUS at 22:12

## 2017-09-23 RX ADMIN — OXYCODONE HYDROCHLORIDE 2 MG: 5 SOLUTION ORAL at 21:08

## 2017-09-23 NOTE — IP AVS SNAPSHOT
` ` Patient Information     Patient Name Sex     Celia Lewis ([id number]) Female 1926       Room Bed    3306 3306-01      Patient Demographics     Address Phone    [facility]  [address] [place] [address] [phone] (Home)  [phone] (Mobile)      Patient Ethnicity & Race     Ethnic Group Patient Race    American White      Emergency Contact(s)     Name Relation Home Work Mobile    Salvatore Lewis . Son [phone]    JoshuaLeonel Spouse [phone]      Castro & Thi Lewis Son [phone]    JoshuaCat Daughter [phone]      Documents on File        Status Date Received Description       Documents for the Patient    Privacy Notice - [place] Received 12     Face Sheet Received () 01/28/10     External Medication Information Consent       Patient ID       Consent for Services - Hospital/Clinic Received () 10/31/10     Face Sheet Received () 10/31/10     Business/Insurance/Care Coordination/Health Form - Patient.1  12     Business/Insurance/Care Coordination/Health Form - Patient.1  12     Insurance Card Received 12 [employer]    Advance Directives and Living Will Received 12 POLST 11-    Consent for Services - Hospital/Clinic Received () 12     Consent for Services - Hospital/Clinic  () 12 CONSENT FOR SERVICES - CLINIC AND HOD    Consent for EHR Access  13 Copied from existing Consent for services - C/HOD collected on 2012    [facility] Specified Other       Consent for Services - Hospital/Clinic  () 14 CONSENT FOR SERVICE    Consent for Services - [facility]       Consent for Services/Privacy Notice - Hospital/Clinic-Esign Received () 16     Consent for Services/Privacy Notice - Hospital/Clinic       Advance Directives and Living Will Received 16 POLST 08/07/15    Privacy Notice -  [place] Received 08/21/17     Consent for Services/Privacy Notice - Hospital/Clinic  08/25/17 CONSENT FOR SERVICE    HIM JONO Authorization  08/28/17     Insurance Card Received (Deleted) 08/05/12     Advance Directives and Living Will Received (Deleted) 09/04/12 to be deleted       Documents for the Encounter    CMS IM for Patient Signature Received 09/25/17 1MM    EMS/Ambulance Record  09/23/17 [facility]      Admission Information     Attending Provider Admitting Provider Admission Type Admission Date/Time    Piotr Saba MD Dasari, Paul Dayan, MD Emergency 09/23/17  1209    Discharge Date Hospital Service Auth/Cert Status Service Area     Hospitalist Incomplete [facility]    Unit Room/Bed Admission Status        33 SURG SPECIALTIES 3306/3306-01 Admission (Confirmed)       Admission     Complaint    Aspiration pneumonia (H)      Hospital Account     Name Acct ID Class Status Primary Coverage    Celia Lewis [id number] Inpatient Open UCARE - UCARE SENIORS NON FPA            Guarantor Account (for Hospital Account #[id number])     Name Relation to Pt Service Area Active? Acct Type    Celia Lewis  FCS Yes Personal/Family    Address Phone          [facility] [address] [place] [address] [phone](H)              Coverage Information (for Hospital Account #[id number])     F/O Payor/Plan Precert #    UCARE/UCARE SENIORS NON FPA     Subscriber Subscriber #    Celia Lewis [id number]    Address Phone    [address]  [place] [address] [phone]

## 2017-09-23 NOTE — ED NOTES
Bed: ED06  Expected date:   Expected time:   Means of arrival:   Comments:  425  89 F poss seizure  1205

## 2017-09-23 NOTE — ED NOTES
Breathing easy, non-labored. Coarse/crackles breath sounds ausc. Pt responsive to painful stimuli. Skin w/d. Has puffy boots on feet. Pt on partial NRB. REceiving Abx and 0.9NS

## 2017-09-23 NOTE — IP AVS SNAPSHOT
"    [place] [address] SURGICAL SPECIALITIES: [phone]                                              INTERAGENCY TRANSFER FORM - PHYSICIAN ORDERS   2017                    Hospital Admission Date: 2017  RAÚL MERCEDES   : 1926  Sex: Female        Attending Provider: Piotr Saba MD     Allergies:  Sulfa Drugs    Infection:  MRSA-Contact Isolation   Service:  HOSPITALIST    Ht:  1.52 m (4' 11.84\")   Wt:  68.1 kg (150 lb 2.1 oz)   Admission Wt:  65.3 kg (144 lb)    BMI:  29.47 kg/m 2   BSA:  1.7 m 2            Patient PCP Information     None on File      ED Clinical Impression     Diagnosis Description Comment Added By Time Added    Severe sepsis (H) [A41.9, R65.20] Severe sepsis (H) [A41.9, R65.20]  Luana Temple MD 2017  1:50 PM    Urinary tract infection associated with indwelling urethral catheter, initial encounter (H) [T83.511A, N39.0] Urinary tract infection associated with indwelling urethral catheter, initial encounter (H) [T83.511A, N39.0]  Luana Temple MD 2017  3:00 PM      Hospital Problems as of 2017              Priority Class Noted POA    Aspiration pneumonia (H) Medium  2017 Yes      Non-Hospital Problems as of 2017              Priority Class Noted    Nephrolithiasis Medium  2012    Bacteremia Medium  2012    Actinomyces infection Medium  2012    Uric acid stone in urine Medium  Unknown    Fall Medium  2014    UTI (urinary tract infection) Medium  2014    Scalp laceration Medium  2014    Advanced dementia Medium  2014    Atrial fibrillation (H) Medium  2015    History of urinary tract infection Medium  2015    Alzheimer's disease Medium  2015    Primary localized osteoarthrosis of shoulder region Medium  2015    Morbid obesity (H) Medium  2015    Diabetes mellitus type 2, insulin dependent (H) Medium  2015    Hypertensive heart and kidney disease with HF and with CKD " stage I-IV (H) Medium  5/22/2015    Hyperlipidemia Medium  5/22/2015    Candidiasis of skin and nails Medium  5/22/2015    Chronic kidney disease, stage III (moderate) Medium  5/22/2015    Advance care planning Medium  5/29/2015    HCAP (healthcare-associated pneumonia) Medium  6/21/2016    Sepsis due to urinary tract infection (H) Medium  6/14/2017    ARF (acute renal failure) (H) Medium  6/30/2017    Recurrent UTI Medium  8/4/2017    Acute renal failure superimposed on stage 3 chronic kidney disease (H) Medium  8/9/2017    Sacral decubitus ulcer, stage III (H) Medium  8/9/2017    Hypernatremia Medium  8/9/2017    Complication of gastrostomy tube (H) Medium  8/9/2017    CVA (cerebral vascular accident) (H) Medium  8/20/2017      Code Status History     Date Active Date Inactive Code Status Order ID Comments User Context    8/23/2017  3:10 PM 9/23/2017  5:28 PM DNR/DNI [id number]  Piotr Mercado MD Outpatient    8/21/2017  9:44 AM 8/23/2017  3:10 PM DNR/DNI [id number]  Piotr Mercado MD Inpatient    8/20/2017  8:51 PM 8/21/2017  9:44 AM Full Code [id number]  Antwan Green MD Inpatient    8/9/2017  6:24 PM 8/20/2017  8:51 PM Full Code [id number]  Tamar Pineda MD Outpatient    8/4/2017  7:51 AM 8/9/2017  6:24 PM Full Code [id number]  Felicitas Cedeño DO Inpatient    7/8/2017 10:22 AM 8/4/2017  7:51 AM Full Code [id number]  Piotr Saba MD Outpatient    6/30/2017  7:29 PM 7/8/2017 10:22 AM Full Code [id number]  Paul Jolly MD Inpatient    6/19/2017 10:10 AM 6/30/2017  7:29 PM Full Code [id number]  Kraig Mobley MD Outpatient    6/14/2017  5:14 PM 6/19/2017 10:10 AM Full Code [id number]  Antwan Green MD Inpatient    6/21/2016 11:48 AM 6/24/2016  5:44 PM Full Code [id number]  Clarence Wilkerson MD Inpatient    6/24/2014  3:01 PM 6/21/2016 11:48 AM Full Code [id number]  Emily Ca DO Outpatient    6/24/2014  3:34 AM 6/24/2014  3:01 PM Full Code [id number]  Ni Mccurdy MD Inpatient     8/14/2012 12:42 PM 6/24/2014  3:34 AM Full Code [id number]  Francesca Knott MD Outpatient    8/6/2012  3:47 AM 8/14/2012 12:42 PM Full Code [id number]  Peewee De La Cruz MD Inpatient         Medication Review      CONTINUE these medications which may have CHANGED, or have new prescriptions. If we are uncertain of the size of tablets/capsules you have at home, strength may be listed as something that might have changed.        Dose / Directions Comments    oxyCODONE 5 MG/5ML solution   Commonly known as:  ROXICODONE   Indication:  Chronic Pain   This may have changed:  Another medication with the same name was removed. Continue taking this medication, and follow the directions you see here.   Used for:  Multiple joint pain, Sacral decubitus ulcer, stage III (H)        Dose:  2 mg   2 mLs (2 mg) by Per G Tube route 3 times daily   Quantity:  15 mL   Refills:  0          CONTINUE these medications which have NOT CHANGED        Dose / Directions Comments    * ACETAMINOPHEN PO        Dose:  650 mg   650 mg by Gastric Tube route 3 times daily   Refills:  0        * acetaminophen 32 mg/mL solution   Commonly known as:  TYLENOL        Dose:  325 mg   325 mg by Gastric Tube route daily as needed for fever or mild pain   Refills:  0        aspirin 325 MG tablet   Used for:  Cerebrovascular accident (CVA) due to bilateral embolism of posterior cerebral arteries (H)        Dose:  325 mg   1 tablet (325 mg) by Per G Tube route daily   Quantity:  120 tablet   Refills:  2        bisacodyl 10 MG Suppository   Commonly known as:  DULCOLAX        Dose:  10 mg   Place 10 mg rectally daily as needed for constipation   Refills:  0        Dakins 0.125 % Soln        Externally apply topically 2 times daily BID and PRN.  1. Cleanse wound with microklenze, cleanse periwound area with naomi perineal 2. Moisten kerlix fluff with dakins solution 0.125%, wring out excess, pack wound with kerlix 3. Apply antifungal powder to periwound area, rub in. Apply  criticaid over powder. 4. Cover with ABD using minimal medipore tape to secure. 5. Label dressing with date, time, initials. Follow Rigorous PIP measures.   Refills:  0        IMODIUM A-D 1 MG/7.5ML Liqd   Generic drug:  Loperamide HCl        Dose:  4 mg   4 mg by Gastric Tube route every other day   Refills:  0        INSULIN ASPART SC        Dose:  2-12 Units   Inject 2-12 Units Subcutaneous every 6 hours 0800, 1400, 2000, 0200 -250 2 unit -300 4 units -350 6 units -400 8 units -450 10 units BG >450 12 units and update MD   Refills:  0        insulin glargine 100 UNIT/ML injection   Commonly known as:  LANTUS        Dose:  39 Units   Inject 39 Units Subcutaneous At Bedtime   Refills:  0        Lactobacillus Acidophilus Powd        Dose:  1 capsule   1 capsule by Gastric Tube route daily   Refills:  0        metoprolol 10 mg/mL Susp   Commonly known as:  LOPRESSOR        Dose:  25 mg   Take 25 mg by mouth 2 times daily   Refills:  0        multivitamin, therapeutic Tabs tablet        Dose:  1 tablet   1 tablet by Gastric Tube route daily   Refills:  0        ranitidine 150 MG/10ML syrup   Commonly known as:  Zantac   Used for:  Gastroesophageal reflux disease without esophagitis        Dose:  150 mg   10 mLs (150 mg) by Per Feeding Tube route daily   Quantity:  600 mL   Refills:  0        simethicone 40 MG/0.6ML suspension   Commonly known as:  MYLICON        Dose:  80 mg   Take 80 mg by mouth 3 times daily   Refills:  0        vitamin A-D & C drops 750-400-35 UNIT-MG/ML solution NEW FORMULATION   Used for:  Decubitus ulcer of buttock, unspecified laterality, unspecified ulcer stage        Dose:  7 mL   7 mLs by Per G Tube route daily   Quantity:  50 mL   Refills:  0        * Notice:  This list has 2 medication(s) that are the same as other medications prescribed for you. Read the directions carefully, and ask your doctor or other care provider to review them with you.             Summary of Visit     Reason for your hospital stay       Aspiration pneumonia             After Care     Advance Diet as Tolerated       Follow this diet upon discharge: Orders Placed This Encounter      Adult Formula Drip Feeding: Continuous Isosource 1.5; Gastrostomy; Goal Rate: 40; mL/hr; Medication - Tube Feeding Flush Frequency: At least 15-30 mL water before and after medication administration and with tube clogging; Amout to Send (Nutrition...      NPO for Medical/Clinical Reasons Except for: Meds       General info for SNF       Length of Stay Estimate: Long Term Care  Condition at Discharge: Stable  Level of care:skilled   Rehabilitation Potential: Poor  Admission H&P remains valid and up-to-date: Yes  Recent Chemotherapy: N/A  Use Nursing Home Standing Orders: Yes       Mantoux instructions       Give two-step Mantoux (PPD) Per Facility Policy Yes       NO CPM                 Statement of Approval     Ordered          09/27/17 1422  I have reviewed and agree with all the recommendations and orders detailed in this document.  EFFECTIVE NOW     Approved and electronically signed by:  Favio Guzmán DO

## 2017-09-23 NOTE — IP AVS SNAPSHOT
"[place] [address] [facility]: [phone]                                              INTERAGENCY TRANSFER FORM - LAB / IMAGING / EKG / EMG RESULTS   2017                    Hospital Admission Date: 2017  RAÚL MERCEDES   : 1926  Sex: Female        Attending Provider: Piotr Saba MD     Allergies:  Sulfa Drugs    Infection:  MRSA-Contact Isolation   Service:  HOSPITALIST    Ht:  1.52 m (4' 11.84\")   Wt:  68.1 kg (150 lb 2.1 oz)   Admission Wt:  65.3 kg (144 lb)    BMI:  29.47 kg/m 2   BSA:  1.7 m 2            Patient PCP Information     None on File         Lab Results - 3 Days      Glucose by meter [[id number]] (Abnormal)  Resulted: 17 1556, Result status: Final result    Ordering provider: Piotr Saba MD  17 1545 Resulting lab: POINT OF CARE TEST, GLUCOSE    Specimen Information    Type Source Collected On     17 1545          Components       Value Reference Range Flag Lab   Glucose 289 70 - 99 mg/dL H 170            Glucose by meter [[id number]] (Abnormal)  Resulted: 17 1311, Result status: Final result    Ordering provider: Piotr Saba MD  17 1253 Resulting lab: POINT OF CARE TEST, GLUCOSE    Specimen Information    Type Source Collected On     17 1253          Components       Value Reference Range Flag Lab   Glucose 279 70 - 99 mg/dL H 170   Comment:  /RN Notified            Glucose by meter [[id number]] (Abnormal)  Resulted: 17 1236, Result status: Final result    Ordering provider: Piotr Saba MD  17 Resulting lab: POINT OF CARE TEST, GLUCOSE    Specimen Information    Type Source Collected On     17          Components       Value Reference Range Flag Lab   Glucose 339 70 - 99 mg/dL H 170            Clostridium difficile toxin B PCR [[id number]]  Resulted: 17 1227, Result status: Final result    Ordering provider: Piotr Saba MD  17 0423 Resulting lab: " [facility]    Specimen Information    Type Source Collected On   Feces  09/27/17 0430          Components       Value Reference Range Flag Lab   Specimen Description Feces   FrStHsLb   C Diff Toxin B PCR Negative NEG^Negative  75   Comment:         Negative: Clostridium difficile target DNA sequences NOT detected, presumed   negative for Clostridium difficile toxin B or the number of bacteria present   may be below the limit of detection for the test.  FDA approved assay performed using [employer] GeneXpert real-time PCR.  A negative result does not exclude actual disease due to Clostridium difficile   and may be due to improper collection, handling and storage of the specimen   or the number of organisms in the specimen is below the detection limit of the   assay.              Glucose by meter [[id number]] (Abnormal)  Resulted: 09/27/17 1126, Result status: Final result    Ordering provider: Piotr Saba MD  09/27/17 0953 Resulting lab: POINT OF CARE TEST, GLUCOSE    Specimen Information    Type Source Collected On     09/27/17 0953          Components       Value Reference Range Flag Lab   Glucose 243 70 - 99 mg/dL H 170   Comment:  /RN Notified            Blood culture [[id number]]  Resulted: 09/27/17 0704, Result status: Preliminary result    Ordering provider: Luana Temple MD  09/23/17 1221 Resulting lab: [facility]    Specimen Information    Type Source Collected On   Blood Arm, Right 09/23/17 1314   Comment:  Right Arm          Components       Value Reference Range Flag Lab   Specimen Description Blood Right Arm      Special Requests Aerobic and anaerobic bottles received   FrStHsLb   Culture Micro No growth after 4 days   225            Blood culture [[id number]]  Resulted: 09/27/17 0704, Result status: Preliminary result    Ordering provider: Luana Temple MD  09/23/17 1221 Resulting lab: [facility]     Specimen Information    Type Source Collected On   Blood Arm, Right 09/23/17 1305   Comment:  Right Arm          Components       Value Reference Range Flag Lab   Specimen Description Blood Right Arm      Special Requests Aerobic and anaerobic bottles received   FrStHsLb   Culture Micro No growth after 4 days   225            Glucose by meter [[id number]] (Abnormal)  Resulted: 09/27/17 0402, Result status: Final result    Ordering provider: Piotr Saba MD  09/27/17 0354 Resulting lab: POINT OF CARE TEST, GLUCOSE    Specimen Information    Type Source Collected On     09/27/17 0354          Components       Value Reference Range Flag Lab   Glucose 277 70 - 99 mg/dL H 170            Glucose by meter [[id number]] (Abnormal)  Resulted: 09/27/17 0206, Result status: Final result    Ordering provider: Piotr Saba MD  09/27/17 0158 Resulting lab: POINT OF CARE TEST, GLUCOSE    Specimen Information    Type Source Collected On     09/27/17 0158          Components       Value Reference Range Flag Lab   Glucose 323 70 - 99 mg/dL H 170            Glucose by meter [[id number]] (Abnormal)  Resulted: 09/27/17 0011, Result status: Final result    Ordering provider: Piotr Saba MD  09/27/17 0006 Resulting lab: POINT OF CARE TEST, GLUCOSE    Specimen Information    Type Source Collected On     09/27/17 0006          Components       Value Reference Range Flag Lab   Glucose 366 70 - 99 mg/dL H 170            Glucose by meter [[id number]] (Abnormal)  Resulted: 09/26/17 1611, Result status: Final result    Ordering provider: Piotr Saba MD  09/26/17 1554 Resulting lab: POINT OF CARE TEST, GLUCOSE    Specimen Information    Type Source Collected On     09/26/17 1554          Components       Value Reference Range Flag Lab   Glucose 348 70 - 99 mg/dL H 170            Glucose by meter [[id number]] (Abnormal)  Resulted: 09/26/17 1136, Result status: Final result    Ordering provider: Piotr Saba MD   09/26/17 1133 Resulting lab: POINT OF CARE TEST, GLUCOSE    Specimen Information    Type Source Collected On     09/26/17 1133          Components       Value Reference Range Flag Lab   Glucose 283 70 - 99 mg/dL H 170            Basic metabolic panel [[id number]] (Abnormal)  Resulted: 09/26/17 0814, Result status: Final result    Ordering provider: Piotr Saba MD  09/26/17 0000 Resulting lab: [facility]    Specimen Information    Type Source Collected On   Blood  09/26/17 0751          Components       Value Reference Range Flag Lab   Sodium 144 133 - 144 mmol/L  FrStHsLb   Potassium 3.8 3.4 - 5.3 mmol/L  FrStHsLb   Chloride 114 94 - 109 mmol/L H FrStHsLb   Carbon Dioxide 19 20 - 32 mmol/L L FrStHsLb   Anion Gap 11 3 - 14 mmol/L  FrStHsLb   Glucose 258 70 - 99 mg/dL H FrStHsLb   Urea Nitrogen 25 7 - 30 mg/dL  FrStHsLb   Creatinine 0.99 0.52 - 1.04 mg/dL  FrStHsLb   GFR Estimate 53 >60 mL/min/1.7m2 L FrStHsLb   Comment:  Non  GFR Calc   GFR Estimate If Black 64 >60 mL/min/1.7m2  FrStHsLb   Comment:  African American GFR Calc   Calcium 7.8 8.5 - 10.1 mg/dL L FrStHsLb            Magnesium [[id number]]  Resulted: 09/26/17 0814, Result status: Final result    Ordering provider: Piotr Saba MD  09/26/17 0000 Resulting lab: [facility]    Specimen Information    Type Source Collected On   Blood  09/26/17 0751          Components       Value Reference Range Flag Lab   Magnesium 2.3 1.6 - 2.3 mg/dL  FrStHsLb            Phosphorus [[id number]]  Resulted: 09/26/17 0814, Result status: Final result    Ordering provider: Piotr Saba MD  09/26/17 0000 Resulting lab: [facility]    Specimen Information    Type Source Collected On   Blood  09/26/17 0751          Components       Value Reference Range Flag Lab   Phosphorus 2.8 2.5 - 4.5 mg/dL  FrStHsLb            CBC with platelets [[id number]] (Abnormal)  Resulted: 09/26/17 0800, Result status:  Final result    Ordering provider: Piotr Saba MD  09/26/17 0000 Resulting lab: [facility]    Specimen Information    Type Source Collected On   Blood  09/26/17 0751          Components       Value Reference Range Flag Lab   WBC 8.4 4.0 - 11.0 10e9/L  FrStHsLb   RBC Count 3.79 3.8 - 5.2 10e12/L L FrStHsLb   Hemoglobin 12.0 11.7 - 15.7 g/dL  FrStHsLb   Hematocrit 37.3 35.0 - 47.0 %  FrStHsLb   MCV 98 78 - 100 fl  FrStHsLb   MCH 31.7 26.5 - 33.0 pg  FrStHsLb   MCHC 32.2 31.5 - 36.5 g/dL  FrStHsLb   RDW 14.5 10.0 - 15.0 %  FrStHsLb   Platelet Count 187 150 - 450 10e9/L  FrStHsLb            Glucose by meter [[id number]] (Abnormal)  Resulted: 09/26/17 0736, Result status: Final result    Ordering provider: Piotr Saba MD  09/26/17 0726 Resulting lab: POINT OF CARE TEST, GLUCOSE    Specimen Information    Type Source Collected On     09/26/17 0726          Components       Value Reference Range Flag Lab   Glucose 260 70 - 99 mg/dL H 170            Glucose by meter [[id number]] (Abnormal)  Resulted: 09/26/17 0415, Result status: Final result    Ordering provider: Piotr Saba MD  09/26/17 0409 Resulting lab: POINT OF CARE TEST, GLUCOSE    Specimen Information    Type Source Collected On     09/26/17 0409          Components       Value Reference Range Flag Lab   Glucose 230 70 - 99 mg/dL H 170            Glucose by meter [[id number]] (Abnormal)  Resulted: 09/26/17 0221, Result status: Final result    Ordering provider: Piotr Saba MD  09/26/17 0215 Resulting lab: POINT OF CARE TEST, GLUCOSE    Specimen Information    Type Source Collected On     09/26/17 0215          Components       Value Reference Range Flag Lab   Glucose 235 70 - 99 mg/dL H 170            Glucose by meter [[id number]] (Abnormal)  Resulted: 09/26/17 0030, Result status: Final result    Ordering provider: Piotr Saba MD  09/26/17 0016 Resulting lab: POINT OF CARE TEST, GLUCOSE    Specimen Information     Type Source Collected On     09/26/17 0016          Components       Value Reference Range Flag Lab   Glucose 241 70 - 99 mg/dL H 170            Glucose by meter [[id number]] (Abnormal)  Resulted: 09/25/17 2011, Result status: Final result    Ordering provider: Piotr Saba MD  09/25/17 2005 Resulting lab: POINT OF CARE TEST, GLUCOSE    Specimen Information    Type Source Collected On     09/25/17 2005          Components       Value Reference Range Flag Lab   Glucose 190 70 - 99 mg/dL H 170            Glucose by meter [[id number]] (Abnormal)  Resulted: 09/25/17 1616, Result status: Final result    Ordering provider: Piotr Saba MD  09/25/17 1601 Resulting lab: POINT OF CARE TEST, GLUCOSE    Specimen Information    Type Source Collected On     09/25/17 1601          Components       Value Reference Range Flag Lab   Glucose 235 70 - 99 mg/dL H 170            Glucose by meter [[id number]] (Abnormal)  Resulted: 09/25/17 1251, Result status: Final result    Ordering provider: Piotr Saba MD  09/25/17 1206 Resulting lab: POINT OF CARE TEST, GLUCOSE    Specimen Information    Type Source Collected On     09/25/17 1206          Components       Value Reference Range Flag Lab   Glucose 202 70 - 99 mg/dL H 170            Platelet count [[id number]]  Resulted: 09/25/17 1119, Result status: Final result    Ordering provider: Piotr Saba MD  09/25/17 0855 Resulting lab: [facility]    Specimen Information    Type Source Collected On     09/25/17 1110          Components       Value Reference Range Flag Lab   Platelet Count 184 150 - 450 10e9/L  FrStLb            Glucose by meter [[id number]] (Abnormal)  Resulted: 09/25/17 0908, Result status: Final result    Ordering provider: Piotr Saba MD  09/25/17 0857 Resulting lab: POINT OF CARE TEST, GLUCOSE    Specimen Information    Type Source Collected On     09/25/17 0857          Components       Value Reference Range Flag Lab    Glucose 195 70 - 99 mg/dL H 170            CBC with platelets [[id number]] (Abnormal)  Resulted: 09/25/17 0843, Result status: Final result    Ordering provider: Enzo Trotter MD  09/25/17 0000 Resulting lab: [facility]    Specimen Information    Type Source Collected On   Blood  09/25/17 0440          Components       Value Reference Range Flag Lab   WBC 10.0 4.0 - 11.0 10e9/L  FrStHsLb   RBC Count 3.79 3.8 - 5.2 10e12/L L FrStHsLb   Hemoglobin 12.0 11.7 - 15.7 g/dL  FrStHsLb   Hematocrit 38.1 35.0 - 47.0 %  FrStHsLb    78 - 100 fl H FrStHsLb   MCH 31.7 26.5 - 33.0 pg  FrStHsLb   MCHC 31.5 31.5 - 36.5 g/dL  FrStHsLb   RDW 14.5 10.0 - 15.0 %  FrStHsLb   Platelet Count 183 150 - 450 10e9/L  FrStHsLb            Basic metabolic panel [[id number]] (Abnormal)  Resulted: 09/25/17 0507, Result status: Final result    Ordering provider: Enzo Trotter MD  09/25/17 0000 Resulting lab: [facility]    Specimen Information    Type Source Collected On   Blood  09/25/17 0440          Components       Value Reference Range Flag Lab   Sodium 150 133 - 144 mmol/L H FrStHsLb   Potassium 4.2 3.4 - 5.3 mmol/L  FrStHsLb   Chloride 120 94 - 109 mmol/L H FrStHsLb   Carbon Dioxide 22 20 - 32 mmol/L  FrStHsLb   Anion Gap 8 3 - 14 mmol/L  FrStHsLb   Glucose 200 70 - 99 mg/dL H FrStHsLb   Urea Nitrogen 39 7 - 30 mg/dL H FrStHsLb   Creatinine 1.04 0.52 - 1.04 mg/dL  FrStHsLb   GFR Estimate 50 >60 mL/min/1.7m2 L FrStHsLb   Comment:  Non  GFR Calc   GFR Estimate If Black 60 >60 mL/min/1.7m2 L FrStHsLb   Comment:  African American GFR Calc   Calcium 7.7 8.5 - 10.1 mg/dL L On license of [facility]            Glucose by meter [[id number]] (Abnormal)  Resulted: 09/25/17 0441, Result status: Final result    Ordering provider: Piotr Saba MD  09/25/17 0432 Resulting lab: POINT OF CARE TEST, GLUCOSE    Specimen Information    Type Source Collected On     09/25/17 0432          Components        Value Reference Range Flag Lab   Glucose 181 70 - 99 mg/dL H 170            Glucose by meter [[id number]] (Abnormal)  Resulted: 09/25/17 0041, Result status: Final result    Ordering provider: Piotr Saba MD  09/25/17 0021 Resulting lab: POINT OF CARE TEST, GLUCOSE    Specimen Information    Type Source Collected On     09/25/17 0021          Components       Value Reference Range Flag Lab   Glucose 175 70 - 99 mg/dL H 170            Glucose by meter [[id number]] (Abnormal)  Resulted: 09/24/17 2356, Result status: Final result    Ordering provider: Piotr Saba MD  09/24/17 1648 Resulting lab: POINT OF CARE TEST, GLUCOSE    Specimen Information    Type Source Collected On     09/24/17 1648          Components       Value Reference Range Flag Lab   Glucose 142 70 - 99 mg/dL H 170            Urine Culture Aerobic Bacterial [[id number]] (Abnormal)  Resulted: 09/24/17 2108, Result status: Final result    Ordering provider: Luana Temple MD  09/23/17 1221 Resulting lab: [facility]    Specimen Information    Type Source Collected On   Catheterized Urine Urine catheter 09/23/17 1419          Components       Value Reference Range Flag Lab   Specimen Description Catheterized Urine      Special Requests Specimen received in preservative   75   Culture Micro --  A 225   Result:         >100,000 colonies/mL  Escherichia coli              Glucose by meter [[id number]] (Abnormal)  Resulted: 09/24/17 2026, Result status: Final result    Ordering provider: Piotr Saba MD  09/24/17 2015 Resulting lab: POINT OF CARE TEST, GLUCOSE    Specimen Information    Type Source Collected On     09/24/17 2015          Components       Value Reference Range Flag Lab   Glucose 144 70 - 99 mg/dL H 170            Vancomycin level [[id number]]  Resulted: 09/24/17 1555, Result status: Final result    Ordering provider: Aurea Sanderson [facility]  09/24/17 0900 Resulting lab: [facility]    Specimen Information    Type Source Collected On   Blood  09/24/17 1508          Components       Value Reference Range Flag Lab   Vancomycin Level 16.1 mg/L  FrStHsLb   Comment:         Traditional Dosing therapeutic Range:         Trough 8-20 mg/L         Peak 20-50 mg/L              Creatinine [[id number]] (Abnormal)  Resulted: 09/24/17 1553, Result status: Final result    Ordering provider: Aurea Sanderson [facility]  09/24/17 0900 Resulting lab: [facility]    Specimen Information    Type Source Collected On   Blood  09/24/17 1508          Components       Value Reference Range Flag Lab   Creatinine 1.16 0.52 - 1.04 mg/dL H FrStHsLb   GFR Estimate 44 >60 mL/min/1.7m2 L FrStHsLb   Comment:  Non  GFR Calc   GFR Estimate If Black 53 >60 mL/min/1.7m2 L FrStHsLb   Comment:   GFR Calc            Glucose by meter [[id number]] (Abnormal)  Resulted: 09/24/17 1211, Result status: Final result    Ordering provider: Piotr Saba MD  09/24/17 1207 Resulting lab: POINT OF CARE TEST, GLUCOSE    Specimen Information    Type Source Collected On     09/24/17 1207          Components       Value Reference Range Flag Lab   Glucose 164 70 - 99 mg/dL H 170            Glucose by meter [[id number]] (Abnormal)  Resulted: 09/24/17 1040, Result status: Final result    Ordering provider: Piotr Saba MD  09/24/17 1030 Resulting lab: POINT OF CARE TEST, GLUCOSE    Specimen Information    Type Source Collected On     09/24/17 1030          Components       Value Reference Range Flag Lab   Glucose 159 70 - 99 mg/dL H 170            Glucose by meter [[id number]] (Abnormal)  Resulted: 09/24/17 0856, Result status: Final result    Ordering provider: Piotr Saba MD  09/24/17 0837 Resulting lab: POINT OF CARE TEST, GLUCOSE    Specimen Information    Type Source Collected On     09/24/17 0837          Components       Value Reference Range Flag Lab   Glucose 197 70 - 99  mg/dL H 170            Basic metabolic panel [[id number]] (Abnormal)  Resulted: 09/24/17 0553, Result status: Final result    Ordering provider: Piotr Saba MD  09/24/17 0000 Resulting lab: [facility]    Specimen Information    Type Source Collected On   Blood  09/24/17 0530          Components       Value Reference Range Flag Lab   Sodium 151 133 - 144 mmol/L H FrStHsLb   Potassium 4.2 3.4 - 5.3 mmol/L  FrStHsLb   Chloride 117 94 - 109 mmol/L H FrStHsLb   Carbon Dioxide 26 20 - 32 mmol/L  FrStHsLb   Anion Gap 8 3 - 14 mmol/L  FrStHsLb   Glucose 205 70 - 99 mg/dL H FrStHsLb   Urea Nitrogen 57 7 - 30 mg/dL H FrStHsLb   Creatinine 1.22 0.52 - 1.04 mg/dL H FrStHsLb   GFR Estimate 41 >60 mL/min/1.7m2 L FrStHsLb   Comment:  Non  GFR Calc   GFR Estimate If Black 50 >60 mL/min/1.7m2 L FrStHsLb   Comment:  African American GFR Calc   Calcium 8.1 8.5 - 10.1 mg/dL L FrStHsLb            CBC with platelets [[id number]] (Abnormal)  Resulted: 09/24/17 0539, Result status: Final result    Ordering provider: Piotr Saba MD  09/24/17 0000 Resulting lab: [facility]    Specimen Information    Type Source Collected On   Blood  09/24/17 0530          Components       Value Reference Range Flag Lab   WBC 10.6 4.0 - 11.0 10e9/L  FrStHsLb   RBC Count 3.91 3.8 - 5.2 10e12/L  FrStHsLb   Hemoglobin 12.3 11.7 - 15.7 g/dL  FrStHsLb   Hematocrit 40.0 35.0 - 47.0 %  FrStHsLb    78 - 100 fl H FrStHsLb   MCH 31.5 26.5 - 33.0 pg  FrStHsLb   MCHC 30.8 31.5 - 36.5 g/dL L FrStHsLb   RDW 14.6 10.0 - 15.0 %  FrStHsLb   Platelet Count 197 150 - 450 10e9/L  FrStHsLb            Glucose by meter [[id number]] (Abnormal)  Resulted: 09/24/17 0535, Result status: Final result    Ordering provider: Piotr Saba MD  09/24/17 0527 Resulting lab: POINT OF CARE TEST, GLUCOSE    Specimen Information    Type Source Collected On     09/24/17 0527          Components       Value Reference Range  Flag Lab   Glucose 181 70 - 99 mg/dL H 170            Testing Performed By     Lab - Abbreviation Name Director Address Valid Date Range    14 - FrStHsLb [facility] Unknown [address] [place] [address] 05/08/15 1057 - Present    75 - Unknown [facility] Unknown [address] [facility] [address] 01/15/15 1019 - Present    170 - Unknown POINT OF CARE TEST, GLUCOSE Unknown Unknown 10/31/11 1114 - Present    225 - Unknown [facility] Unknown [address] [facility] [address] 12/19/14 0954 - Present            Unresulted Labs (24h ago through future)    Start       Ordered    10/02/17 0600  Basic metabolic panel  EVERY MONDAY,   Routine     Comments:  Every Monday while on enteral tube feeding.    09/25/17 1534    10/02/17 0600  Magnesium  EVERY MONDAY,   Routine     Comments:  Every Monday while on enteral tube feeding.    09/25/17 1534    10/02/17 0600  Phosphorus  EVERY MONDAY,   Routine     Comments:  Every Monday while on enteral tube feeding.    09/25/17 1534         Imaging Results - 3 Days      XR Chest 2 Views [[id number]]  Resulted: 09/27/17 0906, Result status: Final result    Ordering provider: Piotr Saba MD  09/26/17 0005 Resulted by: Libby Psot MD    Performed: 09/26/17 1257 - 09/26/17 1314 Resulting lab: RADIOLOGY RESULTS    Narrative:       CHEST TWO VIEWS 9/26/2017 1:14 PM     HISTORY: Resuming tube feedings.    COMPARISON: 9/23/2017    FINDINGS: The heart is enlarged but exaggerated by low lung volumes  and portable technique. Interstitial markings also exaggerated by low  lung volumes. No definite lobar consolidation, pneumothorax, or  pleural effusion.       Impression:       IMPRESSION: Cardiomegaly.     LIBBY POST MD      Testing Performed By     Lab - Abbreviation Name Director Address Valid Date Range    104 - Rad Rslts RADIOLOGY RESULTS Unknown Unknown 02/16/05 1553 - Present             Encounter-Level Documents:     There are no encounter-level documents.      Order-Level Documents:     There are no order-level documents.

## 2017-09-23 NOTE — IP AVS SNAPSHOT
` `     [facility] SURGICAL SPECIALITIES: [phone]                 INTERAGENCY TRANSFER FORM - NOTES (H&P, Discharge Summary, Consults, Procedures, Therapies)   2017                    Hospital Admission Date: 2017  RAÚL MERCEDES   : 1926  Sex: Female        Patient PCP Information     None on File      History & Physicals     No notes of this type exist for this encounter.         Discharge Summaries      Discharge Summaries by Favio Guzmán DO at 2017  2:22 PM     Author:  Favio Guzmán DO Service:  Hospitalist Author Type:  Physician    Filed:  2017  2:40 PM Date of Service:  2017  2:22 PM Creation Time:  2017  2:22 PM    Status:  Signed :  Favio Guzmán DO (Physician)         [facility]    Discharge Summary  Hospitalist    Date of Admission:  2017  Date of Discharge:[JH1.1]  2017[JH1.2]  Discharging Provider: Favio Guzmán DO    Discharge Diagnoses[JH1.1]   1. Aspiration pneumonia  2. Sepsis 2/2 aspiration pneumonia  3. End stage dementia   4. DM type II   5. UTI[JH1.2]     History of Present Illness   Raúl Mercedes is an 89 year old[JH1.1] non-verbal[JH1.2] female who presented[JH1.1] from her nursing home with a chronic G-tube in place for hypoxia and foaming at the mouth.  Unfortunately the patient's history is very limited given her severe dementia[JH1.2]    Hospital Course   Raúl Mercedes was admitted on 2017.  The following problems were addressed during her hospitalization:[JH1.1]    Aspiration Pneumonia with Sepsis  Given the patient's chronic G-tube and hypoxia it was believed that she likely had an aspiration event concerning for aspiration pneumonia.  She was treated with IV Zosyn and the tube feeds were initially stopped.  She had good resolution of her hypoxia with antibiotics and we were able to restart her tube feeds.  These were gradually titrated up back to  her normal tube feeds.      DM type II   Patient was on decreased Lantus units and SSI while tube feeds were here.  She then had to have increasing Lantus injections as her tube feeds were restarted and she was discharged to home on her home insulin regimen as she was to go back on her normal tube feed regimen.    Sacral Decubitus Ulcers  Managed by wound care here.     UTI  Patient's urine was positive and the Zosyn she received via IV should cover this.[JH1.2]     Favio Guzmán, DO    Significant Results and Procedures[JH1.1]   See below[JH1.2]    Pending Results   These results will be followed up b[JH1.1]y PCP[JH1.2]  Unresulted Labs Ordered in the Past 30 Days of this Admission     Date and Time Order Name Status Description    9/23/2017 1221 Blood culture Preliminary     9/23/2017 1221 Blood culture Preliminary           Code Status[JH1.1]   DNR / DNI[JH1.2]       Primary Care Physician   No primary care provider on file.    Physical Exam   Temp: 97.6  F (36.4  C) Temp src: Axillary BP: 146/60 Pulse: 72 Heart Rate: 75 Resp: 26 SpO2: 96 % O2 Device: None (Room air)    Vitals:    09/23/17 1722 09/27/17 0630   Weight: 65.3 kg (144 lb) 68.1 kg (150 lb 2.1 oz)     Vital Signs with Ranges  Temp:  [97.6  F (36.4  C)-98.2  F (36.8  C)] 97.6  F (36.4  C)  Pulse:  [72-73] 72  Heart Rate:  [70-77] 75  Resp:  [18-32] 26  BP: (141-155)/(60-86) 146/60  SpO2:  [94 %-96 %] 96 %  I/O last 3 completed shifts:  In: 2235 [I.V.:1625; NG/GT:120]  Out: 1425 [Urine:1425]    Constitutional:[JH1.1] Resting bed.  Patient is non-verbal and does not follow commands[JH1.2]   Respiratory:[JH1.1] Clear to auscultation[JH1.2]   Cardiovascular:[JH1.1] RRR.  No murmurs[JH1.2]   Neuropsychiatric:[JH1.1] non-verbal[JH1.2]    Discharge Disposition[JH1.1]   Discharged to nursing home[JH1.2]  Condition at discharge:[JH1.1] Fair[JH1.2]    Consultations This Hospital Stay   PHARMACY TO DOSE VANCO  PHARMACY TO DOSE VANCO  WOUND OSTOMY CONTINENCE  NURSE  IP CONSULT  PHARMACY IP CONSULT  SOCIAL WORK IP CONSULT    Time Spent on this Encounter[JH1.1]   Favio HERNADEZ, personally saw the patient today and spent greater than 30 minutes discharging this patient.[JH1.2]    Discharge Orders     General info for SNF   Length of Stay Estimate: Long Term Care  Condition at Discharge: Stable  Level of care:skilled   Rehabilitation Potential: Poor  Admission H&P remains valid and up-to-date: Yes  Recent Chemotherapy: N/A  Use Nursing Home Standing Orders: Yes     Mantoux instructions   Give two-step Mantoux (PPD) Per Facility Policy Yes     NO CPM     Reason for your hospital stay   Aspiration pneumonia     Advance Diet as Tolerated   Follow this diet upon discharge: Orders Placed This Encounter     Adult Formula Drip Feeding: Continuous Isosource 1.5; Gastrostomy; Goal Rate: 40; mL/hr; Medication - Tube Feeding Flush Frequency: At least 15-30 mL water before and after medication administration and with tube clogging; Amout to Send (Nutrition...     NPO for Medical/Clinical Reasons Except for: Meds       Discharge Medications   Current Discharge Medication List      CONTINUE these medications which have CHANGED    Details   !! oxyCODONE (ROXICODONE) 5 MG/5ML solution 2 mLs (2 mg) by Per G Tube route 3 times daily  Qty: 15 mL, Refills: 0    Associated Diagnoses: Multiple joint pain; Sacral decubitus ulcer, stage III (H)       !! - Potential duplicate medications found. Please discuss with provider.      CONTINUE these medications which have NOT CHANGED    Details   insulin glargine (LANTUS) 100 UNIT/ML injection Inject 39 Units Subcutaneous At Bedtime      !! oxyCODONE (ROXICODONE) 5 MG/5ML solution 2 mLs (2 mg) by Per G Tube route every 4 hours as needed for moderate to severe pain  Qty: 15 mL, Refills: 0    Associated Diagnoses: Primary localized osteoarthrosis of shoulder region, unspecified laterality      aspirin 325 MG tablet 1 tablet (325 mg) by Per G Tube  route daily  Qty: 120 tablet, Refills: 2    Associated Diagnoses: Cerebrovascular accident (CVA) due to bilateral embolism of posterior cerebral arteries (H)      metoprolol (LOPRESSOR) 10 mg/mL SUSP Take 25 mg by mouth 2 times daily      simethicone (MYLICON) 40 MG/0.6ML suspension Take 80 mg by mouth 3 times daily      ranitidine (ZANTAC) 150 MG/10ML syrup 10 mLs (150 mg) by Per Feeding Tube route daily  Qty: 600 mL    Associated Diagnoses: Gastroesophageal reflux disease without esophagitis      vitamin A-D & C drops (TRI-VI-SOL) 750-400-35 UNIT-MG/ML solution NEW FORMULATION 7 mLs by Per G Tube route daily  Qty: 50 mL, Refills: 0    Associated Diagnoses: Decubitus ulcer of buttock, unspecified laterality, unspecified ulcer stage      INSULIN ASPART SC Inject 2-12 Units Subcutaneous every 6 hours 0800, 1400, 2000, 0200  -250 2 unit  -300 4 units  -350 6 units  -400 8 units  -450 10 units  BG >450 12 units and update MD      Dakins 0.125 % SOLN Externally apply topically 2 times daily BID and PRN.   1. Cleanse wound with microklenze, cleanse periwound area with naomi perineal  2. Moisten kerlix fluff with dakins solution 0.125%, wring out excess, pack wound with kerlix  3. Apply antifungal powder to periwound area, rub in. Apply criticaid over powder.  4. Cover with ABD using minimal medipore tape to secure.  5. Label dressing with date, time, initials. Follow Rigorous PIP measures.      Lactobacillus Acidophilus POWD 1 capsule by Gastric Tube route daily       multivitamin, therapeutic (THERA-VIT) TABS tablet 1 tablet by Gastric Tube route daily      Loperamide HCl (IMODIUM A-D) 1 MG/7.5ML LIQD 4 mg by Gastric Tube route every other day      !! acetaminophen (TYLENOL) 32 mg/mL solution 325 mg by Gastric Tube route daily as needed for fever or mild pain      !! ACETAMINOPHEN  mg by Gastric Tube route 3 times daily       bisacodyl (DULCOLAX) 10 MG suppository Place 10 mg rectally  daily as needed for constipation       !! - Potential duplicate medications found. Please discuss with provider.        Allergies   Allergies   Allergen Reactions     Sulfa Drugs Other (See Comments)     Per nursing home documents, patient has allergy to sulfa     Data[JH1.1]   Most Recent 3 CBC's:[JH1.2]  Recent Labs   Lab Test  09/26/17   0751  09/25/17   1110  09/25/17   0440  09/24/17   0530   WBC  8.4   --   10.0  10.6   HGB  12.0   --   12.0  12.3   MCV  98   --   101*  102*   PLT  187  184  183  197[JH1.3]      Most Recent 3 BMP's:[JH1.2]  Recent Labs   Lab Test  09/26/17   0751  09/25/17   0440  09/24/17   1508  09/24/17   0530   NA  144  150*   --   151*   POTASSIUM  3.8  4.2   --   4.2   CHLORIDE  114*  120*   --   117*   CO2  19*  22   --   26   BUN  25  39*   --   57*   CR  0.99  1.04  1.16*  1.22*   ANIONGAP  11  8   --   8   NELLIE  7.8*  7.7*   --   8.1*   GLC  258*  200*   --   205*[JH1.3]     Most Recent 2 LFT's:[JH1.2]  Recent Labs   Lab Test  09/23/17   1314  08/20/17   1853   AST  22  23   ALT  28  28   ALKPHOS  64  82   BILITOTAL  0.5  0.5[JH1.3]     Most Recent INR's and Anticoagulation Dosing History:  Anticoagulation Dose History     Recent Dosing and Labs Latest Ref Rng & Units 11/2/2010 11/3/2010 8/5/2012 6/23/2014 6/21/2016 6/14/2017 8/20/2017    INR 0.86 - 1.14 1.02 0.97 1.09 1.01 1.37(H) 1.10 0.98        Most Recent 3 Troponin's:[JH1.2]  Recent Labs   Lab Test  08/21/17   0625  08/20/17   2330  08/20/17   1853   08/05/12   1837   01/28/10   1505   TROPI  0.558*  0.706*  0.658*   < >   --    < >   --    TROPONIN   --    --    --    --   0.00   --   0.00    < > = values in this interval not displayed.[JH1.3]     Most Recent Cholesterol Panel:[JH1.2]  Recent Labs   Lab Test  08/20/17   1853   CHOL  269*   LDL  Cannot estimate LDL when triglyceride exceeds 400 mg/dL  103*   HDL  30*   TRIG  1049*[JH1.3]     Most Recent 6 Bacteria Isolates From Any Culture (See EPIC Reports for Culture  Details):[JH1.2]  Recent Labs   Lab Test  09/23/17   1419  09/23/17   1314  09/23/17   1305  08/23/17   0916  08/23/17   0429  08/22/17   1200   CULT  >100,000 colonies/mL  Escherichia coli  *  No growth after 4 days  No growth after 4 days  No growth  >100,000 colonies/mL  Candida albicans / dubliniensis  Candida albicans and Candida dubliniensis are not routinely speciated  Susceptibility testing not routinely done  *  <10,000 colonies/mL  Pseudomonas fluorescens putida group  *  No growth[JH1.3]     Most Recent TSH, T4 and A1c Labs:[JH1.2]  Recent Labs   Lab Test  08/21/17   0750  08/21/17   0625   TSH  1.19   --    A1C   --   8.2*     Results for orders placed or performed during the hospital encounter of 09/23/17   CT Head w/o Contrast    Narrative    CT SCAN OF THE HEAD WITHOUT CONTRAST   9/23/2017 1:51 PM     HISTORY: Trauma, evaluate fracture, bleed.    TECHNIQUE:  Axial images of the head and coronal reformations without  IV contrast material. Radiation dose for this scan was reduced using  automated exposure control, adjustment of the mA and/or kV according  to patient size, or iterative reconstruction technique.    COMPARISON: Brain MR 8/21/2017.    FINDINGS: Well-defined hypodensities within the occipital lobes  bilaterally, left greater than right, compatible with evolving  infarcts. There is no evidence of acute intracranial hemorrhage.  Minimal cortical hyperdensity in the left occipital lobe likely  represents laminar necrosis in the areas of infarct. Loss of cortical  differentiation in the left frontal lobe from previous infarct again  noted. There is no mass effect or midline shift. Fairly extensive  periventricular white matter hypodensity is likely due to chronic  microvascular ischemic disease. Superimposed area of ischemia would be  difficult to exclude. Ventricular size is unchanged since prior. There  is unchanged moderate diffuse parenchymal volume loss.    The visualized portions of the  sinuses and mastoids appear normal. The  bony calvarium and bones of the skull base appear intact.      Impression    IMPRESSION:      1. Evolving bilateral posterior cerebral artery infarcts, left greater  than right. No evidence of new intracranial hemorrhage. No mass effect  or midline shift. Persistent cortical left frontal lobe infarct.  2. Extensive periventricular white matter hypodensity likely due to  chronic microvascular ischemic disease is similar to prior.  Superimposed areas of ischemia would be difficult to exclude and would  be better evaluated with brain MRI if clinically necessary.  3. Unchanged moderate diffuse parenchymal volume loss.        KY FLOR MD   XR Chest 1 View    Narrative    XR CHEST 1 VW 9/23/2017 2:04 PM    COMPARISON: 8/20/2017    HISTORY: Fever.      Impression    IMPRESSION: Enlarged cardiac silhouette is again seen and unchanged.  Mild mixed interstitial and airspace opacities over both lungs, edema  versus atypical infection. Possible trace left pleural effusion. No  pneumothorax on either side.    BOY BAEZA MD   XR Chest 2 Views    Narrative    CHEST TWO VIEWS 9/26/2017 1:14 PM     HISTORY: Resuming tube feedings.    COMPARISON: 9/23/2017    FINDINGS: The heart is enlarged but exaggerated by low lung volumes  and portable technique. Interstitial markings also exaggerated by low  lung volumes. No definite lobar consolidation, pneumothorax, or  pleural effusion.       Impression    IMPRESSION: Cardiomegaly.     LIBBY POST MD[JH1.3]         Revision History        User Key Date/Time User Provider Type Action    > JH1.3 9/27/2017  2:40 PM Favio Guzmán, DO Physician Sign     JH1.2 9/27/2017  2:31 PM Favio Guzmán,  Physician      JH1.1 9/27/2017  2:22 PM Favio Guzmán, DO Physician                      Consult Notes      Consults by Wilda Jimenez RPH at 9/25/2017  8:18 AM     Author:  Wilda Jimenez RPH Service:   Antimicrobial Management Team (AMT) Author Type:  Pharmacist    Filed:  9/25/2017  1:10 PM Date of Service:  9/25/2017  8:18 AM Creation Time:  9/25/2017  8:17 AM    Status:  Signed :  Wilda Jimenez RPH (Pharmacist)         [facility]  Antimicrobial Stewardship Team (AST) Note   Antimicrobial Stewardship Program Clinical Note - a joint venture between [facility] and [facility] Consultant ID Physicians to optimize antibiotic management.      Allergies: Sulfa drugs    Brief Summary: 89-year-old with advanced dementia, nonverbal, contracted bed bound, total care dependent, G-tube dependent, admitted with foaming at the mouth, hypoxia with acute respiratory failure likely due to aspiration pneumonia, abnormal urinalysis with underlying chronic indwelling Fonseca catheter being admitted for further treatment.       PLAN:   1.  Acute respiratory failure due to likely aspiration pneumonia.  The patient is on chronic tube feeds and likely debilitated and unable to protect her airway.  Chest x-ray shows a mixed interstitial and airspace disease in both lungs.  I suspect that she had an aspiration event.  The patient is also at risk given her chronic health care exposure and recent admission.  The patient will be treated with vancomycin and Zosyn.  She is DNR/DNI.  --Oxygen requirements improving  --resume tube feeding tomorrow if stable   --following cultures      Assessment:[MB1.1] Patient has positive urine cultures for E.Coli which is sensitive to all tested with the exception of Bactrim and amoxicillin.  Chest x-ray is not definitive for pneumonia.  If highly suspect pneumonia recommend to discontinue vancomycin at this time and continue zosyn alone.[MB1.2]        Current Anti-infective Orders:  Anti-infectives (Future)    Start     Dose/Rate Route Frequency Ordered Stop    09/24/17 1700  vancomycin (VANCOCIN) 1500 mg in 0.9% NaCl 250 mL PREMIX      1,500 mg Intravenous  EVERY 24 HOURS 17 1609      17 1000  piperacillin-tazobactam (ZOSYN) 3.375 g vial to attach to  mL bag      3.375 g  over 30 Minutes Intravenous EVERY 6 HOURS 17 0836              Clinical Features/Vital Signs  Vital signs:  Tmax - Temp (24hrs), Av.6  F (37  C), Min:98.3  F (36.8  C), Max:98.9  F (37.2  C)  Temp: 98.5  F (36.9  C) Temp  Min: 98.3  F (36.8  C)  Max: 98.9  F (37.2  C)      Lab Results  Recent Labs   Lab Test  17   0440  17   0530  17   1314   13   1300   WBC  10.0  10.6  12.4*   < >  9.3   SED   --    --    --    --   61*    < > = values in this interval not displayed.           Culture Results - past 9 results  7-Day Micro Results      Procedure Component Value Units Date/Time     Urine Culture Aerobic Bacterial [[id number]] (Abnormal)  Collected: 17 1419     Order Status: Completed Lab Status: Final result Updated: 17     Specimen: Catheterized Urine from Urine catheter       Specimen Description Catheterized Urine      Special Requests Specimen received in preservative      Culture Micro >100,000 colonies/mL   Escherichia coli    (A)     Culture & Susceptibility      ESCHERICHIA COLI      Antibiotic Interpretation Sensitivity Unit Method Status     AMPICILLIN Resistant >=32 ug/mL ARUN Final     AMPICILLIN/SULBACTAM Sensitive 8 ug/mL ARUN Final     CEFAZOLIN Sensitive <=4 ug/mL ARUN Final     Comment: Cefazolin ARUN breakpoints are for the treatment of uncomplicated urinary tract   infections.  For the treatment of systemic infections, please contact the   laboratory for additional testing.     CEFEPIME Sensitive <=1 ug/mL ARUN Final     CEFOXITIN Sensitive <=4 ug/mL ARUN Final     CEFTAZIDIME Sensitive <=1 ug/mL ARUN Final     CEFTRIAXONE Sensitive <=1 ug/mL ARUN Final     CIPROFLOXACIN Sensitive 1 ug/mL ARUN Final     GENTAMICIN Sensitive <=1 ug/mL ARUN Final     LEVOFLOXACIN Sensitive 1 ug/mL ARUN Final     NITROFURANTOIN Sensitive <=16 ug/mL  ARUN Final     Piperacillin/Tazo Sensitive <=4 ug/mL ARUN Final     TOBRAMYCIN Sensitive <=1 ug/mL ARUN Final     Trimethoprim/Sulfa Resistant >=16/304 ug/mL ARUN Final                           Blood culture [[id number]] Collected: 09/23/17 1314     Order Status: Completed Lab Status: Preliminary result Updated: 09/25/17 0624     Specimen: Blood from Arm, Right       Specimen Description Blood Right Arm      Special Requests Aerobic and anaerobic bottles received      Culture Micro No growth after 2 days     Blood culture [[id number]] Collected: 09/23/17 1305     Order Status: Completed Lab Status: Preliminary result Updated: 09/25/17 0624     Specimen: Blood from Arm, Right       Specimen Description Blood Right Arm      Special Requests Aerobic and anaerobic bottles received      Culture Micro No growth after 2 days           Imaging - past 3 days:  Xr Chest 1 View    Result Date: 9/23/2017  XR CHEST 1 VW 9/23/2017 2:04 PM COMPARISON: 8/20/2017 HISTORY: Fever.     IMPRESSION: Enlarged cardiac silhouette is again seen and unchanged. Mild mixed interstitial and airspace opacities over both lungs, edema versus atypical infection. Possible trace left pleural effusion. No pneumothorax on either side. BOY BAEZA MD    Ct Head W/o Contrast    Result Date: 9/23/2017  CT SCAN OF THE HEAD WITHOUT CONTRAST   9/23/2017 1:51 PM HISTORY: Trauma, evaluate fracture, bleed. TECHNIQUE:  Axial images of the head and coronal reformations without IV contrast material. Radiation dose for this scan was reduced using automated exposure control, adjustment of the mA and/or kV according to patient size, or iterative reconstruction technique. COMPARISON: Brain MR 8/21/2017. FINDINGS: Well-defined hypodensities within the occipital lobes bilaterally, left greater than right, compatible with evolving infarcts. There is no evidence of acute intracranial hemorrhage. Minimal cortical hyperdensity in the left occipital lobe likely represents laminar  necrosis in the areas of infarct. Loss of cortical differentiation in the left frontal lobe from previous infarct again noted. There is no mass effect or midline shift. Fairly extensive periventricular white matter hypodensity is likely due to chronic microvascular ischemic disease. Superimposed area of ischemia would be difficult to exclude. Ventricular size is unchanged since prior. There is unchanged moderate diffuse parenchymal volume loss. The visualized portions of the sinuses and mastoids appear normal. The bony calvarium and bones of the skull base appear intact.     IMPRESSION:    1. Evolving bilateral posterior cerebral artery infarcts, left greater than right. No evidence of new intracranial hemorrhage. No mass effect or midline shift. Persistent cortical left frontal lobe infarct. 2. Extensive periventricular white matter hypodensity likely due to chronic microvascular ischemic disease is similar to prior. Superimposed areas of ischemia would be difficult to exclude and would be better evaluated with brain MRI if clinically necessary. 3. Unchanged moderate diffuse parenchymal volume loss. KY FLOR MD          Recommendations/Interventions:[MB1.1]  Discontinue vancomycin[MB1.2]    Discussed with ID Staff - [MB1.1] Liam[MB1.2]       Revision History        User Key Date/Time User Provider Type Action    > MB1.2 9/25/2017  1:10 PM Wilda Jimenez RPH Pharmacist Sign     MB1.1 9/25/2017  8:17 AM Wilda Jimenez RPH Pharmacist             Consults by Bisi Montgomery RD, LD at 9/25/2017  9:29 AM     Author:  Bisi Montgomery RD, LD Service:  Nutrition Author Type:  Registered Dietitian    Filed:  9/25/2017  9:29 AM Date of Service:  9/25/2017  9:29 AM Creation Time:  9/25/2017  8:53 AM    Status:  Signed :  Bisi Montgomery RD, LD (Registered Dietitian)         CLINICAL NUTRITION SERVICES  -  ASSESSMENT NOTE      RECOMMENDATIONS FOR MD/PROVIDER TO ORDER:   Recommend d/c the TriViSol as it  was originally ordered 6/16 for only 10 days for wound healing, per pressure injury protocol.     Future/Additional Recommendations:   When ready to resume TF, recommend Isosource 1.5 at goal of 40 ml/hr to provide 1440 jarrod (29 jarrod/kg), 65 gm pro (1.3 gm/kg), 14 gm fiber, 169 gm CHO, 730 mL H2O.      Malnutrition:   Patient does not meet two of the above criteria necessary for diagnosing malnutrition          REASON FOR ASSESSMENT  Celia Lewis is a 89 year old female seen by Registered Dietitian for Admission Nutrition Risk Screen -  TF or Parenteral Nutrition        NUTRITION HISTORY  - Information obtained from EMR  - Unable to obtain nutrition history from pt, she is non-verbal with advanced dementia. Pt lives in a NH.  Pt is well-known to us from admits earlier this summer. She originally had a G-tube placed 6/16/16 and has been on TF since then.  Per review of NH records, she recently switched formula from Isosource 1.5 to Glucerna 1.5, running at 45 mL/hr continuous, providing 1620 jarrod, 89 gm pro,144 gm CHO.  Pt was also receiving Nutrisource Fiber, 1 packet BID.  She was also on Imodium and Culturell, apparently was having loose stools.    When pt was here in June we ordered a 10-day regimen of Vit A and Vit C (Tri-ViSol) as well as daily Certavite, all per PI protocol.  Pt is still on these vitamins, should have been d/c'd after 10 days.      CURRENT NUTRITION ORDERS  Diet Order:     NPO   No orders yet to resume TF; per MD note, likely will resume today.    PHYSICAL FINDINGS  Observed  No nutrition-related physical findings observed  Obtained from Chart/Interdisciplinary Team  Frail  Sacral dec ulcer, wounds on ears and right foot - WOCN consult pending  Chronic debilitated state    ANTHROPOMETRICS  Height: 5'  Weight:[CM1.1] 144 lbs 0 oz[CM1.2] (65.3 kg)[CM1.1]  Body mass index is 28.27 kg/(m^2).[CM1.2]  Weight Status:  Overweight BMI 25-29.9  IBW: 45.4 kg +/- 10%  % IBW: 144%  Weight History: Pt has  lost 13# - likely due to intetional hypocaloric feeding.[CM1.1]  Wt Readings from Last 10 Encounters:   09/23/17 65.3 kg (144 lb)   08/24/17 69.5 kg (153 lb 3.5 oz)   08/12/17 67.1 kg (148 lb)   08/09/17 69 kg (152 lb 1.9 oz)   07/06/17 71.4 kg (157 lb 6.4 oz)   06/19/17 69.3 kg (152 lb 12.5 oz)   06/24/16 72.2 kg (159 lb 3.2 oz)   06/18/15 83.9 kg (185 lb)   05/29/15 81.6 kg (180 lb)   05/28/15 81.6 kg (180 lb)[CM1.3]       LABS  Alb 3.4    MEDICATIONS  TriViSol, Certavite  Imodium, Culturell  SSI, Lantus    Dosing Weight 49.5 kg - adjusted for overweight    ASSESSED NUTRITION NEEDS PER APPROVED PRACTICE GUIDELINES:  Estimated Energy Needs: 7305-5884 kcals (25-30 Kcal/Kg)  Justification: overweight  Estimated Protein Needs: 50-60 grams protein (1-1.2 g pro/Kg)  Justification: wound healing and CKD  Estimated Fluid Needs: 3677-5197  mL (1 mL/Kcal)  Justification: maintenance    MALNUTRITION:  % Weight Loss:  Weight loss does not meet criteria for malnutrition   % Intake:  No decreased intake noted  Subcutaneous Fat Loss:  None observed  Muscle Loss:  None observed  Fluid Retention:  None noted    Malnutrition Diagnosis: Patient does not meet two of the above criteria necessary for diagnosing malnutrition    NUTRITION DIAGNOSIS:  No nutrition diagnosis identified at this time    NUTRITION INTERVENTIONS  Recommendations / Nutrition Prescription  Recommend d/c the TriViSol as it was originally ordered6/16 for only 10 days for wound healing, per pressure injury protocol.    When ready to resume TF, recommend Isosource 1.5 at goal of 40 ml/hr to provide 1440 jarrod (29 jarrod/kg), 65 gm pro (1.3 gm/kg), 14 gm fiber, 169 gm CHO, 730 mL H2O.       Implementation  Nutrition education: No education needs   No interventions at this time, waiting for MD consult to start TF..      Nutrition Goals  Pt will resume TF next 4-48 hrs.      MONITORING AND EVALUATION:  Progress towards goals will be monitored and evaluated per protocol and  Practice Guidelines    Bisi Montgomery RD  Pager [phone] (M-F)            [phone] (W/E & Hol)[CM1.1]                     Revision History        User Key Date/Time User Provider Type Action    > CM1.3 9/25/2017  9:29 AM Bisi Montgomery, GAVI, LD Registered Dietitian Sign     CM1.2 9/25/2017  8:54 AM Bisi Montgomery, GAVI, LD Registered Dietitian      CM1.1 9/25/2017  8:53 AM Bisi Montgomery, GAVI, LD Registered Dietitian                      Progress Notes - Physician (Notes from 09/24/17 through 09/27/17)      Progress Notes by Chatnelle Dickinson RN at 9/27/2017  3:02 PM     Author:  Chantelle Dickinson RN Service:  (none) Author Type:      Filed:  9/27/2017  3:03 PM Date of Service:  9/27/2017  3:02 PM Creation Time:  9/27/2017  3:02 PM    Status:  Signed :  Chantelle Dickinson RN ()         Updated Son, Dr. Lewis, that transportation time for discharge is 5 pm today via stretcher ambulance. No other questions from son, he was updated by MD.[JM1.1]      Revision History        User Key Date/Time User Provider Type Action    > JM1.1 9/27/2017  3:03 PM Chantelle Dickinson RN Case Manager Sign            Progress Notes by Kiana Plunkett LICSW at 9/27/2017  2:09 PM     Author:  Kiana Plunkett LICSW Service:  Social Work Author Type:      Filed:  9/27/2017  2:15 PM Date of Service:  9/27/2017  2:09 PM Creation Time:  9/27/2017  2:09 PM    Status:  Signed :  Kiana Plunkett LICSW ()         SW:    I: SW following for discharge planning. Pt is medically appropriate for discharge back to [facility] today. SW left message with admissions coordinator with update on discharge time. Pt will require stretcher transport as per bedside RN is bed bound, would not be able to safely transport by . PCS form completed and faxed to  billing ([phone]), original along with facesheet at [facility] station for HE . SW to fax discharge orders to  facility admissions [phone]. Per bedside RN, MD to contact pt son re: readiness for discharge today.    P: Pt to discharge to [facility] via HE stretcher transport at 1700.    SABA Cortez LICSW  Daytime (8:00am-4:30pm): [phone]  After-Hours SW Pager (4:30pm-11:30pm): [phone][CL1.1]        Revision History        User Key Date/Time User Provider Type Action    > CL1.1 9/27/2017  2:15 PM Kiana Plunkett LICSW  Sign            Progress Notes by Bisi Montgomery RD, LD at 9/27/2017  9:12 AM     Author:  Bisi Montgomery RD, LD Service:  Nutrition Author Type:  Registered Dietitian    Filed:  9/27/2017  9:16 AM Date of Service:  9/27/2017  9:12 AM Creation Time:  9/27/2017  9:12 AM    Status:  Signed :  Bisi Montgomery RD, LD (Registered Dietitian)         TF is now at goal, Isosource 15 @ 40 mL/hr.    Note IVF d/c'd, will increase H2O flushes to 120 mL q 4 hours for total fluid (TF + flushes) = 1450 mL/day.    Bisi Montgomery RD  Pager [phone] (M-F)            [phone] (W/E & Hol)[CM1.1]       Revision History        User Key Date/Time User Provider Type Action    > CM1.1 9/27/2017  9:16 AM Bisi Montgomery RD, LD Registered Dietitian Sign            Progress Notes by Suzi Machuca RN at 9/26/2017  2:51 PM     Author:  Suzi Machuca RN Service:  (none) Author Type:  Registered Nurse    Filed:  9/26/2017  2:54 PM Date of Service:  9/26/2017  2:51 PM Creation Time:  9/26/2017  2:51 PM    Status:  Signed :  Suzi Machuca RN (Registered Nurse)         Pt non verbal with staff repositioned every 2 hours by staff went per cart for chest xray this afternoon.  Pt on room air this shift. Noted lungs coarse and occasional expiratory wheeze noted. Dressing to coccyx and right foot ear lobes scabbed areas noted. Tube feeding isosource patent to g tube at 30ml/hr now target rate 40ml/hr nursing assistant reports that pt did have large loose  bowel movement will notify next shift to monitor[KG1.1]     Revision History        User Key Date/Time User Provider Type Action    > KG1.1 9/26/2017  2:54 PM Suzi Machuca, RN Registered Nurse Sign            Progress Notes by Favio Guzmán DO at 9/26/2017 12:05 PM     Author:  Favio Guzmán DO Service:  Hospitalist Author Type:  Physician    Filed:  9/26/2017  2:54 PM Date of Service:  9/26/2017 12:05 PM Creation Time:  9/26/2017 12:05 PM    Status:  Addendum :  Favio Guzmán DO (Physician)         [facility]    Hospitalist Progress Note    Assessment & Plan   Celia Lewis is a 89 year old female with advanced dementia, nonverbal, contracted bed bound, total care dependent, G-tube dependent, admitted with foaming at the mouth, hypoxia with acute respiratory failure likely due to aspiration pneumonia, abnormal urinalysis with underlying chronic indwelling Fonseca catheter being admitted for further treatment.     # Acute respiratory failure, suspect aspiration pneumonia  Patient is on chronic tube feeds and likely unable to protect her airway.  CXR showed possible interstitial infiltrates bilateral concerning for aspiration.  She was initially started on Zosyn and Vancomycin but this was narrowed to Zosyn.  No longer requiring O2  - Continue Zosyn for 2 more days (discontinue on 9/28)[JH1.1]    # Sepsis, present on admission and now resolved  See above[JH1.2]    # DM type II   Blood sugars have been elevated in the 200s the past day.    - Increased Lantus to 13 units and will continue SSI     # UTI   Culture grew E Coli which was pan-sensitive except for Bactrim and Ampicillin.   - Continue Zosyn     # Sacral Decubitus Ulcers  - Continue local wound care     # H/o CVA  - Continue ASA     # Chronic debilitated state with contractures  Tube feeds restarted yesterday without issues  - Continue to uptitrate tube feeds and once at rate of 40 mL/hr if  still tolerating     DVT Prophylaxis: Pneumatic Compression Devices  Code Status: DNR/DNI    Disposition: Expected discharge in 1 day    Favio Guzmán,   Text Page (7am to 6pm)    Interval History   Patient seen and examined.  She is non-verbal.  No events per nursing.     -Data reviewed today: I reviewed all new labs and imaging results over the last 24 hours. I personally reviewed no images or EKG's today.    Physical Exam   Temp: 97.7  F (36.5  C) Temp src: Oral BP: 125/57 Pulse: 68 Heart Rate: 74 Resp: 20 SpO2: 95 % O2 Device: None (Room air)    Vitals:    09/23/17 1722   Weight: 65.3 kg (144 lb)     Vital Signs with Ranges  Temp:  [97.7  F (36.5  C)-98.6  F (37  C)] 97.7  F (36.5  C)  Pulse:  [68-79] 68  Heart Rate:  [71-76] 74  Resp:  [20-28] 20  BP: (120-154)/(57-90) 125/57  SpO2:  [90 %-98 %] 95 %  I/O last 3 completed shifts:  In: 4224 [I.V.:3868; NG/GT:356]  Out: 1150 [Urine:1150]    Constitutional: Awake.  Non-verbal  Respiratory: Clear to auscultation bilaterally, no crackles or wheezing  Cardiovascular: Regular rate and rhythm, normal S1 and S2, and no murmur noted  GI: Normal bowel sounds, soft, non-distended, non-tender  Skin/Integumen: No rashes, no cyanosis, no edema    Medications     D5W 100 mL/hr at 09/26/17 1008     IV fluid REPLACEMENT ONLY         [START ON 9/27/2017] insulin glargine  13 Units Subcutaneous QAM AC     acetaminophen  650 mg Per Feeding Tube TID     piperacillin-tazobactam  3.375 g Intravenous Q6H     aspirin  325 mg Per G Tube Daily     Dakins   Apply externally BID     lactobacillus rhamnosus (GG)  1 capsule Per Feeding Tube Daily     loperamide  4 mg Per Feeding Tube Every Other Day     metoprolol  25 mg Per Feeding Tube BID     multivitamins with minerals  15 mL Per Feeding Tube Daily     oxyCODONE  2 mg Per G Tube TID     ranitidine  150 mg Per Feeding Tube Daily     simethicone  80 mg Per Feeding Tube TID     vitamin A-D & C drops  7 mL Per G Tube Daily     insulin  aspart  1-6 Units Subcutaneous Q4H       Data     Recent Labs  Lab 09/26/17  0751 09/25/17  1110 09/25/17  0440 09/24/17  1508 09/24/17  0530 09/23/17  1314   WBC 8.4  --  10.0  --  10.6 12.4*   HGB 12.0  --  12.0  --  12.3 14.4   MCV 98  --  101*  --  102* 103*    184 183  --  197 235     --  150*  --  151* 149*   POTASSIUM 3.8  --  4.2  --  4.2 4.4   CHLORIDE 114*  --  120*  --  117* 111*   CO2 19*  --  22  --  26 27   BUN 25  --  39*  --  57* 67*   CR 0.99  --  1.04 1.16* 1.22* 1.38*   ANIONGAP 11  --  8  --  8 11   NELLIE 7.8*  --  7.7*  --  8.1* 9.1   *  --  200*  --  205* 294*   ALBUMIN  --   --   --   --   --  3.4   PROTTOTAL  --   --   --   --   --  8.1   BILITOTAL  --   --   --   --   --  0.5   ALKPHOS  --   --   --   --   --  64   ALT  --   --   --   --   --  28   AST  --   --   --   --   --  22       Imaging:   No results found for this or any previous visit (from the past 24 hour(s)).[JH1.1]      Revision History        User Key Date/Time User Provider Type Action    > JH1.2 9/26/2017  2:54 PM Favio Guzmán DO Physician Addend     JH1.1 9/26/2017 12:25 PM Favio Guzmán DO Physician Sign            Progress Notes by Kiana Plunkett LICSW at 9/26/2017  2:03 PM     Author:  Kiana Plunkett LICSW Service:  Social Work Author Type:      Filed:  9/26/2017  2:08 PM Date of Service:  9/26/2017  2:03 PM Creation Time:  9/26/2017  2:03 PM    Status:  Signed :  Kiana Plunkett LICSW ()         Care Transition Initial Assessment - SW  Reason For Consult: discharge planning  Met with: N/A, spoke with facility  Active Problems:    Aspiration pneumonia (H)         DATA  Lives With: facility resident     Description of Support System: Supportive  Who is your support system?: , Children  Support Assessment: Adequate family and caregiver support, Adequate social supports.   Quality Of Family Relationships:  supportive    ASSESSMENT  Cognitive Status:  Not assessed, nonverbal per staff    SW has reviewed pt records. Pt is Celia, a 89yo female who was admitted on 9/23/17 due to aspiration pneumonia. Pt resides at [facility]. SW in contact with facility today to provide update and discuss anticipated discharge tomorrow 9/27. SW faxed over clinical information and updated that pt will require one more day IV zosyn at facility (last day 9/28). SW to contact facility and family on 9/27 to further coordinate discharge.     PLAN  Financial costs for the patient includes None known at this time.  Patient given options and choices for discharge N/A, from facility.  Patient/family is agreeable to the plan?  Yes  Patient Goals and Preferences: LTC.  Patient anticipates discharging to:  LTC.    SABA Cortez LICSW  Daytime (8:00am-4:30pm): [phone]  After-Hours SW Pager (4:30pm-11:30pm): [phone][CL1.1]                Revision History        User Key Date/Time User Provider Type Action    > CL1.1 9/26/2017  2:08 PM Kiana Plunkett LICSW  Sign            Progress Notes by Bisi Montgomery RD, LD at 9/25/2017  3:37 PM     Author:  Bisi Montgomery RD, LD Service:  Nutrition Author Type:  Registered Dietitian    Filed:  9/25/2017  3:39 PM Date of Service:  9/25/2017  3:37 PM Creation Time:  9/25/2017  3:37 PM    Status:  Signed :  Bisi Montgomery RD, LD (Registered Dietitian)         Note MD put in order in pt care order to start TF @ 10 mL/hr and titrate up, no formula or goal rate indicated.  Spoke with RASHMI Vanessa. Will enter TF orders: Isosource 1.5 @ 10 mL/hr, advance by 10 mL q 8 hours to goal of 40 mL/hr.  See full nutrition assessment written earlier today.    Bisi Montgomery RD  Pager [phone] (M-F)            [phone] (W/E & Hol)[CM1.1]       Revision History        User Key Date/Time User Provider Type Action    > CM1.1 9/25/2017  3:39 PM Bisi Montgomery, RD, LD Registered  Dietitian Sign            Progress Notes by Ni Reyes RN at 9/25/2017  3:35 PM     Author:  Ni Reyes RN Service:  (none) Author Type:  Registered Nurse    Filed:  9/25/2017  3:38 PM Date of Service:  9/25/2017  3:35 PM Creation Time:  9/25/2017  3:35 PM    Status:  Signed :  Ni Reyes RN (Registered Nurse)         Nonverbal, opens eyes spontaneously. Appears comfortable. A little moaning with repositioning. TF on hold today. Meds given in g-tube. LS coarse. Continues on IV ABX. VSS, sats well on RA. Turned and repositioned Q 2 hrs. Dressing changes by [facility] nurse today.[AY1.1]      Revision History        User Key Date/Time User Provider Type Action    > AY1.1 9/25/2017  3:38 PM Ni Reyes RN Registered Nurse Sign            Progress Notes by Flavia Nicole RN at 9/25/2017  2:01 PM     Author:  Flavia Nicole RN Service:  [facility] Nurse Author Type:  Enterstomal Therapist    Filed:  9/25/2017  3:15 PM Date of Service:  9/25/2017  2:01 PM Creation Time:  9/25/2017  2:01 PM    Status:  Signed :  Flavia Nicole RN (Enterstomal Therapist)         [facility] Nurse Inpatient Adult Pressure Injury (PI) Assessment     Initial Assessment of PI(s) on pt's:   Coccyx/sacrum; bilateral outer ears (helix)    Data:   Patient History:    Celia Lewis is a 89 year old female who was re-admitted on 9-23-17.  Past history of advanced dementia (non-verbal, bed-bound, s/p G-tube), HTN, DM II, CKD, chronic decubitus ulcer subacute CVA admitted with foaming at the mouth, hypoxia with acute respiratory failure likely due to aspiration pneumonia, abnormal urinalysis with underlying chronic indwelling Fonseca catheter being admitted for further treatment.     Current mattress: atmos air  Current pressure relieving devices:  Pillows      Moisture Management:  Incontinence Protocol and Urinary Catheter    Catheter secured? Yes      Current Diet / Nutrition:       Active Diet  Order: Dietitian following.     Deshawn Assessment and sub scores:  Deshawn Score  Av.7  Min: 9  Max: 12     Labs:   Recent Labs   Lab Test  17   0440   17   1314   17   0750  17   0625  17   1853   ALBUMIN   --    --   3.4   --    --    --   3.3*   HGB  12.0   < >  14.4   < >   --   11.9  12.8   INR   --    --    --    --    --    --   0.98   WBC  10.0   < >  12.4*   < >   --   10.8  14.3*   A1C   --    --    --    --    --   8.2*   --    CRP   --    --    --    --   5.4   --   7.0    < > = values in this interval not displayed.[JP1.1]                                                                                                                      l[JP1.2]  Pressure Injury Assessment  (location):   Coccyx/sacrum  - Present on admission  Wound History:   Pt just readmitted with wound on the coccyx.  Last  debrided, 06-15-17 during hospitalization         Coccyx:          -Size:  2.0cm  x 2.0cm x up to 1.5cm.  Wound is about 5% moist, soft slough @ base / 95% moist,shiny red tissue.           -Wound base: slightly soft but unable to probe any tunnels. This base with palpable bone just under tissue         -Undermining/Tunnels: none noted         -Drainage: scant sero-sang         -[JP1.1]P[JP1.2]myah-wound skin: slightly blanchable light erythema to ecchymosis noted from apporx 1-8 o'clock extending out 1.5cm[JP1.1]         -Odor: none[JP1.2]           Pressure Injury Assessment:   Bilateral outer ears (mid-helix)  -Present on admission  Wound History:   Present on admission; pt lies directly on ears when on sides.     1.  Left ear:  Approx.[JP1.1] 1[JP1.2] x 0.5 cm area of dried scabbed thin[JP1.1] reddish[JP1.2]  eschar   2.  Right ear:  Decrease .5cm x .[JP1.1]7[JP1.2]cm x superficial dried scabbed area. No drainage, no jimbo-wound erythema       Periwound Skin: scattered mild erythema        Odor: none       Pain:[JP1.1]  Unable to determine      P[JP1.2]ressure Injury  Assessment:[JP1.1]  Rt lateral foot - present on admission[JP1.2]  Wound History:[JP1.1]   Severed contractures of BLE.  Bilateral boots in place  Size: Linear 2.5cm x .5cm of light reddish ecchymosis with skin intact  Jimbo-wound skin: Intact, no erythema  Undermining/tunnel: NA  Odor: none  Drainage: none  Odor: none[JP1.2]                Intervention:     Patient's chart evaluated.      Deshawn Interventions:  Current Deshawn Interventions and Care Plan reviewed and updated, appropriate at this time.    Wounds assessed         -Wounds cleaned MicroKlenz         -Coccyx:[JP1.1] Today packed with small piece of aqua cell AG[JP1.2]         -Bilateral ear (helix): apply small piece of Mepilex lite         -[JP1.1]Small episode of FIC. Pt cleaned with diaper removed.[JP1.2]           Orders  In Epic[JP1.1] - orders in Epic for Dakins BID to coccyx ulcer.[JP1.2]           Supplies  Reviewed          POC: son not in room for discussion           Assessment:      coccyx/sacrum:[JP1.1] Chronic[JP1.2] Stage 4 PI, community acquired, no local s/s infections,      Bilateral ears:  Mix stage 2 and unstageable PI, community acquired, no local s/s infection[JP1.1]      Rt lateral foot:  DTI, community acquired PI, no local s/s infection[JP1.2]           Plan:     Nursing to notify the Provider(s) and re-consult the [facility] Nurse if wound(s) deteriorate(s)or if the wound care plan needs reevaluation.    Plan of care for wound on coccyx/ears[JP1.1]/Rt lateral foot[JP1.2]  1. Clean all wounds w with MicroKlenz Spray, pat dry  2. Coccyx:[JP1.1] BID as ordered[JP1.2]       -pack wound moistened 2x2 with Dakins solution        -dust jimbo-ulcer skin with Desenex brushing off excess       -seal in powder with 3M NO sting skin prep       -Cover all with Mepliex   Border  3. bilateral outer ears:[JP1.1] odd days and prn[JP1.2]       -skin prep wounds on ears. Let dry       -Cut stirps of Mepilex lite and wrap around ear to secure[JP1.1]  4.  Rt  lateral foot: odd days and prn        _Mepilex border  5[JP1.2]. PIP measures       - Rt and Lt postioning only  -Consider Z-Martin Pillows to help keep pt on side (#[id number] or #[id number] large). *Z-Flows are for positioning, DO NOT SIT ON!   -Keep heels elevated, use heel lift boots in room  - keep HOB @ 30 degrees for TF  -Incontinence protocol prn. No diaper on patient. Use incontinence pad only        -Deshawn Risk: document interventions        -If anticipate long LOS order a pulsate mattress for additional off-loading        -Full skin inspection BID. Document findings      [facility] Nurse will return: weekly and prn  Face to face time: 15+ Minutes[JP1.1]       Revision History        User Key Date/Time User Provider Type Action    > JP1.2 9/25/2017  3:15 PM Flavia Nicole RN Enterstomal Therapist Sign     JP1.1 9/25/2017  2:01 PM Flavia Nicole RN Enterstomal Therapist             Progress Notes by Piotr Saba MD at 9/25/2017  3:01 PM     Author:  Piotr Saba MD Service:  Hospitalist Author Type:  Physician    Filed:  9/25/2017  3:04 PM Date of Service:  9/25/2017  3:01 PM Creation Time:  9/25/2017  3:01 PM    Status:  Signed :  Piotr Saba MD (Physician)         [facility]  Hospitalist Progress Note  Piotr Saba MD  09/25/2017    Assessment & Plan   This is a 89-year-old with advanced dementia, nonverbal, contracted bed bound, total care dependent, G-tube dependent, admitted with foaming at the mouth, hypoxia with acute respiratory failure likely due to aspiration pneumonia, abnormal urinalysis with underlying chronic indwelling Fonseca catheter being admitted for further treatment.       PLAN:   1.  Acute respiratory failure due to likely aspiration pneumonia.  The patient is on chronic tube feeds and likely debilitated and unable to protect her airway.  Chest x-ray shows a mixed interstitial and airspace disease in both lungs.  I suspect that she had an  "aspiration event.  The patient is also at risk given her chronic health care exposure and recent admission.  The patient will be treated with vancomycin and Zosyn.  She is DNR/DNI.  --Oxygen requirements improving  --resume tube feeding today and titrate slowly  --will discontinue vancomycin, continue zosyn alone for total of 3 days.  -- repeat CXR in am.  2.  FEN:  Dysphagia, on chronic tube feeds:  Given her aspiration event, her feeding tubes will be held for at least 24 hours.   --As above, resume tube feeds today/  --u/o 250cc, will give gentle bolus and continue maintence  3.  Diabetes type 2:  Lantus decreased to 10 units on admission given NPO, q4h SSI  - titrate up insulin based on tube feeding rate starting tomorrow.  4.  History of sacral decubitus ulcer:    - continue the patient with local wound care.   5.  Recent bioccipital CVI.    - continued on her aspirin.     6.  History of reflux disease:    - continue the patient on Zantac.   7.  Chronic debilitated state with contractures:  Will continue the patient with oxycodone as needed for pain and Tylenol for milder pain.   8.  Urinary tract infection:  The patient's urinalysis is abnormal.  UCx shows e.coli, pan-sensitive to all except bactrim and ampicillin.  We will continue the patient on  Zosyn.  Her Fonseca catheter has been changed in the Emergency Department.   --following cultures  9.  CODE STATUS:  DNR/DNI.      DVT Prophylaxis: mechanical  Code Status: DNR/DNI     Disposition:     Interval History   - chart reviewed  - discussed with RN  - no new changes, stable resp status.    -Data reviewed today: I reviewed all new labs and imaging over the last 24 hours. I personally reviewed no images or EKG's today.    Physical Exam   Heart Rate: 76, Blood pressure 154/90, temperature 98  F (36.7  C), temperature source Axillary, resp. rate 28, height 1.52 m (4' 11.84\"), weight 65.3 kg (144 lb), SpO2 98 %.  Vitals:    09/23/17 1722   Weight: 65.3 kg (144 " lb)     Vital Signs with Ranges  Temp:  [98  F (36.7  C)-98.9  F (37.2  C)] 98  F (36.7  C)  Heart Rate:  [68-87] 76  Resp:  [15-36] 28  BP: (104-154)/(32-93) 154/90  SpO2:  [91 %-98 %] 98 %  I/O's Last 24 hours  I/O last 3 completed shifts:  In: 2318 [I.V.:2318]  Out: 1125 [Urine:1125]    Constitutional: Awake, alert, cooperative, no apparent distress  Respiratory: Clear to auscultation bilaterally, no crackles or wheezing  Cardiovascular: Regular rate and rhythm, normal S1 and S2, and no murmur noted  GI: Normal bowel sounds, soft, non-distended, non-tender  Skin/Integumen: No rashes, no cyanosis, no edema  Other:      Medications   All medications were reviewed.    dextrose 5% and 0.45% NaCl + KCl 20 mEq/L 100 mL/hr at 09/25/17 0934       piperacillin-tazobactam  3.375 g Intravenous Q6H     vancomycin (VANCOCIN) IV  1,500 mg Intravenous Q24H     sodium chloride 0.9%  1,000 mL Intravenous Once     acetaminophen (TYLENOL) tablet 650 mg  650 mg Per Feeding Tube TID     aspirin  325 mg Per G Tube Daily     Dakins   Apply externally BID     lactobacillus rhamnosus (GG)  1 capsule Per Feeding Tube Daily     loperamide  4 mg Per Feeding Tube Every Other Day     metoprolol  25 mg Per Feeding Tube BID     multivitamins with minerals  15 mL Per Feeding Tube Daily     oxyCODONE  2 mg Per G Tube TID     ranitidine  150 mg Per Feeding Tube Daily     simethicone  80 mg Per Feeding Tube TID     vitamin A-D & C drops  7 mL Per G Tube Daily     insulin aspart  1-6 Units Subcutaneous Q4H     insulin glargine  10 Units Subcutaneous QAM         Data     Recent Labs  Lab 09/25/17  1110 09/25/17  0440 09/24/17  1508 09/24/17  0530 09/23/17  1314   WBC  --  10.0  --  10.6 12.4*   HGB  --  12.0  --  12.3 14.4   MCV  --  101*  --  102* 103*    183  --  197 235   NA  --  150*  --  151* 149*   POTASSIUM  --  4.2  --  4.2 4.4   CHLORIDE  --  120*  --  117* 111*   CO2  --  22  --  26 27   BUN  --  39*  --  57* 67*   CR  --  1.04  1.16* 1.22* 1.38*   ANIONGAP  --  8  --  8 11   NELLIE  --  7.7*  --  8.1* 9.1   GLC  --  200*  --  205* 294*   ALBUMIN  --   --   --   --  3.4   PROTTOTAL  --   --   --   --  8.1   BILITOTAL  --   --   --   --  0.5   ALKPHOS  --   --   --   --  64   ALT  --   --   --   --  28   AST  --   --   --   --  22       No results found for this or any previous visit (from the past 24 hour(s)).    Piotr Saba MD  Pager [phone][PD1.1]          Revision History        User Key Date/Time User Provider Type Action    > PD1.1 9/25/2017  3:04 PM Piotr Saba MD Physician Sign            Progress Notes by Enzo Trotter MD at 9/24/2017  8:21 AM     Author:  Enzo Trotter MD Service:  Hospitalist Author Type:  Physician    Filed:  9/24/2017  8:32 AM Date of Service:  9/24/2017  8:21 AM Creation Time:  9/24/2017  8:21 AM    Status:  Signed :  Enzo Trotter MD (Physician)         [facility]    Hospitalist Progress Note    Assessment & Plan   This is a 89-year-old with advanced dementia, nonverbal, contracted bed bound, total care dependent, G-tube dependent, admitted with foaming at the mouth, hypoxia with acute respiratory failure likely due to aspiration pneumonia, abnormal urinalysis with underlying chronic indwelling Fonseca catheter being admitted for further treatment.       PLAN:   1.  Acute respiratory failure due to likely aspiration pneumonia.  The patient is on chronic tube feeds and likely debilitated and unable to protect her airway.  Chest x-ray shows a mixed interstitial and airspace disease in both lungs.  I suspect that she had an aspiration event.  The patient is also at risk given her chronic health care exposure and recent admission.  The patient will be treated with vancomycin and Zosyn.  She is DNR/DNI.  --Oxygen requirements improving  --resume tube feeding tomorrow if stable   --following cultures  2.  FEN:  Dysphagia, on chronic tube feeds:  Given her  aspiration event, her feeding tubes will be held for at least 24 hours.   --As above, resume tube feeds tomorrow if stable  --u/o 250cc, will give gentle bolus and continue maintence  3.  Diabetes type 2:  Lantus decreased to 10 units on admission given NPO, q4h SSI  4.  History of sacral decubitus ulcer:  Will continue the patient with local wound care.   5.  Recent bioccipital CVI.  The patient will be continued on her aspirin.     6.  History of reflux disease:  We will continue the patient on Zantac.   7.  Chronic debilitated state with contractures:  Will continue the patient with oxycodone as needed for pain and Tylenol for milder pain.   8.  Urinary tract infection:  The patient's urinalysis is abnormal.  We will continue the patient with vancomycin and Zosyn.  Her Fonseca catheter has been changed in the Emergency Department.   --following cultures  9.  CODE STATUS:  DNR/DNI.     DVT Prophylaxis: mechanical  Code Status: DNR/DNI    Disposition: Expected discharge in 2 days once respiratory status is more clear.    Enzo Trotter MD  Text Page (7am to 6pm)    Interval History   Ovenight events noted, chart reviewed.  Low U/O during night, though oxygen requirements improving. Patient is non verbal, opens eyes to voice    -Data reviewed today: I reviewed all new labs and imaging results over the last 24 hours. I personally reviewed no images or EKG's today.    Physical Exam   Temp: 99.1  F (37.3  C) Temp src: Axillary BP: 124/52   Heart Rate: 81 Resp: 17 SpO2: 98 % O2 Device: Oxymask Oxygen Delivery: 4 LPM  Vitals:    09/23/17 1722   Weight: 65.3 kg (144 lb)     Vital Signs with Ranges  Temp:  [97.6  F (36.4  C)-101.6  F (38.7  C)] 99.1  F (37.3  C)  Heart Rate:  [73-90] 81  Resp:  [17-24] 17  BP: ()/(41-72) 124/52  SpO2:  [97 %-100 %] 98 %  I/O last 3 completed shifts:  In: 1431.33 [I.V.:1371.33; NG/GT:60]  Out: 150 [Urine:150]    Constitutional: Awake, non-verbal, opens eyes to voice  Respiratory:  Coarse breath sounds bilaterally, slight basilar crackles although moving air well.  Cardiovascular: RRR, s1s2  GI: Normal bowel sounds, soft, non-distended, non-tender  Skin/Integumen: No edema  Other:      Medications     NaCl       dextrose 5% and 0.45% NaCl + KCl 20 mEq/L 100 mL/hr at 09/24/17 0433       sodium chloride 0.9%  1,000 mL Intravenous Once     sodium chloride 0.9%  1,000 mL Intravenous Once     acetaminophen (TYLENOL) tablet 650 mg  650 mg Per Feeding Tube TID     aspirin  325 mg Per G Tube Daily     Dakins   Apply externally BID     lactobacillus rhamnosus (GG)  1 capsule Per Feeding Tube Daily     [START ON 9/25/2017] loperamide  4 mg Per Feeding Tube Every Other Day     metoprolol  25 mg Per Feeding Tube BID     multivitamins with minerals  15 mL Per Feeding Tube Daily     oxyCODONE  2 mg Per G Tube TID     ranitidine  150 mg Per Feeding Tube Daily     simethicone  80 mg Per Feeding Tube TID     vitamin A-D & C drops  7 mL Per G Tube Daily     insulin aspart  1-6 Units Subcutaneous Q4H     insulin glargine  10 Units Subcutaneous QAM AC     sodium chloride (PF)  3 mL Intracatheter Q8H     sodium chloride (PF)  3 mL Intracatheter Q8H     [START ON 9/25/2017] vancomycin (VANCOCIN) IV  1,500 mg Intravenous Q48H     piperacillin-tazobactam  2.25 g Intravenous Q6H       Data     Recent Labs  Lab 09/24/17  0530 09/23/17  1314   WBC 10.6 12.4*   HGB 12.3 14.4   * 103*    235   * 149*   POTASSIUM 4.2 4.4   CHLORIDE 117* 111*   CO2 26 27   BUN 57* 67*   CR 1.22* 1.38*   ANIONGAP 8 11   NELLIE 8.1* 9.1   * 294*   ALBUMIN  --  3.4   PROTTOTAL  --  8.1   BILITOTAL  --  0.5   ALKPHOS  --  64   ALT  --  28   AST  --  22       Imaging:   Recent Results (from the past 24 hour(s))   CT Head w/o Contrast    Narrative    CT SCAN OF THE HEAD WITHOUT CONTRAST   9/23/2017 1:51 PM     HISTORY: Trauma, evaluate fracture, bleed.    TECHNIQUE:  Axial images of the head and coronal reformations  without  IV contrast material. Radiation dose for this scan was reduced using  automated exposure control, adjustment of the mA and/or kV according  to patient size, or iterative reconstruction technique.    COMPARISON: Brain MR 8/21/2017.    FINDINGS: Well-defined hypodensities within the occipital lobes  bilaterally, left greater than right, compatible with evolving  infarcts. There is no evidence of acute intracranial hemorrhage.  Minimal cortical hyperdensity in the left occipital lobe likely  represents laminar necrosis in the areas of infarct. Loss of cortical  differentiation in the left frontal lobe from previous infarct again  noted. There is no mass effect or midline shift. Fairly extensive  periventricular white matter hypodensity is likely due to chronic  microvascular ischemic disease. Superimposed area of ischemia would be  difficult to exclude. Ventricular size is unchanged since prior. There  is unchanged moderate diffuse parenchymal volume loss.    The visualized portions of the sinuses and mastoids appear normal. The  bony calvarium and bones of the skull base appear intact.      Impression    IMPRESSION:      1. Evolving bilateral posterior cerebral artery infarcts, left greater  than right. No evidence of new intracranial hemorrhage. No mass effect  or midline shift. Persistent cortical left frontal lobe infarct.  2. Extensive periventricular white matter hypodensity likely due to  chronic microvascular ischemic disease is similar to prior.  Superimposed areas of ischemia would be difficult to exclude and would  be better evaluated with brain MRI if clinically necessary.  3. Unchanged moderate diffuse parenchymal volume loss.        KY FLOR MD   XR Chest 1 View    Narrative    XR CHEST 1 VW 9/23/2017 2:04 PM    COMPARISON: 8/20/2017    HISTORY: Fever.      Impression    IMPRESSION: Enlarged cardiac silhouette is again seen and unchanged.  Mild mixed interstitial and airspace opacities over  both lungs, edema  versus atypical infection. Possible trace left pleural effusion. No  pneumothorax on either side.    BOY BAEZA MD[JH1.1]          Revision History        User Key Date/Time User Provider Type Action    > JH1.1 9/24/2017  8:32 AM Enzo Trotter MD Physician Sign                  Procedure Notes     No notes of this type exist for this encounter.      Progress Notes - Therapies (Notes from 09/24/17 through 09/27/17)     No notes of this type exist for this encounter.

## 2017-09-23 NOTE — ED NOTES
"[facility]  ED Nurse Handoff Report    ED Chief complaint: Shortness of Breath (found by staff foaming at the mouth, high respiratory rate, 75% on RA.  vomited)      ED Diagnosis:   Final diagnoses:   Severe sepsis (H)   Urinary tract infection associated with indwelling urethral catheter, initial encounter (H)       Code Status: POLST with patient    Allergies:   Allergies   Allergen Reactions     Sulfa Drugs Other (See Comments)     Per nursing home documents, patient has allergy to sulfa       Activity level - Baseline/Home:  Total Care    Activity Level - Current:   Total Care     Needed?: No    Isolation: Yes  Infection: Not Applicable  MRSA    Bariatric?: No    Vital Signs:   Vitals:    09/23/17 1330 09/23/17 1342 09/23/17 1500 09/23/17 1600   BP: 121/61 148/72 117/62 143/72   Resp:  20  20   Temp:  101.6  F (38.7  C)     TempSrc:  Rectal     SpO2: 99% 97% 100% 100%       Cardiac Rhythm: ,        Pain level:      Is this patient confused?: Yes    Patient Report: Initial Complaint: 89 year old female with severe dementia NH resident, noncommunicative, total care, well known to the ED presents after staff found her with \"high respiratory rate, foaming at the mouth, hypoxic at 75%, vomited x1\".  Comes in with an indwelling catheter and G-tube.   Focused Assessment: nonverbal, opens eyes at best. Multiple skin issues,   Tests Performed:          Lactate 3.9. Repeat 3.5           UA +UTI          Head CT without change           CXR probable pneumonia  Treatments:  IVF bolus, antibiotics, caro change    Family Comments: not present.  Son is a physician and has not returned our call.     OBS brochure/video discussed/provided to patient: No    ED Medications:   Medications   0.9% sodium chloride BOLUS (1,000 mLs Intravenous New Bag 9/23/17 1324)     Followed by   0.9% sodium chloride infusion (not administered)   0.9% sodium chloride BOLUS (not administered)   piperacillin-tazobactam " (ZOSYN) 3.375 g vial to attach to  mL bag (not administered)   vancomycin (VANCOCIN) 1500 mg in 0.9% NaCl 250 mL PREMIX (not administered)   acetaminophen (TYLENOL) Suppository 650 mg (650 mg Rectal Given 9/23/17 0700)       Drips infusing?:  No      ED NURSE PHONE NUMBER: [phone]

## 2017-09-23 NOTE — IP AVS SNAPSHOT
"` [place] [address] [facility]: [phone]                                              INTERAGENCY TRANSFER FORM - NURSING   2017                    Hospital Admission Date: 2017  RAÚL MERCEDES   : 1926  Sex: Female        Attending Provider: Piotr Saba MD     Allergies:  Sulfa Drugs    Infection:  MRSA-Contact Isolation   Service:  HOSPITALIST    Ht:  1.52 m (4' 11.84\")   Wt:  68.1 kg (150 lb 2.1 oz)   Admission Wt:  65.3 kg (144 lb)    BMI:  29.47 kg/m 2   BSA:  1.7 m 2            Patient PCP Information     None on File      Current Code Status     Date Active Code Status Order ID Comments User Context       2017  5:28 PM DNR/DNI [id number]  Piotr Saba MD Inpatient       Code Status History     Date Active Date Inactive Code Status Order ID Comments User Context    2017  3:10 PM 2017  5:28 PM DNR/DNI [id number]  Piotr Mercado MD Outpatient    2017  9:44 AM 2017  3:10 PM DNR/DNI [id number]  Piotr Mercado MD Inpatient    2017  8:51 PM 2017  9:44 AM Full Code [id number]  Antwan Green MD Inpatient    2017  6:24 PM 2017  8:51 PM Full Code [id number]  Tamar Pineda MD Outpatient    2017  7:51 AM 2017  6:24 PM Full Code [id number]  Felicitas Cedeño DO Inpatient    2017 10:22 AM 2017  7:51 AM Full Code [id number]  Piotr Saba MD Outpatient    2017  7:29 PM 2017 10:22 AM Full Code [id number]  Paul Jolly MD Inpatient    2017 10:10 AM 2017  7:29 PM Full Code [id number]  Kraig Mobley MD Outpatient    2017  5:14 PM 2017 10:10 AM Full Code [id number]  Antwan Green MD Inpatient    2016 11:48 AM 2016  5:44 PM Full Code [id number]  Clarence Wilkerson MD Inpatient    2014  3:01 PM 2016 11:48 AM Full Code [id number]  Emily Ca DO Outpatient    2014  3:34 AM 2014  3:01 PM Full Code [id number]  Ni Mccurdy MD " Inpatient    8/14/2012 12:42 PM 6/24/2014  3:34 AM Full Code [id number]  Francesca Knott MD Outpatient    8/6/2012  3:47 AM 8/14/2012 12:42 PM Full Code [id number]  Peewee De La Cruz MD Inpatient      Advance Directives        Does patient have a scanned Advance Directive/ACP document in EPIC?           Yes        Hospital Problems as of 9/27/2017              Priority Class Noted POA    Aspiration pneumonia (H) Medium  9/23/2017 Yes      Non-Hospital Problems as of 9/27/2017              Priority Class Noted    Nephrolithiasis Medium  8/6/2012    Bacteremia Medium  8/11/2012    Actinomyces infection Medium  8/31/2012    Uric acid stone in urine Medium  Unknown    Fall Medium  6/24/2014    UTI (urinary tract infection) Medium  6/24/2014    Scalp laceration Medium  6/24/2014    Advanced dementia Medium  6/24/2014    Atrial fibrillation (H) Medium  5/22/2015    History of urinary tract infection Medium  5/22/2015    Alzheimer's disease Medium  5/22/2015    Primary localized osteoarthrosis of shoulder region Medium  5/22/2015    Morbid obesity (H) Medium  5/22/2015    Diabetes mellitus type 2, insulin dependent (H) Medium  5/22/2015    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) Medium  5/22/2015    Hyperlipidemia Medium  5/22/2015    Candidiasis of skin and nails Medium  5/22/2015    Chronic kidney disease, stage III (moderate) Medium  5/22/2015    Advance care planning Medium  5/29/2015    HCAP (healthcare-associated pneumonia) Medium  6/21/2016    Sepsis due to urinary tract infection (H) Medium  6/14/2017    ARF (acute renal failure) (H) Medium  6/30/2017    Recurrent UTI Medium  8/4/2017    Acute renal failure superimposed on stage 3 chronic kidney disease (H) Medium  8/9/2017    Sacral decubitus ulcer, stage III (H) Medium  8/9/2017    Hypernatremia Medium  8/9/2017    Complication of gastrostomy tube (H) Medium  8/9/2017    CVA (cerebral vascular accident) (H) Medium  8/20/2017     "  Immunizations     Name Date      Influenza (IIV3) 10/13/14     Pneumococcal 23 valent 12/01/98          END      ASSESSMENT     Discharge Profile Flowsheet     EXPECTED DISCHARGE     FINAL RESOURCES      Expected Discharge Date  09/27/17 ([facility]) 09/26/17 1301   Resources List  Skilled Nursing Facility 08/09/17 1514    DISCHARGE NEEDS ASSESSMENT     Referrals Placed  Post Acute Facilities;Transportation 08/09/17 1514    Concerns To Be Addressed  discharge planning concerns 08/07/17 0900   SKIN      # of Referrals Placed by CTS  Post Acute Facilities 09/26/17 1353   Inspection of bony prominences  Full 09/27/17 1638    Equipment Used at Home  standard walker;wheelchair/stroller 08/09/12 1131   Inspection under devices  Full 09/27/17 1638    GASTROINTESTINAL (ADULT,PEDIATRIC,OB)     Skin WDL  ex 09/27/17 1638    GI WDL  ex 09/27/17 1638   Skin Integrity  abrasion(s);bruise(s);drain/device(s) 09/27/17 1638    Abdominal Appearance  obese 09/27/17 1638   Skin Color/Characteristics  pale 09/27/17 1638    Last Bowel Movement  09/26/17 09/26/17 1732   SAFETY      GI Signs/Symptoms  fecal incontinence 09/27/17 1638   Safety WDL  WDL 09/27/17 1638    COMMUNICATION ASSESSMENT     Safety Factors  bed in low position 09/27/17 1638    Patient's communication style  spoken language (English or Bilingual) 09/23/17 1218                      Assessment WDL (Within Defined Limits) Definitions           Safety WDL     Effective: 09/28/15    Row Information: <b>WDL Definition:</b> Bed in low position, wheels locked; call light in reach; upper side rails up x 2; ID band on<br> <font color=\"gray\"><i>Item=AS safety wdl>>List=AS safety wdl>>Version=F14</i></font>      Skin WDL     Effective: 09/28/15    Row Information: <b>WDL Definition:</b> Warm; dry; intact; elastic; without discoloration; pressure points without redness<br> <font color=\"gray\"><i>Item=AS skin wdl>>List=AS skin wdl>>Version=F14</i></font>      Vitals     Vital " "Signs Flowsheet     VITAL SIGNS     Side Effects Monitoring: Sedation Level  2 09/27/17 1630    Temp  98.2  F (36.8  C) 09/27/17 1548   HEIGHT AND WEIGHT      Temp src  Axillary 09/27/17 1548   Height  1.52 m (4' 11.84\") 09/23/17 1757    Resp  26 09/27/17 1548   Height Method  Stated 09/23/17 1757    Pulse  74 09/27/17 1548   Weight  68.1 kg (150 lb 2.1 oz) 09/27/17 0705    Heart Rate  75 09/27/17 1548   Weight Method  Bed scale (bed wt with bariatric GLS) 09/27/17 0705    Pulse/Heart Rate Source  Monitor 09/27/17 1548   BSA (Calculated - sq m)  1.66 09/23/17 1757    BP  155/79 09/27/17 1548   BMI (Calculated)  28.33 09/23/17 1757    BP Location  Right arm 09/27/17 1548   POSITIONING      OXYGEN THERAPY     Body Position  side-lying, right 09/27/17 1306    SpO2  94 % 09/27/17 1548   Head of Bed (HOB)  HOB at 30 degrees 09/27/17 1306    O2 Device  None (Room air) 09/27/17 1548   Positioning/Transfer Devices  pillows;in use 09/27/17 1306    Oxygen Delivery  1 LPM 09/24/17 1709   DAILY CARE      PAIN/COMFORT     Activity Assistance Provided  lift team assistance 09/27/17 1638    Patient Currently in Pain  ZOEY 09/27/17 1630   Activity Management  bedrest 09/27/17 1638    0-10 Pain Scale  0 09/27/17 1630   Assistive Device Utilized  mechanical lift 09/27/17 1638    Nonverbal Indicators Of Pain  other (see comments) (Pt sleeping) 09/27/17 1630   BRANNON COMA SCALE      Pain Intervention(s)  Medication (See eMAR) 09/27/17 1630   Best Eye Response  2-->(E2) to pain 09/23/17 1654    Response to Interventions  Absence of nonverbal indicators of pain 09/27/17 1630   Best Motor Response  4-->(M4) withdraws from pain 09/23/17 1654    ANALGESIA SIDE EFFECTS MONITORING     Best Verbal Response  1-->(V1) none 09/23/17 1654    Side Effects Monitoring: Respiratory Quality  R 09/27/17 1630   Brannon Coma Scale Score  7 09/23/17 1654    Side Effects Monitoring: Respiratory Depth  N 09/27/17 1630                 Patient " Lines/Drains/Airways Status    Active LINES/DRAINS/AIRWAYS     Name: Placement date: Placement time: Site: Days: Last dressing change:    Gastrostomy/Enterostomy Gastrostomy LUQ       LUQ        Urethral Catheter 16 fr 09/23/17   1609      4     Peripheral IV 09/27/17 Left Upper forearm 09/27/17   1135   Upper forearm   less than 1     Pressure Ulcer 06/21/16 Right Sacrum 06/21/16   1215    463     Pressure Injury 06/14/17 Coccyx 3x4cm irregular shaped wound to coccyx 06/14/17   1715    104     Pressure Injury 09/23/17 Right Foot 09/23/17   1730    3     Wound Posterior Head Laceration       Head        Wound 06/30/17 Bilateral;Outer Ear Suspected pressure ulcer 06/30/17   1900   Ear   88     Rash 07/01/17 0014 Bilateral upper other (see comments) other (see comments) 07/01/17   0014    88             Patient Lines/Drains/Airways Status    Active PICC/CVC     None            Intake/Output Detail Report     Date Intake         Output   Net    Shift P.O. I.V. NG/GT IV Piggyback Enteral Total Urine Emesis/NG output Total       Day 09/26/17 0700 - 09/26/17 1459 0 775 -- -- 150 925 300 -- 300 625    Sandrine 09/26/17 1500 - 09/26/17 2259 -- 750 -- -- -- 750 450 -- 450 300    Noc 09/26/17 2300 - 09/27/17 0659 0 100 120 -- 340 560 675 -- 675 -115    Day 09/27/17 0700 - 09/27/17 1459 0 -- -- -- -- -- 690 -- 690 -690    Sandrine 09/27/17 1500 - 09/27/17 2259 -- -- -- -- -- -- -- -- -- 0      Last Void/BM       Most Recent Value    Urine Occurrence     Stool Occurrence 1 at 09/27/2017 1057      Case Management/Discharge Planning     Case Management/Discharge Planning Flowsheet     REFERRAL INFORMATION     Support Assessment  Adequate family and caregiver support;Adequate social supports 09/26/17 1353    Did the Initial Social Work Assessment result in a Social Work Case?  Yes 09/26/17 1353   Quality of Family Relationships  supportive;involved 09/26/17 1353    Admission Type  inpatient 09/26/17 1353   COPING/STRESS      Arrived From   long-term care 09/26/17 1353   Major Change/Loss/Stressor  unable to assess 09/25/17 0642    Referral Source  physician 09/26/17 1353   Patient Personal Strengths  strong support system 08/20/17 2221    # of Referrals Placed by CTS  Post Acute Facilities 09/26/17 1353   EXPECTED DISCHARGE      Post Acute Facilities  SNF 09/26/17 1353   Expected Discharge Date  09/27/17 ([facility]) 09/26/17 1301    Reason For Consult  discharge planning 09/26/17 1353   ASSESSMENT/CONCERNS TO BE ADDRESSED      Record Reviewed  clinical discipline documentation;history and physical;medical record;patient profile;plan of care 09/26/17 1353   Concerns To Be Addressed  discharge planning concerns 08/07/17 0900    CTS Assigned to John Torres 09/26/17 1353   DISCHARGE PLANNING      LIVING ENVIRONMENT     Skilled Nursing Facility  [facility] 08/10/12 1751    Lives With  facility resident 09/26/17 1353   Skilled Nursing Facility Phone Number  [phone] 08/10/12 1751    Living Arrangements  extended care facility ([facility]) 08/07/17 0900   Equipment Used at Home  standard walker;wheelchair/stroller 08/09/12 1131    Quality Of Family Relationships  supportive 09/26/17 1353   FINAL RESOURCES      Able to Return to Prior Living Arrangements  yes 09/26/17 1353   Resources List  Skilled Nursing Facility 08/09/17 1514    HOME SAFETY     Referrals Placed  Post Acute Facilities;Transportation 08/09/17 1514    Patient Feels Safe Living in Home?  yes 09/26/17 1353   ABUSE RISK SCREEN      ASSESSMENT OF FAMILY/SOCIAL SUPPORT     QUESTION TO PATIENT:  Has a member of your family or a partner(now or in the past) intimidated, hurt, manipulated, or controlled you in any way?  patient declined to answer or is unable to answer 09/25/17 0639    Marital Status   09/26/17 1353   QUESTION TO PATIENT: Do you feel safe going back to the place where you are living?  patient declined to answer or is unable to answer 09/25/17 0639     Who is your support system?  ;Children 09/26/17 0908   OBSERVATION: Is there reason to believe there has been maltreatment of a vulnerable adult (ie. Physical/Sexual/Emotional abuse, self neglect, lack of adequate food, shelter, medical care, or financial exploitation)?  no 09/25/17 0639    Spouse's Name  Leonel 09/26/17 2274   (R) MENTAL HEALTH SUICIDE RISK      Description of Support System  Supportive 09/26/17 0328   Are you depressed or being treated for depression?  No 09/25/17 0600

## 2017-09-23 NOTE — PHARMACY-ADMISSION MEDICATION HISTORY
Admission medication history interview status for the 9/23/2017  admission is complete. See EPIC admission navigator for prior to admission medications     Medication history source reliability:Good    Actions taken by pharmacist (provider contacted, etc):None     Additional medication history information not noted on PTA med list : Med rec completed per paper nursing home MAR.     Medication reconciliation/reorder completed by provider prior to medication history? No    Time spent in this activity: 20 minutes    Prior to Admission medications    Medication Sig Last Dose Taking? Auth Provider   insulin glargine (LANTUS) 100 UNIT/ML injection Inject 39 Units Subcutaneous At Bedtime 9/23/2017 at 800 Yes Unknown, Entered By History   oxyCODONE (ROXICODONE) 5 MG/5ML solution 2 mLs (2 mg) by Per G Tube route 3 times daily 9/23/2017 at 800 Yes Piotr Mercado MD   oxyCODONE (ROXICODONE) 5 MG/5ML solution 2 mLs (2 mg) by Per G Tube route every 4 hours as needed for moderate to severe pain 9/13/2017 at prn Yes Piotr Mercado MD   aspirin 325 MG tablet 1 tablet (325 mg) by Per G Tube route daily 9/23/2017 at 800 Yes Piotr Mercado MD   metoprolol (LOPRESSOR) 10 mg/mL SUSP Take 25 mg by mouth 2 times daily 9/23/2017 at 800 Yes Unknown, Entered By History   simethicone (MYLICON) 40 MG/0.6ML suspension Take 80 mg by mouth 3 times daily 9/23/2017 at 800 Yes Unknown, Entered By History   ranitidine (ZANTAC) 150 MG/10ML syrup 10 mLs (150 mg) by Per Feeding Tube route daily 9/23/2017 at 800 Yes Piotr Saba MD   vitamin A-D & C drops (TRI-VI-SOL) 750-400-35 UNIT-MG/ML solution NEW FORMULATION 7 mLs by Per G Tube route daily 9/23/2017 at 800 Yes Piotr Saba MD   INSULIN ASPART SC Inject 2-12 Units Subcutaneous every 6 hours 0800, 1400, 2000, 0200  -250 2 unit  -300 4 units  -350 6 units  -400 8 units  -450 10 units  BG >450 12 units and update MD 9/23/2017 at x2 Yes Unknown, Entered By  History   Dakins 0.125 % SOLN Externally apply topically 2 times daily BID and PRN.   1. Cleanse wound with microklenze, cleanse periwound area with naomi perineal  2. Moisten kerlix fluff with dakins solution 0.125%, wring out excess, pack wound with kerlix  3. Apply antifungal powder to periwound area, rub in. Apply criticaid over powder.  4. Cover with ABD using minimal medipore tape to secure.  5. Label dressing with date, time, initials. Follow Rigorous PIP measures. 9/22/2017 at 1600 Yes Reported, Patient   Lactobacillus Acidophilus POWD 1 capsule by Gastric Tube route daily  9/23/2017 at 800 Yes Unknown, Entered By History   multivitamin, therapeutic (THERA-VIT) TABS tablet 1 tablet by Gastric Tube route daily 9/23/2017 at 800 Yes Unknown, Entered By History   Loperamide HCl (IMODIUM A-D) 1 MG/7.5ML LIQD 4 mg by Gastric Tube route every other day 9/23/2017 at 800 Yes Unknown, Entered By History   acetaminophen (TYLENOL) 32 mg/mL solution 325 mg by Gastric Tube route daily as needed for fever or mild pain prn at prn Yes Unknown, Entered By History   ACETAMINOPHEN  mg by Gastric Tube route 3 times daily  9/23/2017 at 800 Yes Unknown, Entered By History   bisacodyl (DULCOLAX) 10 MG suppository Place 10 mg rectally daily as needed for constipation prn at prn Yes Unknown, Entered By History

## 2017-09-23 NOTE — ED PROVIDER NOTES
History     Chief Complaint:  Shortness of Breath    HPI   Celia Lewis is a non-verbal 89 year old female with a complicated past medical history significant for hypertension, hyperlipidemia, CVA, type two diabetes and Alzheimer's who presents to the Emergency Department for evaluation of shortness of breath. EMS arrived after nursing home staff found the patient foaming at the mouth with high respiratory rate and oxygen saturations at 75% on room air. Additionally the patient had one episode of vomiting and EMS expressed concern regarding possible aspiration. En route, patient was placed on nasal canula which increased oxygen saturation to 98%, she had a blood pressure of 147/98, temperature of 100.1 F and glucose of 297. Nursing home staff/EMS unaware of patients last known well time. Per staff, the patient seems to be at her mental status baseline.    Allergies:  Sulfa drugs     Medications:    oxyCODONE (ROXICODONE) 5 MG/5ML solution  aspirin 325 MG tablet  insulin glargine (LANTUS) 100 UNIT/ML injection  metoprolol (LOPRESSOR) 10 mg/mL SUSP  fiber modular (NUTRISOURCE FIBER) packet  simethicone (MYLICON) 40 MG/0.6ML suspension  ranitidine (ZANTAC) 150 MG/10ML syrup  vitamin A-D & C drops (TRI-VI-SOL) 750-400-35 UNIT-MG/ML solution NEW FORMULATION  INSULIN ASPART SC  Dakins 0.125 % SOLN  Lactobacillus Acidophilus POWD  multivitamin, therapeutic (THERA-VIT) TABS tablet  Loperamide HCl (IMODIUM A-D) 1 MG/7.5ML LIQD  acetaminophen (TYLENOL) 32 mg/mL solution  ACETAMINOPHEN PO  bisacodyl (DULCOLAX) 10 MG suppository     Past Medical History:    Actinomyces infection   Advanced directives, counseling/discussion, POLST completed 5/29/15  Full Code   Alzheimer's disease   Arthritis   Atrial fibrillation   Calculus of kidney   Candidiasis of skin and nails   Congestive heart failure  Coronary artery disease   Heart disease, unspecified   Hypernatremia  Hypertension   Morbid obesity    Other and unspecified  hyperlipidemia   Personal history of urinary (tract) infection   Primary localized osteoarthrosis, shoulder region   Renal failure, unspecified   Thyroid disease   Type II diabetes mellitus   Uric acid stone in urine  Nephrolithiasis  Bacteremia  UTI  CKD stage 3     Past Surgical History:    Cystoscopy  Laser holmium lithotripsy, insert stent    Family History:    Cerebrovascular disease: mother, father  Diabetes: brother    Social History:  Smoking Status: former smoker   Smokeless Tobacco: never used   Alcohol Use: no  Marital Status:   [2]     Review of Systems   Unable to perform ROS: Patient nonverbal   All other systems reviewed and are negative.    Physical Exam   First Vitals:  /72  Temp 101.6  F (38.7  C) (Rectal)  Resp 20  SpO2 97%       Physical Exam  Gen: Chronically ill appearing. Lying on the left side    Eye:   Pupils are equal, round, and reactive.     Sclera non-injected.    ENT:   Dry mucus membranes.     Normal tongue.    Oropharynx without lesions.    Cardiac:     Normal rate and regular rhythm.    No murmurs, gallops, or rubs.    Pulmonary:     Coarse rattling breath sounds, tachypnic.   No wheezes, rales, or rhonchi.    Abdomen:     Normal active bowel sounds. G tube placed in LUQ    Abdomen is soft and distended, without focal tenderness.    : Fonseca in place with dark yellow urin in bag    Musculoskeletal:     Some spontaneous movement of upper extremities.    Extremities:    Contracted/flexed lower extremities.    Skin:   Warm and dry. Skin breakdown over the outer aspect of bot ears, base of right small toe and red skin over buttock.    Neurologic:    Non-focal exam without asymmetric weakness or numbness.    Normal tone. No verbal. Unable to follow commands    Emergency Department Course   ECG:  ECG obtained 9/23/2017 at 15:16. Normal sinus rhythm, possible right ventricular hypertrophy. Ventricular rate 82 CT interval 134 QRS duration 74 QTc  476.    Imaging:  Radiographic findings were communicated with the patient who voiced understanding of the findings.    Chest XR:  IMPRESSION: Enlarged cardiac silhouette is again seen and unchanged.  Mild mixed interstitial and airspace opacities over both lungs, edema  versus atypical infection. Possible trace left pleural effusion. No  pneumothorax on either side.    BOY BAEZA MD     Narrative:     XR CHEST 1 VW 9/23/2017 2:04 PM    COMPARISON: 8/20/2017    HISTORY: Fever.              As per radiology.     CT Head without Contrast:  1. Evolving bilateral posterior cerebral artery infarcts, left greater  than right. No evidence of new intracranial hemorrhage. No mass effect  or midline shift. Persistent cortical left frontal lobe infarct.  2. Extensive periventricular white matter hypodensity likely due to  chronic microvascular ischemic disease is similar to prior.  Superimposed areas of ischemia would be difficult to exclude and would  be better evaluated with brain MRI if clinically necessary.  3. Unchanged moderate diffuse parenchymal volume loss.   As per radiology.     Laboratory:  CBC: WBC: 12.4, HGB: 14.4, PLT: 235  CMP: Glucose 294, Creatinine 1.38, BUN 67, GFR 43  Lactic acid: 3.9    UA with Microscopic: LE Large, WBC 74, RBC 5, WBC clumps, many bacteria  Urine culture aerobic bacterial: In process      Blood culture: In process  Blood culture: In process    Emergency Department Course:  Nursing notes and vitals reviewed. I performed an exam of the patient as documented above.     1222 I reassessed the patient.     The patient was sent for a chest xray while here in the emergency department, findings above.    The patient was sent for a CT had without contrast while here in the emergency department, findings above.    Blood drawn. This was sent to the lab for further testing, results above.    The patient provided a urine sample here in the emergency department. This was sent for laboratory testing,  findings above.    1300: I attempted to call the patient's son (POA) without response. Messages left at son's home phone and mobile phone to call the ED when available.    2:46 PM  D/w Dr. Forrest (Neurology) - does not recommend seizure medication; recommends seizure precaution given patient could have had a seizure in the setting of infection.    3:05 PM  Awaiting repeat lactate from lab.    3:25 PM  Patient discussed with Dr. Saba, who agrees with admission.    Impression & Plan      The patient has signs of Severe Sepsis as evidenced by:    1. 2 SIRS criteria, AND  2. Suspected infection, AND   3. Organ dysfunction: Lactic Acid >2    Time severe sepsis diagnosis confirmed = 13:46 as this was the time when Lactate resulted, and the level was >2      3 Hour Severe Sepsis Bundle Completion:  1. Initial Lactic Acid Result:   Recent Labs   Lab Test  09/23/17   1314  08/23/17   0916  08/22/17   1630   LACT  3.9*  3.5*  3.0*     2. Blood Cultures before Antibiotics: Yes  3. Broad Spectrum Antibiotics Administered: Yes     Anti-infectives (Future)    Start     Dose/Rate Route Frequency Ordered Stop    09/23/17 1348  piperacillin-tazobactam (ZOSYN) 3.375 g vial to attach to  mL bag      3.375 g  100 mL/hr over 30 Minutes Intravenous ONCE 09/23/17 1347          4. 2000 ml of IV fluids.      6 Hour Severe Sepsis Bundle Completion:  1. Repeat Lactic Acid Level: pending, to be followed up by hospitalist    Medical Decision Making:This is a 89-year-old female with history of severe dementia, recent cerebellar infarct, and chronic indwelling Fonseca who presents today with an episode of altered mentation, hypoxia, and fever. There is no family available at the bedside. According to staff, the patient is at her mental status baseline. Her previous episode which prompted EMS phone call is suspicious for a seizure. This likely is related to her recent cerebellar infarct. Seizure threshold may be lowered by a infection. It  appears that the source of her fever is likely urinary. She also had a possible aspiration events during her episode today. Lactate is elevated at 3.9. Hemodynamically she has been stable. She is being resuscitated with normal saline in its broad-spectrum antibiotics.  Catheter to be changed in the ED. She will be admitted to the [facility] for continued resuscitation and evaluation.    Diagnosis:    ICD-10-CM    1. Severe sepsis (H) A41.9 Comprehensive metabolic panel    R65.20 UA with Microscopic     Urine Culture Aerobic Bacterial     Blood culture     Blood culture   2. Urinary tract infection associated with indwelling urethral catheter, initial encounter (H) T83.511A     N39.0        Disposition:  Admitted to [facility].  IJuliet, am serving as a scribe on 9/23/2017 at 12:13 PM to personally document services performed by Luana Temple MD based on my observations and the provider's statements to me.   Juliet Bates  9/23/2017    EMERGENCY DEPARTMENT       Luana Temple MD  09/23/17 0389

## 2017-09-23 NOTE — IP AVS SNAPSHOT
` [place] [id number] [facility]: [phone]            Medication Administration Report for Celia Lewis as of 09/27/17 1708   Legend:    Given Hold Not Given Due Canceled Entry Other Actions    Time Time (Time) Time  Time-Action       Inactive    Active    Linked        Medications 09/21/17 09/22/17 09/23/17 09/24/17 09/25/17 09/26/17 09/27/17    acetaminophen (TYLENOL) solution 325 mg  Dose: 325 mg Freq: EVERY 4 HOURS PRN Route: PER FEEDING   PRN Reasons: mild pain,fever  Start: 09/23/17 1728   Admin Instructions: Maximum acetaminophen dose from all sources= 75 mg/kg/day not to exceed 4 grams/day.               acetaminophen (TYLENOL) solution 650 mg  Dose: 650 mg Freq: 3 TIMES DAILY Route: PER FEEDING   Start: 09/25/17 1600   Admin Instructions: Maximum acetaminophen dose from all sources= 75 mg/kg/day not to exceed 4 grams/day.         1607 (650 mg)-Given       2225 (650 mg)-Given        0832 (650 mg)-Given       1601 (650 mg)-Given       2121 (650 mg)-Given        0958 (650 mg)-Given       1611 (650 mg)-Given       [ ] 2200           aspirin tablet 325 mg  Dose: 325 mg Freq: DAILY Route: PER G TUBE  Start: 09/24/17 0900   Admin Instructions: Crush med and place down feeding tub.        1028 (325 mg)-Given        1018 (325 mg)-Given        0836 (325 mg)-Given        0958 (325 mg)-Given           bisacodyl (DULCOLAX) Suppository 10 mg  Dose: 10 mg Freq: DAILY PRN Route: RE  PRN Reason: constipation  Start: 09/23/17 1728              Dakins 0.125 % SOLN  Freq: 2 TIMES DAILY Route: EX  Start: 09/23/17 2100      2111 ( )-Given        1238 ( )-Given       2115 ( )-Given        1204 ( )-Given       2226 ( )-Given        1010 ( )-Given [C]       1809 ( )-Given [C]               1050 ( )-Given       [ ] 2100           dextrose 10 % 1,000 mL infusion  Freq: CONTINUOUS PRN Route: IV  PRN Comment: Hypoglycemia prevention  Start: 09/25/17 1530   Admin Instructions: For Hypoglycemia Prevention for  patients on long-acting subcutaneous basal insulin (Glargine, Detemir, NPH) or continuous insulin infusion. Whenever nutrition support is held or interrupted:   1) Infuse IV D10W at nutrition support rate  2) Notify provider for further instructions               glucose 40 % gel 15-30 g  Dose: 15-30 g Freq: EVERY 15 MIN PRN Route: PO  PRN Reason: low blood sugar  Start: 09/23/17 1728   Admin Instructions: Give 15 g for BG 51 to 69 mg/dL IF patient is conscious and able to swallow. Give 30 g for BG less than or equal to 50 mg/dL IF patient is conscious and able to swallow. Do NOT give glucose gel via enteral tube.  IF patient has enteral tube: give apple juice 120 mL (4 oz or 15 g of CHO) via enteral tube for BG 51 to 69 mg/dL.  Give apple juice 240 mL (8 oz or 30 g of CHO) via enteral tube for BG less than or equal to 50 mg/dL.    ~Oral gel is preferable for conscious and able to swallow patient.   ~IF gel unavailable or patient refuses may provide apple juice 120 mL (4 oz or 15 g of CHO). Document juice on I and O flowsheet.              Or  dextrose 50 % injection 25-50 mL  Dose: 25-50 mL Freq: EVERY 15 MIN PRN Route: IV  PRN Reason: low blood sugar  Start: 09/23/17 1728   Admin Instructions: Use if have IV access, BG less than 70 mg/dL and meet dose criteria below:  Dose if conscious and alert (or disorientated) and NPO = 25 mL  Dose if unconscious / not alert = 50 mL  Vesicant.              Or  glucagon injection 1 mg  Dose: 1 mg Freq: EVERY 15 MIN PRN Route: SC  PRN Reason: low blood sugar  PRN Comment: May repeat x 1 only  Start: 09/23/17 1728   Admin Instructions: May give SQ or IM. ONLY use glucagon IF patient has NO IV access AND is UNABLE to swallow AND blood glucose is LESS than or EQUAL to 50 mg/dL.               insulin aspart (NovoLOG) inj (RAPID ACTING)  Dose: 1-6 Units Freq: EVERY 4 HOURS Route: SC  Start: 09/23/17 1730   Admin Instructions: Correction Scale - MEDIUM INSULIN RESISTANCE DOSING     Do  Not give Correction Insulin if BG less than 140.  For  - 189 give 1 unit.  For  - 239 give 2 units.  For  - 289 give 3 units.  For  - 339 give 4 units.  For  - 399 give 5 units.  For BG greater than or equal to 400 give 6 units.  Check blood glucose Q4H and administer based on blood glucose.  Notify provider if glucose greater than or equal to 350 mg/dL after administration of correction dose.  If given at mealtime, must be administered 5 min before meal or immediately after.       1914 (3 Units)-Given       2127 (3 Units)-Given [C]        0150 (2 Units)-Given       0543 (2 Units)-Given       1042 (1 Units)-Given       1255 (1 Units)-Given       1652 (1 Units)-Given       2129 (1 Units)-Given        0030 (1 Units)-Given [C]       0457 (1 Units)-Given       1030 (2 Units)-Given       1353 (2 Units)-Given       1608 (2 Units)-Given       2009 (2 Units)-Given        0040 (3 Units)-Given [C]       0436 (2 Units)-Given [C]       0838 (3 Units)-Given [C]       1235 (3 Units)-Given       1615 (5 Units)-Given [C]       1957 (4 Units)-Given [C]        0014 (5 Units)-Given [C]       0354 (3 Units)-Given [C]       0954 (3 Units)-Given       1416 (3 Units)-Given       1619 (3 Units)-Given       [ ] 2000           insulin glargine (LANTUS) injection 16 Units  Dose: 16 Units Freq: EVERY MORNING BEFORE BREAKFAST Route: SC  Start: 09/27/17 0730          0956 (16 Units)-Given           lactobacillus rhamnosus (GG) (CULTURELL) capsule 1 capsule  Dose: 1 capsule Freq: DAILY Route: PER FEEDING   Start: 09/24/17 0900   Admin Instructions: Administer at least 2 hours before or after oral antibiotics. Capsules may be opened.        1028 (1 capsule)-Given        1022 (1 capsule)-Given        0835 (1 capsule)-Given        0958 (1 capsule)-Given           loperamide (IMODIUM) liquid 4 mg  Dose: 4 mg Freq: EVERY OTHER DAY Route: PER FEEDING   Start: 09/25/17 0900        1203 (4 mg)-Given         1423 (4  mg)-Given           metoprolol (LOPRESSOR) tablet 25 mg  Dose: 25 mg Freq: 2 TIMES DAILY Route: PER FEEDING   Start: 09/23/17 2100   Admin Instructions: May crush for FT admin       2110 (25 mg)-Given        1028 (25 mg)-Given       2115 (25 mg)-Given        1023 (25 mg)-Given       2225 (25 mg)-Given        0835 (25 mg)-Given       2122 (25 mg)-Given        0958 (25 mg)-Given       [ ] 2100           multivitamins with minerals (CERTAVITE/CEROVITE) liquid 15 mL  Dose: 15 mL Freq: DAILY Route: PER FEEDING   Start: 09/24/17 0900       1031 (15 mL)-Given        1023 (15 mL)-Given        0838 (15 mL)-Given        0959 (15 mL)-Given           naloxone (NARCAN) injection 0.1-0.4 mg  Dose: 0.1-0.4 mg Freq: EVERY 2 MIN PRN Route: IV  PRN Reason: opioid reversal  Start: 09/23/17 1728   Admin Instructions: For respiratory rate LESS than or EQUAL to 8.  Partial reversal dose:  0.1 mg titrated q 2 minutes for Analgesia Side Effects Monitoring Sedation Level of 3 (frequently drowsy, arousable, drifts to sleep during conversation).Full reversal dose:  0.4 mg bolus for Analgesia Side Effects Monitoring Sedation Level of 4 (somnolent, minimal or no response to stimulation).               ondansetron (ZOFRAN-ODT) ODT tab 4 mg  Dose: 4 mg Freq: EVERY 6 HOURS PRN Route: PO  PRN Reasons: nausea,vomiting  Start: 09/23/17 1728   Admin Instructions: This is Step 1 of nausea and vomiting management.  If nausea not resolved in 15 minutes, go to Step 2 prochlorperazine (COMPAZINE). Do not push through foil backing. Peel back foil and gently remove. Place on tongue immediately. Administration with liquid unnecessary              Or  ondansetron (ZOFRAN) injection 4 mg  Dose: 4 mg Freq: EVERY 6 HOURS PRN Route: IV  PRN Reasons: nausea,vomiting  Start: 09/23/17 1728   Admin Instructions: This is Step 1 of nausea and vomiting management.  If nausea not resolved in 15 minutes, go to Step 2 prochlorperazine (COMPAZINE).  Irritant.                oxyCODONE (ROXICODONE) solution 2 mg  Dose: 2 mg Freq: EVERY 4 HOURS PRN Route: PER G TUBE  PRN Reason: moderate to severe pain  Start: 09/23/17 1728              oxyCODONE (ROXICODONE) solution 2 mg  Dose: 2 mg Freq: 3 TIMES DAILY Route: PER G TUBE  Indications of Use: CHRONIC PAIN  Start: 09/23/17 1730      1832 (2 mg)-Given       2108 (2 mg)-Given        1029 (2 mg)-Given       1555 (2 mg)-Given       2108 (2 mg)-Given        1019 (2 mg)-Given       1606 (2 mg)-Given       2226 (2 mg)-Given        0836 (2 mg)-Given       1601 (2 mg)-Given       2120 (2 mg)-Given        0958 (2 mg)-Given       1611 (2 mg)-Given       [ ] 2200           piperacillin-tazobactam (ZOSYN) 3.375 g vial to attach to  mL bag  Dose: 3.375 g Freq: EVERY 6 HOURS Route: IV  Indications of Use: HEALTHCARE-ASSOCIATED PNEUMONIA  Last Dose: 3.375 g (09/27/17 0947)  Start: 09/24/17 1000   Admin Instructions: Dose adjusted per renal dosing policy.  Estimated CrCl = 20-40 mL/min.        1035 (3.375 g)-New Bag       1602 (3.375 g)-New Bag       2107 (3.375 g)-New Bag        0456 (3.375 g)-New Bag       1033 (3.375 g)-New Bag       1605 (3.375 g)-New Bag       2225 (3.375 g)-New Bag        0439 (3.375 g)-New Bag       1008 (3.375 g)-New Bag       1602 (3.375 g)-New Bag       2121 (3.375 g)-New Bag        0350 (3.375 g)-New Bag       0947 (3.375 g)-New Bag       1610 (3.375 g)-New Bag       [ ] 2200           ranitidine (Zantac) syrup 150 mg  Dose: 150 mg Freq: DAILY Route: PER FEEDING   Start: 09/24/17 0900       1031 (150 mg)-Given        1022 (150 mg)-Given        0837 (150 mg)-Given        0958 (150 mg)-Given           simethicone (MYLICON) suspension 80 mg  Dose: 80 mg Freq: 3 TIMES DAILY Route: PER FEEDING   Start: 09/23/17 1730      (1700)-Not Given       2113 (80 mg)-Given        1031 (80 mg)-Given       1555 (80 mg)-Given       2113 (80 mg)-Given        1203 (80 mg)-Given       1606 (80 mg)-Given       2231 (80 mg)-Given        0837 (80  mg)-Given       1614 (80 mg)-Given       2121 (80 mg)-Given        0959 (80 mg)-Given       1620 (80 mg)-Given       [ ] 2200           vitamin A-D & C drops (TRI-VI-SOL) drops SOLN 7 mL  Dose: 7 mL Freq: DAILY Route: PER G TUBE  Start: 09/24/17 0900       1031 (7 mL)-Given        1353 (7 mL)-Given        0837 (7 mL)-Given        0959 (7 mL)-Given          Completed Medications  Medications 09/21/17 09/22/17 09/23/17 09/24/17 09/25/17 09/26/17 09/27/17         Dose: 7 Units Freq: ONCE Route: SC  Start: 09/27/17 0215   End: 09/27/17 0209   Admin Instructions: If given at mealtime, must be administered 5 min before meal or immediately after.           0209 (7 Units)-Given [C]          Discontinued Medications  Medications 09/21/17 09/22/17 09/23/17 09/24/17 09/25/17 09/26/17 09/27/17         Dose: 1,000 mL Freq: ONCE Route: IV  Start: 09/23/17 1348   End: 09/25/17 1602               1602-Med Discontinued           Rate: 125 mL/hr Freq: CONTINUOUS Route: IV  Start: 09/23/17 1222   End: 09/24/17 2220   Admin Instructions: Restart after bolus               2220-Med Discontinued            Dose: 650 mg Freq: 3 TIMES DAILY Route: PER FEEDING   Start: 09/23/17 1730   End: 09/25/17 1559   Admin Instructions: Maximum acetaminophen dose from all sources = 75 mg/kg/day not to exceed 4 grams/day.       1832 (650 mg)-Given       2110 (650 mg)-Given        1028 (650 mg)-Given       1556 (650 mg)-Given       2115 (650 mg)-Given        1022 (650 mg)-Given       1559-Med Discontinued           Rate: 100 mL/hr Freq: CONTINUOUS Route: IV  Start: 09/25/17 1515   End: 09/26/17 0802        1601 ( )-New Bag        0624 ( )-Rate/Dose Verify       0802-Med Discontinued          Rate: 100 mL/hr Freq: CONTINUOUS Route: IV  Start: 09/23/17 1730   End: 09/26/17 0800      1825 ( )-New Bag        0433 ( )-New Bag       1403 ( )-Restarted [C]       2219 ( )-New Bag        0934 ( )-New Bag        0800-Med Discontinued          Rate: 100 mL/hr Freq:  "CONTINUOUS Route: IV  Start: 09/26/17 0815   End: 09/27/17 0058         1008 ( )-New Bag       2121 ( )-New Bag        0058-Med Discontinued         Dose: 10 Units Freq: EVERY MORNING BEFORE BREAKFAST Route: SC  Start: 09/24/17 0730   End: 09/26/17 0756       1029 (10 Units)-Given        1031 (10 Units)-Given        0733 (10 Units)-Given       0756-Med Discontinued          Dose: 13 Units Freq: EVERY MORNING BEFORE BREAKFAST Route: SC  Start: 09/27/17 0730   End: 09/27/17 0711          0711-Med Discontinued         Freq: EVERY 1 HOUR PRN Route: Top  PRN Reason: pain  PRN Comment: with VAD insertion or accessing implanted port.  Start: 09/23/17 1728   End: 09/25/17 1459   Admin Instructions: Do NOT give if patient has a history of allergy to any local anesthetic or any \"carlotta\" product.   Apply 30 minutes prior to VAD insertion or port access.  MAX Dose:  2.5 g (  of 5 g tube)         1459-Med Discontinued           Freq: EVERY 1 HOUR PRN Route: Top  PRN Reason: pain  PRN Comment: with VAD insertion or accessing implanted port.  Start: 09/23/17 1627   End: 09/25/17 1459   Admin Instructions: Do NOT give if patient has a history of allergy to any local anesthetic or any \"carlotta\" product.   Apply 30 minutes prior to VAD insertion or port access.  MAX Dose:  2.5 g (  of 5 g tube)         1459-Med Discontinued           Dose: 1 mL Freq: EVERY 1 HOUR PRN Route: OTHER  PRN Comment: mild pain with VAD insertion or accessing implanted port  Start: 09/23/17 1728   End: 09/25/17 1459   Admin Instructions: Do NOT give if patient has a history of allergy to any local anesthetic or any \"carlotta\" product. MAX dose 1 mL subcutaneous OR intradermal in divided doses.         1459-Med Discontinued           Dose: 1 mL Freq: EVERY 1 HOUR PRN Route: OTHER  PRN Comment: mild pain with VAD insertion or accessing implanted port  Start: 09/23/17 1627   End: 09/25/17 1459   Admin Instructions: Do NOT give if patient has a history of allergy to " "any local anesthetic or any \"carlotta\" product. MAX dose 1 mL subcutaneous OR intradermal in divided doses.         1459-Med Discontinued           Dose: 0.4 mg Freq: EVERY 5 MIN PRN Route: SL  PRN Reason: chest pain  Start: 09/23/17 1627   End: 09/25/17 1459   Admin Instructions: Maximum 3 doses in 15 minutes.  Notify provider if no relief after 3 doses.    Do NOT give nitroglycerin SL IF the patient has taken avanafil (STENDRA), sildenafil (VIAGRA) or (REVATIO) within the last 8 hours, vardenafil (LEVITRA) or (STAXYN) within the last 18 hours, tadalafil (CIALIS) or (ADCIRCA) within the last 36 hours. Inform provider if patient has taken one of these medications.  If patient is still having acute angina requiring treatment, an alternative treatment option may be used such as: IV beta-blocker [2.5 mg - 5 mg metoprolol (LOPRESSOR)] if ordered by a provider.         1459-Med Discontinued           Dose: 3 mL Freq: EVERY 8 HOURS Route: IK  Start: 09/23/17 1730   End: 09/25/17 1354   Admin Instructions: And Q1H PRN, to lock peripheral IV dormant line.       (1824)-Not Given        (0152)-Not Given       (0832)-Not Given       (1651)-Not Given        (0031)-Not Given              1354-Med Discontinued           Dose: 3 mL Freq: EVERY 1 HOUR PRN Route: IK  PRN Reason: line flush  PRN Comment: for peripheral IV flush post IV meds  Start: 09/23/17 1728   End: 09/25/17 1459        1459-Med Discontinued           Dose: 3 mL Freq: EVERY 8 HOURS Route: IK  Start: 09/23/17 1628   End: 09/25/17 1459   Admin Instructions: And Q1H PRN, to lock peripheral IV dormant line.       (1824)-Not Given        (0000)-Not Given       (0831)-Not Given       (1603)-Not Given        (0021)-Not Given       (1355)-Not Given       1459-Med Discontinued           Dose: 3 mL Freq: EVERY 1 HOUR PRN Route: IK  PRN Reason: line flush  PRN Comment: for peripheral IV flush post IV meds  Start: 09/23/17 1627   End: 09/25/17 1459        1459-Med Discontinued "           Dose: 1,500 mg Freq: EVERY 24 HOURS Route: IV  Indications of Use: HEALTHCARE-ASSOCIATED PNEUMONIA  Indications Comment: Possible MRSA  Start: 09/24/17 1700   End: 09/26/17 0910   Admin Instructions: For an adult with peripheral catheter and dose of 2-2.5 g, infuse over 2 hours.  IF central catheter, doses below 2 g may be given over 1 hour.        1655 (1,500 mg)-New Bag        1833 (1,500 mg)-New Bag        0910-Med Discontinued     Medications 09/21/17 09/22/17 09/23/17 09/24/17 09/25/17 09/26/17 09/27/17

## 2017-09-24 LAB
ANION GAP SERPL CALCULATED.3IONS-SCNC: 8 MMOL/L (ref 3–14)
BACTERIA SPEC CULT: ABNORMAL
BUN SERPL-MCNC: 57 MG/DL (ref 7–30)
CALCIUM SERPL-MCNC: 8.1 MG/DL (ref 8.5–10.1)
CHLORIDE SERPL-SCNC: 117 MMOL/L (ref 94–109)
CO2 SERPL-SCNC: 26 MMOL/L (ref 20–32)
CREAT SERPL-MCNC: 1.16 MG/DL (ref 0.52–1.04)
CREAT SERPL-MCNC: 1.22 MG/DL (ref 0.52–1.04)
ERYTHROCYTE [DISTWIDTH] IN BLOOD BY AUTOMATED COUNT: 14.6 % (ref 10–15)
GFR SERPL CREATININE-BSD FRML MDRD: 41 ML/MIN/1.7M2
GFR SERPL CREATININE-BSD FRML MDRD: 44 ML/MIN/1.7M2
GLUCOSE BLDC GLUCOMTR-MCNC: 142 MG/DL (ref 70–99)
GLUCOSE BLDC GLUCOMTR-MCNC: 144 MG/DL (ref 70–99)
GLUCOSE BLDC GLUCOMTR-MCNC: 159 MG/DL (ref 70–99)
GLUCOSE BLDC GLUCOMTR-MCNC: 164 MG/DL (ref 70–99)
GLUCOSE BLDC GLUCOMTR-MCNC: 181 MG/DL (ref 70–99)
GLUCOSE BLDC GLUCOMTR-MCNC: 197 MG/DL (ref 70–99)
GLUCOSE BLDC GLUCOMTR-MCNC: 230 MG/DL (ref 70–99)
GLUCOSE SERPL-MCNC: 205 MG/DL (ref 70–99)
HCT VFR BLD AUTO: 40 % (ref 35–47)
HGB BLD-MCNC: 12.3 G/DL (ref 11.7–15.7)
Lab: ABNORMAL
MCH RBC QN AUTO: 31.5 PG (ref 26.5–33)
MCHC RBC AUTO-ENTMCNC: 30.8 G/DL (ref 31.5–36.5)
MCV RBC AUTO: 102 FL (ref 78–100)
PLATELET # BLD AUTO: 197 10E9/L (ref 150–450)
POTASSIUM SERPL-SCNC: 4.2 MMOL/L (ref 3.4–5.3)
RBC # BLD AUTO: 3.91 10E12/L (ref 3.8–5.2)
SODIUM SERPL-SCNC: 151 MMOL/L (ref 133–144)
SPECIMEN SOURCE: ABNORMAL
VANCOMYCIN SERPL-MCNC: 16.1 MG/L
WBC # BLD AUTO: 10.6 10E9/L (ref 4–11)

## 2017-09-24 PROCEDURE — 25000125 ZZHC RX 250: Performed by: INTERNAL MEDICINE

## 2017-09-24 PROCEDURE — 25000128 H RX IP 250 OP 636

## 2017-09-24 PROCEDURE — 80202 ASSAY OF VANCOMYCIN: CPT | Performed by: INTERNAL MEDICINE

## 2017-09-24 PROCEDURE — 00000146 ZZHCL STATISTIC GLUCOSE BY METER IP

## 2017-09-24 PROCEDURE — 82565 ASSAY OF CREATININE: CPT | Performed by: INTERNAL MEDICINE

## 2017-09-24 PROCEDURE — 25000131 ZZH RX MED GY IP 250 OP 636 PS 637: Performed by: INTERNAL MEDICINE

## 2017-09-24 PROCEDURE — 99233 SBSQ HOSP IP/OBS HIGH 50: CPT | Performed by: INTERNAL MEDICINE

## 2017-09-24 PROCEDURE — 25000128 H RX IP 250 OP 636: Performed by: INTERNAL MEDICINE

## 2017-09-24 PROCEDURE — 25800025 ZZH RX 258: Performed by: INTERNAL MEDICINE

## 2017-09-24 PROCEDURE — 36415 COLL VENOUS BLD VENIPUNCTURE: CPT | Performed by: INTERNAL MEDICINE

## 2017-09-24 PROCEDURE — 25000132 ZZH RX MED GY IP 250 OP 250 PS 637: Performed by: INTERNAL MEDICINE

## 2017-09-24 PROCEDURE — 21400002 ZZH R&B CCU CICU CRITICAL

## 2017-09-24 PROCEDURE — 40000884 ZZH STATISTIC STEP DOWN HRS NIGHT

## 2017-09-24 PROCEDURE — 40000885 ZZH STATISTIC STEP DOWN HRS EVENING

## 2017-09-24 PROCEDURE — 85027 COMPLETE CBC AUTOMATED: CPT | Performed by: INTERNAL MEDICINE

## 2017-09-24 PROCEDURE — 80048 BASIC METABOLIC PNL TOTAL CA: CPT | Performed by: INTERNAL MEDICINE

## 2017-09-24 RX ORDER — PIPERACILLIN SODIUM, TAZOBACTAM SODIUM 3; .375 G/15ML; G/15ML
3.38 INJECTION, POWDER, LYOPHILIZED, FOR SOLUTION INTRAVENOUS EVERY 6 HOURS
Status: DISCONTINUED | OUTPATIENT
Start: 2017-09-24 | End: 2017-09-27 | Stop reason: HOSPADM

## 2017-09-24 RX ADMIN — INSULIN ASPART 2 UNITS: 100 INJECTION, SOLUTION INTRAVENOUS; SUBCUTANEOUS at 05:43

## 2017-09-24 RX ADMIN — Medication 1 CAPSULE: at 10:28

## 2017-09-24 RX ADMIN — INSULIN GLARGINE 10 UNITS: 100 INJECTION, SOLUTION SUBCUTANEOUS at 10:29

## 2017-09-24 RX ADMIN — PIPERACILLIN SODIUM,TAZOBACTAM SODIUM 3.38 G: 3; .375 INJECTION, POWDER, FOR SOLUTION INTRAVENOUS at 21:07

## 2017-09-24 RX ADMIN — POTASSIUM CHLORIDE, DEXTROSE MONOHYDRATE AND SODIUM CHLORIDE: 150; 5; 450 INJECTION, SOLUTION INTRAVENOUS at 22:19

## 2017-09-24 RX ADMIN — SIMETHICONE 80 MG: 20 EMULSION ORAL at 21:13

## 2017-09-24 RX ADMIN — ACETAMINOPHEN 650 MG: 325 TABLET, FILM COATED ORAL at 21:15

## 2017-09-24 RX ADMIN — OXYCODONE HYDROCHLORIDE 2 MG: 5 SOLUTION ORAL at 21:08

## 2017-09-24 RX ADMIN — SODIUM HYPOCHLORITE: 1.25 SOLUTION TOPICAL at 12:38

## 2017-09-24 RX ADMIN — ACETAMINOPHEN 650 MG: 325 TABLET, FILM COATED ORAL at 15:56

## 2017-09-24 RX ADMIN — RANITIDINE HYDROCHLORIDE 150 MG: 150 SOLUTION ORAL at 10:31

## 2017-09-24 RX ADMIN — METOPROLOL TARTRATE 25 MG: 25 TABLET ORAL at 10:28

## 2017-09-24 RX ADMIN — VANCOMYCIN HYDROCHLORIDE 1500 MG: 5 INJECTION, POWDER, LYOPHILIZED, FOR SOLUTION INTRAVENOUS at 16:55

## 2017-09-24 RX ADMIN — PIPERACILLIN SODIUM,TAZOBACTAM SODIUM 2.25 G: 2; .25 INJECTION, POWDER, FOR SOLUTION INTRAVENOUS at 03:28

## 2017-09-24 RX ADMIN — INSULIN ASPART 1 UNITS: 100 INJECTION, SOLUTION INTRAVENOUS; SUBCUTANEOUS at 10:42

## 2017-09-24 RX ADMIN — Medication 7 ML: at 10:31

## 2017-09-24 RX ADMIN — POTASSIUM CHLORIDE, DEXTROSE MONOHYDRATE AND SODIUM CHLORIDE: 150; 5; 450 INJECTION, SOLUTION INTRAVENOUS at 04:33

## 2017-09-24 RX ADMIN — SODIUM HYPOCHLORITE: 1.25 SOLUTION TOPICAL at 21:15

## 2017-09-24 RX ADMIN — ASPIRIN 325 MG ORAL TABLET 325 MG: 325 PILL ORAL at 10:28

## 2017-09-24 RX ADMIN — PIPERACILLIN SODIUM,TAZOBACTAM SODIUM 3.38 G: 3; .375 INJECTION, POWDER, FOR SOLUTION INTRAVENOUS at 16:02

## 2017-09-24 RX ADMIN — PIPERACILLIN SODIUM,TAZOBACTAM SODIUM 3.38 G: 3; .375 INJECTION, POWDER, FOR SOLUTION INTRAVENOUS at 10:35

## 2017-09-24 RX ADMIN — INSULIN ASPART 1 UNITS: 100 INJECTION, SOLUTION INTRAVENOUS; SUBCUTANEOUS at 12:55

## 2017-09-24 RX ADMIN — SIMETHICONE 80 MG: 20 EMULSION ORAL at 10:31

## 2017-09-24 RX ADMIN — SIMETHICONE 80 MG: 20 EMULSION ORAL at 15:55

## 2017-09-24 RX ADMIN — OXYCODONE HYDROCHLORIDE 2 MG: 5 SOLUTION ORAL at 15:55

## 2017-09-24 RX ADMIN — ACETAMINOPHEN 650 MG: 325 TABLET, FILM COATED ORAL at 10:28

## 2017-09-24 RX ADMIN — MULTIVITAMIN 15 ML: LIQUID ORAL at 10:31

## 2017-09-24 RX ADMIN — INSULIN ASPART 1 UNITS: 100 INJECTION, SOLUTION INTRAVENOUS; SUBCUTANEOUS at 16:52

## 2017-09-24 RX ADMIN — INSULIN ASPART 1 UNITS: 100 INJECTION, SOLUTION INTRAVENOUS; SUBCUTANEOUS at 21:29

## 2017-09-24 RX ADMIN — METOPROLOL TARTRATE 25 MG: 25 TABLET ORAL at 21:15

## 2017-09-24 RX ADMIN — INSULIN ASPART 2 UNITS: 100 INJECTION, SOLUTION INTRAVENOUS; SUBCUTANEOUS at 01:50

## 2017-09-24 RX ADMIN — OXYCODONE HYDROCHLORIDE 2 MG: 5 SOLUTION ORAL at 10:29

## 2017-09-24 RX ADMIN — SODIUM CHLORIDE 1000 ML: 9 INJECTION, SOLUTION INTRAVENOUS at 08:25

## 2017-09-24 NOTE — PLAN OF CARE
Problem: Sepsis/Septic Shock (Adult)  Goal: Signs and Symptoms of Listed Potential Problems Will be Absent, Minimized or Managed (Sepsis/Septic Shock)  Signs and symptoms of listed potential problems will be absent, minimized or managed by discharge/transition of care (reference Sepsis/Septic Shock (Adult) CPG).  Outcome: No Change  Pt obtunded, stable vitals, afebrile, sating well on partial re breather mask. multiple pressure wounds, ostomy nurse consulted.

## 2017-09-24 NOTE — PLAN OF CARE
Problem: Patient Care Overview  Goal: Plan of Care/Patient Progress Review  Outcome: Improving  Unresponsive, nonverbal; opens eye spontaneously. T 99.1, other VSS, O2 weaned to 1 L n/c. ZOEY pain, appears relaxed at rest; scheduled pain meds given. Tele SR. T/R. Wounds to bilateral ears, coccyx and R lateral foot. Dressings C/D/I. Wound care to coccyx per SNF regimen. NS bolus given, adequate UO. Family did visit for a short bit.

## 2017-09-24 NOTE — PROGRESS NOTES
[facility]    Hospitalist Progress Note    Assessment & Plan   This is a 89-year-old with advanced dementia, nonverbal, contracted bed bound, total care dependent, G-tube dependent, admitted with foaming at the mouth, hypoxia with acute respiratory failure likely due to aspiration pneumonia, abnormal urinalysis with underlying chronic indwelling Fonseca catheter being admitted for further treatment.       PLAN:   1.  Acute respiratory failure due to likely aspiration pneumonia.  The patient is on chronic tube feeds and likely debilitated and unable to protect her airway.  Chest x-ray shows a mixed interstitial and airspace disease in both lungs.  I suspect that she had an aspiration event.  The patient is also at risk given her chronic health care exposure and recent admission.  The patient will be treated with vancomycin and Zosyn.  She is DNR/DNI.  --Oxygen requirements improving  --resume tube feeding tomorrow if stable   --following cultures  2.  FEN:  Dysphagia, on chronic tube feeds:  Given her aspiration event, her feeding tubes will be held for at least 24 hours.   --As above, resume tube feeds tomorrow if stable  --u/o 250cc, will give gentle bolus and continue maintence  3.  Diabetes type 2:  Lantus decreased to 10 units on admission given NPO, q4h SSI  4.  History of sacral decubitus ulcer:  Will continue the patient with local wound care.   5.  Recent bioccipital CVI.  The patient will be continued on her aspirin.     6.  History of reflux disease:  We will continue the patient on Zantac.   7.  Chronic debilitated state with contractures:  Will continue the patient with oxycodone as needed for pain and Tylenol for milder pain.   8.  Urinary tract infection:  The patient's urinalysis is abnormal.  We will continue the patient with vancomycin and Zosyn.  Her Fonseca catheter has been changed in the Emergency Department.   --following cultures  9.  CODE STATUS:  DNR/DNI.     DVT Prophylaxis:  mechanical  Code Status: DNR/DNI    Disposition: Expected discharge in 2 days once respiratory status is more clear.    Enzo Trotter MD  Text Page (7am to 6pm)    Interval History   Ovenight events noted, chart reviewed.  Low U/O during night, though oxygen requirements improving. Patient is non verbal, opens eyes to voice    -Data reviewed today: I reviewed all new labs and imaging results over the last 24 hours. I personally reviewed no images or EKG's today.    Physical Exam   Temp: 99.1  F (37.3  C) Temp src: Axillary BP: 124/52   Heart Rate: 81 Resp: 17 SpO2: 98 % O2 Device: Oxymask Oxygen Delivery: 4 LPM  Vitals:    09/23/17 1722   Weight: 65.3 kg (144 lb)     Vital Signs with Ranges  Temp:  [97.6  F (36.4  C)-101.6  F (38.7  C)] 99.1  F (37.3  C)  Heart Rate:  [73-90] 81  Resp:  [17-24] 17  BP: ()/(41-72) 124/52  SpO2:  [97 %-100 %] 98 %  I/O last 3 completed shifts:  In: 1431.33 [I.V.:1371.33; NG/GT:60]  Out: 150 [Urine:150]    Constitutional: Awake, non-verbal, opens eyes to voice  Respiratory: Coarse breath sounds bilaterally, slight basilar crackles although moving air well.  Cardiovascular: RRR, s1s2  GI: Normal bowel sounds, soft, non-distended, non-tender  Skin/Integumen: No edema  Other:      Medications     NaCl       dextrose 5% and 0.45% NaCl + KCl 20 mEq/L 100 mL/hr at 09/24/17 0433       sodium chloride 0.9%  1,000 mL Intravenous Once     sodium chloride 0.9%  1,000 mL Intravenous Once     acetaminophen (TYLENOL) tablet 650 mg  650 mg Per Feeding Tube TID     aspirin  325 mg Per G Tube Daily     Dakins   Apply externally BID     lactobacillus rhamnosus (GG)  1 capsule Per Feeding Tube Daily     [START ON 9/25/2017] loperamide  4 mg Per Feeding Tube Every Other Day     metoprolol  25 mg Per Feeding Tube BID     multivitamins with minerals  15 mL Per Feeding Tube Daily     oxyCODONE  2 mg Per G Tube TID     ranitidine  150 mg Per Feeding Tube Daily     simethicone  80 mg Per Feeding Tube  TID     vitamin A-D & C drops  7 mL Per G Tube Daily     insulin aspart  1-6 Units Subcutaneous Q4H     insulin glargine  10 Units Subcutaneous QAM AC     sodium chloride (PF)  3 mL Intracatheter Q8H     sodium chloride (PF)  3 mL Intracatheter Q8H     [START ON 9/25/2017] vancomycin (VANCOCIN) IV  1,500 mg Intravenous Q48H     piperacillin-tazobactam  2.25 g Intravenous Q6H       Data     Recent Labs  Lab 09/24/17  0530 09/23/17  1314   WBC 10.6 12.4*   HGB 12.3 14.4   * 103*    235   * 149*   POTASSIUM 4.2 4.4   CHLORIDE 117* 111*   CO2 26 27   BUN 57* 67*   CR 1.22* 1.38*   ANIONGAP 8 11   NELLIE 8.1* 9.1   * 294*   ALBUMIN  --  3.4   PROTTOTAL  --  8.1   BILITOTAL  --  0.5   ALKPHOS  --  64   ALT  --  28   AST  --  22       Imaging:   Recent Results (from the past 24 hour(s))   CT Head w/o Contrast    Narrative    CT SCAN OF THE HEAD WITHOUT CONTRAST   9/23/2017 1:51 PM     HISTORY: Trauma, evaluate fracture, bleed.    TECHNIQUE:  Axial images of the head and coronal reformations without  IV contrast material. Radiation dose for this scan was reduced using  automated exposure control, adjustment of the mA and/or kV according  to patient size, or iterative reconstruction technique.    COMPARISON: Brain MR 8/21/2017.    FINDINGS: Well-defined hypodensities within the occipital lobes  bilaterally, left greater than right, compatible with evolving  infarcts. There is no evidence of acute intracranial hemorrhage.  Minimal cortical hyperdensity in the left occipital lobe likely  represents laminar necrosis in the areas of infarct. Loss of cortical  differentiation in the left frontal lobe from previous infarct again  noted. There is no mass effect or midline shift. Fairly extensive  periventricular white matter hypodensity is likely due to chronic  microvascular ischemic disease. Superimposed area of ischemia would be  difficult to exclude. Ventricular size is unchanged since prior. There  is  unchanged moderate diffuse parenchymal volume loss.    The visualized portions of the sinuses and mastoids appear normal. The  bony calvarium and bones of the skull base appear intact.      Impression    IMPRESSION:      1. Evolving bilateral posterior cerebral artery infarcts, left greater  than right. No evidence of new intracranial hemorrhage. No mass effect  or midline shift. Persistent cortical left frontal lobe infarct.  2. Extensive periventricular white matter hypodensity likely due to  chronic microvascular ischemic disease is similar to prior.  Superimposed areas of ischemia would be difficult to exclude and would  be better evaluated with brain MRI if clinically necessary.  3. Unchanged moderate diffuse parenchymal volume loss.        KY FLOR MD   XR Chest 1 View    Narrative    XR CHEST 1 VW 9/23/2017 2:04 PM    COMPARISON: 8/20/2017    HISTORY: Fever.      Impression    IMPRESSION: Enlarged cardiac silhouette is again seen and unchanged.  Mild mixed interstitial and airspace opacities over both lungs, edema  versus atypical infection. Possible trace left pleural effusion. No  pneumothorax on either side.    BOY BAEZA MD

## 2017-09-24 NOTE — H&P
DATE OF ADMISSION:  09/23/2017.        PRIMARY CARE PHYSICIAN:  Carmen Koehler MD.       REASON FOR ADMISSION:  Shortness of breath and foaming at the mouth,        HISTORY OF PRESENT ILLNESS:  Celia Lewis is a nonverbal 89-year-old  female who is a nursing home patient who is contracted, has chronic G-tube feeding who has a history of hypertension, hyperlipidemia, stroke type 2 diabetes.  The patient was recently admitted from 08/20 through 08/24 with altered mental status.  She was diagnosed as having acute ischemic occipital stroke, sepsis due to UTI as well as demand ischemia and chronic stage III sacral decubitus ulcer.  During that hospitalization she was seen by Neurology with findings of bilateral occipital stroke and possible bifrontal strokes and left frontal micro hemorrhage.  The altered mental status is likely due to UTI and strokes.  Neurology recommended that she would best be a candidate for palliative care and that anticoagulation would be limited in this context.  The patient was made DNR/DNI but was continued to be placed on tube feeds and the family deferred palliative care.  The patient's son is a doctor, Salvatore Lewis MD, who apparently is an immunologist and they are of the [employer] background.      Today the patient's nursing staff called EMS because the patient was foaming at the mouth and her oxygen saturations were only 75% on room air.  The patient apparently had 1 episode of emesis.  En route the patient's oxygen saturation improved to 98%.  She had a temperature of 100.1, blood glucose was 297.  The patient was brought to [facility] for further assessment.      In the emergency room, the patient was seen by Dr. Luana Temple.  The patient's sodium was 149, potassium 4.4, BUN 67, creatinine 1.38 with a calculated GFR of 36, lactic acid 3.9.  White count 12.4, hemoglobin 14.4, platelets of 235.  Blood cultures were obtained.  Head CT was done without  contrast which showed evolved bilateral posterior cerebral artery infarct, left greater than right; no new evidence of hemorrhage or mass effect or midline shift.  There is extensive periventricular white matter hypodensities, likely due to chronic microvascular ischemic disease.  A single view chest x-ray showed enlarged cardiac silhouette, mild mixed interstitial and airspace opacities over both lungs, edema with atypical infection.  Urinalysis that was from a cath specimen showed 74 white cells, 5 red cells, many bacteria and WBC clumps.  Urine cultures were obtained along with blood cultures.  The patient is being admitted for acute respiratory failure likely due to aspiration pneumonia, UTI with chronic indwelling Fonseca catheter, with severe underlying Alzheimer dementia, nonverbal with decubitus ulcer and chronic feeding tube dependency and recent bifrontal occipital strokes.      PAST MEDICAL HISTORY:   1.  Advanced Alzheimer dementia.     2.  Baseline non-verbal and bedbound with contractures and severe decubitus ulcers with exposed muscle/tendon and bones.  3.  Type 2 diabetes.     4.  Chronic kidney disease, stage III.     5.  Dysphagia secondary to status post gastrostomy tube placement.     6.  Hypertension.     7.  Recurrent urinary tract infections.     8.  Chronic indwelling Fonseca catheter.     9.  History of nephrolithiasis.     10.  History of paroxysmal atrial fibrillation.   11.  Hyperlipidemia.      SOCIAL HISTORY:  She is a nursing home resident, nonverbal.  No tobacco or alcohol.  She is DNR/DNI.  She is total care dependent.      ALLERGIES:  Sulfa.      FAMILY HISTORY:  Reviewed and noncontributory.      CURRENT MEDICATIONS:     1.  Tylenol 325 to 650 via G-tube 3 times a day.   2.  Aspirin 325 mg via G-tube daily.   3.  Dulcolax 10 mg rectally daily as needed for constipation.   4.  Dakin's 0.125% solution, apply to wound twice a day and p.r.n.   5.  Insulin subcu q.6 hours per sliding scale    6.  Lantus 39 units at bedtime.   7.  Lactobacillus 1 capsule daily via feeding tube   8.  Loperamide 4 mg via G-tube daily.   9.  Metoprolol 25 mg via G-tube twice a day.   10.  Multivitamin one tab via G-tube daily.   11.  Oxycodone 2 mg via G-tube 3 times a day.   12.  Zantac 150 mg via G-tube daily.   13.  Simethicone 80 mg 3 times daily via G-tube.   14.  Vitamin A D&C droplets via G-tube daily.      REVIEW OF SYSTEMS:  The patient unable to provide given her chronic baseline dementia and nonverbal.      PHYSICAL EXAMINATION:   VITAL SIGNS:  Temperature of 101.6, heart rate 73, respirations 24, blood pressure 107/41, sats 100% on face mask.   GENERAL:  The patient is a frail looking  female who has near complete alopecia.  She is contracted and laying off to her right side.   HEENT:  No head trauma, no gross facial droop.  Pupils appear equal.   NECK:  Neck veins are difficult to assess.   LUNGS:  Rhonchi bilaterally with coarse breath sounds.   CARDIOVASCULAR:  S1, S2, regular rate and rhythm.   ABDOMEN:  Soft.  She has a G-tube in place.   EXTREMITIES:  She has contracted.  No edema.  She has a sacral decubitus ulcer.   NEUROLOGIC:  Does not follow any commands.      ASSESSMENT:  This is a 89-year-old with advanced dementia, nonverbal, contracted bed bound, total care dependent, G-tube dependent, admitted with foaming at the mouth, hypoxia with acute respiratory failure likely due to aspiration pneumonia, abnormal urinalysis with underlying chronic indwelling Fonseca catheter being admitted for further treatment.      PLAN:   1.  Acute respiratory failure due to likely aspiration pneumonia.  The patient is on chronic tube feeds and likely debilitated and unable to protect her airway.  Chest x-ray shows a mixed interstitial and airspace disease in both lungs.  I suspect that she had an aspiration event.  The patient is also at risk given her chronic health care exposure and recent admission.  The  patient will be treated with vancomycin and Zosyn.  She is DNR/DNI.  We will continue oxygen support and hold off on tube feedings for at least 24 hours.   2.  Dysphagia, on chronic tube feeds:  Given her aspiration event, her feeding tubes will be held for at least 24 hours.  Will give her IV fluids meanwhile to avoid dehydration.   3.  Diabetes type 2:  Given that we are going to hold her tube feed, we are going to decrease her Lantus to 10 units daily, and cover her with sliding scale with Accu-Cheks every 4 hours.   4.  History of sacral decubitus ulcer:  Will continue the patient with local wound care.   5.  Recent bioccipital CVI.  The patient will be continued on her aspirin.     6.  History of reflux disease:  We will continue the patient on Zantac.   7.  Chronic debilitated state with contractures:  Will continue the patient with oxycodone as needed for pain and Tylenol for milder pain.   8.  Urinary tract infection:  The patient's urinalysis is abnormal.  We will continue the patient with vancomycin and Zosyn.  Her Fonseca catheter has been changed in the Emergency Department.   9.  CODE STATUS:  DNR/DNI.         ANTONIO COLON MD             D: 2017 00:05   T: 2017 01:22   MT:       Name:     RAÚL MERCEDES   MRN:      [id number]        Account:      [id number]   :      1926           Admitted:     [id number]      Document: [id number]       cc: Copy for Provider        Carmen Koehler MD

## 2017-09-24 NOTE — PLAN OF CARE
Problem: Sepsis/Septic Shock (Adult)  Goal: Signs and Symptoms of Listed Potential Problems Will be Absent, Minimized or Managed (Sepsis/Septic Shock)  Signs and symptoms of listed potential problems will be absent, minimized or managed by discharge/transition of care (reference Sepsis/Septic Shock (Adult) CPG).   Outcome: No Change  Patient is somnolent, vital signs stable,  partial re breather mask oxygen saturation 100%, multiple pressure wounds, dressing changed X 2. Turned and repositioned, BG at 05:30  205 insulin administered. Tele:

## 2017-09-24 NOTE — PHARMACY-VANCOMYCIN DOSING SERVICE
Pharmacy Vancomycin Note  Date of Service 2017  Patient's  1926   89 year old, female    Indication: Healthcare-Associated Pneumonia possible MRSA  Goal Trough Level: 15-20 mg/L  Day of Therapy: 2  Current Vancomycin regimen:  1500 mg IV q48h    Current estimated CrCl = Estimated Creatinine Clearance: 33.2 mL/min (based on Cr of 1.16).    Creatinine for last 3 days  2017:  1:14 PM Creatinine 1.38 mg/dL  2017:  5:30 AM Creatinine 1.22 mg/dL;  3:08 PM Creatinine 1.16 mg/dL    Recent Vancomycin Levels (past 3 days)  2017:  3:08 PM Vancomycin Level 16.1 mg/L    Vancomycin IV Administrations (past 72 hours)                   vancomycin (VANCOCIN) 1500 mg in 0.9% NaCl 250 mL PREMIX (mg) 1,500 mg New Bag 17 1522                Nephrotoxins and other renal medications (Future)    Start     Dose/Rate Route Frequency Ordered Stop    17 1700  vancomycin (VANCOCIN) 1500 mg in 0.9% NaCl 250 mL PREMIX      1,500 mg Intravenous EVERY 24 HOURS 17 1609      17 1000  piperacillin-tazobactam (ZOSYN) 3.375 g vial to attach to  mL bag      3.375 g  over 30 Minutes Intravenous EVERY 6 HOURS 17 0836               Contrast Orders - past 72 hours     None          Interpretation of levels and current regimen:  Trough level is  Therapeutic at 24 hours, crcl improving.    Has serum creatinine changed > 50% in last 72 hours: No    Urine output:  44 cc/hr 1st shift     Renal Function: Improving    Plan:  1.  Increase Dose to 1500mg q 24 hours  2.  Pharmacy will check trough levels as appropriate in 1-3 Days.    3. Serum creatinine levels will be ordered daily for the first week of therapy and at least twice weekly for subsequent weeks.      Piotr Arguello        .

## 2017-09-25 LAB
ANION GAP SERPL CALCULATED.3IONS-SCNC: 8 MMOL/L (ref 3–14)
BUN SERPL-MCNC: 39 MG/DL (ref 7–30)
CALCIUM SERPL-MCNC: 7.7 MG/DL (ref 8.5–10.1)
CHLORIDE SERPL-SCNC: 120 MMOL/L (ref 94–109)
CO2 SERPL-SCNC: 22 MMOL/L (ref 20–32)
CREAT SERPL-MCNC: 1.04 MG/DL (ref 0.52–1.04)
ERYTHROCYTE [DISTWIDTH] IN BLOOD BY AUTOMATED COUNT: 14.5 % (ref 10–15)
GFR SERPL CREATININE-BSD FRML MDRD: 50 ML/MIN/1.7M2
GLUCOSE BLDC GLUCOMTR-MCNC: 175 MG/DL (ref 70–99)
GLUCOSE BLDC GLUCOMTR-MCNC: 181 MG/DL (ref 70–99)
GLUCOSE BLDC GLUCOMTR-MCNC: 190 MG/DL (ref 70–99)
GLUCOSE BLDC GLUCOMTR-MCNC: 195 MG/DL (ref 70–99)
GLUCOSE BLDC GLUCOMTR-MCNC: 202 MG/DL (ref 70–99)
GLUCOSE BLDC GLUCOMTR-MCNC: 235 MG/DL (ref 70–99)
GLUCOSE SERPL-MCNC: 200 MG/DL (ref 70–99)
HCT VFR BLD AUTO: 38.1 % (ref 35–47)
HGB BLD-MCNC: 12 G/DL (ref 11.7–15.7)
MCH RBC QN AUTO: 31.7 PG (ref 26.5–33)
MCHC RBC AUTO-ENTMCNC: 31.5 G/DL (ref 31.5–36.5)
MCV RBC AUTO: 101 FL (ref 78–100)
PLATELET # BLD AUTO: 183 10E9/L (ref 150–450)
PLATELET # BLD AUTO: 184 10E9/L (ref 150–450)
POTASSIUM SERPL-SCNC: 4.2 MMOL/L (ref 3.4–5.3)
RBC # BLD AUTO: 3.79 10E12/L (ref 3.8–5.2)
SODIUM SERPL-SCNC: 150 MMOL/L (ref 133–144)
WBC # BLD AUTO: 10 10E9/L (ref 4–11)

## 2017-09-25 PROCEDURE — 85049 AUTOMATED PLATELET COUNT: CPT | Performed by: INTERNAL MEDICINE

## 2017-09-25 PROCEDURE — 36415 COLL VENOUS BLD VENIPUNCTURE: CPT | Performed by: INTERNAL MEDICINE

## 2017-09-25 PROCEDURE — 80048 BASIC METABOLIC PNL TOTAL CA: CPT | Performed by: INTERNAL MEDICINE

## 2017-09-25 PROCEDURE — 25000128 H RX IP 250 OP 636

## 2017-09-25 PROCEDURE — 25000132 ZZH RX MED GY IP 250 OP 250 PS 637: Performed by: INTERNAL MEDICINE

## 2017-09-25 PROCEDURE — 25000125 ZZHC RX 250: Performed by: INTERNAL MEDICINE

## 2017-09-25 PROCEDURE — 25000128 H RX IP 250 OP 636: Performed by: INTERNAL MEDICINE

## 2017-09-25 PROCEDURE — 21400002 ZZH R&B CCU CICU CRITICAL

## 2017-09-25 PROCEDURE — 25800025 ZZH RX 258: Performed by: INTERNAL MEDICINE

## 2017-09-25 PROCEDURE — 99213 OFFICE O/P EST LOW 20 MIN: CPT | Mod: 25

## 2017-09-25 PROCEDURE — 27210429 ZZH NUTRITION PRODUCT INTERMEDIATE LITER

## 2017-09-25 PROCEDURE — 25000131 ZZH RX MED GY IP 250 OP 636 PS 637: Performed by: INTERNAL MEDICINE

## 2017-09-25 PROCEDURE — 00000146 ZZHCL STATISTIC GLUCOSE BY METER IP

## 2017-09-25 PROCEDURE — 99232 SBSQ HOSP IP/OBS MODERATE 35: CPT | Performed by: INTERNAL MEDICINE

## 2017-09-25 PROCEDURE — 85027 COMPLETE CBC AUTOMATED: CPT | Performed by: INTERNAL MEDICINE

## 2017-09-25 PROCEDURE — 40000886 ZZH STATISTIC STEP DOWN HRS DAY

## 2017-09-25 RX ADMIN — OXYCODONE HYDROCHLORIDE 2 MG: 5 SOLUTION ORAL at 22:26

## 2017-09-25 RX ADMIN — INSULIN ASPART 2 UNITS: 100 INJECTION, SOLUTION INTRAVENOUS; SUBCUTANEOUS at 10:30

## 2017-09-25 RX ADMIN — SODIUM HYPOCHLORITE: 1.25 SOLUTION TOPICAL at 22:26

## 2017-09-25 RX ADMIN — ACETAMINOPHEN 650 MG: 325 SOLUTION ORAL at 16:07

## 2017-09-25 RX ADMIN — OXYCODONE HYDROCHLORIDE 2 MG: 5 SOLUTION ORAL at 10:19

## 2017-09-25 RX ADMIN — ACETAMINOPHEN 650 MG: 325 TABLET, FILM COATED ORAL at 10:22

## 2017-09-25 RX ADMIN — SIMETHICONE 80 MG: 20 EMULSION ORAL at 22:31

## 2017-09-25 RX ADMIN — DEXTROSE MONOHYDRATE: 50 INJECTION, SOLUTION INTRAVENOUS at 16:01

## 2017-09-25 RX ADMIN — INSULIN GLARGINE 10 UNITS: 100 INJECTION, SOLUTION SUBCUTANEOUS at 10:31

## 2017-09-25 RX ADMIN — INSULIN ASPART 2 UNITS: 100 INJECTION, SOLUTION INTRAVENOUS; SUBCUTANEOUS at 20:09

## 2017-09-25 RX ADMIN — MULTIVITAMIN 15 ML: LIQUID ORAL at 10:23

## 2017-09-25 RX ADMIN — Medication 1 CAPSULE: at 10:22

## 2017-09-25 RX ADMIN — POTASSIUM CHLORIDE, DEXTROSE MONOHYDRATE AND SODIUM CHLORIDE: 150; 5; 450 INJECTION, SOLUTION INTRAVENOUS at 09:34

## 2017-09-25 RX ADMIN — PIPERACILLIN SODIUM,TAZOBACTAM SODIUM 3.38 G: 3; .375 INJECTION, POWDER, FOR SOLUTION INTRAVENOUS at 04:56

## 2017-09-25 RX ADMIN — RANITIDINE HYDROCHLORIDE 150 MG: 150 SOLUTION ORAL at 10:22

## 2017-09-25 RX ADMIN — SODIUM HYPOCHLORITE: 1.25 SOLUTION TOPICAL at 12:04

## 2017-09-25 RX ADMIN — ASPIRIN 325 MG ORAL TABLET 325 MG: 325 PILL ORAL at 10:18

## 2017-09-25 RX ADMIN — Medication 7 ML: at 13:53

## 2017-09-25 RX ADMIN — PIPERACILLIN SODIUM,TAZOBACTAM SODIUM 3.38 G: 3; .375 INJECTION, POWDER, FOR SOLUTION INTRAVENOUS at 16:05

## 2017-09-25 RX ADMIN — SIMETHICONE 80 MG: 20 EMULSION ORAL at 12:03

## 2017-09-25 RX ADMIN — INSULIN ASPART 1 UNITS: 100 INJECTION, SOLUTION INTRAVENOUS; SUBCUTANEOUS at 00:30

## 2017-09-25 RX ADMIN — OXYCODONE HYDROCHLORIDE 2 MG: 5 SOLUTION ORAL at 16:06

## 2017-09-25 RX ADMIN — VANCOMYCIN HYDROCHLORIDE 1500 MG: 5 INJECTION, POWDER, LYOPHILIZED, FOR SOLUTION INTRAVENOUS at 18:33

## 2017-09-25 RX ADMIN — PIPERACILLIN SODIUM,TAZOBACTAM SODIUM 3.38 G: 3; .375 INJECTION, POWDER, FOR SOLUTION INTRAVENOUS at 22:25

## 2017-09-25 RX ADMIN — INSULIN ASPART 1 UNITS: 100 INJECTION, SOLUTION INTRAVENOUS; SUBCUTANEOUS at 04:57

## 2017-09-25 RX ADMIN — ACETAMINOPHEN 650 MG: 325 SOLUTION ORAL at 22:25

## 2017-09-25 RX ADMIN — Medication 4 MG: at 12:03

## 2017-09-25 RX ADMIN — INSULIN ASPART 2 UNITS: 100 INJECTION, SOLUTION INTRAVENOUS; SUBCUTANEOUS at 13:53

## 2017-09-25 RX ADMIN — INSULIN ASPART 2 UNITS: 100 INJECTION, SOLUTION INTRAVENOUS; SUBCUTANEOUS at 16:08

## 2017-09-25 RX ADMIN — SIMETHICONE 80 MG: 20 EMULSION ORAL at 16:06

## 2017-09-25 RX ADMIN — METOPROLOL TARTRATE 25 MG: 25 TABLET ORAL at 22:25

## 2017-09-25 RX ADMIN — PIPERACILLIN SODIUM,TAZOBACTAM SODIUM 3.38 G: 3; .375 INJECTION, POWDER, FOR SOLUTION INTRAVENOUS at 10:33

## 2017-09-25 RX ADMIN — METOPROLOL TARTRATE 25 MG: 25 TABLET ORAL at 10:23

## 2017-09-25 ASSESSMENT — ACTIVITIES OF DAILY LIVING (ADL)
DRESS: 4-->COMPLETELY DEPENDENT
RETIRED_COMMUNICATION: 3-->UNABLE TO UNDERSTAND OR SPEAK (NOT RELATED TO LANGUAGE BARRIER)
TRANSFERRING: 4-->COMPLETELY DEPENDENT
COGNITION: 2 - DIFFICULTY WITH ORGANIZING THOUGHTS
FALL_HISTORY_WITHIN_LAST_SIX_MONTHS: NO
SWALLOWING: 2-->DIFFICULTY SWALLOWING LIQUIDS/FOODS
BATHING: 4-->COMPLETELY DEPENDENT
TOILETING: 4-->COMPLETELY DEPENDENT
AMBULATION: 4-->COMPLETELY DEPENDENT
RETIRED_EATING: 4-->COMPLETELY DEPENDENT

## 2017-09-25 NOTE — PROGRESS NOTES
Note MD put in order in pt care order to start TF @ 10 mL/hr and titrate up, no formula or goal rate indicated.  Spoke with RASHMI Vanessa. Will enter TF orders: Isosource 1.5 @ 10 mL/hr, advance by 10 mL q 8 hours to goal of 40 mL/hr.  See full nutrition assessment written earlier today.    Bisi Montgomery RD  Pager [phone] (M-F)            [phone] (W/E & Hol)

## 2017-09-25 NOTE — CONSULTS
CLINICAL NUTRITION SERVICES  -  ASSESSMENT NOTE      RECOMMENDATIONS FOR MD/PROVIDER TO ORDER:   Recommend d/c the TriViSol as it was originally ordered 6/16 for only 10 days for wound healing, per pressure injury protocol.     Future/Additional Recommendations:   When ready to resume TF, recommend Isosource 1.5 at goal of 40 ml/hr to provide 1440 jarrod (29 jarrod/kg), 65 gm pro (1.3 gm/kg), 14 gm fiber, 169 gm CHO, 730 mL H2O.      Malnutrition:   Patient does not meet two of the above criteria necessary for diagnosing malnutrition          REASON FOR ASSESSMENT  Celia Lewis is a 89 year old female seen by Registered Dietitian for Admission Nutrition Risk Screen -  TF or Parenteral Nutrition        NUTRITION HISTORY  - Information obtained from EMR  - Unable to obtain nutrition history from pt, she is non-verbal with advanced dementia. Pt lives in a NH.  Pt is well-known to us from admits earlier this summer. She originally had a G-tube placed 6/16/16 and has been on TF since then.  Per review of NH records, she recently switched formula from Isosource 1.5 to Glucerna 1.5, running at 45 mL/hr continuous, providing 1620 jarrod, 89 gm pro,144 gm CHO.  Pt was also receiving Nutrisource Fiber, 1 packet BID.  She was also on Imodium and Culturell, apparently was having loose stools.    When pt was here in June we ordered a 10-day regimen of Vit A and Vit C (Tri-ViSol) as well as daily Certavite, all per PI protocol.  Pt is still on these vitamins, should have been d/c'd after 10 days.      CURRENT NUTRITION ORDERS  Diet Order:     NPO   No orders yet to resume TF; per MD note, likely will resume today.    PHYSICAL FINDINGS  Observed  No nutrition-related physical findings observed  Obtained from Chart/Interdisciplinary Team  Frail  Sacral dec ulcer, wounds on ears and right foot - WOCN consult pending  Chronic debilitated state    ANTHROPOMETRICS  Height: 5'  Weight: 144 lbs 0 oz (65.3 kg)  Body mass index is 28.27  kg/(m^2).  Weight Status:  Overweight BMI 25-29.9  IBW: 45.4 kg +/- 10%  % IBW: 144%  Weight History: Pt has lost 13# - likely due to intetional hypocaloric feeding.  Wt Readings from Last 10 Encounters:   09/23/17 65.3 kg (144 lb)   08/24/17 69.5 kg (153 lb 3.5 oz)   08/12/17 67.1 kg (148 lb)   08/09/17 69 kg (152 lb 1.9 oz)   07/06/17 71.4 kg (157 lb 6.4 oz)   06/19/17 69.3 kg (152 lb 12.5 oz)   06/24/16 72.2 kg (159 lb 3.2 oz)   06/18/15 83.9 kg (185 lb)   05/29/15 81.6 kg (180 lb)   05/28/15 81.6 kg (180 lb)       LABS  Alb 3.4    MEDICATIONS  TriViSol, Certavite  Imodium, Culturell  SSI, Lantus    Dosing Weight 49.5 kg - adjusted for overweight    ASSESSED NUTRITION NEEDS PER APPROVED PRACTICE GUIDELINES:  Estimated Energy Needs: 2210-2208 kcals (25-30 Kcal/Kg)  Justification: overweight  Estimated Protein Needs: 50-60 grams protein (1-1.2 g pro/Kg)  Justification: wound healing and CKD  Estimated Fluid Needs: 6605-6080  mL (1 mL/Kcal)  Justification: maintenance    MALNUTRITION:  % Weight Loss:  Weight loss does not meet criteria for malnutrition   % Intake:  No decreased intake noted  Subcutaneous Fat Loss:  None observed  Muscle Loss:  None observed  Fluid Retention:  None noted    Malnutrition Diagnosis: Patient does not meet two of the above criteria necessary for diagnosing malnutrition    NUTRITION DIAGNOSIS:  No nutrition diagnosis identified at this time    NUTRITION INTERVENTIONS  Recommendations / Nutrition Prescription  Recommend d/c the TriViSol as it was originally ordered6/16 for only 10 days for wound healing, per pressure injury protocol.    When ready to resume TF, recommend Isosource 1.5 at goal of 40 ml/hr to provide 1440 jarrod (29 jarrod/kg), 65 gm pro (1.3 gm/kg), 14 gm fiber, 169 gm CHO, 730 mL H2O.       Implementation  Nutrition education: No education needs   No interventions at this time, waiting for MD consult to start TF..      Nutrition Goals  Pt will resume TF next 4-48  hrs.      MONITORING AND EVALUATION:  Progress towards goals will be monitored and evaluated per protocol and Practice Guidelines    Bisi Montgomery RD  Pager [phone] (M-F)            [phone] (W/E & Hol)

## 2017-09-25 NOTE — CONSULTS
[facility]  Antimicrobial Stewardship Team (AST) Note   Antimicrobial Stewardship Program Clinical Note - a joint venture between [facility] and [facility] Consultant ID Physicians to optimize antibiotic management.      Allergies: Sulfa drugs    Brief Summary: 89-year-old with advanced dementia, nonverbal, contracted bed bound, total care dependent, G-tube dependent, admitted with foaming at the mouth, hypoxia with acute respiratory failure likely due to aspiration pneumonia, abnormal urinalysis with underlying chronic indwelling Fonseca catheter being admitted for further treatment.       PLAN:   1.  Acute respiratory failure due to likely aspiration pneumonia.  The patient is on chronic tube feeds and likely debilitated and unable to protect her airway.  Chest x-ray shows a mixed interstitial and airspace disease in both lungs.  I suspect that she had an aspiration event.  The patient is also at risk given her chronic health care exposure and recent admission.  The patient will be treated with vancomycin and Zosyn.  She is DNR/DNI.  --Oxygen requirements improving  --resume tube feeding tomorrow if stable   --following cultures      Assessment: Patient has positive urine cultures for E.Coli which is sensitive to all tested with the exception of Bactrim and amoxicillin.  Chest x-ray is not definitive for pneumonia.  If highly suspect pneumonia recommend to discontinue vancomycin at this time and continue zosyn alone.        Current Anti-infective Orders:  Anti-infectives (Future)    Start     Dose/Rate Route Frequency Ordered Stop    09/24/17 1700  vancomycin (VANCOCIN) 1500 mg in 0.9% NaCl 250 mL PREMIX      1,500 mg Intravenous EVERY 24 HOURS 09/24/17 1609      09/24/17 1000  piperacillin-tazobactam (ZOSYN) 3.375 g vial to attach to  mL bag      3.375 g  over 30 Minutes Intravenous EVERY 6 HOURS 09/24/17 0836              Clinical Features/Vital Signs  Vital signs:  Tmax  - Temp (24hrs), Av.6  F (37  C), Min:98.3  F (36.8  C), Max:98.9  F (37.2  C)  Temp: 98.5  F (36.9  C) Temp  Min: 98.3  F (36.8  C)  Max: 98.9  F (37.2  C)      Lab Results  Recent Labs   Lab Test  17   0440  17   0530  17   1314   13   1300   WBC  10.0  10.6  12.4*   < >  9.3   SED   --    --    --    --   61*    < > = values in this interval not displayed.           Culture Results - past 9 results  7-Day Micro Results      Procedure Component Value Units Date/Time     Urine Culture Aerobic Bacterial [[id number]] (Abnormal)  Collected: 17 1419     Order Status: Completed Lab Status: Final result Updated: 17     Specimen: Catheterized Urine from Urine catheter       Specimen Description Catheterized Urine      Special Requests Specimen received in preservative      Culture Micro >100,000 colonies/mL   Escherichia coli    (A)     Culture & Susceptibility      ESCHERICHIA COLI      Antibiotic Interpretation Sensitivity Unit Method Status     AMPICILLIN Resistant >=32 ug/mL ARUN Final     AMPICILLIN/SULBACTAM Sensitive 8 ug/mL ARUN Final     CEFAZOLIN Sensitive <=4 ug/mL ARUN Final     Comment: Cefazolin ARUN breakpoints are for the treatment of uncomplicated urinary tract   infections.  For the treatment of systemic infections, please contact the   laboratory for additional testing.     CEFEPIME Sensitive <=1 ug/mL ARUN Final     CEFOXITIN Sensitive <=4 ug/mL ARUN Final     CEFTAZIDIME Sensitive <=1 ug/mL ARUN Final     CEFTRIAXONE Sensitive <=1 ug/mL ARUN Final     CIPROFLOXACIN Sensitive 1 ug/mL ARUN Final     GENTAMICIN Sensitive <=1 ug/mL ARUN Final     LEVOFLOXACIN Sensitive 1 ug/mL ARUN Final     NITROFURANTOIN Sensitive <=16 ug/mL ARUN Final     Piperacillin/Tazo Sensitive <=4 ug/mL ARUN Final     TOBRAMYCIN Sensitive <=1 ug/mL ARUN Final     Trimethoprim/Sulfa Resistant >=16/304 ug/mL ARUN Final                           Blood culture [[id number]] Collected: 17 1314     Order  Status: Completed Lab Status: Preliminary result Updated: 09/25/17 0624     Specimen: Blood from Arm, Right       Specimen Description Blood Right Arm      Special Requests Aerobic and anaerobic bottles received      Culture Micro No growth after 2 days     Blood culture [[id number]] Collected: 09/23/17 1305     Order Status: Completed Lab Status: Preliminary result Updated: 09/25/17 0624     Specimen: Blood from Arm, Right       Specimen Description Blood Right Arm      Special Requests Aerobic and anaerobic bottles received      Culture Micro No growth after 2 days           Imaging - past 3 days:  Xr Chest 1 View    Result Date: 9/23/2017  XR CHEST 1 VW 9/23/2017 2:04 PM COMPARISON: 8/20/2017 HISTORY: Fever.     IMPRESSION: Enlarged cardiac silhouette is again seen and unchanged. Mild mixed interstitial and airspace opacities over both lungs, edema versus atypical infection. Possible trace left pleural effusion. No pneumothorax on either side. BOY BAEZA MD    Ct Head W/o Contrast    Result Date: 9/23/2017  CT SCAN OF THE HEAD WITHOUT CONTRAST   9/23/2017 1:51 PM HISTORY: Trauma, evaluate fracture, bleed. TECHNIQUE:  Axial images of the head and coronal reformations without IV contrast material. Radiation dose for this scan was reduced using automated exposure control, adjustment of the mA and/or kV according to patient size, or iterative reconstruction technique. COMPARISON: Brain MR 8/21/2017. FINDINGS: Well-defined hypodensities within the occipital lobes bilaterally, left greater than right, compatible with evolving infarcts. There is no evidence of acute intracranial hemorrhage. Minimal cortical hyperdensity in the left occipital lobe likely represents laminar necrosis in the areas of infarct. Loss of cortical differentiation in the left frontal lobe from previous infarct again noted. There is no mass effect or midline shift. Fairly extensive periventricular white matter hypodensity is likely due to chronic  microvascular ischemic disease. Superimposed area of ischemia would be difficult to exclude. Ventricular size is unchanged since prior. There is unchanged moderate diffuse parenchymal volume loss. The visualized portions of the sinuses and mastoids appear normal. The bony calvarium and bones of the skull base appear intact.     IMPRESSION:    1. Evolving bilateral posterior cerebral artery infarcts, left greater than right. No evidence of new intracranial hemorrhage. No mass effect or midline shift. Persistent cortical left frontal lobe infarct. 2. Extensive periventricular white matter hypodensity likely due to chronic microvascular ischemic disease is similar to prior. Superimposed areas of ischemia would be difficult to exclude and would be better evaluated with brain MRI if clinically necessary. 3. Unchanged moderate diffuse parenchymal volume loss. KY FLOR MD          Recommendations/Interventions:  Discontinue vancomycin    Discussed with ID Staff - Dr. Wheeler

## 2017-09-25 NOTE — PLAN OF CARE
Problem: Sepsis/Septic Shock (Adult)  Goal: Signs and Symptoms of Listed Potential Problems Will be Absent, Minimized or Managed (Sepsis/Septic Shock)  Signs and symptoms of listed potential problems will be absent, minimized or managed by discharge/transition of care (reference Sepsis/Septic Shock (Adult) CPG).   Outcome: Improving  Non verbal unresponsive. AVSS on RA. Gtube clamped. Repositioned q2hrs. Wounds to ears and R food dsg CDI. Coccyx dsg CDI. Tele NSR

## 2017-09-25 NOTE — PROGRESS NOTES
[facility] Nurse Inpatient Adult Pressure Injury (PI) Assessment     Initial Assessment of PI(s) on pt's:   Coccyx/sacrum; bilateral outer ears (helix)    Data:   Patient History:    Celia Lewis is a 89 year old female who was re-admitted on 17.  Past history of advanced dementia (non-verbal, bed-bound, s/p G-tube), HTN, DM II, CKD, chronic decubitus ulcer subacute CVA admitted with foaming at the mouth, hypoxia with acute respiratory failure likely due to aspiration pneumonia, abnormal urinalysis with underlying chronic indwelling Fonseca catheter being admitted for further treatment.     Current mattress: atmos air  Current pressure relieving devices:  Pillows      Moisture Management:  Incontinence Protocol and Urinary Catheter    Catheter secured? Yes      Current Diet / Nutrition:       Active Diet Order: Dietitian following.     Deshawn Assessment and sub scores:  Deshawn Score  Av.7  Min: 9  Max: 12     Labs:   Recent Labs   Lab Test  17   0440   17   1314   17   0750  17   0625  17   1853   ALBUMIN   --    --   3.4   --    --    --   3.3*   HGB  12.0   < >  14.4   < >   --   11.9  12.8   INR   --    --    --    --    --    --   0.98   WBC  10.0   < >  12.4*   < >   --   10.8  14.3*   A1C   --    --    --    --    --   8.2*   --    CRP   --    --    --    --   5.4   --   7.0    < > = values in this interval not displayed.                                                                                                                      l  Pressure Injury Assessment  (location):   Coccyx/sacrum  - Present on admission  Wound History:   Pt just readmitted with wound on the coccyx.  Last  debrided, 06-15-17 during hospitalization         Coccyx:          -Size:  2.0cm  x 2.0cm x up to 1.5cm.  Wound is about 5% moist, soft slough @ base / 95% moist,shiny red tissue.           -Wound base: slightly soft but unable to probe any tunnels. This base with  palpable bone just under tissue         -Undermining/Tunnels: none noted         -Drainage: scant sero-sang         -Jimbo-wound skin: slightly blanchable light erythema to ecchymosis noted from apporx 1-8 o'clock extending out 1.5cm         -Odor: none           Pressure Injury Assessment:   Bilateral outer ears (mid-helix)  -Present on admission  Wound History:   Present on admission; pt lies directly on ears when on sides.     1.  Left ear:  Approx. 1 x 0.5 cm area of dried scabbed thin reddish  eschar   2.  Right ear:  Decrease .5cm x .7cm x superficial dried scabbed area. No drainage, no jimbo-wound erythema       Periwound Skin: scattered mild erythema        Odor: none       Pain:  Unable to determine      Pressure Injury Assessment:  Rt lateral foot - present on admission  Wound History:   Severed contractures of BLE.  Bilateral boots in place  Size: Linear 2.5cm x .5cm of light reddish ecchymosis with skin intact  Jimbo-wound skin: Intact, no erythema  Undermining/tunnel: NA  Odor: none  Drainage: none  Odor: none                Intervention:     Patient's chart evaluated.      Deshawn Interventions:  Current Deshawn Interventions and Care Plan reviewed and updated, appropriate at this time.    Wounds assessed         -Wounds cleaned MicroKlenz         -Coccyx: Today packed with small piece of aqua cell AG         -Bilateral ear (helix): apply small piece of Mepilex lite         -Small episode of FIC. Pt cleaned with diaper removed.           Orders  In Epic - orders in Epic for Dakins BID to coccyx ulcer.           Supplies  Reviewed          POC: son not in room for discussion           Assessment:      coccyx/sacrum: Chronic Stage 4 PI, community acquired, no local s/s infections,      Bilateral ears:  Mix stage 2 and unstageable PI, community acquired, no local s/s infection      Rt lateral foot:  DTI, community acquired PI, no local s/s infection           Plan:     Nursing to notify the Provider(s) and  re-consult the WOC Nurse if wound(s) deteriorate(s)or if the wound care plan needs reevaluation.    Plan of care for wound on coccyx/ears/Rt lateral foot  1. Clean all wounds w with MicroKlenz Spray, pat dry  2. Coccyx: BID as ordered       -pack wound moistened 2x2 with Dakins solution        -dust jimbo-ulcer skin with Desenex brushing off excess       -seal in powder with 3M NO sting skin prep       -Cover all with Mepliex   Border  3. bilateral outer ears: odd days and prn       -skin prep wounds on ears. Let dry       -Cut stirps of Mepilex lite and wrap around ear to secure  4.  Rt lateral foot: odd days and prn        _Mepilex border  5. PIP measures       - Rt and Lt postioning only  -Consider Z-Martin Pillows to help keep pt on side (#[id number] or #[id number]- large). *Z-Flows are for positioning, DO NOT SIT ON!   -Keep heels elevated, use heel lift boots in room  - keep HOB @ 30 degrees for TF  -Incontinence protocol prn. No diaper on patient. Use incontinence pad only        -Deshawn Risk: document interventions        -If anticipate long LOS order a pulsate mattress for additional off-loading        -Full skin inspection BID. Document findings      WOC Nurse will return: weekly and prn  Face to face time: 15+ Minutes

## 2017-09-25 NOTE — PROGRESS NOTES
Plan of care for wound on coccyx/ears/Rt lateral foot  1. Clean all wounds w with MicroKlenz Spray, pat dry  2. Coccyx: BID as ordered       -pack wound moistened 2x2 with Dakins solution        -dust jimbo-ulcer skin with Desenex brushing off excess       -seal in powder with 3M NO sting skin prep       -Cover all with Mepliex   Border  3. bilateral outer ears: odd days and prn       -skin prep wounds on ears. Let dry       -Cut stirps of Mepilex lite and wrap around ear to secure  4.  Rt lateral foot: odd days and prn        _Mepilex border  5. PIP measures       - Rt and Lt postioning only  -Consider Z-Martin Pillows to help keep pt on side (#[id number]-medium or #[id number]- large). *Z-Flows are for positioning, DO NOT SIT ON!   -Keep heels elevated, use heel lift boots in room  - keep HOB @ 30 degrees for TF  -Incontinence protocol prn. No diaper on patient. Use incontinence pad only        -Deshawn Risk: document interventions        -If anticipate long LOS order a pulsate mattress for additional off-loading        -Full skin inspection BID. Document findings    Face time:  30 minutes  Follow-up Weekly

## 2017-09-25 NOTE — PROGRESS NOTES
Nonverbal, opens eyes spontaneously. Appears comfortable. A little moaning with repositioning. TF on hold today. Meds given in g-tube. LS coarse. Continues on IV ABX. VSS, sats well on RA. Turned and repositioned Q 2 hrs. Dressing changes by WOC nurse today.

## 2017-09-25 NOTE — PROGRESS NOTES
[facility]  Hospitalist Progress Note  Piotr Saba MD  09/25/2017    Assessment & Plan   This is a 89-year-old with advanced dementia, nonverbal, contracted bed bound, total care dependent, G-tube dependent, admitted with foaming at the mouth, hypoxia with acute respiratory failure likely due to aspiration pneumonia, abnormal urinalysis with underlying chronic indwelling Fonseca catheter being admitted for further treatment.       PLAN:   1.  Acute respiratory failure due to likely aspiration pneumonia.  The patient is on chronic tube feeds and likely debilitated and unable to protect her airway.  Chest x-ray shows a mixed interstitial and airspace disease in both lungs.  I suspect that she had an aspiration event.  The patient is also at risk given her chronic health care exposure and recent admission.  The patient will be treated with vancomycin and Zosyn.  She is DNR/DNI.  --Oxygen requirements improving  --resume tube feeding today and titrate slowly  --will discontinue vancomycin, continue zosyn alone for total of 3 days.  -- repeat CXR in am.  2.  FEN:  Dysphagia, on chronic tube feeds:  Given her aspiration event, her feeding tubes will be held for at least 24 hours.   --As above, resume tube feeds today/  --u/o 250cc, will give gentle bolus and continue maintence  3.  Diabetes type 2:  Lantus decreased to 10 units on admission given NPO, q4h SSI  - titrate up insulin based on tube feeding rate starting tomorrow.  4.  History of sacral decubitus ulcer:    - continue the patient with local wound care.   5.  Recent bioccipital CVI.    - continued on her aspirin.     6.  History of reflux disease:    - continue the patient on Zantac.   7.  Chronic debilitated state with contractures:  Will continue the patient with oxycodone as needed for pain and Tylenol for milder pain.   8.  Urinary tract infection:  The patient's urinalysis is abnormal.  UCx shows e.coli, pan-sensitive to all except  "bactrim and ampicillin.  We will continue the patient on  Zosyn.  Her Fonseca catheter has been changed in the Emergency Department.   --following cultures  9.  CODE STATUS:  DNR/DNI.      DVT Prophylaxis: mechanical  Code Status: DNR/DNI     Disposition:     Interval History   - chart reviewed  - discussed with RN  - no new changes, stable resp status.    -Data reviewed today: I reviewed all new labs and imaging over the last 24 hours. I personally reviewed no images or EKG's today.    Physical Exam   Heart Rate: 76, Blood pressure 154/90, temperature 98  F (36.7  C), temperature source Axillary, resp. rate 28, height 1.52 m (4' 11.84\"), weight 65.3 kg (144 lb), SpO2 98 %.  Vitals:    09/23/17 1722   Weight: 65.3 kg (144 lb)     Vital Signs with Ranges  Temp:  [98  F (36.7  C)-98.9  F (37.2  C)] 98  F (36.7  C)  Heart Rate:  [68-87] 76  Resp:  [15-36] 28  BP: (104-154)/(32-93) 154/90  SpO2:  [91 %-98 %] 98 %  I/O's Last 24 hours  I/O last 3 completed shifts:  In: 2318 [I.V.:2318]  Out: 1125 [Urine:1125]    Constitutional: Awake, alert, cooperative, no apparent distress  Respiratory: Clear to auscultation bilaterally, no crackles or wheezing  Cardiovascular: Regular rate and rhythm, normal S1 and S2, and no murmur noted  GI: Normal bowel sounds, soft, non-distended, non-tender  Skin/Integumen: No rashes, no cyanosis, no edema  Other:      Medications   All medications were reviewed.    dextrose 5% and 0.45% NaCl + KCl 20 mEq/L 100 mL/hr at 09/25/17 0934       piperacillin-tazobactam  3.375 g Intravenous Q6H     vancomycin (VANCOCIN) IV  1,500 mg Intravenous Q24H     sodium chloride 0.9%  1,000 mL Intravenous Once     acetaminophen (TYLENOL) tablet 650 mg  650 mg Per Feeding Tube TID     aspirin  325 mg Per G Tube Daily     Dakins   Apply externally BID     lactobacillus rhamnosus (GG)  1 capsule Per Feeding Tube Daily     loperamide  4 mg Per Feeding Tube Every Other Day     metoprolol  25 mg Per Feeding Tube BID     " multivitamins with minerals  15 mL Per Feeding Tube Daily     oxyCODONE  2 mg Per G Tube TID     ranitidine  150 mg Per Feeding Tube Daily     simethicone  80 mg Per Feeding Tube TID     vitamin A-D & C drops  7 mL Per G Tube Daily     insulin aspart  1-6 Units Subcutaneous Q4H     insulin glargine  10 Units Subcutaneous QAM AC        Data     Recent Labs  Lab 09/25/17  1110 09/25/17  0440 09/24/17  1508 09/24/17  0530 09/23/17  1314   WBC  --  10.0  --  10.6 12.4*   HGB  --  12.0  --  12.3 14.4   MCV  --  101*  --  102* 103*    183  --  197 235   NA  --  150*  --  151* 149*   POTASSIUM  --  4.2  --  4.2 4.4   CHLORIDE  --  120*  --  117* 111*   CO2  --  22  --  26 27   BUN  --  39*  --  57* 67*   CR  --  1.04 1.16* 1.22* 1.38*   ANIONGAP  --  8  --  8 11   NELLIE  --  7.7*  --  8.1* 9.1   GLC  --  200*  --  205* 294*   ALBUMIN  --   --   --   --  3.4   PROTTOTAL  --   --   --   --  8.1   BILITOTAL  --   --   --   --  0.5   ALKPHOS  --   --   --   --  64   ALT  --   --   --   --  28   AST  --   --   --   --  22       No results found for this or any previous visit (from the past 24 hour(s)).    Piotr Saba MD  Pager [phone]

## 2017-09-26 ENCOUNTER — APPOINTMENT (OUTPATIENT)
Dept: GENERAL RADIOLOGY | Facility: CLINIC | Age: 82
DRG: 871 | End: 2017-09-26
Attending: INTERNAL MEDICINE
Payer: COMMERCIAL

## 2017-09-26 LAB
ANION GAP SERPL CALCULATED.3IONS-SCNC: 11 MMOL/L (ref 3–14)
BUN SERPL-MCNC: 25 MG/DL (ref 7–30)
CALCIUM SERPL-MCNC: 7.8 MG/DL (ref 8.5–10.1)
CHLORIDE SERPL-SCNC: 114 MMOL/L (ref 94–109)
CO2 SERPL-SCNC: 19 MMOL/L (ref 20–32)
CREAT SERPL-MCNC: 0.99 MG/DL (ref 0.52–1.04)
ERYTHROCYTE [DISTWIDTH] IN BLOOD BY AUTOMATED COUNT: 14.5 % (ref 10–15)
GFR SERPL CREATININE-BSD FRML MDRD: 53 ML/MIN/1.7M2
GLUCOSE BLDC GLUCOMTR-MCNC: 230 MG/DL (ref 70–99)
GLUCOSE BLDC GLUCOMTR-MCNC: 235 MG/DL (ref 70–99)
GLUCOSE BLDC GLUCOMTR-MCNC: 241 MG/DL (ref 70–99)
GLUCOSE BLDC GLUCOMTR-MCNC: 260 MG/DL (ref 70–99)
GLUCOSE BLDC GLUCOMTR-MCNC: 283 MG/DL (ref 70–99)
GLUCOSE BLDC GLUCOMTR-MCNC: 348 MG/DL (ref 70–99)
GLUCOSE SERPL-MCNC: 258 MG/DL (ref 70–99)
HCT VFR BLD AUTO: 37.3 % (ref 35–47)
HGB BLD-MCNC: 12 G/DL (ref 11.7–15.7)
MAGNESIUM SERPL-MCNC: 2.3 MG/DL (ref 1.6–2.3)
MCH RBC QN AUTO: 31.7 PG (ref 26.5–33)
MCHC RBC AUTO-ENTMCNC: 32.2 G/DL (ref 31.5–36.5)
MCV RBC AUTO: 98 FL (ref 78–100)
PHOSPHATE SERPL-MCNC: 2.8 MG/DL (ref 2.5–4.5)
PLATELET # BLD AUTO: 187 10E9/L (ref 150–450)
POTASSIUM SERPL-SCNC: 3.8 MMOL/L (ref 3.4–5.3)
RBC # BLD AUTO: 3.79 10E12/L (ref 3.8–5.2)
SODIUM SERPL-SCNC: 144 MMOL/L (ref 133–144)
WBC # BLD AUTO: 8.4 10E9/L (ref 4–11)

## 2017-09-26 PROCEDURE — 80048 BASIC METABOLIC PNL TOTAL CA: CPT | Performed by: INTERNAL MEDICINE

## 2017-09-26 PROCEDURE — 25000132 ZZH RX MED GY IP 250 OP 250 PS 637: Performed by: INTERNAL MEDICINE

## 2017-09-26 PROCEDURE — 85027 COMPLETE CBC AUTOMATED: CPT | Performed by: INTERNAL MEDICINE

## 2017-09-26 PROCEDURE — 71020 XR CHEST 2 VW: CPT

## 2017-09-26 PROCEDURE — 25000131 ZZH RX MED GY IP 250 OP 636 PS 637: Performed by: INTERNAL MEDICINE

## 2017-09-26 PROCEDURE — 25000125 ZZHC RX 250: Performed by: INTERNAL MEDICINE

## 2017-09-26 PROCEDURE — 00000146 ZZHCL STATISTIC GLUCOSE BY METER IP

## 2017-09-26 PROCEDURE — 25000128 H RX IP 250 OP 636

## 2017-09-26 PROCEDURE — 84100 ASSAY OF PHOSPHORUS: CPT | Performed by: INTERNAL MEDICINE

## 2017-09-26 PROCEDURE — 25000128 H RX IP 250 OP 636: Performed by: INTERNAL MEDICINE

## 2017-09-26 PROCEDURE — 83735 ASSAY OF MAGNESIUM: CPT | Performed by: INTERNAL MEDICINE

## 2017-09-26 PROCEDURE — 12000007 ZZH R&B INTERMEDIATE

## 2017-09-26 PROCEDURE — 36415 COLL VENOUS BLD VENIPUNCTURE: CPT | Performed by: INTERNAL MEDICINE

## 2017-09-26 PROCEDURE — 99207 ZZC CDG-MDM COMPONENT: MEETS MODERATE - UP CODED: CPT | Performed by: INTERNAL MEDICINE

## 2017-09-26 PROCEDURE — 99232 SBSQ HOSP IP/OBS MODERATE 35: CPT | Performed by: INTERNAL MEDICINE

## 2017-09-26 RX ORDER — DEXTROSE MONOHYDRATE 50 MG/ML
INJECTION, SOLUTION INTRAVENOUS CONTINUOUS
Status: DISCONTINUED | OUTPATIENT
Start: 2017-09-26 | End: 2017-09-27

## 2017-09-26 RX ADMIN — DEXTROSE MONOHYDRATE: 50 INJECTION, SOLUTION INTRAVENOUS at 10:08

## 2017-09-26 RX ADMIN — INSULIN ASPART 2 UNITS: 100 INJECTION, SOLUTION INTRAVENOUS; SUBCUTANEOUS at 04:36

## 2017-09-26 RX ADMIN — PIPERACILLIN SODIUM,TAZOBACTAM SODIUM 3.38 G: 3; .375 INJECTION, POWDER, FOR SOLUTION INTRAVENOUS at 10:08

## 2017-09-26 RX ADMIN — INSULIN ASPART 3 UNITS: 100 INJECTION, SOLUTION INTRAVENOUS; SUBCUTANEOUS at 00:40

## 2017-09-26 RX ADMIN — PIPERACILLIN SODIUM,TAZOBACTAM SODIUM 3.38 G: 3; .375 INJECTION, POWDER, FOR SOLUTION INTRAVENOUS at 04:39

## 2017-09-26 RX ADMIN — ACETAMINOPHEN 650 MG: 325 SOLUTION ORAL at 16:01

## 2017-09-26 RX ADMIN — METOPROLOL TARTRATE 25 MG: 25 TABLET ORAL at 08:35

## 2017-09-26 RX ADMIN — METOPROLOL TARTRATE 25 MG: 25 TABLET ORAL at 21:22

## 2017-09-26 RX ADMIN — SODIUM HYPOCHLORITE: 1.25 SOLUTION TOPICAL at 18:09

## 2017-09-26 RX ADMIN — DEXTROSE MONOHYDRATE: 50 INJECTION, SOLUTION INTRAVENOUS at 21:21

## 2017-09-26 RX ADMIN — ACETAMINOPHEN 650 MG: 325 SOLUTION ORAL at 08:32

## 2017-09-26 RX ADMIN — INSULIN ASPART 3 UNITS: 100 INJECTION, SOLUTION INTRAVENOUS; SUBCUTANEOUS at 08:38

## 2017-09-26 RX ADMIN — SIMETHICONE 80 MG: 20 EMULSION ORAL at 16:14

## 2017-09-26 RX ADMIN — RANITIDINE HYDROCHLORIDE 150 MG: 150 SOLUTION ORAL at 08:37

## 2017-09-26 RX ADMIN — ACETAMINOPHEN 650 MG: 325 SOLUTION ORAL at 21:21

## 2017-09-26 RX ADMIN — MULTIVITAMIN 15 ML: LIQUID ORAL at 08:38

## 2017-09-26 RX ADMIN — PIPERACILLIN SODIUM,TAZOBACTAM SODIUM 3.38 G: 3; .375 INJECTION, POWDER, FOR SOLUTION INTRAVENOUS at 16:02

## 2017-09-26 RX ADMIN — Medication 1 CAPSULE: at 08:35

## 2017-09-26 RX ADMIN — INSULIN ASPART 4 UNITS: 100 INJECTION, SOLUTION INTRAVENOUS; SUBCUTANEOUS at 19:57

## 2017-09-26 RX ADMIN — INSULIN ASPART 5 UNITS: 100 INJECTION, SOLUTION INTRAVENOUS; SUBCUTANEOUS at 16:15

## 2017-09-26 RX ADMIN — SODIUM HYPOCHLORITE: 1.25 SOLUTION TOPICAL at 10:10

## 2017-09-26 RX ADMIN — SIMETHICONE 80 MG: 20 EMULSION ORAL at 08:37

## 2017-09-26 RX ADMIN — Medication 7 ML: at 08:37

## 2017-09-26 RX ADMIN — OXYCODONE HYDROCHLORIDE 2 MG: 5 SOLUTION ORAL at 21:20

## 2017-09-26 RX ADMIN — OXYCODONE HYDROCHLORIDE 2 MG: 5 SOLUTION ORAL at 16:01

## 2017-09-26 RX ADMIN — PIPERACILLIN SODIUM,TAZOBACTAM SODIUM 3.38 G: 3; .375 INJECTION, POWDER, FOR SOLUTION INTRAVENOUS at 21:21

## 2017-09-26 RX ADMIN — INSULIN GLARGINE 10 UNITS: 100 INJECTION, SOLUTION SUBCUTANEOUS at 07:33

## 2017-09-26 RX ADMIN — SIMETHICONE 80 MG: 20 EMULSION ORAL at 21:21

## 2017-09-26 RX ADMIN — OXYCODONE HYDROCHLORIDE 2 MG: 5 SOLUTION ORAL at 08:36

## 2017-09-26 RX ADMIN — ASPIRIN 325 MG ORAL TABLET 325 MG: 325 PILL ORAL at 08:36

## 2017-09-26 RX ADMIN — INSULIN ASPART 3 UNITS: 100 INJECTION, SOLUTION INTRAVENOUS; SUBCUTANEOUS at 12:35

## 2017-09-26 NOTE — PLAN OF CARE
Problem: Patient Care Overview  Goal: Plan of Care/Patient Progress Review  Outcome: No Change  VSS on RA. LS are diminished with coarse crackles, BS are active, had small BM this shift. Pt was on TF 10mls/hr, increased to 2omls/hr at 0445. Pt slept comfortably , no nonverbal sign of pain noticed. Fonseca catheter is patent with AUO. T & R 2hourly. Will continue to monitor.

## 2017-09-26 NOTE — PLAN OF CARE
Problem: Patient Care Overview  Goal: Plan of Care/Patient Progress Review  Outcome: No Change  VSS, IMC monitoring d/c'd at 1600. Turned q2h. LS crackles, diminished. Fonseca adequate output, small BMx1. TF restarted at 2045 at 10ml/hr.

## 2017-09-26 NOTE — PROGRESS NOTES
[facility]    Hospitalist Progress Note    Assessment & Plan   Celia Lewis is a 89 year old female with advanced dementia, nonverbal, contracted bed bound, total care dependent, G-tube dependent, admitted with foaming at the mouth, hypoxia with acute respiratory failure likely due to aspiration pneumonia, abnormal urinalysis with underlying chronic indwelling Fonseca catheter being admitted for further treatment.     # Acute respiratory failure, suspect aspiration pneumonia  Patient is on chronic tube feeds and likely unable to protect her airway.  CXR showed possible interstitial infiltrates bilateral concerning for aspiration.  She was initially started on Zosyn and Vancomycin but this was narrowed to Zosyn.  No longer requiring O2  - Continue Zosyn for 2 more days (discontinue on 9/28)    # Sepsis, present on admission and now resolved  See above    # DM type II   Blood sugars have been elevated in the 200s the past day.    - Increased Lantus to 13 units and will continue SSI     # UTI   Culture grew E Coli which was pan-sensitive except for Bactrim and Ampicillin.   - Continue Zosyn     # Sacral Decubitus Ulcers  - Continue local wound care     # H/o CVA  - Continue ASA     # Chronic debilitated state with contractures  Tube feeds restarted yesterday without issues  - Continue to uptitrate tube feeds and once at rate of 40 mL/hr if still tolerating     DVT Prophylaxis: Pneumatic Compression Devices  Code Status: DNR/DNI    Disposition: Expected discharge in 1 day    Favio Guzmán,   Text Page (7am to 6pm)    Interval History   Patient seen and examined.  She is non-verbal.  No events per nursing.     -Data reviewed today: I reviewed all new labs and imaging results over the last 24 hours. I personally reviewed no images or EKG's today.    Physical Exam   Temp: 97.7  F (36.5  C) Temp src: Oral BP: 125/57 Pulse: 68 Heart Rate: 74 Resp: 20 SpO2: 95 % O2 Device: None (Room air)    Vitals:     09/23/17 1722   Weight: 65.3 kg (144 lb)     Vital Signs with Ranges  Temp:  [97.7  F (36.5  C)-98.6  F (37  C)] 97.7  F (36.5  C)  Pulse:  [68-79] 68  Heart Rate:  [71-76] 74  Resp:  [20-28] 20  BP: (120-154)/(57-90) 125/57  SpO2:  [90 %-98 %] 95 %  I/O last 3 completed shifts:  In: 4224 [I.V.:3868; NG/GT:356]  Out: 1150 [Urine:1150]    Constitutional: Awake.  Non-verbal  Respiratory: Clear to auscultation bilaterally, no crackles or wheezing  Cardiovascular: Regular rate and rhythm, normal S1 and S2, and no murmur noted  GI: Normal bowel sounds, soft, non-distended, non-tender  Skin/Integumen: No rashes, no cyanosis, no edema    Medications     D5W 100 mL/hr at 09/26/17 1008     IV fluid REPLACEMENT ONLY         [START ON 9/27/2017] insulin glargine  13 Units Subcutaneous QAM AC     acetaminophen  650 mg Per Feeding Tube TID     piperacillin-tazobactam  3.375 g Intravenous Q6H     aspirin  325 mg Per G Tube Daily     Dakins   Apply externally BID     lactobacillus rhamnosus (GG)  1 capsule Per Feeding Tube Daily     loperamide  4 mg Per Feeding Tube Every Other Day     metoprolol  25 mg Per Feeding Tube BID     multivitamins with minerals  15 mL Per Feeding Tube Daily     oxyCODONE  2 mg Per G Tube TID     ranitidine  150 mg Per Feeding Tube Daily     simethicone  80 mg Per Feeding Tube TID     vitamin A-D & C drops  7 mL Per G Tube Daily     insulin aspart  1-6 Units Subcutaneous Q4H       Data     Recent Labs  Lab 09/26/17  0751 09/25/17  1110 09/25/17  0440 09/24/17  1508 09/24/17  0530 09/23/17  1314   WBC 8.4  --  10.0  --  10.6 12.4*   HGB 12.0  --  12.0  --  12.3 14.4   MCV 98  --  101*  --  102* 103*    184 183  --  197 235     --  150*  --  151* 149*   POTASSIUM 3.8  --  4.2  --  4.2 4.4   CHLORIDE 114*  --  120*  --  117* 111*   CO2 19*  --  22  --  26 27   BUN 25  --  39*  --  57* 67*   CR 0.99  --  1.04 1.16* 1.22* 1.38*   ANIONGAP 11  --  8  --  8 11   NELLIE 7.8*  --  7.7*  --  8.1* 9.1    *  --  200*  --  205* 294*   ALBUMIN  --   --   --   --   --  3.4   PROTTOTAL  --   --   --   --   --  8.1   BILITOTAL  --   --   --   --   --  0.5   ALKPHOS  --   --   --   --   --  64   ALT  --   --   --   --   --  28   AST  --   --   --   --   --  22       Imaging:   No results found for this or any previous visit (from the past 24 hour(s)).

## 2017-09-26 NOTE — PROGRESS NOTES
Care Transition Initial Assessment - MARLON  Reason For Consult: discharge planning  Met with: N/A, spoke with facility  Active Problems:    Aspiration pneumonia (H)         DATA  Lives With: facility resident     Description of Support System: Supportive  Who is your support system?: , Children  Support Assessment: Adequate family and caregiver support, Adequate social supports.   Quality Of Family Relationships: supportive    ASSESSMENT  Cognitive Status:  Not assessed, nonverbal per staff    SW has reviewed pt records. Pt is Celia, a 89yo female who was admitted on 9/23/17 due to aspiration pneumonia. Pt resides at [facility]. SW in contact with facility today to provide update and discuss anticipated discharge tomorrow 9/27. SW faxed over clinical information and updated that pt will require one more day IV zosyn at facility (last day 9/28). SW to contact facility and family on 9/27 to further coordinate discharge.     PLAN  Financial costs for the patient includes None known at this time.  Patient given options and choices for discharge N/A, from facility.  Patient/family is agreeable to the plan?  Yes  Patient Goals and Preferences: LTC.  Patient anticipates discharging to:  LTC.    SABA Cortez, LICSW  Daytime (8:00am-4:30pm): [phone]  After-Hours SW Pager (4:30pm-11:30pm): [phone]

## 2017-09-26 NOTE — PROGRESS NOTES
Pt non verbal with staff repositioned every 2 hours by staff went per cart for chest xray this afternoon.  Pt on room air this shift. Noted lungs coarse and occasional expiratory wheeze noted. Dressing to coccyx and right foot ear lobes scabbed areas noted. Tube feeding isosource patent to g tube at 30ml/hr now target rate 40ml/hr nursing assistant reports that pt did have large loose bowel movement will notify next shift to monitor

## 2017-09-27 VITALS
WEIGHT: 150.13 LBS | TEMPERATURE: 98.2 F | BODY MASS INDEX: 29.48 KG/M2 | OXYGEN SATURATION: 94 % | HEART RATE: 74 BPM | SYSTOLIC BLOOD PRESSURE: 155 MMHG | HEIGHT: 60 IN | RESPIRATION RATE: 26 BRPM | DIASTOLIC BLOOD PRESSURE: 79 MMHG

## 2017-09-27 LAB
C DIFF TOX B STL QL: NEGATIVE
GLUCOSE BLDC GLUCOMTR-MCNC: 243 MG/DL (ref 70–99)
GLUCOSE BLDC GLUCOMTR-MCNC: 277 MG/DL (ref 70–99)
GLUCOSE BLDC GLUCOMTR-MCNC: 279 MG/DL (ref 70–99)
GLUCOSE BLDC GLUCOMTR-MCNC: 289 MG/DL (ref 70–99)
GLUCOSE BLDC GLUCOMTR-MCNC: 323 MG/DL (ref 70–99)
GLUCOSE BLDC GLUCOMTR-MCNC: 339 MG/DL (ref 70–99)
GLUCOSE BLDC GLUCOMTR-MCNC: 366 MG/DL (ref 70–99)
SPECIMEN SOURCE: NORMAL

## 2017-09-27 PROCEDURE — 25000128 H RX IP 250 OP 636

## 2017-09-27 PROCEDURE — 25000125 ZZHC RX 250: Performed by: INTERNAL MEDICINE

## 2017-09-27 PROCEDURE — 00000146 ZZHCL STATISTIC GLUCOSE BY METER IP

## 2017-09-27 PROCEDURE — 25000131 ZZH RX MED GY IP 250 OP 636 PS 637: Performed by: INTERNAL MEDICINE

## 2017-09-27 PROCEDURE — 99239 HOSP IP/OBS DSCHRG MGMT >30: CPT | Performed by: INTERNAL MEDICINE

## 2017-09-27 PROCEDURE — 25000132 ZZH RX MED GY IP 250 OP 250 PS 637: Performed by: INTERNAL MEDICINE

## 2017-09-27 PROCEDURE — 87493 C DIFF AMPLIFIED PROBE: CPT | Performed by: INTERNAL MEDICINE

## 2017-09-27 RX ORDER — OXYCODONE HCL 5 MG/5 ML
2 SOLUTION, ORAL ORAL 3 TIMES DAILY
Qty: 15 ML | Refills: 0 | Status: SHIPPED | OUTPATIENT
Start: 2017-09-27

## 2017-09-27 RX ADMIN — ACETAMINOPHEN 650 MG: 325 SOLUTION ORAL at 09:58

## 2017-09-27 RX ADMIN — MULTIVITAMIN 15 ML: LIQUID ORAL at 09:59

## 2017-09-27 RX ADMIN — METOPROLOL TARTRATE 25 MG: 25 TABLET ORAL at 09:58

## 2017-09-27 RX ADMIN — PIPERACILLIN SODIUM,TAZOBACTAM SODIUM 3.38 G: 3; .375 INJECTION, POWDER, FOR SOLUTION INTRAVENOUS at 03:50

## 2017-09-27 RX ADMIN — PIPERACILLIN SODIUM,TAZOBACTAM SODIUM 3.38 G: 3; .375 INJECTION, POWDER, FOR SOLUTION INTRAVENOUS at 09:47

## 2017-09-27 RX ADMIN — INSULIN ASPART 3 UNITS: 100 INJECTION, SOLUTION INTRAVENOUS; SUBCUTANEOUS at 09:54

## 2017-09-27 RX ADMIN — OXYCODONE HYDROCHLORIDE 2 MG: 5 SOLUTION ORAL at 16:11

## 2017-09-27 RX ADMIN — INSULIN GLARGINE 16 UNITS: 100 INJECTION, SOLUTION SUBCUTANEOUS at 09:56

## 2017-09-27 RX ADMIN — RANITIDINE HYDROCHLORIDE 150 MG: 150 SOLUTION ORAL at 09:58

## 2017-09-27 RX ADMIN — OXYCODONE HYDROCHLORIDE 2 MG: 5 SOLUTION ORAL at 09:58

## 2017-09-27 RX ADMIN — Medication 1 CAPSULE: at 09:58

## 2017-09-27 RX ADMIN — SIMETHICONE 80 MG: 20 EMULSION ORAL at 09:59

## 2017-09-27 RX ADMIN — Medication 7 ML: at 09:59

## 2017-09-27 RX ADMIN — SODIUM HYPOCHLORITE: 1.25 SOLUTION TOPICAL at 10:50

## 2017-09-27 RX ADMIN — INSULIN ASPART 5 UNITS: 100 INJECTION, SOLUTION INTRAVENOUS; SUBCUTANEOUS at 00:14

## 2017-09-27 RX ADMIN — INSULIN ASPART 3 UNITS: 100 INJECTION, SOLUTION INTRAVENOUS; SUBCUTANEOUS at 14:16

## 2017-09-27 RX ADMIN — Medication 4 MG: at 14:23

## 2017-09-27 RX ADMIN — ACETAMINOPHEN 650 MG: 325 SOLUTION ORAL at 16:11

## 2017-09-27 RX ADMIN — ASPIRIN 325 MG ORAL TABLET 325 MG: 325 PILL ORAL at 09:58

## 2017-09-27 RX ADMIN — SIMETHICONE 80 MG: 20 EMULSION ORAL at 16:20

## 2017-09-27 RX ADMIN — INSULIN ASPART 3 UNITS: 100 INJECTION, SOLUTION INTRAVENOUS; SUBCUTANEOUS at 03:54

## 2017-09-27 RX ADMIN — INSULIN ASPART 3 UNITS: 100 INJECTION, SOLUTION INTRAVENOUS; SUBCUTANEOUS at 16:19

## 2017-09-27 RX ADMIN — PIPERACILLIN SODIUM,TAZOBACTAM SODIUM 3.38 G: 3; .375 INJECTION, POWDER, FOR SOLUTION INTRAVENOUS at 16:10

## 2017-09-27 NOTE — PROVIDER NOTIFICATION
MD notified that RR 32, O2 sats stable. LS w/ slight exp wheezes/coarse.   Also, IV infiltrated, need replaced vs transition to Abx thru TF.   Will decrease TF to 30 mL/hr.

## 2017-09-27 NOTE — PLAN OF CARE
Problem: Patient Care Overview  Goal: Plan of Care/Patient Progress Review  Outcome: No Change  Non verbal, does not follow commands, opens eyes spontaneously, VSS, GT feeding in place at 40 cc/hr, loose stool x2 overnight, sent sample for C diff. Red non blanchable buttocks , applied critic care paste.Turned &  repositioned every 2hrs with two assist+mechanical lift. No non-verbal pain indicators observed. Will continue to mioonitor.

## 2017-09-27 NOTE — PROVIDER NOTIFICATION
Brief update:    BGs continue to be elevated. Note increase in long acting insulin earlier today.     D5 discontinued at this time.    Recheck BG in 2 hr, will likely require additional short acting insulin.    Renaldo Sandy MD  1:07 AM    Still elevated into 300 range.    7 units novolog x1    Recheck at 4AM, sliding scale available at that time as well.    Renaldo Sandy MD  2:10 AM

## 2017-09-27 NOTE — PROGRESS NOTES
SW:    I: SW following for discharge planning. Pt is medically appropriate for discharge back to [facility] today. MARLON left message with admissions coordinator with update on discharge time. Pt will require stretcher transport as per bedside RN is bed bound, would not be able to safely transport by . PCS form completed and faxed to  billing ([phone]), original along with facesheet at [facility] station for  . SW to fax discharge orders to facility admissions [phone]. Per bedside RN, MD to contact pt son re: readiness for discharge today.    P: Pt to discharge to [facility] via  stretcher transport at 1700.    SABA Cortez, [facility] HospitalSW  Daytime (8:00am-4:30pm): [phone]  After-Hours SW Pager (4:30pm-11:30pm): [phone]

## 2017-09-27 NOTE — PLAN OF CARE
Problem: Patient Care Overview  Goal: Plan of Care/Patient Progress Review  Outcome: No Change  Unresponsive. Appears calm. RR 25-32; other VSS. T/R. Incontinent of stool. Watery stool x 1. C diff negative. TF advanced again to 40 mL/hr at 1430. Wounds to bilateral ears, coccyx and R lateral heel; dressings changed per POC. Plan to d/c to TCU at 1700.

## 2017-09-27 NOTE — PROVIDER NOTIFICATION
, pt on D5 iv fluids & tube feeding. Gave correction dose of 5 units insulin aspart and notified Dr. Sandy. See MD 's  to d/c D5. Will recheck BGM at 02 and update MD.     Addendum:  at 0200. Spoke with Dr Sandy, gave 7 units insulin aspart per MD's order. Will recheck at 4 am and use correction dose sliding scale as ordered.

## 2017-09-27 NOTE — PLAN OF CARE
Problem: Patient Care Overview  Goal: Plan of Care/Patient Progress Review  Outcome: Adequate for Discharge Date Met:  09/27/17  Pt is nonverbal. VSS on RA, respirations are in high 20's and shallow. Pt has contractures and needs total cares, repo q2hrs. TF running at 40ml. Gave scheduled oxycodone. Pt appears comfortable. Rooke boots in place. Pt d/c'dto [facility] vis stretcher. Removed IV. All paperwork given to EMT.

## 2017-09-27 NOTE — PROGRESS NOTES
Updated Son, Dr. Lewis, that transportation time for discharge is 5 pm today via stretcher ambulance. No other questions from son, he was updated by MD.

## 2017-09-27 NOTE — PLAN OF CARE
Problem: Patient Care Overview  Goal: Plan of Care/Patient Progress Review  Outcome: No Change  VSS. TF increased to goal of 40. G tube intact. Coarse wheezing, pt SOB at times. Dressing to coccyx changed. Loose stool on shift. Repositioned q2. BG checks with coverage needed. Pain meds scheduled through G tube.

## 2017-09-27 NOTE — PROGRESS NOTES
TF is now at goal, Isosource 15 @ 40 mL/hr.    Note IVF d/c'd, will increase H2O flushes to 120 mL q 4 hours for total fluid (TF + flushes) = 1450 mL/day.    Bisi Montgomery, RD  Pager [phone] (M-F)            [phone] (W/E & Hol)

## 2017-10-01 ENCOUNTER — HOSPITAL ENCOUNTER (EMERGENCY)
Facility: CLINIC | Age: 82
Discharge: HOME | End: 2017-10-01
Attending: EMERGENCY MEDICINE | Admitting: EMERGENCY MEDICINE
Payer: COMMERCIAL

## 2017-10-01 VITALS
OXYGEN SATURATION: 94 % | DIASTOLIC BLOOD PRESSURE: 74 MMHG | TEMPERATURE: 97.5 F | RESPIRATION RATE: 16 BRPM | SYSTOLIC BLOOD PRESSURE: 153 MMHG

## 2017-10-01 DIAGNOSIS — F03.90 DEMENTIA WITHOUT BEHAVIORAL DISTURBANCE, UNSPECIFIED DEMENTIA TYPE: ICD-10-CM

## 2017-10-01 DIAGNOSIS — T85.598A OBSTRUCTION OF FEEDING TUBE, INITIAL ENCOUNTER: ICD-10-CM

## 2017-10-01 PROCEDURE — 99282 EMERGENCY DEPT VISIT SF MDM: CPT

## 2017-10-01 NOTE — ED AVS SNAPSHOT
Emergency Department    [address] [place] [address]    Phone:  [phone]    Fax:  [phone]                                       Celia Lewis   MRN: [id number]    Department:   Emergency Department   Date of Visit:  10/1/2017           After Visit Summary Signature Page     I have received my discharge instructions, and my questions have been answered. I have discussed any challenges I see with this plan with the nurse or doctor.    ..........................................................................................................................................  Patient/Patient Representative Signature      ..........................................................................................................................................  Patient Representative Print Name and Relationship to Patient    ..................................................               ................................................  Date                                            Time    ..........................................................................................................................................  Reviewed by Signature/Title    ...................................................              ..............................................  Date                                                            Time

## 2017-10-01 NOTE — ED AVS SNAPSHOT
Emergency Department    [address] [facility] [address]    Phone:  [phone]    Fax:  [phone]                                       Celia Lewis   MRN: [id number]    Department:   Emergency Department   Date of Visit:  10/1/2017           Patient Information     Date Of Birth          11/30/1926        Your diagnoses for this visit were:     Obstruction of feeding tube, initial encounter     Dementia without behavioral disturbance, unspecified dementia type        You were seen by Marek Frost MD.      Follow-up Information     Schedule an appointment as soon as possible for a visit with your regular physician.    Why:  As needed        Follow up with  Emergency Department.    Specialty:  EMERGENCY MEDICINE    Why:  As needed for crisis    Contact information:    [address] [facility] [address] [phone]      Discharge References/Attachments     FEEDING TUBE CARE, GASTROSTOMY: FLUSHING (ENGLISH)      24 Hour Appointment Hotline       To make an appointment at any [facility], call [phone] ([phone]). If you don't have a family doctor or clinic, we will help you find one. [facility] are conveniently located to serve the needs of you and your family.             Review of your medicines      Our records show that you are taking the medicines listed below. If these are incorrect, please call your family doctor or clinic.        Dose / Directions Last dose taken    * ACETAMINOPHEN PO   Dose:  650 mg        650 mg by Gastric Tube route 3 times daily   Refills:  0        * acetaminophen 32 mg/mL solution   Commonly known as:  TYLENOL   Dose:  325 mg        325 mg by Gastric Tube route daily as needed for fever or mild pain   Refills:  0        aspirin 325 MG tablet   Dose:  325 mg   Quantity:  120 tablet        1 tablet (325 mg) by Per G Tube route daily   Refills:  2        bisacodyl 10 MG Suppository   Commonly known as:   DULCOLAX   Dose:  10 mg        Place 10 mg rectally daily as needed for constipation   Refills:  0        Dakins 0.125 % Soln        Externally apply topically 2 times daily BID and PRN.  1. Cleanse wound with microklenze, cleanse periwound area with naomi perineal 2. Moisten kerlix fluff with dakins solution 0.125%, wring out excess, pack wound with kerlix 3. Apply antifungal powder to periwound area, rub in. Apply criticaid over powder. 4. Cover with ABD using minimal medipore tape to secure. 5. Label dressing with date, time, initials. Follow Rigorous PIP measures.   Refills:  0        IMODIUM A-D 1 MG/7.5ML Liqd   Dose:  4 mg   Generic drug:  Loperamide HCl        4 mg by Gastric Tube route every other day   Refills:  0        INSULIN ASPART SC   Dose:  2-12 Units        Inject 2-12 Units Subcutaneous every 6 hours 0800, 1400, 2000, 0200 -250 2 unit -300 4 units -350 6 units -400 8 units -450 10 units BG >450 12 units and update MD   Refills:  0        insulin glargine 100 UNIT/ML injection   Commonly known as:  LANTUS   Dose:  39 Units        Inject 39 Units Subcutaneous At Bedtime   Refills:  0        Lactobacillus Acidophilus Powd   Dose:  1 capsule        1 capsule by Gastric Tube route daily   Refills:  0        metoprolol 10 mg/mL Susp   Commonly known as:  LOPRESSOR   Dose:  25 mg        Take 25 mg by mouth 2 times daily   Refills:  0        multivitamin, therapeutic Tabs tablet   Dose:  1 tablet        1 tablet by Gastric Tube route daily   Refills:  0        oxyCODONE 5 MG/5ML solution   Commonly known as:  ROXICODONE   Dose:  2 mg   Indication:  Chronic Pain   Quantity:  15 mL        2 mLs (2 mg) by Per G Tube route 3 times daily   Refills:  0        ranitidine 150 MG/10ML syrup   Commonly known as:  Zantac   Dose:  150 mg   Quantity:  600 mL        10 mLs (150 mg) by Per Feeding Tube route daily   Refills:  0        simethicone 40 MG/0.6ML suspension   Commonly known as:   MYLICON   Dose:  80 mg        Take 80 mg by mouth 3 times daily   Refills:  0        vitamin A-D & C drops 750-400-35 UNIT-MG/ML solution NEW FORMULATION   Dose:  7 mL   Quantity:  50 mL        7 mLs by Per G Tube route daily   Refills:  0        * Notice:  This list has 2 medication(s) that are the same as other medications prescribed for you. Read the directions carefully, and ask your doctor or other care provider to review them with you.            Orders Needing Specimen Collection     None      Pending Results     No orders found from 9/29/2017 to 10/2/2017.            Pending Culture Results     No orders found from 9/29/2017 to 10/2/2017.            Pending Results Instructions     If you had any lab results that were not finalized at the time of your Discharge, you can call the ED Lab Result RN at [phone]. You will be contacted by this team for any positive Lab results or changes in treatment. The nurses are available 7 days a week from 10A to 6:30P.  You can leave a message 24 hours per day and they will return your call.        Test Results From Your Hospital Stay               Clinical Quality Measure: Blood Pressure Screening     Your blood pressure was checked while you were in the emergency department today. The last reading we obtained was  BP: 153/74 . Please read the guidelines below about what these numbers mean and what you should do about them.  If your systolic blood pressure (the top number) is less than 120 and your diastolic blood pressure (the bottom number) is less than 80, then your blood pressure is normal. There is nothing more that you need to do about it.  If your systolic blood pressure (the top number) is 120-139 or your diastolic blood pressure (the bottom number) is 80-89, your blood pressure may be higher than it should be. You should have your blood pressure rechecked within a year by a primary care provider.  If your systolic blood pressure (the top number) is 140 or  "greater or your diastolic blood pressure (the bottom number) is 90 or greater, you may have high blood pressure. High blood pressure is treatable, but if left untreated over time it can put you at risk for heart attack, stroke, or kidney failure. You should have your blood pressure rechecked by a primary care provider within the next 4 weeks.  If your provider in the emergency department today gave you specific instructions to follow-up with your doctor or provider even sooner than that, you should follow that instruction and not wait for up to 4 weeks for your follow-up visit.        Thank you for choosing [facility]       Thank you for choosing [facility] for your care. Our goal is always to provide you with excellent care. Hearing back from our patients is one way we can continue to improve our services. Please take a few minutes to complete the written survey that you may receive in the mail after you visit with us. Thank you!        [employer] Information     [employer] lets you send messages to your doctor, view your test results, renew your prescriptions, schedule appointments and more. To sign up, go to [email or web address] . Click on \"Log in\" on the left side of the screen, which will take you to the Welcome page. Then click on \"Sign up Now\" on the right side of the page.     You will be asked to enter the access code listed below, as well as some personal information. Please follow the directions to create your username and password.     Your access code is: [email or web address]  Expires: 2017  8:11 AM     Your access code will  in 90 days. If you need help or a new code, please call your [facility] clinic or [phone].        Care EveryWhere ID     This is your Care EveryWhere ID. This could be used by other organizations to access your [facility] medical records  [id number]        Equal Access to Services     CECILIA GATES AH: joshua Nava qaybta kaalmada adeegyada, waxay " tammi proctor ah. So [facility] [phone].    ATENCIÓN: Si habla español, tiene a rosas disposición servicios gratuitos de asistencia lingüística. Llame al [phone].    We comply with applicable federal civil rights laws and [place] laws. We do not discriminate on the basis of race, color, national origin, age, disability, sex, sexual orientation, or gender identity.            After Visit Summary       This is your record. Keep this with you and show to your community pharmacist(s) and doctor(s) at your next visit.

## 2017-10-01 NOTE — ED PROVIDER NOTES
History     Chief Complaint:  Clogged feeding tube     HPI   Celia Lewis is a 89 year old female who presents by EMS for evaluation of a clogged feeding tube. Per EMS, the patient has end-stage dementia and gets her nutrition and medications through a gastric tube. She is a history of aspiration and is nonverbal at baseline. Her mental status is unchanged. [facility]-tube was clogged about 2 hours prior to arrival. Review of nursing home records suggests that this tube accepted her free water flush at 4 AM. Her medications are all given through a gastric tube, other than her insulin. Vitals were stable and her glucose was 132 for EMS. The patient is unable to further contribute to the history. She is DNR/DNI and has a pulsed form.It is unclear when or where this tube was placed.  Er power of  is her son, Dr. Salvatore Lewis, whose phone number is [phone], and home phone number [phone].    Allergies:  Sulfa Drugs     Medications:      oxyCODONE (ROXICODONE) 5 MG/5ML solution   insulin glargine (LANTUS) 100 UNIT/ML injection   aspirin 325 MG tablet   metoprolol (LOPRESSOR) 10 mg/mL SUSP   simethicone (MYLICON) 40 MG/0.6ML suspension   ranitidine (ZANTAC) 150 MG/10ML syrup   vitamin A-D & C drops (TRI-VI-SOL) 750-400-35 UNIT-MG/ML solution NEW FORMULATION   INSULIN ASPART SC   Dakins 0.125 % SOLN   Lactobacillus Acidophilus POWD   multivitamin, therapeutic (THERA-VIT) TABS tablet   Loperamide HCl (IMODIUM A-D) 1 MG/7.5ML LIQD   acetaminophen (TYLENOL) 32 mg/mL solution   ACETAMINOPHEN PO   bisacodyl (DULCOLAX) 10 MG suppository       Past Medical History:    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     Advanced directives, counseling/discussion, POLST completed 5/29/15  Full Code 5/29/2015     Alzheimer's disease 5/22/2015     Arthritis      Atrial fibrillation (H) 5/22/2015     Calculus of kidney       Candidiasis of skin and nails 5/22/2015     Congestive heart failure, unspecified      Coronary artery disease      Heart disease, unspecified      Hypertension      Morbid obesity (H) 5/22/2015     Other and unspecified hyperlipidemia 5/22/2015     Personal history of urinary (tract) infection 5/22/2015     Primary localized osteoarthrosis, shoulder region 5/22/2015     Renal failure, unspecified      Thyroid disease      Type II or unspecified type diabetes mellitus with renal manifestations, not stated as uncontrolled(250.40) 5/22/2015     Type II or unspecified type diabetes mellitus without mention of complication, not stated as uncontrolled      Unspecified hypertensive heart and kidney disease with heart failure and with chronic kidney disease stage I through stage IV, or unspecified(404.91) 5/22/2015     Uric acid stone in urine        Patient Active Problem List    Diagnosis Date Noted     Aspiration pneumonia (H) 09/23/2017     Priority: Medium     CVA (cerebral vascular accident) (H) 08/20/2017     Priority: Medium     Acute renal failure superimposed on stage 3 chronic kidney disease (H) 08/09/2017     Priority: Medium     Sacral decubitus ulcer, stage III (H) 08/09/2017     Priority: Medium     Hypernatremia 08/09/2017     Priority: Medium     Complication of gastrostomy tube (H) 08/09/2017     Priority: Medium     Recurrent UTI 08/04/2017     Priority: Medium     ARF (acute renal failure) (H) 06/30/2017     Priority: Medium     Sepsis due to urinary tract infection (H) 06/14/2017     Priority: Medium     HCAP (healthcare-associated pneumonia) 06/21/2016     Priority: Medium     Advance care planning 05/29/2015     Priority: Medium     Advance Care Planning 8/30/2016: Receipt of ACP document:  Received: POLST which was signed and dated by provider on 7/21/15.  Document previously scanned on 6/27/16.  Order reviewed and found to be valid.  Code Status reflects choices in most recent ACP document.  Also  received POLST dated 7/18/11.  Confirmed/documented designated decision maker(s).  Added by Suzi Little   Advance Care Planning Liaison               Atrial fibrillation (H) 05/22/2015     Priority: Medium     History of urinary tract infection 05/22/2015     Priority: Medium     Problem list name updated by automated process. Provider to review       Alzheimer's disease 05/22/2015     Priority: Medium     Primary localized osteoarthrosis of shoulder region 05/22/2015     Priority: Medium     Problem list name updated by automated process. Provider to review       Morbid obesity (H) 05/22/2015     Priority: Medium     Diabetes mellitus type 2, insulin dependent (H) 05/22/2015     Priority: Medium     Problem list name updated by automated process. Provider to review       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) 05/22/2015     Priority: Medium     Problem list name updated by automated process. Provider to review       Hyperlipidemia 05/22/2015     Priority: Medium     Problem list name updated by automated process. Provider to review       Candidiasis of skin and nails 05/22/2015     Priority: Medium     Chronic kidney disease, stage III (moderate) 05/22/2015     Priority: Medium     Fall 06/24/2014     Priority: Medium     UTI (urinary tract infection) 06/24/2014     Priority: Medium     Scalp laceration 06/24/2014     Priority: Medium     Advanced dementia 06/24/2014     Priority: Medium     Uric acid stone in urine      Priority: Medium     Actinomyces infection 08/31/2012     Priority: Medium     Bacteremia 08/11/2012     Priority: Medium     Gram positive rods in setting of obstructive kidney stone and pyelonephritis       Nephrolithiasis 08/06/2012     Priority: Medium        Past Surgical History:    Past Surgical History:   Procedure Laterality Date     CYSTOSCOPY, INSERT STENT URETHRA, COMBINED  8/6/2012    Procedure: COMBINED CYSTOSCOPY, INSERT STENT URETHRA;   cystoscopy, left  ureteral stent placement, left pyelogram;  Surgeon: Eulalio Bernardo MD;  Location: [facility] OR     CYSTOSCOPY,+URETEROSCOPY  8/19/05    with placement of (L) ureteral JJ stent     HC X-RAY ABDOMEN AP VIEW (KUB)  8/19/05     LASER HOLMIUM LITHOTRIPSY URETER(S), INSERT STENT, COMBINED  8/21/2012    Procedure: COMBINED CYSTOSCOPY, URETEROSCOPY, LASER HOLMIUM LITHOTRIPSY URETER(S), INSERT STENT;  CYSTOSCOPY, LEFT URETEROSCOPY, LASER HOLMIUM LITHOTRIPSY ,left ureteral stent exchange;  Surgeon: Eulalio Bernardo MD;  Location: [facility] OR        Family History:    family history includes CANCER in an other family member; CEREBROVASCULAR DISEASE in her father and mother; DIABETES in her brother.    Social History:   reports that she has quit smoking. She has never used smokeless tobacco. She reports that she does not drink alcohol or use illicit drugs.    PCP: No primary care provider on file.     Review of Systems   Unable to perform ROS: Dementia     Physical Exam     Patient Vitals for the past 24 hrs:   BP Temp Temp src Heart Rate Resp SpO2   10/01/17 1050 153/74 97.5  F (36.4  C) Temporal 89 16 94 %        Physical Exam  General: elderly woman recumbent  HENT: face nontender  CV: regular rate  Resp: unlabored  GI: abdomen protuberant, soft, with 16F feeding tube in place with debris and fluid in tube, bowel sounds present  MSK: no focal tenderness  Skin: no abdominal rash  Neuro: nonverbal (baseline)  Psych: calm      Emergency Department Course     Procedures:  The patient's ED nurse applied clog zapper to the existing gastric tube, let it set for while, and was thereafter able to flush the tube easily.    Interventions:  Clog Zapper - applied via gastric tube      Emergency Department Course:  Past medical records, nursing notes, and vitals reviewed.  I performed an exam of the patient and obtained history, as documented above.    At noon, I called the patient's son, Dr. Lewis. He agrees  with the plan to attempt to unclog the tube.    I spoke with ED RN, who was able to unclog the tube with Clog Zapper.    At 1226, I spoke with Dr. Lewis again. I updated him that the tube was successfully unclog it is now working normally. He requests that we arrange transportation to get her back to [facility].    I rechecked the patient. Findings and plan explained to the Patient's son. Patient was discharged back to [facility] by EMS.    Impression & Plan    Medical Decision Making:  Her gastric tube is necessary for her nutrition hydration and medications, so it was important to get this working again. Conservative measures using clog zapper in the ED have reestablished a patent lumen. There was nothing on history or exam to suggest that the tubing been dislodged so I did not feel that imaging would be of benefit. They will be all the more important to crush her pills very carefully to minimize the risk of recurrence. She was discharged back to her care facility in improved condition after discussion of her ED course with her very pleasant physician son by phone.  No other changes to her medical regimen are currently indicated.    Diagnosis:    ICD-10-CM    1. Obstruction of feeding tube, initial encounter T85.598A    2. Dementia without behavioral disturbance, unspecified dementia type F03.90         10/1/2017   Marek Frost, *        Marek Frost MD  10/01/17 1551

## 2017-10-01 NOTE — ED NOTES
Bed: ED24  Expected date:   Expected time:   Means of arrival:   Comments:  Lea 429 clogged Feeding tube 89 female

## 2018-09-26 NOTE — DISCHARGE INSTRUCTIONS
TF via G-tube: Isosource 1.5 (or equivalent) at 45 mL/hr continuous.  FLush 130 mL H2O q 4 hours.  Nutrisource Fiber, 1 packet BID.    You have been discharged back to [facility]  Phone Number is [phone]  Fax Number is [phone]  
(2) assistive person

## 2019-05-19 NOTE — DISCHARGE SUMMARY
Problem: Daily Care:  Goal: Daily care needs are met  Description  Daily care needs are met  Outcome: Ongoing     Problem: Pain:  Goal: Patient's pain/discomfort is manageable  Description  Patient's pain/discomfort is manageable  Outcome: Ongoing     Problem: Discharge Planning:  Goal: Patients continuum of care needs are met  Description  Patients continuum of care needs are met  Outcome: Ongoing [employer]    Discharge Summary  Hospitalist    Date of Admission:  9/23/2017  Date of Discharge:  9/27/2017  Discharging Provider: Favio Guzmán DO    Discharge Diagnoses   1. Aspiration pneumonia  2. Sepsis 2/2 aspiration pneumonia  3. End stage dementia   4. DM type II   5. UTI     History of Present Illness   Celia Lewis is an 89 year old non-verbal female who presented from her nursing home with a chronic G-tube in place for hypoxia and foaming at the mouth.  Unfortunately the patient's history is very limited given her severe dementia    Hospital Course   Celia Lewis was admitted on 9/23/2017.  The following problems were addressed during her hospitalization:    Aspiration Pneumonia with Sepsis  Given the patient's chronic G-tube and hypoxia it was believed that she likely had an aspiration event concerning for aspiration pneumonia.  She was treated with IV Zosyn and the tube feeds were initially stopped.  She had good resolution of her hypoxia with antibiotics and we were able to restart her tube feeds.  These were gradually titrated up back to her normal tube feeds.      DM type II   Patient was on decreased Lantus units and SSI while tube feeds were here.  She then had to have increasing Lantus injections as her tube feeds were restarted and she was discharged to home on her home insulin regimen as she was to go back on her normal tube feed regimen.    Sacral Decubitus Ulcers  Managed by wound care here.     UTI  Patient's urine was positive and the Zosyn she received via IV should cover this.     Favio Guzmán DO    Significant Results and Procedures   See below    Pending Results   These results will be followed up by PCP  Unresulted Labs Ordered in the Past 30 Days of this Admission     Date and Time Order Name Status Description    9/23/2017 1221 Blood culture Preliminary     9/23/2017 1221 Blood culture Preliminary           Code Status   DNR / DNI       Primary  Care Physician   No primary care provider on file.    Physical Exam   Temp: 97.6  F (36.4  C) Temp src: Axillary BP: 146/60 Pulse: 72 Heart Rate: 75 Resp: 26 SpO2: 96 % O2 Device: None (Room air)    Vitals:    09/23/17 1722 09/27/17 0630   Weight: 65.3 kg (144 lb) 68.1 kg (150 lb 2.1 oz)     Vital Signs with Ranges  Temp:  [97.6  F (36.4  C)-98.2  F (36.8  C)] 97.6  F (36.4  C)  Pulse:  [72-73] 72  Heart Rate:  [70-77] 75  Resp:  [18-32] 26  BP: (141-155)/(60-86) 146/60  SpO2:  [94 %-96 %] 96 %  I/O last 3 completed shifts:  In: 2235 [I.V.:1625; NG/GT:120]  Out: 1425 [Urine:1425]    Constitutional: Resting bed.  Patient is non-verbal and does not follow commands   Respiratory: Clear to auscultation   Cardiovascular: RRR.  No murmurs   Neuropsychiatric: non-verbal    Discharge Disposition   Discharged to nursing home  Condition at discharge: Fair    Consultations This Hospital Stay   PHARMACY TO DOSE VANCO  PHARMACY TO DOSE VANCO  WOUND OSTOMY CONTINENCE NURSE  IP CONSULT  PHARMACY IP CONSULT  SOCIAL WORK IP CONSULT    Time Spent on this Encounter   IFavio, personally saw the patient today and spent greater than 30 minutes discharging this patient.    Discharge Orders     General info for SNF   Length of Stay Estimate: Long Term Care  Condition at Discharge: Stable  Level of care:skilled   Rehabilitation Potential: Poor  Admission H&P remains valid and up-to-date: Yes  Recent Chemotherapy: N/A  Use Nursing Home Standing Orders: Yes     Mantoux instructions   Give two-step Mantoux (PPD) Per Facility Policy Yes     NO CPM     Reason for your hospital stay   Aspiration pneumonia     Advance Diet as Tolerated   Follow this diet upon discharge: Orders Placed This Encounter     Adult Formula Drip Feeding: Continuous Isosource 1.5; Gastrostomy; Goal Rate: 40; mL/hr; Medication - Tube Feeding Flush Frequency: At least 15-30 mL water before and after medication administration and with tube clogging; Amout to  Send (Nutrition...     NPO for Medical/Clinical Reasons Except for: Meds       Discharge Medications   Current Discharge Medication List      CONTINUE these medications which have CHANGED    Details   !! oxyCODONE (ROXICODONE) 5 MG/5ML solution 2 mLs (2 mg) by Per G Tube route 3 times daily  Qty: 15 mL, Refills: 0    Associated Diagnoses: Multiple joint pain; Sacral decubitus ulcer, stage III (H)       !! - Potential duplicate medications found. Please discuss with provider.      CONTINUE these medications which have NOT CHANGED    Details   insulin glargine (LANTUS) 100 UNIT/ML injection Inject 39 Units Subcutaneous At Bedtime      !! oxyCODONE (ROXICODONE) 5 MG/5ML solution 2 mLs (2 mg) by Per G Tube route every 4 hours as needed for moderate to severe pain  Qty: 15 mL, Refills: 0    Associated Diagnoses: Primary localized osteoarthrosis of shoulder region, unspecified laterality      aspirin 325 MG tablet 1 tablet (325 mg) by Per G Tube route daily  Qty: 120 tablet, Refills: 2    Associated Diagnoses: Cerebrovascular accident (CVA) due to bilateral embolism of posterior cerebral arteries (H)      metoprolol (LOPRESSOR) 10 mg/mL SUSP Take 25 mg by mouth 2 times daily      simethicone (MYLICON) 40 MG/0.6ML suspension Take 80 mg by mouth 3 times daily      ranitidine (ZANTAC) 150 MG/10ML syrup 10 mLs (150 mg) by Per Feeding Tube route daily  Qty: 600 mL    Associated Diagnoses: Gastroesophageal reflux disease without esophagitis      vitamin A-D & C drops (TRI-VI-SOL) 750-400-35 UNIT-MG/ML solution NEW FORMULATION 7 mLs by Per G Tube route daily  Qty: 50 mL, Refills: 0    Associated Diagnoses: Decubitus ulcer of buttock, unspecified laterality, unspecified ulcer stage      INSULIN ASPART SC Inject 2-12 Units Subcutaneous every 6 hours 0800, 1400, 2000, 0200  -250 2 unit  -300 4 units  -350 6 units  -400 8 units  -450 10 units  BG >450 12 units and update MD Dakins 0.125 % SOLN  Externally apply topically 2 times daily BID and PRN.   1. Cleanse wound with microklenze, cleanse periwound area with naomi perineal  2. Moisten kerlix fluff with dakins solution 0.125%, wring out excess, pack wound with kerlix  3. Apply antifungal powder to periwound area, rub in. Apply criticaid over powder.  4. Cover with ABD using minimal medipore tape to secure.  5. Label dressing with date, time, initials. Follow Rigorous PIP measures.      Lactobacillus Acidophilus POWD 1 capsule by Gastric Tube route daily       multivitamin, therapeutic (THERA-VIT) TABS tablet 1 tablet by Gastric Tube route daily      Loperamide HCl (IMODIUM A-D) 1 MG/7.5ML LIQD 4 mg by Gastric Tube route every other day      !! acetaminophen (TYLENOL) 32 mg/mL solution 325 mg by Gastric Tube route daily as needed for fever or mild pain      !! ACETAMINOPHEN  mg by Gastric Tube route 3 times daily       bisacodyl (DULCOLAX) 10 MG suppository Place 10 mg rectally daily as needed for constipation       !! - Potential duplicate medications found. Please discuss with provider.        Allergies   Allergies   Allergen Reactions     Sulfa Drugs Other (See Comments)     Per nursing home documents, patient has allergy to sulfa     Data   Most Recent 3 CBC's:  Recent Labs   Lab Test  09/26/17   0751  09/25/17   1110  09/25/17   0440  09/24/17   0530   WBC  8.4   --   10.0  10.6   HGB  12.0   --   12.0  12.3   MCV  98   --   101*  102*   PLT  187  184  183  197      Most Recent 3 BMP's:  Recent Labs   Lab Test  09/26/17   0751  09/25/17   0440  09/24/17   1508  09/24/17   0530   NA  144  150*   --   151*   POTASSIUM  3.8  4.2   --   4.2   CHLORIDE  114*  120*   --   117*   CO2  19*  22   --   26   BUN  25  39*   --   57*   CR  0.99  1.04  1.16*  1.22*   ANIONGAP  11  8   --   8   NELLIE  7.8*  7.7*   --   8.1*   GLC  258*  200*   --   205*     Most Recent 2 LFT's:  Recent Labs   Lab Test  09/23/17   1314  08/20/17   1853   AST  22  23   ALT  28  28    ALKPHOS  64  82   BILITOTAL  0.5  0.5     Most Recent INR's and Anticoagulation Dosing History:  Anticoagulation Dose History     Recent Dosing and Labs Latest Ref Rng & Units 11/2/2010 11/3/2010 8/5/2012 6/23/2014 6/21/2016 6/14/2017 8/20/2017    INR 0.86 - 1.14 1.02 0.97 1.09 1.01 1.37(H) 1.10 0.98        Most Recent 3 Troponin's:  Recent Labs   Lab Test  08/21/17   0625  08/20/17   2330  08/20/17   1853   08/05/12   1837   01/28/10   1505   TROPI  0.558*  0.706*  0.658*   < >   --    < >   --    TROPONIN   --    --    --    --   0.00   --   0.00    < > = values in this interval not displayed.     Most Recent Cholesterol Panel:  Recent Labs   Lab Test  08/20/17   1853   CHOL  269*   LDL  Cannot estimate LDL when triglyceride exceeds 400 mg/dL  103*   HDL  30*   TRIG  1049*     Most Recent 6 Bacteria Isolates From Any Culture (See EPIC Reports for Culture Details):  Recent Labs   Lab Test  09/23/17   1419  09/23/17   1314  09/23/17   1305  08/23/17   0916  08/23/17   0429  08/22/17   1200   CULT  >100,000 colonies/mL  Escherichia coli  *  No growth after 4 days  No growth after 4 days  No growth  >100,000 colonies/mL  Candida albicans / dubliniensis  Candida albicans and Candida dubliniensis are not routinely speciated  Susceptibility testing not routinely done  *  <10,000 colonies/mL  Pseudomonas fluorescens putida group  *  No growth     Most Recent TSH, T4 and A1c Labs:  Recent Labs   Lab Test  08/21/17   0750  08/21/17   0625   TSH  1.19   --    A1C   --   8.2*     Results for orders placed or performed during the hospital encounter of 09/23/17   CT Head w/o Contrast    Narrative    CT SCAN OF THE HEAD WITHOUT CONTRAST   9/23/2017 1:51 PM     HISTORY: Trauma, evaluate fracture, bleed.    TECHNIQUE:  Axial images of the head and coronal reformations without  IV contrast material. Radiation dose for this scan was reduced using  automated exposure control, adjustment of the mA and/or kV according  to patient  size, or iterative reconstruction technique.    COMPARISON: Brain MR 8/21/2017.    FINDINGS: Well-defined hypodensities within the occipital lobes  bilaterally, left greater than right, compatible with evolving  infarcts. There is no evidence of acute intracranial hemorrhage.  Minimal cortical hyperdensity in the left occipital lobe likely  represents laminar necrosis in the areas of infarct. Loss of cortical  differentiation in the left frontal lobe from previous infarct again  noted. There is no mass effect or midline shift. Fairly extensive  periventricular white matter hypodensity is likely due to chronic  microvascular ischemic disease. Superimposed area of ischemia would be  difficult to exclude. Ventricular size is unchanged since prior. There  is unchanged moderate diffuse parenchymal volume loss.    The visualized portions of the sinuses and mastoids appear normal. The  bony calvarium and bones of the skull base appear intact.      Impression    IMPRESSION:      1. Evolving bilateral posterior cerebral artery infarcts, left greater  than right. No evidence of new intracranial hemorrhage. No mass effect  or midline shift. Persistent cortical left frontal lobe infarct.  2. Extensive periventricular white matter hypodensity likely due to  chronic microvascular ischemic disease is similar to prior.  Superimposed areas of ischemia would be difficult to exclude and would  be better evaluated with brain MRI if clinically necessary.  3. Unchanged moderate diffuse parenchymal volume loss.        KY FLOR MD   XR Chest 1 View    Narrative    XR CHEST 1 VW 9/23/2017 2:04 PM    COMPARISON: 8/20/2017    HISTORY: Fever.      Impression    IMPRESSION: Enlarged cardiac silhouette is again seen and unchanged.  Mild mixed interstitial and airspace opacities over both lungs, edema  versus atypical infection. Possible trace left pleural effusion. No  pneumothorax on either side.    BOY BAEZA MD   XR Chest 2 Views     Narrative    CHEST TWO VIEWS 9/26/2017 1:14 PM     HISTORY: Resuming tube feedings.    COMPARISON: 9/23/2017    FINDINGS: The heart is enlarged but exaggerated by low lung volumes  and portable technique. Interstitial markings also exaggerated by low  lung volumes. No definite lobar consolidation, pneumothorax, or  pleural effusion.       Impression    IMPRESSION: Cardiomegaly.     LIBBY POST MD

## 2021-02-11 NOTE — IP AVS SNAPSHOT
[place] [address] [facility]: [phone]                                              INTERAGENCY TRANSFER FORM - PHYSICIAN ORDERS   2017                    Hospital Admission Date: 2017  RAÚL MERCEDES   : 1926  Sex: Female        Attending Provider: Felicitas Cedeño DO     Allergies:  Sulfa Drugs    Infection:  MRSA-Contact Isolation   Service:  HOSPITALIST    Ht:  1.524 m (5')   Wt:  69 kg (152 lb 1.9 oz)   Admission Wt:  64.7 kg (142 lb 10.2 oz)    BMI:  29.71 kg/m 2   BSA:  1.71 m 2            Patient PCP Information     None on File      ED Clinical Impression     Diagnosis Description Comment Added By Time Added    Severe sepsis without septic shock (H) [A41.9, R65.20] Severe sepsis without septic shock (H) [A41.9, R65.20]  Juan Rubi MD 2017  5:57 AM    Urinary tract infection associated with indwelling urethral catheter, initial encounter (H) [T83.511A, N39.0] Urinary tract infection associated with indwelling urethral catheter, initial encounter (H) [T83.511A, N39.0]  Juan Rubi MD 2017  5:57 AM      Hospital Problems as of 8/10/2017              Priority Class Noted POA    Advanced dementia Medium  2014 Yes    Diabetes mellitus type 2, insulin dependent (H) Medium  2015 Yes    Sepsis due to urinary tract infection (H) Medium  2017 Yes    Recurrent UTI Medium  2017 Yes    Acute renal failure superimposed on stage 3 chronic kidney disease (H) Medium  2017 Yes    Sacral decubitus ulcer, stage III (H) Medium  2017 Yes    Hypernatremia Medium  2017 Yes    Complication of gastrostomy tube (H) Medium  2017 Yes      Non-Hospital Problems as of 8/10/2017              Priority Class Noted    Nephrolithiasis Medium  2012    Bacteremia Medium  2012    Actinomyces infection Medium  2012    Uric acid stone in urine Medium  Unknown    Fall Medium  2014    UTI (urinary tract infection) Medium   6/24/2014    Scalp laceration Medium  6/24/2014    Atrial fibrillation (H) Medium  5/22/2015    History of urinary tract infection Medium  5/22/2015    Alzheimer's disease Medium  5/22/2015    Primary localized osteoarthrosis of shoulder region Medium  5/22/2015    Morbid obesity (H) Medium  5/22/2015    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) Medium  5/22/2015    Hyperlipidemia Medium  5/22/2015    Candidiasis of skin and nails Medium  5/22/2015    Chronic kidney disease, stage III (moderate) Medium  5/22/2015    Advance care planning Medium  5/29/2015    HCAP (healthcare-associated pneumonia) Medium  6/21/2016    ARF (acute renal failure) (H) Medium  6/30/2017      Code Status History     Date Active Date Inactive Code Status Order ID Comments User Context    8/9/2017  6:24 PM  Full Code [id number]  Tamar Pineda MD Outpatient    8/4/2017  7:51 AM 8/9/2017  6:24 PM Full Code [id number]  Felicitas Cedeño DO Inpatient    7/8/2017 10:22 AM 8/4/2017  7:51 AM Full Code [id number]  Piotr Saba MD Outpatient    6/30/2017  7:29 PM 7/8/2017 10:22 AM Full Code [id number]  Paul Jolly MD Inpatient    6/19/2017 10:10 AM 6/30/2017  7:29 PM Full Code [id number]  Kraig Mobley MD Outpatient    6/14/2017  5:14 PM 6/19/2017 10:10 AM Full Code [id number]  Antwan Green MD Inpatient    6/21/2016 11:48 AM 6/24/2016  5:44 PM Full Code [id number]  Clarence Wilkerson MD Inpatient    6/24/2014  3:01 PM 6/21/2016 11:48 AM Full Code [id number]  Emily Ca DO Outpatient    6/24/2014  3:34 AM 6/24/2014  3:01 PM Full Code [id number]  Ni Mccurdy MD Inpatient    8/14/2012 12:42 PM 6/24/2014  3:34 AM Full Code [id number]  Francesca Knott MD Outpatient    8/6/2012  3:47 AM 8/14/2012 12:42 PM Full Code [id number]  Peewee De La Cruz MD Inpatient         Medication Review      START taking        Dose / Directions Comments    cefuroxime 500 MG tablet   Commonly known as:  CEFTIN    Indication:  Urinary Tract Infection   Used for:  Sepsis due to urinary tract infection (H)        Dose:  500 mg   1 tablet (500 mg) by Per G Tube route every 12 hours   Quantity:  15 tablet   Refills:  0          CONTINUE these medications which have NOT CHANGED        Dose / Directions Comments    * ACETAMINOPHEN PO        Dose:  650 mg   650 mg by Gastric Tube route 3 times daily   Refills:  0        * acetaminophen 32 mg/mL solution   Commonly known as:  TYLENOL        Dose:  325 mg   325 mg by Gastric Tube route daily as needed for fever or mild pain   Refills:  0        bisacodyl 10 MG Suppository   Commonly known as:  DULCOLAX        Dose:  10 mg   Place 10 mg rectally daily as needed for constipation   Refills:  0        Dakins 0.125 % Soln        Externally apply topically 2 times daily BID and PRN.  1. Cleanse wound with microklenze, cleanse periwound area with naomi perineal 2. Moisten kerlix fluff with dakins solution 0.125%, wring out excess, pack wound with kerlix 3. Apply antifungal powder to periwound area, rub in. Apply criticaid over powder. 4. Cover with ABD using minimal medipore tape to secure. 5. Label dressing with date, time, initials. Follow Rigorous PIP measures.   Refills:  0        IMODIUM A-D 1 MG/7.5ML Liqd   Generic drug:  Loperamide HCl        Dose:  4 mg   4 mg by Gastric Tube route every other day   Refills:  0        INSULIN ASPART SC        Dose:  1-6 Units   Inject 1-6 Units Subcutaneous every 6 hours 0800, 1400, 2000, 0200 -250 1 unit -300 2 units -350 3 units -400 4 units -450 5 units BG >450 6 units and update MD   Refills:  0        insulin glargine 100 UNIT/ML injection   Commonly known as:  LANTUS   Used for:  Type 2 diabetes mellitus with diabetic nephropathy, unspecified long term insulin use status (H)        Dose:  30 Units   Inject 30 Units Subcutaneous 2 times daily   Refills:  0        Lactobacillus Acidophilus Powd        Dose:  1  capsule   1 capsule by Gastric Tube route daily 10 billion units   Refills:  0        metoprolol 10 mg/mL Susp   Commonly known as:  LOPRESSOR   Used for:  Benign essential hypertension        Dose:  25 mg   Take 2.5 mLs (25 mg) by mouth 2 times daily   Quantity:  100 mL   Refills:  1        multivitamin, therapeutic Tabs tablet        Dose:  1 tablet   1 tablet by Gastric Tube route daily   Refills:  0        * oxyCODONE 5 MG/5ML solution   Commonly known as:  ROXICODONE   Indication:  Chronic Pain        Dose:  2 mg   Take 2 mg by mouth 3 times daily   Refills:  0        * oxyCODONE 5 MG/5ML solution   Commonly known as:  ROXICODONE   Used for:  Primary localized osteoarthrosis of shoulder region, unspecified laterality        Dose:  2 mg   2 mLs (2 mg) by Per G Tube route every 4 hours as needed for moderate to severe pain   Quantity:  473 mL   Refills:  0        ranitidine 150 MG/10ML syrup   Commonly known as:  Zantac   Used for:  Gastroesophageal reflux disease without esophagitis        Dose:  150 mg   10 mLs (150 mg) by Per Feeding Tube route daily   Quantity:  600 mL   Refills:  0        simethicone 40 MG/0.6ML suspension   Commonly known as:  MYLICON        Dose:  80 mg   Take 80 mg by mouth 3 times daily   Refills:  0        vitamin A-D & C drops 750-400-35 UNIT-MG/ML solution NEW FORMULATION   Used for:  Decubitus ulcer of buttock, unspecified laterality, unspecified ulcer stage        Dose:  7 mL   7 mLs by Per G Tube route daily   Quantity:  50 mL   Refills:  0        * Notice:  This list has 4 medication(s) that are the same as other medications prescribed for you. Read the directions carefully, and ask your doctor or other care provider to review them with you.              Further instructions from your care team       TF via G-tube: Isosource 1.5 (or equivalent) at 45 mL/hr continuous.  FLush 130 mL H2O q 4 hours.  Nutrisource Fiber, 1 packet BID.    Summary of Visit     Reason for your hospital  stay       Sepsis with urinary tract infection             After Care     Activity - Up with nursing assistance           Advance Diet as Tolerated       Follow this diet upon discharge: Orders Placed This Encounter      Adult Formula Drip Feeding: Continuous Isosource 1.5; Gastrostomy; Goal Rate: 45; mL/hr; Medication - Tube Feeding Flush Frequency: At least 15-30 mL water before and after medication administration and with tube clogging       General info for SNF       Length of Stay Estimate: Long Term Care  Condition at Discharge: Stable  Level of care:skilled   Rehabilitation Potential: Poor  Admission H&P remains valid and up-to-date: Yes  Recent Chemotherapy: N/A  Use Nursing Home Standing Orders: Yes       Glucose monitor nursing POCT       Before meals and at bedtime       Mantoux instructions       Give two-step Mantoux (PPD) Per Facility Policy Yes             Follow-Up Appointment Instructions     Future Labs/Procedures    Follow Up and recommended labs and tests     Comments:    Follow up with Nursing home physician in 1 week for hospital follow up, repeat BMP.  The following labs/tests are recommended: BMP.      Follow-Up Appointment Instructions     Follow Up and recommended labs and tests       Follow up with Nursing home physician in 1 week for hospital follow up, repeat BMP.  The following labs/tests are recommended: BMP.                5-Fu Pregnancy And Lactation Text: This medication is Pregnancy Category X and contraindicated in pregnancy and in women who may become pregnant. It is unknown if this medication is excreted in breast milk.

## 2021-10-08 NOTE — PLAN OF CARE
Anesthesia Pre Eval Note    Anesthesia ROS/Med Hx          Pulmonary Review:    Positive for sleep apnea     Cardiovascular Review:    Positive for hypertension  Positive for hyperlipidemia    GI/HEPATIC/RENAL Review:   Positive for liver disease   Positive for renal disease    End/Other Review:  Positive for diabetes  Positive for obesity       Relevant Problems   Anesthesia Problems   (+) MEREDITH (obstructive sleep apnea)       Physical Exam     Airway   Mallampati: III  TM Distance: >3 FB  Neck ROM: Full  Neck: Short, Thick and Able to place in sniff position  TMJ Mobility: Good    Cardiovascular  Cardiovascular exam normal  Cardio Rhythm: Regular  Cardio Rate: Normal    Head Assessment  Head assessment: Normocephalic and Atraumatic    General Assessment  General Assessment: Alert and oriented and No acute distress    Dental Exam  Dental exam normal    Pulmonary Exam  Pulmonary exam normal  Breath sounds clear to auscultation:  Yes    Abdominal Exam  Abdominal exam normal      Anesthesia Plan:  Anesthesia Plan    ASA Status: 3    Anesthesia Type: General    Induction: Intravenous  Maintenance: TIVA      Post-op Pain Management: Per Surgeon      Checklist  Reviewed: NPO Status, Allergies, Medications, Problem list, Past Med History and Patient Summary  Consent/Risks Discussed Statement:  The proposed anesthetic plan, including its risks and benefits, have been discussed with the Patient along with the risks and benefits of alternatives. Questions were encouraged and answered and the patient and/or representative understands and agrees to proceed.        I discussed with the patient (and/or patient's legal representative) the risks and benefits of the proposed anesthesia plan, General, which may include services performed by other anesthesia providers.    Alternative anesthesia plans, if available, were reviewed with the patient (and/or patient's legal representative). Discussion has been held with the patient (and/or  Problem: Patient Care Overview  Goal: Plan of Care/Patient Progress Review  Outcome: Improving  Pt unresponsive/nonverbal. Does open eyes spontaneously. VSS - weaned to RA. Pt seems relaxed at rest - ZEOY pain but scheduled tylenol/oxy given. G tube clamped. TF on hold, but okay to give meds. IVF and IV antbx. Chronic caro. Turned/repo q 2hrs. Wounds to ears and R foot dsg CDI. Coccyx dsg changed per POC. Tele: NSR       patient's legal representative) regarding risks of anesthesia, which include Intra-operative Awareness and emergent situations that may require change in anesthesia plan.    The patient (and/or patient's legal representative) has indicated understanding, his/her questions have been answered, and he/she wishes to proceed with the planned anesthetic.    Blood Products: Not Anticipated